# Patient Record
Sex: MALE | Race: WHITE | NOT HISPANIC OR LATINO | URBAN - METROPOLITAN AREA
[De-identification: names, ages, dates, MRNs, and addresses within clinical notes are randomized per-mention and may not be internally consistent; named-entity substitution may affect disease eponyms.]

---

## 2021-10-11 ENCOUNTER — EMERGENCY (EMERGENCY)
Facility: HOSPITAL | Age: 81
LOS: 1 days | Discharge: HOME | End: 2021-10-11
Attending: EMERGENCY MEDICINE | Admitting: EMERGENCY MEDICINE
Payer: COMMERCIAL

## 2021-10-11 VITALS — TEMPERATURE: 99 F

## 2021-10-11 VITALS
WEIGHT: 235.01 LBS | OXYGEN SATURATION: 97 % | RESPIRATION RATE: 19 BRPM | HEART RATE: 89 BPM | SYSTOLIC BLOOD PRESSURE: 191 MMHG | TEMPERATURE: 98 F | DIASTOLIC BLOOD PRESSURE: 78 MMHG

## 2021-10-11 PROCEDURE — 73030 X-RAY EXAM OF SHOULDER: CPT

## 2021-10-11 PROCEDURE — 93971 EXTREMITY STUDY: CPT

## 2021-10-11 PROCEDURE — 71045 X-RAY EXAM CHEST 1 VIEW: CPT

## 2021-10-11 PROCEDURE — 93971 EXTREMITY STUDY: CPT | Mod: 26,RT

## 2021-10-11 PROCEDURE — 73030 X-RAY EXAM OF SHOULDER: CPT | Mod: 26,RT

## 2021-10-11 PROCEDURE — 73060 X-RAY EXAM OF HUMERUS: CPT | Mod: 26,RT

## 2021-10-11 PROCEDURE — 71045 X-RAY EXAM CHEST 1 VIEW: CPT | Mod: 26

## 2021-10-11 PROCEDURE — 99284 EMERGENCY DEPT VISIT MOD MDM: CPT | Mod: 25

## 2021-10-11 PROCEDURE — 73060 X-RAY EXAM OF HUMERUS: CPT

## 2021-10-11 PROCEDURE — 99285 EMERGENCY DEPT VISIT HI MDM: CPT

## 2021-10-11 RX ORDER — ACETAMINOPHEN 500 MG
650 TABLET ORAL ONCE
Refills: 0 | Status: COMPLETED | OUTPATIENT
Start: 2021-10-11 | End: 2021-10-11

## 2021-10-11 RX ADMIN — Medication 650 MILLIGRAM(S): at 15:31

## 2021-10-11 NOTE — ED PROVIDER NOTE - PHYSICAL EXAMINATION
right arm- tenderness to humerus and anterior shoulder, positive ROM, no redness or signs of infection noted, positive radial pulse, less than 2 sec cap refill, pain on ROM

## 2021-10-11 NOTE — ED PROVIDER NOTE - CARE PLAN
Principal Discharge DX:	Arthritis of right shoulder region   1 Principal Discharge DX:	Right arm pain

## 2021-10-11 NOTE — ED PROVIDER NOTE - PATIENT PORTAL LINK FT
no You can access the FollowMyHealth Patient Portal offered by [facility] by registering at the following website: [email or web address]. By joining [facility]’s FollowMyHealth portal, you will also be able to view your health information using other applications (apps) compatible with our system.

## 2021-10-11 NOTE — ED PROVIDER NOTE - CLINICAL SUMMARY MEDICAL DECISION MAKING FREE TEXT BOX
81 yr old male with hx of HTN, HLD, DM presents with atraumatic right arm pain since last night after fixing his belt. Denies any fever, chills, n/v/d, chest pain, sob or any other symptoms.  right arm- tenderness to humerus and anterior shoulder, positive ROM, no redness or signs of infection noted, positive radial pulse, less than 2 sec cap refill, pain on ROM 81 yr old male with hx of HTN, HLD, DM presents with atraumatic right arm pain since last night after fixing his belt. Denies any fever, chills, n/v/d, chest pain, sob or any other symptoms.  right arm- tenderness to humerus and anterior shoulder, positive ROM, no redness or signs of infection noted, positive radial pulse, less than 2 sec cap refill, pain on ROM   xray reviewed 81 yr old male with hx of HTN, HLD, DM presents with atraumatic right arm pain since last night after fixing his belt. Denies any fever, chills, n/v/d, chest pain, sob or any other symptoms.  right arm- tenderness to humerus and anterior shoulder, positive ROM, no redness or signs of infection noted, positive radial pulse, less than 2 sec cap refill, pain on ROM   xray, us - reviewed   RICE, stable for dc and fu with ortho   sling given

## 2021-10-11 NOTE — CHART NOTE - NSCHARTNOTEFT_GEN_A_CORE
SW met with pt at bedside, pts son Nader Raines present as well. SW introduced self and role of SW. Pt presented to ED for arm pain. Pt resides in pvt home with daughter Moira ([phone]), has 2 [address] and a full flight upstairs with hr present. Pt is independent with ADLs and ambulation, denies use of any DME or services. Pt denies any recent falls or safety concerns. Fall prevention and safety checklist reviewed and discussed, copy provided to pt. Pt declined assistance with scheduling follow up care stating he will call his PMD himself to setup appointment. Pt denies need for any community/home based resources. SW met with pt at bedside, pts son Nader Raines present as well. SW introduced self and role of SW. Pt presented to ED for arm pain. Pt resides in pvt home with daughter Moira ([phone]), has 2 [address] and a full flight upstairs with hr present. Pt is independent with ADLs and ambulation, denies use of any DME or services. Pt denies any recent falls or safety concerns. Fall prevention and safety checklist reviewed and discussed, copy provided to pt. Pt declined assistance with scheduling follow up care stating he will call his PMD himself to setup appointment. Pt denies need for any community/home based resources.    PMD: Dr. Phoenix ([phone])  Rx: [facility] in [place] ([phone] SW met with pt at bedside, pts son Nader Raines present as well. SW introduced self and role of SW. Pt presented to ED for arm pain. Pt resides in pvt home with daughter Moiar ([phone]), has 2 [address] and a full flight upstairs with hr present. Pt is independent with ADLs and ambulation, denies use of any DME or services. Pt denies any recent falls or safety concerns. Fall prevention and safety checklist reviewed and discussed, copy provided to pt. Pt declined assistance with scheduling follow up care stating he will call his PMD himself to setup appointment. Pt reports having seen an orthopedic in the past and wishes to go home to confirm name and will schedule himself. Pt denies need for any community/home based resources.    PMD: Dr. Phoenix ([phone])  Rx: [facility] in [place] ([phone]

## 2021-10-11 NOTE — ED PROVIDER NOTE - ATTENDING CONTRIBUTION TO CARE
I personally evaluated the patient. I reviewed the Resident’s or Physician Assistant’s note (as assigned above), and agree with the findings and plan except as documented in my note.  81 yr old male with hx of HTN, HLD, DM presents with atraumatic right arm pain since last night after fixing his belt. Denies any fever, chills, n/v/d, chest pain, sob or any other symptoms.  right arm- tenderness to humerus and anterior shoulder, positive ROM, no redness or signs of infection noted, positive radial pulse, less than 2 sec cap refill, pain on ROM   xray, us - reviewed   sono negative  Most likely muscular tear  CLOVIS, stable for dc and fu with ortho   sling given

## 2021-10-11 NOTE — ED PROVIDER NOTE - PRO INTERPRETER NEED 2
"Behavior  Anxiety 0 with 10 being the worst.   Depression 0 with 10 being the worst.   SI no  HI no  AVH no    1:1 with patient completed. Patient is calm and cooperative in his room. Patient makes good eye contact and is interacting appropriately with staff.  Patient states \"I am feeling pretty good, just tired.\" Patient denies any needs at this time.           Intervention  Instructed in medication usage and effects. Patient encouraged to notify staff of any needs, increased/uncontrolled anxiety/depression, or thoughts to harm self or others.       Response  Patient is agreeable and verbalizes understanding.         Plan  Support offered and will continue to monitor. Q15 minute checks for safety.     " English

## 2021-10-11 NOTE — ED ADULT TRIAGE NOTE - CHIEF COMPLAINT QUOTE
Pt BIB EMS from home for c/o right arm pain Pt BIB EMS from home for c/o right arm pain. ISAR Negative.

## 2021-10-11 NOTE — ED ADULT NURSE NOTE - OBJECTIVE STATEMENT
Patient from home, reports 7/10 right arm pain that began yesterday after buckling his pants. Pt states he also feels numbness and tingling in his right hand. Denies injuring his arm or other complaints at this time

## 2021-10-11 NOTE — ED PROVIDER NOTE - CARE PROVIDER_API CALL
Hector Torres)  Orthopaedic Sports Medicine; Orthopaedic Surgery  [address] [address]  Phone: [phone]  Fax: [phone]  Follow Up Time:

## 2021-10-11 NOTE — ED PROVIDER NOTE - OBJECTIVE STATEMENT
81 yr old male with hx of HTN, HLD, DM presents with atraumatic right arm pain since last night after fixing his belt. Denies any fever, chills, n/v/d, chest pain, sob or any other symptoms.

## 2021-10-11 NOTE — ED PROVIDER NOTE - NSFOLLOWUPINSTRUCTIONS_ED_ALL_ED_FT
Rest, ice, elevate Rest, ice, elevate   Take tylenol 650 mg every  4-6 hours as needed for pain   Return to the ED if any worsening or persistent symptoms.       Arm Pain    WHAT YOU NEED TO KNOW:    Your arm pain may be caused by a number of conditions. Examples include arthritis, nerve problems, or an awkward position while you sleep. X-rays did not show a broken bone in your arm or wrist. Arm pain may be a sign of a serious condition that needs immediate care, such as a heart attack.    DISCHARGE INSTRUCTIONS:    Call your local emergency number (911 in the [place]) for any of the following:   •You have any of the following signs of a heart attack: ?Squeezing, pressure, or pain in your chest      ?You may also have any of the following: ?Discomfort or pain in your back, neck, jaw, stomach, or arm      ?Shortness of breath      ?Nausea or vomiting      ?Lightheadedness or a sudden cold sweat            Return to the emergency department if:   •You have severe pain, or pain that spreads from your arm to other areas.      •You have swelling, tingling, or numbness in your hand or fingers, or the skin turns blue.      •You cannot move your arm.      Call your doctor if:   •You have questions or concerns about your condition or care.          Medicines: You may need any of the following:   •Prescription pain medicine may be given. Ask your healthcare provider how to take this medicine safely. Some prescription pain medicines contain acetaminophen. Do not take other medicines that contain acetaminophen without talking to your healthcare provider. Too much acetaminophen may cause liver damage. Prescription pain medicine may cause constipation. Ask your healthcare provider how to prevent or treat constipation.       •NSAIDs, such as ibuprofen, help decrease swelling, pain, and fever. This medicine is available with or without a doctor's order. NSAIDs can cause stomach bleeding or kidney problems in certain people. If you take blood thinner medicine, always ask your healthcare provider if NSAIDs are safe for you. Always read the medicine label and follow directions.      •Take your medicine as directed. Contact your healthcare provider if you think your medicine is not helping or if you have side effects. Tell him or her if you are allergic to any medicine. Keep a list of the medicines, vitamins, and herbs you take. Include the amounts, and when and why you take them. Bring the list or the pill bottles to follow-up visits. Carry your medicine list with you in case of an emergency.      Self-care:   •Rest your arm as directed. A sling may be used to keep your arm from moving while it heals.      •Apply ice as directed. Ice helps decrease pain and swelling. Ice may also help prevent tissue damage. Use an ice pack, or put crushed ice in a plastic bag. Cover it with a towel. Apply it to your arm for 20 minutes every few hours, or as directed. Ask how many times to apply ice each day, and for how many days.      •Elevate your arm above the level of your heart as often as you can. This will help decrease swelling and pain. Prop your arm on pillows or blankets to keep the area elevated comfortably.             •Adjust your position if you work in front of a computer. You may need arm or wrist supports or change the height of your chair.      •Keep a pain record. Write down when your pain happens and how severe it is. Include any other symptoms you have with your pain. A record will help you keep track of pain cycles. Bring the record with you to your follow-up visits. It may also help your healthcare provider find out what is causing your pain.      Follow up with your doctor as directed: You may need physical therapy. You may need to see an orthopedic specialist. Write down your questions so you remember to ask them during your visits.       © Copyright [employer] 2021           back to top                          © Copyright [employer] 2021

## 2021-10-16 NOTE — CHART NOTE - NSCHARTNOTEFT_GEN_A_CORE
SW called patient to discuss and assist with follow up.  Patient presented to ED on 10/10/21 due to arm pain.  Patient was seen by SW in ED - denied any safety concerns or assistance with scheduling.  Patient not available to speak but SW spoke with daughter.  Daughter confirms patient is scheduled to see PMD on 10/18/21 to discuss referral to orthopedic. Daughter denied any other needs at this time.  Encouraged daughter to call SW is further assistance is needed.

## 2024-11-09 ENCOUNTER — INPATIENT (INPATIENT)
Facility: HOSPITAL | Age: 84
LOS: 2 days | Discharge: HOME | DRG: 948 | End: 2024-11-12
Attending: STUDENT IN AN ORGANIZED HEALTH CARE EDUCATION/TRAINING PROGRAM | Admitting: INTERNAL MEDICINE
Payer: COMMERCIAL

## 2024-11-09 VITALS
TEMPERATURE: 98 F | SYSTOLIC BLOOD PRESSURE: 94 MMHG | WEIGHT: 199.96 LBS | OXYGEN SATURATION: 99 % | DIASTOLIC BLOOD PRESSURE: 47 MMHG | RESPIRATION RATE: 18 BRPM | HEART RATE: 61 BPM

## 2024-11-09 DIAGNOSIS — R53.1 WEAKNESS: ICD-10-CM

## 2024-11-09 LAB
ALBUMIN SERPL ELPH-MCNC: 3.9 G/DL — SIGNIFICANT CHANGE UP (ref 3.3–5)
ALP SERPL-CCNC: 70 U/L — SIGNIFICANT CHANGE UP (ref 40–120)
ALT FLD-CCNC: 22 U/L — SIGNIFICANT CHANGE UP (ref 10–45)
ANION GAP SERPL CALC-SCNC: 7 MMOL/L — SIGNIFICANT CHANGE UP (ref 5–17)
APTT BLD: 24.1 SEC — LOW (ref 24.5–35.6)
AST SERPL-CCNC: 16 U/L — SIGNIFICANT CHANGE UP (ref 10–40)
BASOPHILS # BLD AUTO: 0.05 K/UL — SIGNIFICANT CHANGE UP (ref 0–0.2)
BASOPHILS NFR BLD AUTO: 0.4 % — SIGNIFICANT CHANGE UP (ref 0–2)
BILIRUB SERPL-MCNC: 0.7 MG/DL — SIGNIFICANT CHANGE UP (ref 0.2–1.2)
BLD GP AB SCN SERPL QL: SIGNIFICANT CHANGE UP
BUN SERPL-MCNC: 82 MG/DL — HIGH (ref 7–23)
CALCIUM SERPL-MCNC: 9.2 MG/DL — SIGNIFICANT CHANGE UP (ref 8.4–10.5)
CHLORIDE SERPL-SCNC: 104 MMOL/L — SIGNIFICANT CHANGE UP (ref 96–108)
CO2 SERPL-SCNC: 31 MMOL/L — SIGNIFICANT CHANGE UP (ref 22–31)
CREAT SERPL-MCNC: 3.07 MG/DL — HIGH (ref 0.5–1.3)
EGFR: 19 ML/MIN/1.73M2 — LOW
EOSINOPHIL # BLD AUTO: 0.47 K/UL — SIGNIFICANT CHANGE UP (ref 0–0.5)
EOSINOPHIL NFR BLD AUTO: 3.5 % — SIGNIFICANT CHANGE UP (ref 0–6)
FLUAV AG NPH QL: SIGNIFICANT CHANGE UP
FLUBV AG NPH QL: SIGNIFICANT CHANGE UP
GLUCOSE BLDC GLUCOMTR-MCNC: 118 MG/DL — HIGH (ref 70–99)
GLUCOSE BLDC GLUCOMTR-MCNC: 87 MG/DL — SIGNIFICANT CHANGE UP (ref 70–99)
GLUCOSE SERPL-MCNC: 91 MG/DL — SIGNIFICANT CHANGE UP (ref 70–99)
HCT VFR BLD CALC: 32 % — LOW (ref 39–50)
HGB BLD-MCNC: 10.9 G/DL — LOW (ref 13–17)
IMM GRANULOCYTES NFR BLD AUTO: 0.4 % — SIGNIFICANT CHANGE UP (ref 0–0.9)
INR BLD: 1.21 RATIO — HIGH (ref 0.85–1.16)
LACTATE SERPL-SCNC: 1.2 MMOL/L — SIGNIFICANT CHANGE UP (ref 0.7–2)
LYMPHOCYTES # BLD AUTO: 16.4 % — SIGNIFICANT CHANGE UP (ref 13–44)
LYMPHOCYTES # BLD AUTO: 2.19 K/UL — SIGNIFICANT CHANGE UP (ref 1–3.3)
MCHC RBC-ENTMCNC: 30.4 PG — SIGNIFICANT CHANGE UP (ref 27–34)
MCHC RBC-ENTMCNC: 34.1 G/DL — SIGNIFICANT CHANGE UP (ref 32–36)
MCV RBC AUTO: 89.1 FL — SIGNIFICANT CHANGE UP (ref 80–100)
MONOCYTES # BLD AUTO: 1.02 K/UL — HIGH (ref 0–0.9)
MONOCYTES NFR BLD AUTO: 7.6 % — SIGNIFICANT CHANGE UP (ref 2–14)
NEUTROPHILS # BLD AUTO: 9.58 K/UL — HIGH (ref 1.8–7.4)
NEUTROPHILS NFR BLD AUTO: 71.7 % — SIGNIFICANT CHANGE UP (ref 43–77)
NRBC # BLD: 0 /100 WBCS — SIGNIFICANT CHANGE UP (ref 0–0)
PLATELET # BLD AUTO: 176 K/UL — SIGNIFICANT CHANGE UP (ref 150–400)
POTASSIUM SERPL-MCNC: 4.1 MMOL/L — SIGNIFICANT CHANGE UP (ref 3.5–5.3)
POTASSIUM SERPL-SCNC: 4.1 MMOL/L — SIGNIFICANT CHANGE UP (ref 3.5–5.3)
PROT SERPL-MCNC: 7.1 G/DL — SIGNIFICANT CHANGE UP (ref 6–8.3)
PROTHROM AB SERPL-ACNC: 14.2 SEC — HIGH (ref 9.9–13.4)
RBC # BLD: 3.59 M/UL — LOW (ref 4.2–5.8)
RBC # FLD: 13.9 % — SIGNIFICANT CHANGE UP (ref 10.3–14.5)
RSV RNA NPH QL NAA+NON-PROBE: SIGNIFICANT CHANGE UP
SARS-COV-2 RNA SPEC QL NAA+PROBE: SIGNIFICANT CHANGE UP
SODIUM SERPL-SCNC: 142 MMOL/L — SIGNIFICANT CHANGE UP (ref 135–145)
TROPONIN I, HIGH SENSITIVITY RESULT: 10.9 NG/L — SIGNIFICANT CHANGE UP
TROPONIN I, HIGH SENSITIVITY RESULT: 13.9 NG/L — SIGNIFICANT CHANGE UP
TSH SERPL-MCNC: 6.28 UIU/ML — HIGH (ref 0.36–3.74)
WBC # BLD: 13.37 K/UL — HIGH (ref 3.8–10.5)
WBC # FLD AUTO: 13.37 K/UL — HIGH (ref 3.8–10.5)

## 2024-11-09 PROCEDURE — 99223 1ST HOSP IP/OBS HIGH 75: CPT | Mod: GC

## 2024-11-09 PROCEDURE — 70450 CT HEAD/BRAIN W/O DYE: CPT | Mod: 26,MC

## 2024-11-09 PROCEDURE — 71045 X-RAY EXAM CHEST 1 VIEW: CPT | Mod: 26

## 2024-11-09 PROCEDURE — 99285 EMERGENCY DEPT VISIT HI MDM: CPT

## 2024-11-09 PROCEDURE — 93010 ELECTROCARDIOGRAM REPORT: CPT

## 2024-11-09 RX ORDER — CLONIDINE HYDROCHLORIDE 0.2 MG/1
1 TABLET ORAL
Refills: 0 | DISCHARGE

## 2024-11-09 RX ORDER — MELATONIN 5 MG
3 TABLET ORAL AT BEDTIME
Refills: 0 | Status: DISCONTINUED | OUTPATIENT
Start: 2024-11-09 | End: 2024-11-12

## 2024-11-09 RX ORDER — INSULIN GLARGINE,HUM.REC.ANLOG 100/ML
24 VIAL (ML) SUBCUTANEOUS
Refills: 0 | DISCHARGE

## 2024-11-09 RX ORDER — MAGNESIUM, ALUMINUM HYDROXIDE 200-200 MG
30 TABLET,CHEWABLE ORAL EVERY 4 HOURS
Refills: 0 | Status: DISCONTINUED | OUTPATIENT
Start: 2024-11-09 | End: 2024-11-12

## 2024-11-09 RX ORDER — INSULIN LISPRO 100/ML
12 VIAL (ML) SUBCUTANEOUS
Refills: 0 | DISCHARGE

## 2024-11-09 RX ORDER — CHLORTHALIDONE 25 MG
1 TABLET ORAL
Refills: 0 | DISCHARGE

## 2024-11-09 RX ORDER — LINAGLIPTIN 5 MG/1
1 TABLET, FILM COATED ORAL
Refills: 0 | DISCHARGE

## 2024-11-09 RX ORDER — LEVOTHYROXINE SODIUM 88 MCG
1 TABLET ORAL
Refills: 0 | DISCHARGE

## 2024-11-09 RX ORDER — ROSUVASTATIN CALCIUM 10 MG
1 TABLET ORAL
Refills: 0 | DISCHARGE

## 2024-11-09 RX ORDER — ONDANSETRON HYDROCHLORIDE 2 MG/ML
4 INJECTION, SOLUTION INTRAMUSCULAR; INTRAVENOUS EVERY 8 HOURS
Refills: 0 | Status: DISCONTINUED | OUTPATIENT
Start: 2024-11-09 | End: 2024-11-12

## 2024-11-09 RX ORDER — SODIUM CHLORIDE 9 MG/ML
1000 INJECTION, SOLUTION INTRAMUSCULAR; INTRAVENOUS; SUBCUTANEOUS ONCE
Refills: 0 | Status: COMPLETED | OUTPATIENT
Start: 2024-11-09 | End: 2024-11-09

## 2024-11-09 RX ORDER — ACETAMINOPHEN 500 MG
650 TABLET ORAL EVERY 6 HOURS
Refills: 0 | Status: DISCONTINUED | OUTPATIENT
Start: 2024-11-09 | End: 2024-11-12

## 2024-11-09 RX ORDER — PROPRANOLOL HCL 60 MG
1 TABLET ORAL
Refills: 0 | DISCHARGE

## 2024-11-09 RX ORDER — HYDRALAZINE HYDROCHLORIDE 50 MG/1
1 TABLET, FILM COATED ORAL
Refills: 0 | DISCHARGE

## 2024-11-09 RX ADMIN — SODIUM CHLORIDE 2000 MILLILITER(S): 9 INJECTION, SOLUTION INTRAMUSCULAR; INTRAVENOUS; SUBCUTANEOUS at 20:41

## 2024-11-09 RX ADMIN — SODIUM CHLORIDE 1000 MILLILITER(S): 9 INJECTION, SOLUTION INTRAMUSCULAR; INTRAVENOUS; SUBCUTANEOUS at 22:00

## 2024-11-09 NOTE — ED PROVIDER NOTE - CARE PLAN
Principal Discharge DX:	Weakness  Secondary Diagnosis:	Near syncope  Secondary Diagnosis:	Elevated serum creatinine   1

## 2024-11-09 NOTE — ED PROVIDER NOTE - OBJECTIVE STATEMENT
84-year-old male presents to the emergency department status post extreme weakness.  Appeared near syncopal.  It is possible that patient had a syncopal episode however he was supported by his family and scouts as he was at a veterans dinner when this occurred.  Patient states he had his insulin and then had dinner at around 6 PM.  Event occurred around 7:30 PM.  There was no fall or trauma.  Daughter states that she was sitting next to him at the veterans dinner and he kept leaning to the side.  She kept prompting him to sit up straight and then realized that maybe he was tired and needed to go home.  She asked for him to stand up so they can get going and he could not.  Some scouts at the event came to assist and he was unable to get up.  EMS noted that he was hypotensive at the scene with diastolic of 40.  Systolic of the 100s.  Upon arrival, fingerstick favorable.  Patient denies pain.  States he has some weakness.  No other complaints reported.  Patient has diabetes, hypertension, hyperlipidemia, pacemaker, on Eliquis, hypothyroidism on levothyroxine.

## 2024-11-09 NOTE — ED ADULT NURSE NOTE - NSFALLHARMRISKINTERV_ED_ALL_ED

## 2024-11-09 NOTE — ED ADULT NURSE NOTE - OBJECTIVE STATEMENT
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

## 2024-11-09 NOTE — ED PROVIDER NOTE - CLINICAL SUMMARY MEDICAL DECISION MAKING FREE TEXT BOX
84-year-old male presents to the emergency department status post extreme weakness.  Appeared near syncopal.  It is possible that patient had a syncopal episode however he was supported by his family and scouts as he was at a veterans dinner when this occurred.  Patient states he had his insulin and then had dinner at around 6 PM.  Event occurred around 7:30 PM.  There was no fall or trauma.  Daughter states that she was sitting next to him at the veterans dinner and he kept leaning to the side.  She kept prompting him to sit up straight and then realized that maybe he was tired and needed to go home.  She asked for him to stand up so they can get going and he could not.  Some scouts at the event came to assist and he was unable to get up.  EMS noted that he was hypotensive at the scene with diastolic of 40.  Systolic of the 100s.  Upon arrival, fingerstick favorable.  Patient denies pain.  States he has some weakness.  No other complaints reported.  Patient has diabetes, hypertension, hyperlipidemia, pacemaker, on Eliquis, hypothyroidism on levothyroxine.Exam as stated.  Patient neurologically intact.  EKG steady rate at 60.  Blood pressure values reading low.  98/40.  Plan for IV fluids.  Patient agreeable to admission.  CT head negative. Plan for admission.

## 2024-11-09 NOTE — ED PROVIDER NOTE - PHYSICAL EXAMINATION
Vitals: I have reviewed the patients vital signs  General: nontoxic appearing  HEENT: Atraumatic, normocephalic, airway patent, dry oral mucosa  Eyes: EOMI, tracking appropriately  Neck: no tracheal deviation  Chest/Lungs: no trauma, symmetric chest rise, speaking in complete sentences,  no resp distress, clear lungs b/l.  Heart: skin and extremities well perfused, regular rate and rhythm  Neuro: A+Ox3, appears non focal, cn 3-12 intact.   MSK: strength at baseline in all extremities, no muscle wasting or atrophy  Skin: no cyanosis, no jaundice, mild pallor.

## 2024-11-10 DIAGNOSIS — Z95.0 PRESENCE OF CARDIAC PACEMAKER: Chronic | ICD-10-CM

## 2024-11-10 DIAGNOSIS — Z90.79 ACQUIRED ABSENCE OF OTHER GENITAL ORGAN(S): Chronic | ICD-10-CM

## 2024-11-10 DIAGNOSIS — E89.0 POSTPROCEDURAL HYPOTHYROIDISM: Chronic | ICD-10-CM

## 2024-11-10 PROBLEM — E11.9 TYPE 2 DIABETES MELLITUS WITHOUT COMPLICATIONS: Chronic | Status: ACTIVE | Noted: 2021-10-11

## 2024-11-10 PROBLEM — E78.5 HYPERLIPIDEMIA, UNSPECIFIED: Chronic | Status: ACTIVE | Noted: 2021-10-11

## 2024-11-10 PROBLEM — I10 ESSENTIAL (PRIMARY) HYPERTENSION: Chronic | Status: ACTIVE | Noted: 2021-10-11

## 2024-11-10 LAB
A1C WITH ESTIMATED AVERAGE GLUCOSE RESULT: 7.4 % — HIGH (ref 4–5.6)
ALBUMIN SERPL ELPH-MCNC: 3.5 G/DL — SIGNIFICANT CHANGE UP (ref 3.3–5)
ALP SERPL-CCNC: 65 U/L — SIGNIFICANT CHANGE UP (ref 40–120)
ALT FLD-CCNC: 23 U/L — SIGNIFICANT CHANGE UP (ref 10–45)
ANION GAP SERPL CALC-SCNC: 8 MMOL/L — SIGNIFICANT CHANGE UP (ref 5–17)
APPEARANCE UR: CLEAR — SIGNIFICANT CHANGE UP
AST SERPL-CCNC: 20 U/L — SIGNIFICANT CHANGE UP (ref 10–40)
BACTERIA # UR AUTO: NEGATIVE /HPF — SIGNIFICANT CHANGE UP
BILIRUB SERPL-MCNC: 0.5 MG/DL — SIGNIFICANT CHANGE UP (ref 0.2–1.2)
BILIRUB UR-MCNC: NEGATIVE — SIGNIFICANT CHANGE UP
BUN SERPL-MCNC: 85 MG/DL — HIGH (ref 7–23)
CALCIUM SERPL-MCNC: 8.7 MG/DL — SIGNIFICANT CHANGE UP (ref 8.4–10.5)
CHLORIDE SERPL-SCNC: 104 MMOL/L — SIGNIFICANT CHANGE UP (ref 96–108)
CHOLEST SERPL-MCNC: 124 MG/DL — SIGNIFICANT CHANGE UP
CO2 SERPL-SCNC: 29 MMOL/L — SIGNIFICANT CHANGE UP (ref 22–31)
COLOR SPEC: YELLOW — SIGNIFICANT CHANGE UP
CREAT SERPL-MCNC: 3.01 MG/DL — HIGH (ref 0.5–1.3)
DIFF PNL FLD: NEGATIVE — SIGNIFICANT CHANGE UP
EGFR: 20 ML/MIN/1.73M2 — LOW
EPI CELLS # UR: SIGNIFICANT CHANGE UP
ESTIMATED AVERAGE GLUCOSE: 166 MG/DL — HIGH (ref 68–114)
GLUCOSE BLDC GLUCOMTR-MCNC: 110 MG/DL — HIGH (ref 70–99)
GLUCOSE BLDC GLUCOMTR-MCNC: 225 MG/DL — HIGH (ref 70–99)
GLUCOSE BLDC GLUCOMTR-MCNC: 255 MG/DL — HIGH (ref 70–99)
GLUCOSE BLDC GLUCOMTR-MCNC: 309 MG/DL — HIGH (ref 70–99)
GLUCOSE SERPL-MCNC: 219 MG/DL — HIGH (ref 70–99)
GLUCOSE UR QL: NEGATIVE MG/DL — SIGNIFICANT CHANGE UP
HCT VFR BLD CALC: 31.6 % — LOW (ref 39–50)
HDLC SERPL-MCNC: 35 MG/DL — LOW
HGB BLD-MCNC: 10.4 G/DL — LOW (ref 13–17)
KETONES UR-MCNC: NEGATIVE MG/DL — SIGNIFICANT CHANGE UP
LEUKOCYTE ESTERASE UR-ACNC: NEGATIVE — SIGNIFICANT CHANGE UP
LIPID PNL WITH DIRECT LDL SERPL: 51 MG/DL — SIGNIFICANT CHANGE UP
MCHC RBC-ENTMCNC: 29.8 PG — SIGNIFICANT CHANGE UP (ref 27–34)
MCHC RBC-ENTMCNC: 32.9 G/DL — SIGNIFICANT CHANGE UP (ref 32–36)
MCV RBC AUTO: 90.5 FL — SIGNIFICANT CHANGE UP (ref 80–100)
NITRITE UR-MCNC: NEGATIVE — SIGNIFICANT CHANGE UP
NON HDL CHOLESTEROL: 89 MG/DL — SIGNIFICANT CHANGE UP
NRBC # BLD: 0 /100 WBCS — SIGNIFICANT CHANGE UP (ref 0–0)
PH UR: 6 — SIGNIFICANT CHANGE UP (ref 5–8)
PLATELET # BLD AUTO: 158 K/UL — SIGNIFICANT CHANGE UP (ref 150–400)
POTASSIUM SERPL-MCNC: 4.5 MMOL/L — SIGNIFICANT CHANGE UP (ref 3.5–5.3)
POTASSIUM SERPL-SCNC: 4.5 MMOL/L — SIGNIFICANT CHANGE UP (ref 3.5–5.3)
PROT SERPL-MCNC: 6.8 G/DL — SIGNIFICANT CHANGE UP (ref 6–8.3)
PROT UR-MCNC: NEGATIVE MG/DL — SIGNIFICANT CHANGE UP
RBC # BLD: 3.49 M/UL — LOW (ref 4.2–5.8)
RBC # FLD: 14.1 % — SIGNIFICANT CHANGE UP (ref 10.3–14.5)
RBC CASTS # UR COMP ASSIST: 0 /HPF — SIGNIFICANT CHANGE UP (ref 0–4)
SODIUM SERPL-SCNC: 141 MMOL/L — SIGNIFICANT CHANGE UP (ref 135–145)
SP GR SPEC: 1.01 — SIGNIFICANT CHANGE UP (ref 1–1.03)
T3 SERPL-MCNC: 69 NG/DL — LOW (ref 80–200)
T4 AB SER-ACNC: 8.7 UG/DL — SIGNIFICANT CHANGE UP (ref 4.6–12)
T4 FREE SERPL-MCNC: 1.5 NG/DL — SIGNIFICANT CHANGE UP (ref 0.9–1.8)
TRIGL SERPL-MCNC: 237 MG/DL — HIGH
TROPONIN I, HIGH SENSITIVITY RESULT: 13.4 NG/L — SIGNIFICANT CHANGE UP
UROBILINOGEN FLD QL: 0.2 MG/DL — SIGNIFICANT CHANGE UP (ref 0.2–1)
WBC # BLD: 8.75 K/UL — SIGNIFICANT CHANGE UP (ref 3.8–10.5)
WBC # FLD AUTO: 8.75 K/UL — SIGNIFICANT CHANGE UP (ref 3.8–10.5)
WBC UR QL: 0 /HPF — SIGNIFICANT CHANGE UP (ref 0–5)

## 2024-11-10 PROCEDURE — 93306 TTE W/DOPPLER COMPLETE: CPT | Mod: 26

## 2024-11-10 PROCEDURE — 99222 1ST HOSP IP/OBS MODERATE 55: CPT

## 2024-11-10 PROCEDURE — 93880 EXTRACRANIAL BILAT STUDY: CPT | Mod: 26

## 2024-11-10 PROCEDURE — 99233 SBSQ HOSP IP/OBS HIGH 50: CPT

## 2024-11-10 RX ORDER — ERGOCALCIFEROL (VITAMIN D2) 200 MCG/ML
50000 DROPS ORAL
Refills: 0 | Status: DISCONTINUED | OUTPATIENT
Start: 2024-11-10 | End: 2024-11-10

## 2024-11-10 RX ORDER — INSULIN GLARGINE,HUM.REC.ANLOG 100/ML
12 VIAL (ML) SUBCUTANEOUS AT BEDTIME
Refills: 0 | Status: DISCONTINUED | OUTPATIENT
Start: 2024-11-10 | End: 2024-11-11

## 2024-11-10 RX ORDER — ERGOCALCIFEROL (VITAMIN D2) 200 MCG/ML
50000 DROPS ORAL
Refills: 0 | Status: DISCONTINUED | OUTPATIENT
Start: 2024-11-13 | End: 2024-11-12

## 2024-11-10 RX ORDER — ROSUVASTATIN CALCIUM 10 MG
5 TABLET ORAL AT BEDTIME
Refills: 0 | Status: DISCONTINUED | OUTPATIENT
Start: 2024-11-10 | End: 2024-11-12

## 2024-11-10 RX ORDER — CLONIDINE HYDROCHLORIDE 0.2 MG/1
0.2 TABLET ORAL
Refills: 0 | Status: DISCONTINUED | OUTPATIENT
Start: 2024-11-10 | End: 2024-11-10

## 2024-11-10 RX ORDER — ENALAPRIL MALEATE 10 MG
20 TABLET ORAL
Refills: 0 | Status: DISCONTINUED | OUTPATIENT
Start: 2024-11-10 | End: 2024-11-10

## 2024-11-10 RX ORDER — INSULIN LISPRO 100/ML
VIAL (ML) SUBCUTANEOUS AT BEDTIME
Refills: 0 | Status: DISCONTINUED | OUTPATIENT
Start: 2024-11-10 | End: 2024-11-12

## 2024-11-10 RX ORDER — LEVOTHYROXINE SODIUM 88 MCG
150 TABLET ORAL DAILY
Refills: 0 | Status: DISCONTINUED | OUTPATIENT
Start: 2024-11-10 | End: 2024-11-12

## 2024-11-10 RX ORDER — HYDRALAZINE HYDROCHLORIDE 50 MG/1
100 TABLET, FILM COATED ORAL THREE TIMES A DAY
Refills: 0 | Status: DISCONTINUED | OUTPATIENT
Start: 2024-11-10 | End: 2024-11-10

## 2024-11-10 RX ORDER — INSULIN LISPRO 100/ML
VIAL (ML) SUBCUTANEOUS
Refills: 0 | Status: DISCONTINUED | OUTPATIENT
Start: 2024-11-10 | End: 2024-11-12

## 2024-11-10 RX ORDER — PROPRANOLOL HCL 60 MG
80 TABLET ORAL DAILY
Refills: 0 | Status: DISCONTINUED | OUTPATIENT
Start: 2024-11-10 | End: 2024-11-12

## 2024-11-10 RX ORDER — HYDRALAZINE HYDROCHLORIDE 50 MG/1
75 TABLET, FILM COATED ORAL THREE TIMES A DAY
Refills: 0 | Status: DISCONTINUED | OUTPATIENT
Start: 2024-11-10 | End: 2024-11-12

## 2024-11-10 RX ORDER — FUROSEMIDE 40 MG
1 TABLET ORAL
Refills: 0 | DISCHARGE

## 2024-11-10 RX ORDER — CLONIDINE HYDROCHLORIDE 0.2 MG/1
0.2 TABLET ORAL
Refills: 0 | Status: DISCONTINUED | OUTPATIENT
Start: 2024-11-10 | End: 2024-11-12

## 2024-11-10 RX ORDER — ASPIRIN/MAG CARB/ALUMINUM AMIN 325 MG
325 TABLET ORAL DAILY
Refills: 0 | Status: DISCONTINUED | OUTPATIENT
Start: 2024-11-10 | End: 2024-11-10

## 2024-11-10 RX ORDER — CLONIDINE HYDROCHLORIDE 0.2 MG/1
0.1 TABLET ORAL
Refills: 0 | Status: DISCONTINUED | OUTPATIENT
Start: 2024-11-10 | End: 2024-11-10

## 2024-11-10 RX ORDER — ASPIRIN/MAG CARB/ALUMINUM AMIN 325 MG
81 TABLET ORAL DAILY
Refills: 0 | Status: DISCONTINUED | OUTPATIENT
Start: 2024-11-10 | End: 2024-11-10

## 2024-11-10 RX ORDER — GLUCAGON INJECTION, SOLUTION 1 MG/.2ML
1 INJECTION, SOLUTION SUBCUTANEOUS ONCE
Refills: 0 | Status: DISCONTINUED | OUTPATIENT
Start: 2024-11-10 | End: 2024-11-12

## 2024-11-10 RX ORDER — INSULIN LISPRO 100/ML
6 VIAL (ML) SUBCUTANEOUS
Refills: 0 | Status: DISCONTINUED | OUTPATIENT
Start: 2024-11-10 | End: 2024-11-11

## 2024-11-10 RX ORDER — APIXABAN 5 MG/1
1 TABLET, FILM COATED ORAL
Refills: 0 | DISCHARGE

## 2024-11-10 RX ORDER — ERGOCALCIFEROL (VITAMIN D2) 200 MCG/ML
1.25 DROPS ORAL
Refills: 0 | DISCHARGE

## 2024-11-10 RX ORDER — APIXABAN 5 MG/1
2.5 TABLET, FILM COATED ORAL
Refills: 0 | Status: DISCONTINUED | OUTPATIENT
Start: 2024-11-10 | End: 2024-11-12

## 2024-11-10 RX ADMIN — Medication 6 UNIT(S): at 12:14

## 2024-11-10 RX ADMIN — Medication 6: at 17:05

## 2024-11-10 RX ADMIN — HYDRALAZINE HYDROCHLORIDE 75 MILLIGRAM(S): 50 TABLET, FILM COATED ORAL at 13:41

## 2024-11-10 RX ADMIN — Medication 6 UNIT(S): at 08:02

## 2024-11-10 RX ADMIN — Medication 12 UNIT(S): at 21:39

## 2024-11-10 RX ADMIN — APIXABAN 2.5 MILLIGRAM(S): 5 TABLET, FILM COATED ORAL at 17:04

## 2024-11-10 RX ADMIN — Medication 6 UNIT(S): at 17:05

## 2024-11-10 RX ADMIN — APIXABAN 2.5 MILLIGRAM(S): 5 TABLET, FILM COATED ORAL at 06:17

## 2024-11-10 RX ADMIN — CLONIDINE HYDROCHLORIDE 0.2 MILLIGRAM(S): 0.2 TABLET ORAL at 06:17

## 2024-11-10 RX ADMIN — Medication 4: at 07:59

## 2024-11-10 RX ADMIN — HYDRALAZINE HYDROCHLORIDE 75 MILLIGRAM(S): 50 TABLET, FILM COATED ORAL at 21:33

## 2024-11-10 RX ADMIN — Medication 80 MILLIGRAM(S): at 20:50

## 2024-11-10 RX ADMIN — Medication 8: at 12:14

## 2024-11-10 RX ADMIN — CLONIDINE HYDROCHLORIDE 0.2 MILLIGRAM(S): 0.2 TABLET ORAL at 18:00

## 2024-11-10 RX ADMIN — Medication 5 MILLIGRAM(S): at 21:33

## 2024-11-10 RX ADMIN — Medication 150 MICROGRAM(S): at 06:17

## 2024-11-10 NOTE — PROGRESS NOTE ADULT - SUBJECTIVE AND OBJECTIVE BOX
CC: Patient is a 84y old  Male who presents with a chief complaint of     Interval History: Patient seen and examined at bedside. No acute overnight events. No complaints this morning.    ALLERGIES:  No Known Allergies    MEDICATIONS  (STANDING):  apixaban 2.5 milliGRAM(s) Oral two times a day  cloNIDine 0.2 milliGRAM(s) Oral two times a day  dextrose 5%. 1000 milliLiter(s) (50 mL/Hr) IV Continuous <Continuous>  dextrose 5%. 1000 milliLiter(s) (100 mL/Hr) IV Continuous <Continuous>  dextrose 50% Injectable 12.5 Gram(s) IV Push once  dextrose 50% Injectable 25 Gram(s) IV Push once  dextrose 50% Injectable 25 Gram(s) IV Push once  ergocalciferol 88669 Unit(s) Oral every week  glucagon  Injectable 1 milliGRAM(s) IntraMuscular once  insulin glargine Injectable (LANTUS) 12 Unit(s) SubCutaneous at bedtime  insulin lispro (ADMELOG) corrective regimen sliding scale   SubCutaneous at bedtime  insulin lispro (ADMELOG) corrective regimen sliding scale   SubCutaneous three times a day before meals  insulin lispro Injectable (ADMELOG) 6 Unit(s) SubCutaneous three times a day before meals  levothyroxine 150 MICROGram(s) Oral daily  propranolol 80 milliGRAM(s) Oral daily  rosuvastatin 5 milliGRAM(s) Oral at bedtime    MEDICATIONS  (PRN):  acetaminophen     Tablet .. 650 milliGRAM(s) Oral every 6 hours PRN Temp greater or equal to 38C (100.4F), Mild Pain (1 - 3)  aluminum hydroxide/magnesium hydroxide/simethicone Suspension 30 milliLiter(s) Oral every 4 hours PRN Dyspepsia  dextrose Oral Gel 15 Gram(s) Oral once PRN Blood Glucose LESS THAN 70 milliGRAM(s)/deciliter  melatonin 3 milliGRAM(s) Oral at bedtime PRN Insomnia  ondansetron Injectable 4 milliGRAM(s) IV Push every 8 hours PRN Nausea and/or Vomiting    Vital Signs Last 24 Hrs  T(F): 98.2 (10 Nov 2024 06:15), Max: 98.2 (09 Nov 2024 20:03)  HR: 61 (10 Nov 2024 06:15) (59 - 65)  BP: 169/66 (10 Nov 2024 06:15) (94/47 - 177/74)  RR: 18 (10 Nov 2024 06:15) (17 - 18)  SpO2: 93% (10 Nov 2024 06:15) (93% - 100%)  I&O's Summary    09 Nov 2024 07:01  -  10 Nov 2024 07:00  --------------------------------------------------------  IN: 0 mL / OUT: 500 mL / NET: -500 mL          PHYSICAL EXAM:  GENERAL: pt laying in bed in NAD  CHEST/LUNG: Clear to percussion bilaterally; No rales, rhonchi, wheezing, or rubs; normal respiratory effort   HEART: Regular rate and rhythm; No murmurs noted  ABDOMEN: Soft, Nontender, Nondistended; Bowel sounds present   MUSCULOSKELETAL/EXTREMITIES:   FROM of extremities no edema noted to BLE  NERVOUS SYSTEM:   Sensation intact; follows commands  PSYCH: Appropriate affect, Alert & Oriented x 3     LABS:                        10.4   8.75  )-----------( 158      ( 10 Nov 2024 06:55 )             31.6       11-10    141  |  104  |  85  ----------------------------<  219  4.5   |  29  |  3.01    Ca    8.7      10 Nov 2024 06:55    TPro  6.8  /  Alb  3.5  /  TBili  0.5  /  DBili  x   /  AST  20  /  ALT  23  /  AlkPhos  65  11-10       PT/INR - ( 09 Nov 2024 20:23 )   PT: 14.2 sec;   INR: 1.21 ratio         PTT - ( 09 Nov 2024 20:23 )  PTT:24.1 sec   Lactate, Blood: 1.2 mmol/L (11-09 @ 20:23)    CARDIAC MARKERS ( 10 Nov 2024 06:55 )  x     / 13.4 ng/L / x     / x     / x      CARDIAC MARKERS ( 09 Nov 2024 23:03 )  x     / 13.9 ng/L / x     / x     / x      CARDIAC MARKERS ( 09 Nov 2024 20:23 )  x     / 10.9 ng/L / x     / x     / x            TSH 6.283   TSH with FT4 reflex --  Total T3 --              POCT Blood Glucose.: 225 mg/dL (10 Nov 2024 07:56)  POCT Blood Glucose.: 118 mg/dL (09 Nov 2024 23:58)  POCT Blood Glucose.: 87 mg/dL (09 Nov 2024 22:58)  POCT Blood Glucose.: 93 mg/dL (09 Nov 2024 20:09)      Urinalysis Basic - ( 10 Nov 2024 06:55 )    Color: x / Appearance: x / SG: x / pH: x  Gluc: 219 mg/dL / Ketone: x  / Bili: x / Urobili: x   Blood: x / Protein: x / Nitrite: x   Leuk Esterase: x / RBC: x / WBC x   Sq Epi: x / Non Sq Epi: x / Bacteria: x            Care Discussed with Consultants/Other Providers: Yes   CC: Patient is a 84y old  Male who presents with a chief complaint of     Interval History: Patient seen and examined at bedside. No acute overnight events. No complaints this morning.    ALLERGIES:  No Known Allergies    MEDICATIONS  (STANDING):  apixaban 2.5 milliGRAM(s) Oral two times a day  cloNIDine 0.2 milliGRAM(s) Oral two times a day  dextrose 5%. 1000 milliLiter(s) (50 mL/Hr) IV Continuous <Continuous>  dextrose 5%. 1000 milliLiter(s) (100 mL/Hr) IV Continuous <Continuous>  dextrose 50% Injectable 12.5 Gram(s) IV Push once  dextrose 50% Injectable 25 Gram(s) IV Push once  dextrose 50% Injectable 25 Gram(s) IV Push once  ergocalciferol 13200 Unit(s) Oral every week  glucagon  Injectable 1 milliGRAM(s) IntraMuscular once  insulin glargine Injectable (LANTUS) 12 Unit(s) SubCutaneous at bedtime  insulin lispro (ADMELOG) corrective regimen sliding scale   SubCutaneous at bedtime  insulin lispro (ADMELOG) corrective regimen sliding scale   SubCutaneous three times a day before meals  insulin lispro Injectable (ADMELOG) 6 Unit(s) SubCutaneous three times a day before meals  levothyroxine 150 MICROGram(s) Oral daily  propranolol 80 milliGRAM(s) Oral daily  rosuvastatin 5 milliGRAM(s) Oral at bedtime    MEDICATIONS  (PRN):  acetaminophen     Tablet .. 650 milliGRAM(s) Oral every 6 hours PRN Temp greater or equal to 38C (100.4F), Mild Pain (1 - 3)  aluminum hydroxide/magnesium hydroxide/simethicone Suspension 30 milliLiter(s) Oral every 4 hours PRN Dyspepsia  dextrose Oral Gel 15 Gram(s) Oral once PRN Blood Glucose LESS THAN 70 milliGRAM(s)/deciliter  melatonin 3 milliGRAM(s) Oral at bedtime PRN Insomnia  ondansetron Injectable 4 milliGRAM(s) IV Push every 8 hours PRN Nausea and/or Vomiting    Vital Signs Last 24 Hrs  T(F): 98.2 (10 Nov 2024 06:15), Max: 98.2 (09 Nov 2024 20:03)  HR: 61 (10 Nov 2024 06:15) (59 - 65)  BP: 169/66 (10 Nov 2024 06:15) (94/47 - 177/74)  RR: 18 (10 Nov 2024 06:15) (17 - 18)  SpO2: 93% (10 Nov 2024 06:15) (93% - 100%)  I&O's Summary    09 Nov 2024 07:01  -  10 Nov 2024 07:00  --------------------------------------------------------  IN: 0 mL / OUT: 500 mL / NET: -500 mL          PHYSICAL EXAM:  GENERAL: pt laying in bed in NAD  CHEST/LUNG: Clear to percussion bilaterally; No rales, rhonchi, wheezing, or rubs; normal respiratory effort   HEART: Regular rate and rhythm; No murmurs noted  ABDOMEN: Soft, Nontender, Nondistended; Bowel sounds present   MUSCULOSKELETAL/EXTREMITIES:   FROM of extremities no edema noted to BLE  NERVOUS SYSTEM:   Sensation intact; follows commands  PSYCH: Appropriate affect, Alert & Oriented x 3     LABS:                        10.4   8.75  )-----------( 158      ( 10 Nov 2024 06:55 )             31.6       11-10    141  |  104  |  85  ----------------------------<  219  4.5   |  29  |  3.01    Ca    8.7      10 Nov 2024 06:55    TPro  6.8  /  Alb  3.5  /  TBili  0.5  /  DBili  x   /  AST  20  /  ALT  23  /  AlkPhos  65  11-10       PT/INR - ( 09 Nov 2024 20:23 )   PT: 14.2 sec;   INR: 1.21 ratio         PTT - ( 09 Nov 2024 20:23 )  PTT:24.1 sec   Lactate, Blood: 1.2 mmol/L (11-09 @ 20:23)    CARDIAC MARKERS ( 10 Nov 2024 06:55 )  x     / 13.4 ng/L / x     / x     / x      CARDIAC MARKERS ( 09 Nov 2024 23:03 )  x     / 13.9 ng/L / x     / x     / x      CARDIAC MARKERS ( 09 Nov 2024 20:23 )  x     / 10.9 ng/L / x     / x     / x            TSH 6.283   TSH with FT4 reflex --  Total T3 --              POCT Blood Glucose.: 225 mg/dL (10 Nov 2024 07:56)  POCT Blood Glucose.: 118 mg/dL (09 Nov 2024 23:58)  POCT Blood Glucose.: 87 mg/dL (09 Nov 2024 22:58)  POCT Blood Glucose.: 93 mg/dL (09 Nov 2024 20:09)      Urinalysis Basic - ( 10 Nov 2024 06:55 )    Color: x / Appearance: x / SG: x / pH: x  Gluc: 219 mg/dL / Ketone: x  / Bili: x / Urobili: x   Blood: x / Protein: x / Nitrite: x   Leuk Esterase: x / RBC: x / WBC x   Sq Epi: x / Non Sq Epi: x / Bacteria: x            Care Discussed with Consultants/Other Providers: Yes- cardio

## 2024-11-10 NOTE — H&P ADULT - HISTORY OF PRESENT ILLNESS
84M PMHx significant for HTN, HLD, DM2, prostate cancer s/p prostatectomy, CKD, TIA, with a pacemaker who was BIBA because of extreme weakness and syncope. The patient was at a veterans dinner when his daughter noticed that he was leaning over the left repeatedly. The patient states he did not realize he was leaning over in his chair. When the daughter had mentioned to him that they should go home, the patient was unable to get up because of extreme weakness. The patient was very groggy as per the patient daughter. The daughter called the ambulance, but by the time they got to him, the patient was passed out, as per daughter. The patient woke up in the ambulance. Patient states his BG levels are in the 110-120s in the morning and go up to the 200s at night. States he took his premeal insulin before the dinner. States he ate the food during dinner.     In the ED, the patient's vitals were significant for a BP of 94/47. Labs were significant for WBC: 13.3, H&H: 10.9/32, BUN: 82, Cr: 3.07, TSH: 6.283, eGFR: 19. CTH: negative. CXR wet read: negative. EKG: Atrial paced rhythm with prolonged AV conduction.

## 2024-11-10 NOTE — CONSULT NOTE ADULT - SUBJECTIVE AND OBJECTIVE BOX
Chief Complaint: syncope    HPI: THIS IS AN 84-YEAR-OLD MAN ADMITTED FOLLOWING A SYNCOPAL EPISODE LAST NIGHT.  tHE PATIENT WAS IN HIS USUAL STATE OF HEALTH AND WAS AT A [employer] DINNER.  He apparently was listing to the left he did not notice this but his daughter did.  He then had a syncopal episode.  Of note is the fact that the patient has a pacemaker since approximately May this was placed when apparently pauses were found on a monitor looking for atrial fibrillation following  and MRI that revealed multiple old strokes.  The patient has no known history of coronary artery disease.  He states he has never had a stress test.  The patient does have longstanding hypertension hyperlipidemia and diabetes.  Of note is the fact that the patient was a smoker for approximately 20 years but stopped 50 years ago.  He has not had an alcoholic beverage since .  He denies chest discomfort shortness of breath palpitations or dizziness.  Of note is the fact the patient has had worsening renal insufficiency over the past year.  Also of note is the fact the patient was somewhat hypotensive on admission to the emergency room.      PMH:       HTN (hypertension)    HLD (hyperlipidemia)    DM (diabetes mellitus)    TIA (transient ischemic attack)    Prostate cancer      PSH:   No significant past surgical history    S/P prostatectomy    Pacemaker    H/O thyroidectomy      Family History:  FAMILY HISTORY:  FHx: heart disease (Father, Mother)        Social History:  Smoking:in the past  Alcohol:not since   Drugs:no    Allergies:  No Known Allergies      Medications:  acetaminophen     Tablet .. 650 milliGRAM(s) Oral every 6 hours PRN  aluminum hydroxide/magnesium hydroxide/simethicone Suspension 30 milliLiter(s) Oral every 4 hours PRN  apixaban 2.5 milliGRAM(s) Oral two times a day  cloNIDine 0.2 milliGRAM(s) Oral two times a day  dextrose 5%. 1000 milliLiter(s) IV Continuous <Continuous>  dextrose 5%. 1000 milliLiter(s) IV Continuous <Continuous>  dextrose 50% Injectable 25 Gram(s) IV Push once  dextrose 50% Injectable 25 Gram(s) IV Push once  dextrose 50% Injectable 12.5 Gram(s) IV Push once  dextrose Oral Gel 15 Gram(s) Oral once PRN  ergocalciferol 79882 Unit(s) Oral every week  glucagon  Injectable 1 milliGRAM(s) IntraMuscular once  hydrALAZINE 75 milliGRAM(s) Oral three times a day  insulin glargine Injectable (LANTUS) 12 Unit(s) SubCutaneous at bedtime  insulin lispro (ADMELOG) corrective regimen sliding scale   SubCutaneous three times a day before meals  insulin lispro (ADMELOG) corrective regimen sliding scale   SubCutaneous at bedtime  insulin lispro Injectable (ADMELOG) 6 Unit(s) SubCutaneous three times a day before meals  levothyroxine 150 MICROGram(s) Oral daily  melatonin 3 milliGRAM(s) Oral at bedtime PRN  ondansetron Injectable 4 milliGRAM(s) IV Push every 8 hours PRN  propranolol 80 milliGRAM(s) Oral daily  rosuvastatin 5 milliGRAM(s) Oral at bedtime      in    Physical Exam:  T(C): 36.8 (11-10-24 @ 06:15), Max: 36.8 (24 @ 20:03)  HR: 61 (11-10-24 @ 06:15) (59 - 65)  BP: 169/66 (11-10-24 @ 06:15) (94/47 - 177/74)  RR: 18 (11-10-24 @ 06:15) (17 - 18)  SpO2: 93% (11-10-24 @ 06:15) (93% - 100%)  Wt(kg): --      NECK: Supple, No JVD, no bruits  CHEST/LUNG: Clear to percussion bilaterally; No rales, rhonchi, wheezing, or rubs  HEART: Regular rate and rhythm; No murmurs, rubs, or gallops PMI non displaced.      Cardiovascular Diagnostic Testing:  ECG: a paced    Labs:                        10.4   8.75  )-----------( 158      ( 10 Nov 2024 06:55 )             31.6     11-10    141  |  104  |  85[H]  ----------------------------<  219[H]  4.5   |  29  |  3.01[H]    Ca    8.7      10 Nov 2024 06:55    TPro  6.8  /  Alb  3.5  /  TBili  0.5  /  DBili  x   /  AST  20  /  ALT  23  /  AlkPhos  65  11-10    PT/INR - ( 2024 20:23 )   PT: 14.2 sec;   INR: 1.21 ratio         PTT - ( 2024 20:23 )  PTT:24.1 sec    troponin negative x 3        Total Cholesterol: 124  LDL: --  HDL: 35  T      Thyroid Stimulating Hormone, Serum: 6.283 uIU/mL ( @ 20:23)      Imaging:cxr- pacing wires clear lung fields

## 2024-11-10 NOTE — PATIENT PROFILE ADULT - NSPROHMDIABETMGMTSTRAT_GEN_A_NUR
blood glucose testing Patient has Hyperic blood glucose monitoring device in place to left upper arm- but does not have reading monitor device available to him in hospital/blood glucose testing

## 2024-11-10 NOTE — PROGRESS NOTE ADULT - ASSESSMENT
84M PMHx significant for HTN, HLD, DM2, prostate cancer s/p prostatectomy, CKD, TIA, with a pacemaker who was BIBA because of extreme weakness and syncope.     #Syncope likely secondary to hypoglycemia vs orthostatic hypotension vs cardiac in nature   -Pt BIBA after syncopal episode at a restaurant  -Pt with PPM  -BP: 94/47, glucose: 91 on admission  -C/w  telemetry monitoring  -F/u Echocardiogram   -Serial EKGs  -Troponin neg x3  -F/u Carotid doppler  -Orthostatics  -Provider to RN to have teletech to interrogate PPM in AM  -Cardio consulted    #DM2  -Glucose 91 after eating a full meal  -Halved patient's home dose medications- Lantus 12 units, Lispro 6 units with meals  -ISS  -Consistent carbohydrate renal diet     #CKD stage 4  -eGFR: 19  -Cr: 3.07 (baseline ~3)  -BUN:82    #HTN  -Patient with hypotension on arrival  -Hold Furosemide, Chlorthalidone Hydralazine, Enalapril due to hypotension on arrival  -c/w the rest of home medications    #HLD  -c/w home medications    #DVT prophylaxis  -c/w Bubba     Spoke with patient's daughter, Farhana, at bedside. All questions and concerns were answered. Patient and daughter are in agreement with the plan.  Farhana's phone number: [phone]   84M PMHx significant for HTN, HLD, DM2, prostate cancer s/p prostatectomy, CKD, TIA, with a pacemaker who was BIBA because of extreme weakness and syncope.     #Syncope possibly secondary to hypoglycemia vs orthostatic hypotension vs cardiac in nature   -Pt BIBA after syncopal episode at a restaurant  -Pt with PPM  -BP: 94/47, glucose: 91 on admission  -C/w  telemetry monitoring  -F/u Echocardiogram   -Serial EKGs  -Troponin neg x3  -F/u Carotid doppler  -Orthostatics  -Provider to RN to have teletech to interrogate PPM in AM. PPM at [place], cardio at [facility]. Pt saw cardio 3 days ago and daughter reports meds were all okay/checked  -Cardio consulted    #DM2  -Glucose 91 after eating a full meal  -Halved patient's home dose medications- Lantus 12 units, Lispro 6 units with meals  -ISS  -Consistent carbohydrate renal diet     #CKD stage 4  -Cr: 3.07 (baseline ~3)  -monitor    #HTN  -Patient with hypotension on arrival  -Hold Furosemide, Chlorthalidone,  Enalapril due to hypotension on arrival  -Restart hydralazine at 75mg TID    #HLD  -c/w home medications    #DVT prophylaxis  -c/w Bubba     Spoke with patient's daughter, Farhana 11/10. All questions and concerns were answered. Farhana's phone number: [phone]   84M PMHx significant for HTN, HLD, DM2, prostate cancer s/p prostatectomy, CKD, TIA, with a pacemaker who was BIBA because of extreme weakness and syncope.     #Syncope possibly secondary to hypoglycemia vs orthostatic hypotension vs cardiac in nature   -Pt BIBA after syncopal episode at a restaurant  -Pt with PPM  -BP: 94/47, glucose: 91 on admission  -C/w  telemetry monitoring  -F/u Echocardiogram   -Serial EKGs  -Troponin neg x3  -F/u Carotid doppler  -Orthostatics  -Provider to RN to have teletech to interrogate PPM in AM. PPM at [place], cardio at [facility]. Pt saw cardio 3 days ago and daughter reports meds were all okay/checked  -Cardio consulted  -neg UA and CXR and leukocytosis; infection ruled out    #DM2  -Glucose 91 after eating a full meal  -Halved patient's home dose medications- Lantus 12 units, Lispro 6 units with meals  -ISS  -Consistent carbohydrate renal diet     #CKD stage 4  -Cr: 3.07 (baseline ~3)  -monitor    #HTN  -Patient with hypotension on arrival  -Hold Furosemide, Chlorthalidone,  Enalapril due to hypotension on arrival  -Restart hydralazine at 75mg TID    #HLD  -c/w home medications    #DVT prophylaxis  -c/w Bubba     Spoke with patient's daughter, Farhana 11/10. All questions and concerns were answered. Farhana's phone number: [phone]   84M PMHx significant for HTN, HLD, DM2, prostate cancer s/p prostatectomy, CKD, TIA, with a pacemaker who was BIBA because of extreme weakness and syncope.     #Syncope possibly secondary to hypoglycemia vs orthostatic hypotension vs cardiac in nature   -Pt BIBA after syncopal episode at a restaurant  -Pt with PPM  -BP: 94/47, glucose: 91 on admission  -C/w  telemetry monitoring  -F/u Echocardiogram   -Serial EKGs  -Troponin neg x3  -F/u Carotid doppler  -Orthostatics  -Provider to RN to have teletech to interrogate PPM in AM. PPM at [place], cardio at [facility]. Pt saw cardio 3 days ago and daughter reports meds were all okay/checked  -Cardio consulted, f/u recs  -neg UA and CXR and leukocytosis; infection ruled out    #DM2  -Glucose 91 after eating a full meal  -Halved patient's home dose medications- Lantus 12 units, Lispro 6 units with meals  -ISS  -Consistent carbohydrate renal diet     #CKD stage 4  -Cr: 3.07 (baseline ~3)  -monitor    #HTN  -Patient with hypotension on arrival  -Hold Furosemide, Chlorthalidone,  Enalapril due to hypotension on arrival  -Restart hydralazine at 75mg TID    #HLD  -c/w home medications    #DVT prophylaxis  -c/w Bubba     Spoke with patient's daughter, Farhana 11/10. All questions and concerns were answered. Farhana's phone number: [phone]

## 2024-11-10 NOTE — H&P ADULT - ATTENDING COMMENTS
I have personally seen and examined patient on the above date.  I discussed the case with Dr Thompson and I agree with findings and plan as detailed per note above, which I have amended where appropriate.    Superseding the above.   84M HTN, HLD, s/p PPM, T2DM on insulin, CKD (baseline cr: 2.5-3) pw syncope witnessed by daughter. Pt was at a [employer] party, finished meal and was leaning against daughter without realizing it. Daughter noticed he appeared fatigue and was attempting to leave but pt unable to get and syncopized. no injury - was sitting throughout episode. No antecedent dizziness, HA, SOB, CP, palps, nausea. No recent fevers, chills, cough NVD, dysuria.     PSHx: s/p thyroidectomy, PPM, prostatectomy sec prostate cancer  Sochx: remote tob/ETOH use  FMHx; CAD, DM  Meds:tradjenta 5mg, enalapril 20mg bid, crestor 5, humalog 12-15 u tid, lantus 24 u qhs, levo 150, propranolol ER 80, hydralazine 100mg tid, clonidine 0.2mg bid, chlorthalidone 25 , vit D qweek Lasix 40mg qd, eliquis 2.5mg bid. ASA 325mg qd.     #Syncope likely sec to hypotension  BP improved after IVFs  tele monitoring  trend trops, serial EKGs, ECHO, carotid dopplers, cardiology consult  PPM interrogation  hold hydralazine for now  reduce dose of clonidine. once BP normalizes/hypertensive can increase clonidine back to baseline dose and add hydralazine back on  r/o occult infection, check UA, COVID/RSV/flu  #?JEROME on CKD  bladder scan q6  hold diuretics lasix/chlorathalidone/ACE  #T2DM with borderline low BG  reduce dose of premeal/night insulin by half given JEROME on CKD    [facility] MARGOTH rev  D/.W Dr Robert DESAI.

## 2024-11-10 NOTE — PHARMACOTHERAPY INTERVENTION NOTE - COMMENTS
Home health called regarding wound care referral. Ref already made Propranolol 80 mg immediate-release ordered, but I noticed patient takes the extended-release at home.  I notified Dr. Khan who agreed the order should be changed to propranolol 80 mg extended-release once a day.

## 2024-11-10 NOTE — H&P ADULT - NSICDXPASTMEDICALHX_GEN_ALL_CORE_FT
PAST MEDICAL HISTORY:  DM (diabetes mellitus)     HLD (hyperlipidemia)     HTN (hypertension)     Prostate cancer     TIA (transient ischemic attack)

## 2024-11-10 NOTE — H&P ADULT - NSHPPHYSICALEXAM_GEN_ALL_CORE
VITALS:   T(C): 36.5 (11-10-24 @ 00:09), Max: 36.8 (11-09-24 @ 20:03)  HR: 59 (11-10-24 @ 00:09) (59 - 65)  BP: 177/74 (11-10-24 @ 00:09) (94/47 - 177/74)  RR: 17 (11-10-24 @ 00:09) (17 - 18)  SpO2: 95% (11-10-24 @ 00:09) (95% - 100%)    GENERAL: NAD, lying in bed comfortably  HEAD:  Atraumatic, normocephalic  EYES: EOMI, PERRLA, conjunctiva and sclera clear  ENT: Dry mucous membranes  HEART: +Distant heart sounds. Regular rate and rhythm, no murmurs, rubs, or gallops  LUNGS: Unlabored respirations.  Clear to auscultation bilaterally, no crackles, wheezing, or rhonchi  ABDOMEN: Soft, nontender, nondistended, +BS  EXTREMITIES: 2+ peripheral pulses bilaterally. No clubbing, cyanosis, or edema  NERVOUS SYSTEM:  A&Ox3, no focal deficits   SKIN: No rashes or lesions

## 2024-11-10 NOTE — H&P ADULT - ASSESSMENT
84M PMHx significant for HTN, HLD, DM2, prostate cancer s/p prostatectomy, CKD, TIA, with a pacemaker who was BIBA because of extreme weakness and syncope.     #Syncope    84M PMHx significant for HTN, HLD, DM2, prostate cancer s/p prostatectomy, CKD, TIA, with a pacemaker who was BIBA because of extreme weakness and syncope.     #Syncope   -Pt BIBA after syncopal episode at a restaurant  -Pt with PPM  -BP: 94/47, glucose: 91 on admission  -Admit to medicine  -Placed on telemetry  -Echocardiogram in AM  -Serial EKG  -Serial Troponin  -Carotid doppler  -Orthostatics  -Provider to RN to have teletech to interrogate PPM  -Cardio consult    #DM2  -Glucose 91 after eating a full meal  -Halved patient's home dose medications  -Hold Lantus tonight as patient's BG levels running low  -ISS  Consistent carbohydrate renal diet     #CKD stage 4  -eGFR: 19  -Cr: 3.07 (baseline ~3)  -BUN:82    #HTN  -Patient with hypotension on arrival  -Hold Furosemide, Chlorthalidone Hydralazine, Enalapril  -c/w the rest of home medications    #HLD  -c/w home medications    #DVT prophylaxis  -c/w Bubba     Spoke with patient's daughter, Farhana, at bedside. All questions and concerns were answered. Patient and daughter are in agreement with the plan.  Farhana's phone number: [phone] 84M PMHx significant for HTN, HLD, DM2, prostate cancer s/p prostatectomy, CKD, TIA, with a pacemaker who was BIBA because of extreme weakness and syncope.     #Syncope likely secondary to hypoglycemia vs orthostatic hypotension vs cardiac in nature   -Pt BIBA after syncopal episode at a restaurant  -Pt with PPM  -BP: 94/47, glucose: 91 on admission  -Admit to medicine  -Placed on telemetry  -Echocardiogram in AM  -Serial EKG  -Serial Troponin  -Carotid doppler  -Orthostatics  -Provider to RN to have teletech to interrogate PPM in AM  -Cardio consult    #DM2  -Glucose 91 after eating a full meal  -Halved patient's home dose medications  -Hold Lantus tonight as patient's BG levels running low  -ISS  #Consistent carbohydrate renal diet     #CKD stage 4  -eGFR: 19  -Cr: 3.07 (baseline ~3)  -BUN:82    #HTN  -Patient with hypotension on arrival  -Hold Furosemide, Chlorthalidone Hydralazine, Enalapril due to hypotension on arrival  -c/w the rest of home medications    #HLD  -c/w home medications    #DVT prophylaxis  -c/w Bubba     Spoke with patient's daughter, Farhana, at bedside. All questions and concerns were answered. Patient and daughter are in agreement with the plan.  Farhana's phone number: [phone]    Case discussed with Dr. Khan

## 2024-11-10 NOTE — PATIENT PROFILE ADULT - FALL HARM RISK - HARM RISK INTERVENTIONS

## 2024-11-10 NOTE — CONSULT NOTE ADULT - ASSESSMENT
In summary, the patient is an 84-year-old man with a syncopal episode of uncertain etiology.  There is no evidence of acute coronary syndrome.    For now would suggest echocardiography.    Would get pacemaker interrogation as well.

## 2024-11-10 NOTE — PATIENT PROFILE ADULT - FUNCTIONAL ASSESSMENT - DAILY ACTIVITY 1.
Follow Up Office Visit      Patient Name: Dillan Medina  : 1983   MRN: [id number]     3 = A little assistance

## 2024-11-10 NOTE — H&P ADULT - NSICDXFAMILYHX_GEN_ALL_CORE_FT
FAMILY HISTORY:  Father  Still living? Unknown  FHx: heart disease, Age at diagnosis: Age Unknown    Mother  Still living? Unknown  FHx: heart disease, Age at diagnosis: Age Unknown

## 2024-11-11 LAB
ANION GAP SERPL CALC-SCNC: 7 MMOL/L — SIGNIFICANT CHANGE UP (ref 5–17)
BUN SERPL-MCNC: 81 MG/DL — HIGH (ref 7–23)
CALCIUM SERPL-MCNC: 8.9 MG/DL — SIGNIFICANT CHANGE UP (ref 8.4–10.5)
CHLORIDE SERPL-SCNC: 103 MMOL/L — SIGNIFICANT CHANGE UP (ref 96–108)
CO2 SERPL-SCNC: 30 MMOL/L — SIGNIFICANT CHANGE UP (ref 22–31)
CREAT SERPL-MCNC: 2.58 MG/DL — HIGH (ref 0.5–1.3)
EGFR: 24 ML/MIN/1.73M2 — LOW
GLUCOSE BLDC GLUCOMTR-MCNC: 125 MG/DL — HIGH (ref 70–99)
GLUCOSE BLDC GLUCOMTR-MCNC: 232 MG/DL — HIGH (ref 70–99)
GLUCOSE BLDC GLUCOMTR-MCNC: 232 MG/DL — HIGH (ref 70–99)
GLUCOSE BLDC GLUCOMTR-MCNC: 238 MG/DL — HIGH (ref 70–99)
GLUCOSE BLDC GLUCOMTR-MCNC: 242 MG/DL — HIGH (ref 70–99)
GLUCOSE SERPL-MCNC: 218 MG/DL — HIGH (ref 70–99)
POTASSIUM SERPL-MCNC: 4.2 MMOL/L — SIGNIFICANT CHANGE UP (ref 3.5–5.3)
POTASSIUM SERPL-SCNC: 4.2 MMOL/L — SIGNIFICANT CHANGE UP (ref 3.5–5.3)
SODIUM SERPL-SCNC: 140 MMOL/L — SIGNIFICANT CHANGE UP (ref 135–145)

## 2024-11-11 PROCEDURE — 99232 SBSQ HOSP IP/OBS MODERATE 35: CPT

## 2024-11-11 PROCEDURE — 99233 SBSQ HOSP IP/OBS HIGH 50: CPT

## 2024-11-11 RX ORDER — INSULIN GLARGINE,HUM.REC.ANLOG 100/ML
20 VIAL (ML) SUBCUTANEOUS AT BEDTIME
Refills: 0 | Status: DISCONTINUED | OUTPATIENT
Start: 2024-11-11 | End: 2024-11-12

## 2024-11-11 RX ORDER — INSULIN LISPRO 100/ML
10 VIAL (ML) SUBCUTANEOUS
Refills: 0 | Status: DISCONTINUED | OUTPATIENT
Start: 2024-11-11 | End: 2024-11-12

## 2024-11-11 RX ORDER — REGADENOSON 0.08 MG/ML
0.4 INJECTION, SOLUTION INTRAVENOUS ONCE
Refills: 0 | Status: DISCONTINUED | OUTPATIENT
Start: 2024-11-11 | End: 2024-11-12

## 2024-11-11 RX ADMIN — Medication 10 UNIT(S): at 18:14

## 2024-11-11 RX ADMIN — Medication 4: at 12:17

## 2024-11-11 RX ADMIN — HYDRALAZINE HYDROCHLORIDE 75 MILLIGRAM(S): 50 TABLET, FILM COATED ORAL at 05:56

## 2024-11-11 RX ADMIN — HYDRALAZINE HYDROCHLORIDE 75 MILLIGRAM(S): 50 TABLET, FILM COATED ORAL at 21:55

## 2024-11-11 RX ADMIN — Medication 20 UNIT(S): at 21:54

## 2024-11-11 RX ADMIN — CLONIDINE HYDROCHLORIDE 0.2 MILLIGRAM(S): 0.2 TABLET ORAL at 17:29

## 2024-11-11 RX ADMIN — Medication 6 UNIT(S): at 08:55

## 2024-11-11 RX ADMIN — APIXABAN 2.5 MILLIGRAM(S): 5 TABLET, FILM COATED ORAL at 17:29

## 2024-11-11 RX ADMIN — Medication 4: at 08:55

## 2024-11-11 RX ADMIN — APIXABAN 2.5 MILLIGRAM(S): 5 TABLET, FILM COATED ORAL at 05:56

## 2024-11-11 RX ADMIN — Medication 150 MICROGRAM(S): at 05:56

## 2024-11-11 RX ADMIN — Medication 80 MILLIGRAM(S): at 05:57

## 2024-11-11 RX ADMIN — CLONIDINE HYDROCHLORIDE 0.2 MILLIGRAM(S): 0.2 TABLET ORAL at 05:56

## 2024-11-11 RX ADMIN — Medication 5 MILLIGRAM(S): at 21:54

## 2024-11-11 RX ADMIN — HYDRALAZINE HYDROCHLORIDE 75 MILLIGRAM(S): 50 TABLET, FILM COATED ORAL at 12:17

## 2024-11-11 RX ADMIN — Medication 6 UNIT(S): at 12:17

## 2024-11-11 NOTE — DISCHARGE NOTE PROVIDER - DISCHARGE DIET
Pt reports raw bleeding sites to the penis as well as a few bumps. Pt reports he recently used a different condom than usual which may have caused it. Consistent Carbohydrate Diabetic Diets DASH Diet/Consistent Carbohydrate Diabetic Diets

## 2024-11-11 NOTE — DISCHARGE NOTE PROVIDER - PROVIDER TOKENS
FREE:[LAST:[Dr. Diego],PHONE:[(   )    -],FAX:[(   )    -]],FREE:[LAST:[Dr. Pro],PHONE:[(   )    -],FAX:[(   )    -]]

## 2024-11-11 NOTE — DISCHARGE NOTE PROVIDER - ATTENDING DISCHARGE PHYSICAL EXAMINATION:
GENERAL: pt laying in bed in NAD  HEENT: NC/AT, MMM  PULMONARY: CTAB, no wheezing  CARDIOVASCULAR:  RRR, no murmur  GASTROINTESTINAL: soft nontender    MUSCULOSKELETAL:  +weakness, no edema to BLE  Psych: A&Ox3, appropriate affect

## 2024-11-11 NOTE — PROGRESS NOTE ADULT - ASSESSMENT
Assessment:  Nader Liu is an 84 year old man with past medical history of Hypertension, Hyperlipidemia, Diabetes mellitus, Prostate cancer (s/p prostatectomy) and Chronic kidney disease who presents with episode of syncope while eating, found to be hypotensive.     ECG consistent with atrial paced rhythm and nonspecific ST abnormalities. Troponins negative x 3, patient denies any angina or dyspnea. CT head unremarkable. CXR clear. Carotid US with no significant hemodynamic stenosis of either carotid artery, however there is limited visualization of the left carotid bifurcation due to the presence of calcified plaque - consider correlation with CTA.    Recommendations:  [] Syncope: Unclear etiology. May need to consider vascular evaluation given carotid US findings. PPM interrogation reviewed with normal battery life and normal PPM functioning, no events. Continue to monitor on telemetry. Check orthostatic vital signs.  Patient follows with cardiologist, Dr. Pro in [facility].   [] CKD: Cr improving with IV fluids, likely there was a component of dehydration, recommend to obtain baseline Cr level    We will continue to follow along.    Cee Lopez MD  Cardiology

## 2024-11-11 NOTE — DISCHARGE NOTE PROVIDER - CARE PROVIDER_API CALL
Dr. Diego,   Phone: (   )    -  Fax: (   )    -  Follow Up Time:     Dr. Pro,   Phone: (   )    -  Fax: (   )    -  Follow Up Time:

## 2024-11-11 NOTE — PROGRESS NOTE ADULT - SUBJECTIVE AND OBJECTIVE BOX
Patient is a 84y old  Male who presents with a chief complaint of     Patient seen and examined at bedside. No overnight events reported. Patient states he feels well. Denies chest pain, sob, nausea, vomiting.     ALLERGIES:  No Known Allergies    MEDICATIONS  (STANDING):  apixaban 2.5 milliGRAM(s) Oral two times a day  cloNIDine 0.2 milliGRAM(s) Oral two times a day  dextrose 5%. 1000 milliLiter(s) (50 mL/Hr) IV Continuous <Continuous>  dextrose 5%. 1000 milliLiter(s) (100 mL/Hr) IV Continuous <Continuous>  dextrose 50% Injectable 12.5 Gram(s) IV Push once  dextrose 50% Injectable 25 Gram(s) IV Push once  dextrose 50% Injectable 25 Gram(s) IV Push once  ergocalciferol 60114 Unit(s) Oral every week  glucagon  Injectable 1 milliGRAM(s) IntraMuscular once  hydrALAZINE 75 milliGRAM(s) Oral three times a day  insulin glargine Injectable (LANTUS) 12 Unit(s) SubCutaneous at bedtime  insulin lispro (ADMELOG) corrective regimen sliding scale   SubCutaneous at bedtime  insulin lispro (ADMELOG) corrective regimen sliding scale   SubCutaneous three times a day before meals  insulin lispro Injectable (ADMELOG) 6 Unit(s) SubCutaneous three times a day before meals  levothyroxine 150 MICROGram(s) Oral daily  propranolol LA 80 milliGRAM(s) Oral daily  rosuvastatin 5 milliGRAM(s) Oral at bedtime    MEDICATIONS  (PRN):  acetaminophen     Tablet .. 650 milliGRAM(s) Oral every 6 hours PRN Temp greater or equal to 38C (100.4F), Mild Pain (1 - 3)  aluminum hydroxide/magnesium hydroxide/simethicone Suspension 30 milliLiter(s) Oral every 4 hours PRN Dyspepsia  dextrose Oral Gel 15 Gram(s) Oral once PRN Blood Glucose LESS THAN 70 milliGRAM(s)/deciliter  melatonin 3 milliGRAM(s) Oral at bedtime PRN Insomnia  ondansetron Injectable 4 milliGRAM(s) IV Push every 8 hours PRN Nausea and/or Vomiting    Vital Signs Last 24 Hrs  T(F): 98.1 (11 Nov 2024 05:34), Max: 98.6 (10 Nov 2024 14:23)  HR: 84 (11 Nov 2024 05:34) (56 - 84)  BP: 158/74 (11 Nov 2024 05:34) (118/52 - 158/74)  RR: 17 (11 Nov 2024 05:34) (16 - 18)  SpO2: 92% (10 Nov 2024 20:52) (92% - 95%)  I&O's Summary    10 Nov 2024 07:01  -  11 Nov 2024 07:00  --------------------------------------------------------  IN: 0 mL / OUT: 1822 mL / NET: -1822 mL    11 Nov 2024 07:01  -  11 Nov 2024 11:54  --------------------------------------------------------  IN: 400 mL / OUT: 400 mL / NET: 0 mL      PHYSICAL EXAM:  General: NAD, Awake, alert  ENT: No gross hearing impairment, Moist mucous membranes, no thrush  Neck: Supple, No JVD  Lungs: Clear to auscultation bilaterally, good air entry, non-labored breathing  Cardio: RRR, S1/S2, No murmur  Abdomen: Soft, Nontender, Nondistended; Bowel sounds present  Extremities: No calf tenderness, No cyanosis, No pitting edema  Psych: Appropriate mood and affect    LABS:                        10.4   8.75  )-----------( 158      ( 10 Nov 2024 06:55 )             31.6     11-11    140  |  103  |  81  ----------------------------<  218  4.2   |  30  |  2.58    Ca    8.9      11 Nov 2024 06:51    TPro  6.8  /  Alb  3.5  /  TBili  0.5  /  DBili  x   /  AST  20  /  ALT  23  /  AlkPhos  65  11-10          PT/INR - ( 09 Nov 2024 20:23 )   PT: 14.2 sec;   INR: 1.21 ratio         PTT - ( 09 Nov 2024 20:23 )  PTT:24.1 sec  Lactate, Blood: 1.2 mmol/L (11-09 @ 20:23)      CARDIAC MARKERS ( 10 Nov 2024 06:55 )  x     / 13.4 ng/L / x     / x     / x      CARDIAC MARKERS ( 09 Nov 2024 23:03 )  x     / 13.9 ng/L / x     / x     / x      CARDIAC MARKERS ( 09 Nov 2024 20:23 )  x     / 10.9 ng/L / x     / x     / x          11-10 Chol 124 mg/dL LDL -- HDL 35 mg/dL Trig 237 mg/dL  TSH --   TSH with FT4 reflex --  Total T3 69              POCT Blood Glucose.: 232 mg/dL (11 Nov 2024 08:04)  POCT Blood Glucose.: 110 mg/dL (10 Nov 2024 21:29)  POCT Blood Glucose.: 255 mg/dL (10 Nov 2024 16:36)  POCT Blood Glucose.: 309 mg/dL (10 Nov 2024 12:14)      Urinalysis Basic - ( 11 Nov 2024 06:51 )    Color: x / Appearance: x / SG: x / pH: x  Gluc: 218 mg/dL / Ketone: x  / Bili: x / Urobili: x   Blood: x / Protein: x / Nitrite: x   Leuk Esterase: x / RBC: x / WBC x   Sq Epi: x / Non Sq Epi: x / Bacteria: x          RADIOLOGY & ADDITIONAL TESTS:    Care Discussed with Consultants/Other Providers:    Patient is a 84y old  Male who presents with a chief complaint of     Patient seen and examined at bedside. No overnight events reported. Patient states he feels well. Denies chest pain, sob, nausea, vomiting.     ALLERGIES:  No Known Allergies    MEDICATIONS  (STANDING):  apixaban 2.5 milliGRAM(s) Oral two times a day  cloNIDine 0.2 milliGRAM(s) Oral two times a day  dextrose 5%. 1000 milliLiter(s) (50 mL/Hr) IV Continuous <Continuous>  dextrose 5%. 1000 milliLiter(s) (100 mL/Hr) IV Continuous <Continuous>  dextrose 50% Injectable 12.5 Gram(s) IV Push once  dextrose 50% Injectable 25 Gram(s) IV Push once  dextrose 50% Injectable 25 Gram(s) IV Push once  ergocalciferol 67266 Unit(s) Oral every week  glucagon  Injectable 1 milliGRAM(s) IntraMuscular once  hydrALAZINE 75 milliGRAM(s) Oral three times a day  insulin glargine Injectable (LANTUS) 12 Unit(s) SubCutaneous at bedtime  insulin lispro (ADMELOG) corrective regimen sliding scale   SubCutaneous at bedtime  insulin lispro (ADMELOG) corrective regimen sliding scale   SubCutaneous three times a day before meals  insulin lispro Injectable (ADMELOG) 6 Unit(s) SubCutaneous three times a day before meals  levothyroxine 150 MICROGram(s) Oral daily  propranolol LA 80 milliGRAM(s) Oral daily  rosuvastatin 5 milliGRAM(s) Oral at bedtime    MEDICATIONS  (PRN):  acetaminophen     Tablet .. 650 milliGRAM(s) Oral every 6 hours PRN Temp greater or equal to 38C (100.4F), Mild Pain (1 - 3)  aluminum hydroxide/magnesium hydroxide/simethicone Suspension 30 milliLiter(s) Oral every 4 hours PRN Dyspepsia  dextrose Oral Gel 15 Gram(s) Oral once PRN Blood Glucose LESS THAN 70 milliGRAM(s)/deciliter  melatonin 3 milliGRAM(s) Oral at bedtime PRN Insomnia  ondansetron Injectable 4 milliGRAM(s) IV Push every 8 hours PRN Nausea and/or Vomiting    Vital Signs Last 24 Hrs  T(F): 98.1 (11 Nov 2024 05:34), Max: 98.6 (10 Nov 2024 14:23)  HR: 84 (11 Nov 2024 05:34) (56 - 84)  BP: 158/74 (11 Nov 2024 05:34) (118/52 - 158/74)  RR: 17 (11 Nov 2024 05:34) (16 - 18)  SpO2: 92% (10 Nov 2024 20:52) (92% - 95%)  I&O's Summary    10 Nov 2024 07:01  -  11 Nov 2024 07:00  --------------------------------------------------------  IN: 0 mL / OUT: 1822 mL / NET: -1822 mL    11 Nov 2024 07:01  -  11 Nov 2024 11:54  --------------------------------------------------------  IN: 400 mL / OUT: 400 mL / NET: 0 mL      PHYSICAL EXAM:  General: NAD, Awake, alert  ENT: No gross hearing impairment, Moist mucous membranes, no thrush  Neck: Supple, No JVD  Lungs: Clear to auscultation bilaterally, good air entry, non-labored breathing  Cardio: RRR, S1/S2, No murmur  Abdomen: Soft, Nontender, Nondistended; Bowel sounds present  Extremities: No calf tenderness, No cyanosis, No pitting edema  Psych: Appropriate mood and affect    LABS:                        10.4   8.75  )-----------( 158      ( 10 Nov 2024 06:55 )             31.6     11-11    140  |  103  |  81  ----------------------------<  218  4.2   |  30  |  2.58    Ca    8.9      11 Nov 2024 06:51    TPro  6.8  /  Alb  3.5  /  TBili  0.5  /  DBili  x   /  AST  20  /  ALT  23  /  AlkPhos  65  11-10          PT/INR - ( 09 Nov 2024 20:23 )   PT: 14.2 sec;   INR: 1.21 ratio         PTT - ( 09 Nov 2024 20:23 )  PTT:24.1 sec  Lactate, Blood: 1.2 mmol/L (11-09 @ 20:23)      CARDIAC MARKERS ( 10 Nov 2024 06:55 )  x     / 13.4 ng/L / x     / x     / x      CARDIAC MARKERS ( 09 Nov 2024 23:03 )  x     / 13.9 ng/L / x     / x     / x      CARDIAC MARKERS ( 09 Nov 2024 20:23 )  x     / 10.9 ng/L / x     / x     / x          11-10 Chol 124 mg/dL LDL -- HDL 35 mg/dL Trig 237 mg/dL  TSH --   TSH with FT4 reflex --  Total T3 69              POCT Blood Glucose.: 232 mg/dL (11 Nov 2024 08:04)  POCT Blood Glucose.: 110 mg/dL (10 Nov 2024 21:29)  POCT Blood Glucose.: 255 mg/dL (10 Nov 2024 16:36)  POCT Blood Glucose.: 309 mg/dL (10 Nov 2024 12:14)      Urinalysis Basic - ( 11 Nov 2024 06:51 )    Color: x / Appearance: x / SG: x / pH: x  Gluc: 218 mg/dL / Ketone: x  / Bili: x / Urobili: x   Blood: x / Protein: x / Nitrite: x   Leuk Esterase: x / RBC: x / WBC x   Sq Epi: x / Non Sq Epi: x / Bacteria: x          RADIOLOGY & ADDITIONAL TESTS:    Care Discussed with Consultants/Other Providers:   yes with cardio

## 2024-11-11 NOTE — DISCHARGE NOTE PROVIDER - HOSPITAL COURSE
HPI:  84M PMHx significant for HTN, HLD, DM2, prostate cancer s/p prostatectomy, CKD, TIA, with a pacemaker who was BIBA because of extreme weakness and syncope. The patient was at a veterans dinner when his daughter noticed that he was leaning over the left repeatedly. The patient states he did not realize he was leaning over in his chair. When the daughter had mentioned to him that they should go home, the patient was unable to get up because of extreme weakness. The patient was very groggy as per the patient daughter. The daughter called the ambulance, but by the time they got to him, the patient was passed out, as per daughter. The patient woke up in the ambulance. Patient states his BG levels are in the 110-120s in the morning and go up to the 200s at night. States he took his premeal insulin before the dinner. States he ate the food during dinner.     In the ED, the patient's vitals were significant for a BP of 94/47. Labs were significant for WBC: 13.3, H&H: 10.9/32, BUN: 82, Cr: 3.07, TSH: 6.283, eGFR: 19. CTH: negative. CXR wet read: negative. EKG: Atrial paced rhythm with prolonged AV conduction. (10 Nov 2024 00:22)      Hospital Course Summary: Upon admission, cardiology was consulted. TTE and carotid US performed with results shown below. Discussed with vascular, no intervention for carotids. Patient's PPM interrogated with normal battery life and normal PPM functioning, no events. Patient had pharm nuclear stress test....      < from: US Duplex Carotid Arteries Complete, Bilateral (11.10.24 @ 12:08) >    IMPRESSION: No significant hemodynamic stenosis of either carotid artery.    Limited visualization of the left carotid bifurcation due to the presence   of calcified plaque. Consider correlation with CTA.    < end of copied text >    < from: TTE Echo Complete w/o Contrast w/ Doppler (11.10.24 @ 09:32) >    Summary:   1. Left ventricular ejection fraction, by visual estimation, is 60 to   65%.   2. Normal global left ventricular systolic function.   3. Mildly enlarged left atrium.   4. Mild thickening and calcification of the anterior and posterior   mitral valve leaflets.   5. Trace mitral valve regurgitation.   6. Mild tricuspid regurgitation.   7. Sclerotic aortic valve with normal opening.    < end of copied text >      GOC:   •	Code Status :Full Code  •	Summary of Goals of Care Conversation/ what matters most: Explained risks of CPR/Intubation, patient agreeable to chest compressions and intubation if needed to save their life in an emergency.    Discharging Provider:  “First, Last Name”  Contact Info: “Your work cell number”    Outpatient Provider: Sign out given?  SNF Provider: Sign-out given?    You came to the hospital due to syncope (passing out)  You were evaluated by cardiology and had a stress test performed  You were treated with   You were prescribed the following new medications:    You will need to follow up with your primary care physician for further management.    Discharging Provider:  First, Last Name, MD/DO/NP/PA  Contact Info: Work Cell - Please call with any questions or concerns.         HPI:  84M PMHx significant for HTN, HLD, DM2, prostate cancer s/p prostatectomy, CKD, TIA, with a pacemaker who was BIBA because of extreme weakness and syncope. The patient was at a veterans dinner when his daughter noticed that he was leaning over the left repeatedly. The patient states he did not realize he was leaning over in his chair. When the daughter had mentioned to him that they should go home, the patient was unable to get up because of extreme weakness. The patient was very groggy as per the patient daughter. The daughter called the ambulance, but by the time they got to him, the patient was passed out, as per daughter. The patient woke up in the ambulance. Patient states his BG levels are in the 110-120s in the morning and go up to the 200s at night. States he took his premeal insulin before the dinner. States he ate the food during dinner.     In the ED, the patient's vitals were significant for a BP of 94/47. Labs were significant for WBC: 13.3, H&H: 10.9/32, BUN: 82, Cr: 3.07, TSH: 6.283, eGFR: 19. CTH: negative. CXR wet read: negative. EKG: Atrial paced rhythm with prolonged AV conduction. (10 Nov 2024 00:22)      Hospital Course Summary: Upon admission, cardiology was consulted. TTE and carotid US performed with results shown below. Discussed with vascular, no intervention for carotids. Patient's PPM interrogated with normal battery life and normal PPM functioning, no events. Patient had pharm nuclear stress test with normal results. Patient should follow up with cardiology outpatient as well as PCP. Patient was evaluated by physical therapy and recommended home PT, however patient declines.       < from: US Duplex Carotid Arteries Complete, Bilateral (11.10.24 @ 12:08) >    IMPRESSION: No significant hemodynamic stenosis of either carotid artery.    Limited visualization of the left carotid bifurcation due to the presence   of calcified plaque. Consider correlation with CTA.    < end of copied text >    < from: TTE Echo Complete w/o Contrast w/ Doppler (11.10.24 @ 09:32) >    Summary:   1. Left ventricular ejection fraction, by visual estimation, is 60 to   65%.   2. Normal global left ventricular systolic function.   3. Mildly enlarged left atrium.   4. Mild thickening and calcification of the anterior and posterior   mitral valve leaflets.   5. Trace mitral valve regurgitation.   6. Mild tricuspid regurgitation.   7. Sclerotic aortic valve with normal opening.    < end of copied text >      GOC:   •	Code Status :Full Code  •	Summary of Goals of Care Conversation/ what matters most: Explained risks of CPR/Intubation, patient agreeable to chest compressions and intubation if needed to save their life in an emergency.    Discharging Provider:  SYDNEY Ingram  Contact Info: [phone]    Outpatient Provider: Sign out given?  SNF Provider: Sign-out given?    You came to the hospital due to syncope (passing out)  You were evaluated by cardiology and had a stress test performed  Your stress test is normal and you should follow up with cardiology outpatient  Hold off on taking Enalopril until follow up with your primary care provider    You will need to follow up with your primary care physician for further management.    Discharging Provider:  First, Last Name, MD//NP/PA  Contact Info: Work Cell - Please call with any questions or concerns.         HPI:  84M PMHx significant for HTN, HLD, DM2, prostate cancer s/p prostatectomy, CKD, TIA, with a pacemaker who was BIBA because of extreme weakness and syncope. The patient was at a veterans dinner when his daughter noticed that he was leaning over the left repeatedly. The patient states he did not realize he was leaning over in his chair. When the daughter had mentioned to him that they should go home, the patient was unable to get up because of extreme weakness. The patient was very groggy as per the patient daughter. The daughter called the ambulance, but by the time they got to him, the patient was passed out, as per daughter. The patient woke up in the ambulance. Patient states his BG levels are in the 110-120s in the morning and go up to the 200s at night. States he took his premeal insulin before the dinner. States he ate the food during dinner.     In the ED, the patient's vitals were significant for a BP of 94/47. Labs were significant for WBC: 13.3, H&H: 10.9/32, BUN: 82, Cr: 3.07, TSH: 6.283, eGFR: 19. CTH: negative. CXR wet read: negative. EKG: Atrial paced rhythm with prolonged AV conduction. (10 Nov 2024 00:22)      Hospital Course Summary: Upon admission, cardiology was consulted. TTE and carotid US performed with results shown below. Discussed with vascular, no intervention for carotids. Patient's PPM interrogated with normal battery life and normal PPM functioning, no events. Patient had pharm nuclear stress test with normal results per discussion with cardiology. Patient should follow up with cardiology outpatient as well as PCP. Patient was evaluated by physical therapy and recommended home PT, however patient declines. Carotid doppler reviewed, no significant stenosis, but presence of calcified plaque, reached out to vascular for consult Dr. Branham -no vascular intervention. UA and CXR negative. Held Furosemide, and Enalapril due to hypotension on arrival. Restarted chlorthalidone and hydralazine.        < from: US Duplex Carotid Arteries Complete, Bilateral (11.10.24 @ 12:08) >    IMPRESSION: No significant hemodynamic stenosis of either carotid artery.    Limited visualization of the left carotid bifurcation due to the presence   of calcified plaque. Consider correlation with CTA.    < end of copied text >    < from: TTE Echo Complete w/o Contrast w/ Doppler (11.10.24 @ 09:32) >    Summary:   1. Left ventricular ejection fraction, by visual estimation, is 60 to   65%.   2. Normal global left ventricular systolic function.   3. Mildly enlarged left atrium.   4. Mild thickening and calcification of the anterior and posterior   mitral valve leaflets.   5. Trace mitral valve regurgitation.   6. Mild tricuspid regurgitation.   7. Sclerotic aortic valve with normal opening.    < end of copied text >      GOC:   •	Code Status :Full Code  •	Summary of Goals of Care Conversation/ what matters most: Explained risks of CPR/Intubation, patient agreeable to chest compressions and intubation if needed to save their life in an emergency.    Discharging Provider:  SYDNEY Ingram  Contact Info: [phone]    Outpatient Provider: Dr. Diego [phone] notified     You came to the hospital due to syncope (passing out)  You were evaluated by cardiology and had a stress test performed  Your stress test is normal and you should follow up with cardiology outpatient  Hold off on taking Enalopril until follow up with your primary care provider    You will need to follow up with your primary care physician for further management.    Discharging Provider:  First, Last Name, MD//NP/PA  Contact Info: Work Cell - Please call with any questions or concerns.         HPI:  84M PMHx significant for HTN, HLD, DM2, prostate cancer s/p prostatectomy, CKD, TIA, with a pacemaker who was BIBA because of extreme weakness and syncope. The patient was at a veterans dinner when his daughter noticed that he was leaning over the left repeatedly. The patient states he did not realize he was leaning over in his chair. When the daughter had mentioned to him that they should go home, the patient was unable to get up because of extreme weakness. The patient was very groggy as per the patient daughter. The daughter called the ambulance, but by the time they got to him, the patient was passed out, as per daughter. The patient woke up in the ambulance. Patient states his BG levels are in the 110-120s in the morning and go up to the 200s at night. States he took his premeal insulin before the dinner. States he ate the food during dinner.     In the ED, the patient's vitals were significant for a BP of 94/47. Labs were significant for WBC: 13.3, H&H: 10.9/32, BUN: 82, Cr: 3.07, TSH: 6.283, eGFR: 19. CTH: negative. CXR wet read: negative. EKG: Atrial paced rhythm with prolonged AV conduction. (10 Nov 2024 00:22)      Hospital Course Summary: Pt was admitted for Syncope possibly secondary to hypoglycemia vs orthostatic hypotension vs cardiac in nature . Insulin was decreased from home dosing and gradually brought back up to home dosing. Upon admission, cardiology was consulted. TTE and carotid US performed with results shown below. Discussed with vascular, no intervention for carotids. Patient's PPM interrogated with normal battery life and normal PPM functioning, no events. Patient had pharm nuclear stress test with normal results per discussion with cardiology. Patient should follow up with cardiology outpatient as well as PCP. Patient was evaluated by physical therapy and recommended home PT, however patient declines. Carotid doppler reviewed, no significant stenosis, but presence of calcified plaque, reached out to vascular for consult Dr. Branham -no vascular intervention. UA and CXR negative. Held Furosemide, Chlordalithone, and Enalapril due to hypotension on arrival. Restarted Clonidine and hydralazine TID. Pt remained hemodynamically stable for d/c home. Daughter at bedside aware and stated pt has cardio apt in 2 days.      < from: US Duplex Carotid Arteries Complete, Bilateral (11.10.24 @ 12:08) >    IMPRESSION: No significant hemodynamic stenosis of either carotid artery.    Limited visualization of the left carotid bifurcation due to the presence   of calcified plaque. Consider correlation with CTA.    < end of copied text >    < from: TTE Echo Complete w/o Contrast w/ Doppler (11.10.24 @ 09:32) >    Summary:   1. Left ventricular ejection fraction, by visual estimation, is 60 to   65%.   2. Normal global left ventricular systolic function.   3. Mildly enlarged left atrium.   4. Mild thickening and calcification of the anterior and posterior   mitral valve leaflets.   5. Trace mitral valve regurgitation.   6. Mild tricuspid regurgitation.   7. Sclerotic aortic valve with normal opening.    < end of copied text >      GOC:   •	Code Status :Full Code  •	Summary of Goals of Care Conversation/ what matters most: Explained risks of CPR/Intubation, patient agreeable to chest compressions and intubation if needed to save their life in an emergency.    Discharging Provider:  SYDNEY Ingram  Contact Info: [phone]    Outpatient Provider: Dr. Diego      You came to the hospital due to syncope (passing out)  You were evaluated by cardiology and had a stress test performed  Your stress test is normal and you should follow up with cardiology outpatient  Hold off on taking Enalopril until follow up with your primary care provider/cardiologist    You will need to follow up with your primary care physician for further management.    Discharging Provider:  Dr. Madrigal  Please call with any questions or concerns.

## 2024-11-11 NOTE — PROGRESS NOTE ADULT - ASSESSMENT
84M PMHx significant for HTN, HLD, DM2, prostate cancer s/p prostatectomy, CKD, TIA, with a pacemaker who was BIBA because of extreme weakness and syncope.     #Syncope possibly secondary to hypoglycemia vs orthostatic hypotension vs cardiac in nature   -Pt BIBA after syncopal episode at a restaurant  -Pt with PPM  -BP: 94/47, glucose: 91 on admission  -C/w  telemetry monitoring  - Echocardiogram reviewed  -Serial EKGs  -Troponin neg x3  -Carotid doppler reviewed, no significant stenosis, but presence of calcified plaque, reached out to vascular for consult Dr. Branham -no vascular intervention   -Check Orthostatics  -PPM interrogated, normal functioning.  PPM at [place], cardio at [facility]. Pt saw cardio 3 days ago and daughter reports meds were all okay/checked  -Cardio consulted, pharm nuclear stress test tmrw am, npo after midnight   -neg UA and CXR and leukocytosis; infection ruled out    #DM2  -Glucose 91 after eating a full meal  -Halved patient's home dose medications- Lantus 12 units, Lispro 6 units with meals  -Patient's glucose elevated in 200s  -ISS  -Consistent carbohydrate renal diet     #CKD stage 4  -Cr: 2.58 (baseline ~3)  -monitor    #HTN  -Patient with hypotension on arrival  -Held Furosemide, Chlorthalidone,  Enalapril due to hypotension on arrival  -Restart hydralazine at 75mg TID, restart catapres 0.2mg BID    #HLD  -c/w home medications    #DVT prophylaxis  -c/w Bubba     Spoke with patient's daughter, Farhana. All questions and concerns were answered. Farhana's phone number: [phone]   84M PMHx significant for HTN, HLD, DM2, prostate cancer s/p prostatectomy, CKD, TIA, with a pacemaker who was BIBA because of extreme weakness and syncope.     #Syncope possibly secondary to hypoglycemia vs orthostatic hypotension vs cardiac in nature   -Pt BIBA after syncopal episode at a restaurant  -Pt with PPM  -BP: 94/47, glucose: 91 on admission  -C/w  telemetry monitoring  - Echocardiogram reviewed  -Serial EKGs  -Troponin neg x3  -Carotid doppler reviewed, no significant stenosis, but presence of calcified plaque, reached out to vascular for consult Dr. Branham -no vascular intervention   -Check Orthostatics  -PPM interrogated, normal functioning.  PPM at [place], cardio at [facility]. Pt saw cardio 3 days ago and daughter reports meds were all okay/checked  -Cardio consulted, pharm nuclear stress test tmrw am, npo after midnight   -neg UA and CXR and leukocytosis; infection ruled out    #DM2  -Glucose 91 after eating a full meal  -Halved patient's home dose medications- Lantus 12 units, Lispro 6 units with meals  -Patient's glucose elevated in 200s, will increase lantus to 20 units, and increase premeal to 10 units  -ISS  -Consistent carbohydrate renal diet     #CKD stage 4  -Cr: 2.58 (baseline ~3)  -monitor    #HTN  -Patient with hypotension on arrival  -Held Furosemide, Chlorthalidone,  Enalapril due to hypotension on arrival  -Restart hydralazine at 75mg TID, restart catapres 0.2mg BID, restart enalapril if bp elevated consistently     #HLD  -c/w home medications    #DVT prophylaxis  -c/w Bubba     Spoke with patient's daughter, Farhana. All questions and concerns were answered. Farhana's phone number: [phone]   84M PMHx significant for HTN, HLD, DM2, prostate cancer s/p prostatectomy, CKD, TIA, with a pacemaker who was BIBA because of extreme weakness and syncope.     #Syncope possibly secondary to hypoglycemia vs orthostatic hypotension vs cardiac in nature   -Pt BIBA after syncopal episode at a restaurant  -BP: 94/47, glucose: 91 on admission  -C/w  telemetry monitoring  - Echocardiogram reviewed  -Serial EKGs  -Troponin neg x3  -Carotid doppler reviewed, no significant stenosis, but presence of calcified plaque, reached out to vascular for consult Dr. Branham -no vascular intervention   -Check Orthostatics  -PPM interrogated, normal functioning.  PPM at [place], cardio at [facility]. Pt saw cardio 3 days ago and daughter reports meds were all okay/checked  -Cardio consulted, pharm nuclear stress test tmrw am, npo after midnight   -neg UA and CXR and leukocytosis; infection ruled out    #DM2  -Glucose 91 after eating a full meal  -Halved patient's home dose medications- Lantus 12 units, Lispro 6 units with meals  -Patient's glucose elevated in 200s, will increase lantus to 20 units, and increase premeal to 10 units  -ISS  -Consistent carbohydrate renal diet     #CKD stage 4  -Cr: 2.58 (baseline ~3)  -monitor    #HTN  -Patient with hypotension on arrival  -Held Furosemide, Chlorthalidone,  Enalapril due to hypotension on arrival  -Restarted hydralazine at 75mg TID, restarted catapres 0.2mg BID, restart enalapril if bp elevated consistently     #HLD  -c/w home medications    #DVT prophylaxis  -c/w Bubba     Spoke with patient's daughter, Farhana. All questions and concerns were answered. Farhana's phone number: [phone]

## 2024-11-11 NOTE — DISCHARGE NOTE PROVIDER - NSDCCPCAREPLAN_GEN_ALL_CORE_FT
PRINCIPAL DISCHARGE DIAGNOSIS  Diagnosis: Syncope  Assessment and Plan of Treatment: You came to the hospital due to syncope (passing out)  You were evaluated by cardiology and had a stress test performed  Your stress test is normal and you should follow up with cardiology outpatient  Hold off on taking Enalopril until follow up with your primary care provider      SECONDARY DISCHARGE DIAGNOSES  Diagnosis: Near syncope  Assessment and Plan of Treatment:     Diagnosis: Elevated serum creatinine  Assessment and Plan of Treatment:      PRINCIPAL DISCHARGE DIAGNOSIS  Diagnosis: Syncope  Assessment and Plan of Treatment: You came to the hospital due to syncope (passing out)  You were evaluated by cardiology and had a stress test performed  Your stress test is normal and you should follow up with cardiology outpatient  Hold off on taking Enalopril until follow up with your primary care provider     PRINCIPAL DISCHARGE DIAGNOSIS  Diagnosis: Syncope  Assessment and Plan of Treatment: You came to the hospital due to syncope (passing out)  You were evaluated by cardiology and had a stress test performed  Your stress test is normal and you should follow up with cardiology outpatient as scheduled  Hold off on taking Enalopril until follow up with your primary care provider/cardiologist and hold ASA until then as well.

## 2024-11-11 NOTE — PROGRESS NOTE ADULT - SUBJECTIVE AND OBJECTIVE BOX
[facility]  [id number]      Chief Complaint: Syncope/History of PPM    Interval History: The patient recalls eating dinner at a restaurant and then fainting, denies prodromal symptoms of chest pain, shortness of breath or palpitations.     Tele: atrial paced 60s BPM      Current meds:   acetaminophen     Tablet .. 650 milliGRAM(s) Oral every 6 hours PRN  aluminum hydroxide/magnesium hydroxide/simethicone Suspension 30 milliLiter(s) Oral every 4 hours PRN  apixaban 2.5 milliGRAM(s) Oral two times a day  cloNIDine 0.2 milliGRAM(s) Oral two times a day  dextrose 5%. 1000 milliLiter(s) IV Continuous <Continuous>  dextrose 5%. 1000 milliLiter(s) IV Continuous <Continuous>  dextrose 50% Injectable 25 Gram(s) IV Push once  dextrose 50% Injectable 25 Gram(s) IV Push once  dextrose 50% Injectable 12.5 Gram(s) IV Push once  dextrose Oral Gel 15 Gram(s) Oral once PRN  ergocalciferol 48648 Unit(s) Oral every week  glucagon  Injectable 1 milliGRAM(s) IntraMuscular once  hydrALAZINE 75 milliGRAM(s) Oral three times a day  insulin glargine Injectable (LANTUS) 12 Unit(s) SubCutaneous at bedtime  insulin lispro (ADMELOG) corrective regimen sliding scale   SubCutaneous three times a day before meals  insulin lispro (ADMELOG) corrective regimen sliding scale   SubCutaneous at bedtime  insulin lispro Injectable (ADMELOG) 6 Unit(s) SubCutaneous three times a day before meals  levothyroxine 150 MICROGram(s) Oral daily  melatonin 3 milliGRAM(s) Oral at bedtime PRN  ondansetron Injectable 4 milliGRAM(s) IV Push every 8 hours PRN  propranolol LA 80 milliGRAM(s) Oral daily  rosuvastatin 5 milliGRAM(s) Oral at bedtime      Objective:     Vital Signs:   T(C): 36.7 (11-11-24 @ 05:34), Max: 37 (11-10-24 @ 14:23)  HR: 84 (11-11-24 @ 05:34) (56 - 84)  BP: 158/74 (11-11-24 @ 05:34) (118/52 - 158/74)  RR: 17 (11-11-24 @ 05:34) (16 - 18)  SpO2: 92% (11-10-24 @ 20:52) (92% - 95%)  Wt(kg): --    Physical Exam:   General: no acute distress  Neck: supple   CVS: JVP ~ 7 cm H20, RRR, s1, s2, no murmurs  Pulm: unlabored respirations, clear to ausculation   Ext: no lower extremity edema b/l   Neuro: awake, alert and oriented  Psych: Normal affect      Labs:   10 Nov 2024 06:55    141    |  104    |  85     ----------------------------<  219    4.5     |  29     |  3.01     Ca    8.7        10 Nov 2024 06:55    TPro  6.8    /  Alb  3.5    /  TBili  0.5    /  DBili  x      /  AST  20     /  ALT  23     /  AlkPhos  65     10 Nov 2024 06:55                          10.4   8.75  )-----------( 158      ( 10 Nov 2024 06:55 )             31.6     PT/INR - ( 09 Nov 2024 20:23 )   PT: 14.2 sec;   INR: 1.21 ratio         PTT - ( 09 Nov 2024 20:23 )  PTT:24.1 sec        ECG (11/9/24): atrial paced rhythm, nonspecific ST abnormalities     TTE (11/10/24):  LVEF 60-65%

## 2024-11-11 NOTE — DISCHARGE NOTE PROVIDER - NSDCMRMEDTOKEN_GEN_ALL_CORE_FT
aspirin 325 mg oral tablet: 1 tab(s) orally once a day with dinner  chlorthalidone 25 mg oral tablet: 1 tab(s) orally once a day after breakfast  cloNIDine 0.2 mg oral tablet: 1 tab(s) orally 2 times a day  Eliquis 2.5 mg oral tablet: 1 tab(s) orally 2 times a day  enalapril: 20 milligram(s) 2 times a day  ergocalciferol: 1.25 milligram(s) once a week  furosemide 40 mg oral tablet: 1 tab(s) orally once a day (in the afternoon)  HumaLOG 100 units/mL injectable solution: 12 unit(s) injectable 3 times a day (before meals)  hydrALAZINE 100 mg oral tablet: 1 tab(s) orally 3 times a day  Lantus 100 units/mL subcutaneous solution: 24 unit(s) subcutaneous once a day (at bedtime)  levothyroxine 150 mcg (0.15 mg) oral tablet: 1 tab(s) orally once a day (in the morning)  propranolol 80 mg oral capsule, extended release: 1 cap(s) orally once a day with dinner  rosuvastatin 5 mg oral tablet: 1 tab(s) orally once a day with dinner  Tradjenta 5 mg oral tablet: 1 tab(s) orally once a day (in the morning)   chlorthalidone 25 mg oral tablet: 1 tab(s) orally once a day after breakfast  cloNIDine 0.2 mg oral tablet: 1 tab(s) orally 2 times a day  Eliquis 2.5 mg oral tablet: 1 tab(s) orally 2 times a day  ergocalciferol: 1.25 milligram(s) once a week  furosemide 40 mg oral tablet: 1 tab(s) orally once a day (in the afternoon)  HumaLOG 100 units/mL injectable solution: 12 unit(s) injectable 3 times a day (before meals)  hydrALAZINE 100 mg oral tablet: 1 tab(s) orally 3 times a day  Lantus 100 units/mL subcutaneous solution: 24 unit(s) subcutaneous once a day (at bedtime)  levothyroxine 150 mcg (0.15 mg) oral tablet: 1 tab(s) orally once a day (in the morning)  propranolol 80 mg oral capsule, extended release: 1 cap(s) orally once a day with dinner  rosuvastatin 5 mg oral tablet: 1 tab(s) orally once a day with dinner  Tradjenta 5 mg oral tablet: 1 tab(s) orally once a day (in the morning)

## 2024-11-12 VITALS — HEART RATE: 86 BPM | SYSTOLIC BLOOD PRESSURE: 152 MMHG | DIASTOLIC BLOOD PRESSURE: 64 MMHG

## 2024-11-12 LAB
ANION GAP SERPL CALC-SCNC: 7 MMOL/L — SIGNIFICANT CHANGE UP (ref 5–17)
BUN SERPL-MCNC: 74 MG/DL — HIGH (ref 7–23)
CALCIUM SERPL-MCNC: 8.3 MG/DL — LOW (ref 8.4–10.5)
CHLORIDE SERPL-SCNC: 104 MMOL/L — SIGNIFICANT CHANGE UP (ref 96–108)
CO2 SERPL-SCNC: 28 MMOL/L — SIGNIFICANT CHANGE UP (ref 22–31)
CREAT SERPL-MCNC: 2.45 MG/DL — HIGH (ref 0.5–1.3)
EGFR: 25 ML/MIN/1.73M2 — LOW
GLUCOSE BLDC GLUCOMTR-MCNC: 236 MG/DL — HIGH (ref 70–99)
GLUCOSE BLDC GLUCOMTR-MCNC: 290 MG/DL — HIGH (ref 70–99)
GLUCOSE SERPL-MCNC: 258 MG/DL — HIGH (ref 70–99)
MAGNESIUM SERPL-MCNC: 2.2 MG/DL — SIGNIFICANT CHANGE UP (ref 1.6–2.6)
POTASSIUM SERPL-MCNC: 3.8 MMOL/L — SIGNIFICANT CHANGE UP (ref 3.5–5.3)
POTASSIUM SERPL-SCNC: 3.8 MMOL/L — SIGNIFICANT CHANGE UP (ref 3.5–5.3)
SODIUM SERPL-SCNC: 139 MMOL/L — SIGNIFICANT CHANGE UP (ref 135–145)

## 2024-11-12 PROCEDURE — 81001 URINALYSIS AUTO W/SCOPE: CPT

## 2024-11-12 PROCEDURE — 93018 CV STRESS TEST I&R ONLY: CPT

## 2024-11-12 PROCEDURE — 86901 BLOOD TYPING SEROLOGIC RH(D): CPT

## 2024-11-12 PROCEDURE — 70450 CT HEAD/BRAIN W/O DYE: CPT | Mod: MC

## 2024-11-12 PROCEDURE — 36415 COLL VENOUS BLD VENIPUNCTURE: CPT

## 2024-11-12 PROCEDURE — 83735 ASSAY OF MAGNESIUM: CPT

## 2024-11-12 PROCEDURE — A9500: CPT

## 2024-11-12 PROCEDURE — 80053 COMPREHEN METABOLIC PANEL: CPT

## 2024-11-12 PROCEDURE — 84443 ASSAY THYROID STIM HORMONE: CPT

## 2024-11-12 PROCEDURE — 84480 ASSAY TRIIODOTHYRONINE (T3): CPT

## 2024-11-12 PROCEDURE — 86900 BLOOD TYPING SEROLOGIC ABO: CPT

## 2024-11-12 PROCEDURE — 83036 HEMOGLOBIN GLYCOSYLATED A1C: CPT

## 2024-11-12 PROCEDURE — 84484 ASSAY OF TROPONIN QUANT: CPT

## 2024-11-12 PROCEDURE — 93017 CV STRESS TEST TRACING ONLY: CPT

## 2024-11-12 PROCEDURE — 87637 SARSCOV2&INF A&B&RSV AMP PRB: CPT

## 2024-11-12 PROCEDURE — 97162 PT EVAL MOD COMPLEX 30 MIN: CPT

## 2024-11-12 PROCEDURE — 85025 COMPLETE CBC W/AUTO DIFF WBC: CPT

## 2024-11-12 PROCEDURE — 78452 HT MUSCLE IMAGE SPECT MULT: CPT | Mod: MC

## 2024-11-12 PROCEDURE — 84439 ASSAY OF FREE THYROXINE: CPT

## 2024-11-12 PROCEDURE — 99285 EMERGENCY DEPT VISIT HI MDM: CPT | Mod: 25

## 2024-11-12 PROCEDURE — 99239 HOSP IP/OBS DSCHRG MGMT >30: CPT

## 2024-11-12 PROCEDURE — 86850 RBC ANTIBODY SCREEN: CPT

## 2024-11-12 PROCEDURE — 85730 THROMBOPLASTIN TIME PARTIAL: CPT

## 2024-11-12 PROCEDURE — 96360 HYDRATION IV INFUSION INIT: CPT

## 2024-11-12 PROCEDURE — 85610 PROTHROMBIN TIME: CPT

## 2024-11-12 PROCEDURE — 99232 SBSQ HOSP IP/OBS MODERATE 35: CPT

## 2024-11-12 PROCEDURE — 84436 ASSAY OF TOTAL THYROXINE: CPT

## 2024-11-12 PROCEDURE — 71045 X-RAY EXAM CHEST 1 VIEW: CPT

## 2024-11-12 PROCEDURE — 93880 EXTRACRANIAL BILAT STUDY: CPT

## 2024-11-12 PROCEDURE — 93005 ELECTROCARDIOGRAM TRACING: CPT

## 2024-11-12 PROCEDURE — 82962 GLUCOSE BLOOD TEST: CPT

## 2024-11-12 PROCEDURE — 80061 LIPID PANEL: CPT

## 2024-11-12 PROCEDURE — 78452 HT MUSCLE IMAGE SPECT MULT: CPT | Mod: 26

## 2024-11-12 PROCEDURE — 83605 ASSAY OF LACTIC ACID: CPT

## 2024-11-12 PROCEDURE — 93016 CV STRESS TEST SUPVJ ONLY: CPT

## 2024-11-12 PROCEDURE — 93306 TTE W/DOPPLER COMPLETE: CPT

## 2024-11-12 PROCEDURE — 80048 BASIC METABOLIC PNL TOTAL CA: CPT

## 2024-11-12 PROCEDURE — 85027 COMPLETE CBC AUTOMATED: CPT

## 2024-11-12 RX ORDER — INSULIN GLARGINE,HUM.REC.ANLOG 100/ML
24 VIAL (ML) SUBCUTANEOUS AT BEDTIME
Refills: 0 | Status: DISCONTINUED | OUTPATIENT
Start: 2024-11-12 | End: 2024-11-12

## 2024-11-12 RX ORDER — ASPIRIN/MAG CARB/ALUMINUM AMIN 325 MG
1 TABLET ORAL
Refills: 0 | DISCHARGE

## 2024-11-12 RX ORDER — HYDRALAZINE HYDROCHLORIDE 50 MG/1
100 TABLET, FILM COATED ORAL THREE TIMES A DAY
Refills: 0 | Status: DISCONTINUED | OUTPATIENT
Start: 2024-11-12 | End: 2024-11-12

## 2024-11-12 RX ORDER — ENALAPRIL MALEATE 10 MG
20 TABLET ORAL
Refills: 0 | DISCHARGE

## 2024-11-12 RX ADMIN — HYDRALAZINE HYDROCHLORIDE 100 MILLIGRAM(S): 50 TABLET, FILM COATED ORAL at 13:45

## 2024-11-12 RX ADMIN — Medication 10 UNIT(S): at 11:47

## 2024-11-12 RX ADMIN — APIXABAN 2.5 MILLIGRAM(S): 5 TABLET, FILM COATED ORAL at 06:49

## 2024-11-12 RX ADMIN — Medication 150 MICROGRAM(S): at 06:49

## 2024-11-12 RX ADMIN — Medication 6: at 11:46

## 2024-11-12 NOTE — PROGRESS NOTE ADULT - ASSESSMENT
Assessment:  Nader Liu is an 84 year old man with past medical history of Hypertension, Hyperlipidemia, Diabetes mellitus, Prostate cancer (s/p prostatectomy) and Chronic kidney disease who presents with episode of syncope while eating, found to be hypotensive and hypoglycemic.     ECG consistent with atrial paced rhythm and nonspecific ST abnormalities. Troponins negative x 3, patient denies any angina or dyspnea. CT head unremarkable. CXR clear. Carotid US with no significant hemodynamic stenosis of either carotid artery, however there is limited visualization of the left carotid bifurcation due to the presence of calcified plaque - consider correlation with CTA.    Recommendations:  [] Syncope: Unclear etiology, may be due to hypoglycemia and dehydration - noted when patient presented in ER. Carotid US with no significant hemodynamic stenosis, also per primary team no Vascular intervention for left calcified plaque. PPM interrogation reviewed with normal battery life and normal PPM functioning, no events. Continue to monitor on telemetry - no events. Check orthostatic vital signs. Plan for pharmacologic nuclear stress test today to ensure no coronary ischemia. Patient follows with cardiologist, Dr. Pro in [facility].  [] CKD: Cr improving with IV fluids, likely there was a component of dehydration, patient reports his baseline Cr ranges from 2.5 to 3.5 and it is currently 2.4 which is stable.     We will continue to follow along.    Cee Lopez MD  Cardiology         Assessment:  Nader Liu is an 84 year old man with past medical history of Hypertension, Hyperlipidemia, Diabetes mellitus, Prostate cancer (s/p prostatectomy) and Chronic kidney disease who presents with episode of syncope while eating, found to be hypotensive and hypoglycemic.     ECG consistent with atrial paced rhythm and nonspecific ST abnormalities. Troponins negative x 3, patient denies any angina or dyspnea. CT head unremarkable. CXR clear. Carotid US with no significant hemodynamic stenosis of either carotid artery, however there is limited visualization of the left carotid bifurcation due to the presence of calcified plaque - consider correlation with CTA.    Recommendations:  [] Syncope: Unclear etiology, may be due to hypoglycemia and dehydration - noted when patient presented in ER. Carotid US with no significant hemodynamic stenosis, also per primary team no Vascular intervention for left calcified plaque. PPM interrogation reviewed with normal battery life and normal PPM functioning, no events. Continue to monitor on telemetry - no events. Check orthostatic vital signs. Plan for pharmacologic nuclear stress test today to ensure no coronary ischemia. Patient follows with cardiologist, Dr. Pro in [facility].  [] CKD: Cr improving with IV fluids, likely there was a component of dehydration, patient reports his baseline Cr ranges from 2.5 to 3.5 and it is currently 2.4 which is stable.     Addendum:  Nuclear stress test report:    IMPRESSION:  SPECT Myocardial Perfusion Imaging post rest and post vasodilator revealed a moderate-sized, moderate intensity fixed perfusion defect of the apical inferior, mid inferior and basal inferior wall with normal wall motion suggestive of diaphragmatic attenuation artifact.  Normal left ventricular wall motion with ejection fraction of 69 %   (normal: 50% or greater).  No regional wall motion abnormalities.    Nuclear stress test appears to have generally normal myocardial perfusion and diaphragmatic attenuation artifact. The patient is CV stable for discharge home today with close follow up with his cardiologist.    Cee Lopez MD  Cardiology

## 2024-11-12 NOTE — PROGRESS NOTE ADULT - SUBJECTIVE AND OBJECTIVE BOX
Patient is a 84y old  Male who presents with a chief complaint of     Patient seen and examined at bedside. No overnight events reported. Patient states he feels good. Denies chest pain, sob, nausea, vomiting.     ALLERGIES:  No Known Allergies    MEDICATIONS  (STANDING):  apixaban 2.5 milliGRAM(s) Oral two times a day  cloNIDine 0.2 milliGRAM(s) Oral two times a day  dextrose 5%. 1000 milliLiter(s) (50 mL/Hr) IV Continuous <Continuous>  dextrose 5%. 1000 milliLiter(s) (100 mL/Hr) IV Continuous <Continuous>  dextrose 50% Injectable 25 Gram(s) IV Push once  dextrose 50% Injectable 25 Gram(s) IV Push once  dextrose 50% Injectable 12.5 Gram(s) IV Push once  ergocalciferol 72204 Unit(s) Oral every week  glucagon  Injectable 1 milliGRAM(s) IntraMuscular once  hydrALAZINE 75 milliGRAM(s) Oral three times a day  insulin glargine Injectable (LANTUS) 20 Unit(s) SubCutaneous at bedtime  insulin lispro (ADMELOG) corrective regimen sliding scale   SubCutaneous at bedtime  insulin lispro (ADMELOG) corrective regimen sliding scale   SubCutaneous three times a day before meals  insulin lispro Injectable (ADMELOG) 10 Unit(s) SubCutaneous three times a day before meals  levothyroxine 150 MICROGram(s) Oral daily  propranolol LA 80 milliGRAM(s) Oral daily  regadenoson Injectable 0.4 milliGRAM(s) IV Push once  rosuvastatin 5 milliGRAM(s) Oral at bedtime    MEDICATIONS  (PRN):  acetaminophen     Tablet .. 650 milliGRAM(s) Oral every 6 hours PRN Temp greater or equal to 38C (100.4F), Mild Pain (1 - 3)  aluminum hydroxide/magnesium hydroxide/simethicone Suspension 30 milliLiter(s) Oral every 4 hours PRN Dyspepsia  dextrose Oral Gel 15 Gram(s) Oral once PRN Blood Glucose LESS THAN 70 milliGRAM(s)/deciliter  melatonin 3 milliGRAM(s) Oral at bedtime PRN Insomnia  ondansetron Injectable 4 milliGRAM(s) IV Push every 8 hours PRN Nausea and/or Vomiting    Vital Signs Last 24 Hrs  T(F): 97.7 (12 Nov 2024 04:58), Max: 98.2 (11 Nov 2024 13:09)  HR: 60 (12 Nov 2024 04:58) (59 - 96)  BP: 156/64 (12 Nov 2024 04:58) (114/57 - 156/64)  RR: 18 (12 Nov 2024 04:58) (16 - 18)  SpO2: 94% (12 Nov 2024 04:58) (92% - 94%)  I&O's Summary    11 Nov 2024 07:01  -  12 Nov 2024 07:00  --------------------------------------------------------  IN: 400 mL / OUT: 1510 mL / NET: -1110 mL      PHYSICAL EXAM:  General: NAD, A/O x 3  ENT: No gross hearing impairment, Moist mucous membranes, no thrush  Neck: Supple, No JVD  Lungs: Clear to auscultation bilaterally, good air entry, non-labored breathing  Cardio: RRR, S1/S2, No murmur  Abdomen: Soft, Nontender, Nondistended; Bowel sounds present  Extremities: No calf tenderness, No cyanosis, No pitting edema  Psych: Appropriate mood and affect    LABS:                        10.4   8.75  )-----------( 158      ( 10 Nov 2024 06:55 )             31.6     11-12    139  |  104  |  74  ----------------------------<  258  3.8   |  28  |  2.45    Ca    8.3      12 Nov 2024 06:00  Mg     2.2     11-12    TPro  6.8  /  Alb  3.5  /  TBili  0.5  /  DBili  x   /  AST  20  /  ALT  23  /  AlkPhos  65  11-10          PT/INR - ( 09 Nov 2024 20:23 )   PT: 14.2 sec;   INR: 1.21 ratio         PTT - ( 09 Nov 2024 20:23 )  PTT:24.1 sec  Lactate, Blood: 1.2 mmol/L (11-09 @ 20:23)      CARDIAC MARKERS ( 10 Nov 2024 06:55 )  x     / 13.4 ng/L / x     / x     / x      CARDIAC MARKERS ( 09 Nov 2024 23:03 )  x     / 13.9 ng/L / x     / x     / x      CARDIAC MARKERS ( 09 Nov 2024 20:23 )  x     / 10.9 ng/L / x     / x     / x          11-10 Chol 124 mg/dL LDL -- HDL 35 mg/dL Trig 237 mg/dL  TSH --   TSH with FT4 reflex --  Total T3 69              POCT Blood Glucose.: 236 mg/dL (12 Nov 2024 07:47)  POCT Blood Glucose.: 242 mg/dL (11 Nov 2024 20:58)  POCT Blood Glucose.: 125 mg/dL (11 Nov 2024 16:54)  POCT Blood Glucose.: 232 mg/dL (11 Nov 2024 12:15)  POCT Blood Glucose.: 238 mg/dL (11 Nov 2024 11:38)      Urinalysis Basic - ( 12 Nov 2024 06:00 )    Color: x / Appearance: x / SG: x / pH: x  Gluc: 258 mg/dL / Ketone: x  / Bili: x / Urobili: x   Blood: x / Protein: x / Nitrite: x   Leuk Esterase: x / RBC: x / WBC x   Sq Epi: x / Non Sq Epi: x / Bacteria: x          RADIOLOGY & ADDITIONAL TESTS:    Care Discussed with Consultants/Other Providers:    Patient is a 84y old  Male who presents with a chief complaint of     Patient seen and examined at bedside. No overnight events reported. Patient states he feels good. Denies chest pain, sob, nausea, vomiting.     ALLERGIES:  No Known Allergies    MEDICATIONS  (STANDING):  apixaban 2.5 milliGRAM(s) Oral two times a day  cloNIDine 0.2 milliGRAM(s) Oral two times a day  dextrose 5%. 1000 milliLiter(s) (50 mL/Hr) IV Continuous <Continuous>  dextrose 5%. 1000 milliLiter(s) (100 mL/Hr) IV Continuous <Continuous>  dextrose 50% Injectable 25 Gram(s) IV Push once  dextrose 50% Injectable 25 Gram(s) IV Push once  dextrose 50% Injectable 12.5 Gram(s) IV Push once  ergocalciferol 07066 Unit(s) Oral every week  glucagon  Injectable 1 milliGRAM(s) IntraMuscular once  hydrALAZINE 75 milliGRAM(s) Oral three times a day  insulin glargine Injectable (LANTUS) 20 Unit(s) SubCutaneous at bedtime  insulin lispro (ADMELOG) corrective regimen sliding scale   SubCutaneous at bedtime  insulin lispro (ADMELOG) corrective regimen sliding scale   SubCutaneous three times a day before meals  insulin lispro Injectable (ADMELOG) 10 Unit(s) SubCutaneous three times a day before meals  levothyroxine 150 MICROGram(s) Oral daily  propranolol LA 80 milliGRAM(s) Oral daily  regadenoson Injectable 0.4 milliGRAM(s) IV Push once  rosuvastatin 5 milliGRAM(s) Oral at bedtime    MEDICATIONS  (PRN):  acetaminophen     Tablet .. 650 milliGRAM(s) Oral every 6 hours PRN Temp greater or equal to 38C (100.4F), Mild Pain (1 - 3)  aluminum hydroxide/magnesium hydroxide/simethicone Suspension 30 milliLiter(s) Oral every 4 hours PRN Dyspepsia  dextrose Oral Gel 15 Gram(s) Oral once PRN Blood Glucose LESS THAN 70 milliGRAM(s)/deciliter  melatonin 3 milliGRAM(s) Oral at bedtime PRN Insomnia  ondansetron Injectable 4 milliGRAM(s) IV Push every 8 hours PRN Nausea and/or Vomiting    Vital Signs Last 24 Hrs  T(F): 97.7 (12 Nov 2024 04:58), Max: 98.2 (11 Nov 2024 13:09)  HR: 60 (12 Nov 2024 04:58) (59 - 96)  BP: 156/64 (12 Nov 2024 04:58) (114/57 - 156/64)  RR: 18 (12 Nov 2024 04:58) (16 - 18)  SpO2: 94% (12 Nov 2024 04:58) (92% - 94%)  I&O's Summary    11 Nov 2024 07:01  -  12 Nov 2024 07:00  --------------------------------------------------------  IN: 400 mL / OUT: 1510 mL / NET: -1110 mL      PHYSICAL EXAM:  General: NAD, A/O x 3  ENT: No gross hearing impairment, Moist mucous membranes, no thrush  Neck: Supple, No JVD  Lungs: Clear to auscultation bilaterally, good air entry, non-labored breathing  Cardio: RRR, S1/S2, No murmur  Abdomen: Soft, Nontender, Nondistended; Bowel sounds present  Extremities: No calf tenderness, No cyanosis, No pitting edema  Psych: Appropriate mood and affect    LABS:                        10.4   8.75  )-----------( 158      ( 10 Nov 2024 06:55 )             31.6     11-12    139  |  104  |  74  ----------------------------<  258  3.8   |  28  |  2.45    Ca    8.3      12 Nov 2024 06:00  Mg     2.2     11-12    TPro  6.8  /  Alb  3.5  /  TBili  0.5  /  DBili  x   /  AST  20  /  ALT  23  /  AlkPhos  65  11-10          PT/INR - ( 09 Nov 2024 20:23 )   PT: 14.2 sec;   INR: 1.21 ratio         PTT - ( 09 Nov 2024 20:23 )  PTT:24.1 sec  Lactate, Blood: 1.2 mmol/L (11-09 @ 20:23)      CARDIAC MARKERS ( 10 Nov 2024 06:55 )  x     / 13.4 ng/L / x     / x     / x      CARDIAC MARKERS ( 09 Nov 2024 23:03 )  x     / 13.9 ng/L / x     / x     / x      CARDIAC MARKERS ( 09 Nov 2024 20:23 )  x     / 10.9 ng/L / x     / x     / x          11-10 Chol 124 mg/dL LDL -- HDL 35 mg/dL Trig 237 mg/dL  TSH --   TSH with FT4 reflex --  Total T3 69              POCT Blood Glucose.: 236 mg/dL (12 Nov 2024 07:47)  POCT Blood Glucose.: 242 mg/dL (11 Nov 2024 20:58)  POCT Blood Glucose.: 125 mg/dL (11 Nov 2024 16:54)  POCT Blood Glucose.: 232 mg/dL (11 Nov 2024 12:15)  POCT Blood Glucose.: 238 mg/dL (11 Nov 2024 11:38)      Urinalysis Basic - ( 12 Nov 2024 06:00 )    Color: x / Appearance: x / SG: x / pH: x  Gluc: 258 mg/dL / Ketone: x  / Bili: x / Urobili: x   Blood: x / Protein: x / Nitrite: x   Leuk Esterase: x / RBC: x / WBC x   Sq Epi: x / Non Sq Epi: x / Bacteria: x          RADIOLOGY & ADDITIONAL TESTS:    Care Discussed with Consultants/Other Providers:   yes with cardio

## 2024-11-12 NOTE — PROGRESS NOTE ADULT - ASSESSMENT
84M PMHx significant for HTN, HLD, DM2, prostate cancer s/p prostatectomy, CKD, TIA, with a pacemaker who was BIBA because of extreme weakness and syncope.     #Syncope possibly secondary to hypoglycemia vs orthostatic hypotension vs cardiac in nature   -Pt BIBA after syncopal episode at a restaurant  -BP: 94/47, glucose: 91 on admission  -C/w  telemetry monitoring  - Echocardiogram reviewed  -Serial EKGs  -Troponin neg x3  -Carotid doppler reviewed, no significant stenosis, but presence of calcified plaque, reached out to vascular for consult Dr. Branham -no vascular intervention   -Check Orthostatics  -PPM interrogated, normal functioning.  PPM at [place], cardio at [facility]. Pt saw cardio 3 days ago and daughter reports meds were all okay/checked  -Cardio consulted, pharm nuclear stress test today, npo for test  -neg UA and CXR and leukocytosis; infection ruled out    #DM2  -Glucose 91 after eating a full meal  -Halved patient's home dose medications- Lantus 12 units, Lispro 6 units with meals  -Patient's glucose elevated in 200s, increased lantus to 20 units, and increased premeal to 10 units  -ISS  -Consistent carbohydrate renal diet     #CKD stage 4  -Cr: 2.45 (baseline ~3)  -monitor    #HTN  -Patient with hypotension on arrival  -Held Furosemide, Chlorthalidone,  Enalapril due to hypotension on arrival  -Restarted hydralazine at 75mg TID, restarted catapres 0.2mg BID, restart enalapril if bp elevated consistently     #HLD  -c/w home medications    #DVT prophylaxis  -c/w Bubba     Spoke with patient's daughter, Farhana. All questions and concerns were answered. Farhana's phone number: [phone]   84M PMHx significant for HTN, HLD, DM2, prostate cancer s/p prostatectomy, CKD, TIA, with a pacemaker who was BIBA because of extreme weakness and syncope.     #Syncope possibly secondary to hypoglycemia vs orthostatic hypotension vs cardiac in nature   -Pt BIBA after syncopal episode at a restaurant  -BP: 94/47, glucose: 91 on admission  -C/w  telemetry monitoring  - Echocardiogram reviewed  -Serial EKGs  -Troponin neg x3  -Carotid doppler reviewed, no significant stenosis, but presence of calcified plaque, reached out to vascular for consult Dr. Branham -no vascular intervention   -Check Orthostatics  -PPM interrogated, normal functioning.  PPM at [place], cardio at [facility]. Pt saw cardio 3 days ago and daughter reports meds were all okay/checked  -Cardio consulted, pharm nuclear stress test today, npo for test  -neg UA and CXR and leukocytosis; infection ruled out    #DM2  -Glucose 91 after eating a full meal  -Halved patient's home dose medications- Lantus 12 units, Lispro 6 units with meals  -Patient's glucose elevated in 200s, increased lantus to 20 units, and increased premeal to 10 units yesterday. will increase dosing back up to home doses today  -ISS  -Consistent carbohydrate renal diet     #CKD stage 4  -Cr: 2.45 (baseline ~3)  -monitor    #HTN  -Patient with hypotension on arrival  -Held Furosemide, Chlorthalidone,  Enalapril due to hypotension on arrival  -Restarted hydralazine at 75mg TID, restarted catapres 0.2mg BID, restart enalapril if bp elevated consistently. Will increase Hydralazine back to home dose 100mg TID today    #HLD  -c/w home medications    #DVT prophylaxis  -c/w Bubba     Spoke with patient's daughter, Farhana. All questions and concerns were answered. Farhana's phone number: [phone]

## 2024-11-12 NOTE — DISCHARGE NOTE NURSING/CASE MANAGEMENT/SOCIAL WORK - PATIENT PORTAL LINK FT
You can access the FollowMyHealth Patient Portal offered by [facility] by registering at the following website: [email or web address]. By joining [employer]’s FollowMyHealth portal, you will also be able to view your health information using other applications (apps) compatible with our system.

## 2024-11-12 NOTE — PROGRESS NOTE ADULT - SUBJECTIVE AND OBJECTIVE BOX
[facility]  [id number]      Chief Complaint: Syncope/History of PPM/Hypoglycemia     Interval History: The patient reports feeling well. Denies chest pain, shortness of breath, palpitations or dizziness.    Tele: atrial paced 60s BPM      Current meds:   acetaminophen     Tablet .. 650 milliGRAM(s) Oral every 6 hours PRN  aluminum hydroxide/magnesium hydroxide/simethicone Suspension 30 milliLiter(s) Oral every 4 hours PRN  apixaban 2.5 milliGRAM(s) Oral two times a day  cloNIDine 0.2 milliGRAM(s) Oral two times a day  dextrose 5%. 1000 milliLiter(s) IV Continuous <Continuous>  dextrose 5%. 1000 milliLiter(s) IV Continuous <Continuous>  dextrose 50% Injectable 25 Gram(s) IV Push once  dextrose 50% Injectable 25 Gram(s) IV Push once  dextrose 50% Injectable 12.5 Gram(s) IV Push once  dextrose Oral Gel 15 Gram(s) Oral once PRN  ergocalciferol 12648 Unit(s) Oral every week  glucagon  Injectable 1 milliGRAM(s) IntraMuscular once  hydrALAZINE 75 milliGRAM(s) Oral three times a day  insulin glargine Injectable (LANTUS) 20 Unit(s) SubCutaneous at bedtime  insulin lispro (ADMELOG) corrective regimen sliding scale   SubCutaneous at bedtime  insulin lispro (ADMELOG) corrective regimen sliding scale   SubCutaneous three times a day before meals  insulin lispro Injectable (ADMELOG) 10 Unit(s) SubCutaneous three times a day before meals  levothyroxine 150 MICROGram(s) Oral daily  melatonin 3 milliGRAM(s) Oral at bedtime PRN  ondansetron Injectable 4 milliGRAM(s) IV Push every 8 hours PRN  propranolol LA 80 milliGRAM(s) Oral daily  regadenoson Injectable 0.4 milliGRAM(s) IV Push once  rosuvastatin 5 milliGRAM(s) Oral at bedtime      Objective:     Vital Signs:   T(C): 36.5 (11-12-24 @ 04:58), Max: 36.8 (11-11-24 @ 13:09)  HR: 60 (11-12-24 @ 04:58) (59 - 96)  BP: 156/64 (11-12-24 @ 04:58) (114/57 - 156/64)  RR: 18 (11-12-24 @ 04:58) (16 - 18)  SpO2: 94% (11-12-24 @ 04:58) (92% - 94%)  Wt(kg): --      Physical Exam:   General: no acute distress  Neck: supple   CVS: JVP ~ 7 cm H20, RRR, s1, s2, no murmurs  Pulm: unlabored respirations, clear to ausculation   Ext: no lower extremity edema b/l   Neuro: awake, alert and oriented  Psych: Normal affect      Labs:   12 Nov 2024 06:00    139    |  104    |  74     ----------------------------<  258    3.8     |  28     |  2.45     Ca    8.3        12 Nov 2024 06:00  Mg     2.2       12 Nov 2024 06:00          ECG (11/9/24): atrial paced rhythm, nonspecific ST abnormalities     TTE (11/10/24):  LVEF 60-65%

## 2024-11-12 NOTE — PROGRESS NOTE ADULT - NS ATTEND AMEND GEN_ALL_CORE FT
reviewed am labs - Cr downtrending. spoke with cardio, plan for stress test in AM, NPO at midnight.  spoke with vascular coverage for US carotid findings - no acute intervention.  Increase Lantus to 20 and Lispro to 10 TID with meals
reviewed AM labs; stable. pt getting stress test this AM. will f/u with cardio about result. will increase lantus and lispro back to home dosing as well as pt's hyralazine.

## 2024-11-12 NOTE — DISCHARGE NOTE NURSING/CASE MANAGEMENT/SOCIAL WORK - FINANCIAL ASSISTANCE
[facility] provides services at a reduced cost to those who are determined to be eligible through [facility]’s financial assistance program. Information regarding [facility]’s financial assistance program can be found by going to [email or web address] or by calling [phone].

## 2024-11-12 NOTE — PHYSICAL THERAPY INITIAL EVALUATION ADULT - ADDITIONAL COMMENTS
Pt lives with daughter in private home, [address] with 2 rails in rear of house, 1 step inside.  Pt stated ambulates with SAC in home and RW for long distances.

## 2024-11-12 NOTE — PHYSICAL THERAPY INITIAL EVALUATION ADULT - PERTINENT HX OF CURRENT PROBLEM, REHAB EVAL
84M PMHx significant for HTN, HLD, DM2, prostate cancer s/p prostatectomy, CKD, TIA, with a pacemaker who was BIBA because of extreme weakness and syncope. The patient was at a veterans dinner when his daughter noticed that he was leaning over the left repeatedly. The patient states he did not realize he was leaning over in his chair. When the daughter had mentioned to him that they should go home, the patient was unable to get up because of extreme weakness. The patient was very groggy as per the patient daughter. The daughter called the ambulance, but by the time they got to him, the patient was passed out, as per daughter. The patient woke up in the ambulance. Patient states his BG levels are in the 110-120s in the morning and go up to the 200s at night. States he took his premeal insulin before the dinner. States he ate the food during dinner.

## 2025-05-06 ENCOUNTER — INPATIENT (INPATIENT)
Facility: HOSPITAL | Age: 85
LOS: 4 days | Discharge: ANOTHER HOSPITAL | DRG: 543 | End: 2025-05-11
Attending: FAMILY MEDICINE | Admitting: FAMILY MEDICINE
Payer: COMMERCIAL

## 2025-05-06 VITALS
OXYGEN SATURATION: 99 % | DIASTOLIC BLOOD PRESSURE: 73 MMHG | WEIGHT: 207.9 LBS | TEMPERATURE: 98 F | SYSTOLIC BLOOD PRESSURE: 188 MMHG | RESPIRATION RATE: 16 BRPM | HEART RATE: 69 BPM | HEIGHT: 71 IN

## 2025-05-06 DIAGNOSIS — R26.2 DIFFICULTY IN WALKING, NOT ELSEWHERE CLASSIFIED: ICD-10-CM

## 2025-05-06 DIAGNOSIS — E89.0 POSTPROCEDURAL HYPOTHYROIDISM: Chronic | ICD-10-CM

## 2025-05-06 DIAGNOSIS — Z95.0 PRESENCE OF CARDIAC PACEMAKER: Chronic | ICD-10-CM

## 2025-05-06 DIAGNOSIS — Z90.79 ACQUIRED ABSENCE OF OTHER GENITAL ORGAN(S): Chronic | ICD-10-CM

## 2025-05-06 LAB
ALBUMIN SERPL ELPH-MCNC: 3.3 G/DL — SIGNIFICANT CHANGE UP (ref 3.3–5)
ALP SERPL-CCNC: 68 U/L — SIGNIFICANT CHANGE UP (ref 40–120)
ALT FLD-CCNC: 21 U/L — SIGNIFICANT CHANGE UP (ref 10–45)
ANION GAP SERPL CALC-SCNC: 9 MMOL/L — SIGNIFICANT CHANGE UP (ref 5–17)
APPEARANCE UR: CLEAR — SIGNIFICANT CHANGE UP
AST SERPL-CCNC: 18 U/L — SIGNIFICANT CHANGE UP (ref 10–40)
BACTERIA # UR AUTO: ABNORMAL /HPF
BASOPHILS # BLD AUTO: 0.02 K/UL — SIGNIFICANT CHANGE UP (ref 0–0.2)
BASOPHILS NFR BLD AUTO: 0.1 % — SIGNIFICANT CHANGE UP (ref 0–2)
BILIRUB SERPL-MCNC: 1.4 MG/DL — HIGH (ref 0.2–1.2)
BILIRUB UR-MCNC: NEGATIVE — SIGNIFICANT CHANGE UP
BUN SERPL-MCNC: 53 MG/DL — HIGH (ref 7–23)
CALCIUM SERPL-MCNC: 9.1 MG/DL — SIGNIFICANT CHANGE UP (ref 8.4–10.5)
CHLORIDE SERPL-SCNC: 95 MMOL/L — LOW (ref 96–108)
CO2 SERPL-SCNC: 26 MMOL/L — SIGNIFICANT CHANGE UP (ref 22–31)
COLOR SPEC: YELLOW — SIGNIFICANT CHANGE UP
CREAT SERPL-MCNC: 2.4 MG/DL — HIGH (ref 0.5–1.3)
DIFF PNL FLD: NEGATIVE — SIGNIFICANT CHANGE UP
EGFR: 25 ML/MIN/1.73M2 — LOW
EGFR: 25 ML/MIN/1.73M2 — LOW
EOSINOPHIL # BLD AUTO: 0.02 K/UL — SIGNIFICANT CHANGE UP (ref 0–0.5)
EOSINOPHIL NFR BLD AUTO: 0.1 % — SIGNIFICANT CHANGE UP (ref 0–6)
EPI CELLS # UR: SIGNIFICANT CHANGE UP
GLUCOSE BLDC GLUCOMTR-MCNC: 253 MG/DL — HIGH (ref 70–99)
GLUCOSE SERPL-MCNC: 261 MG/DL — HIGH (ref 70–99)
GLUCOSE UR QL: NEGATIVE MG/DL — SIGNIFICANT CHANGE UP
HCT VFR BLD CALC: 32.9 % — LOW (ref 39–50)
HGB BLD-MCNC: 11.1 G/DL — LOW (ref 13–17)
IMM GRANULOCYTES NFR BLD AUTO: 0.8 % — SIGNIFICANT CHANGE UP (ref 0–0.9)
KETONES UR-MCNC: ABNORMAL MG/DL
LEUKOCYTE ESTERASE UR-ACNC: ABNORMAL
LYMPHOCYTES # BLD AUTO: 0.43 K/UL — LOW (ref 1–3.3)
LYMPHOCYTES # BLD AUTO: 2.8 % — LOW (ref 13–44)
MCHC RBC-ENTMCNC: 28.2 PG — SIGNIFICANT CHANGE UP (ref 27–34)
MCHC RBC-ENTMCNC: 33.7 G/DL — SIGNIFICANT CHANGE UP (ref 32–36)
MCV RBC AUTO: 83.5 FL — SIGNIFICANT CHANGE UP (ref 80–100)
MONOCYTES # BLD AUTO: 0.77 K/UL — SIGNIFICANT CHANGE UP (ref 0–0.9)
MONOCYTES NFR BLD AUTO: 5 % — SIGNIFICANT CHANGE UP (ref 2–14)
NEUTROPHILS # BLD AUTO: 14.13 K/UL — HIGH (ref 1.8–7.4)
NEUTROPHILS NFR BLD AUTO: 91.2 % — HIGH (ref 43–77)
NITRITE UR-MCNC: NEGATIVE — SIGNIFICANT CHANGE UP
NRBC BLD AUTO-RTO: 0 /100 WBCS — SIGNIFICANT CHANGE UP (ref 0–0)
PH UR: 5.5 — SIGNIFICANT CHANGE UP (ref 5–8)
PLATELET # BLD AUTO: 189 K/UL — SIGNIFICANT CHANGE UP (ref 150–400)
POTASSIUM SERPL-MCNC: 4.7 MMOL/L — SIGNIFICANT CHANGE UP (ref 3.5–5.3)
POTASSIUM SERPL-SCNC: 4.7 MMOL/L — SIGNIFICANT CHANGE UP (ref 3.5–5.3)
PROT SERPL-MCNC: 7.5 G/DL — SIGNIFICANT CHANGE UP (ref 6–8.3)
PROT UR-MCNC: 100 MG/DL
RBC # BLD: 3.94 M/UL — LOW (ref 4.2–5.8)
RBC # FLD: 15.3 % — HIGH (ref 10.3–14.5)
RBC CASTS # UR COMP ASSIST: 10 /HPF — HIGH (ref 0–4)
SODIUM SERPL-SCNC: 130 MMOL/L — LOW (ref 135–145)
SP GR SPEC: 1.02 — SIGNIFICANT CHANGE UP (ref 1–1.03)
UROBILINOGEN FLD QL: 1 MG/DL — SIGNIFICANT CHANGE UP (ref 0.2–1)
WBC # BLD: 15.49 K/UL — HIGH (ref 3.8–10.5)
WBC # FLD AUTO: 15.49 K/UL — HIGH (ref 3.8–10.5)
WBC UR QL: 5 /HPF — SIGNIFICANT CHANGE UP (ref 0–5)

## 2025-05-06 PROCEDURE — 72131 CT LUMBAR SPINE W/O DYE: CPT | Mod: 26

## 2025-05-06 PROCEDURE — 99285 EMERGENCY DEPT VISIT HI MDM: CPT

## 2025-05-06 PROCEDURE — 99223 1ST HOSP IP/OBS HIGH 75: CPT | Mod: GC

## 2025-05-06 PROCEDURE — 93010 ELECTROCARDIOGRAM REPORT: CPT

## 2025-05-06 RX ORDER — LIDOCAINE HYDROCHLORIDE 20 MG/ML
1 JELLY TOPICAL ONCE
Refills: 0 | Status: COMPLETED | OUTPATIENT
Start: 2025-05-06 | End: 2025-05-06

## 2025-05-06 RX ORDER — KETOROLAC TROMETHAMINE 30 MG/ML
15 INJECTION, SOLUTION INTRAMUSCULAR; INTRAVENOUS ONCE
Refills: 0 | Status: DISCONTINUED | OUTPATIENT
Start: 2025-05-06 | End: 2025-05-06

## 2025-05-06 RX ORDER — APIXABAN 2.5 MG/1
2.5 TABLET, FILM COATED ORAL
Refills: 0 | Status: DISCONTINUED | OUTPATIENT
Start: 2025-05-06 | End: 2025-05-11

## 2025-05-06 RX ORDER — GLUCAGON 3 MG/1
1 POWDER NASAL ONCE
Refills: 0 | Status: DISCONTINUED | OUTPATIENT
Start: 2025-05-06 | End: 2025-05-11

## 2025-05-06 RX ORDER — INSULIN LISPRO 100 U/ML
12 INJECTION, SOLUTION INTRAVENOUS; SUBCUTANEOUS
Refills: 0 | Status: DISCONTINUED | OUTPATIENT
Start: 2025-05-06 | End: 2025-05-06

## 2025-05-06 RX ORDER — DEXTROSE 50 % IN WATER 50 %
25 SYRINGE (ML) INTRAVENOUS ONCE
Refills: 0 | Status: DISCONTINUED | OUTPATIENT
Start: 2025-05-06 | End: 2025-05-11

## 2025-05-06 RX ORDER — CYCLOBENZAPRINE HYDROCHLORIDE 15 MG/1
5 CAPSULE, EXTENDED RELEASE ORAL ONCE
Refills: 0 | Status: COMPLETED | OUTPATIENT
Start: 2025-05-06 | End: 2025-05-06

## 2025-05-06 RX ORDER — DEXTROSE 50 % IN WATER 50 %
15 SYRINGE (ML) INTRAVENOUS ONCE
Refills: 0 | Status: DISCONTINUED | OUTPATIENT
Start: 2025-05-06 | End: 2025-05-11

## 2025-05-06 RX ORDER — MAGNESIUM, ALUMINUM HYDROXIDE 200-200 MG
30 TABLET,CHEWABLE ORAL EVERY 4 HOURS
Refills: 0 | Status: DISCONTINUED | OUTPATIENT
Start: 2025-05-06 | End: 2025-05-11

## 2025-05-06 RX ORDER — ONDANSETRON HCL/PF 4 MG/2 ML
4 VIAL (ML) INJECTION EVERY 8 HOURS
Refills: 0 | Status: DISCONTINUED | OUTPATIENT
Start: 2025-05-06 | End: 2025-05-11

## 2025-05-06 RX ORDER — MELATONIN 5 MG
3 TABLET ORAL AT BEDTIME
Refills: 0 | Status: DISCONTINUED | OUTPATIENT
Start: 2025-05-06 | End: 2025-05-11

## 2025-05-06 RX ORDER — SODIUM CHLORIDE 9 G/1000ML
1000 INJECTION, SOLUTION INTRAVENOUS
Refills: 0 | Status: DISCONTINUED | OUTPATIENT
Start: 2025-05-06 | End: 2025-05-11

## 2025-05-06 RX ORDER — DEXTROSE 50 % IN WATER 50 %
12.5 SYRINGE (ML) INTRAVENOUS ONCE
Refills: 0 | Status: DISCONTINUED | OUTPATIENT
Start: 2025-05-06 | End: 2025-05-11

## 2025-05-06 RX ORDER — ASPIRIN 325 MG
81 TABLET ORAL DAILY
Refills: 0 | Status: DISCONTINUED | OUTPATIENT
Start: 2025-05-06 | End: 2025-05-11

## 2025-05-06 RX ORDER — INSULIN GLARGINE-YFGN 100 [IU]/ML
20 INJECTION, SOLUTION SUBCUTANEOUS AT BEDTIME
Refills: 0 | Status: DISCONTINUED | OUTPATIENT
Start: 2025-05-06 | End: 2025-05-11

## 2025-05-06 RX ORDER — INSULIN LISPRO 100 U/ML
INJECTION, SOLUTION INTRAVENOUS; SUBCUTANEOUS AT BEDTIME
Refills: 0 | Status: DISCONTINUED | OUTPATIENT
Start: 2025-05-06 | End: 2025-05-11

## 2025-05-06 RX ORDER — INSULIN LISPRO 100 U/ML
8 INJECTION, SOLUTION INTRAVENOUS; SUBCUTANEOUS
Refills: 0 | Status: DISCONTINUED | OUTPATIENT
Start: 2025-05-05 | End: 2025-05-11

## 2025-05-06 RX ORDER — SENNA 187 MG
2 TABLET ORAL AT BEDTIME
Refills: 0 | Status: DISCONTINUED | OUTPATIENT
Start: 2025-05-06 | End: 2025-05-11

## 2025-05-06 RX ORDER — POLYETHYLENE GLYCOL 3350 17 G/17G
17 POWDER, FOR SOLUTION ORAL DAILY
Refills: 0 | Status: DISCONTINUED | OUTPATIENT
Start: 2025-05-06 | End: 2025-05-11

## 2025-05-06 RX ORDER — LEVOTHYROXINE SODIUM 300 MCG
150 TABLET ORAL DAILY
Refills: 0 | Status: DISCONTINUED | OUTPATIENT
Start: 2025-05-07 | End: 2025-05-11

## 2025-05-06 RX ORDER — INSULIN LISPRO 100 U/ML
INJECTION, SOLUTION INTRAVENOUS; SUBCUTANEOUS
Refills: 0 | Status: DISCONTINUED | OUTPATIENT
Start: 2025-05-06 | End: 2025-05-11

## 2025-05-06 RX ORDER — ROSUVASTATIN CALCIUM 20 MG/1
5 TABLET, FILM COATED ORAL AT BEDTIME
Refills: 0 | Status: DISCONTINUED | OUTPATIENT
Start: 2025-05-06 | End: 2025-05-11

## 2025-05-06 RX ORDER — ACETAMINOPHEN 500 MG/5ML
650 LIQUID (ML) ORAL EVERY 6 HOURS
Refills: 0 | Status: DISCONTINUED | OUTPATIENT
Start: 2025-05-06 | End: 2025-05-07

## 2025-05-06 RX ADMIN — LIDOCAINE HYDROCHLORIDE 1 PATCH: 20 JELLY TOPICAL at 16:42

## 2025-05-06 RX ADMIN — Medication 1000 MILLILITER(S): at 16:47

## 2025-05-06 RX ADMIN — Medication 650 MILLIGRAM(S): at 23:50

## 2025-05-06 RX ADMIN — KETOROLAC TROMETHAMINE 15 MILLIGRAM(S): 30 INJECTION, SOLUTION INTRAMUSCULAR; INTRAVENOUS at 16:42

## 2025-05-06 RX ADMIN — ROSUVASTATIN CALCIUM 5 MILLIGRAM(S): 20 TABLET, FILM COATED ORAL at 21:21

## 2025-05-06 RX ADMIN — KETOROLAC TROMETHAMINE 15 MILLIGRAM(S): 30 INJECTION, SOLUTION INTRAMUSCULAR; INTRAVENOUS at 17:12

## 2025-05-06 RX ADMIN — Medication 80 MILLILITER(S): at 23:23

## 2025-05-06 RX ADMIN — Medication 100 MILLIGRAM(S): at 21:21

## 2025-05-06 RX ADMIN — Medication 0.2 MILLIGRAM(S): at 21:21

## 2025-05-06 RX ADMIN — LIDOCAINE HYDROCHLORIDE 1 PATCH: 20 JELLY TOPICAL at 20:44

## 2025-05-06 RX ADMIN — Medication 80 MILLILITER(S): at 21:59

## 2025-05-06 RX ADMIN — INSULIN GLARGINE-YFGN 20 UNIT(S): 100 INJECTION, SOLUTION SUBCUTANEOUS at 21:22

## 2025-05-06 RX ADMIN — Medication 650 MILLIGRAM(S): at 23:18

## 2025-05-06 RX ADMIN — CYCLOBENZAPRINE HYDROCHLORIDE 5 MILLIGRAM(S): 15 CAPSULE, EXTENDED RELEASE ORAL at 16:41

## 2025-05-06 RX ADMIN — INSULIN LISPRO 1: 100 INJECTION, SOLUTION INTRAVENOUS; SUBCUTANEOUS at 21:21

## 2025-05-07 PROBLEM — G45.9 TRANSIENT CEREBRAL ISCHEMIC ATTACK, UNSPECIFIED: Chronic | Status: ACTIVE | Noted: 2024-11-10

## 2025-05-07 PROBLEM — C61 MALIGNANT NEOPLASM OF PROSTATE: Chronic | Status: ACTIVE | Noted: 2024-11-10

## 2025-05-07 LAB
A1C WITH ESTIMATED AVERAGE GLUCOSE RESULT: 6.9 % — HIGH (ref 4–5.6)
ALBUMIN SERPL ELPH-MCNC: 2.7 G/DL — LOW (ref 3.3–5)
ALP SERPL-CCNC: 57 U/L — SIGNIFICANT CHANGE UP (ref 40–120)
ALT FLD-CCNC: 12 U/L — SIGNIFICANT CHANGE UP (ref 10–45)
ANION GAP SERPL CALC-SCNC: 10 MMOL/L — SIGNIFICANT CHANGE UP (ref 5–17)
AST SERPL-CCNC: 15 U/L — SIGNIFICANT CHANGE UP (ref 10–40)
BASOPHILS # BLD AUTO: 0.03 K/UL — SIGNIFICANT CHANGE UP (ref 0–0.2)
BASOPHILS NFR BLD AUTO: 0.2 % — SIGNIFICANT CHANGE UP (ref 0–2)
BILIRUB SERPL-MCNC: 1.4 MG/DL — HIGH (ref 0.2–1.2)
BUN SERPL-MCNC: 58 MG/DL — HIGH (ref 7–23)
CALCIUM SERPL-MCNC: 8.5 MG/DL — SIGNIFICANT CHANGE UP (ref 8.4–10.5)
CHLORIDE SERPL-SCNC: 100 MMOL/L — SIGNIFICANT CHANGE UP (ref 96–108)
CO2 SERPL-SCNC: 25 MMOL/L — SIGNIFICANT CHANGE UP (ref 22–31)
CREAT SERPL-MCNC: 2.41 MG/DL — HIGH (ref 0.5–1.3)
EGFR: 25 ML/MIN/1.73M2 — LOW
EGFR: 25 ML/MIN/1.73M2 — LOW
EOSINOPHIL # BLD AUTO: 0.02 K/UL — SIGNIFICANT CHANGE UP (ref 0–0.5)
EOSINOPHIL NFR BLD AUTO: 0.1 % — SIGNIFICANT CHANGE UP (ref 0–6)
ESTIMATED AVERAGE GLUCOSE: 151 MG/DL — HIGH (ref 68–114)
GLUCOSE BLDC GLUCOMTR-MCNC: 108 MG/DL — HIGH (ref 70–99)
GLUCOSE BLDC GLUCOMTR-MCNC: 112 MG/DL — HIGH (ref 70–99)
GLUCOSE BLDC GLUCOMTR-MCNC: 157 MG/DL — HIGH (ref 70–99)
GLUCOSE BLDC GLUCOMTR-MCNC: 194 MG/DL — HIGH (ref 70–99)
GLUCOSE SERPL-MCNC: 202 MG/DL — HIGH (ref 70–99)
HCT VFR BLD CALC: 28.7 % — LOW (ref 39–50)
HGB BLD-MCNC: 9.5 G/DL — LOW (ref 13–17)
IMM GRANULOCYTES NFR BLD AUTO: 0.6 % — SIGNIFICANT CHANGE UP (ref 0–0.9)
LYMPHOCYTES # BLD AUTO: 0.81 K/UL — LOW (ref 1–3.3)
LYMPHOCYTES # BLD AUTO: 4.9 % — LOW (ref 13–44)
MCHC RBC-ENTMCNC: 27.6 PG — SIGNIFICANT CHANGE UP (ref 27–34)
MCHC RBC-ENTMCNC: 33.1 G/DL — SIGNIFICANT CHANGE UP (ref 32–36)
MCV RBC AUTO: 83.4 FL — SIGNIFICANT CHANGE UP (ref 80–100)
MONOCYTES # BLD AUTO: 1.11 K/UL — HIGH (ref 0–0.9)
MONOCYTES NFR BLD AUTO: 6.7 % — SIGNIFICANT CHANGE UP (ref 2–14)
NEUTROPHILS # BLD AUTO: 14.61 K/UL — HIGH (ref 1.8–7.4)
NEUTROPHILS NFR BLD AUTO: 87.5 % — HIGH (ref 43–77)
NRBC BLD AUTO-RTO: 0 /100 WBCS — SIGNIFICANT CHANGE UP (ref 0–0)
PLATELET # BLD AUTO: 161 K/UL — SIGNIFICANT CHANGE UP (ref 150–400)
POTASSIUM SERPL-MCNC: 4.5 MMOL/L — SIGNIFICANT CHANGE UP (ref 3.5–5.3)
POTASSIUM SERPL-SCNC: 4.5 MMOL/L — SIGNIFICANT CHANGE UP (ref 3.5–5.3)
PROT SERPL-MCNC: 6.3 G/DL — SIGNIFICANT CHANGE UP (ref 6–8.3)
RBC # BLD: 3.44 M/UL — LOW (ref 4.2–5.8)
RBC # FLD: 15.4 % — HIGH (ref 10.3–14.5)
SODIUM SERPL-SCNC: 135 MMOL/L — SIGNIFICANT CHANGE UP (ref 135–145)
TSH SERPL-MCNC: 2.27 UIU/ML — SIGNIFICANT CHANGE UP (ref 0.36–3.74)
WBC # BLD: 16.68 K/UL — HIGH (ref 3.8–10.5)
WBC # FLD AUTO: 16.68 K/UL — HIGH (ref 3.8–10.5)

## 2025-05-07 PROCEDURE — 71045 X-RAY EXAM CHEST 1 VIEW: CPT | Mod: 26

## 2025-05-07 PROCEDURE — 99233 SBSQ HOSP IP/OBS HIGH 50: CPT

## 2025-05-07 PROCEDURE — 99222 1ST HOSP IP/OBS MODERATE 55: CPT | Mod: GC

## 2025-05-07 RX ORDER — BISACODYL 5 MG
5 TABLET, DELAYED RELEASE (ENTERIC COATED) ORAL DAILY
Refills: 0 | Status: DISCONTINUED | OUTPATIENT
Start: 2025-05-07 | End: 2025-05-11

## 2025-05-07 RX ORDER — PROPRANOLOL HCL 60 MG
120 TABLET ORAL EVERY 24 HOURS
Refills: 0 | Status: DISCONTINUED | OUTPATIENT
Start: 2025-05-07 | End: 2025-05-11

## 2025-05-07 RX ORDER — KETOROLAC TROMETHAMINE 30 MG/ML
7.5 INJECTION, SOLUTION INTRAMUSCULAR; INTRAVENOUS EVERY 6 HOURS
Refills: 0 | Status: DISCONTINUED | OUTPATIENT
Start: 2025-05-07 | End: 2025-05-08

## 2025-05-07 RX ORDER — IBUPROFEN 200 MG
600 TABLET ORAL EVERY 8 HOURS
Refills: 0 | Status: DISCONTINUED | OUTPATIENT
Start: 2025-05-07 | End: 2025-05-07

## 2025-05-07 RX ORDER — LIDOCAINE HYDROCHLORIDE 20 MG/ML
1 JELLY TOPICAL EVERY 24 HOURS
Refills: 0 | Status: DISCONTINUED | OUTPATIENT
Start: 2025-05-07 | End: 2025-05-08

## 2025-05-07 RX ORDER — NALOXONE HYDROCHLORIDE 0.4 MG/ML
0.4 INJECTION, SOLUTION INTRAMUSCULAR; INTRAVENOUS; SUBCUTANEOUS ONCE
Refills: 0 | Status: DISCONTINUED | OUTPATIENT
Start: 2025-05-07 | End: 2025-05-11

## 2025-05-07 RX ORDER — KETOROLAC TROMETHAMINE 30 MG/ML
15 INJECTION, SOLUTION INTRAMUSCULAR; INTRAVENOUS ONCE
Refills: 0 | Status: DISCONTINUED | OUTPATIENT
Start: 2025-05-07 | End: 2025-05-08

## 2025-05-07 RX ORDER — ACETAMINOPHEN 500 MG/5ML
1000 LIQUID (ML) ORAL EVERY 6 HOURS
Refills: 0 | Status: DISCONTINUED | OUTPATIENT
Start: 2025-05-07 | End: 2025-05-09

## 2025-05-07 RX ORDER — TRAMADOL HYDROCHLORIDE 50 MG/1
25 TABLET, FILM COATED ORAL EVERY 12 HOURS
Refills: 0 | Status: DISCONTINUED | OUTPATIENT
Start: 2025-05-07 | End: 2025-05-07

## 2025-05-07 RX ORDER — HYDROMORPHONE/SOD CHLOR,ISO/PF 2 MG/10 ML
0.2 SYRINGE (ML) INJECTION EVERY 4 HOURS
Refills: 0 | Status: DISCONTINUED | OUTPATIENT
Start: 2025-05-07 | End: 2025-05-08

## 2025-05-07 RX ADMIN — Medication 81 MILLIGRAM(S): at 11:41

## 2025-05-07 RX ADMIN — Medication 2 MILLIGRAM(S): at 14:50

## 2025-05-07 RX ADMIN — INSULIN LISPRO 1: 100 INJECTION, SOLUTION INTRAVENOUS; SUBCUTANEOUS at 08:53

## 2025-05-07 RX ADMIN — Medication 2 TABLET(S): at 21:16

## 2025-05-07 RX ADMIN — Medication 0.2 MILLIGRAM(S): at 21:16

## 2025-05-07 RX ADMIN — Medication 2 MILLIGRAM(S): at 15:50

## 2025-05-07 RX ADMIN — Medication 4 MILLIGRAM(S): at 09:48

## 2025-05-07 RX ADMIN — INSULIN LISPRO 1: 100 INJECTION, SOLUTION INTRAVENOUS; SUBCUTANEOUS at 12:43

## 2025-05-07 RX ADMIN — INSULIN LISPRO 8 UNIT(S): 100 INJECTION, SOLUTION INTRAVENOUS; SUBCUTANEOUS at 12:44

## 2025-05-07 RX ADMIN — Medication 100 MILLIGRAM(S): at 06:09

## 2025-05-07 RX ADMIN — Medication 400 MILLIGRAM(S): at 18:21

## 2025-05-07 RX ADMIN — Medication 4 MILLIGRAM(S): at 10:30

## 2025-05-07 RX ADMIN — LIDOCAINE HYDROCHLORIDE 1 PATCH: 20 JELLY TOPICAL at 05:03

## 2025-05-07 RX ADMIN — Medication 0.2 MILLIGRAM(S): at 15:49

## 2025-05-07 RX ADMIN — Medication 1000 MILLIGRAM(S): at 19:00

## 2025-05-07 RX ADMIN — INSULIN LISPRO 8 UNIT(S): 100 INJECTION, SOLUTION INTRAVENOUS; SUBCUTANEOUS at 08:53

## 2025-05-07 RX ADMIN — Medication 120 MILLIGRAM(S): at 17:22

## 2025-05-07 RX ADMIN — APIXABAN 2.5 MILLIGRAM(S): 2.5 TABLET, FILM COATED ORAL at 06:09

## 2025-05-07 RX ADMIN — Medication 2 MILLIGRAM(S): at 17:38

## 2025-05-07 RX ADMIN — Medication 150 MICROGRAM(S): at 06:09

## 2025-05-07 RX ADMIN — POLYETHYLENE GLYCOL 3350 17 GRAM(S): 17 POWDER, FOR SOLUTION ORAL at 11:41

## 2025-05-07 RX ADMIN — Medication 2 MILLIGRAM(S): at 16:38

## 2025-05-07 RX ADMIN — INSULIN LISPRO 8 UNIT(S): 100 INJECTION, SOLUTION INTRAVENOUS; SUBCUTANEOUS at 17:22

## 2025-05-07 RX ADMIN — APIXABAN 2.5 MILLIGRAM(S): 2.5 TABLET, FILM COATED ORAL at 17:22

## 2025-05-07 RX ADMIN — Medication 100 MILLIGRAM(S): at 15:49

## 2025-05-07 RX ADMIN — Medication 0.2 MILLIGRAM(S): at 06:02

## 2025-05-07 RX ADMIN — ROSUVASTATIN CALCIUM 5 MILLIGRAM(S): 20 TABLET, FILM COATED ORAL at 21:17

## 2025-05-07 RX ADMIN — INSULIN GLARGINE-YFGN 20 UNIT(S): 100 INJECTION, SOLUTION SUBCUTANEOUS at 21:15

## 2025-05-08 LAB
ALBUMIN SERPL ELPH-MCNC: 2.5 G/DL — LOW (ref 3.3–5)
ALP SERPL-CCNC: 59 U/L — SIGNIFICANT CHANGE UP (ref 40–120)
ALT FLD-CCNC: 16 U/L — SIGNIFICANT CHANGE UP (ref 10–45)
ANION GAP SERPL CALC-SCNC: 11 MMOL/L — SIGNIFICANT CHANGE UP (ref 5–17)
AST SERPL-CCNC: 17 U/L — SIGNIFICANT CHANGE UP (ref 10–40)
BASOPHILS # BLD AUTO: 0.04 K/UL — SIGNIFICANT CHANGE UP (ref 0–0.2)
BASOPHILS NFR BLD AUTO: 0.3 % — SIGNIFICANT CHANGE UP (ref 0–2)
BILIRUB SERPL-MCNC: 0.9 MG/DL — SIGNIFICANT CHANGE UP (ref 0.2–1.2)
BUN SERPL-MCNC: 66 MG/DL — HIGH (ref 7–23)
CALCIUM SERPL-MCNC: 8.7 MG/DL — SIGNIFICANT CHANGE UP (ref 8.4–10.5)
CHLORIDE SERPL-SCNC: 102 MMOL/L — SIGNIFICANT CHANGE UP (ref 96–108)
CO2 SERPL-SCNC: 24 MMOL/L — SIGNIFICANT CHANGE UP (ref 22–31)
CREAT SERPL-MCNC: 2.2 MG/DL — HIGH (ref 0.5–1.3)
EGFR: 28 ML/MIN/1.73M2 — LOW
EGFR: 28 ML/MIN/1.73M2 — LOW
EOSINOPHIL # BLD AUTO: 0.2 K/UL — SIGNIFICANT CHANGE UP (ref 0–0.5)
EOSINOPHIL NFR BLD AUTO: 1.3 % — SIGNIFICANT CHANGE UP (ref 0–6)
GLUCOSE BLDC GLUCOMTR-MCNC: 115 MG/DL — HIGH (ref 70–99)
GLUCOSE BLDC GLUCOMTR-MCNC: 156 MG/DL — HIGH (ref 70–99)
GLUCOSE BLDC GLUCOMTR-MCNC: 156 MG/DL — HIGH (ref 70–99)
GLUCOSE BLDC GLUCOMTR-MCNC: 201 MG/DL — HIGH (ref 70–99)
GLUCOSE SERPL-MCNC: 108 MG/DL — HIGH (ref 70–99)
HCT VFR BLD CALC: 30 % — LOW (ref 39–50)
HGB BLD-MCNC: 9.9 G/DL — LOW (ref 13–17)
IMM GRANULOCYTES NFR BLD AUTO: 0.8 % — SIGNIFICANT CHANGE UP (ref 0–0.9)
LYMPHOCYTES # BLD AUTO: 0.84 K/UL — LOW (ref 1–3.3)
LYMPHOCYTES # BLD AUTO: 5.5 % — LOW (ref 13–44)
MCHC RBC-ENTMCNC: 27.7 PG — SIGNIFICANT CHANGE UP (ref 27–34)
MCHC RBC-ENTMCNC: 33 G/DL — SIGNIFICANT CHANGE UP (ref 32–36)
MCV RBC AUTO: 83.8 FL — SIGNIFICANT CHANGE UP (ref 80–100)
MONOCYTES # BLD AUTO: 1.52 K/UL — HIGH (ref 0–0.9)
MONOCYTES NFR BLD AUTO: 9.9 % — SIGNIFICANT CHANGE UP (ref 2–14)
NEUTROPHILS # BLD AUTO: 12.66 K/UL — HIGH (ref 1.8–7.4)
NEUTROPHILS NFR BLD AUTO: 82.2 % — HIGH (ref 43–77)
NRBC BLD AUTO-RTO: 0 /100 WBCS — SIGNIFICANT CHANGE UP (ref 0–0)
PLATELET # BLD AUTO: 162 K/UL — SIGNIFICANT CHANGE UP (ref 150–400)
POTASSIUM SERPL-MCNC: 4.5 MMOL/L — SIGNIFICANT CHANGE UP (ref 3.5–5.3)
POTASSIUM SERPL-SCNC: 4.5 MMOL/L — SIGNIFICANT CHANGE UP (ref 3.5–5.3)
PROT SERPL-MCNC: 6.4 G/DL — SIGNIFICANT CHANGE UP (ref 6–8.3)
RBC # BLD: 3.58 M/UL — LOW (ref 4.2–5.8)
RBC # FLD: 15.5 % — HIGH (ref 10.3–14.5)
SODIUM SERPL-SCNC: 137 MMOL/L — SIGNIFICANT CHANGE UP (ref 135–145)
WBC # BLD: 15.38 K/UL — HIGH (ref 3.8–10.5)
WBC # FLD AUTO: 15.38 K/UL — HIGH (ref 3.8–10.5)

## 2025-05-08 PROCEDURE — 99233 SBSQ HOSP IP/OBS HIGH 50: CPT

## 2025-05-08 RX ORDER — HYDROMORPHONE/SOD CHLOR,ISO/PF 2 MG/10 ML
0.2 SYRINGE (ML) INJECTION EVERY 4 HOURS
Refills: 0 | Status: DISCONTINUED | OUTPATIENT
Start: 2025-05-08 | End: 2025-05-09

## 2025-05-08 RX ORDER — HYDROMORPHONE/SOD CHLOR,ISO/PF 2 MG/10 ML
0.5 SYRINGE (ML) INJECTION EVERY 4 HOURS
Refills: 0 | Status: DISCONTINUED | OUTPATIENT
Start: 2025-05-08 | End: 2025-05-09

## 2025-05-08 RX ADMIN — Medication 120 MILLIGRAM(S): at 17:32

## 2025-05-08 RX ADMIN — Medication 0.2 MILLIGRAM(S): at 21:30

## 2025-05-08 RX ADMIN — POLYETHYLENE GLYCOL 3350 17 GRAM(S): 17 POWDER, FOR SOLUTION ORAL at 12:56

## 2025-05-08 RX ADMIN — Medication 0.2 MILLIGRAM(S): at 02:00

## 2025-05-08 RX ADMIN — Medication 0.5 MILLIGRAM(S): at 18:45

## 2025-05-08 RX ADMIN — KETOROLAC TROMETHAMINE 15 MILLIGRAM(S): 30 INJECTION, SOLUTION INTRAMUSCULAR; INTRAVENOUS at 04:15

## 2025-05-08 RX ADMIN — INSULIN LISPRO 1: 100 INJECTION, SOLUTION INTRAVENOUS; SUBCUTANEOUS at 17:29

## 2025-05-08 RX ADMIN — INSULIN GLARGINE-YFGN 20 UNIT(S): 100 INJECTION, SOLUTION SUBCUTANEOUS at 21:29

## 2025-05-08 RX ADMIN — APIXABAN 2.5 MILLIGRAM(S): 2.5 TABLET, FILM COATED ORAL at 05:43

## 2025-05-08 RX ADMIN — ROSUVASTATIN CALCIUM 5 MILLIGRAM(S): 20 TABLET, FILM COATED ORAL at 21:30

## 2025-05-08 RX ADMIN — INSULIN LISPRO 8 UNIT(S): 100 INJECTION, SOLUTION INTRAVENOUS; SUBCUTANEOUS at 12:47

## 2025-05-08 RX ADMIN — APIXABAN 2.5 MILLIGRAM(S): 2.5 TABLET, FILM COATED ORAL at 17:31

## 2025-05-08 RX ADMIN — INSULIN LISPRO 2: 100 INJECTION, SOLUTION INTRAVENOUS; SUBCUTANEOUS at 12:47

## 2025-05-08 RX ADMIN — INSULIN LISPRO 8 UNIT(S): 100 INJECTION, SOLUTION INTRAVENOUS; SUBCUTANEOUS at 08:59

## 2025-05-08 RX ADMIN — INSULIN LISPRO 8 UNIT(S): 100 INJECTION, SOLUTION INTRAVENOUS; SUBCUTANEOUS at 17:29

## 2025-05-08 RX ADMIN — KETOROLAC TROMETHAMINE 15 MILLIGRAM(S): 30 INJECTION, SOLUTION INTRAMUSCULAR; INTRAVENOUS at 03:39

## 2025-05-08 RX ADMIN — Medication 81 MILLIGRAM(S): at 12:56

## 2025-05-08 RX ADMIN — Medication 0.2 MILLIGRAM(S): at 13:38

## 2025-05-08 RX ADMIN — Medication 0.2 MILLIGRAM(S): at 01:00

## 2025-05-08 RX ADMIN — Medication 100 MILLIGRAM(S): at 05:43

## 2025-05-08 RX ADMIN — Medication 2 TABLET(S): at 21:30

## 2025-05-08 RX ADMIN — Medication 0.5 MILLIGRAM(S): at 12:53

## 2025-05-08 RX ADMIN — Medication 0.5 MILLIGRAM(S): at 13:45

## 2025-05-08 RX ADMIN — Medication 0.2 MILLIGRAM(S): at 05:43

## 2025-05-08 RX ADMIN — Medication 150 MICROGRAM(S): at 05:44

## 2025-05-08 RX ADMIN — Medication 0.5 MILLIGRAM(S): at 22:37

## 2025-05-08 RX ADMIN — Medication 0.5 MILLIGRAM(S): at 23:00

## 2025-05-08 RX ADMIN — Medication 100 MILLIGRAM(S): at 13:34

## 2025-05-08 RX ADMIN — Medication 0.5 MILLIGRAM(S): at 17:52

## 2025-05-09 ENCOUNTER — TRANSCRIPTION ENCOUNTER (OUTPATIENT)
Age: 85
End: 2025-05-09

## 2025-05-09 LAB
ALBUMIN SERPL ELPH-MCNC: 2.4 G/DL — LOW (ref 3.3–5)
ALP SERPL-CCNC: 64 U/L — SIGNIFICANT CHANGE UP (ref 40–120)
ALT FLD-CCNC: 14 U/L — SIGNIFICANT CHANGE UP (ref 10–45)
ANION GAP SERPL CALC-SCNC: 10 MMOL/L — SIGNIFICANT CHANGE UP (ref 5–17)
AST SERPL-CCNC: 12 U/L — SIGNIFICANT CHANGE UP (ref 10–40)
BILIRUB SERPL-MCNC: 0.7 MG/DL — SIGNIFICANT CHANGE UP (ref 0.2–1.2)
BUN SERPL-MCNC: 77 MG/DL — HIGH (ref 7–23)
CALCIUM SERPL-MCNC: 8.5 MG/DL — SIGNIFICANT CHANGE UP (ref 8.4–10.5)
CHLORIDE SERPL-SCNC: 102 MMOL/L — SIGNIFICANT CHANGE UP (ref 96–108)
CO2 SERPL-SCNC: 25 MMOL/L — SIGNIFICANT CHANGE UP (ref 22–31)
CREAT SERPL-MCNC: 2.48 MG/DL — HIGH (ref 0.5–1.3)
EGFR: 24 ML/MIN/1.73M2 — LOW
EGFR: 24 ML/MIN/1.73M2 — LOW
GLUCOSE BLDC GLUCOMTR-MCNC: 142 MG/DL — HIGH (ref 70–99)
GLUCOSE BLDC GLUCOMTR-MCNC: 161 MG/DL — HIGH (ref 70–99)
GLUCOSE BLDC GLUCOMTR-MCNC: 210 MG/DL — HIGH (ref 70–99)
GLUCOSE BLDC GLUCOMTR-MCNC: 222 MG/DL — HIGH (ref 70–99)
GLUCOSE SERPL-MCNC: 237 MG/DL — HIGH (ref 70–99)
HCT VFR BLD CALC: 29.9 % — LOW (ref 39–50)
HGB BLD-MCNC: 9.8 G/DL — LOW (ref 13–17)
MCHC RBC-ENTMCNC: 27.5 PG — SIGNIFICANT CHANGE UP (ref 27–34)
MCHC RBC-ENTMCNC: 32.8 G/DL — SIGNIFICANT CHANGE UP (ref 32–36)
MCV RBC AUTO: 84 FL — SIGNIFICANT CHANGE UP (ref 80–100)
NRBC BLD AUTO-RTO: 0 /100 WBCS — SIGNIFICANT CHANGE UP (ref 0–0)
PLATELET # BLD AUTO: 184 K/UL — SIGNIFICANT CHANGE UP (ref 150–400)
POTASSIUM SERPL-MCNC: 5 MMOL/L — SIGNIFICANT CHANGE UP (ref 3.5–5.3)
POTASSIUM SERPL-SCNC: 5 MMOL/L — SIGNIFICANT CHANGE UP (ref 3.5–5.3)
PROT SERPL-MCNC: 6.5 G/DL — SIGNIFICANT CHANGE UP (ref 6–8.3)
RBC # BLD: 3.56 M/UL — LOW (ref 4.2–5.8)
RBC # FLD: 15.4 % — HIGH (ref 10.3–14.5)
SODIUM SERPL-SCNC: 137 MMOL/L — SIGNIFICANT CHANGE UP (ref 135–145)
WBC # BLD: 13.37 K/UL — HIGH (ref 3.8–10.5)
WBC # FLD AUTO: 13.37 K/UL — HIGH (ref 3.8–10.5)

## 2025-05-09 PROCEDURE — 76770 US EXAM ABDO BACK WALL COMP: CPT | Mod: 26

## 2025-05-09 PROCEDURE — 99233 SBSQ HOSP IP/OBS HIGH 50: CPT

## 2025-05-09 RX ORDER — CALCITONIN,SALMON,SYNTHETIC 200/SPRAY
1 AEROSOL, SPRAY WITH PUMP (ML) NASAL DAILY
Refills: 0 | Status: DISCONTINUED | OUTPATIENT
Start: 2025-05-09 | End: 2025-05-11

## 2025-05-09 RX ORDER — HYDROMORPHONE/SOD CHLOR,ISO/PF 2 MG/10 ML
1 SYRINGE (ML) INJECTION EVERY 4 HOURS
Refills: 0 | Status: DISCONTINUED | OUTPATIENT
Start: 2025-05-09 | End: 2025-05-11

## 2025-05-09 RX ORDER — ACETAMINOPHEN 500 MG/5ML
650 LIQUID (ML) ORAL EVERY 6 HOURS
Refills: 0 | Status: DISCONTINUED | OUTPATIENT
Start: 2025-05-09 | End: 2025-05-11

## 2025-05-09 RX ORDER — HYDROMORPHONE/SOD CHLOR,ISO/PF 2 MG/10 ML
1.5 SYRINGE (ML) INJECTION EVERY 4 HOURS
Refills: 0 | Status: DISCONTINUED | OUTPATIENT
Start: 2025-05-09 | End: 2025-05-11

## 2025-05-09 RX ORDER — TAMSULOSIN HYDROCHLORIDE 0.4 MG/1
0.4 CAPSULE ORAL AT BEDTIME
Refills: 0 | Status: DISCONTINUED | OUTPATIENT
Start: 2025-05-09 | End: 2025-05-11

## 2025-05-09 RX ORDER — ACETAMINOPHEN 500 MG/5ML
1000 LIQUID (ML) ORAL ONCE
Refills: 0 | Status: COMPLETED | OUTPATIENT
Start: 2025-05-09 | End: 2025-05-09

## 2025-05-09 RX ADMIN — APIXABAN 2.5 MILLIGRAM(S): 2.5 TABLET, FILM COATED ORAL at 05:43

## 2025-05-09 RX ADMIN — Medication 0.5 MILLIGRAM(S): at 09:30

## 2025-05-09 RX ADMIN — INSULIN LISPRO 2: 100 INJECTION, SOLUTION INTRAVENOUS; SUBCUTANEOUS at 12:49

## 2025-05-09 RX ADMIN — Medication 150 MICROGRAM(S): at 05:44

## 2025-05-09 RX ADMIN — INSULIN GLARGINE-YFGN 20 UNIT(S): 100 INJECTION, SOLUTION SUBCUTANEOUS at 22:04

## 2025-05-09 RX ADMIN — Medication 1000 MILLIGRAM(S): at 11:45

## 2025-05-09 RX ADMIN — Medication 0.2 MILLIGRAM(S): at 22:04

## 2025-05-09 RX ADMIN — INSULIN LISPRO 8 UNIT(S): 100 INJECTION, SOLUTION INTRAVENOUS; SUBCUTANEOUS at 12:50

## 2025-05-09 RX ADMIN — Medication 100 MILLIGRAM(S): at 22:05

## 2025-05-09 RX ADMIN — Medication 1.5 MILLIGRAM(S): at 16:48

## 2025-05-09 RX ADMIN — Medication 100 MILLIGRAM(S): at 05:44

## 2025-05-09 RX ADMIN — Medication 0.5 MILLIGRAM(S): at 03:26

## 2025-05-09 RX ADMIN — Medication 400 MILLIGRAM(S): at 10:54

## 2025-05-09 RX ADMIN — Medication 0.2 MILLIGRAM(S): at 07:21

## 2025-05-09 RX ADMIN — Medication 2 TABLET(S): at 22:20

## 2025-05-09 RX ADMIN — Medication 500 MILLILITER(S): at 18:55

## 2025-05-09 RX ADMIN — Medication 0.2 MILLIGRAM(S): at 13:55

## 2025-05-09 RX ADMIN — Medication 0.2 MILLIGRAM(S): at 05:44

## 2025-05-09 RX ADMIN — Medication 1.5 MILLIGRAM(S): at 22:04

## 2025-05-09 RX ADMIN — Medication 1.5 MILLIGRAM(S): at 17:30

## 2025-05-09 RX ADMIN — Medication 1.5 MILLIGRAM(S): at 11:30

## 2025-05-09 RX ADMIN — Medication 1 SPRAY(S): at 13:49

## 2025-05-09 RX ADMIN — INSULIN LISPRO 8 UNIT(S): 100 INJECTION, SOLUTION INTRAVENOUS; SUBCUTANEOUS at 08:43

## 2025-05-09 RX ADMIN — Medication 1.5 MILLIGRAM(S): at 23:22

## 2025-05-09 RX ADMIN — APIXABAN 2.5 MILLIGRAM(S): 2.5 TABLET, FILM COATED ORAL at 17:16

## 2025-05-09 RX ADMIN — Medication 100 MILLIGRAM(S): at 13:55

## 2025-05-09 RX ADMIN — ROSUVASTATIN CALCIUM 5 MILLIGRAM(S): 20 TABLET, FILM COATED ORAL at 22:05

## 2025-05-09 RX ADMIN — POLYETHYLENE GLYCOL 3350 17 GRAM(S): 17 POWDER, FOR SOLUTION ORAL at 12:51

## 2025-05-09 RX ADMIN — Medication 0.2 MILLIGRAM(S): at 06:29

## 2025-05-09 RX ADMIN — INSULIN LISPRO 2: 100 INJECTION, SOLUTION INTRAVENOUS; SUBCUTANEOUS at 08:42

## 2025-05-09 RX ADMIN — Medication 1.5 MILLIGRAM(S): at 10:48

## 2025-05-09 RX ADMIN — Medication 120 MILLIGRAM(S): at 17:16

## 2025-05-09 RX ADMIN — Medication 81 MILLIGRAM(S): at 12:51

## 2025-05-09 RX ADMIN — TAMSULOSIN HYDROCHLORIDE 0.4 MILLIGRAM(S): 0.4 CAPSULE ORAL at 22:20

## 2025-05-09 RX ADMIN — Medication 0.5 MILLIGRAM(S): at 08:47

## 2025-05-09 RX ADMIN — Medication 0.5 MILLIGRAM(S): at 04:20

## 2025-05-10 VITALS
SYSTOLIC BLOOD PRESSURE: 120 MMHG | DIASTOLIC BLOOD PRESSURE: 62 MMHG | RESPIRATION RATE: 18 BRPM | TEMPERATURE: 98 F | OXYGEN SATURATION: 95 % | HEART RATE: 78 BPM

## 2025-05-10 LAB
ALBUMIN SERPL ELPH-MCNC: 2.6 G/DL — LOW (ref 3.3–5)
ALP SERPL-CCNC: 77 U/L — SIGNIFICANT CHANGE UP (ref 40–120)
ALT FLD-CCNC: 14 U/L — SIGNIFICANT CHANGE UP (ref 10–45)
ANION GAP SERPL CALC-SCNC: 16 MMOL/L — SIGNIFICANT CHANGE UP (ref 5–17)
APPEARANCE UR: CLEAR — SIGNIFICANT CHANGE UP
AST SERPL-CCNC: 15 U/L — SIGNIFICANT CHANGE UP (ref 10–40)
BACTERIA # UR AUTO: ABNORMAL /HPF
BILIRUB SERPL-MCNC: 1 MG/DL — SIGNIFICANT CHANGE UP (ref 0.2–1.2)
BILIRUB UR-MCNC: NEGATIVE — SIGNIFICANT CHANGE UP
BUN SERPL-MCNC: 85 MG/DL — HIGH (ref 7–23)
CALCIUM SERPL-MCNC: 8.8 MG/DL — SIGNIFICANT CHANGE UP (ref 8.4–10.5)
CHLORIDE SERPL-SCNC: 102 MMOL/L — SIGNIFICANT CHANGE UP (ref 96–108)
CO2 SERPL-SCNC: 21 MMOL/L — LOW (ref 22–31)
COLOR SPEC: YELLOW — SIGNIFICANT CHANGE UP
COMMENT - URINE: SIGNIFICANT CHANGE UP
CREAT SERPL-MCNC: 2.27 MG/DL — HIGH (ref 0.5–1.3)
DIFF PNL FLD: NEGATIVE — SIGNIFICANT CHANGE UP
EGFR: 27 ML/MIN/1.73M2 — LOW
EGFR: 27 ML/MIN/1.73M2 — LOW
EPI CELLS # UR: PRESENT
GLUCOSE BLDC GLUCOMTR-MCNC: 160 MG/DL — HIGH (ref 70–99)
GLUCOSE BLDC GLUCOMTR-MCNC: 184 MG/DL — HIGH (ref 70–99)
GLUCOSE BLDC GLUCOMTR-MCNC: 216 MG/DL — HIGH (ref 70–99)
GLUCOSE BLDC GLUCOMTR-MCNC: 234 MG/DL — HIGH (ref 70–99)
GLUCOSE SERPL-MCNC: 219 MG/DL — HIGH (ref 70–99)
GLUCOSE UR QL: NEGATIVE MG/DL — SIGNIFICANT CHANGE UP
HCT VFR BLD CALC: 33.9 % — LOW (ref 39–50)
HGB BLD-MCNC: 10.8 G/DL — LOW (ref 13–17)
KETONES UR-MCNC: ABNORMAL MG/DL
LEUKOCYTE ESTERASE UR-ACNC: ABNORMAL
MCHC RBC-ENTMCNC: 27.1 PG — SIGNIFICANT CHANGE UP (ref 27–34)
MCHC RBC-ENTMCNC: 31.9 G/DL — LOW (ref 32–36)
MCV RBC AUTO: 85.2 FL — SIGNIFICANT CHANGE UP (ref 80–100)
NITRITE UR-MCNC: NEGATIVE — SIGNIFICANT CHANGE UP
NRBC BLD AUTO-RTO: 0 /100 WBCS — SIGNIFICANT CHANGE UP (ref 0–0)
PH UR: 5 — SIGNIFICANT CHANGE UP (ref 5–8)
PLATELET # BLD AUTO: 217 K/UL — SIGNIFICANT CHANGE UP (ref 150–400)
POTASSIUM SERPL-MCNC: 4.9 MMOL/L — SIGNIFICANT CHANGE UP (ref 3.5–5.3)
POTASSIUM SERPL-SCNC: 4.9 MMOL/L — SIGNIFICANT CHANGE UP (ref 3.5–5.3)
PROT SERPL-MCNC: 7.1 G/DL — SIGNIFICANT CHANGE UP (ref 6–8.3)
PROT UR-MCNC: 100 MG/DL
RBC # BLD: 3.98 M/UL — LOW (ref 4.2–5.8)
RBC # FLD: 15.7 % — HIGH (ref 10.3–14.5)
RBC CASTS # UR COMP ASSIST: 3 /HPF — SIGNIFICANT CHANGE UP (ref 0–4)
SODIUM SERPL-SCNC: 139 MMOL/L — SIGNIFICANT CHANGE UP (ref 135–145)
SP GR SPEC: 1.02 — SIGNIFICANT CHANGE UP (ref 1–1.03)
UROBILINOGEN FLD QL: 1 MG/DL — SIGNIFICANT CHANGE UP (ref 0.2–1)
WBC # BLD: 16.22 K/UL — HIGH (ref 3.8–10.5)
WBC # FLD AUTO: 16.22 K/UL — HIGH (ref 3.8–10.5)
WBC UR QL: 31 /HPF — HIGH (ref 0–5)

## 2025-05-10 PROCEDURE — 74176 CT ABD & PELVIS W/O CONTRAST: CPT | Mod: 26

## 2025-05-10 PROCEDURE — 99233 SBSQ HOSP IP/OBS HIGH 50: CPT

## 2025-05-10 RX ORDER — TELMISARTAN 20 MG/1
1 TABLET ORAL
Refills: 0 | DISCHARGE

## 2025-05-10 RX ORDER — CEFTRIAXONE 500 MG/1
1000 INJECTION, POWDER, FOR SOLUTION INTRAMUSCULAR; INTRAVENOUS EVERY 24 HOURS
Refills: 0 | Status: DISCONTINUED | OUTPATIENT
Start: 2025-05-10 | End: 2025-05-11

## 2025-05-10 RX ORDER — ASPIRIN 325 MG
1 TABLET ORAL
Refills: 0 | DISCHARGE

## 2025-05-10 RX ORDER — BISACODYL 5 MG
10 TABLET, DELAYED RELEASE (ENTERIC COATED) ORAL ONCE
Refills: 0 | Status: COMPLETED | OUTPATIENT
Start: 2025-05-10 | End: 2025-05-10

## 2025-05-10 RX ORDER — METOCLOPRAMIDE HCL 10 MG
10 TABLET ORAL ONCE
Refills: 0 | Status: COMPLETED | OUTPATIENT
Start: 2025-05-10 | End: 2025-05-10

## 2025-05-10 RX ORDER — PROPRANOLOL HCL 60 MG
1 TABLET ORAL
Refills: 0 | DISCHARGE

## 2025-05-10 RX ORDER — NALOXEGOL OXALATE 12.5 MG/1
12.5 TABLET, FILM COATED ORAL DAILY
Refills: 0 | Status: DISCONTINUED | OUTPATIENT
Start: 2025-05-10 | End: 2025-05-11

## 2025-05-10 RX ORDER — METOCLOPRAMIDE HCL 10 MG
10 TABLET ORAL EVERY 6 HOURS
Refills: 0 | Status: DISCONTINUED | OUTPATIENT
Start: 2025-05-10 | End: 2025-05-11

## 2025-05-10 RX ADMIN — Medication 10 MILLIGRAM(S): at 12:59

## 2025-05-10 RX ADMIN — CEFTRIAXONE 100 MILLIGRAM(S): 500 INJECTION, POWDER, FOR SOLUTION INTRAMUSCULAR; INTRAVENOUS at 11:55

## 2025-05-10 RX ADMIN — Medication 0.2 MILLIGRAM(S): at 21:38

## 2025-05-10 RX ADMIN — Medication 1 MILLIGRAM(S): at 17:40

## 2025-05-10 RX ADMIN — Medication 120 MILLIGRAM(S): at 17:33

## 2025-05-10 RX ADMIN — Medication 0.2 MILLIGRAM(S): at 06:05

## 2025-05-10 RX ADMIN — POLYETHYLENE GLYCOL 3350 17 GRAM(S): 17 POWDER, FOR SOLUTION ORAL at 11:55

## 2025-05-10 RX ADMIN — INSULIN GLARGINE-YFGN 20 UNIT(S): 100 INJECTION, SOLUTION SUBCUTANEOUS at 21:37

## 2025-05-10 RX ADMIN — Medication 1 MILLIGRAM(S): at 21:38

## 2025-05-10 RX ADMIN — APIXABAN 2.5 MILLIGRAM(S): 2.5 TABLET, FILM COATED ORAL at 17:33

## 2025-05-10 RX ADMIN — Medication 100 MILLIGRAM(S): at 06:05

## 2025-05-10 RX ADMIN — Medication 75 MILLILITER(S): at 10:43

## 2025-05-10 RX ADMIN — Medication 1.5 MILLIGRAM(S): at 07:29

## 2025-05-10 RX ADMIN — NALOXEGOL OXALATE 12.5 MILLIGRAM(S): 12.5 TABLET, FILM COATED ORAL at 11:56

## 2025-05-10 RX ADMIN — Medication 4 MILLIGRAM(S): at 21:37

## 2025-05-10 RX ADMIN — Medication 100 MILLIGRAM(S): at 13:40

## 2025-05-10 RX ADMIN — Medication 4 MILLIGRAM(S): at 07:58

## 2025-05-10 RX ADMIN — ROSUVASTATIN CALCIUM 5 MILLIGRAM(S): 20 TABLET, FILM COATED ORAL at 21:37

## 2025-05-10 RX ADMIN — Medication 150 MICROGRAM(S): at 06:05

## 2025-05-10 RX ADMIN — INSULIN LISPRO 1: 100 INJECTION, SOLUTION INTRAVENOUS; SUBCUTANEOUS at 17:32

## 2025-05-10 RX ADMIN — APIXABAN 2.5 MILLIGRAM(S): 2.5 TABLET, FILM COATED ORAL at 06:05

## 2025-05-10 RX ADMIN — INSULIN LISPRO 8 UNIT(S): 100 INJECTION, SOLUTION INTRAVENOUS; SUBCUTANEOUS at 08:07

## 2025-05-10 RX ADMIN — Medication 81 MILLIGRAM(S): at 11:55

## 2025-05-10 RX ADMIN — Medication 2 TABLET(S): at 21:38

## 2025-05-10 RX ADMIN — Medication 1 MILLIGRAM(S): at 11:52

## 2025-05-10 RX ADMIN — INSULIN LISPRO 8 UNIT(S): 100 INJECTION, SOLUTION INTRAVENOUS; SUBCUTANEOUS at 17:32

## 2025-05-10 RX ADMIN — Medication 1.5 MILLIGRAM(S): at 06:22

## 2025-05-10 RX ADMIN — Medication 1.5 MILLIGRAM(S): at 23:22

## 2025-05-10 RX ADMIN — INSULIN LISPRO 8 UNIT(S): 100 INJECTION, SOLUTION INTRAVENOUS; SUBCUTANEOUS at 12:38

## 2025-05-10 RX ADMIN — INSULIN LISPRO 2: 100 INJECTION, SOLUTION INTRAVENOUS; SUBCUTANEOUS at 08:06

## 2025-05-10 RX ADMIN — Medication 1 MILLIGRAM(S): at 12:15

## 2025-05-10 RX ADMIN — Medication 100 MILLIGRAM(S): at 21:37

## 2025-05-10 RX ADMIN — Medication 0.2 MILLIGRAM(S): at 13:40

## 2025-05-10 RX ADMIN — INSULIN LISPRO 2: 100 INJECTION, SOLUTION INTRAVENOUS; SUBCUTANEOUS at 12:38

## 2025-05-10 RX ADMIN — Medication 1 SPRAY(S): at 12:36

## 2025-05-10 RX ADMIN — TAMSULOSIN HYDROCHLORIDE 0.4 MILLIGRAM(S): 0.4 CAPSULE ORAL at 21:37

## 2025-05-11 ENCOUNTER — INPATIENT (INPATIENT)
Facility: HOSPITAL | Age: 85
LOS: 51 days | Discharge: HOME | End: 2025-07-02
Attending: STUDENT IN AN ORGANIZED HEALTH CARE EDUCATION/TRAINING PROGRAM | Admitting: STUDENT IN AN ORGANIZED HEALTH CARE EDUCATION/TRAINING PROGRAM
Payer: MEDICARE

## 2025-05-11 VITALS — HEART RATE: 64 BPM | RESPIRATION RATE: 18 BRPM | OXYGEN SATURATION: 100 %

## 2025-05-11 DIAGNOSIS — Z29.9 ENCOUNTER FOR PROPHYLACTIC MEASURES, UNSPECIFIED: ICD-10-CM

## 2025-05-11 DIAGNOSIS — J96.01 ACUTE RESPIRATORY FAILURE WITH HYPOXIA: ICD-10-CM

## 2025-05-11 DIAGNOSIS — R33.9 RETENTION OF URINE, UNSPECIFIED: ICD-10-CM

## 2025-05-11 DIAGNOSIS — S32.010A WEDGE COMPRESSION FRACTURE OF FIRST LUMBAR VERTEBRA, INITIAL ENCOUNTER FOR CLOSED FRACTURE: ICD-10-CM

## 2025-05-11 DIAGNOSIS — Z95.0 PRESENCE OF CARDIAC PACEMAKER: Chronic | ICD-10-CM

## 2025-05-11 DIAGNOSIS — E11.9 TYPE 2 DIABETES MELLITUS WITHOUT COMPLICATIONS: ICD-10-CM

## 2025-05-11 DIAGNOSIS — M54.50 LOW BACK PAIN, UNSPECIFIED: ICD-10-CM

## 2025-05-11 DIAGNOSIS — E78.5 HYPERLIPIDEMIA, UNSPECIFIED: ICD-10-CM

## 2025-05-11 DIAGNOSIS — I10 ESSENTIAL (PRIMARY) HYPERTENSION: ICD-10-CM

## 2025-05-11 DIAGNOSIS — E89.0 POSTPROCEDURAL HYPOTHYROIDISM: Chronic | ICD-10-CM

## 2025-05-11 DIAGNOSIS — Z90.79 ACQUIRED ABSENCE OF OTHER GENITAL ORGAN(S): Chronic | ICD-10-CM

## 2025-05-11 DIAGNOSIS — Z86.73 PERSONAL HISTORY OF TRANSIENT ISCHEMIC ATTACK (TIA), AND CEREBRAL INFARCTION WITHOUT RESIDUAL DEFICITS: ICD-10-CM

## 2025-05-11 DIAGNOSIS — E03.9 HYPOTHYROIDISM, UNSPECIFIED: ICD-10-CM

## 2025-05-11 LAB
ADD ON TEST-SPECIMEN IN LAB: SIGNIFICANT CHANGE UP
ANION GAP SERPL CALC-SCNC: 17 MMOL/L — HIGH (ref 7–14)
BASOPHILS # BLD AUTO: 0.03 K/UL — SIGNIFICANT CHANGE UP (ref 0–0.2)
BASOPHILS NFR BLD AUTO: 0.2 % — SIGNIFICANT CHANGE UP (ref 0–2)
BLOOD GAS ARTERIAL COMPREHENSIVE RESULT: SIGNIFICANT CHANGE UP
BUN SERPL-MCNC: 92 MG/DL — HIGH (ref 7–23)
CALCIUM SERPL-MCNC: 9.2 MG/DL — SIGNIFICANT CHANGE UP (ref 8.4–10.5)
CHLORIDE SERPL-SCNC: 101 MMOL/L — SIGNIFICANT CHANGE UP (ref 98–107)
CO2 SERPL-SCNC: 22 MMOL/L — SIGNIFICANT CHANGE UP (ref 22–31)
CREAT SERPL-MCNC: 2.12 MG/DL — HIGH (ref 0.5–1.3)
EGFR: 30 ML/MIN/1.73M2 — LOW
EGFR: 30 ML/MIN/1.73M2 — LOW
EOSINOPHIL # BLD AUTO: 0.02 K/UL — SIGNIFICANT CHANGE UP (ref 0–0.5)
EOSINOPHIL NFR BLD AUTO: 0.1 % — SIGNIFICANT CHANGE UP (ref 0–6)
GLUCOSE BLDC GLUCOMTR-MCNC: 174 MG/DL — HIGH (ref 70–99)
GLUCOSE BLDC GLUCOMTR-MCNC: 194 MG/DL — HIGH (ref 70–99)
GLUCOSE BLDC GLUCOMTR-MCNC: 262 MG/DL — HIGH (ref 70–99)
GLUCOSE BLDC GLUCOMTR-MCNC: 286 MG/DL — HIGH (ref 70–99)
GLUCOSE SERPL-MCNC: 245 MG/DL — HIGH (ref 70–99)
HCT VFR BLD CALC: 32.8 % — LOW (ref 39–50)
HGB BLD-MCNC: 10.9 G/DL — LOW (ref 13–17)
IANC: 16.06 K/UL — HIGH (ref 1.8–7.4)
IMM GRANULOCYTES NFR BLD AUTO: 1 % — HIGH (ref 0–0.9)
LACTATE SERPL-SCNC: 1 MMOL/L — SIGNIFICANT CHANGE UP (ref 0.5–2)
LYMPHOCYTES # BLD AUTO: 0.87 K/UL — LOW (ref 1–3.3)
LYMPHOCYTES # BLD AUTO: 4.6 % — LOW (ref 13–44)
MAGNESIUM SERPL-MCNC: 2.8 MG/DL — HIGH (ref 1.6–2.6)
MCHC RBC-ENTMCNC: 27.9 PG — SIGNIFICANT CHANGE UP (ref 27–34)
MCHC RBC-ENTMCNC: 33.2 G/DL — SIGNIFICANT CHANGE UP (ref 32–36)
MCV RBC AUTO: 83.9 FL — SIGNIFICANT CHANGE UP (ref 80–100)
MONOCYTES # BLD AUTO: 1.73 K/UL — HIGH (ref 0–0.9)
MONOCYTES NFR BLD AUTO: 9.2 % — SIGNIFICANT CHANGE UP (ref 2–14)
NEUTROPHILS # BLD AUTO: 16.06 K/UL — HIGH (ref 1.8–7.4)
NEUTROPHILS NFR BLD AUTO: 84.9 % — HIGH (ref 43–77)
NRBC # BLD AUTO: 0 K/UL — SIGNIFICANT CHANGE UP (ref 0–0)
NRBC # FLD: 0 K/UL — SIGNIFICANT CHANGE UP (ref 0–0)
NRBC BLD AUTO-RTO: 0 /100 WBCS — SIGNIFICANT CHANGE UP (ref 0–0)
NT-PROBNP SERPL-SCNC: 2064 PG/ML — HIGH
PHOSPHATE SERPL-MCNC: 3.9 MG/DL — SIGNIFICANT CHANGE UP (ref 2.5–4.5)
PLATELET # BLD AUTO: 247 K/UL — SIGNIFICANT CHANGE UP (ref 150–400)
POTASSIUM SERPL-MCNC: 4.6 MMOL/L — SIGNIFICANT CHANGE UP (ref 3.5–5.3)
POTASSIUM SERPL-SCNC: 4.6 MMOL/L — SIGNIFICANT CHANGE UP (ref 3.5–5.3)
RBC # BLD: 3.91 M/UL — LOW (ref 4.2–5.8)
RBC # FLD: 15.6 % — HIGH (ref 10.3–14.5)
SODIUM SERPL-SCNC: 140 MMOL/L — SIGNIFICANT CHANGE UP (ref 135–145)
TROPONIN T, HIGH SENSITIVITY RESULT: 26 NG/L — SIGNIFICANT CHANGE UP
WBC # BLD: 18.45 K/UL — HIGH (ref 3.8–10.5)
WBC # FLD AUTO: 18.45 K/UL — HIGH (ref 3.8–10.5)

## 2025-05-11 PROCEDURE — 93010 ELECTROCARDIOGRAM REPORT: CPT

## 2025-05-11 PROCEDURE — 82962 GLUCOSE BLOOD TEST: CPT

## 2025-05-11 PROCEDURE — 78582 LUNG VENTILAT&PERFUS IMAGING: CPT | Mod: 26,GC

## 2025-05-11 PROCEDURE — 80053 COMPREHEN METABOLIC PANEL: CPT

## 2025-05-11 PROCEDURE — 83036 HEMOGLOBIN GLYCOSYLATED A1C: CPT

## 2025-05-11 PROCEDURE — 71045 X-RAY EXAM CHEST 1 VIEW: CPT | Mod: 26

## 2025-05-11 PROCEDURE — 93005 ELECTROCARDIOGRAM TRACING: CPT

## 2025-05-11 PROCEDURE — 71045 X-RAY EXAM CHEST 1 VIEW: CPT

## 2025-05-11 PROCEDURE — 85025 COMPLETE CBC W/AUTO DIFF WBC: CPT

## 2025-05-11 PROCEDURE — 99223 1ST HOSP IP/OBS HIGH 75: CPT

## 2025-05-11 PROCEDURE — 76770 US EXAM ABDO BACK WALL COMP: CPT

## 2025-05-11 PROCEDURE — 36415 COLL VENOUS BLD VENIPUNCTURE: CPT

## 2025-05-11 PROCEDURE — 72131 CT LUMBAR SPINE W/O DYE: CPT | Mod: MC

## 2025-05-11 PROCEDURE — 74176 CT ABD & PELVIS W/O CONTRAST: CPT

## 2025-05-11 PROCEDURE — 99285 EMERGENCY DEPT VISIT HI MDM: CPT | Mod: 25

## 2025-05-11 PROCEDURE — 97535 SELF CARE MNGMENT TRAINING: CPT

## 2025-05-11 PROCEDURE — 97166 OT EVAL MOD COMPLEX 45 MIN: CPT

## 2025-05-11 PROCEDURE — 81001 URINALYSIS AUTO W/SCOPE: CPT

## 2025-05-11 PROCEDURE — 84443 ASSAY THYROID STIM HORMONE: CPT

## 2025-05-11 PROCEDURE — 85027 COMPLETE CBC AUTOMATED: CPT

## 2025-05-11 PROCEDURE — 97162 PT EVAL MOD COMPLEX 30 MIN: CPT

## 2025-05-11 RX ORDER — NIFEDIPINE 30 MG
30 TABLET, EXTENDED RELEASE 24 HR ORAL DAILY
Refills: 0 | Status: DISCONTINUED | OUTPATIENT
Start: 2025-05-11 | End: 2025-06-06

## 2025-05-11 RX ORDER — HYDROMORPHONE/SOD CHLOR,ISO/PF 2 MG/10 ML
0.2 SYRINGE (ML) INJECTION ONCE
Refills: 0 | Status: DISCONTINUED | OUTPATIENT
Start: 2025-05-11 | End: 2025-05-11

## 2025-05-11 RX ORDER — SODIUM CHLORIDE 9 G/1000ML
1000 INJECTION, SOLUTION INTRAVENOUS
Refills: 0 | Status: DISCONTINUED | OUTPATIENT
Start: 2025-05-11 | End: 2025-06-06

## 2025-05-11 RX ORDER — INSULIN LISPRO 100 U/ML
INJECTION, SOLUTION INTRAVENOUS; SUBCUTANEOUS
Refills: 0 | Status: DISCONTINUED | OUTPATIENT
Start: 2025-05-11 | End: 2025-05-16

## 2025-05-11 RX ORDER — DEXTROSE 50 % IN WATER 50 %
12.5 SYRINGE (ML) INTRAVENOUS ONCE
Refills: 0 | Status: DISCONTINUED | OUTPATIENT
Start: 2025-05-11 | End: 2025-06-06

## 2025-05-11 RX ORDER — GLUCAGON 3 MG/1
1 POWDER NASAL ONCE
Refills: 0 | Status: DISCONTINUED | OUTPATIENT
Start: 2025-05-11 | End: 2025-06-06

## 2025-05-11 RX ORDER — LEVOTHYROXINE SODIUM 300 MCG
150 TABLET ORAL DAILY
Refills: 0 | Status: DISCONTINUED | OUTPATIENT
Start: 2025-05-11 | End: 2025-06-06

## 2025-05-11 RX ORDER — NIFEDIPINE 30 MG
30 TABLET, EXTENDED RELEASE 24 HR ORAL ONCE
Refills: 0 | Status: DISCONTINUED | OUTPATIENT
Start: 2025-05-11 | End: 2025-05-11

## 2025-05-11 RX ORDER — PROPRANOLOL HCL 60 MG
120 TABLET ORAL DAILY
Refills: 0 | Status: DISCONTINUED | OUTPATIENT
Start: 2025-05-11 | End: 2025-06-06

## 2025-05-11 RX ORDER — INSULIN GLARGINE-YFGN 100 [IU]/ML
20 INJECTION, SOLUTION SUBCUTANEOUS AT BEDTIME
Refills: 0 | Status: DISCONTINUED | OUTPATIENT
Start: 2025-05-11 | End: 2025-05-11

## 2025-05-11 RX ORDER — TAMSULOSIN HYDROCHLORIDE 0.4 MG/1
0.4 CAPSULE ORAL AT BEDTIME
Refills: 0 | Status: DISCONTINUED | OUTPATIENT
Start: 2025-05-11 | End: 2025-06-06

## 2025-05-11 RX ORDER — DEXTROSE 50 % IN WATER 50 %
15 SYRINGE (ML) INTRAVENOUS ONCE
Refills: 0 | Status: DISCONTINUED | OUTPATIENT
Start: 2025-05-11 | End: 2025-06-06

## 2025-05-11 RX ORDER — ROSUVASTATIN CALCIUM 5 MG/1
10 TABLET, FILM COATED ORAL AT BEDTIME
Refills: 0 | Status: DISCONTINUED | OUTPATIENT
Start: 2025-05-11 | End: 2025-06-06

## 2025-05-11 RX ORDER — HYDROMORPHONE/SOD CHLOR,ISO/PF 2 MG/10 ML
0.5 SYRINGE (ML) INJECTION EVERY 4 HOURS
Refills: 0 | Status: DISCONTINUED | OUTPATIENT
Start: 2025-05-11 | End: 2025-05-18

## 2025-05-11 RX ORDER — INSULIN LISPRO 100 U/ML
8 INJECTION, SOLUTION INTRAVENOUS; SUBCUTANEOUS
Refills: 0 | Status: DISCONTINUED | OUTPATIENT
Start: 2025-05-11 | End: 2025-05-14

## 2025-05-11 RX ORDER — ASPIRIN 325 MG
81 TABLET ORAL DAILY
Refills: 0 | Status: DISCONTINUED | OUTPATIENT
Start: 2025-05-11 | End: 2025-06-17

## 2025-05-11 RX ORDER — INSULIN LISPRO 100 U/ML
INJECTION, SOLUTION INTRAVENOUS; SUBCUTANEOUS AT BEDTIME
Refills: 0 | Status: DISCONTINUED | OUTPATIENT
Start: 2025-05-11 | End: 2025-05-16

## 2025-05-11 RX ORDER — DEXTROSE 50 % IN WATER 50 %
25 SYRINGE (ML) INTRAVENOUS ONCE
Refills: 0 | Status: DISCONTINUED | OUTPATIENT
Start: 2025-05-11 | End: 2025-06-07

## 2025-05-11 RX ORDER — INSULIN LISPRO 100 U/ML
0 INJECTION, SOLUTION INTRAVENOUS; SUBCUTANEOUS
Refills: 0 | DISCHARGE

## 2025-05-11 RX ORDER — APIXABAN 2.5 MG/1
2.5 TABLET, FILM COATED ORAL
Refills: 0 | Status: DISCONTINUED | OUTPATIENT
Start: 2025-05-11 | End: 2025-05-14

## 2025-05-11 RX ORDER — DEXTROSE 50 % IN WATER 50 %
25 SYRINGE (ML) INTRAVENOUS ONCE
Refills: 0 | Status: DISCONTINUED | OUTPATIENT
Start: 2025-05-11 | End: 2025-06-06

## 2025-05-11 RX ORDER — INSULIN GLARGINE-YFGN 100 [IU]/ML
24 INJECTION, SOLUTION SUBCUTANEOUS AT BEDTIME
Refills: 0 | Status: DISCONTINUED | OUTPATIENT
Start: 2025-05-11 | End: 2025-05-15

## 2025-05-11 RX ADMIN — Medication 0.5 MILLIGRAM(S): at 10:40

## 2025-05-11 RX ADMIN — Medication 100 MILLIGRAM(S): at 21:21

## 2025-05-11 RX ADMIN — Medication 0.5 MILLIGRAM(S): at 16:00

## 2025-05-11 RX ADMIN — TAMSULOSIN HYDROCHLORIDE 0.4 MILLIGRAM(S): 0.4 CAPSULE ORAL at 21:21

## 2025-05-11 RX ADMIN — APIXABAN 2.5 MILLIGRAM(S): 2.5 TABLET, FILM COATED ORAL at 05:57

## 2025-05-11 RX ADMIN — Medication 120 MILLIGRAM(S): at 05:58

## 2025-05-11 RX ADMIN — Medication 0.5 MILLIGRAM(S): at 20:15

## 2025-05-11 RX ADMIN — INSULIN LISPRO 8 UNIT(S): 100 INJECTION, SOLUTION INTRAVENOUS; SUBCUTANEOUS at 09:45

## 2025-05-11 RX ADMIN — Medication 0.2 MILLIGRAM(S): at 14:56

## 2025-05-11 RX ADMIN — INSULIN LISPRO 8 UNIT(S): 100 INJECTION, SOLUTION INTRAVENOUS; SUBCUTANEOUS at 18:18

## 2025-05-11 RX ADMIN — APIXABAN 2.5 MILLIGRAM(S): 2.5 TABLET, FILM COATED ORAL at 18:06

## 2025-05-11 RX ADMIN — Medication 0.5 MILLIGRAM(S): at 21:00

## 2025-05-11 RX ADMIN — Medication 0.5 MILLIGRAM(S): at 09:45

## 2025-05-11 RX ADMIN — Medication 150 MICROGRAM(S): at 04:30

## 2025-05-11 RX ADMIN — Medication 81 MILLIGRAM(S): at 12:09

## 2025-05-11 RX ADMIN — Medication 100 MILLIGRAM(S): at 05:57

## 2025-05-11 RX ADMIN — Medication 0.2 MILLIGRAM(S): at 05:57

## 2025-05-11 RX ADMIN — Medication 0.2 MILLIGRAM(S): at 21:21

## 2025-05-11 RX ADMIN — Medication 0.2 MILLIGRAM(S): at 12:08

## 2025-05-11 RX ADMIN — Medication 100 MILLIGRAM(S): at 12:07

## 2025-05-11 RX ADMIN — Medication 30 MILLIGRAM(S): at 14:11

## 2025-05-11 RX ADMIN — INSULIN GLARGINE-YFGN 24 UNIT(S): 100 INJECTION, SOLUTION SUBCUTANEOUS at 21:21

## 2025-05-11 RX ADMIN — INSULIN LISPRO 2: 100 INJECTION, SOLUTION INTRAVENOUS; SUBCUTANEOUS at 18:18

## 2025-05-11 RX ADMIN — INSULIN LISPRO 8 UNIT(S): 100 INJECTION, SOLUTION INTRAVENOUS; SUBCUTANEOUS at 13:42

## 2025-05-11 RX ADMIN — Medication 0.5 MILLIGRAM(S): at 14:35

## 2025-05-11 RX ADMIN — Medication 0.2 MILLIGRAM(S): at 16:00

## 2025-05-11 RX ADMIN — ROSUVASTATIN CALCIUM 10 MILLIGRAM(S): 5 TABLET, FILM COATED ORAL at 21:21

## 2025-05-11 NOTE — H&P ADULT - NSHPLABSRESULTS_GEN_ALL_CORE
10.8   16.22 )-----------( 217      ( 10 May 2025 07:55 )             33.9     05-10    139  |  102  |  85[H]  ----------------------------<  219[H]  4.9   |  21[L]  |  2.27[H]    Ca    8.8      10 May 2025 07:55    TPro  7.1  /  Alb  2.6[L]  /  TBili  1.0  /  DBili  x   /  AST  15  /  ALT  14  /  AlkPhos  77  05-10        LIVER FUNCTIONS - ( 10 May 2025 07:55 )  Alb: 2.6 g/dL / Pro: 7.1 g/dL / ALK PHOS: 77 U/L / ALT: 14 U/L / AST: 15 U/L / GGT: x               Urinalysis Basic - ( 10 May 2025 07:55 )    Color: x / Appearance: x / SG: x / pH: x  Gluc: 219 mg/dL / Ketone: x  / Bili: x / Urobili: x   Blood: x / Protein: x / Nitrite: x   Leuk Esterase: x / RBC: x / WBC x   Sq Epi: x / Non Sq Epi: x / Bacteria: x          Blood, Urine: Negative (05-10 @ 06:50)    Troponin Trend:               EKG:     RADIOLOGY STUDIES:

## 2025-05-11 NOTE — H&P ADULT - NSHPPHYSICALEXAM_GEN_ALL_CORE
VITALS:   T(C): 36.6 (05-11-25 @ 02:30), Max: 36.9 (05-10-25 @ 19:01)  HR: 65 (05-11-25 @ 02:30) (60 - 88)  BP: 159/64 (05-11-25 @ 02:30) (115/67 - 193/65)  RR: 18 (05-11-25 @ 02:30) (17 - 18)  SpO2: 100% (05-11-25 @ 02:30) (91% - 100%)    GENERAL: NAD, lying in bed comfortably  HEAD:  Atraumatic, normocephalic  EYES: EOMI, PERRLA, conjunctiva and sclera clear  ENT: Moist mucous membranes, HFNC in place  NECK: Supple, no JVD  HEART: Regular rate and rhythm, no murmurs, rubs, or gallops  LUNGS: Unlabored respirations.  Clear to auscultation bilaterally, no crackles, wheezing, or rhonchi  ABDOMEN: Soft, nontender, nondistended, +BS  EXTREMITIES: 2+ peripheral pulses bilaterally. No clubbing, cyanosis, or edema. 2+/5 strength b/l LEs, weaker proximally. Sensation intact  NERVOUS SYSTEM:  A&Ox3, no focal deficits   SKIN: No rashes or lesions

## 2025-05-11 NOTE — PROGRESS NOTE ADULT - PROBLEM SELECTOR PLAN 5
Home regimen: lantus 24u, admelog 12u tid with meals   - lantus 20u qhs, humalog 8u tid with meals, low ISS

## 2025-05-11 NOTE — H&P ADULT - PROBLEM SELECTOR PLAN 8
Diet: CC  DVT: eliquis  Dispo: pending ortho and PT eval    DNR/DNI - Continue Home Levothyroxine 150mcg

## 2025-05-11 NOTE — H&P ADULT - PROBLEM SELECTOR PLAN 3
#HTN   - Continue Home Hydralazine 100mg tid  - Continue Home Clonidine .2mg tid  - Hold Telmisartan 40mg qd in setting of JEROME   - Hold Chlorthalidone 25mg qd in setting of JEROME acute urinary retention in the setting of back pain and immobility  - wong in place, TOV tomorrow

## 2025-05-11 NOTE — PROGRESS NOTE ADULT - ASSESSMENT
Pt is an 85M with HTN, HLD, DM2, prostate cancer s/p prostatectomy, CKD, TIA with a pacemaker who presents transferred from [place] for further evaluation of low back pain secondary to lumbar compression deformities.

## 2025-05-11 NOTE — PROGRESS NOTE ADULT - PROBLEM SELECTOR PLAN 4
#HTN   - Continue Home Hydralazine 100mg tid  - Continue Home Clonidine .2mg tid  - Hold Telmisartan 40mg qd in setting of JEROME   - Hold Chlorthalidone 25mg qd in setting of JEROME

## 2025-05-11 NOTE — H&P ADULT - PROBLEM SELECTOR PLAN 4
Home regimen: lantus 24u, admelog 12u tid with meals   - lantus 20u qhs, humalog 8u tid with meals, low ISS #HTN   - Continue Home Hydralazine 100mg tid  - Continue Home Clonidine .2mg tid  - Hold Telmisartan 40mg qd in setting of JEROME   - Hold Chlorthalidone 25mg qd in setting of JEROME

## 2025-05-11 NOTE — H&P ADULT - ATTENDING COMMENTS
84 y/o M with HTN, HLD, DM type 2, CKD, TIA and prostate ca s/p prostatectomy transferred from [place] where he presented for back pain.  Tranferred to [facility] for further w/u with MRI.  Pt has continued pain, especially with movement/transferring.  Feels mild SOB.  Pt was started on NRB for hypoxia prior to arrival to [facility].  Was transitioned to hi flow.  On exam, pt in NAD, breathing comfortably.  Heart RRR, lungs CTA B, abd s/nt/nd, BS normal,  No edema  labs reviewed  CT reviewed showing L5 compression fx  reviewed ortho/spine consult  will check MRI  unclear reason for hypoxia, but must r/o PE given cancer history  Will check VQ scan   Pyuria on UA but asymptomatic.  Will observe off abx for now

## 2025-05-11 NOTE — PROGRESS NOTE ADULT - SUBJECTIVE AND OBJECTIVE BOX
PROGRESS NOTE:     Patient is a 85y old  Male who presents with a chief complaint of back pain (11 May 2025 03:10)      INTERVAL EVENTS: No acute overnight events.     SUBJECTIVE: Patient seen and examined at bedside. This morning, the patient is comfortable and doing well. No acute complaints.    MEDICATIONS  (STANDING):  apixaban 2.5 milliGRAM(s) Oral two times a day  aspirin  chewable 81 milliGRAM(s) Oral daily  cloNIDine 0.2 milliGRAM(s) Oral three times a day  dextrose 5%. 1000 milliLiter(s) (50 mL/Hr) IV Continuous <Continuous>  dextrose 5%. 1000 milliLiter(s) (100 mL/Hr) IV Continuous <Continuous>  dextrose 50% Injectable 25 Gram(s) IV Push once  dextrose 50% Injectable 12.5 Gram(s) IV Push once  dextrose 50% Injectable 25 Gram(s) IV Push once  dextrose Oral Gel 15 Gram(s) Oral once  glucagon  Injectable 1 milliGRAM(s) IntraMuscular once  hydrALAZINE 100 milliGRAM(s) Oral three times a day  insulin glargine Injectable (LANTUS) 20 Unit(s) SubCutaneous at bedtime  insulin lispro Injectable (ADMELOG) 8 Unit(s) SubCutaneous three times a day before meals  levothyroxine 150 MICROGram(s) Oral daily  propranolol  milliGRAM(s) Oral daily  rosuvastatin 10 milliGRAM(s) Oral at bedtime    MEDICATIONS  (PRN):      CAPILLARY BLOOD GLUCOSE      POCT Blood Glucose.: 184 mg/dL (10 May 2025 20:54)  POCT Blood Glucose.: 160 mg/dL (10 May 2025 16:58)  POCT Blood Glucose.: 234 mg/dL (10 May 2025 12:14)  POCT Blood Glucose.: 216 mg/dL (10 May 2025 08:01)    I&O's Summary      PHYSICAL EXAM:  Vital Signs Last 24 Hrs  T(C): 36.6 (11 May 2025 02:30), Max: 36.9 (10 May 2025 19:01)  T(F): 97.8 (11 May 2025 02:30), Max: 98.5 (10 May 2025 19:01)  HR: 65 (11 May 2025 02:30) (60 - 88)  BP: 159/64 (11 May 2025 02:30) (115/67 - 193/65)  BP(mean): --  RR: 18 (11 May 2025 02:30) (17 - 18)  SpO2: 100% (11 May 2025 02:30) (92% - 100%)    Parameters below as of 11 May 2025 02:30  Patient On (Oxygen Delivery Method): nasal cannula, high flow  O2 Flow (L/min): 40  O2 Concentration (%): 40    GENERAL: NAD, lying in bed comfortably  HEAD: Atraumatic, normocephalic  EYES: EOMI, PERRLA, conjunctiva and sclera clear  ENT: Moist mucous membranes  NECK: Supple, no JVD  HEART: S1, S2, Regular rate and rhythm, no murmurs, rubs, or gallops  LUNGS: Unlabored respirations, clear to auscultation bilaterally, no crackles, wheezing, or rhonchi  ABDOMEN: Soft, nontender, nondistended, +BS  EXTREMITIES: 2+ peripheral pulses bilaterally. No clubbing, cyanosis, or edema  NERVOUS SYSTEM:  A&Ox3, no focal deficits   SKIN: No rashes or lesions    LABS:                        10.9   18.45 )-----------( 247      ( 11 May 2025 06:30 )             32.8     05-10    139  |  102  |  85[H]  ----------------------------<  219[H]  4.9   |  21[L]  |  2.27[H]    Ca    8.8      10 May 2025 07:55    TPro  7.1  /  Alb  2.6[L]  /  TBili  1.0  /  DBili  x   /  AST  15  /  ALT  14  /  AlkPhos  77  05-10          Urinalysis Basic - ( 10 May 2025 07:55 )    Color: x / Appearance: x / SG: x / pH: x  Gluc: 219 mg/dL / Ketone: x  / Bili: x / Urobili: x   Blood: x / Protein: x / Nitrite: x   Leuk Esterase: x / RBC: x / WBC x   Sq Epi: x / Non Sq Epi: x / Bacteria: x          RADIOLOGY & ADDITIONAL TESTS:  Results Reviewed:   Imaging Personally Reviewed:  Electrocardiogram Personally Reviewed:  Tele:

## 2025-05-11 NOTE — PROGRESS NOTE ADULT - PROBLEM SELECTOR PLAN 3
acute urinary retention in the setting of back pain and immobility  - wong in place, TOV tomorrow estimated energy & protein needs based on IBW of 52.1 kg with consideration for BMI>30; pt fluid overloaded; defer fluid to team.

## 2025-05-11 NOTE — CONSULT NOTE ADULT - ASSESSMENT
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  [facility] [id number]  [facility]        [id number]  [facility]  [phone]/ [phone]

## 2025-05-11 NOTE — H&P ADULT - NSHPREVIEWOFSYSTEMS_GEN_ALL_CORE
REVIEW OF SYSTEMS:  CONSTITUTIONAL: No weakness, fevers or chills  EYES/ENT: No visual changes;  No vertigo or throat pain   NECK: No pain or stiffness  RESPIRATORY: No cough, wheezing, hemoptysis; mild shortness of breath  CARDIOVASCULAR: No chest pain or palpitations  GASTROINTESTINAL: No abdominal or epigastric pain. No nausea, vomiting, or hematemesis; No diarrhea or constipation. No melena or hematochezia.  GENITOURINARY: No dysuria, frequency or hematuria  NEUROLOGICAL: No numbness or weakness  SKIN: No itching, rashes

## 2025-05-11 NOTE — H&P ADULT - PROBLEM SELECTOR PLAN 2
acute urinary retention in the setting of back pain and immobility  - wong in place, TOV tomorrow Pt hypoxic on presentation for unclear reason; started on high flow nasal cannula 40/40  - CXR unimpressive  - lungs clear on exam  - poss bronchiectasis vs pna Pt hypoxic on presentation for unclear reason; started on high flow nasal cannula 40/40  - CXR unimpressive  - lungs clear on exam  - poss bronchiectasis vs pna  - VQ scan to rule out PE, avoiding iv contrast iso JEROME

## 2025-05-11 NOTE — PROGRESS NOTE ADULT - PROBLEM SELECTOR PLAN 2
Pt hypoxic on presentation for unclear reason; started on high flow nasal cannula 40/40  - CXR unimpressive  - lungs clear on exam  - poss bronchiectasis vs pna  - VQ scan to rule out PE, avoiding iv contrast iso JEROME

## 2025-05-11 NOTE — PATIENT PROFILE ADULT - FALL HARM RISK - HARM RISK INTERVENTIONS
Assistance with ambulation/Assistance OOB with selected safe patient handling equipment/Communicate Risk of Fall with Harm to all staff/Discuss with provider need for PT consult/Monitor gait and stability/Provide patient with walking aids - walker, cane, crutches/Reinforce activity limits and safety measures with patient and family/Tailored Fall Risk Interventions/Use of alarms - bed, chair and/or voice tab/Visual Cue: Yellow wristband and red socks/Bed in lowest position, wheels locked, appropriate side rails in place/Call bell, personal items and telephone in reach/Instruct patient to call for assistance before getting out of bed or chair/Non-slip footwear when patient is out of bed/[place] to call system/Physically safe environment - no spills, clutter or unnecessary equipment/Purposeful Proactive Rounding/Room/bathroom lighting operational, light cord in reach

## 2025-05-11 NOTE — PATIENT PROFILE ADULT - FUNCTIONAL ASSESSMENT - BASIC MOBILITY 6.
1-calculated by average/Not able to assess (calculate score using [facility] averaging method)

## 2025-05-11 NOTE — PROGRESS NOTE ADULT - ATTENDING COMMENTS
84 y/o M with HTN, HLD, DM type 2, CKD, TIA and prostate ca s/p prostatectomy transferred from [place] where he presented for back pain.      #Back Pain  - f/u MR    #HTN  - add on nifedipine  - c/w Hydral  - c/w clonidine    #Urinary retention  - add on tamsulosin    #Diabetes  - increase lantus to 24  - add on mISS  - obtain a1c

## 2025-05-11 NOTE — PROGRESS NOTE ADULT - PROBLEM SELECTOR PLAN 1
CT Lumbar Spine: L3-L4 disc bulge, L4-L5 left paracentral-foraminal disc protrusion, multiple mild lumbar vertebral body compression deformities  . L3-L4 disc bulge, resulting in severe central canal, bilateral lateral recess and moderate bilateral neural foramen stenosis with facet arthrosis and ligamentum flavum hypertrophy.    Pain: iv tylenol prn, dilaudid .5mg mod pain, dilaudid 1mg severe pain  - Ortho Consulted; recs appreciated  - TLSO brace  - MRI wo con (CrCl 14 so avoiding gadolinium) to r/o cauda equina and malignant disease, gely with history of prostate ca

## 2025-05-11 NOTE — H&P ADULT - HISTORY OF PRESENT ILLNESS
Pt is an 85M with HTN, HLD, DM2, prostate cancer s/p prostatectomy, CKD, TIA with a pacemaker who presents transferred from [place] for further evaluation of low back pain. The pain began when he was going to the bathroom and worsened when he was in bed and rolled over. The pain is worse when he is walking or moving. He has never had back pain like this before. Patient normally ambulates with a walker or with a cane. Of note, patient endorses a fall in February where he fell into a pile of snow. Patient denies any injury after that event and did not have any imaging at that time. He usually has one BM every 3-4 days and this has not changed. Wakes up 2-3x/night to urinate. No episodes of bladder or bowel incontinence. He says he has diabetic neuropathy and has seen pain management. Says he has tried gabapentin but it made his legs very weak and he almost fell.    At [place], CT showed multiple lumbar compression deformities. MRI was unable to be performed 2/2 ppm compatibility. He was transferred to [facility] for MRI and orthopedic eval.

## 2025-05-11 NOTE — PATIENT PROFILE ADULT - NSPROSPHOSPCHAPLAINYN_GEN_A_NUR
Message   Recorded as Task   Date: 03/10/2017 06:58 AM, Created By: Bonifacio Molina   Task Name: Follow Up   Assigned To: Michelle Sanchez   Regarding Patient: Gorge Rey, Status: In Progress   CommentFaghulam Scott - 10 Mar 2017 6:58 AM     TASK CREATED  On the patient and ordered the daughter-in-law tell her that her labs are stable her thyroid function is normal she has some pyuria in the urine but she did not have any symptoms when I saw her have a nice rest of the winter and spring and we will see her   Shawna Avalos - 10 Mar 2017 8:23 AM     TASK IN PROGRESS   Shawna Avalos - 10 Mar 2017 11:32 AM     TASK EDITED   Stager to Cathy Veloz, daughter, and gave results    she understood
no

## 2025-05-11 NOTE — H&P ADULT - ASSESSMENT
Pt is an 85M with HTN, HLD, DM2, prostate cancer s/p prostatectomy, CKD, TIA with a pacemaker who presents transferred from [place] for further evaluation of low back pain secondary to lumbar compression deformities.

## 2025-05-11 NOTE — H&P ADULT - PROBLEM SELECTOR PLAN 5
- Continue Home Propanolol  - Continue Home Eliquis   - ASA 81mg qd Home regimen: lantus 24u, admelog 12u tid with meals   - lantus 20u qhs, humalog 8u tid with meals, low ISS

## 2025-05-11 NOTE — CONSULT NOTE ADULT - SUBJECTIVE AND OBJECTIVE BOX
Patient is a 85y Male PMH HTN, HLD, DM, Portate ca s/p prostatectomy, CKD, TIA s/p PPM who presents c/o low back pain for one week. Patient notes he has had multiple falls in the past most recently in February. Prior to presentation to OSH was in bathroom when he turned and heard a crack. Since then has had intractable back pain. At baseline ambulates w a cane/walker. Denies HS/LOC. Denies pain/injury elsewhere. Denies saddle anesthesia. Denies fevers/chills. No other complaints at this time.    HEALTH ISSUES - PROBLEM Dx:          MEDICATIONS  (STANDING):      Allergies    gabapentin (Other)    Intolerances        PAST MEDICAL & SURGICAL HISTORY:  No pertinent past medical history    HTN (hypertension)    HLD (hyperlipidemia)    DM (diabetes mellitus)    TIA (transient ischemic attack)    Prostate cancer    No significant past surgical history    S/P prostatectomy    Pacemaker    H/O thyroidectomy                              10.8   16.22 )-----------( 217      ( 10 May 2025 07:55 )             33.9       10 May 2025 07:55    139    |  102    |  85     ----------------------------<  219    4.9     |  21     |  2.27     Ca    8.8        10 May 2025 07:55    TPro  7.1    /  Alb  2.6    /  TBili  1.0    /  DBili  x      /  AST  15     /  ALT  14     /  AlkPhos  77     10 May 2025 07:55          Urinalysis Basic - ( 10 May 2025 07:55 )    Color: x / Appearance: x / SG: x / pH: x  Gluc: 219 mg/dL / Ketone: x  / Bili: x / Urobili: x   Blood: x / Protein: x / Nitrite: x   Leuk Esterase: x / RBC: x / WBC x   Sq Epi: x / Non Sq Epi: x / Bacteria: x        Vital Signs Last 24 Hrs  T(C): 36.6 (05-10-25 @ 23:00), Max: 36.9 (05-10-25 @ 19:01)  T(F): 97.9 (05-10-25 @ 23:00), Max: 98.5 (05-10-25 @ 19:01)  HR: 78 (05-10-25 @ 23:00) (60 - 88)  BP: 120/62 (05-10-25 @ 23:00) (115/67 - 193/65)  BP(mean): --  RR: 18 (05-10-25 @ 23:00) (17 - 18)  SpO2: 95% (05-10-25 @ 23:00) (91% - 95%)    Physical Exam:  Gen: NAD  Spine:  Skin intact  No gross deformity  No midline TTP C/T/L/S spine  No bony step offs  No paraspinal muscle ttp/hypertonicity   Negative Straight leg raise  Negative clonus  Negative babinski  Negative carbajal  + rectal tone  No saddle anesthesia    Motor:                   C5                C6              C7               C8           T1   R            5/5                5/5            5/5             5/5          5/5  L             5/5               5/5             5/5             5/5          5/5                L2             L3             L4               L5            S1  R         2+/5           2+/5          4/5             4/5           4/5  L          2+/5          2+/5           4/5             4/5           4/5    Sensory:            C5         C6         C7      C8       T1        (0=absent, 1=impaired, 2=normal, NT=not testable)  R         2            2           2        2         2  L          2            2           2        2         2               L2          L3         L4      L5       S1         (0=absent, 1=impaired, 2=normal, NT=not testable)  R         2            2            2        2        2  L          2            2           2        2         2    Imaging:

## 2025-05-11 NOTE — H&P ADULT - PROBLEM SELECTOR PLAN 1
CT Lumbar Spine: L3-L4 disc bulge, L4-L5 left paracentral-foraminal disc protrusion, multiple mild lumbar vertebral body compression deformities  . L3-L4 disc bulge, resulting in severe central canal, bilateral lateral recess and moderate bilateral neural foramen stenosis with facet arthrosis and ligamentum flavum hypertrophy.    Pain: iv tylenol prn, dilaudid .5mg mod pain, dilaudid 1mg severe pain  - Ortho Consulted; recs appreciated  - TLSO brace  - MRI w/wo to r/o cauda equina and malignant disease, gely with history of prostate ca CT Lumbar Spine: L3-L4 disc bulge, L4-L5 left paracentral-foraminal disc protrusion, multiple mild lumbar vertebral body compression deformities  . L3-L4 disc bulge, resulting in severe central canal, bilateral lateral recess and moderate bilateral neural foramen stenosis with facet arthrosis and ligamentum flavum hypertrophy.    Pain: iv tylenol prn, dilaudid .5mg mod pain, dilaudid 1mg severe pain  - Ortho Consulted; recs appreciated  - TLSO brace  - MRI wo con (CrCl 14 so avoiding gadolinium) to r/o cauda equina and malignant disease, gely with history of prostate ca

## 2025-05-11 NOTE — PATIENT PROFILE ADULT - FUNCTIONAL ASSESSMENT - BASIC MOBILITY 3.
Follow Up:  encephalitis with positive CSF toxo    Interval History: brain MRI again did not show any lesions    ROS:      All other systems negative    Constitutional: no fever, no chills  Eyes: no vision changes, no eye pain  ENT:  no sore throat, no rhinorrhea  Cardiovascular:  no chest pain, no palpitation  Respiratory:  no SOB, no cough  GI:  no abd pain, no vomiting, no diarrhea  urinary: no dysuria, no hematuria, no flank pain  musculoskeletal:  no joint pain, no joint swelling  skin:  no rash  neurology:  occasional frontal headache        Allergies  No Known Allergies        ANTIMICROBIALS:  trimethoprim / sulfamethoxazole IVPB 385 every 12 hours      OTHER MEDS:  acetaminophen    Suspension .. 650 milliGRAM(s) Oral every 6 hours PRN  acetaminophen   Tablet .. 325 milliGRAM(s) Oral daily  aMILoride 10 milliGRAM(s) Oral <User Schedule>  enoxaparin Injectable 40 milliGRAM(s) SubCutaneous daily  fluticasone propionate 50 MICROgram(s)/spray Nasal Spray 1 Spray(s) Both Nostrils two times a day  influenza   Vaccine 0.5 milliLiter(s) IntraMuscular once  labetalol 100 milliGRAM(s) Oral every 8 hours  lacosamide 200 milliGRAM(s) Oral two times a day  levETIRAcetam  Solution 1500 milliGRAM(s) Oral two times a day  magnesium oxide 400 milliGRAM(s) Oral two times a day with meals  methylPREDNISolone 60 milliGRAM(s) Oral daily  pantoprazole  Injectable 40 milliGRAM(s) IV Push two times a day  potassium chloride   Solution 40 milliEquivalent(s) Enteral Tube daily  senna Syrup 10 milliLiter(s) Oral at bedtime  sertraline 50 milliGRAM(s) Oral daily  sodium chloride 3 Gram(s) Oral three times a day      Vital Signs Last 24 Hrs  T(C): 36.5 (02 Dec 2019 08:46), Max: 36.7 (01 Dec 2019 20:22)  T(F): 97.7 (02 Dec 2019 08:46), Max: 98 (01 Dec 2019 20:22)  HR: 53 (02 Dec 2019 08:46) (53 - 67)  BP: 123/72 (02 Dec 2019 08:46) (114/57 - 129/70)  BP(mean): --  RR: 18 (02 Dec 2019 08:46) (18 - 20)  SpO2: 99% (02 Dec 2019 08:46) (95% - 99%)    Physical Exam:  General:    NAD,  non toxic  Head: EEG electrodes  Eye: normal sclera and conjunctiva  ENT:    no oropharyngeal lesions,   no LAD,   neck supple  Cardio:     regular S1, S2,  no murmur  Respiratory:    clear b/l,    no wheezing  abd:     soft,   BS +,   no tenderness, PEG with no erythema  :   no CVAT,  no suprapubic tenderness,   no  bradley  Musculoskeletal:   no joint swelling,   no edema  vascular: no phlebitis, normal pulses  Skin:    no rash  Neurologic:   awake and sitting on a chair, A&O x 3, follows commands                          13.4   8.64  )-----------( 322      ( 02 Dec 2019 08:01 )             38.9       12-02    129<L>  |  92<L>  |  25<H>  ----------------------------<  145<H>  3.5   |  22  |  0.63    Ca    8.9      02 Dec 2019 06:00  Phos  1.9     12-02  Mg     1.4     12-02    TPro  8.2  /  Alb  3.0<L>  /  TBili  0.3  /  DBili  x   /  AST  31  /  ALT  51<H>  /  AlkPhos  108  12-02          MICROBIOLOGY:  v  .CSF CSF  11-20-19   No growth  --    No polymorphonuclear cells seen  No organisms seen  by cytocentrifuge      .Urine Clean Catch (Midstream)  11-17-19   >=3 organisms. Probable collection contamination.  --  --        CMV PCR Detection: NotDetec IU/mL (11-27-19 @ 13:39)    CMV PCR Detection: NotDetec IU/mL (11-27 @ 13:39)  EBV PCR: NotDetec IU/mL (11-27 @ 13:39)        RADIOLOGY:  Images below reviewed personally  < from: MR Head w/wo IV Cont (11.29.19 @ 16:41) >  IMPRESSION:    Limited by motion    No gross evidence for abnormal intracranial enhancement.    No acute intracranial hemorrhage, mass effect, vasogenic edema, or   evidence of acute territorial infarct.    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

## 2025-05-12 DIAGNOSIS — R78.81 BACTEREMIA: ICD-10-CM

## 2025-05-12 LAB
ANION GAP SERPL CALC-SCNC: 14 MMOL/L — SIGNIFICANT CHANGE UP (ref 7–14)
APPEARANCE UR: ABNORMAL
BACTERIA # UR AUTO: NEGATIVE /HPF — SIGNIFICANT CHANGE UP
BILIRUB UR-MCNC: NEGATIVE — SIGNIFICANT CHANGE UP
BUN SERPL-MCNC: 91 MG/DL — HIGH (ref 7–23)
CALCIUM SERPL-MCNC: 9 MG/DL — SIGNIFICANT CHANGE UP (ref 8.4–10.5)
CAST: 23 /LPF — HIGH (ref 0–4)
CHLORIDE SERPL-SCNC: 105 MMOL/L — SIGNIFICANT CHANGE UP (ref 98–107)
CO2 SERPL-SCNC: 24 MMOL/L — SIGNIFICANT CHANGE UP (ref 22–31)
COLOR SPEC: YELLOW — SIGNIFICANT CHANGE UP
CREAT SERPL-MCNC: 1.98 MG/DL — HIGH (ref 0.5–1.3)
DIFF PNL FLD: NEGATIVE — SIGNIFICANT CHANGE UP
E FAECALIS DNA BLD POS QL NAA+NON-PROBE: SIGNIFICANT CHANGE UP
EGFR: 32 ML/MIN/1.73M2 — LOW
EGFR: 32 ML/MIN/1.73M2 — LOW
GLUCOSE BLDC GLUCOMTR-MCNC: 177 MG/DL — HIGH (ref 70–99)
GLUCOSE SERPL-MCNC: 155 MG/DL — HIGH (ref 70–99)
GLUCOSE UR QL: NEGATIVE MG/DL — SIGNIFICANT CHANGE UP
GRAM STN FLD: ABNORMAL
HCT VFR BLD CALC: 30.8 % — LOW (ref 39–50)
HGB BLD-MCNC: 10.1 G/DL — LOW (ref 13–17)
KETONES UR QL: NEGATIVE MG/DL — SIGNIFICANT CHANGE UP
LEUKOCYTE ESTERASE UR-ACNC: ABNORMAL
MAGNESIUM SERPL-MCNC: 2.8 MG/DL — HIGH (ref 1.6–2.6)
MCHC RBC-ENTMCNC: 27.7 PG — SIGNIFICANT CHANGE UP (ref 27–34)
MCHC RBC-ENTMCNC: 32.8 G/DL — SIGNIFICANT CHANGE UP (ref 32–36)
MCV RBC AUTO: 84.6 FL — SIGNIFICANT CHANGE UP (ref 80–100)
METHOD TYPE: SIGNIFICANT CHANGE UP
NITRITE UR-MCNC: NEGATIVE — SIGNIFICANT CHANGE UP
NRBC # BLD AUTO: 0 K/UL — SIGNIFICANT CHANGE UP (ref 0–0)
NRBC # FLD: 0 K/UL — SIGNIFICANT CHANGE UP (ref 0–0)
NRBC BLD AUTO-RTO: 0 /100 WBCS — SIGNIFICANT CHANGE UP (ref 0–0)
PH UR: 5.5 — SIGNIFICANT CHANGE UP (ref 5–8)
PHOSPHATE SERPL-MCNC: 3.3 MG/DL — SIGNIFICANT CHANGE UP (ref 2.5–4.5)
PLATELET # BLD AUTO: 247 K/UL — SIGNIFICANT CHANGE UP (ref 150–400)
POTASSIUM SERPL-MCNC: 4.4 MMOL/L — SIGNIFICANT CHANGE UP (ref 3.5–5.3)
POTASSIUM SERPL-SCNC: 4.4 MMOL/L — SIGNIFICANT CHANGE UP (ref 3.5–5.3)
PROT UR-MCNC: 30 MG/DL
RBC # BLD: 3.64 M/UL — LOW (ref 4.2–5.8)
RBC # FLD: 15.6 % — HIGH (ref 10.3–14.5)
RBC CASTS # UR COMP ASSIST: 0 /HPF — SIGNIFICANT CHANGE UP (ref 0–4)
REVIEW: SIGNIFICANT CHANGE UP
SODIUM SERPL-SCNC: 143 MMOL/L — SIGNIFICANT CHANGE UP (ref 135–145)
SP GR SPEC: 1.02 — SIGNIFICANT CHANGE UP (ref 1–1.03)
SPECIMEN SOURCE: SIGNIFICANT CHANGE UP
SQUAMOUS # UR AUTO: 2 /HPF — SIGNIFICANT CHANGE UP (ref 0–5)
URATE CRY FLD QL MICRO: PRESENT
UROBILINOGEN FLD QL: 0.2 MG/DL — SIGNIFICANT CHANGE UP (ref 0.2–1)
WBC # BLD: 15.78 K/UL — HIGH (ref 3.8–10.5)
WBC # FLD AUTO: 15.78 K/UL — HIGH (ref 3.8–10.5)
WBC UR QL: 3 /HPF — SIGNIFICANT CHANGE UP (ref 0–5)

## 2025-05-12 PROCEDURE — 99233 SBSQ HOSP IP/OBS HIGH 50: CPT | Mod: GC

## 2025-05-12 PROCEDURE — 93280 PM DEVICE PROGR EVAL DUAL: CPT | Mod: 26

## 2025-05-12 RX ORDER — AMPICILLIN SODIUM 1 G/1
2 INJECTION, POWDER, FOR SOLUTION INTRAMUSCULAR; INTRAVENOUS EVERY 6 HOURS
Refills: 0 | Status: DISCONTINUED | OUTPATIENT
Start: 2025-05-12 | End: 2025-05-12

## 2025-05-12 RX ORDER — AMPICILLIN SODIUM 1 G/1
2 INJECTION, POWDER, FOR SOLUTION INTRAMUSCULAR; INTRAVENOUS EVERY 8 HOURS
Refills: 0 | Status: DISCONTINUED | OUTPATIENT
Start: 2025-05-12 | End: 2025-05-13

## 2025-05-12 RX ADMIN — Medication 81 MILLIGRAM(S): at 13:22

## 2025-05-12 RX ADMIN — Medication 150 MICROGRAM(S): at 04:40

## 2025-05-12 RX ADMIN — Medication 0.5 MILLIGRAM(S): at 10:50

## 2025-05-12 RX ADMIN — APIXABAN 2.5 MILLIGRAM(S): 2.5 TABLET, FILM COATED ORAL at 05:56

## 2025-05-12 RX ADMIN — Medication 120 MILLIGRAM(S): at 05:55

## 2025-05-12 RX ADMIN — AMPICILLIN SODIUM 200 GRAM(S): 1 INJECTION, POWDER, FOR SOLUTION INTRAMUSCULAR; INTRAVENOUS at 13:20

## 2025-05-12 RX ADMIN — Medication 0.5 MILLIGRAM(S): at 05:14

## 2025-05-12 RX ADMIN — INSULIN LISPRO 8 UNIT(S): 100 INJECTION, SOLUTION INTRAVENOUS; SUBCUTANEOUS at 18:03

## 2025-05-12 RX ADMIN — Medication 0.2 MILLIGRAM(S): at 05:56

## 2025-05-12 RX ADMIN — Medication 0.2 MILLIGRAM(S): at 13:23

## 2025-05-12 RX ADMIN — INSULIN GLARGINE-YFGN 24 UNIT(S): 100 INJECTION, SOLUTION SUBCUTANEOUS at 21:50

## 2025-05-12 RX ADMIN — Medication 0.5 MILLIGRAM(S): at 06:30

## 2025-05-12 RX ADMIN — INSULIN LISPRO 2: 100 INJECTION, SOLUTION INTRAVENOUS; SUBCUTANEOUS at 09:13

## 2025-05-12 RX ADMIN — Medication 100 MILLIGRAM(S): at 21:48

## 2025-05-12 RX ADMIN — Medication 100 MILLIGRAM(S): at 13:23

## 2025-05-12 RX ADMIN — TAMSULOSIN HYDROCHLORIDE 0.4 MILLIGRAM(S): 0.4 CAPSULE ORAL at 21:47

## 2025-05-12 RX ADMIN — APIXABAN 2.5 MILLIGRAM(S): 2.5 TABLET, FILM COATED ORAL at 18:06

## 2025-05-12 RX ADMIN — AMPICILLIN SODIUM 200 GRAM(S): 1 INJECTION, POWDER, FOR SOLUTION INTRAMUSCULAR; INTRAVENOUS at 23:24

## 2025-05-12 RX ADMIN — Medication 100 MILLIGRAM(S): at 05:56

## 2025-05-12 RX ADMIN — Medication 0.2 MILLIGRAM(S): at 21:49

## 2025-05-12 RX ADMIN — Medication 0.5 MILLIGRAM(S): at 14:53

## 2025-05-12 RX ADMIN — Medication 0.5 MILLIGRAM(S): at 19:01

## 2025-05-12 RX ADMIN — ROSUVASTATIN CALCIUM 10 MILLIGRAM(S): 5 TABLET, FILM COATED ORAL at 21:48

## 2025-05-12 RX ADMIN — Medication 30 MILLIGRAM(S): at 05:56

## 2025-05-12 RX ADMIN — INSULIN LISPRO 8 UNIT(S): 100 INJECTION, SOLUTION INTRAVENOUS; SUBCUTANEOUS at 09:13

## 2025-05-12 NOTE — PHARMACOTHERAPY INTERVENTION NOTE - COMMENTS
Recommended starting ampicillin 2g IV q6h for Enterococcus faecalis bacteremia. eGFR 32.       Lizz Neri, PharmD   Clinical Pharmacy Specialist, Infectious Diseases  Tele-Antimicrobial Stewardship Program (Tele-ASP)  Tele-ASP Phone: [phone]

## 2025-05-12 NOTE — PROGRESS NOTE ADULT - ATTENDING COMMENTS
6 y/o M with HTN, HLD, DM type 2, CKD, TIA and prostate ca s/p prostatectomy transferred from [place] where he presented for back pain.    LE sensation intact,  LE strength 2/5 b/l    #Back Pain  - awaiting MR, required EP/Radiology clearance in setting of PPM  -otho following  -continue pain control    #Urinary retention  - continue wong; tamsulosin  rest as above

## 2025-05-12 NOTE — PROGRESS NOTE ADULT - ASSESSMENT
Pt is an 85M with HTN, HLD, DM2, prostate cancer s/p prostatectomy, CKD, TIA with a pacemaker who presents transferred from [place] for further evaluation of low back pain secondary to lumbar compression deformities. Pt is an 85M with HTN, HLD, DM2, prostate cancer s/p prostatectomy, CKD, TIA with a pacemaker who presents transferred from [place] for further evaluation of low back pain secondary to lumbar compression deformities. EP on board to clear PPM for MRI compatibility. Found to have e facaelis bacteremia in one BCx bottle, on treatment awaiting repeat BCx.

## 2025-05-12 NOTE — PROGRESS NOTE ADULT - PROBLEM SELECTOR PLAN 4
#HTN   - Continue Home Hydralazine 100mg tid  - Continue Home Clonidine .2mg tid  - Hold Telmisartan 40mg qd in setting of JEROME   - Hold Chlorthalidone 25mg qd in setting of JEROME acute urinary retention in the setting of back pain and immobility    - wong in place  - Will TOV after ortho assessment given Pt stating worsening LE weakness that is progressive

## 2025-05-12 NOTE — PROGRESS NOTE ADULT - PROBLEM SELECTOR PLAN 3
acute urinary retention in the setting of back pain and immobility  - wong in place, TOV tomorrow Pt hypoxic on presentation for unclear reason; started on high flow nasal cannula 40/40    - CXR unimpressive  - lungs clear on exam  - poss bronchiectasis vs pna  - VQ scan to rule out PE -> very low probability of PE

## 2025-05-12 NOTE — PROGRESS NOTE ADULT - PROBLEM SELECTOR PLAN 2
Pt hypoxic on presentation for unclear reason; started on high flow nasal cannula 40/40  - CXR unimpressive  - lungs clear on exam  - poss bronchiectasis vs pna  - VQ scan to rule out PE, avoiding iv contrast iso JEROME - BCx positive for E faecalis   - Only 1 bottle was sent and is positive  - Sent repeat BCx  - Started ampicillin 2g q8 for reduced renal clearance

## 2025-05-12 NOTE — PROGRESS NOTE ADULT - PROBLEM SELECTOR PLAN 7
cont home rosuvastatin 5mg qhs (atorva inpatient) - Continue Home Propanolol  - Continue Home Eliquis   - ASA 81mg qd

## 2025-05-12 NOTE — PROGRESS NOTE ADULT - PROBLEM SELECTOR PLAN 5
Home regimen: lantus 24u, admelog 12u tid with meals   - lantus 20u qhs, humalog 8u tid with meals, low ISS #HTN     - Continue Home Hydralazine 100mg tid  - Continue Home Clonidine .2mg tid  - Hold Telmisartan 40mg qd in setting of JEROME   - Hold Chlorthalidone 25mg qd in setting of EJROME

## 2025-05-12 NOTE — PROGRESS NOTE ADULT - SUBJECTIVE AND OBJECTIVE BOX
***Plan not finalized until attending attestation***    Raman Carmichael MD (PGY-1)  Internal Medicine  Contact via [employer] TEAMS    ******************************************    PROGRESS NOTE:     Patient is a 85y old  Male who presents with a chief complaint of back pain (11 May 2025 07:15)      INTERVAL EVENTS: No acute overnight events.     SUBJECTIVE: Patient seen and examined at bedside. This morning, the patient is comfortable and doing well. No acute complaints.    MEDICATIONS  (STANDING):  apixaban 2.5 milliGRAM(s) Oral two times a day  aspirin  chewable 81 milliGRAM(s) Oral daily  cloNIDine 0.2 milliGRAM(s) Oral three times a day  dextrose 5%. 1000 milliLiter(s) (50 mL/Hr) IV Continuous <Continuous>  dextrose 5%. 1000 milliLiter(s) (100 mL/Hr) IV Continuous <Continuous>  dextrose 50% Injectable 25 Gram(s) IV Push once  dextrose 50% Injectable 12.5 Gram(s) IV Push once  dextrose 50% Injectable 25 Gram(s) IV Push once  dextrose Oral Gel 15 Gram(s) Oral once  glucagon  Injectable 1 milliGRAM(s) IntraMuscular once  hydrALAZINE 100 milliGRAM(s) Oral three times a day  insulin glargine Injectable (LANTUS) 24 Unit(s) SubCutaneous at bedtime  insulin lispro (ADMELOG) corrective regimen sliding scale   SubCutaneous three times a day before meals  insulin lispro (ADMELOG) corrective regimen sliding scale   SubCutaneous at bedtime  insulin lispro Injectable (ADMELOG) 8 Unit(s) SubCutaneous three times a day before meals  levothyroxine 150 MICROGram(s) Oral daily  NIFEdipine XL 30 milliGRAM(s) Oral daily  propranolol  milliGRAM(s) Oral daily  rosuvastatin 10 milliGRAM(s) Oral at bedtime  tamsulosin 0.4 milliGRAM(s) Oral at bedtime    MEDICATIONS  (PRN):  HYDROmorphone  Injectable 0.5 milliGRAM(s) IV Push every 4 hours PRN Severe Pain (7 - 10)      CAPILLARY BLOOD GLUCOSE      POCT Blood Glucose.: 174 mg/dL (11 May 2025 21:15)  POCT Blood Glucose.: 194 mg/dL (11 May 2025 17:57)  POCT Blood Glucose.: 286 mg/dL (11 May 2025 12:50)  POCT Blood Glucose.: 262 mg/dL (11 May 2025 08:47)    I&O's Summary    11 May 2025 07:01  -  12 May 2025 07:00  --------------------------------------------------------  IN: 0 mL / OUT: 300 mL / NET: -300 mL        PHYSICAL EXAM:  Vital Signs Last 24 Hrs  T(C): 36.7 (11 May 2025 20:40), Max: 36.7 (11 May 2025 09:40)  T(F): 98 (11 May 2025 20:40), Max: 98.1 (11 May 2025 09:40)  HR: 60 (11 May 2025 20:40) (60 - 67)  BP: 123/58 (11 May 2025 20:40) (123/58 - 189/64)  BP(mean): --  RR: 18 (11 May 2025 20:40) (18 - 19)  SpO2: 97% (11 May 2025 20:40) (96% - 99%)    Parameters below as of 11 May 2025 20:40  Patient On (Oxygen Delivery Method): nasal cannula  O2 Flow (L/min): 5      GENERAL: NAD, lying in bed comfortably  HEAD: Atraumatic, normocephalic  EYES: EOMI, PERRLA, conjunctiva and sclera clear  ENT: Moist mucous membranes  NECK: Supple, no JVD  HEART: S1, S2, Regular rate and rhythm, no murmurs, rubs, or gallops  LUNGS: Unlabored respirations, clear to auscultation bilaterally, no crackles, wheezing, or rhonchi  ABDOMEN: Soft, nontender, nondistended, +BS  EXTREMITIES: 2+ peripheral pulses bilaterally. No clubbing, cyanosis, or edema  NERVOUS SYSTEM:  A&Ox3, no focal deficits   SKIN: No rashes or lesions    LABS:                        10.1   15.78 )-----------( 247      ( 12 May 2025 06:00 )             30.8     05-11    140  |  101  |  92[H]  ----------------------------<  245[H]  4.6   |  22  |  2.12[H]    Ca    9.2      11 May 2025 06:30  Phos  3.9     05-11  Mg     2.80     05-11    TPro  7.1  /  Alb  2.6[L]  /  TBili  1.0  /  DBili  x   /  AST  15  /  ALT  14  /  AlkPhos  77  05-10          Urinalysis Basic - ( 11 May 2025 06:30 )    Color: x / Appearance: x / SG: x / pH: x  Gluc: 245 mg/dL / Ketone: x  / Bili: x / Urobili: x   Blood: x / Protein: x / Nitrite: x   Leuk Esterase: x / RBC: x / WBC x   Sq Epi: x / Non Sq Epi: x / Bacteria: x        Culture - Blood (collected 11 May 2025 14:27)  Source: Blood Blood-Peripheral  Gram Stain (12 May 2025 06:28):    Growth in aerobic bottle: Gram Positive Cocci in Pairs and Chains    Growth in anaerobic bottle: Gram Positive Cocci in Pairs and Chains  Preliminary Report (12 May 2025 06:29):    Growth in aerobic bottle: Gram Positive Cocci in Pairs and Chains    Growth in anaerobic bottle: Gram Positive Cocci in Pairs and Chains    Direct identification is available within approximately 3-5    hours either by Blood Panel Multiplexed PCR or Direct    MALDI-TOF. Details: [email or web address]  Organism: Blood Culture PCR (12 May 2025 06:13)  Organism: Blood Culture PCR (12 May 2025 06:13)        RADIOLOGY & ADDITIONAL TESTS:  Results Reviewed:   Imaging Personally Reviewed:  Electrocardiogram Personally Reviewed:  Tele: ***Plan not finalized until attending attestation***    Raman Carmichael MD (PGY-1)  Internal Medicine  Contact via [employer] TEAMS    ******************************************    PROGRESS NOTE:     Patient is a 85y old  Male who presents with a chief complaint of back pain (11 May 2025 07:15)    INTERVAL EVENTS: No acute overnight events.     SUBJECTIVE: Patient seen and examined at bedside.   Pt states that his feels his legs getting progressively weaker for the past couple of days  Other than this, no complaints    MEDICATIONS  (STANDING):  apixaban 2.5 milliGRAM(s) Oral two times a day  aspirin  chewable 81 milliGRAM(s) Oral daily  cloNIDine 0.2 milliGRAM(s) Oral three times a day  dextrose 5%. 1000 milliLiter(s) (50 mL/Hr) IV Continuous <Continuous>  dextrose 5%. 1000 milliLiter(s) (100 mL/Hr) IV Continuous <Continuous>  dextrose 50% Injectable 25 Gram(s) IV Push once  dextrose 50% Injectable 12.5 Gram(s) IV Push once  dextrose 50% Injectable 25 Gram(s) IV Push once  dextrose Oral Gel 15 Gram(s) Oral once  glucagon  Injectable 1 milliGRAM(s) IntraMuscular once  hydrALAZINE 100 milliGRAM(s) Oral three times a day  insulin glargine Injectable (LANTUS) 24 Unit(s) SubCutaneous at bedtime  insulin lispro (ADMELOG) corrective regimen sliding scale   SubCutaneous three times a day before meals  insulin lispro (ADMELOG) corrective regimen sliding scale   SubCutaneous at bedtime  insulin lispro Injectable (ADMELOG) 8 Unit(s) SubCutaneous three times a day before meals  levothyroxine 150 MICROGram(s) Oral daily  NIFEdipine XL 30 milliGRAM(s) Oral daily  propranolol  milliGRAM(s) Oral daily  rosuvastatin 10 milliGRAM(s) Oral at bedtime  tamsulosin 0.4 milliGRAM(s) Oral at bedtime    MEDICATIONS  (PRN):  HYDROmorphone  Injectable 0.5 milliGRAM(s) IV Push every 4 hours PRN Severe Pain (7 - 10)      CAPILLARY BLOOD GLUCOSE      POCT Blood Glucose.: 174 mg/dL (11 May 2025 21:15)  POCT Blood Glucose.: 194 mg/dL (11 May 2025 17:57)  POCT Blood Glucose.: 286 mg/dL (11 May 2025 12:50)  POCT Blood Glucose.: 262 mg/dL (11 May 2025 08:47)    I&O's Summary    11 May 2025 07:01  -  12 May 2025 07:00  --------------------------------------------------------  IN: 0 mL / OUT: 300 mL / NET: -300 mL        PHYSICAL EXAM:  Vital Signs Last 24 Hrs  T(C): 36.7 (11 May 2025 20:40), Max: 36.7 (11 May 2025 09:40)  T(F): 98 (11 May 2025 20:40), Max: 98.1 (11 May 2025 09:40)  HR: 60 (11 May 2025 20:40) (60 - 67)  BP: 123/58 (11 May 2025 20:40) (123/58 - 189/64)  BP(mean): --  RR: 18 (11 May 2025 20:40) (18 - 19)  SpO2: 97% (11 May 2025 20:40) (96% - 99%)    Parameters below as of 11 May 2025 20:40  Patient On (Oxygen Delivery Method): nasal cannula  O2 Flow (L/min): 5      GENERAL: NAD, lying in bed comfortably  HEAD: Atraumatic, normocephalic  EYES: EOMI, PERRLA, conjunctiva and sclera clear  ENT: Moist mucous membranes  NECK: Supple, no JVD  HEART: S1, S2, Regular rate and rhythm, no murmurs, rubs, or gallops  LUNGS: Unlabored respirations, clear to auscultation bilaterally, no crackles, wheezing, or rhonchi  ABDOMEN: Soft, nontender, nondistended, +BS  EXTREMITIES: 2+ peripheral pulses bilaterally. No clubbing, cyanosis, or edema  NERVOUS SYSTEM:  +Moves all extremities, LE b/l can be pressed down with minor resistance  SKIN: No rashes or lesions  +Frye    LABS:                        10.1   15.78 )-----------( 247      ( 12 May 2025 06:00 )             30.8     05-11    140  |  101  |  92[H]  ----------------------------<  245[H]  4.6   |  22  |  2.12[H]    Ca    9.2      11 May 2025 06:30  Phos  3.9     05-11  Mg     2.80     05-11    TPro  7.1  /  Alb  2.6[L]  /  TBili  1.0  /  DBili  x   /  AST  15  /  ALT  14  /  AlkPhos  77  05-10          Urinalysis Basic - ( 11 May 2025 06:30 )    Color: x / Appearance: x / SG: x / pH: x  Gluc: 245 mg/dL / Ketone: x  / Bili: x / Urobili: x   Blood: x / Protein: x / Nitrite: x   Leuk Esterase: x / RBC: x / WBC x   Sq Epi: x / Non Sq Epi: x / Bacteria: x        Culture - Blood (collected 11 May 2025 14:27)  Source: Blood Blood-Peripheral  Gram Stain (12 May 2025 06:28):    Growth in aerobic bottle: Gram Positive Cocci in Pairs and Chains    Growth in anaerobic bottle: Gram Positive Cocci in Pairs and Chains  Preliminary Report (12 May 2025 06:29):    Growth in aerobic bottle: Gram Positive Cocci in Pairs and Chains    Growth in anaerobic bottle: Gram Positive Cocci in Pairs and Chains    Direct identification is available within approximately 3-5    hours either by Blood Panel Multiplexed PCR or Direct    MALDI-TOF. Details: [email or web address]  Organism: Blood Culture PCR (12 May 2025 06:13)  Organism: Blood Culture PCR (12 May 2025 06:13)

## 2025-05-12 NOTE — PROGRESS NOTE ADULT - PROBLEM SELECTOR PLAN 1
CT Lumbar Spine: L3-L4 disc bulge, L4-L5 left paracentral-foraminal disc protrusion, multiple mild lumbar vertebral body compression deformities  . L3-L4 disc bulge, resulting in severe central canal, bilateral lateral recess and moderate bilateral neural foramen stenosis with facet arthrosis and ligamentum flavum hypertrophy.    Pain: iv tylenol prn, dilaudid .5mg mod pain, dilaudid 1mg severe pain  - Ortho Consulted; recs appreciated  - TLSO brace  - MRI wo con (CrCl 14 so avoiding gadolinium) to r/o cauda equina and malignant disease, gely with history of prostate ca CT Lumbar Spine: L3-L4 disc bulge, L4-L5 left paracentral-foraminal disc protrusion, multiple mild lumbar vertebral body compression deformities  . L3-L4 disc bulge, resulting in severe central canal, bilateral lateral recess and moderate bilateral neural foramen stenosis with facet arthrosis and ligamentum flavum hypertrophy.    - Pain: iv tylenol prn, dilaudid .5mg mod pain, dilaudid 1mg severe pain  - Ortho Consulted; recs appreciated  - TLSO brace  - MRI wo con (CrCl 14 so avoiding gadolinium) to r/o cauda equina and malignant disease, gely with history of prostate ca  - EP consulted for PPM MRI clearance -> cardiac clearance form filled by EP and sent to radiology CT Lumbar Spine: L3-L4 disc bulge, L4-L5 left paracentral-foraminal disc protrusion, multiple mild lumbar vertebral body compression deformities  . L3-L4 disc bulge, resulting in severe central canal, bilateral lateral recess and moderate bilateral neural foramen stenosis with facet arthrosis and ligamentum flavum hypertrophy.    - Pain: iv tylenol prn, dilaudid .5mg mod pain, dilaudid 1mg severe pain  - Ortho Consulted; recs appreciated  - TLSO brace  - MRI wo con (CrCl 14 so avoiding gadolinium) to r/o cauda equina and malignant disease, gely with history of prostate ca  - EP consulted for PPM MRI clearance -> cardiac clearance form filled by EP and sent to radiology  - Ortho reached out to about Pt's worsening progressive BL LE symptoms

## 2025-05-13 LAB
-  AMPICILLIN: SIGNIFICANT CHANGE UP
-  GENTAMICIN SYNERGY: SIGNIFICANT CHANGE UP
-  STREPTOMYCIN SYNERGY: SIGNIFICANT CHANGE UP
-  VANCOMYCIN: SIGNIFICANT CHANGE UP
ANION GAP SERPL CALC-SCNC: 10 MMOL/L — SIGNIFICANT CHANGE UP (ref 7–14)
ANISOCYTOSIS BLD QL: SLIGHT — SIGNIFICANT CHANGE UP
BASOPHILS # BLD AUTO: 0 K/UL — SIGNIFICANT CHANGE UP (ref 0–0.2)
BASOPHILS NFR BLD AUTO: 0 % — SIGNIFICANT CHANGE UP (ref 0–2)
BUN SERPL-MCNC: 83 MG/DL — HIGH (ref 7–23)
CALCIUM SERPL-MCNC: 9.1 MG/DL — SIGNIFICANT CHANGE UP (ref 8.4–10.5)
CHLORIDE SERPL-SCNC: 104 MMOL/L — SIGNIFICANT CHANGE UP (ref 98–107)
CO2 SERPL-SCNC: 25 MMOL/L — SIGNIFICANT CHANGE UP (ref 22–31)
CREAT SERPL-MCNC: 1.83 MG/DL — HIGH (ref 0.5–1.3)
CULTURE RESULTS: ABNORMAL
EGFR: 35 ML/MIN/1.73M2 — LOW
EGFR: 35 ML/MIN/1.73M2 — LOW
EOSINOPHIL # BLD AUTO: 0.12 K/UL — SIGNIFICANT CHANGE UP (ref 0–0.5)
EOSINOPHIL NFR BLD AUTO: 0.9 % — SIGNIFICANT CHANGE UP (ref 0–6)
GIANT PLATELETS BLD QL SMEAR: PRESENT — SIGNIFICANT CHANGE UP
GLUCOSE SERPL-MCNC: 118 MG/DL — HIGH (ref 70–99)
HCT VFR BLD CALC: 30.3 % — LOW (ref 39–50)
HGB BLD-MCNC: 10 G/DL — LOW (ref 13–17)
IANC: 9.89 K/UL — HIGH (ref 1.8–7.4)
LYMPHOCYTES # BLD AUTO: 0.61 K/UL — LOW (ref 1–3.3)
LYMPHOCYTES # BLD AUTO: 4.4 % — LOW (ref 13–44)
MAGNESIUM SERPL-MCNC: 2.7 MG/DL — HIGH (ref 1.6–2.6)
MANUAL SMEAR VERIFICATION: SIGNIFICANT CHANGE UP
MCHC RBC-ENTMCNC: 27.6 PG — SIGNIFICANT CHANGE UP (ref 27–34)
MCHC RBC-ENTMCNC: 33 G/DL — SIGNIFICANT CHANGE UP (ref 32–36)
MCV RBC AUTO: 83.7 FL — SIGNIFICANT CHANGE UP (ref 80–100)
METHOD TYPE: SIGNIFICANT CHANGE UP
MONOCYTES # BLD AUTO: 1.47 K/UL — HIGH (ref 0–0.9)
MONOCYTES NFR BLD AUTO: 10.6 % — SIGNIFICANT CHANGE UP (ref 2–14)
NEUTROPHILS # BLD AUTO: 11.63 K/UL — HIGH (ref 1.8–7.4)
NEUTROPHILS NFR BLD AUTO: 84.1 % — HIGH (ref 43–77)
ORGANISM # SPEC MICROSCOPIC CNT: ABNORMAL
OVALOCYTES BLD QL SMEAR: SLIGHT — SIGNIFICANT CHANGE UP
PHOSPHATE SERPL-MCNC: 3.5 MG/DL — SIGNIFICANT CHANGE UP (ref 2.5–4.5)
PLAT MORPH BLD: ABNORMAL
PLATELET # BLD AUTO: 241 K/UL — SIGNIFICANT CHANGE UP (ref 150–400)
PLATELET COUNT - ESTIMATE: NORMAL — SIGNIFICANT CHANGE UP
POIKILOCYTOSIS BLD QL AUTO: SLIGHT — SIGNIFICANT CHANGE UP
POLYCHROMASIA BLD QL SMEAR: SLIGHT — SIGNIFICANT CHANGE UP
POTASSIUM SERPL-MCNC: 4 MMOL/L — SIGNIFICANT CHANGE UP (ref 3.5–5.3)
POTASSIUM SERPL-SCNC: 4 MMOL/L — SIGNIFICANT CHANGE UP (ref 3.5–5.3)
RBC # BLD: 3.62 M/UL — LOW (ref 4.2–5.8)
RBC # FLD: 15.4 % — HIGH (ref 10.3–14.5)
RBC BLD AUTO: ABNORMAL
SMUDGE CELLS # BLD: PRESENT — SIGNIFICANT CHANGE UP
SODIUM SERPL-SCNC: 139 MMOL/L — SIGNIFICANT CHANGE UP (ref 135–145)
SPECIMEN SOURCE: SIGNIFICANT CHANGE UP
WBC # BLD: 13.83 K/UL — HIGH (ref 3.8–10.5)
WBC # FLD AUTO: 13.83 K/UL — HIGH (ref 3.8–10.5)

## 2025-05-13 PROCEDURE — 72146 MRI CHEST SPINE W/O DYE: CPT | Mod: 26

## 2025-05-13 PROCEDURE — 99233 SBSQ HOSP IP/OBS HIGH 50: CPT | Mod: GC

## 2025-05-13 PROCEDURE — 72141 MRI NECK SPINE W/O DYE: CPT | Mod: 26

## 2025-05-13 PROCEDURE — 72148 MRI LUMBAR SPINE W/O DYE: CPT | Mod: 26

## 2025-05-13 PROCEDURE — G0545: CPT

## 2025-05-13 PROCEDURE — 99222 1ST HOSP IP/OBS MODERATE 55: CPT | Mod: GC

## 2025-05-13 RX ORDER — HYDROMORPHONE/SOD CHLOR,ISO/PF 2 MG/10 ML
1 SYRINGE (ML) INJECTION ONCE
Refills: 0 | Status: DISCONTINUED | OUTPATIENT
Start: 2025-05-13 | End: 2025-05-13

## 2025-05-13 RX ORDER — ACETAMINOPHEN 500 MG/5ML
1000 LIQUID (ML) ORAL ONCE
Refills: 0 | Status: COMPLETED | OUTPATIENT
Start: 2025-05-13 | End: 2025-05-13

## 2025-05-13 RX ORDER — CEFTRIAXONE 500 MG/1
2000 INJECTION, POWDER, FOR SOLUTION INTRAMUSCULAR; INTRAVENOUS EVERY 24 HOURS
Refills: 0 | Status: DISCONTINUED | OUTPATIENT
Start: 2025-05-13 | End: 2025-05-13

## 2025-05-13 RX ORDER — HYDROMORPHONE/SOD CHLOR,ISO/PF 2 MG/10 ML
0.5 SYRINGE (ML) INJECTION ONCE
Refills: 0 | Status: DISCONTINUED | OUTPATIENT
Start: 2025-05-13 | End: 2025-05-13

## 2025-05-13 RX ORDER — CEFTRIAXONE 500 MG/1
2000 INJECTION, POWDER, FOR SOLUTION INTRAMUSCULAR; INTRAVENOUS EVERY 12 HOURS
Refills: 0 | Status: DISCONTINUED | OUTPATIENT
Start: 2025-05-13 | End: 2025-05-20

## 2025-05-13 RX ORDER — AMPICILLIN SODIUM 1 G/1
2 INJECTION, POWDER, FOR SOLUTION INTRAMUSCULAR; INTRAVENOUS EVERY 6 HOURS
Refills: 0 | Status: DISCONTINUED | OUTPATIENT
Start: 2025-05-13 | End: 2025-05-19

## 2025-05-13 RX ADMIN — Medication 0.5 MILLIGRAM(S): at 14:32

## 2025-05-13 RX ADMIN — Medication 1000 MILLIGRAM(S): at 17:50

## 2025-05-13 RX ADMIN — Medication 0.5 MILLIGRAM(S): at 09:31

## 2025-05-13 RX ADMIN — Medication 0.2 MILLIGRAM(S): at 14:38

## 2025-05-13 RX ADMIN — Medication 0.2 MILLIGRAM(S): at 22:39

## 2025-05-13 RX ADMIN — Medication 0.2 MILLIGRAM(S): at 05:25

## 2025-05-13 RX ADMIN — INSULIN LISPRO 8 UNIT(S): 100 INJECTION, SOLUTION INTRAVENOUS; SUBCUTANEOUS at 09:30

## 2025-05-13 RX ADMIN — Medication 100 MILLIGRAM(S): at 22:39

## 2025-05-13 RX ADMIN — Medication 0.5 MILLIGRAM(S): at 17:50

## 2025-05-13 RX ADMIN — INSULIN GLARGINE-YFGN 24 UNIT(S): 100 INJECTION, SOLUTION SUBCUTANEOUS at 21:22

## 2025-05-13 RX ADMIN — Medication 30 MILLIGRAM(S): at 05:26

## 2025-05-13 RX ADMIN — Medication 100 MILLIGRAM(S): at 14:38

## 2025-05-13 RX ADMIN — ROSUVASTATIN CALCIUM 10 MILLIGRAM(S): 5 TABLET, FILM COATED ORAL at 21:57

## 2025-05-13 RX ADMIN — INSULIN LISPRO 8 UNIT(S): 100 INJECTION, SOLUTION INTRAVENOUS; SUBCUTANEOUS at 13:25

## 2025-05-13 RX ADMIN — APIXABAN 2.5 MILLIGRAM(S): 2.5 TABLET, FILM COATED ORAL at 16:51

## 2025-05-13 RX ADMIN — Medication 1 MILLIGRAM(S): at 14:38

## 2025-05-13 RX ADMIN — Medication 400 MILLIGRAM(S): at 16:51

## 2025-05-13 RX ADMIN — APIXABAN 2.5 MILLIGRAM(S): 2.5 TABLET, FILM COATED ORAL at 05:26

## 2025-05-13 RX ADMIN — Medication 81 MILLIGRAM(S): at 13:33

## 2025-05-13 RX ADMIN — Medication 0.5 MILLIGRAM(S): at 10:31

## 2025-05-13 RX ADMIN — Medication 0.5 MILLIGRAM(S): at 16:50

## 2025-05-13 RX ADMIN — AMPICILLIN SODIUM 200 GRAM(S): 1 INJECTION, POWDER, FOR SOLUTION INTRAMUSCULAR; INTRAVENOUS at 05:31

## 2025-05-13 RX ADMIN — TAMSULOSIN HYDROCHLORIDE 0.4 MILLIGRAM(S): 0.4 CAPSULE ORAL at 21:57

## 2025-05-13 RX ADMIN — AMPICILLIN SODIUM 200 GRAM(S): 1 INJECTION, POWDER, FOR SOLUTION INTRAMUSCULAR; INTRAVENOUS at 23:40

## 2025-05-13 RX ADMIN — Medication 0.5 MILLIGRAM(S): at 13:32

## 2025-05-13 RX ADMIN — Medication 1 APPLICATION(S): at 13:34

## 2025-05-13 RX ADMIN — INSULIN LISPRO 2: 100 INJECTION, SOLUTION INTRAVENOUS; SUBCUTANEOUS at 09:30

## 2025-05-13 RX ADMIN — Medication 0.5 MILLIGRAM(S): at 04:28

## 2025-05-13 RX ADMIN — Medication 100 MILLIGRAM(S): at 05:26

## 2025-05-13 RX ADMIN — Medication 150 MICROGRAM(S): at 05:26

## 2025-05-13 RX ADMIN — Medication 120 MILLIGRAM(S): at 05:27

## 2025-05-13 RX ADMIN — AMPICILLIN SODIUM 200 GRAM(S): 1 INJECTION, POWDER, FOR SOLUTION INTRAMUSCULAR; INTRAVENOUS at 13:33

## 2025-05-13 NOTE — CONSULT NOTE ADULT - SUBJECTIVE AND OBJECTIVE BOX
Patient is an 85-year-old male with past medical history significant for HTN, HLD, DM2, hypothyroidism,  prostate cancer s/p prostatectomy, CKD, TIA, pacemaker in place transferred from [facility] for further management of bacteremia. Patient intiially presented to [facility] on 5/6 for acute onset of sharp lower back pain  who presents with low back pain that began at 1:30 a.m. Patient notes that he was going to the bathroom when he starting having a sharp pain in lower back bilaterally. Patient states the pain is worse when he is walking or moving. Patient endorses a 10/10 pain. Patient unable to eat or sleep due to the pain. Patient states he has never had back pain like this before. Patient normally ambulates with a walker or with a cane. Of note, patient endorses a fall in February where he fell into a pile of snow. Patient denies any injury after that event and did not have any imaging at that time. Patient denies trouble with bladder or bowel. Patient endorses that he usually is constipated and only has a bowel movement every three days (last BM yesterday). Patient denies pain shooting down from his back to his legs. Patient does endorse diabetic neuropathy, pins and needles sensation in bilateral lower extremity. Patient endorses an allergy to gabapentin where his feet went completely numb. Patient denies fevers, but endorses an episode of chills on Sunday. Patient denies abdominal pain.     On presentation:   VSS  Labs: CBC with leukocytosis to 15.49. CMP with creatinine 2.40, TB 1.4. U/A with 10 WBCs.   CT lumbar spine: 1. Evidence of L3-L4 disc bulge, resulting in severe central canal,   bilateral lateral recess and moderate bilateral neural foramen stenosis with facet arthrosis and ligamentum flavum hypertrophy. 2. Evidence of L4-L5 left paracentral-foraminal disc protrusion superimposed upon a disc bulge, resulting in severe central canal,   bilateral lateral recess, severe left and moderate right neural foramen stenosis with facet arthrosis and ligamentum flavum hypertrophy, likely impinging upon the left L4 nerve roots. 3. Multiple mild lumbar vertebral body compression deformities, with a  suggestion of paravertebral stranding at the level of the L5 superior  endplate. No retropulsion. A recent compression fracture cannot be excluded. If there are no medical contraindications, correlation with   noncontrast MRI of the lumbar spine is recommended.4. Additional findings, including those degenerative, described in detail above.    Hospital course at [facility] also complicated by acute urinary retention for which wong was ultimately placed (first straight cath performed on 5/9) and imagign was ordered: US kidney bladder was ordered showing Very distended urinary bladder. Please correlate for urinary retention. Mild left hydronephrosis. Hypoechoic lesion in the right lower pole, measuring 1.4 cm without demonstrable internal vascularity, possibly complex cyst. Follow up CT abdomen pelvis was ordered: Nonspecific gastric distention. Colonic distention with stool and air. Probable pancreatic cystic lesions and indeterminant right adrenal nodule which can be further evaluated on MRI. Partially visualized heterogeneous sclerotic lesion in the left proximal femur. Mild L5 superior endplate compression deformity. Please see recent lumbar   spine CT report for further details.    Orhto consulted- recommended TLSO brace and outpatient follow up however after urinary retention episode,r ecommended MRI T/L/S spine further evaluation fo cord injury.  However unable to obtain MRI for further evaluation of spine given PPM so patient transferred to [facility] for othopedic spinel evaluation.     On presentation here, patient noted to be hypoxic requiring hi flow NC.     Lans notable for leukocytosis to 18.45 and repeat U/;A with 31 WBCs. 1 set of blood culture sent on 5/11 and 2 sets on 5/12 with growth of e. fecalis.     Abx:   Abx: ampicillin 2 g IVPB q8h ( 5/12-)       REVIEW OF SYSTEMS  pending full examination    prior hospital charts reviewed [V]  primary team notes reviewed [V]  other consultant notes reviewed [V]    PAST MEDICAL & SURGICAL HISTORY:  HTN (hypertension)      HLD (hyperlipidemia)      DM (diabetes mellitus)      TIA (transient ischemic attack)      Prostate cancer      S/P prostatectomy      Pacemaker      H/O thyroidectomy          SOCIAL HISTORY:  Denied smoking/vaping/alcohol/recreational drug use    FAMILY HISTORY:  FHx: heart disease (Father, Mother)        Allergies  gabapentin (Other)        ANTIMICROBIALS:  ampicillin  IVPB 2 every 8 hours      ANTIMICROBIALS (past 90 days):  MEDICATIONS  (STANDING):    ampicillin  IVPB   200 mL/Hr IV Intermittent (05-13-25 @ 05:31)   200 mL/Hr IV Intermittent (05-12-25 @ 23:24)   200 mL/Hr IV Intermittent (05-12-25 @ 13:20)        OTHER MEDS:   MEDICATIONS  (STANDING):  apixaban 2.5 two times a day  aspirin  chewable 81 daily  cloNIDine 0.2 three times a day  dextrose 50% Injectable 25 once  dextrose 50% Injectable 12.5 once  dextrose 50% Injectable 25 once  dextrose Oral Gel 15 once  glucagon  Injectable 1 once  hydrALAZINE 100 three times a day  HYDROmorphone  Injectable 0.5 every 4 hours PRN  insulin glargine Injectable (LANTUS) 24 at bedtime  insulin lispro (ADMELOG) corrective regimen sliding scale  three times a day before meals  insulin lispro (ADMELOG) corrective regimen sliding scale  at bedtime  insulin lispro Injectable (ADMELOG) 8 three times a day before meals  levothyroxine 150 daily  NIFEdipine XL 30 daily  propranolol  daily  rosuvastatin 10 at bedtime  tamsulosin 0.4 at bedtime      VITALS:  Vital Signs Last 24 Hrs  T(F): 97.4 (05-13-25 @ 05:30), Max: 98.9 (05-06-25 @ 22:29)    Vital Signs Last 24 Hrs  HR: 60 (05-13-25 @ 09:36) (60 - 94)  BP: 140/46 (05-13-25 @ 09:36) (123/53 - 154/54)  RR: 18 (05-13-25 @ 09:36)  SpO2: 94% (05-13-25 @ 09:36) (94% - 97%)  Wt(kg): --    EXAM:  pending full examination      Labs:                        10.0   13.83 )-----------( 241      ( 13 May 2025 03:30 )             30.3     05-13    139  |  104  |  83[H]  ----------------------------<  118[H]  4.0   |  25  |  1.83[H]    Ca    9.1      13 May 2025 03:30  Phos  3.5     05-13  Mg     2.70     05-13        WBC Trend:  WBC Count: 13.83 (05-13-25 @ 03:30)  WBC Count: 15.78 (05-12-25 @ 06:00)  WBC Count: 18.45 (05-11-25 @ 06:30)  WBC Count: 16.22 (05-10-25 @ 07:55)      Auto Neutrophil #: 9.58 K/uL (11-09-24 @ 20:23)      Creatine Trend:  Creatinine: 1.83 (05-13)  Creatinine: 1.98 (05-12)  Creatinine: 2.12 (05-11)  Creatinine: 2.27 (05-10)      Liver Biochemical Testing Trend:  Alanine Aminotransferase (ALT/SGPT): 14 (05-10)  Alanine Aminotransferase (ALT/SGPT): 14 (05-09)  Alanine Aminotransferase (ALT/SGPT): 16 (05-08)  Alanine Aminotransferase (ALT/SGPT): 12 (05-07)  Alanine Aminotransferase (ALT/SGPT): 21 (05-06)  Aspartate Aminotransferase (AST/SGOT): 15 (05-10-25 @ 07:55)  Aspartate Aminotransferase (AST/SGOT): 12 (05-09-25 @ 07:15)  Aspartate Aminotransferase (AST/SGOT): 17 (05-08-25 @ 06:42)  Aspartate Aminotransferase (AST/SGOT): 15 (05-07-25 @ 06:03)  Aspartate Aminotransferase (AST/SGOT): 18 (05-06-25 @ 14:45)  Bilirubin Total: 1.0 (05-10)  Bilirubin Total: 0.7 (05-09)  Bilirubin Total: 0.9 (05-08)  Bilirubin Total: 1.4 (05-07)  Bilirubin Total: 1.4 (05-06)      Trend LDH          MICROBIOLOGY:        Culture - Blood (collected 12 May 2025 07:12)  Source: Blood Blood-Peripheral  Preliminary Report:    Growth in aerobic bottle: Gram Positive Cocci in Pairs and Chains    Growth in anaerobic bottle: Gram Positive Cocci in Pairs and Chains    Culture - Blood (collected 12 May 2025 07:07)  Source: Blood Blood-Peripheral  Preliminary Report:    Growth in aerobic bottle: Gram Positive Cocci in Pairs and Chains    Growth in anaerobic bottle: Gram Positive Cocci in Pairs and Chains    Culture - Blood (collected 11 May 2025 14:27)  Source: Blood Blood-Peripheral  Preliminary Report:    Growth in aerobic bottle: Gram Positive Cocci in Pairs and Chains    Growth in anaerobic bottle: Gram Positive Cocci in Pairs and Chains    Direct identification is available within approximately 3-5    hours either by Blood Panel Multiplexed PCR or Direct    MALDI-TOF. Details: [email or web address]  Organism: Blood Culture PCR  Organism: Blood Culture PCR    Sensitivities:      Method Type: PCR      -  Enterococcus faecalis: Detec                                            Troponin T, High Sensitivity Result: 26 (05-11)    Lactate, Blood: 1.0 (05-11 @ 14:27)  Blood Gas Arterial, Lactate: 1.0 (05-11 @ 14:27)    A1C with Estimated Average Glucose Result: 6.9 % (05-07-25 @ 06:03)      RADIOLOGY:  imaging below personally reviewed   Patient is an 85-year-old male with past medical history significant for HTN, HLD, DM2, hypothyroidism, prostate cancer s/p prostatectomy, CKD, TIA, pacemaker placed in 5/2024 for abnormal arrhythymia per patient transferred from [facility] for further workup of possiole spinal cord injury.  Patient intiially presented to [facility] on 5/6 for acute onset of sharp lower back pain that occured while turning in bed to go to the bathroom. Patient endorses an episode of chills 2 days prior that self resolved but denies prior fevers, night sweats, chest pain, trouble breathing, abdominal pain, nausea, vomiting, urinary sx, diarrhea, new skin wounds or rashes. No other prosthetic devices or hardware other than pacemaker. No recent surgical procedures or injections. No dental pain or recent dental manipulations.     On presentation:   VSS  Labs: CBC with leukocytosis to 15.49 (had normal labs in 12/2024). CMP with creatinine 2.40, TB 1.4. U/A with 10 WBCs.   CT lumbar spine: 1. Evidence of L3-L4 disc bulge, resulting in severe central canal, bilateral lateral recess and moderate bilateral neural foramen stenosis with facet arthrosis and ligamentum flavum hypertrophy. 2. Evidence of L4-L5 left paracentral-foraminal disc protrusion superimposed upon a disc bulge, resulting in severe central canal, bilateral lateral recess, severe left and moderate right neural foramen stenosis with facet arthrosis and ligamentum flavum hypertrophy, likely impinging upon the left L4 nerve roots. 3. Multiple mild lumbar vertebral body compression deformities, with a  suggestion of paravertebral stranding at the level of the L5 superior  endplate. No retropulsion. A recent compression fracture cannot be excluded. If there are no medical contraindications, correlation with noncontrast MRI of the lumbar spine is recommended.4. Additional findings, including those degenerative, described in detail above.    Hospital course at [facility] also complicated by acute urinary retention for which wong was ultimately placed (first straight cath performed on 5/9) and imagign was ordered: US kidney bladder was ordered showing Very distended urinary bladder. Please correlate for urinary retention. Mild left hydronephrosis. Hypoechoic lesion in the right lower pole, measuring 1.4 cm without demonstrable internal vascularity, possibly complex cyst. Follow up CT abdomen pelvis was ordered: Nonspecific gastric distention. Colonic distention with stool and air. Probable pancreatic cystic lesions and indeterminant right adrenal nodule which can be further evaluated on MRI. Partially visualized heterogeneous sclerotic lesion in the left proximal femur. Mild L5 superior endplate compression deformity. Please see recent lumbar   spine CT report for further details.    Orhto consulted- recommended TLSO brace and outpatient follow up however after urinary retention episode,r ecommended MRI T/L/S spine further evaluation fo cord injury.  However unable to obtain MRI for further evaluation of spine given PPM so patient transferred to [facility] for orthopedic spinel evaluation.     On presentation here, patient noted to be hypoxic requiring hi flow NC now on 2 L NC. Patient states he was never feeling short of breath.     5/11 CXR: Clear lungs.  5/11 VQ scan: very low probability of PE.     Labs notable for leukocytosis to 18.45 and repeat U/A with 31 WBCs. 1 set of blood culture sent on 5/11 and 2 sets on 5/12 with growth of e. fecalis. Blood cultures drawn for leukocytosis.     Abx:   ampicillin 2 g IVPB q8h ( 5/12-)       REVIEW OF SYSTEMS  Constitutional: No fevers, No chills  Respiratory: No cough, no SOB  Cardiovascular:  No chest pain, No palpitations   Gastrointestinal: No pain, No nausea, No vomiting, No diarrhea, No constipation	  Genitourinary: No dysuria, No frequency, No hesitancy, No flank pain  MSK: No Joint pain, + back pain, No edema  Neurological: No HA, no weakness, no seizures, no AMS     prior hospital charts reviewed [V]  primary team notes reviewed [V]  other consultant notes reviewed [V]    PAST MEDICAL & SURGICAL HISTORY:  HTN (hypertension)      HLD (hyperlipidemia)      DM (diabetes mellitus)      TIA (transient ischemic attack)      Prostate cancer      S/P prostatectomy      Pacemaker      H/O thyroidectomy          SOCIAL HISTORY:  +former smoker   -denies etoh use or drug use  -born in the [place]  -no pets at home     FAMILY HISTORY:  FHx: heart disease (Father, Mother) and diabetes         Allergies  gabapentin (Other)        ANTIMICROBIALS:  ampicillin  IVPB 2 every 8 hours      ANTIMICROBIALS (past 90 days):  MEDICATIONS  (STANDING):    ampicillin  IVPB   200 mL/Hr IV Intermittent (05-13-25 @ 05:31)   200 mL/Hr IV Intermittent (05-12-25 @ 23:24)   200 mL/Hr IV Intermittent (05-12-25 @ 13:20)        OTHER MEDS:   MEDICATIONS  (STANDING):  apixaban 2.5 two times a day  aspirin  chewable 81 daily  cloNIDine 0.2 three times a day  dextrose 50% Injectable 25 once  dextrose 50% Injectable 12.5 once  dextrose 50% Injectable 25 once  dextrose Oral Gel 15 once  glucagon  Injectable 1 once  hydrALAZINE 100 three times a day  HYDROmorphone  Injectable 0.5 every 4 hours PRN  insulin glargine Injectable (LANTUS) 24 at bedtime  insulin lispro (ADMELOG) corrective regimen sliding scale  three times a day before meals  insulin lispro (ADMELOG) corrective regimen sliding scale  at bedtime  insulin lispro Injectable (ADMELOG) 8 three times a day before meals  levothyroxine 150 daily  NIFEdipine XL 30 daily  propranolol  daily  rosuvastatin 10 at bedtime  tamsulosin 0.4 at bedtime      VITALS:  Vital Signs Last 24 Hrs  T(F): 97.4 (05-13-25 @ 05:30), Max: 98.9 (05-06-25 @ 22:29)    Vital Signs Last 24 Hrs  HR: 60 (05-13-25 @ 09:36) (60 - 94)  BP: 140/46 (05-13-25 @ 09:36) (123/53 - 154/54)  RR: 18 (05-13-25 @ 09:36)  SpO2: 94% (05-13-25 @ 09:36) (94% - 97%)  Wt(kg): --    EXAM:  General: Patient appears comfortable, no acute distress  HEENT: NCAT. poor dentition noted.   CV: +PPM in place with site c/d/i.   Lungs: No respiratory distress, CTA b/l, no wheezing, rales or rhonchi  Abd:  BS4+, Soft, NTND, no guarding  : No suprapubic tenderness  Neuro: AAOx3. No focal deficits noted. +lower  lumbar midline spinal tenderness. Unable to pinpoint as patient is not able to fully turn.   Ext: No cyanosis, no edema  Msk: freely moving upper and lower extremities  Skin: No rash, no phlebitis, No erythema     Labs:                        10.0   13.83 )-----------( 241      ( 13 May 2025 03:30 )             30.3     05-13    139  |  104  |  83[H]  ----------------------------<  118[H]  4.0   |  25  |  1.83[H]    Ca    9.1      13 May 2025 03:30  Phos  3.5     05-13  Mg     2.70     05-13        WBC Trend:  WBC Count: 13.83 (05-13-25 @ 03:30)  WBC Count: 15.78 (05-12-25 @ 06:00)  WBC Count: 18.45 (05-11-25 @ 06:30)  WBC Count: 16.22 (05-10-25 @ 07:55)      Auto Neutrophil #: 9.58 K/uL (11-09-24 @ 20:23)      Creatine Trend:  Creatinine: 1.83 (05-13)  Creatinine: 1.98 (05-12)  Creatinine: 2.12 (05-11)  Creatinine: 2.27 (05-10)      Liver Biochemical Testing Trend:  Alanine Aminotransferase (ALT/SGPT): 14 (05-10)  Alanine Aminotransferase (ALT/SGPT): 14 (05-09)  Alanine Aminotransferase (ALT/SGPT): 16 (05-08)  Alanine Aminotransferase (ALT/SGPT): 12 (05-07)  Alanine Aminotransferase (ALT/SGPT): 21 (05-06)  Aspartate Aminotransferase (AST/SGOT): 15 (05-10-25 @ 07:55)  Aspartate Aminotransferase (AST/SGOT): 12 (05-09-25 @ 07:15)  Aspartate Aminotransferase (AST/SGOT): 17 (05-08-25 @ 06:42)  Aspartate Aminotransferase (AST/SGOT): 15 (05-07-25 @ 06:03)  Aspartate Aminotransferase (AST/SGOT): 18 (05-06-25 @ 14:45)  Bilirubin Total: 1.0 (05-10)  Bilirubin Total: 0.7 (05-09)  Bilirubin Total: 0.9 (05-08)  Bilirubin Total: 1.4 (05-07)  Bilirubin Total: 1.4 (05-06)      Trend LDH          MICROBIOLOGY:        Culture - Blood (collected 12 May 2025 07:12)  Source: Blood Blood-Peripheral  Preliminary Report:    Growth in aerobic bottle: Gram Positive Cocci in Pairs and Chains    Growth in anaerobic bottle: Gram Positive Cocci in Pairs and Chains    Culture - Blood (collected 12 May 2025 07:07)  Source: Blood Blood-Peripheral  Preliminary Report:    Growth in aerobic bottle: Gram Positive Cocci in Pairs and Chains    Growth in anaerobic bottle: Gram Positive Cocci in Pairs and Chains    Culture - Blood (collected 11 May 2025 14:27)  Source: Blood Blood-Peripheral  Preliminary Report:    Growth in aerobic bottle: Gram Positive Cocci in Pairs and Chains    Growth in anaerobic bottle: Gram Positive Cocci in Pairs and Chains    Direct identification is available within approximately 3-5    hours either by Blood Panel Multiplexed PCR or Direct    MALDI-TOF. Details: [email or web address]  Organism: Blood Culture PCR  Organism: Blood Culture PCR    Sensitivities:      Method Type: PCR      -  Enterococcus faecalis: Detec                                            Troponin T, High Sensitivity Result: 26 (05-11)    Lactate, Blood: 1.0 (05-11 @ 14:27)  Blood Gas Arterial, Lactate: 1.0 (05-11 @ 14:27)    A1C with Estimated Average Glucose Result: 6.9 % (05-07-25 @ 06:03)      RADIOLOGY:    ACC: [id number] EXAM:  CT ABDOMEN AND PELVIS   ORDERED BY:  NAZANIN DOUGLAS     PROCEDURE DATE:  05/10/2025          INTERPRETATION:  CLINICAL INFORMATION: Low back pain. Urinary retention.   Concern for cauda equina syndrome.    COMPARISON: CT lumbar spine 5/6/2025.    CONTRAST/COMPLICATIONS:  IV Contrast: NONE  Oral Contrast: NONE  .    PROCEDURE:  CT of the Abdomen and Pelvis was performed.  Sagittal and coronal reformats were performed.    FINDINGS:  LOWER CHEST: Trace left pleuraleffusion. Partially visualized cardiac   device leads.    LIVER: Within normal limits.  BILE DUCTS: Normal caliber.  GALLBLADDER: Within normal limits.  SPLEEN: Within normal limits.  PANCREAS: Atrophic. 1.5 cm hypodense in the pancreatic body and possibly   in the pancreatic tail cyst, likely cystic lesions. Evaluation is   somewhat degraded by streak artifact and noncontrast technique.  ADRENALS: Indeterminate 1.5 cm right adrenal nodule.  KIDNEYS/URETERS: No renal stones or hydronephrosis. Right renal cyst.    BLADDER: Within normal limits.  REPRODUCTIVE ORGANS: Prostatectomy.    BOWEL: Nonspecific gastric distention. No small bowel obstruction.   Rectum, transverse, and ascending colon distended with air and stool.   Appendix is normal.  PERITONEUM/RETROPERITONEUM: Within normal limits.  VESSELS: Diffuse atherosclerotic calcification.  LYMPH NODES: No lymphadenopathy.  ABDOMINAL WALL: Small right inguinal hernia containing nonobstructed   small bowel.  BONES: Partially visualized heterogeneous sclerotic lesion in the left   femur. Age indeterminant L5 superior endplate compression deformity.    IMPRESSION:  Nonspecific gastric distention. Colonic distention with stool and air.    Probable pancreatic cystic lesions and indeterminant right adrenal nodule   which can be further evaluated on MRI.    Partially visualized heterogeneous sclerotic lesion in the left proximal   femur.    Mild L5 superior endplate compression deformity. Please see recent lumbar   spine CT report for further details.    --- End of Report ---            MELO DUARTE MD; Attending Radiologist  This document has been electronically signed. May 10 2025  5:13PM    ACC: [id number] EXAM:  NM PULM VENTILATION PERFUS IMG   ORDERED BY: YAQUELIN HERRING     PROCEDURE DATE:  05/11/2025          INTERPRETATION:  CLINICAL INFORMATION: 85-year-old male back pain.    Evaluate for pulmonary embolus.    RADIOPHARMACEUTICAL: 1 mCi [id number];  6.6 mCi Tc-99m-MAA, I.V.    TECHNIQUE:  Ventilation and perfusion images of the lungs were obtained   following administration of Tc-99m-DTPA and Tc-99m-MAA. Images were   obtained in the anterior, posterior, both lateral, and all 4 oblique   views. The study was interpreted in conjunction with chest radiograph of   5/11/2025.    COMPARISON: None    OTHER STUDIES USED FOR CORRELATION: None    FINDINGS: Chest radiograph shows no distinct consolidation or opacity    Mild heterogeneity in distribution of radiopharmaceutical in both lung   fields on perfusion and ventilation images. No mismatched perfusion   defect that is compatible with pulmonary embolus.    IMPRESSION: Very low probability of pulmonary embolus.    --- End of Report ---            SUSANNAH CALDERON MD; Attending Radiologist  This document has been electronically signed. May 11 2025  4:40PM     Patient is an 85-year-old male with past medical history significant for HTN, HLD, DM2, hypothyroidism, prostate cancer s/p prostatectomy, CKD, TIA, pacemaker placed in 5/2024 for abnormal arrhythymia per patient transferred from [facility] for further workup of possiole spinal cord injury.  Patient intiially presented to [facility] on 5/6 for acute onset of sharp lower back pain that occured while turning in bed to go to the bathroom. Patient endorses an episode of chills 2 days prior that self resolved but denies prior fevers, night sweats, chest pain, trouble breathing, abdominal pain, nausea, vomiting, urinary sx, diarrhea, new skin wounds or rashes. No other prosthetic devices or hardware other than pacemaker. No recent surgical procedures or injections. No dental pain or recent dental manipulations.     On presentation:   VSS  Labs: CBC with leukocytosis to 15.49 (had normal labs in 12/2024). CMP with creatinine 2.40, TB 1.4. U/A with 10 WBCs.   CT lumbar spine: 1. Evidence of L3-L4 disc bulge, resulting in severe central canal, bilateral lateral recess and moderate bilateral neural foramen stenosis with facet arthrosis and ligamentum flavum hypertrophy. 2. Evidence of L4-L5 left paracentral-foraminal disc protrusion superimposed upon a disc bulge, resulting in severe central canal, bilateral lateral recess, severe left and moderate right neural foramen stenosis with facet arthrosis and ligamentum flavum hypertrophy, likely impinging upon the left L4 nerve roots. 3. Multiple mild lumbar vertebral body compression deformities, with a  suggestion of paravertebral stranding at the level of the L5 superior  endplate. No retropulsion. A recent compression fracture cannot be excluded. If there are no medical contraindications, correlation with noncontrast MRI of the lumbar spine is recommended.4. Additional findings, including those degenerative, described in detail above.    Hospital course at [facility] also complicated by acute urinary retention for which wong was ultimately placed (first straight cath performed on 5/9) and imagign was ordered: US kidney bladder was ordered showing Very distended urinary bladder. Please correlate for urinary retention. Mild left hydronephrosis. Hypoechoic lesion in the right lower pole, measuring 1.4 cm without demonstrable internal vascularity, possibly complex cyst. Follow up CT abdomen pelvis on 5/120 was ordered: Nonspecific gastric distention. Colonic distention with stool and air. Probable pancreatic cystic lesions and indeterminant right adrenal nodule which can be further evaluated on MRI. Partially visualized heterogeneous sclerotic lesion in the left proximal femur. Mild L5 superior endplate compression deformity. Please see recent lumbar   spine CT report for further details.    Orhto consulted- recommended TLSO brace and outpatient follow up however after urinary retention episode,r ecommended MRI T/L/S spine further evaluation fo cord injury.  However unable to obtain MRI for further evaluation of spine given PPM so patient transferred to [facility] for orthopedic spinel evaluation.     On presentation here, patient noted to be hypoxic requiring hi flow NC now on 2 L NC. Patient states he was never feeling short of breath.     5/11 CXR: Clear lungs.  5/11 VQ scan: very low probability of PE.     Labs notable for leukocytosis to 18.45 and repeat U/A with 31 WBCs. 1 set of blood culture sent on 5/11 and 2 sets on 5/12 with growth of e. fecalis. Blood cultures drawn for leukocytosis.     Abx:   ampicillin 2 g IVPB q8h ( 5/12-)       REVIEW OF SYSTEMS  Constitutional: No fevers, No chills  Respiratory: No cough, no SOB  Cardiovascular:  No chest pain, No palpitations   Gastrointestinal: No pain, No nausea, No vomiting, No diarrhea, No constipation	  Genitourinary: No dysuria, No frequency, No hesitancy, No flank pain  MSK: No Joint pain, + back pain, No edema  Neurological: No HA, no weakness, no seizures, no AMS     prior hospital charts reviewed [V]  primary team notes reviewed [V]  other consultant notes reviewed [V]    PAST MEDICAL & SURGICAL HISTORY:  HTN (hypertension)      HLD (hyperlipidemia)      DM (diabetes mellitus)      TIA (transient ischemic attack)      Prostate cancer      S/P prostatectomy      Pacemaker      H/O thyroidectomy          SOCIAL HISTORY:  +former smoker   -denies etoh use or drug use  -born in the [place]  -no pets at home     FAMILY HISTORY:  FHx: heart disease (Father, Mother) and diabetes         Allergies  gabapentin (Other)        ANTIMICROBIALS:  ampicillin  IVPB 2 every 8 hours      ANTIMICROBIALS (past 90 days):  MEDICATIONS  (STANDING):    ampicillin  IVPB   200 mL/Hr IV Intermittent (05-13-25 @ 05:31)   200 mL/Hr IV Intermittent (05-12-25 @ 23:24)   200 mL/Hr IV Intermittent (05-12-25 @ 13:20)        OTHER MEDS:   MEDICATIONS  (STANDING):  apixaban 2.5 two times a day  aspirin  chewable 81 daily  cloNIDine 0.2 three times a day  dextrose 50% Injectable 25 once  dextrose 50% Injectable 12.5 once  dextrose 50% Injectable 25 once  dextrose Oral Gel 15 once  glucagon  Injectable 1 once  hydrALAZINE 100 three times a day  HYDROmorphone  Injectable 0.5 every 4 hours PRN  insulin glargine Injectable (LANTUS) 24 at bedtime  insulin lispro (ADMELOG) corrective regimen sliding scale  three times a day before meals  insulin lispro (ADMELOG) corrective regimen sliding scale  at bedtime  insulin lispro Injectable (ADMELOG) 8 three times a day before meals  levothyroxine 150 daily  NIFEdipine XL 30 daily  propranolol  daily  rosuvastatin 10 at bedtime  tamsulosin 0.4 at bedtime      VITALS:  Vital Signs Last 24 Hrs  T(F): 97.4 (05-13-25 @ 05:30), Max: 98.9 (05-06-25 @ 22:29)    Vital Signs Last 24 Hrs  HR: 60 (05-13-25 @ 09:36) (60 - 94)  BP: 140/46 (05-13-25 @ 09:36) (123/53 - 154/54)  RR: 18 (05-13-25 @ 09:36)  SpO2: 94% (05-13-25 @ 09:36) (94% - 97%)  Wt(kg): --    EXAM:  General: Patient appears comfortable, no acute distress  HEENT: NCAT. poor dentition noted.   CV: +PPM in place with site c/d/i.   Lungs: No respiratory distress, CTA b/l, no wheezing, rales or rhonchi  Abd:  BS4+, Soft, NTND, no guarding  : No suprapubic tenderness  Neuro: AAOx3. No focal deficits noted. +lower  lumbar midline spinal tenderness. Unable to pinpoint as patient is not able to fully turn.   Ext: No cyanosis, no edema  Msk: freely moving upper and lower extremities  Skin: No rash, no phlebitis, No erythema     Labs:                        10.0   13.83 )-----------( 241      ( 13 May 2025 03:30 )             30.3     05-13    139  |  104  |  83[H]  ----------------------------<  118[H]  4.0   |  25  |  1.83[H]    Ca    9.1      13 May 2025 03:30  Phos  3.5     05-13  Mg     2.70     05-13        WBC Trend:  WBC Count: 13.83 (05-13-25 @ 03:30)  WBC Count: 15.78 (05-12-25 @ 06:00)  WBC Count: 18.45 (05-11-25 @ 06:30)  WBC Count: 16.22 (05-10-25 @ 07:55)      Auto Neutrophil #: 9.58 K/uL (11-09-24 @ 20:23)      Creatine Trend:  Creatinine: 1.83 (05-13)  Creatinine: 1.98 (05-12)  Creatinine: 2.12 (05-11)  Creatinine: 2.27 (05-10)      Liver Biochemical Testing Trend:  Alanine Aminotransferase (ALT/SGPT): 14 (05-10)  Alanine Aminotransferase (ALT/SGPT): 14 (05-09)  Alanine Aminotransferase (ALT/SGPT): 16 (05-08)  Alanine Aminotransferase (ALT/SGPT): 12 (05-07)  Alanine Aminotransferase (ALT/SGPT): 21 (05-06)  Aspartate Aminotransferase (AST/SGOT): 15 (05-10-25 @ 07:55)  Aspartate Aminotransferase (AST/SGOT): 12 (05-09-25 @ 07:15)  Aspartate Aminotransferase (AST/SGOT): 17 (05-08-25 @ 06:42)  Aspartate Aminotransferase (AST/SGOT): 15 (05-07-25 @ 06:03)  Aspartate Aminotransferase (AST/SGOT): 18 (05-06-25 @ 14:45)  Bilirubin Total: 1.0 (05-10)  Bilirubin Total: 0.7 (05-09)  Bilirubin Total: 0.9 (05-08)  Bilirubin Total: 1.4 (05-07)  Bilirubin Total: 1.4 (05-06)      Trend LDH          MICROBIOLOGY:        Culture - Blood (collected 12 May 2025 07:12)  Source: Blood Blood-Peripheral  Preliminary Report:    Growth in aerobic bottle: Gram Positive Cocci in Pairs and Chains    Growth in anaerobic bottle: Gram Positive Cocci in Pairs and Chains    Culture - Blood (collected 12 May 2025 07:07)  Source: Blood Blood-Peripheral  Preliminary Report:    Growth in aerobic bottle: Gram Positive Cocci in Pairs and Chains    Growth in anaerobic bottle: Gram Positive Cocci in Pairs and Chains    Culture - Blood (collected 11 May 2025 14:27)  Source: Blood Blood-Peripheral  Preliminary Report:    Growth in aerobic bottle: Gram Positive Cocci in Pairs and Chains    Growth in anaerobic bottle: Gram Positive Cocci in Pairs and Chains    Direct identification is available within approximately 3-5    hours either by Blood Panel Multiplexed PCR or Direct    MALDI-TOF. Details: [email or web address]  Organism: Blood Culture PCR  Organism: Blood Culture PCR    Sensitivities:      Method Type: PCR      -  Enterococcus faecalis: Detec                                            Troponin T, High Sensitivity Result: 26 (05-11)    Lactate, Blood: 1.0 (05-11 @ 14:27)  Blood Gas Arterial, Lactate: 1.0 (05-11 @ 14:27)    A1C with Estimated Average Glucose Result: 6.9 % (05-07-25 @ 06:03)      RADIOLOGY:    ACC: [id number] EXAM:  CT ABDOMEN AND PELVIS   ORDERED BY:  NAZANIN DOUGLAS     PROCEDURE DATE:  05/10/2025          INTERPRETATION:  CLINICAL INFORMATION: Low back pain. Urinary retention.   Concern for cauda equina syndrome.    COMPARISON: CT lumbar spine 5/6/2025.    CONTRAST/COMPLICATIONS:  IV Contrast: NONE  Oral Contrast: NONE  .    PROCEDURE:  CT of the Abdomen and Pelvis was performed.  Sagittal and coronal reformats were performed.    FINDINGS:  LOWER CHEST: Trace left pleuraleffusion. Partially visualized cardiac   device leads.    LIVER: Within normal limits.  BILE DUCTS: Normal caliber.  GALLBLADDER: Within normal limits.  SPLEEN: Within normal limits.  PANCREAS: Atrophic. 1.5 cm hypodense in the pancreatic body and possibly   in the pancreatic tail cyst, likely cystic lesions. Evaluation is   somewhat degraded by streak artifact and noncontrast technique.  ADRENALS: Indeterminate 1.5 cm right adrenal nodule.  KIDNEYS/URETERS: No renal stones or hydronephrosis. Right renal cyst.    BLADDER: Within normal limits.  REPRODUCTIVE ORGANS: Prostatectomy.    BOWEL: Nonspecific gastric distention. No small bowel obstruction.   Rectum, transverse, and ascending colon distended with air and stool.   Appendix is normal.  PERITONEUM/RETROPERITONEUM: Within normal limits.  VESSELS: Diffuse atherosclerotic calcification.  LYMPH NODES: No lymphadenopathy.  ABDOMINAL WALL: Small right inguinal hernia containing nonobstructed   small bowel.  BONES: Partially visualized heterogeneous sclerotic lesion in the left   femur. Age indeterminant L5 superior endplate compression deformity.    IMPRESSION:  Nonspecific gastric distention. Colonic distention with stool and air.    Probable pancreatic cystic lesions and indeterminant right adrenal nodule   which can be further evaluated on MRI.    Partially visualized heterogeneous sclerotic lesion in the left proximal   femur.    Mild L5 superior endplate compression deformity. Please see recent lumbar   spine CT report for further details.    --- End of Report ---            MELO DUARTE MD; Attending Radiologist  This document has been electronically signed. May 10 2025  5:13PM    ACC: [id number] EXAM:  NM PULM VENTILATION PERFUS IMG   ORDERED BY: YAQUELIN HERRING     PROCEDURE DATE:  05/11/2025          INTERPRETATION:  CLINICAL INFORMATION: 85-year-old male back pain.    Evaluate for pulmonary embolus.    RADIOPHARMACEUTICAL: 1 mCi [id number];  6.6 mCi Tc-99m-MAA, I.V.    TECHNIQUE:  Ventilation and perfusion images of the lungs were obtained   following administration of Tc-99m-DTPA and Tc-99m-MAA. Images were   obtained in the anterior, posterior, both lateral, and all 4 oblique   views. The study was interpreted in conjunction with chest radiograph of   5/11/2025.    COMPARISON: None    OTHER STUDIES USED FOR CORRELATION: None    FINDINGS: Chest radiograph shows no distinct consolidation or opacity    Mild heterogeneity in distribution of radiopharmaceutical in both lung   fields on perfusion and ventilation images. No mismatched perfusion   defect that is compatible with pulmonary embolus.    IMPRESSION: Very low probability of pulmonary embolus.    --- End of Report ---            SUSANNAH CALDERON MD; Attending Radiologist  This document has been electronically signed. May 11 2025  4:40PM

## 2025-05-13 NOTE — CONSULT NOTE ADULT - ASSESSMENT
Patient is an 85-year-old male with past medical history significant for HTN, HLD, DM2, hypothyroidism, prostate cancer s/p prostatectomy, CKD, TIA, pacemaker placed in 5/2024 for abnormal arrhythymia per patient transferred from [facility] for further workup of possible spinal cord injury. Patient initially presented to [facility] on 5/6 for acute onset of sharp lower back pain.  Patient endorses an episode of chills 2 days prior that self resolved but denies prior fevers, night sweats, chest pain, trouble breathing, abdominal pain, nausea, vomiting, urinary sx, diarrhea, new skin wounds or rashes. No other prosthetic devices or hardware other than pacemaker. No recent surgical procedures or injections. No dental pain or recent dental manipulations.     Patient hemodynamically stable on presentation to [facility]. Labs with leukocytosis to 15.49.     Infectious workup/imaging:   -U/A with 10 WBCs.    -5/11 blood cultures set x 1 and 4/125 blood culture sets x 2 with e. fecalis by PCR  -2 sets on 5/12 with growth of e. fecalis. Blood cultures drawn for leukocytosis.   -CT lumbar spine (5/6) : 1. Evidence of L3-L4 disc bulge, resulting in severe central canal, bilateral lateral recess and moderate bilateral neural foramen stenosis with facet arthrosis and ligamentum flavum hypertrophy. 2. Evidence of L4-L5 left paracentral-foraminal disc protrusion superimposed upon a disc bulge, resulting in severe central canal, bilateral lateral recess, severe left and moderate right neural foramen stenosis with facet arthrosis and ligamentum flavum hypertrophy, likely impinging upon the left L4 nerve roots. 3. Multiple mild lumbar vertebral body compression deformities, with a  suggestion of paravertebral stranding at the level of the L5 superior  endplate. No retropulsion. A recent compression fracture cannot be excluded. If there are no medical contraindications, correlation with noncontrast MRI of the lumbar spine is recommended.4. Additional findings, including those degenerative, described in detail above.  -US kidney bladder ( 5/9): Very distended urinary bladder. Please correlate for urinary retention. Mild left hydronephrosis. Hypoechoic lesion in the right lower pole, measuring 1.4 cm without demonstrable internal vascularity, possibly complex cyst.  -CT abdomen pelvis (5/10): Nonspecific gastric distention. Colonic distention with stool and air. Probable pancreatic cystic lesions and indeterminant right adrenal nodule which can be further evaluated on MRI. Partially visualized heterogeneous sclerotic lesion in the left proximal femur. Mild L5 superior endplate compression deformity. Please see recent lumbar   spine CT report for further details.    Patient transferred from [facility] for further ortho spine evaluation in setting of pacemaker. On arrival to [facility], blood cultures drawn in setting of leukocytosis with growth of e. fecalis.     Orhto consulted- recommended TLSO brace and outpatient follow up however after urinary retention episode,r ecommended MRI T/L/S spine further evaluation fo cord injury.  However unable to obtain MRI for further evaluation of spine given PPM so patient transferred to [facility] for orthopedic spinel evaluation.     On presentation here, patient noted to be hypoxic requiring hi flow NC now on 2 L NC. Patient states he was never feeling short of breath.     5/11 CXR: Clear lungs.  5/11 VQ scan: very low probability of PE.     Labs notable for leukocytosis to 18.45 and repeat U/A with 31 WBCs.  Abx:   ampicillin 2 g IVPB q8h ( 5/12-)    Patient is an 85-year-old male with past medical history significant for HTN, HLD, DM2, hypothyroidism, prostate cancer s/p prostatectomy, CKD, TIA, pacemaker placed in 5/2024 for abnormal arrhythymia per patient transferred from [facility] for further workup of possible spinal cord injury. Patient initially presented to [facility] on 5/6 for acute onset of sharp lower back pain.  Patient endorses an episode of chills 2 days prior that self resolved but denies prior fevers, night sweats, chest pain, trouble breathing, abdominal pain, nausea, vomiting, urinary sx, diarrhea, new skin wounds or rashes. No other prosthetic devices or hardware other than pacemaker. No recent surgical procedures or injections. No dental pain or recent dental manipulations.     Patient hemodynamically stable on presentation to [facility]. Labs with leukocytosis to 15.49.     Infectious workup/imaging:   -U/A with 10 WBCs.    -5/11 blood cultures set x 1 and 4/125 blood culture sets x 2 with e. fecalis by PCR  -2 sets on 5/12 with growth of e. fecalis. Blood cultures drawn for leukocytosis.   -CT lumbar spine (5/6) : 1. Evidence of L3-L4 disc bulge, resulting in severe central canal, bilateral lateral recess and moderate bilateral neural foramen stenosis with facet arthrosis and ligamentum flavum hypertrophy. 2. Evidence of L4-L5 left paracentral-foraminal disc protrusion superimposed upon a disc bulge, resulting in severe central canal, bilateral lateral recess, severe left and moderate right neural foramen stenosis with facet arthrosis and ligamentum flavum hypertrophy, likely impinging upon the left L4 nerve roots. 3. Multiple mild lumbar vertebral body compression deformities, with a  suggestion of paravertebral stranding at the level of the L5 superior  endplate. No retropulsion. A recent compression fracture cannot be excluded. If there are no medical contraindications, correlation with noncontrast MRI of the lumbar spine is recommended.4. Additional findings, including those degenerative, described in detail above.  -US kidney bladder ( 5/9): Very distended urinary bladder. Please correlate for urinary retention. Mild left hydronephrosis. Hypoechoic lesion in the right lower pole, measuring 1.4 cm without demonstrable internal vascularity, possibly complex cyst.  -CT abdomen pelvis (5/10): Nonspecific gastric distention. Colonic distention with stool and air. Probable pancreatic cystic lesions and indeterminant right adrenal nodule which can be further evaluated on MRI. Partially visualized heterogeneous sclerotic lesion in the left proximal femur. Mild L5 superior endplate compression deformity. Please see recent lumbar   spine CT report for further details.  -CXR (5/11): Clear lungs.  -VQ scan (5/11): very low probability of PE.   -5/12 MRI CTL spine: 1. Incomplete thoracic and lumbar examinations. 2. C4-C5 disc bulge, effacing the subarachnoid space and flattening the ventral cord, resulting in moderate central canal, severe right and moderate left neural foramen stenosis with uncovertebral spurring, facet arthrosis and ligamentum flavum hypertrophy. 3. C5-C6 disc bulge with left paracentral annular tear, effacing the subarachnoid space and flattening the ventral cord, resulting in moderate central canal, severe right and moderate left neural foramen stenosis with uncovertebral spurring, facet arthrosis and ligamentum flavum   hypertrophy.4. Evidence of a T8-T9 left paracentral disc protrusion, flattening the left ventral cord, incompletely characterized in the absence of axial imaging. 5. No gross abnormal cord signal, cord edema or atrophy, within the   limitations of this exam.    Patient transferred from [facility] for further ortho spine evaluation in setting of pacemaker. On arrival to [facility], blood cultures drawn in setting of leukocytosis with growth of e. fecalis (seen above). Patient afebrile throughout entire hospital course. Noted to have acute urinary retention while at [facility] for which he had multiple straight caths and eventually wong placement however continued to have leukocytosis during hospital course before straight cath placed. Patient has ppm in place which was placed in 5/2024. No concern for PPM infection clinically.     Abx:   ampicillin 2 g IVPB q8h ( 5/12-)     #leukocytosis  #e. fecalis bacteremia   -unclear source of bacteremia at this time based on imaging and clinical presentation however will cover empirically for endocarditis in setting of pacemaker in place.   -increase ampicillin to 2 g IVPB q6h based on currently creatine clearance and also add Ceftriaxone 2 g IVPB q12h.   -obtain TTE   -obtain EP evaluation in setting of pacemaker and e. fecalis bacteremia for input on device removal and MARIA LUISA   -obtain 2 sets of repeat blood cultures tomorrow  -f/u all culture data  -monitor WBC and fever curve     Case seen and discussed with Dr. Falk who agrees with assessment and plan. Note not final until attending addendum.

## 2025-05-13 NOTE — PROVIDER CONTACT NOTE (OTHER) - SITUATION
pt unable to tolerate MRI; tolerated about 1/2 (30 minutes) before hit call bell in machine and insisted on coming out.  complaining of noise; hard surface; pain.

## 2025-05-13 NOTE — PROGRESS NOTE ADULT - ATTENDING COMMENTS
6 y/o M with HTN, HLD, DM type 2, CKD, TIA and prostate ca s/p prostatectomy transferred from [place] where he presented for back pain.    Afebrile  WBC 13  5/11: Bcx Enterococcus  5/12: BCx; GPC chains    LE sensation intact,  LE strength 2/5 b/l    #Enterococcus Bacertemia:  Continue ampicillin; c/s ID, check echo, repeat bcx, concern for involvement of spine given pain, f/u MRI    #Back Pain  - awaiting MRI  -otho following  -continue pain control    #Urinary retention  - continue wong; tamsulosin  rest as above .

## 2025-05-13 NOTE — PROGRESS NOTE ADULT - PROBLEM SELECTOR PLAN 4
acute urinary retention in the setting of back pain and immobility    - wong in place  - Will TOV after MRI and possible ortho intervention

## 2025-05-13 NOTE — PROGRESS NOTE ADULT - PROBLEM SELECTOR PLAN 2
- BCx positive for E faecalis   - Only 1 bottle was sent and is positive -> repeat Bcx positive  - Started ampicillin 2g q8 for reduced renal clearance  - ID consulted  - TTE ordered

## 2025-05-13 NOTE — PROGRESS NOTE ADULT - SUBJECTIVE AND OBJECTIVE BOX
***Plan not finalized until attending attestation***    Raman Carmichael MD (PGY-1)  Internal Medicine  Contact via [employer] TEAMS    ******************************************    PROGRESS NOTE:     Patient is a 85y old  Male who presents with a chief complaint of back pain (13 May 2025 12:19)      INTERVAL EVENTS:   Rapid for agitation and AMS  Pt was agitated that didn't know when going for MRI    SUBJECTIVE: Patient seen and examined at bedside. This morning, the patient is comfortable and doing well. No acute complaints.    MEDICATIONS  (STANDING):  ampicillin  IVPB 2 Gram(s) IV Intermittent every 8 hours  apixaban 2.5 milliGRAM(s) Oral two times a day  aspirin  chewable 81 milliGRAM(s) Oral daily  chlorhexidine 2% Cloths 1 Application(s) Topical daily  cloNIDine 0.2 milliGRAM(s) Oral three times a day  dextrose 5%. 1000 milliLiter(s) (50 mL/Hr) IV Continuous <Continuous>  dextrose 5%. 1000 milliLiter(s) (100 mL/Hr) IV Continuous <Continuous>  dextrose 50% Injectable 25 Gram(s) IV Push once  dextrose 50% Injectable 12.5 Gram(s) IV Push once  dextrose 50% Injectable 25 Gram(s) IV Push once  dextrose Oral Gel 15 Gram(s) Oral once  glucagon  Injectable 1 milliGRAM(s) IntraMuscular once  hydrALAZINE 100 milliGRAM(s) Oral three times a day  HYDROmorphone  Injectable 0.5 milliGRAM(s) IV Push once  insulin glargine Injectable (LANTUS) 24 Unit(s) SubCutaneous at bedtime  insulin lispro (ADMELOG) corrective regimen sliding scale   SubCutaneous three times a day before meals  insulin lispro (ADMELOG) corrective regimen sliding scale   SubCutaneous at bedtime  insulin lispro Injectable (ADMELOG) 8 Unit(s) SubCutaneous three times a day before meals  levothyroxine 150 MICROGram(s) Oral daily  NIFEdipine XL 30 milliGRAM(s) Oral daily  propranolol  milliGRAM(s) Oral daily  rosuvastatin 10 milliGRAM(s) Oral at bedtime  tamsulosin 0.4 milliGRAM(s) Oral at bedtime    MEDICATIONS  (PRN):  HYDROmorphone  Injectable 0.5 milliGRAM(s) IV Push every 4 hours PRN Severe Pain (7 - 10)      CAPILLARY BLOOD GLUCOSE      POCT Blood Glucose.: 110 mg/dL (13 May 2025 12:33)  POCT Blood Glucose.: 152 mg/dL (13 May 2025 09:26)  POCT Blood Glucose.: 115 mg/dL (13 May 2025 03:08)  POCT Blood Glucose.: 115 mg/dL (12 May 2025 21:22)  POCT Blood Glucose.: 147 mg/dL (12 May 2025 17:55)    I&O's Summary    12 May 2025 07:01  -  13 May 2025 07:00  --------------------------------------------------------  IN: 0 mL / OUT: 1200 mL / NET: -1200 mL        PHYSICAL EXAM:  Vital Signs Last 24 Hrs  T(C): 36.3 (13 May 2025 05:30), Max: 36.7 (12 May 2025 18:41)  T(F): 97.4 (13 May 2025 05:30), Max: 98.1 (12 May 2025 18:41)  HR: 60 (13 May 2025 09:36) (60 - 94)  BP: 140/46 (13 May 2025 09:36) (123/53 - 154/54)  BP(mean): --  RR: 18 (13 May 2025 09:36) (18 - 18)  SpO2: 94% (13 May 2025 09:36) (94% - 97%)    Parameters below as of 13 May 2025 09:36  Patient On (Oxygen Delivery Method): nasal cannula  O2 Flow (L/min): 2      GENERAL: NAD, lying in bed comfortably  HEAD: Atraumatic, normocephalic  EYES: EOMI, PERRLA, conjunctiva and sclera clear  ENT: Moist mucous membranes  NECK: Supple, no JVD  HEART: S1, S2, Regular rate and rhythm, no murmurs, rubs, or gallops  LUNGS: Unlabored respirations, clear to auscultation bilaterally, no crackles, wheezing, or rhonchi  ABDOMEN: Soft, nontender, nondistended, +BS  EXTREMITIES: 2+ peripheral pulses bilaterally. No clubbing, cyanosis, or edema  NERVOUS SYSTEM:  A&Ox3, LE R weaker than left  SKIN: No rashes or lesions  +Frye    LABS:                        10.0   13.83 )-----------( 241      ( 13 May 2025 03:30 )             30.3     05-13    139  |  104  |  83[H]  ----------------------------<  118[H]  4.0   |  25  |  1.83[H]    Ca    9.1      13 May 2025 03:30  Phos  3.5     05-13  Mg     2.70     05-13            Urinalysis Basic - ( 13 May 2025 03:30 )    Color: x / Appearance: x / SG: x / pH: x  Gluc: 118 mg/dL / Ketone: x  / Bili: x / Urobili: x   Blood: x / Protein: x / Nitrite: x   Leuk Esterase: x / RBC: x / WBC x   Sq Epi: x / Non Sq Epi: x / Bacteria: x        Culture - Blood (collected 12 May 2025 07:12)  Source: Blood Blood-Peripheral  Gram Stain (12 May 2025 22:19):    Growth in aerobic bottle: Gram Positive Cocci in Pairs and Chains    Growth in anaerobic bottle: Gram Positive Cocci in Pairs and Chains  Preliminary Report (12 May 2025 22:19):    Growth in aerobic bottle: Gram Positive Cocci in Pairs and Chains    Growth in anaerobic bottle: Gram Positive Cocci in Pairs and Chains    Culture - Blood (collected 12 May 2025 07:07)  Source: Blood Blood-Peripheral  Gram Stain (12 May 2025 22:29):    Growth in aerobic bottle: Gram Positive Cocci in Pairs and Chains    Growth in anaerobic bottle: Gram Positive Cocci in Pairs and Chains  Preliminary Report (12 May 2025 22:29):    Growth in aerobic bottle: Gram Positive Cocci in Pairs and Chains    Growth in anaerobic bottle: Gram Positive Cocci in Pairs and Chains    Culture - Blood (collected 11 May 2025 14:27)  Source: Blood Blood-Peripheral  Gram Stain (12 May 2025 06:28):    Growth in aerobic bottle: Gram Positive Cocci in Pairs and Chains    Growth in anaerobic bottle: Gram Positive Cocci in Pairs and Chains  Preliminary Report (12 May 2025 06:29):    Growth in aerobic bottle: Gram Positive Cocci in Pairs and Chains    Growth in anaerobic bottle: Gram Positive Cocci in Pairs and Chains    Direct identification is available within approximately 3-5    hours either by Blood Panel Multiplexed PCR or Direct    MALDI-TOF. Details: [email or web address]  Organism: Blood Culture PCR (12 May 2025 06:13)  Organism: Blood Culture PCR (12 May 2025 06:13)

## 2025-05-13 NOTE — PROGRESS NOTE ADULT - ASSESSMENT
Pt is an 85M with HTN, HLD, DM2, prostate cancer s/p prostatectomy, CKD, TIA with a pacemaker who presents transferred from [place] for further evaluation of low back pain secondary to lumbar compression deformities. EP on board to clear PPM for MRI compatibility. Found to have e facaelis bacteremia.

## 2025-05-13 NOTE — PROVIDER CONTACT NOTE (HYPOGLYCEMIA EVENT) - NS PROVIDER CONTACT BACKGROUND-HYPO
Age: 85y    Gender: Male    POCT Blood Glucose:63 (05-13-25 @ 18:05)  65 (05-13-25 @ 18:03)    90 mg/dL (05-13-25 @ 19:27)  72 mg/dL (05-13-25 @ 18:49)  66 mg/dL (05-13-25 @ 18:48)  65 mg/dL (05-13-25 @ 18:03)  110 mg/dL (05-13-25 @ 12:33)  152 mg/dL (05-13-25 @ 09:26)  115 mg/dL (05-13-25 @ 03:08)  115 mg/dL (05-12-25 @ 21:22)      eMAR:  insulin glargine Injectable (LANTUS)   24 Unit(s) SubCutaneous (05-12-25 @ 21:50)    insulin lispro (ADMELOG) corrective regimen sliding scale   2 Unit(s) SubCutaneous (05-13-25 @ 09:30)    insulin lispro Injectable (ADMELOG)   8 Unit(s) SubCutaneous (05-13-25 @ 13:25)   8 Unit(s) SubCutaneous (05-13-25 @ 09:30)    levothyroxine   150 MICROGram(s) Oral (05-13-25 @ 05:26)    rosuvastatin   10 milliGRAM(s) Oral (05-12-25 @ 21:48)

## 2025-05-13 NOTE — PROGRESS NOTE ADULT - PROBLEM SELECTOR PLAN 1
CT Lumbar Spine: L3-L4 disc bulge, L4-L5 left paracentral-foraminal disc protrusion, multiple mild lumbar vertebral body compression deformities  . L3-L4 disc bulge, resulting in severe central canal, bilateral lateral recess and moderate bilateral neural foramen stenosis with facet arthrosis and ligamentum flavum hypertrophy.    - Pain: iv tylenol prn, dilaudid .5mg mod pain, dilaudid 1mg severe pain  - Ortho Consulted; recs appreciated  - TLSO brace  - MRI wo con (CrCl 14 so avoiding gadolinium) to r/o cauda equina and malignant disease, gely with history of prostate ca  - EP consulted for PPM MRI clearance -> cleared by EP and radiology  - Ortho reached out to about Pt's worsening progressive BL LE symptoms

## 2025-05-13 NOTE — PROGRESS NOTE ADULT - PROBLEM SELECTOR PLAN 3
Pt hypoxic on presentation for unclear reason; started on high flow nasal cannula 40/40    - CXR unimpressive  - lungs clear on exam  - poss bronchiectasis vs pna  - VQ scan to rule out PE -> very low probability of PE

## 2025-05-13 NOTE — RAPID RESPONSE TEAM SUMMARY - NSSITUATIONBACKGROUNDRRT_GEN_ALL_CORE
Pt is an 85M with HTN, HLD, DM2, prostate cancer s/p prostatectomy, CKD, TIA with a pacemaker who presents transferred from [place] for further evaluation of low back pain secondary to lumbar compression deformities. EP on board to clear PPM for MRI compatibility. Found to have e facaelis bacteremia in one BCx bottle, on treatment awaiting repeat BCx.  Rapid called for AMS. Per NF and primary nurse, patient had awoken earlier in the night, pulled out all IVs, and refused all treatment/wanted to AMA unless he spoke with his daughter immediately. Rapid called because patient refusing to answer orientation questions, concerning that he may be newly altered. On initial exam, patient refusing to cooperate with any exam or to answer any questions. After speaking with his daughter, patient cooperative, answering questions appropriately, AOx4. Exam notable for slight discrepancy in pupil size between eyes, but no other neurologic deficits. Unclear chronicity of eye findings, as no previous exam to compare with. Otherwise VSS. At this time low concern for acute neurologic event, most likely delirium. Rapid ended.

## 2025-05-14 ENCOUNTER — RESULT REVIEW (OUTPATIENT)
Age: 85
End: 2025-05-14

## 2025-05-14 LAB
ALBUMIN SERPL ELPH-MCNC: 2.8 G/DL — LOW (ref 3.3–5)
ALP SERPL-CCNC: 57 U/L — SIGNIFICANT CHANGE UP (ref 40–120)
ALT FLD-CCNC: 8 U/L — SIGNIFICANT CHANGE UP (ref 4–41)
ANION GAP SERPL CALC-SCNC: 9 MMOL/L — SIGNIFICANT CHANGE UP (ref 7–14)
AST SERPL-CCNC: 14 U/L — SIGNIFICANT CHANGE UP (ref 4–40)
BILIRUB SERPL-MCNC: 0.3 MG/DL — SIGNIFICANT CHANGE UP (ref 0.2–1.2)
BLD GP AB SCN SERPL QL: NEGATIVE — SIGNIFICANT CHANGE UP
BUN SERPL-MCNC: 77 MG/DL — HIGH (ref 7–23)
CALCIUM SERPL-MCNC: 8.7 MG/DL — SIGNIFICANT CHANGE UP (ref 8.4–10.5)
CHLORIDE SERPL-SCNC: 102 MMOL/L — SIGNIFICANT CHANGE UP (ref 98–107)
CO2 SERPL-SCNC: 26 MMOL/L — SIGNIFICANT CHANGE UP (ref 22–31)
CREAT SERPL-MCNC: 1.82 MG/DL — HIGH (ref 0.5–1.3)
EGFR: 36 ML/MIN/1.73M2 — LOW
EGFR: 36 ML/MIN/1.73M2 — LOW
GLUCOSE SERPL-MCNC: 244 MG/DL — HIGH (ref 70–99)
HCT VFR BLD CALC: 29.5 % — LOW (ref 39–50)
HGB BLD-MCNC: 9.5 G/DL — LOW (ref 13–17)
MAGNESIUM SERPL-MCNC: 2.7 MG/DL — HIGH (ref 1.6–2.6)
MCHC RBC-ENTMCNC: 27.5 PG — SIGNIFICANT CHANGE UP (ref 27–34)
MCHC RBC-ENTMCNC: 32.2 G/DL — SIGNIFICANT CHANGE UP (ref 32–36)
MCV RBC AUTO: 85.5 FL — SIGNIFICANT CHANGE UP (ref 80–100)
NRBC # BLD AUTO: 0 K/UL — SIGNIFICANT CHANGE UP (ref 0–0)
NRBC # FLD: 0 K/UL — SIGNIFICANT CHANGE UP (ref 0–0)
NRBC BLD AUTO-RTO: 0 /100 WBCS — SIGNIFICANT CHANGE UP (ref 0–0)
PHOSPHATE SERPL-MCNC: 3.7 MG/DL — SIGNIFICANT CHANGE UP (ref 2.5–4.5)
PLATELET # BLD AUTO: 217 K/UL — SIGNIFICANT CHANGE UP (ref 150–400)
POTASSIUM SERPL-MCNC: 4 MMOL/L — SIGNIFICANT CHANGE UP (ref 3.5–5.3)
POTASSIUM SERPL-SCNC: 4 MMOL/L — SIGNIFICANT CHANGE UP (ref 3.5–5.3)
PROT SERPL-MCNC: 6.1 G/DL — SIGNIFICANT CHANGE UP (ref 6–8.3)
RBC # BLD: 3.45 M/UL — LOW (ref 4.2–5.8)
RBC # FLD: 15.3 % — HIGH (ref 10.3–14.5)
RH IG SCN BLD-IMP: POSITIVE — SIGNIFICANT CHANGE UP
SODIUM SERPL-SCNC: 137 MMOL/L — SIGNIFICANT CHANGE UP (ref 135–145)
WBC # BLD: 12.45 K/UL — HIGH (ref 3.8–10.5)
WBC # FLD AUTO: 12.45 K/UL — HIGH (ref 3.8–10.5)

## 2025-05-14 PROCEDURE — 99233 SBSQ HOSP IP/OBS HIGH 50: CPT

## 2025-05-14 PROCEDURE — G0545: CPT

## 2025-05-14 PROCEDURE — 93306 TTE W/DOPPLER COMPLETE: CPT | Mod: 26

## 2025-05-14 PROCEDURE — 93010 ELECTROCARDIOGRAM REPORT: CPT

## 2025-05-14 PROCEDURE — 99233 SBSQ HOSP IP/OBS HIGH 50: CPT | Mod: GC

## 2025-05-14 RX ORDER — BISACODYL 5 MG
5 TABLET, DELAYED RELEASE (ENTERIC COATED) ORAL AT BEDTIME
Refills: 0 | Status: DISCONTINUED | OUTPATIENT
Start: 2025-05-14 | End: 2025-06-05

## 2025-05-14 RX ORDER — SENNA 187 MG
2 TABLET ORAL AT BEDTIME
Refills: 0 | Status: DISCONTINUED | OUTPATIENT
Start: 2025-05-14 | End: 2025-06-06

## 2025-05-14 RX ORDER — LIDOCAINE HYDROCHLORIDE 20 MG/ML
1 JELLY TOPICAL DAILY
Refills: 0 | Status: DISCONTINUED | OUTPATIENT
Start: 2025-05-14 | End: 2025-07-02

## 2025-05-14 RX ORDER — INSULIN LISPRO 100 U/ML
5 INJECTION, SOLUTION INTRAVENOUS; SUBCUTANEOUS
Refills: 0 | Status: DISCONTINUED | OUTPATIENT
Start: 2025-05-14 | End: 2025-05-15

## 2025-05-14 RX ADMIN — AMPICILLIN SODIUM 200 GRAM(S): 1 INJECTION, POWDER, FOR SOLUTION INTRAMUSCULAR; INTRAVENOUS at 06:38

## 2025-05-14 RX ADMIN — LIDOCAINE HYDROCHLORIDE 1 PATCH: 20 JELLY TOPICAL at 13:09

## 2025-05-14 RX ADMIN — CEFTRIAXONE 100 MILLIGRAM(S): 500 INJECTION, POWDER, FOR SOLUTION INTRAMUSCULAR; INTRAVENOUS at 17:03

## 2025-05-14 RX ADMIN — Medication 0.5 MILLIGRAM(S): at 06:43

## 2025-05-14 RX ADMIN — Medication 0.5 MILLIGRAM(S): at 23:00

## 2025-05-14 RX ADMIN — Medication 0.5 MILLIGRAM(S): at 13:08

## 2025-05-14 RX ADMIN — Medication 0.5 MILLIGRAM(S): at 14:08

## 2025-05-14 RX ADMIN — Medication 150 MICROGRAM(S): at 04:47

## 2025-05-14 RX ADMIN — INSULIN LISPRO 4: 100 INJECTION, SOLUTION INTRAVENOUS; SUBCUTANEOUS at 09:13

## 2025-05-14 RX ADMIN — INSULIN LISPRO 4: 100 INJECTION, SOLUTION INTRAVENOUS; SUBCUTANEOUS at 13:07

## 2025-05-14 RX ADMIN — Medication 5 MILLIGRAM(S): at 22:40

## 2025-05-14 RX ADMIN — Medication 0.2 MILLIGRAM(S): at 13:06

## 2025-05-14 RX ADMIN — INSULIN LISPRO 2: 100 INJECTION, SOLUTION INTRAVENOUS; SUBCUTANEOUS at 18:11

## 2025-05-14 RX ADMIN — AMPICILLIN SODIUM 200 GRAM(S): 1 INJECTION, POWDER, FOR SOLUTION INTRAMUSCULAR; INTRAVENOUS at 13:08

## 2025-05-14 RX ADMIN — Medication 1 APPLICATION(S): at 17:03

## 2025-05-14 RX ADMIN — Medication 81 MILLIGRAM(S): at 13:06

## 2025-05-14 RX ADMIN — APIXABAN 2.5 MILLIGRAM(S): 2.5 TABLET, FILM COATED ORAL at 06:37

## 2025-05-14 RX ADMIN — INSULIN LISPRO 5 UNIT(S): 100 INJECTION, SOLUTION INTRAVENOUS; SUBCUTANEOUS at 09:14

## 2025-05-14 RX ADMIN — Medication 100 MILLIGRAM(S): at 06:41

## 2025-05-14 RX ADMIN — INSULIN LISPRO 5 UNIT(S): 100 INJECTION, SOLUTION INTRAVENOUS; SUBCUTANEOUS at 13:08

## 2025-05-14 RX ADMIN — Medication 100 MILLIGRAM(S): at 22:44

## 2025-05-14 RX ADMIN — INSULIN GLARGINE-YFGN 24 UNIT(S): 100 INJECTION, SOLUTION SUBCUTANEOUS at 22:41

## 2025-05-14 RX ADMIN — CEFTRIAXONE 100 MILLIGRAM(S): 500 INJECTION, POWDER, FOR SOLUTION INTRAMUSCULAR; INTRAVENOUS at 05:42

## 2025-05-14 RX ADMIN — Medication 0.5 MILLIGRAM(S): at 22:15

## 2025-05-14 RX ADMIN — ROSUVASTATIN CALCIUM 10 MILLIGRAM(S): 5 TABLET, FILM COATED ORAL at 22:45

## 2025-05-14 RX ADMIN — LIDOCAINE HYDROCHLORIDE 1 PATCH: 20 JELLY TOPICAL at 22:25

## 2025-05-14 RX ADMIN — TAMSULOSIN HYDROCHLORIDE 0.4 MILLIGRAM(S): 0.4 CAPSULE ORAL at 22:43

## 2025-05-14 RX ADMIN — Medication 0.2 MILLIGRAM(S): at 06:41

## 2025-05-14 RX ADMIN — Medication 120 MILLIGRAM(S): at 06:41

## 2025-05-14 RX ADMIN — AMPICILLIN SODIUM 200 GRAM(S): 1 INJECTION, POWDER, FOR SOLUTION INTRAMUSCULAR; INTRAVENOUS at 17:40

## 2025-05-14 RX ADMIN — Medication 0.2 MILLIGRAM(S): at 22:40

## 2025-05-14 RX ADMIN — Medication 1 APPLICATION(S): at 13:06

## 2025-05-14 RX ADMIN — Medication 100 MILLIGRAM(S): at 13:06

## 2025-05-14 NOTE — CONSULT NOTE ADULT - SUBJECTIVE AND OBJECTIVE BOX
Source: patient and Chart    HPI:  Patient is a 85y old Male with PMH of HTN, HLD, DM2, hypothyroidism, prostate cancer s/p prostatectomy, CKD, TIA, dual chamber pacemaker( 5/2024 at [facility]) presented as a transferred from [facility] for further workup of possiole spinal cord injury.  Patient intiially presented to [facility] for acute onset of sharp lower back pain and course c/b acute urinary retention. CT L Spine showed L-spine disc bulge and MRI L spine to follow. Hospital course is further c/b significant leukocytosis to 18.45, afebrile and postive blood culture with positive E. Fecalis on 5/11 and 5/12. CXR with clear lungs and VQ scan with very low probability of PE. ID consulted and pt. is on IV ABX Ampicillin and Rocephin. EP is consulted to eval for CIED infection. Device implanted in 4/2024 with interrogation reveals AP 90.1%,  1.4%. TTE in 11/2024 with EF 60-65%.            PAST MEDICAL & SURGICAL HISTORY:  HTN (hypertension)      HLD (hyperlipidemia)      DM (diabetes mellitus)      TIA (transient ischemic attack)      Prostate cancer      S/P prostatectomy      Pacemaker      H/O thyroidectomy            MEDICATIONS  (STANDING):  ampicillin  IVPB 2 Gram(s) IV Intermittent every 6 hours  apixaban 2.5 milliGRAM(s) Oral two times a day  aspirin  chewable 81 milliGRAM(s) Oral daily  cefTRIAXone   IVPB 2000 milliGRAM(s) IV Intermittent every 12 hours  chlorhexidine 2% Cloths 1 Application(s) Topical daily  cloNIDine 0.2 milliGRAM(s) Oral three times a day  dextrose 5%. 1000 milliLiter(s) (50 mL/Hr) IV Continuous <Continuous>  dextrose 5%. 1000 milliLiter(s) (100 mL/Hr) IV Continuous <Continuous>  dextrose 50% Injectable 25 Gram(s) IV Push once  dextrose 50% Injectable 12.5 Gram(s) IV Push once  dextrose 50% Injectable 25 Gram(s) IV Push once  dextrose Oral Gel 15 Gram(s) Oral once  glucagon  Injectable 1 milliGRAM(s) IntraMuscular once  hydrALAZINE 100 milliGRAM(s) Oral three times a day  insulin glargine Injectable (LANTUS) 24 Unit(s) SubCutaneous at bedtime  insulin lispro (ADMELOG) corrective regimen sliding scale   SubCutaneous three times a day before meals  insulin lispro (ADMELOG) corrective regimen sliding scale   SubCutaneous at bedtime  insulin lispro Injectable (ADMELOG) 5 Unit(s) SubCutaneous three times a day before meals  levothyroxine 150 MICROGram(s) Oral daily  lidocaine   4% Patch 1 Patch Transdermal daily  NIFEdipine XL 30 milliGRAM(s) Oral daily  propranolol  milliGRAM(s) Oral daily  rosuvastatin 10 milliGRAM(s) Oral at bedtime  tamsulosin 0.4 milliGRAM(s) Oral at bedtime    MEDICATIONS  (PRN):  HYDROmorphone  Injectable 0.5 milliGRAM(s) IV Push every 4 hours PRN Severe Pain (7 - 10)      FAMILY HISTORY:  FHx: heart disease (Father, Mother)        SOCIAL HISTORY:    LIVING SITUATION:  CIGARETTES: Denied  ALCOHOL: denied   ILLICIT DRUG USES: denied    REVIEW OF SYSTEMS:  CONSTITUTIONAL: No fever, weight loss, chills, shakes, or fatigue, +back pain  EYES: No eye pain, visual disturbances, or discharge  ENMT:  No difficulty hearing, tinnitus, vertigo; No sinus or throat pain  NECK: No pain or stiffness  RESPIRATORY: No cough, wheezing, hemoptysis, or shortness of breath  CARDIOVASCULAR: No chest pain, dyspnea, palpitations, dizziness, syncope, paroxysmal nocturnal dyspnea, orthopnea, or arm or leg swelling  GASTROINTESTINAL: No abdominal  or epigastric pain, nausea, vomiting, hematemesis, diarrhea, constipation, melena or bright red blood.  GENITOURINARY: No dysuria, nocturia, hematuria, or urinary incontinence  NEUROLOGICAL: No headaches, memory loss, slurred speech, limb weakness, loss of strength, numbness, or tremors  MUSCULOSKELETAL: No joint pain or swelling, muscle, back, or extremity pain  PSYCHIATRIC: No depression, anxiety, or difficulty sleeping        Vital Signs Last 24 Hrs  T(C): 36.6 (14 May 2025 06:40), Max: 36.8 (13 May 2025 21:31)  T(F): 97.8 (14 May 2025 06:40), Max: 98.3 (13 May 2025 21:31)  HR: 62 (14 May 2025 06:40) (59 - 62)  BP: 139/47 (14 May 2025 06:40) (111/43 - 145/50)  BP(mean): --  RR: 18 (14 May 2025 06:40) (17 - 18)  SpO2: 97% (14 May 2025 06:40) (92% - 100%)    Parameters below as of 14 May 2025 06:40  Patient On (Oxygen Delivery Method): nasal cannula  O2 Flow (L/min): 2      PHYSICAL EXAM:  GENERAL: appears weak, speaking in full sentence, in NAD  HEAD:  Atraumatic, Normocephalic  EYES: EOMI, PERRLA, conjunctiva and sclera clear  ENMT: No tonsillar erythema, exudates, or enlargement; Moist mucous membranes, Good dentition, No lesions  NECK: Supple and normal thyroid.  No JVD or carotid bruit.  Carotid pulse is 2+ bilaterally.  HEART: S1S2 RRR; No murmurs, rubs, or gallops appreciated .  PULMONARY: CTABL, normal respiratory effort.  No rales, wheezing, or rhonchi appreciated bilaterally  ABDOMEN: Bowel sounds present, soft, NDNT  EXTREMITIES:  Warm, well -perfused, no pedal edema, distal pulses present  NEUROLOGICAL:AOx3       INTERPRETATION OF TELEMETRY: patient is not on Tele monitor        I&O's Detail    13 May 2025 07:01  -  14 May 2025 07:00  --------------------------------------------------------  IN:  Total IN: 0 mL    OUT:    Indwelling Catheter - Urethral (mL): 1325 mL  Total OUT: 1325 mL    Total NET: -1325 mL          LABS:                        9.5    12.45 )-----------( 217      ( 14 May 2025 07:46 )             29.5     05-14    137  |  102  |  77[H]  ----------------------------<  244[H]  4.0   |  26  |  1.82[H]    Ca    8.7      14 May 2025 07:46  Phos  3.7     05-14  Mg     2.70     05-14    TPro  6.1  /  Alb  2.8[L]  /  TBili  0.3  /  DBili  x   /  AST  14  /  ALT  8   /  AlkPhos  57  05-14          Urinalysis Basic - ( 14 May 2025 07:46 )    Color: x / Appearance: x / SG: x / pH: x  Gluc: 244 mg/dL / Ketone: x  / Bili: x / Urobili: x   Blood: x / Protein: x / Nitrite: x   Leuk Esterase: x / RBC: x / WBC x   Sq Epi: x / Non Sq Epi: x / Bacteria: x      BNP  I&O's Detail    13 May 2025 07:01  -  14 May 2025 07:00  --------------------------------------------------------  IN:  Total IN: 0 mL    OUT:    Indwelling Catheter - Urethral (mL): 1325 mL  Total OUT: 1325 mL    Total NET: -1325 mL        Daily     Daily     RADIOLOGY & ADDITIONAL STUDIES:      Summary:   1. Left ventricular ejection fraction, by visual estimation, is 60 to   65%.   2. Normal global left ventricular systolic function.   3. Mildly enlarged left atrium.   4. Mild thickening and calcification of the anterior and posterior   mitral valve leaflets.   5. Trace mitral valve regurgitation.   6. Mild tricuspid regurgitation.   7. Sclerotic aortic valve with normal opening.

## 2025-05-14 NOTE — PROGRESS NOTE ADULT - ASSESSMENT
Pt is an 85M with HTN, HLD, DM2, prostate cancer s/p prostatectomy, CKD, TIA with a pacemaker who presents transferred from [place] for further evaluation of low back pain secondary to lumbar compression deformities. EP on board to clear PPM for MRI compatibility. Found to have e facaelis bacteremia.

## 2025-05-14 NOTE — PROGRESS NOTE ADULT - PROBLEM SELECTOR PLAN 2
- BCx positive for E faecalis   - Only 1 bottle was sent and is positive -> repeat Bcx positive  - Started ampicillin 2g q8 for reduced renal clearance  - ID consulted  - TTE ordered - BCx positive for E faecalis   - Only 1 bottle was sent and is positive -> repeat Bcx positive  - Started ampicillin 2g q6 and ceftriaxone per ID  - TTE ordered  - EP consulted regarding possible removing PPM  - Repeat Cx ordered

## 2025-05-14 NOTE — PROGRESS NOTE ADULT - SUBJECTIVE AND OBJECTIVE BOX
Infectious Diseases Follow Up:    Patient is a 85y old  Male who presents with a chief complaint of back pain (14 May 2025 11:13)      Interval History/ROS:  No acute events noted, back pain w/ movement     Allergies  gabapentin (Other)        ANTIMICROBIALS:  ampicillin  IVPB 2 every 6 hours  cefTRIAXone   IVPB 2000 every 12 hours      Current Abx:     Previous Abx     OTHER MEDS:  MEDICATIONS  (STANDING):  apixaban 2.5 two times a day  aspirin  chewable 81 daily  cloNIDine 0.2 three times a day  dextrose 50% Injectable 25 once  dextrose 50% Injectable 12.5 once  dextrose 50% Injectable 25 once  dextrose Oral Gel 15 once  glucagon  Injectable 1 once  hydrALAZINE 100 three times a day  HYDROmorphone  Injectable 0.5 every 4 hours PRN  insulin glargine Injectable (LANTUS) 24 at bedtime  insulin lispro (ADMELOG) corrective regimen sliding scale  three times a day before meals  insulin lispro (ADMELOG) corrective regimen sliding scale  at bedtime  insulin lispro Injectable (ADMELOG) 5 three times a day before meals  levothyroxine 150 daily  NIFEdipine XL 30 daily  propranolol  daily  rosuvastatin 10 at bedtime  tamsulosin 0.4 at bedtime      Vital Signs Last 24 Hrs  T(C): 36.6 (14 May 2025 06:40), Max: 36.8 (13 May 2025 21:31)  T(F): 97.8 (14 May 2025 06:40), Max: 98.3 (13 May 2025 21:31)  HR: 62 (14 May 2025 06:40) (59 - 62)  BP: 139/47 (14 May 2025 06:40) (111/43 - 145/50)  BP(mean): --  RR: 18 (14 May 2025 06:40) (17 - 18)  SpO2: 97% (14 May 2025 06:40) (92% - 100%)    Parameters below as of 14 May 2025 06:40  Patient On (Oxygen Delivery Method): nasal cannula  O2 Flow (L/min): 2      PHYSICAL EXAM:  GENERAL: NAD, well-developed  HEAD:  Atraumatic, Normocephalic  EYES: EOMI, conjunctiva and sclera clear  CHEST/LUNG: On RA, not in respiratory distress, clear to auscultation bilaterally; L chest w/ PPM site well appearing   HEART: Regular rate and rhythm;   ABDOMEN: Soft, Nontender, Nondistended;   PSYCH: AAOx3                          9.5    12.45 )-----------( 217      ( 14 May 2025 07:46 )             29.5       05-14    137  |  102  |  77[H]  ----------------------------<  244[H]  4.0   |  26  |  1.82[H]    Ca    8.7      14 May 2025 07:46  Phos  3.7     05-14  Mg     2.70     05-14    TPro  6.1  /  Alb  2.8[L]  /  TBili  0.3  /  DBili  x   /  AST  14  /  ALT  8   /  AlkPhos  57  05-14      Urinalysis Basic - ( 14 May 2025 07:46 )    Color: x / Appearance: x / SG: x / pH: x  Gluc: 244 mg/dL / Ketone: x  / Bili: x / Urobili: x   Blood: x / Protein: x / Nitrite: x   Leuk Esterase: x / RBC: x / WBC x   Sq Epi: x / Non Sq Epi: x / Bacteria: x        MICROBIOLOGY:  v  Blood Blood-Peripheral  05-12-25   Growth in aerobic and anaerobic bottles: Enterococcus faecalis  See previous culture [id number]  --    Growth in aerobic bottle: Gram Positive Cocci in Pairs and Chains  Growth in anaerobic bottle: Gram Positive Cocci in Pairs and Chains      Blood Blood-Peripheral  05-12-25   Growth in aerobic and anaerobic bottles: Enterococcus faecalis  See previous culture [id number]  --    Growth in aerobic bottle: Gram Positive Cocci in Pairs and Chains  Growth in anaerobic bottle: Gram Positive Cocci in Pairs and Chains      Blood Blood-Peripheral  05-11-25   Growth in aerobic and anaerobic bottles: Enterococcus faecalis  Direct identification is available within approximately 3-5  hours either by Blood Panel Multiplexed PCR or Direct  MALDI-TOF. Details: [email or web address]  --  Blood Culture PCR  Enterococcus faecalis                RADIOLOGY:

## 2025-05-14 NOTE — PROGRESS NOTE ADULT - PROBLEM SELECTOR PLAN 1
CT Lumbar Spine: L3-L4 disc bulge, L4-L5 left paracentral-foraminal disc protrusion, multiple mild lumbar vertebral body compression deformities  . L3-L4 disc bulge, resulting in severe central canal, bilateral lateral recess and moderate bilateral neural foramen stenosis with facet arthrosis and ligamentum flavum hypertrophy.    - Pain: iv tylenol prn, dilaudid .5mg mod pain, dilaudid 1mg severe pain  - Ortho Consulted; recs appreciated  - TLSO brace  - MRI wo con (CrCl 14 so avoiding gadolinium) to r/o cauda equina and malignant disease, gely with history of prostate ca  - EP consulted for PPM MRI clearance -> cleared by EP and radiology  - Ortho reached out to about Pt's worsening progressive BL LE symptoms CT Lumbar Spine: L3-L4 disc bulge, L4-L5 left paracentral-foraminal disc protrusion, multiple mild lumbar vertebral body compression deformities  . L3-L4 disc bulge, resulting in severe central canal, bilateral lateral recess and moderate bilateral neural foramen stenosis with facet arthrosis and ligamentum flavum hypertrophy.    - Pain: iv tylenol prn, dilaudid .5mg mod pain, dilaudid 1mg severe pain  - Ortho Consulted; recs appreciated  - TLSO brace  - MRI wo con (CrCl 14 so avoiding gadolinium) to r/o cauda equina and malignant disease, gely with history of prostate ca -> MRI was not tolerated given loudness of machine and pain. Pt does not want to go into MRI machine again    - Ortho reached out to about Pt's worsening progressive BL LE symptoms

## 2025-05-14 NOTE — PROGRESS NOTE ADULT - ATTENDING COMMENTS
86 y/o M with HTN, HLD, DM type 2, CKD, TIA and prostate ca s/p prostatectomy transferred from [place] where he presented for back pain.    Remains Afebrile  WBC 13  5/11: Bcx Enterococcus  5/12: BCx; Enterococcus  WBC 12, Cr 1.82    LE sensation intact,  LE strength 2/5 b/l    #Enterococcus Bacertemia:  -Continue ampicillin, 2gm q6, ID recommends adding CTX 2gm q12; check echo, repeat bcx, concern for involvement of spine given pain however patient unable to tolerate MRI; will discuss options with ID; will likely need PPM removed, EP following    #Back Pain  - unable to tolerate MRI  -otho following  -continue pain control, add lidocaine patch  #hx TIA  continue DOAC, statin  #DM  -am fingerstick low, decreased prandial insulin, continue to monitor  #Urinary retention  - continue wong; tamsulosin  rest as above .

## 2025-05-14 NOTE — CONSULT NOTE ADULT - ASSESSMENT
Patient is a 85y old Male with PMH of HTN, HLD, DM2, hypothyroidism, prostate cancer s/p prostatectomy, CKD, TIA, dual chamber pacemaker( 5/2024 at [facility]) presented as a transferred from [facility] for further workup of possiole spinal cord injury.  Patient intiially presented to [facility] for acute onset of sharp lower back pain and course c/b acute urinary retention. CT L Spine showed L-spine disc bulge and MRI L spine to follow. Hospital course is further c/b significant leukocytosis to 18.45, afebrile and postive blood culture with positive E. Fecalis on 5/11 and 5/12. CXR with clear lungs and VQ scan with very low probability of PE. ID consulted and pt. is on IV ABX Ampicillin and Rocephin. EP is consulted to eval for CIED infection. Device implanted in 4/2024 with interrogation reveals AP 90.1%,  1.4%. TTE in 11/2024 with EF 60-65%.      ## e. fecalis bacteremia   ## leukocytosis  ## Back pain  ## Dual chamber PPM ( 5/2024)         RECOMMENDATIONS:  - In light of e. fecalis bacteremia with a PPM, device (include wires) explant is warranted to ensure complete resolution of bacteremia. Device shows AP 90.1%,  1.4% ( AAI <=> DDD ) with SND of SB at 50s. Possible leadless pacemaker will be warranted after device extraction  - Device implanted on 5/21/2024 by DR. JOSAFAT Fgiueredo at [facility]     [id number]   [id number]       [id number]  [id number]    [id number]  [id number]    - Please obtain EKG  - Please obtain TTE to eval IE   - Monitor electrolytes and replete K to 4 and Mg to 2  - Appreciate ID rec. and continue IV ABX   - Continue care per primary team    Patient to be staffed with attending. Please await attending addendum   Patient is a 85y old Male with PMH of HTN, HLD, DM2, hypothyroidism, prostate cancer s/p prostatectomy, CKD, TIA, dual chamber pacemaker( 5/2024 at [facility]) presented as a transferred from [facility] for further workup of possible spinal cord injury.  Patient initially presented to [facility] for acute onset of sharp lower back pain and course c/b acute urinary retention. CT L Spine showed L-spine disc bulge and MRI L spine to follow. Hospital course is further c/b significant leukocytosis to 18.45, afebrile and positive blood culture with positive E. Fecalis on 5/11 and 5/12. CXR with clear lungs and VQ scan with very low probability of PE. ID consulted and pt. is on IV ABX Ampicillin and Rocephin. EP is consulted to eval for CIED infection. Device implanted in 4/2024 with interrogation reveals AP 90.1%,  1.4%. TTE in 11/2024 with EF 60-65%.      ## e. fecalis bacteremia   ## leukocytosis  ## Back pain  ## Dual chamber PPM ( 5/2024)         RECOMMENDATIONS:  - In light of e. fecalis bacteremia with a PPM, device (include wires) explant is warranted to ensure complete resolution of bacteremia. Device shows AP 90.1%,  1.4% ( AAI <=> DDD ) with SND of SB at 50s. leadless pacemaker will be warranted simultaneously with device extraction for his SND  - The process of the procedures along with the risks and benefits for each procedure were explained in detail which included but not limited to bleeding requiring transfusion, infection, stroke, plural effusion, esophageal injury, pericardial effusion, cardiac tamponade requiring chest tube, intubation, and death. Patient and family ( daughter/ HCP) expressed understanding and all questions were answered. Consent obtained.  - NPO after MN   - AM labs and T&S X2  - Please hold Eliquis tonight and kavitha for anticipated procedure  - Device implanted on 5/21/2024 by DR. JOSAFAT Figueredo at [facility]     [id number]   [id number]       [id number]  [id number]    [id number]  [id number]  - Please obtain EKG  - Please obtain TTE to eval IE   - Monitor electrolytes and replete K to 4 and Mg to 2  - Appreciate ID rec. and continue IV ABX   - Continue care per primary team    Patient to be staffed with attending. Please await attending addendum

## 2025-05-14 NOTE — CONSULT NOTE ADULT - NS ATTEND AMEND GEN_ALL_CORE FT
Patient with persistent E. Fecalis bacteremia. Has PPM. Needs extraction. Family is agreeable. Will plan for an Avier placement.

## 2025-05-14 NOTE — PROGRESS NOTE ADULT - SUBJECTIVE AND OBJECTIVE BOX
***Plan not finalized until attending attestation***    Raman Carmichael MD (PGY-1)  Internal Medicine  Contact via [employer] TEAMS    ******************************************    PROGRESS NOTE:     Patient is a 85y old  Male who presents with a chief complaint of back pain (13 May 2025 12:54)      INTERVAL EVENTS: No acute overnight events.     SUBJECTIVE: Patient seen and examined at bedside. This morning, the patient is comfortable and doing well. No acute complaints.    MEDICATIONS  (STANDING):  ampicillin  IVPB 2 Gram(s) IV Intermittent every 6 hours  apixaban 2.5 milliGRAM(s) Oral two times a day  aspirin  chewable 81 milliGRAM(s) Oral daily  cefTRIAXone   IVPB 2000 milliGRAM(s) IV Intermittent every 12 hours  chlorhexidine 2% Cloths 1 Application(s) Topical daily  cloNIDine 0.2 milliGRAM(s) Oral three times a day  dextrose 5%. 1000 milliLiter(s) (50 mL/Hr) IV Continuous <Continuous>  dextrose 5%. 1000 milliLiter(s) (100 mL/Hr) IV Continuous <Continuous>  dextrose 50% Injectable 25 Gram(s) IV Push once  dextrose 50% Injectable 12.5 Gram(s) IV Push once  dextrose 50% Injectable 25 Gram(s) IV Push once  dextrose Oral Gel 15 Gram(s) Oral once  glucagon  Injectable 1 milliGRAM(s) IntraMuscular once  hydrALAZINE 100 milliGRAM(s) Oral three times a day  insulin glargine Injectable (LANTUS) 24 Unit(s) SubCutaneous at bedtime  insulin lispro (ADMELOG) corrective regimen sliding scale   SubCutaneous three times a day before meals  insulin lispro (ADMELOG) corrective regimen sliding scale   SubCutaneous at bedtime  insulin lispro Injectable (ADMELOG) 8 Unit(s) SubCutaneous three times a day before meals  levothyroxine 150 MICROGram(s) Oral daily  NIFEdipine XL 30 milliGRAM(s) Oral daily  propranolol  milliGRAM(s) Oral daily  rosuvastatin 10 milliGRAM(s) Oral at bedtime  tamsulosin 0.4 milliGRAM(s) Oral at bedtime    MEDICATIONS  (PRN):  HYDROmorphone  Injectable 0.5 milliGRAM(s) IV Push every 4 hours PRN Severe Pain (7 - 10)      CAPILLARY BLOOD GLUCOSE  63 (13 May 2025 18:05)  65 (13 May 2025 18:03)      POCT Blood Glucose.: 137 mg/dL (13 May 2025 20:33)  POCT Blood Glucose.: 114 mg/dL (13 May 2025 20:04)  POCT Blood Glucose.: 90 mg/dL (13 May 2025 19:27)  POCT Blood Glucose.: 72 mg/dL (13 May 2025 18:49)  POCT Blood Glucose.: 66 mg/dL (13 May 2025 18:48)  POCT Blood Glucose.: 65 mg/dL (13 May 2025 18:03)  POCT Blood Glucose.: 110 mg/dL (13 May 2025 12:33)  POCT Blood Glucose.: 152 mg/dL (13 May 2025 09:26)    I&O's Summary    13 May 2025 07:01  -  14 May 2025 07:00  --------------------------------------------------------  IN: 0 mL / OUT: 1325 mL / NET: -1325 mL        PHYSICAL EXAM:  Vital Signs Last 24 Hrs  T(C): 36.6 (14 May 2025 06:40), Max: 36.8 (13 May 2025 21:31)  T(F): 97.8 (14 May 2025 06:40), Max: 98.3 (13 May 2025 21:31)  HR: 62 (14 May 2025 06:40) (59 - 62)  BP: 139/47 (14 May 2025 06:40) (111/43 - 145/50)  BP(mean): --  RR: 18 (14 May 2025 06:40) (17 - 18)  SpO2: 97% (14 May 2025 06:40) (92% - 100%)    Parameters below as of 14 May 2025 06:40  Patient On (Oxygen Delivery Method): nasal cannula  O2 Flow (L/min): 2      GENERAL: NAD, lying in bed comfortably  HEAD: Atraumatic, normocephalic  EYES: EOMI, PERRLA, conjunctiva and sclera clear  ENT: Moist mucous membranes  NECK: Supple, no JVD  HEART: S1, S2, Regular rate and rhythm, no murmurs, rubs, or gallops  LUNGS: Unlabored respirations, clear to auscultation bilaterally, no crackles, wheezing, or rhonchi  ABDOMEN: Soft, nontender, nondistended, +BS  EXTREMITIES: 2+ peripheral pulses bilaterally. No clubbing, cyanosis, or edema  NERVOUS SYSTEM:  A&Ox3, no focal deficits   SKIN: No rashes or lesions    LABS:                        9.5    12.45 )-----------( 217      ( 14 May 2025 07:46 )             29.5     05-13    139  |  104  |  83[H]  ----------------------------<  118[H]  4.0   |  25  |  1.83[H]    Ca    9.1      13 May 2025 03:30  Phos  3.5     05-13  Mg     2.70     05-13            Urinalysis Basic - ( 13 May 2025 03:30 )    Color: x / Appearance: x / SG: x / pH: x  Gluc: 118 mg/dL / Ketone: x  / Bili: x / Urobili: x   Blood: x / Protein: x / Nitrite: x   Leuk Esterase: x / RBC: x / WBC x   Sq Epi: x / Non Sq Epi: x / Bacteria: x        Culture - Blood (collected 12 May 2025 07:12)  Source: Blood Blood-Peripheral  Gram Stain (12 May 2025 22:19):    Growth in aerobic bottle: Gram Positive Cocci in Pairs and Chains    Growth in anaerobic bottle: Gram Positive Cocci in Pairs and Chains  Final Report (13 May 2025 16:37):    Growth in aerobic and anaerobic bottles: Enterococcus faecalis    See previous culture [id number]    Culture - Blood (collected 12 May 2025 07:07)  Source: Blood Blood-Peripheral  Gram Stain (12 May 2025 22:29):    Growth in aerobic bottle: Gram Positive Cocci in Pairs and Chains    Growth in anaerobic bottle: Gram Positive Cocci in Pairs and Chains  Final Report (13 May 2025 14:14):    Growth in aerobic and anaerobic bottles: Enterococcus faecalis    See previous culture [id number]    Culture - Blood (collected 11 May 2025 14:27)  Source: Blood Blood-Peripheral  Gram Stain (12 May 2025 06:28):    Growth in aerobic bottle: Gram Positive Cocci in Pairs and Chains    Growth in anaerobic bottle: Gram Positive Cocci in Pairs and Chains  Final Report (13 May 2025 16:32):    Growth in aerobic and anaerobic bottles: Enterococcus faecalis    Direct identification is available within approximately 3-5    hours either by Blood Panel Multiplexed PCR or Direct    MALDI-TOF. Details: [email or web address]  Organism: Blood Culture PCR  Enterococcus faecalis (13 May 2025 16:32)  Organism: Enterococcus faecalis (13 May 2025 16:32)  Organism: Blood Culture PCR (13 May 2025 16:32)        RADIOLOGY & ADDITIONAL TESTS:  Results Reviewed:   Imaging Personally Reviewed:  Electrocardiogram Personally Reviewed:  Tele: ***Plan not finalized until attending attestation***    Raman Carmichael MD (PGY-1)  Internal Medicine  Contact via [employer] TEAMS    ******************************************    PROGRESS NOTE:     Patient is a 85y old  Male who presents with a chief complaint of back pain (13 May 2025 12:54)      INTERVAL EVENTS: No acute overnight events.     SUBJECTIVE: Patient seen and examined at bedside. This morning, the patient is comfortable and doing well. No acute complaints.    MEDICATIONS  (STANDING):  ampicillin  IVPB 2 Gram(s) IV Intermittent every 6 hours  apixaban 2.5 milliGRAM(s) Oral two times a day  aspirin  chewable 81 milliGRAM(s) Oral daily  cefTRIAXone   IVPB 2000 milliGRAM(s) IV Intermittent every 12 hours  chlorhexidine 2% Cloths 1 Application(s) Topical daily  cloNIDine 0.2 milliGRAM(s) Oral three times a day  dextrose 5%. 1000 milliLiter(s) (50 mL/Hr) IV Continuous <Continuous>  dextrose 5%. 1000 milliLiter(s) (100 mL/Hr) IV Continuous <Continuous>  dextrose 50% Injectable 25 Gram(s) IV Push once  dextrose 50% Injectable 12.5 Gram(s) IV Push once  dextrose 50% Injectable 25 Gram(s) IV Push once  dextrose Oral Gel 15 Gram(s) Oral once  glucagon  Injectable 1 milliGRAM(s) IntraMuscular once  hydrALAZINE 100 milliGRAM(s) Oral three times a day  insulin glargine Injectable (LANTUS) 24 Unit(s) SubCutaneous at bedtime  insulin lispro (ADMELOG) corrective regimen sliding scale   SubCutaneous three times a day before meals  insulin lispro (ADMELOG) corrective regimen sliding scale   SubCutaneous at bedtime  insulin lispro Injectable (ADMELOG) 8 Unit(s) SubCutaneous three times a day before meals  levothyroxine 150 MICROGram(s) Oral daily  NIFEdipine XL 30 milliGRAM(s) Oral daily  propranolol  milliGRAM(s) Oral daily  rosuvastatin 10 milliGRAM(s) Oral at bedtime  tamsulosin 0.4 milliGRAM(s) Oral at bedtime    MEDICATIONS  (PRN):  HYDROmorphone  Injectable 0.5 milliGRAM(s) IV Push every 4 hours PRN Severe Pain (7 - 10)      CAPILLARY BLOOD GLUCOSE  63 (13 May 2025 18:05)  65 (13 May 2025 18:03)      POCT Blood Glucose.: 137 mg/dL (13 May 2025 20:33)  POCT Blood Glucose.: 114 mg/dL (13 May 2025 20:04)  POCT Blood Glucose.: 90 mg/dL (13 May 2025 19:27)  POCT Blood Glucose.: 72 mg/dL (13 May 2025 18:49)  POCT Blood Glucose.: 66 mg/dL (13 May 2025 18:48)  POCT Blood Glucose.: 65 mg/dL (13 May 2025 18:03)  POCT Blood Glucose.: 110 mg/dL (13 May 2025 12:33)  POCT Blood Glucose.: 152 mg/dL (13 May 2025 09:26)    I&O's Summary    13 May 2025 07:01  -  14 May 2025 07:00  --------------------------------------------------------  IN: 0 mL / OUT: 1325 mL / NET: -1325 mL        PHYSICAL EXAM:  Vital Signs Last 24 Hrs  T(C): 36.6 (14 May 2025 06:40), Max: 36.8 (13 May 2025 21:31)  T(F): 97.8 (14 May 2025 06:40), Max: 98.3 (13 May 2025 21:31)  HR: 62 (14 May 2025 06:40) (59 - 62)  BP: 139/47 (14 May 2025 06:40) (111/43 - 145/50)  BP(mean): --  RR: 18 (14 May 2025 06:40) (17 - 18)  SpO2: 97% (14 May 2025 06:40) (92% - 100%)    Parameters below as of 14 May 2025 06:40  Patient On (Oxygen Delivery Method): nasal cannula  O2 Flow (L/min): 2      GENERAL: NAD, lying in bed comfortably  HEAD: Atraumatic, normocephalic  EYES: EOMI, PERRLA, conjunctiva and sclera clear  ENT: Moist mucous membranes  NECK: Supple, no JVD  HEART: S1, S2, Regular rate and rhythm, no murmurs, rubs, or gallops  LUNGS: Unlabored respirations, clear to auscultation bilaterally, no crackles, wheezing, or rhonchi  ABDOMEN: Soft, nontender, nondistended, +BS  EXTREMITIES: 2+ peripheral pulses bilaterally. No clubbing, cyanosis, or edema  NERVOUS SYSTEM:  A&Ox3, no focal deficits, LE 2/5   SKIN: No rashes or lesions    LABS:                        9.5    12.45 )-----------( 217      ( 14 May 2025 07:46 )             29.5     05-13    139  |  104  |  83[H]  ----------------------------<  118[H]  4.0   |  25  |  1.83[H]    Ca    9.1      13 May 2025 03:30  Phos  3.5     05-13  Mg     2.70     05-13            Urinalysis Basic - ( 13 May 2025 03:30 )    Color: x / Appearance: x / SG: x / pH: x  Gluc: 118 mg/dL / Ketone: x  / Bili: x / Urobili: x   Blood: x / Protein: x / Nitrite: x   Leuk Esterase: x / RBC: x / WBC x   Sq Epi: x / Non Sq Epi: x / Bacteria: x        Culture - Blood (collected 12 May 2025 07:12)  Source: Blood Blood-Peripheral  Gram Stain (12 May 2025 22:19):    Growth in aerobic bottle: Gram Positive Cocci in Pairs and Chains    Growth in anaerobic bottle: Gram Positive Cocci in Pairs and Chains  Final Report (13 May 2025 16:37):    Growth in aerobic and anaerobic bottles: Enterococcus faecalis    See previous culture [id number]    Culture - Blood (collected 12 May 2025 07:07)  Source: Blood Blood-Peripheral  Gram Stain (12 May 2025 22:29):    Growth in aerobic bottle: Gram Positive Cocci in Pairs and Chains    Growth in anaerobic bottle: Gram Positive Cocci in Pairs and Chains  Final Report (13 May 2025 14:14):    Growth in aerobic and anaerobic bottles: Enterococcus faecalis    See previous culture [id number]    Culture - Blood (collected 11 May 2025 14:27)  Source: Blood Blood-Peripheral  Gram Stain (12 May 2025 06:28):    Growth in aerobic bottle: Gram Positive Cocci in Pairs and Chains    Growth in anaerobic bottle: Gram Positive Cocci in Pairs and Chains  Final Report (13 May 2025 16:32):    Growth in aerobic and anaerobic bottles: Enterococcus faecalis    Direct identification is available within approximately 3-5    hours either by Blood Panel Multiplexed PCR or Direct    MALDI-TOF. Details: [email or web address]  Organism: Blood Culture PCR  Enterococcus faecalis (13 May 2025 16:32)  Organism: Enterococcus faecalis (13 May 2025 16:32)  Organism: Blood Culture PCR (13 May 2025 16:32)        RADIOLOGY & ADDITIONAL TESTS:  Results Reviewed:   Imaging Personally Reviewed:  Electrocardiogram Personally Reviewed:  Tele: ***Plan not finalized until attending attestation***    Raman Carmichael MD (PGY-1)  Internal Medicine  Contact via [employer] TEAMS    ******************************************    PROGRESS NOTE:     Patient is a 85y old  Male who presents with a chief complaint of back pain (13 May 2025 12:54)    INTERVAL EVENTS: No acute overnight events.     SUBJECTIVE: Patient seen and examined at bedside.   Pt staing that he does not want to go into MRI again    MEDICATIONS  (STANDING):  ampicillin  IVPB 2 Gram(s) IV Intermittent every 6 hours  apixaban 2.5 milliGRAM(s) Oral two times a day  aspirin  chewable 81 milliGRAM(s) Oral daily  cefTRIAXone   IVPB 2000 milliGRAM(s) IV Intermittent every 12 hours  chlorhexidine 2% Cloths 1 Application(s) Topical daily  cloNIDine 0.2 milliGRAM(s) Oral three times a day  dextrose 5%. 1000 milliLiter(s) (50 mL/Hr) IV Continuous <Continuous>  dextrose 5%. 1000 milliLiter(s) (100 mL/Hr) IV Continuous <Continuous>  dextrose 50% Injectable 25 Gram(s) IV Push once  dextrose 50% Injectable 12.5 Gram(s) IV Push once  dextrose 50% Injectable 25 Gram(s) IV Push once  dextrose Oral Gel 15 Gram(s) Oral once  glucagon  Injectable 1 milliGRAM(s) IntraMuscular once  hydrALAZINE 100 milliGRAM(s) Oral three times a day  insulin glargine Injectable (LANTUS) 24 Unit(s) SubCutaneous at bedtime  insulin lispro (ADMELOG) corrective regimen sliding scale   SubCutaneous three times a day before meals  insulin lispro (ADMELOG) corrective regimen sliding scale   SubCutaneous at bedtime  insulin lispro Injectable (ADMELOG) 8 Unit(s) SubCutaneous three times a day before meals  levothyroxine 150 MICROGram(s) Oral daily  NIFEdipine XL 30 milliGRAM(s) Oral daily  propranolol  milliGRAM(s) Oral daily  rosuvastatin 10 milliGRAM(s) Oral at bedtime  tamsulosin 0.4 milliGRAM(s) Oral at bedtime    MEDICATIONS  (PRN):  HYDROmorphone  Injectable 0.5 milliGRAM(s) IV Push every 4 hours PRN Severe Pain (7 - 10)      CAPILLARY BLOOD GLUCOSE  63 (13 May 2025 18:05)  65 (13 May 2025 18:03)      POCT Blood Glucose.: 137 mg/dL (13 May 2025 20:33)  POCT Blood Glucose.: 114 mg/dL (13 May 2025 20:04)  POCT Blood Glucose.: 90 mg/dL (13 May 2025 19:27)  POCT Blood Glucose.: 72 mg/dL (13 May 2025 18:49)  POCT Blood Glucose.: 66 mg/dL (13 May 2025 18:48)  POCT Blood Glucose.: 65 mg/dL (13 May 2025 18:03)  POCT Blood Glucose.: 110 mg/dL (13 May 2025 12:33)  POCT Blood Glucose.: 152 mg/dL (13 May 2025 09:26)    I&O's Summary    13 May 2025 07:01  -  14 May 2025 07:00  --------------------------------------------------------  IN: 0 mL / OUT: 1325 mL / NET: -1325 mL        PHYSICAL EXAM:  Vital Signs Last 24 Hrs  T(C): 36.6 (14 May 2025 06:40), Max: 36.8 (13 May 2025 21:31)  T(F): 97.8 (14 May 2025 06:40), Max: 98.3 (13 May 2025 21:31)  HR: 62 (14 May 2025 06:40) (59 - 62)  BP: 139/47 (14 May 2025 06:40) (111/43 - 145/50)  BP(mean): --  RR: 18 (14 May 2025 06:40) (17 - 18)  SpO2: 97% (14 May 2025 06:40) (92% - 100%)    Parameters below as of 14 May 2025 06:40  Patient On (Oxygen Delivery Method): nasal cannula  O2 Flow (L/min): 2      GENERAL: NAD, lying in bed comfortably  HEAD: Atraumatic, normocephalic  EYES: EOMI, PERRLA, conjunctiva and sclera clear  ENT: Moist mucous membranes  NECK: Supple, no JVD  HEART: S1, S2, Regular rate and rhythm, no murmurs, rubs, or gallops  LUNGS: Unlabored respirations, clear to auscultation bilaterally, no crackles, wheezing, or rhonchi  ABDOMEN: Soft, nontender, nondistended, +BS  EXTREMITIES: 2+ peripheral pulses bilaterally. No clubbing, cyanosis, or edema  NERVOUS SYSTEM:  A&Ox3, LE R weaker than left  SKIN: No rashes or lesions  +Frye    LABS:                        9.5    12.45 )-----------( 217      ( 14 May 2025 07:46 )             29.5     05-13    139  |  104  |  83[H]  ----------------------------<  118[H]  4.0   |  25  |  1.83[H]    Ca    9.1      13 May 2025 03:30  Phos  3.5     05-13  Mg     2.70     05-13            Urinalysis Basic - ( 13 May 2025 03:30 )    Color: x / Appearance: x / SG: x / pH: x  Gluc: 118 mg/dL / Ketone: x  / Bili: x / Urobili: x   Blood: x / Protein: x / Nitrite: x   Leuk Esterase: x / RBC: x / WBC x   Sq Epi: x / Non Sq Epi: x / Bacteria: x        Culture - Blood (collected 12 May 2025 07:12)  Source: Blood Blood-Peripheral  Gram Stain (12 May 2025 22:19):    Growth in aerobic bottle: Gram Positive Cocci in Pairs and Chains    Growth in anaerobic bottle: Gram Positive Cocci in Pairs and Chains  Final Report (13 May 2025 16:37):    Growth in aerobic and anaerobic bottles: Enterococcus faecalis    See previous culture [id number]    Culture - Blood (collected 12 May 2025 07:07)  Source: Blood Blood-Peripheral  Gram Stain (12 May 2025 22:29):    Growth in aerobic bottle: Gram Positive Cocci in Pairs and Chains    Growth in anaerobic bottle: Gram Positive Cocci in Pairs and Chains  Final Report (13 May 2025 14:14):    Growth in aerobic and anaerobic bottles: Enterococcus faecalis    See previous culture [id number]    Culture - Blood (collected 11 May 2025 14:27)  Source: Blood Blood-Peripheral  Gram Stain (12 May 2025 06:28):    Growth in aerobic bottle: Gram Positive Cocci in Pairs and Chains    Growth in anaerobic bottle: Gram Positive Cocci in Pairs and Chains  Final Report (13 May 2025 16:32):    Growth in aerobic and anaerobic bottles: Enterococcus faecalis    Direct identification is available within approximately 3-5    hours either by Blood Panel Multiplexed PCR or Direct    MALDI-TOF. Details: [email or web address]  Organism: Blood Culture PCR  Enterococcus faecalis (13 May 2025 16:32)  Organism: Enterococcus faecalis (13 May 2025 16:32)  Organism: Blood Culture PCR (13 May 2025 16:32)

## 2025-05-14 NOTE — PROGRESS NOTE ADULT - ASSESSMENT
This is a 86 y/o M w/ PMhx HTN, HLD, DM2, hypothyroidism, prostate cancer s/p prostatectomy, CKD, TIA, pacemaker placed in 5/2024 for abnormal arrythmia initially admitted to [facility] on 5/6 for back pain, now transferred to [facility] for orthopedic spine. Labs w/ leukocytosis to 18, BCx w/ E faecalis in multiple sets.     #E faecalis bacteremia in the setting of PPM, c/f PPM infection, and possible IE   #LBP, L3-4 disc bulge  #Respiratory failure, improved     Overall, 86 y/o M w/ PMhx HTN, HLD, DM2, hypothyroidism, prostate cancer s/p prostatectomy, CKD, TIA, pacemaker placed in 5/2024 for abnormal arrythmia transferred to [facility] from [facility] for MRI spine and ortho evaluation give LBP and lumbar compression w/ urinary retention. Pt noted to have chills and leukocytosis to 18, now with high grade E faecalis bacteremia. Unclear source however UCx not done, U/A with 10 WBCs, CT A/P w/o IV contrast on 5/10 w/o acute infectious source, MRI L spine here limited exam, findings of stenosis, no clear OM/discitis, however no contrast.   Given PPM and high grade bacteremia, will need TTE and eventual MARIA LUISA with PPM removal. Would empirically treat for IE until MARIA LUISA done.      Recommendations:   1. Ampicillin 2 g q6, Ceftriaxone 2 g q12  2. TTE, will need MARIA LUISA eventually    3. Appreciate EP recommendations for PPM removal   4. Repeat BCx x 2 in the AM   5. If bacteremia not clearing, consider interval CT A/P with IV contrast     Thank you for consulting us and involving us in the management of this patient's case. In addition to reviewing history, imaging, documents, labs, microbiology, and infection control strategies and potential issues.     ID will continue to follow    Darrin Falk M.D.  Attending Physician  Division of Infectious Diseases  Department of Medicine    Please contact through MS Teams message.  Office: [phone] (after 5 PM or weekend). This is a 84 y/o M w/ PMhx HTN, HLD, DM2, hypothyroidism, prostate cancer s/p prostatectomy, CKD, TIA, pacemaker placed in 5/2024 for abnormal arrythmia initially admitted to [facility] on 5/6 for back pain, now transferred to [facility] for orthopedic spine. Labs w/ leukocytosis to 18, BCx w/ E faecalis in multiple sets.     #E faecalis bacteremia in the setting of PPM, c/f PPM infection, and possible IE   #LBP, L3-4 disc bulge  #Respiratory failure, improved     Overall, 84 y/o M w/ PMhx HTN, HLD, DM2, hypothyroidism, prostate cancer s/p prostatectomy, CKD, TIA, pacemaker placed in 5/2024 for abnormal arrythmia transferred to [facility] from [facility] for MRI spine and ortho evaluation give LBP and lumbar compression w/ urinary retention. Pt noted to have chills and leukocytosis to 18, now with high grade E faecalis bacteremia. Unclear source however UCx not done, U/A with 10 WBCs, CT A/P w/o IV contrast on 5/10 w/o acute infectious source, MRI L spine here limited exam, findings of stenosis, no clear OM/discitis, however no contrast.   Given PPM and high grade bacteremia, will need TTE and eventual MARIA LUISA with PPM removal. Would empirically treat for IE until MARIA LUISA done.      Recommendations:   1. Ampicillin 2 g q6, Ceftriaxone 2 g q12  2. TTE, will need MARIA LUISA eventually, would ask if EP can do intraop  3. Appreciate EP recommendations for PPM removal   4. Repeat BCx x 2 in the AM   5. If bacteremia not clearing, consider interval CT A/P with IV contrast  6. MRI incomplete d/t patient not able to tolerate, would consider repeating MRI L spine later if possible.     Thank you for consulting us and involving us in the management of this patient's case. In addition to reviewing history, imaging, documents, labs, microbiology, and infection control strategies and potential issues.     ID will continue to follow    Darrin Falk M.D.  Attending Physician  Division of Infectious Diseases  Department of Medicine    Please contact through MS Teams message.  Office: [phone] (after 5 PM or weekend).

## 2025-05-14 NOTE — PROGRESS NOTE ADULT - PROBLEM SELECTOR PLAN 5
#HTN     - Continue Home Hydralazine 100mg tid  - Continue Home Clonidine .2mg tid  - Hold Telmisartan 40mg qd in setting of JEROME   - Hold Chlorthalidone 25mg qd in setting of JEROME No abnormalities

## 2025-05-15 LAB
ALBUMIN SERPL ELPH-MCNC: 2.8 G/DL — LOW (ref 3.3–5)
ALP SERPL-CCNC: 56 U/L — SIGNIFICANT CHANGE UP (ref 40–120)
ALT FLD-CCNC: 10 U/L — SIGNIFICANT CHANGE UP (ref 4–41)
ANION GAP SERPL CALC-SCNC: 12 MMOL/L — SIGNIFICANT CHANGE UP (ref 7–14)
APTT BLD: 26.3 SEC — SIGNIFICANT CHANGE UP (ref 26.1–36.8)
AST SERPL-CCNC: 15 U/L — SIGNIFICANT CHANGE UP (ref 4–40)
BILIRUB SERPL-MCNC: 0.2 MG/DL — SIGNIFICANT CHANGE UP (ref 0.2–1.2)
BLD GP AB SCN SERPL QL: NEGATIVE — SIGNIFICANT CHANGE UP
BUN SERPL-MCNC: 69 MG/DL — HIGH (ref 7–23)
CALCIUM SERPL-MCNC: 8.7 MG/DL — SIGNIFICANT CHANGE UP (ref 8.4–10.5)
CHLORIDE SERPL-SCNC: 102 MMOL/L — SIGNIFICANT CHANGE UP (ref 98–107)
CO2 SERPL-SCNC: 23 MMOL/L — SIGNIFICANT CHANGE UP (ref 22–31)
CREAT SERPL-MCNC: 1.68 MG/DL — HIGH (ref 0.5–1.3)
EGFR: 39 ML/MIN/1.73M2 — LOW
EGFR: 39 ML/MIN/1.73M2 — LOW
GLUCOSE SERPL-MCNC: 255 MG/DL — HIGH (ref 70–99)
GRAM STN FLD: ABNORMAL
HCT VFR BLD CALC: 29.9 % — LOW (ref 39–50)
HGB BLD-MCNC: 9.7 G/DL — LOW (ref 13–17)
INR BLD: 1.14 RATIO — SIGNIFICANT CHANGE UP (ref 0.85–1.16)
MAGNESIUM SERPL-MCNC: 2.5 MG/DL — SIGNIFICANT CHANGE UP (ref 1.6–2.6)
MCHC RBC-ENTMCNC: 27.4 PG — SIGNIFICANT CHANGE UP (ref 27–34)
MCHC RBC-ENTMCNC: 32.4 G/DL — SIGNIFICANT CHANGE UP (ref 32–36)
MCV RBC AUTO: 84.5 FL — SIGNIFICANT CHANGE UP (ref 80–100)
MRSA PCR RESULT.: SIGNIFICANT CHANGE UP
NRBC # BLD AUTO: 0 K/UL — SIGNIFICANT CHANGE UP (ref 0–0)
NRBC # FLD: 0 K/UL — SIGNIFICANT CHANGE UP (ref 0–0)
NRBC BLD AUTO-RTO: 0 /100 WBCS — SIGNIFICANT CHANGE UP (ref 0–0)
PHOSPHATE SERPL-MCNC: 3.5 MG/DL — SIGNIFICANT CHANGE UP (ref 2.5–4.5)
PLATELET # BLD AUTO: 253 K/UL — SIGNIFICANT CHANGE UP (ref 150–400)
POTASSIUM SERPL-MCNC: 4.3 MMOL/L — SIGNIFICANT CHANGE UP (ref 3.5–5.3)
POTASSIUM SERPL-SCNC: 4.3 MMOL/L — SIGNIFICANT CHANGE UP (ref 3.5–5.3)
PROT SERPL-MCNC: 6.3 G/DL — SIGNIFICANT CHANGE UP (ref 6–8.3)
PROTHROM AB SERPL-ACNC: 13.6 SEC — HIGH (ref 9.9–13.4)
RBC # BLD: 3.54 M/UL — LOW (ref 4.2–5.8)
RBC # FLD: 15.2 % — HIGH (ref 10.3–14.5)
RH IG SCN BLD-IMP: POSITIVE — SIGNIFICANT CHANGE UP
S AUREUS DNA NOSE QL NAA+PROBE: SIGNIFICANT CHANGE UP
SODIUM SERPL-SCNC: 137 MMOL/L — SIGNIFICANT CHANGE UP (ref 135–145)
WBC # BLD: 13.85 K/UL — HIGH (ref 3.8–10.5)
WBC # FLD AUTO: 13.85 K/UL — HIGH (ref 3.8–10.5)

## 2025-05-15 PROCEDURE — 99232 SBSQ HOSP IP/OBS MODERATE 35: CPT

## 2025-05-15 PROCEDURE — 99233 SBSQ HOSP IP/OBS HIGH 50: CPT | Mod: GC

## 2025-05-15 PROCEDURE — 93010 ELECTROCARDIOGRAM REPORT: CPT

## 2025-05-15 PROCEDURE — G0545: CPT

## 2025-05-15 PROCEDURE — 99233 SBSQ HOSP IP/OBS HIGH 50: CPT

## 2025-05-15 RX ORDER — POLYETHYLENE GLYCOL 3350 17 G/17G
17 POWDER, FOR SOLUTION ORAL DAILY
Refills: 0 | Status: DISCONTINUED | OUTPATIENT
Start: 2025-05-15 | End: 2025-05-21

## 2025-05-15 RX ORDER — INSULIN LISPRO 100 U/ML
6 INJECTION, SOLUTION INTRAVENOUS; SUBCUTANEOUS
Refills: 0 | Status: DISCONTINUED | OUTPATIENT
Start: 2025-05-15 | End: 2025-06-06

## 2025-05-15 RX ORDER — BISACODYL 5 MG
10 TABLET, DELAYED RELEASE (ENTERIC COATED) ORAL ONCE
Refills: 0 | Status: COMPLETED | OUTPATIENT
Start: 2025-05-15 | End: 2025-05-15

## 2025-05-15 RX ORDER — APIXABAN 2.5 MG/1
2.5 TABLET, FILM COATED ORAL EVERY 12 HOURS
Refills: 0 | Status: COMPLETED | OUTPATIENT
Start: 2025-05-15 | End: 2025-05-15

## 2025-05-15 RX ORDER — INSULIN GLARGINE-YFGN 100 [IU]/ML
28 INJECTION, SOLUTION SUBCUTANEOUS AT BEDTIME
Refills: 0 | Status: DISCONTINUED | OUTPATIENT
Start: 2025-05-15 | End: 2025-05-26

## 2025-05-15 RX ADMIN — INSULIN GLARGINE-YFGN 28 UNIT(S): 100 INJECTION, SOLUTION SUBCUTANEOUS at 21:58

## 2025-05-15 RX ADMIN — Medication 0.2 MILLIGRAM(S): at 22:03

## 2025-05-15 RX ADMIN — Medication 0.2 MILLIGRAM(S): at 05:21

## 2025-05-15 RX ADMIN — Medication 100 MILLIGRAM(S): at 05:21

## 2025-05-15 RX ADMIN — INSULIN LISPRO 4: 100 INJECTION, SOLUTION INTRAVENOUS; SUBCUTANEOUS at 09:38

## 2025-05-15 RX ADMIN — AMPICILLIN SODIUM 200 GRAM(S): 1 INJECTION, POWDER, FOR SOLUTION INTRAMUSCULAR; INTRAVENOUS at 00:09

## 2025-05-15 RX ADMIN — LIDOCAINE HYDROCHLORIDE 1 PATCH: 20 JELLY TOPICAL at 20:00

## 2025-05-15 RX ADMIN — Medication 0.5 MILLIGRAM(S): at 09:36

## 2025-05-15 RX ADMIN — TAMSULOSIN HYDROCHLORIDE 0.4 MILLIGRAM(S): 0.4 CAPSULE ORAL at 22:01

## 2025-05-15 RX ADMIN — Medication 0.5 MILLIGRAM(S): at 14:56

## 2025-05-15 RX ADMIN — CEFTRIAXONE 100 MILLIGRAM(S): 500 INJECTION, POWDER, FOR SOLUTION INTRAMUSCULAR; INTRAVENOUS at 05:53

## 2025-05-15 RX ADMIN — Medication 120 MILLIGRAM(S): at 05:20

## 2025-05-15 RX ADMIN — Medication 1 APPLICATION(S): at 13:13

## 2025-05-15 RX ADMIN — Medication 150 MICROGRAM(S): at 04:07

## 2025-05-15 RX ADMIN — Medication 30 MILLIGRAM(S): at 05:21

## 2025-05-15 RX ADMIN — Medication 0.5 MILLIGRAM(S): at 19:30

## 2025-05-15 RX ADMIN — AMPICILLIN SODIUM 200 GRAM(S): 1 INJECTION, POWDER, FOR SOLUTION INTRAMUSCULAR; INTRAVENOUS at 05:19

## 2025-05-15 RX ADMIN — Medication 1 APPLICATION(S): at 13:30

## 2025-05-15 RX ADMIN — ROSUVASTATIN CALCIUM 10 MILLIGRAM(S): 5 TABLET, FILM COATED ORAL at 22:00

## 2025-05-15 RX ADMIN — Medication 0.5 MILLIGRAM(S): at 10:00

## 2025-05-15 RX ADMIN — INSULIN LISPRO 6 UNIT(S): 100 INJECTION, SOLUTION INTRAVENOUS; SUBCUTANEOUS at 18:54

## 2025-05-15 RX ADMIN — INSULIN LISPRO 4: 100 INJECTION, SOLUTION INTRAVENOUS; SUBCUTANEOUS at 13:09

## 2025-05-15 RX ADMIN — Medication 0.2 MILLIGRAM(S): at 13:09

## 2025-05-15 RX ADMIN — Medication 100 MILLIGRAM(S): at 22:03

## 2025-05-15 RX ADMIN — INSULIN LISPRO 6 UNIT(S): 100 INJECTION, SOLUTION INTRAVENOUS; SUBCUTANEOUS at 13:10

## 2025-05-15 RX ADMIN — Medication 0.5 MILLIGRAM(S): at 18:55

## 2025-05-15 RX ADMIN — CEFTRIAXONE 100 MILLIGRAM(S): 500 INJECTION, POWDER, FOR SOLUTION INTRAMUSCULAR; INTRAVENOUS at 17:20

## 2025-05-15 RX ADMIN — Medication 0.5 MILLIGRAM(S): at 03:01

## 2025-05-15 RX ADMIN — Medication 10 MILLIGRAM(S): at 17:19

## 2025-05-15 RX ADMIN — LIDOCAINE HYDROCHLORIDE 1 PATCH: 20 JELLY TOPICAL at 13:07

## 2025-05-15 RX ADMIN — INSULIN LISPRO 5 UNIT(S): 100 INJECTION, SOLUTION INTRAVENOUS; SUBCUTANEOUS at 09:40

## 2025-05-15 RX ADMIN — Medication 2 TABLET(S): at 00:09

## 2025-05-15 RX ADMIN — Medication 81 MILLIGRAM(S): at 13:12

## 2025-05-15 RX ADMIN — LIDOCAINE HYDROCHLORIDE 1 PATCH: 20 JELLY TOPICAL at 01:00

## 2025-05-15 RX ADMIN — APIXABAN 2.5 MILLIGRAM(S): 2.5 TABLET, FILM COATED ORAL at 13:02

## 2025-05-15 RX ADMIN — Medication 100 MILLIGRAM(S): at 13:12

## 2025-05-15 RX ADMIN — Medication 0.5 MILLIGRAM(S): at 14:26

## 2025-05-15 RX ADMIN — AMPICILLIN SODIUM 200 GRAM(S): 1 INJECTION, POWDER, FOR SOLUTION INTRAMUSCULAR; INTRAVENOUS at 19:47

## 2025-05-15 RX ADMIN — Medication 0.5 MILLIGRAM(S): at 03:36

## 2025-05-15 RX ADMIN — AMPICILLIN SODIUM 200 GRAM(S): 1 INJECTION, POWDER, FOR SOLUTION INTRAMUSCULAR; INTRAVENOUS at 14:26

## 2025-05-15 NOTE — PROGRESS NOTE ADULT - SUBJECTIVE AND OBJECTIVE BOX
***Plan not finalized until attending attestation***    Raman Carmichael MD (PGY-1)  Internal Medicine  Contact via [employer] TEAMS    ******************************************    PROGRESS NOTE:     Patient is a 85y old  Male who presents with a chief complaint of back pain (14 May 2025 12:03)    INTERVAL EVENTS: No acute overnight events.     SUBJECTIVE: Patient seen and examined at bedside. This morning, the patient is comfortable and doing well. No acute complaints.  Has back pain when moving  Still does not want MRI    MEDICATIONS  (STANDING):  ampicillin  IVPB 2 Gram(s) IV Intermittent every 6 hours  aspirin  chewable 81 milliGRAM(s) Oral daily  bisacodyl 5 milliGRAM(s) Oral at bedtime  cefTRIAXone   IVPB 2000 milliGRAM(s) IV Intermittent every 12 hours  chlorhexidine 2% Cloths 1 Application(s) Topical daily  chlorhexidine 2% Cloths 1 Application(s) Topical daily  cloNIDine 0.2 milliGRAM(s) Oral three times a day  dextrose 5%. 1000 milliLiter(s) (50 mL/Hr) IV Continuous <Continuous>  dextrose 5%. 1000 milliLiter(s) (100 mL/Hr) IV Continuous <Continuous>  dextrose 50% Injectable 25 Gram(s) IV Push once  dextrose 50% Injectable 12.5 Gram(s) IV Push once  dextrose 50% Injectable 25 Gram(s) IV Push once  dextrose Oral Gel 15 Gram(s) Oral once  glucagon  Injectable 1 milliGRAM(s) IntraMuscular once  hydrALAZINE 100 milliGRAM(s) Oral three times a day  insulin glargine Injectable (LANTUS) 24 Unit(s) SubCutaneous at bedtime  insulin lispro (ADMELOG) corrective regimen sliding scale   SubCutaneous three times a day before meals  insulin lispro (ADMELOG) corrective regimen sliding scale   SubCutaneous at bedtime  insulin lispro Injectable (ADMELOG) 5 Unit(s) SubCutaneous three times a day before meals  levothyroxine 150 MICROGram(s) Oral daily  lidocaine   4% Patch 1 Patch Transdermal daily  NIFEdipine XL 30 milliGRAM(s) Oral daily  propranolol  milliGRAM(s) Oral daily  rosuvastatin 10 milliGRAM(s) Oral at bedtime  senna 2 Tablet(s) Oral at bedtime  tamsulosin 0.4 milliGRAM(s) Oral at bedtime    MEDICATIONS  (PRN):  HYDROmorphone  Injectable 0.5 milliGRAM(s) IV Push every 4 hours PRN Severe Pain (7 - 10)      CAPILLARY BLOOD GLUCOSE      POCT Blood Glucose.: 232 mg/dL (14 May 2025 21:50)  POCT Blood Glucose.: 179 mg/dL (14 May 2025 17:41)  POCT Blood Glucose.: 216 mg/dL (14 May 2025 12:59)  POCT Blood Glucose.: 240 mg/dL (14 May 2025 08:42)    I&O's Summary      PHYSICAL EXAM:  Vital Signs Last 24 Hrs  T(C): 36.4 (15 May 2025 05:01), Max: 36.9 (14 May 2025 21:06)  T(F): 97.5 (15 May 2025 05:01), Max: 98.5 (14 May 2025 21:06)  HR: 61 (15 May 2025 05:01) (59 - 63)  BP: 125/48 (15 May 2025 05:01) (125/40 - 151/55)  BP(mean): 68 (14 May 2025 11:55) (68 - 68)  RR: 18 (15 May 2025 05:01) (18 - 19)  SpO2: 98% (15 May 2025 05:01) (95% - 99%)    Parameters below as of 15 May 2025 05:01  Patient On (Oxygen Delivery Method): nasal cannula  O2 Flow (L/min): 2      GENERAL: NAD, lying in bed comfortably  HEAD: Atraumatic, normocephalic  EYES: EOMI, PERRLA, conjunctiva and sclera clear  ENT: Moist mucous membranes  NECK: Supple, no JVD  HEART: S1, S2, Regular rate and rhythm, no murmurs, rubs, or gallops  LUNGS: Unlabored respirations, clear to auscultation bilaterally, no crackles, wheezing, or rhonchi  ABDOMEN: Soft, nontender, nondistended, +BS  EXTREMITIES: 2+ peripheral pulses bilaterally. No clubbing, cyanosis, or edema  NERVOUS SYSTEM:  A&Ox3, LE R weaker than left  SKIN: No rashes or lesions  +Frye    LABS:                        9.5    12.45 )-----------( 217      ( 14 May 2025 07:46 )             29.5     05-14    137  |  102  |  77[H]  ----------------------------<  244[H]  4.0   |  26  |  1.82[H]    Ca    8.7      14 May 2025 07:46  Phos  3.7     05-14  Mg     2.70     05-14    TPro  6.1  /  Alb  2.8[L]  /  TBili  0.3  /  DBili  x   /  AST  14  /  ALT  8   /  AlkPhos  57  05-14          Urinalysis Basic - ( 14 May 2025 07:46 )    Color: x / Appearance: x / SG: x / pH: x  Gluc: 244 mg/dL / Ketone: x  / Bili: x / Urobili: x   Blood: x / Protein: x / Nitrite: x   Leuk Esterase: x / RBC: x / WBC x   Sq Epi: x / Non Sq Epi: x / Bacteria: x

## 2025-05-15 NOTE — PROGRESS NOTE ADULT - PROBLEM SELECTOR PLAN 4
acute urinary retention in the setting of back pain and immobility    - wong in place  - Will TOV after MRI and possible ortho intervention acute urinary retention in the setting of back pain and immobility    - wong in place

## 2025-05-15 NOTE — PROGRESS NOTE ADULT - PROBLEM SELECTOR PLAN 2
- BCx positive for E faecalis   - Only 1 bottle was sent and is positive -> repeat Bcx positive  - Started ampicillin 2g q6 and ceftriaxone per ID  - TTE ordered  - EP consulted regarding possible removing PPM -> plan for removal 5/15  - Repeat Cx ordered - BCx positive for E faecalis   - Only 1 bottle was sent and is positive -> repeat Bcx positive  - Started ampicillin 2g q6 and ceftriaxone per ID  - TTE ordered -> EF 64% mild LV dystolic dysfunction  - EP consulted regarding possible removing PPM -> plan for removal 5/16  - NPO midnight, morning labs ordered  - Repeat Cx in lab  - MARIA LUISA ordered per ID

## 2025-05-15 NOTE — PROGRESS NOTE ADULT - ASSESSMENT
Pt is an 85M with HTN, HLD, DM2, prostate cancer s/p prostatectomy, CKD, TIA with a pacemaker who presents transferred from [place] for further evaluation of low back pain secondary to lumbar compression deformities. EP on board to clear PPM for MRI compatibility. Found to have e facaelis bacteremia. On abx, unable to tolerate MRI. PPM removal on 5/15 Pt is an 85M with HTN, HLD, DM2, prostate cancer s/p prostatectomy, CKD, TIA with a pacemaker who presents transferred from [place] for further evaluation of low back pain secondary to lumbar compression deformities. EP on board to clear PPM for MRI compatibility. Found to have e facaelis bacteremia. On abx, unable to tolerate MRI. PPM removal on 5/16

## 2025-05-15 NOTE — PROGRESS NOTE ADULT - SUBJECTIVE AND OBJECTIVE BOX
Interval history:   no acute overnight event        PAST MEDICAL & SURGICAL HISTORY:  No pertinent past medical history    HTN (hypertension)    HLD (hyperlipidemia)    DM (diabetes mellitus)    TIA (transient ischemic attack)    Prostate cancer    No significant past surgical history    S/P prostatectomy    Pacemaker    H/O thyroidectomy        MEDICATIONS  (STANDING):  ampicillin  IVPB 2 Gram(s) IV Intermittent every 6 hours  aspirin  chewable 81 milliGRAM(s) Oral daily  bisacodyl 5 milliGRAM(s) Oral at bedtime  cefTRIAXone   IVPB 2000 milliGRAM(s) IV Intermittent every 12 hours  chlorhexidine 2% Cloths 1 Application(s) Topical daily  chlorhexidine 2% Cloths 1 Application(s) Topical daily  cloNIDine 0.2 milliGRAM(s) Oral three times a day  dextrose 5%. 1000 milliLiter(s) (50 mL/Hr) IV Continuous <Continuous>  dextrose 5%. 1000 milliLiter(s) (100 mL/Hr) IV Continuous <Continuous>  dextrose 50% Injectable 25 Gram(s) IV Push once  dextrose 50% Injectable 12.5 Gram(s) IV Push once  dextrose 50% Injectable 25 Gram(s) IV Push once  dextrose Oral Gel 15 Gram(s) Oral once  glucagon  Injectable 1 milliGRAM(s) IntraMuscular once  hydrALAZINE 100 milliGRAM(s) Oral three times a day  insulin glargine Injectable (LANTUS) 24 Unit(s) SubCutaneous at bedtime  insulin lispro (ADMELOG) corrective regimen sliding scale   SubCutaneous three times a day before meals  insulin lispro (ADMELOG) corrective regimen sliding scale   SubCutaneous at bedtime  insulin lispro Injectable (ADMELOG) 5 Unit(s) SubCutaneous three times a day before meals  levothyroxine 150 MICROGram(s) Oral daily  lidocaine   4% Patch 1 Patch Transdermal daily  NIFEdipine XL 30 milliGRAM(s) Oral daily  propranolol  milliGRAM(s) Oral daily  rosuvastatin 10 milliGRAM(s) Oral at bedtime  senna 2 Tablet(s) Oral at bedtime  tamsulosin 0.4 milliGRAM(s) Oral at bedtime    MEDICATIONS  (PRN):  HYDROmorphone  Injectable 0.5 milliGRAM(s) IV Push every 4 hours PRN Severe Pain (7 - 10)            Vital Signs Last 24 Hrs  T(C): 36.4 (15 May 2025 05:01), Max: 36.9 (14 May 2025 21:06)  T(F): 97.5 (15 May 2025 05:01), Max: 98.5 (14 May 2025 21:06)  HR: 61 (15 May 2025 05:01) (59 - 63)  BP: 125/48 (15 May 2025 05:01) (125/40 - 151/55)  BP(mean): 68 (14 May 2025 11:55) (68 - 68)  RR: 18 (15 May 2025 05:01) (18 - 19)  SpO2: 98% (15 May 2025 05:01) (95% - 99%)    Parameters below as of 15 May 2025 05:01  Patient On (Oxygen Delivery Method): nasal cannula  O2 Flow (L/min): 2              INTERPRETATION OF TELEMETRY: NOT on Tele        LABS:                        9.7    13.85 )-----------( 253      ( 15 May 2025 06:48 )             29.9     05-15    137  |  102  |  69[H]  ----------------------------<  255[H]  4.3   |  23  |  1.68[H]    Ca    8.7      15 May 2025 06:48  Phos  3.5     05-15  Mg     2.50     05-15    TPro  6.3  /  Alb  2.8[L]  /  TBili  0.2  /  DBili  x   /  AST  15  /  ALT  10  /  AlkPhos  56  05-15        PT/INR - ( 15 May 2025 06:48 )   PT: 13.6 sec;   INR: 1.14 ratio         PTT - ( 15 May 2025 06:48 )  PTT:26.3 sec  Urinalysis Basic - ( 15 May 2025 06:48 )    Color: x / Appearance: x / SG: x / pH: x  Gluc: 255 mg/dL / Ketone: x  / Bili: x / Urobili: x   Blood: x / Protein: x / Nitrite: x   Leuk Esterase: x / RBC: x / WBC x   Sq Epi: x / Non Sq Epi: x / Bacteria: x      I&O's Summary    BNP  RADIOLOGY & ADDITIONAL STUDIES:    CONCLUSIONS:      1. Technically difficult image quality.   2. Left ventricular cavity is normal in size. Left ventricular systolic function is normal with an ejection fraction of 64 % by Melgar's method of disks. There are no regional wall motion abnormalities seen.   3. There is mild (grade 1) left ventricular diastolic dysfunction.   4. The right atrium is dilated.   5. Normal right ventricular cavity size and normal right ventricular systolic function.   6. No significant valvular disease.   7. Trileaflet aortic valve with normal systolic excursion. There is calcification of the aortic valve leaflets.   8. No pericardial effusion seen.      PHYSICAL EXAM:    GENERAL: In no apparent distress, well nourished, and hydrated.  HEART: Regular rate and rhythm; No murmurs, rubs, or gallops.  PULMONARY: Clear to auscultation and percussion.    ABDOMEN: Soft, Nontender, Nondistended; Bowel sounds present  EXTREMITIES: Peripheral Pulses present  NEUROLOGICAL: Grossly nonfocal

## 2025-05-15 NOTE — PROGRESS NOTE ADULT - SUBJECTIVE AND OBJECTIVE BOX
Infectious Diseases Follow Up:    Patient is a 85y old  Male who presents with a chief complaint of back pain (15 May 2025 09:02)      Interval History/ROS:   No acute events, back pain okay at rest    Allergies  gabapentin (Other)        ANTIMICROBIALS:  ampicillin  IVPB 2 every 6 hours  cefTRIAXone   IVPB 2000 every 12 hours      Current Abx:     Previous Abx     OTHER MEDS:  MEDICATIONS  (STANDING):  apixaban 2.5 every 12 hours  aspirin  chewable 81 daily  bisacodyl 5 at bedtime  cloNIDine 0.2 three times a day  dextrose 50% Injectable 25 once  dextrose 50% Injectable 12.5 once  dextrose 50% Injectable 25 once  dextrose Oral Gel 15 once  glucagon  Injectable 1 once  hydrALAZINE 100 three times a day  HYDROmorphone  Injectable 0.5 every 4 hours PRN  insulin glargine Injectable (LANTUS) 28 at bedtime  insulin lispro (ADMELOG) corrective regimen sliding scale  three times a day before meals  insulin lispro (ADMELOG) corrective regimen sliding scale  at bedtime  insulin lispro Injectable (ADMELOG) 6 three times a day before meals  levothyroxine 150 daily  NIFEdipine XL 30 daily  propranolol  daily  rosuvastatin 10 at bedtime  senna 2 at bedtime  tamsulosin 0.4 at bedtime      Vital Signs Last 24 Hrs  T(C): 36.4 (15 May 2025 09:30), Max: 36.9 (14 May 2025 21:06)  T(F): 97.5 (15 May 2025 09:30), Max: 98.5 (14 May 2025 21:06)  HR: 62 (15 May 2025 09:30) (59 - 63)  BP: 157/50 (15 May 2025 09:30) (125/40 - 157/50)  BP(mean): --  RR: 19 (15 May 2025 09:30) (18 - 19)  SpO2: 93% (15 May 2025 09:30) (93% - 99%)    Parameters below as of 15 May 2025 09:30  Patient On (Oxygen Delivery Method): nasal cannula  O2 Flow (L/min): 2      PHYSICAL EXAM:  GENERAL: NAD, well-developed  HEAD:  Atraumatic, Normocephalic  EYES: EOMI, conjunctiva and sclera clear  CHEST/LUNG: On RA, not in respiratory distress, clear to auscultation bilaterally; L chest w/ PPM site well appearing   HEART: Regular rate and rhythm;   ABDOMEN: Soft, Nontender, Nondistended;   PSYCH: AAOx3                          9.7    13.85 )-----------( 253      ( 15 May 2025 06:48 )             29.9       05-15    137  |  102  |  69[H]  ----------------------------<  255[H]  4.3   |  23  |  1.68[H]    Ca    8.7      15 May 2025 06:48  Phos  3.5     05-15  Mg     2.50     05-15    TPro  6.3  /  Alb  2.8[L]  /  TBili  0.2  /  DBili  x   /  AST  15  /  ALT  10  /  AlkPhos  56  05-15      Urinalysis Basic - ( 15 May 2025 06:48 )    Color: x / Appearance: x / SG: x / pH: x  Gluc: 255 mg/dL / Ketone: x  / Bili: x / Urobili: x   Blood: x / Protein: x / Nitrite: x   Leuk Esterase: x / RBC: x / WBC x   Sq Epi: x / Non Sq Epi: x / Bacteria: x        MICROBIOLOGY:  v  Blood Blood-Peripheral  05-12-25   Growth in aerobic and anaerobic bottles: Enterococcus faecalis  See previous culture [id number]  --    Growth in aerobic bottle: Gram Positive Cocci in Pairs and Chains  Growth in anaerobic bottle: Gram Positive Cocci in Pairs and Chains      Blood Blood-Peripheral  05-12-25   Growth in aerobic and anaerobic bottles: Enterococcus faecalis  See previous culture [id number]  --    Growth in aerobic bottle: Gram Positive Cocci in Pairs and Chains  Growth in anaerobic bottle: Gram Positive Cocci in Pairs and Chains      Blood Blood-Peripheral  05-11-25   Growth in aerobic and anaerobic bottles: Enterococcus faecalis  Direct identification is available within approximately 3-5  hours either by Blood Panel Multiplexed PCR or Direct  MALDI-TOF. Details: [email or web address]  --  Blood Culture PCR  Enterococcus faecalis                RADIOLOGY:

## 2025-05-15 NOTE — PROGRESS NOTE ADULT - PROBLEM SELECTOR PLAN 1
CT Lumbar Spine: L3-L4 disc bulge, L4-L5 left paracentral-foraminal disc protrusion, multiple mild lumbar vertebral body compression deformities  . L3-L4 disc bulge, resulting in severe central canal, bilateral lateral recess and moderate bilateral neural foramen stenosis with facet arthrosis and ligamentum flavum hypertrophy.    - Pain: iv tylenol prn, dilaudid .5mg mod pain, dilaudid 1mg severe pain  - Ortho Consulted; recs appreciated  - TLSO brace  - MRI wo con (CrCl 14 so avoiding gadolinium) to r/o cauda equina and malignant disease, gely with history of prostate ca -> MRI was not tolerated given loudness of machine and pain. Pt does not want to go into MRI machine again    - Ortho reached out to about Pt's worsening progressive BL LE symptoms CT Lumbar Spine: L3-L4 disc bulge, L4-L5 left paracentral-foraminal disc protrusion, multiple mild lumbar vertebral body compression deformities  . L3-L4 disc bulge, resulting in severe central canal, bilateral lateral recess and moderate bilateral neural foramen stenosis with facet arthrosis and ligamentum flavum hypertrophy.    - Pain: iv tylenol prn, dilaudid .5mg mod pain, dilaudid 1mg severe pain  - Ortho Consulted; recs appreciated  - TLSO brace  - MRI wo con (CrCl 14 so avoiding gadolinium) to r/o cauda equina and malignant disease, gely with history of prostate ca -> MRI was not tolerated given loudness of machine and pain. Pt does not want to go into MRI machine again

## 2025-05-15 NOTE — PROGRESS NOTE ADULT - ATTENDING COMMENTS
85 year old transferred from [place] for further evaluation of low back pain secondary to lumbar compression deformities found to have e facaelis bacteremia.    Remains Afebrile    5/11: Bcx Enterococcus  5/12: BCx; Enterococcus    #Enterococcus Bacertemia:  -Continue ampicillin and CTX, awaiting repeat bcx, concern for involvement of spine given pain however patient unable to tolerate MRI; for PPM removed, EP following, needs MARIA LUISA    #Back Pain  - unable to tolerate MRI  - otho following  -continue pain control, add lidocaine patch  #hx TIA  continue DOAC, statin  #DM  - titrate insulin  #Urinary retention  - continue wong; tamsulosin  rest as above .

## 2025-05-15 NOTE — PROGRESS NOTE ADULT - ASSESSMENT
Patient is a 85y old Male with PMH of HTN, HLD, DM2, hypothyroidism, prostate cancer s/p prostatectomy, CKD, TIA, dual chamber pacemaker( 5/2024 at [facility]) presented as a transferred from [facility] for further workup of possible spinal cord injury.  Patient initially presented to [facility] for acute onset of sharp lower back pain and course c/b acute urinary retention. CT L Spine showed L-spine disc bulge and MRI L spine to follow. Hospital course is further c/b significant leukocytosis to 18.45, afebrile and positive blood culture with positive E. Fecalis on 5/11 and 5/12. CXR with clear lungs and VQ scan with very low probability of PE. ID consulted and pt. is on IV ABX Ampicillin and Rocephin. EP is consulted to eval for CIED infection. Device implanted in 4/2024 with interrogation reveals AP 90.1%,  1.4%. TTE in 11/2024 with EF 60-65%.      ## e. fecalis bacteremia   ## leukocytosis  ## Back pain  ## Dual chamber PPM ( 5/2024)         RECOMMENDATIONS:  - In light of e. fecalis bacteremia with a PPM, device (include wires) explant is warranted to ensure complete resolution of bacteremia. Device shows AP 90.1%,  1.4% ( AAI <=> DDD ) with SND of SB at 50s. leadless pacemaker will be warranted simultaneously with device extraction for his SND  - The process of the procedures along with the risks and benefits for each procedure were explained and consent obtained.  - NPO after MN   - AM labs and keep T&S X2 active  - Please hold Eliquis tonight and kavitha for anticipated procedure  - Device implanted on 5/21/2024 by DR. JOSAFAT Figueredo at [facility]     [id number]   [id number]       [id number]  [id number]    [id number]  [id number]  - Please obtain EKG  - TTE with EF 64%  - Monitor electrolytes and replete K to 4 and Mg to 2  - Appreciate ID rec. and continue IV ABX, pending repeat blood culture  - Continue care per primary team

## 2025-05-15 NOTE — PROGRESS NOTE ADULT - PROBLEM SELECTOR PLAN 6
Home regimen: lantus 24u, admelog 12u tid with meals   - lantus 20u qhs, humalog 8u tid with meals, low ISS - lantus 28u qhs, humalog 6u tid with meals, low ISS  - Tending to be hyperglycemic on FSG

## 2025-05-15 NOTE — PROGRESS NOTE ADULT - ASSESSMENT
This is a 84 y/o M w/ PMhx HTN, HLD, DM2, hypothyroidism, prostate cancer s/p prostatectomy, CKD, TIA, pacemaker placed in 5/2024 for abnormal arrythmia initially admitted to [facility] on 5/6 for back pain, now transferred to [facility] for orthopedic spine. Labs w/ leukocytosis to 18, BCx w/ E faecalis in multiple sets.     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 Pt noted to have chills and leukocytosis to 18, now with high grade E faecalis bacteremia. Unclear source however UCx not done, U/A with 10 WBCs, CT A/P w/o IV contrast on 5/10 w/o acute infectious source, MRI L spine here limited exam, findings of stenosis, no clear OM/discitis, however no contrast.   Given PPM and high grade bacteremia, will need TTE and eventual MARIA LUISA with PPM removal. Would empirically treat for IE until MARIA LUISA done.      Recommendations:   1. Ampicillin 2 g q6, Ceftriaxone 2 g q12  2. TTE difficult views, would obtain MARIA LUISA  3. Appreciate EP recommendations for PPM removal   4. Repeat BCx x 2 sent 5/14  5. If bacteremia not clearing, consider interval CT A/P with IV contrast  6. MRI incomplete d/t patient not able to tolerate, would consider repeating MRI L spine later if possible.     Thank you for consulting us and involving us in the management of this patient's case. In addition to reviewing history, imaging, documents, labs, microbiology, and infection control strategies and potential issues.     ID will continue to follow    Darrin Falk M.D.  Attending Physician  Division of Infectious Diseases  Department of Medicine    Please contact through MS Teams message.  Office: [phone] (after 5 PM or weekend).

## 2025-05-16 LAB
ALBUMIN SERPL ELPH-MCNC: 2.7 G/DL — LOW (ref 3.3–5)
ALP SERPL-CCNC: 54 U/L — SIGNIFICANT CHANGE UP (ref 40–120)
ALT FLD-CCNC: 8 U/L — SIGNIFICANT CHANGE UP (ref 4–41)
ANION GAP SERPL CALC-SCNC: 10 MMOL/L — SIGNIFICANT CHANGE UP (ref 7–14)
APTT BLD: 23.8 SEC — LOW (ref 26.1–36.8)
AST SERPL-CCNC: 13 U/L — SIGNIFICANT CHANGE UP (ref 4–40)
BILIRUB SERPL-MCNC: 0.3 MG/DL — SIGNIFICANT CHANGE UP (ref 0.2–1.2)
BLD GP AB SCN SERPL QL: NEGATIVE — SIGNIFICANT CHANGE UP
BUN SERPL-MCNC: 58 MG/DL — HIGH (ref 7–23)
CALCIUM SERPL-MCNC: 8.8 MG/DL — SIGNIFICANT CHANGE UP (ref 8.4–10.5)
CHLORIDE SERPL-SCNC: 105 MMOL/L — SIGNIFICANT CHANGE UP (ref 98–107)
CO2 SERPL-SCNC: 25 MMOL/L — SIGNIFICANT CHANGE UP (ref 22–31)
CREAT SERPL-MCNC: 1.58 MG/DL — HIGH (ref 0.5–1.3)
EGFR: 42 ML/MIN/1.73M2 — LOW
EGFR: 42 ML/MIN/1.73M2 — LOW
GLUCOSE SERPL-MCNC: 201 MG/DL — HIGH (ref 70–99)
HCT VFR BLD CALC: 27.1 % — LOW (ref 39–50)
HGB BLD-MCNC: 9 G/DL — LOW (ref 13–17)
INR BLD: 1.14 RATIO — SIGNIFICANT CHANGE UP (ref 0.85–1.16)
MAGNESIUM SERPL-MCNC: 2.5 MG/DL — SIGNIFICANT CHANGE UP (ref 1.6–2.6)
MCHC RBC-ENTMCNC: 27.6 PG — SIGNIFICANT CHANGE UP (ref 27–34)
MCHC RBC-ENTMCNC: 33.2 G/DL — SIGNIFICANT CHANGE UP (ref 32–36)
MCV RBC AUTO: 83.1 FL — SIGNIFICANT CHANGE UP (ref 80–100)
NRBC # BLD AUTO: 0 K/UL — SIGNIFICANT CHANGE UP (ref 0–0)
NRBC # FLD: 0 K/UL — SIGNIFICANT CHANGE UP (ref 0–0)
NRBC BLD AUTO-RTO: 0 /100 WBCS — SIGNIFICANT CHANGE UP (ref 0–0)
PHOSPHATE SERPL-MCNC: 3.2 MG/DL — SIGNIFICANT CHANGE UP (ref 2.5–4.5)
PLATELET # BLD AUTO: 237 K/UL — SIGNIFICANT CHANGE UP (ref 150–400)
POTASSIUM SERPL-MCNC: 3.9 MMOL/L — SIGNIFICANT CHANGE UP (ref 3.5–5.3)
POTASSIUM SERPL-SCNC: 3.9 MMOL/L — SIGNIFICANT CHANGE UP (ref 3.5–5.3)
PROT SERPL-MCNC: 6 G/DL — SIGNIFICANT CHANGE UP (ref 6–8.3)
PROTHROM AB SERPL-ACNC: 13.2 SEC — SIGNIFICANT CHANGE UP (ref 9.9–13.4)
RBC # BLD: 3.26 M/UL — LOW (ref 4.2–5.8)
RBC # FLD: 14.9 % — HIGH (ref 10.3–14.5)
RH IG SCN BLD-IMP: POSITIVE — SIGNIFICANT CHANGE UP
SODIUM SERPL-SCNC: 140 MMOL/L — SIGNIFICANT CHANGE UP (ref 135–145)
WBC # BLD: 13.28 K/UL — HIGH (ref 3.8–10.5)
WBC # FLD AUTO: 13.28 K/UL — HIGH (ref 3.8–10.5)

## 2025-05-16 PROCEDURE — 99233 SBSQ HOSP IP/OBS HIGH 50: CPT | Mod: GC

## 2025-05-16 PROCEDURE — 71045 X-RAY EXAM CHEST 1 VIEW: CPT | Mod: 26

## 2025-05-16 PROCEDURE — 99232 SBSQ HOSP IP/OBS MODERATE 35: CPT

## 2025-05-16 PROCEDURE — 93010 ELECTROCARDIOGRAM REPORT: CPT

## 2025-05-16 RX ORDER — ONDANSETRON HCL/PF 4 MG/2 ML
4 VIAL (ML) INJECTION ONCE
Refills: 0 | Status: DISCONTINUED | OUTPATIENT
Start: 2025-05-16 | End: 2025-05-17

## 2025-05-16 RX ORDER — HYDROMORPHONE/SOD CHLOR,ISO/PF 2 MG/10 ML
0.5 SYRINGE (ML) INJECTION
Refills: 0 | Status: DISCONTINUED | OUTPATIENT
Start: 2025-05-16 | End: 2025-05-17

## 2025-05-16 RX ORDER — INSULIN LISPRO 100 U/ML
INJECTION, SOLUTION INTRAVENOUS; SUBCUTANEOUS EVERY 6 HOURS
Refills: 0 | Status: DISCONTINUED | OUTPATIENT
Start: 2025-05-16 | End: 2025-05-21

## 2025-05-16 RX ADMIN — LIDOCAINE HYDROCHLORIDE 1 PATCH: 20 JELLY TOPICAL at 23:24

## 2025-05-16 RX ADMIN — Medication 0.5 MILLIGRAM(S): at 21:30

## 2025-05-16 RX ADMIN — Medication 0.2 MILLIGRAM(S): at 23:23

## 2025-05-16 RX ADMIN — Medication 100 MILLIGRAM(S): at 05:57

## 2025-05-16 RX ADMIN — AMPICILLIN SODIUM 200 GRAM(S): 1 INJECTION, POWDER, FOR SOLUTION INTRAMUSCULAR; INTRAVENOUS at 21:11

## 2025-05-16 RX ADMIN — LIDOCAINE HYDROCHLORIDE 1 PATCH: 20 JELLY TOPICAL at 01:28

## 2025-05-16 RX ADMIN — Medication 0.2 MILLIGRAM(S): at 05:57

## 2025-05-16 RX ADMIN — INSULIN GLARGINE-YFGN 28 UNIT(S): 100 INJECTION, SOLUTION SUBCUTANEOUS at 23:56

## 2025-05-16 RX ADMIN — Medication 120 MILLIGRAM(S): at 06:00

## 2025-05-16 RX ADMIN — Medication 0.5 MILLIGRAM(S): at 21:15

## 2025-05-16 RX ADMIN — ROSUVASTATIN CALCIUM 10 MILLIGRAM(S): 5 TABLET, FILM COATED ORAL at 23:23

## 2025-05-16 RX ADMIN — Medication 100 MILLIGRAM(S): at 23:24

## 2025-05-16 RX ADMIN — Medication 1 APPLICATION(S): at 13:03

## 2025-05-16 RX ADMIN — INSULIN LISPRO 4: 100 INJECTION, SOLUTION INTRAVENOUS; SUBCUTANEOUS at 07:53

## 2025-05-16 RX ADMIN — Medication 150 MICROGRAM(S): at 05:58

## 2025-05-16 RX ADMIN — Medication 30 MILLIGRAM(S): at 05:57

## 2025-05-16 RX ADMIN — AMPICILLIN SODIUM 200 GRAM(S): 1 INJECTION, POWDER, FOR SOLUTION INTRAMUSCULAR; INTRAVENOUS at 05:52

## 2025-05-16 RX ADMIN — AMPICILLIN SODIUM 200 GRAM(S): 1 INJECTION, POWDER, FOR SOLUTION INTRAMUSCULAR; INTRAVENOUS at 00:10

## 2025-05-16 RX ADMIN — CEFTRIAXONE 100 MILLIGRAM(S): 500 INJECTION, POWDER, FOR SOLUTION INTRAMUSCULAR; INTRAVENOUS at 21:53

## 2025-05-16 RX ADMIN — TAMSULOSIN HYDROCHLORIDE 0.4 MILLIGRAM(S): 0.4 CAPSULE ORAL at 23:23

## 2025-05-16 RX ADMIN — CEFTRIAXONE 100 MILLIGRAM(S): 500 INJECTION, POWDER, FOR SOLUTION INTRAMUSCULAR; INTRAVENOUS at 06:05

## 2025-05-16 NOTE — PROGRESS NOTE ADULT - PROBLEM SELECTOR PLAN 1
CT Lumbar Spine: L3-L4 disc bulge, L4-L5 left paracentral-foraminal disc protrusion, multiple mild lumbar vertebral body compression deformities  . L3-L4 disc bulge, resulting in severe central canal, bilateral lateral recess and moderate bilateral neural foramen stenosis with facet arthrosis and ligamentum flavum hypertrophy.    - Pain: iv tylenol prn, dilaudid .5mg mod pain, dilaudid 1mg severe pain  - Ortho Consulted; recs appreciated  - TLSO brace  - MRI wo con (CrCl 14 so avoiding gadolinium) to r/o cauda equina and malignant disease, gely with history of prostate ca -> MRI was not tolerated given loudness of machine and pain. Pt does not want to go into MRI machine again

## 2025-05-16 NOTE — DISCHARGE NOTE PROVIDER - NSDCCPTREATMENT_GEN_ALL_CORE_FT
PRINCIPAL PROCEDURE  Procedure: CT lumbar spine  Findings and Treatment: IMPRESSION:  1. Evidence of L3-L4 disc bulge, resulting in severe central canal,   bilateral lateral recess and moderate bilateral neural foramen stenosis   with facet arthrosis and ligamentum flavum hypertrophy.  2. Evidence of L4-L5 left paracentral-foraminal disc protrusion   superimposed upon a disc bulge, resulting in severe central canal,   bilateral lateral recess, severe left and moderate right neural foramen   stenosis with facet arthrosis and ligamentum flavum hypertrophy, likely   impinging upon the left L4 nerve roots.  3. Multiple mild lumbar vertebral body compression deformities, with a   suggestion of paravertebral stranding at the level of the L5 superior   endplate. No retropulsion. A recent compression fracture cannot be   excluded. If there are no medical contraindications, correlation with   noncontrast MRI of the lumbar spine is recommended.  4. Additional findings, including those degenerative, described in detail   above.        SECONDARY PROCEDURE  Procedure: CT abdomen pelvis  Findings and Treatment: IMPRESSION:  Nonspecific gastric distention. Colonic distention with stool and air.  Probable pancreatic cystic lesions and indeterminant right adrenal nodule   which can be further evaluated on MRI.  Partially visualized heterogeneous sclerotic lesion in the left proximal   femur.  Mild L5 superior endplate compression deformity. Please see recent lumbar   spine CT report for further details.       PRINCIPAL PROCEDURE  Procedure: CT lumbar spine  Findings and Treatment: IMPRESSION:  1. Evidence of L3-L4 disc bulge, resulting in severe central canal,   bilateral lateral recess and moderate bilateral neural foramen stenosis   with facet arthrosis and ligamentum flavum hypertrophy.  2. Evidence of L4-L5 left paracentral-foraminal disc protrusion   superimposed upon a disc bulge, resulting in severe central canal,   bilateral lateral recess, severe left and moderate right neural foramen   stenosis with facet arthrosis and ligamentum flavum hypertrophy, likely   impinging upon the left L4 nerve roots.  3. Multiple mild lumbar vertebral body compression deformities, with a   suggestion of paravertebral stranding at the level of the L5 superior   endplate. No retropulsion. A recent compression fracture cannot be   excluded. If there are no medical contraindications, correlation with   noncontrast MRI of the lumbar spine is recommended.  4. Additional findings, including those degenerative, described in detail   above.        SECONDARY PROCEDURE  Procedure: CT abdomen pelvis  Findings and Treatment: IMPRESSION:  Nonspecific gastric distention. Colonic distention with stool and air.  Probable pancreatic cystic lesions and indeterminant right adrenal nodule   which can be further evaluated on MRI.  Partially visualized heterogeneous sclerotic lesion in the left proximal   femur.  Mild L5 superior endplate compression deformity. Please see recent lumbar   spine CT report for further details.      Procedure: Transesophageal echocardiography  Findings and Treatment: CONCLUSIONS:      1. Left ventricular systolic function is normal. There are no regional wall motion abnormalities seen.   2. Normal right ventricular cavity size and normal right ventricular systolic function.   3. Structurally normal mitral valve with normal leaflet excursion. No vegetations seen in association with the mitral valve. There is calcification of the mitral valve annulus. There is mild mitral regurgitation.   4. The aortic valve appears trileaflet with normal systolic excursion. There is calcification of the aortic valve leaflets. No vegetations seen in association with the aortic valve. There is trace aortic regurgitation.   5. No atheroma in the visualized portions of the proximal ascending aorta. Mild non-mobile atheroma in the visualized portions of the transverse aortic arch. Mild non-mobile atheroma in the visualized portions of the descending aorta.   6. The left atrium is normal in size. There is no evidence of left atrial or left atrial appendage thrombus. The left atrial appendage emptying velocity is normal.   7. Agitated saline injection was negative for intracardiac shunt.   8. No echocardiographic evidence of vegetations.       PRINCIPAL PROCEDURE  Procedure: Laparoscopic cholecystectomy  Findings and Treatment: WOUND CARE:  Please keep incisions clean and dry. Please do not Scrub or rub incisions. Do not use lotion or powder on incisions.   BATHING: You may shower and/or sponge bathe. You may use warm soapy water in the shower and rinse, pat dry.  ACTIVITY: As tolerated  DIET: NPO (failed esophragram, feeding tube not within GOC  Patient is discharging to hospice, does not need to follow up with your surgeon, Dr. Irwin, but can follow up or call as needed.      SECONDARY PROCEDURE  Procedure: CT lumbar spine  Findings and Treatment: IMPRESSION:  1. Evidence of L3-L4 disc bulge, resulting in severe central canal,   bilateral lateral recess and moderate bilateral neural foramen stenosis   with facet arthrosis and ligamentum flavum hypertrophy.  2. Evidence of L4-L5 left paracentral-foraminal disc protrusion   superimposed upon a disc bulge, resulting in severe central canal,   bilateral lateral recess, severe left and moderate right neural foramen   stenosis with facet arthrosis and ligamentum flavum hypertrophy, likely   impinging upon the left L4 nerve roots.  3. Multiple mild lumbar vertebral body compression deformities, with a   suggestion of paravertebral stranding at the level of the L5 superior   endplate. No retropulsion. A recent compression fracture cannot be   excluded. If there are no medical contraindications, correlation with   noncontrast MRI of the lumbar spine is recommended.  4. Additional findings, including those degenerative, described in detail   above.

## 2025-05-16 NOTE — DISCHARGE NOTE PROVIDER - NSDCFUSCHEDAPPT_GEN_ALL_CORE_FT
[facility] Phys[facility] [phone]  Scheduled Appointment: 05/28/2025

## 2025-05-16 NOTE — DISCHARGE NOTE PROVIDER - NSDCCPCAREPLAN_GEN_ALL_CORE_FT
PRINCIPAL DISCHARGE DIAGNOSIS  Diagnosis: Lumbar radiculopathy  Assessment and Plan of Treatment: Lumbar radiculopathy, sometimes called a pinched nerve, occurs in your lower back.  It happens when a nerve root in your lumbar spine is compressed or irritated. This can cause pain, numbness, tingling, or weakness that travels down your leg, sometimes into your foot.  Several things can cause this, including a herniated disc. Given your fall and findings of disc buldging, there is concern for compression of the spinal cord. Orthpedics was consulted and recommended MRI which showed***      SECONDARY DISCHARGE DIAGNOSES  Diagnosis: Enterococcal bacteremia  Assessment and Plan of Treatment: Enterococcal faecalis bacteremia is a bloodstream infection caused by Enterococcus faecalis bacteria, which normally live in your intestines.  It can cause fever, chills, and other symptoms.  Sometimes it originates from an infection elsewhere in the body, like the urinary tract.  Treatment usually involves antibiotics. You were given ceftriaxone and ampicillin and a scan of your heart ruled out endocariditis, and ceftriaxone was stopped. Your pacemaker was replaced given you being bacteremic. You cleared your blood cultures meaning you no longer have bacteria in your blood. Please continue taking your antibiotics ***.      Diagnosis: Urinary retention  Assessment and Plan of Treatment: Urinary retention means you're unable to completely empty your bladder. This can happen suddenly (acute) or gradually (chronic).  You might feel the urge to urinate but can't, or you might only pass small amounts.  Causes can range from blockage to nerve problems.  Your MRI showed ***       Diagnosis: Cardiac pacemaker  Assessment and Plan of Treatment: You have a new pacemaker in from when you were bacteremic and your pacemaker was removed. Please follow-up with the elctrophysiologist.     PRINCIPAL DISCHARGE DIAGNOSIS  Diagnosis: Lumbar radiculopathy  Assessment and Plan of Treatment: Lumbar radiculopathy, sometimes called a pinched nerve, occurs in your lower back.  It happens when a nerve root in your lumbar spine is compressed or irritated. This can cause pain, numbness, tingling, or weakness that travels down your leg, sometimes into your foot.  Several things can cause this, including a herniated disc. Given your fall and findings of disc buldging, there is concern for compression of the spinal cord. Orthpedics was consulted and recommended MRI which showed***      SECONDARY DISCHARGE DIAGNOSES  Diagnosis: Enterococcal bacteremia  Assessment and Plan of Treatment: Enterococcal faecalis bacteremia is a bloodstream infection caused by Enterococcus faecalis bacteria, which normally live in your intestines.  It can cause fever, chills, and other symptoms.  Sometimes it originates from an infection elsewhere in the body, like the urinary tract.  Treatment usually involves antibiotics. You were given ceftriaxone and ampicillin and a scan of your heart ruled out endocariditis, and ceftriaxone was stopped. Your pacemaker was replaced given you being bacteremic. You cleared your blood cultures meaning you no longer have bacteria in your blood. Please continue taking your antibiotics ***.      Diagnosis: Urinary retention  Assessment and Plan of Treatment: Urinary retention means you're unable to completely empty your bladder. This can happen suddenly (acute) or gradually (chronic).  You might feel the urge to urinate but can't, or you might only pass small amounts.  Causes can range from blockage to nerve problems.  Your MRI showed ***       Diagnosis: Cardiac pacemaker  Assessment and Plan of Treatment: You have a new pacemaker in from when you were bacteremic and your pacemaker was removed. Please follow-up with the elctrophysiologist.    Diagnosis: Phlebitis of superficial vein of upper extremity  Assessment and Plan of Treatment: Superficial phlebitis in an upper extremity is inflammation of a vein close to the skin's surface, usually caused by an IV catheter. It presents as redness, tenderness, and a palpable cord along the vein. It's generally benign and resolves with warm compresses and NSAIDs. Rarely, it can progress to a more serious condition like deep vein thrombosis (DVT) if the clot extends to deeper veins. Because you are on anticoagulation, this is already the treatment for vein clots.     PRINCIPAL DISCHARGE DIAGNOSIS  Diagnosis: Lumbar radiculopathy  Assessment and Plan of Treatment: Lumbar radiculopathy, sometimes called a pinched nerve, occurs in your lower back.  It happens when a nerve root in your lumbar spine is compressed or irritated. This can cause pain, numbness, tingling, or weakness that travels down your leg, sometimes into your foot.  Several things can cause this, including a herniated disc. Given your fall and findings of disc buldging, there is concern for compression of the spinal cord. Orthpedics was consulted and recommended MRI which showed***      SECONDARY DISCHARGE DIAGNOSES  Diagnosis: Enterococcal bacteremia  Assessment and Plan of Treatment: Enterococcal faecalis bacteremia is a bloodstream infection caused by Enterococcus faecalis bacteria, which normally live in your intestines.  It can cause fever, chills, and other symptoms.  Sometimes it originates from an infection elsewhere in the body, like the urinary tract.  Treatment usually involves antibiotics. You were given ceftriaxone and ampicillin and a scan of your heart ruled out endocariditis, and ceftriaxone was stopped. Your pacemaker was replaced given you being bacteremic. You cleared your blood cultures meaning you no longer have bacteria in your blood. Please continue taking your antibiotics ***.      Diagnosis: Urinary retention  Assessment and Plan of Treatment: Urinary retention means you're unable to completely empty your bladder. This can happen suddenly (acute) or gradually (chronic).  You might feel the urge to urinate but can't, or you might only pass small amounts.  Causes can range from blockage to nerve problems.  Your MRI showed ***       Diagnosis: Cardiac pacemaker  Assessment and Plan of Treatment: You have a new pacemaker in from when you were bacteremic and your pacemaker was removed. Please follow-up with the elctrophysiologist.    Diagnosis: Phlebitis of superficial vein of upper extremity  Assessment and Plan of Treatment: Superficial phlebitis in an upper extremity is inflammation of a vein close to the skin's surface, usually caused by an IV catheter. It presents as redness, tenderness, and a palpable cord along the vein. It's generally benign and resolves with warm compresses and NSAIDs. Rarely, it can progress to a more serious condition like deep vein thrombosis (DVT) if the clot extends to deeper veins. Because you are on anticoagulation, this is already the treatment for vein clots.    Diagnosis: Acute acalculous cholecystitis  Assessment and Plan of Treatment:

## 2025-05-16 NOTE — DISCHARGE NOTE PROVIDER - NSFOLLOWUPCLINICS_GEN_ALL_ED_FT
[facility]  Internal Medicine  [address]  [place] [address]  Phone: [phone]  Fax:   Follow Up Time: 1 week     [facility]  Internal Medicine  [address] [place] [address]  Phone: [phone]  Fax:   Follow Up Time: 1 week    [facility] - Infectious Disease  Infectious Disease  [address], [place] [address]  Phone: [phone]  Fax:   Follow Up Time: 2 weeks

## 2025-05-16 NOTE — PROGRESS NOTE ADULT - PROBLEM SELECTOR PLAN 2
- BCx positive for E faecalis   - Only 1 bottle was sent and is positive -> repeat Bcx positive  - Started ampicillin 2g q6 and ceftriaxone per ID  - TTE ordered -> EF 64% mild LV dystolic dysfunction  - EP consulted regarding possible removing PPM -> plan for removal 5/16  - Repeat Cx show one set positive  - MARIA LUISA ordered per ID

## 2025-05-16 NOTE — DISCHARGE NOTE PROVIDER - NSDCFUADDINST_GEN_ALL_CORE_FT
You are being discharged with an wong catheter. You are being discharged to a palliative facility who will mainatain your wong catheter

## 2025-05-16 NOTE — DISCHARGE NOTE PROVIDER - NSDCFUADDAPPT_GEN_ALL_CORE_FT
APPTS ARE READY TO BE MADE: [X] YES    Best Family or Patient Contact (if needed):    Additional Information about above appointments (if needed):    1: PCP  2: Electrophysiology  3:     Other comments or requests:    APPTS ARE READY TO BE MADE: [X] YES    Best Family or Patient Contact (if needed):    Additional Information about above appointments (if needed):    1: PCP  2: Electrophysiology  3: Infectious disease    Other comments or requests:    APPTS ARE READY TO BE MADE: [X] YES    Best Family or Patient Contact (if needed):    Additional Information about above appointments (if needed):    1: PCP  2: Electrophysiology  3: Infectious disease    Other comments or requests:     Patient is being discharged to SNF. Caregiver will arrange follow up.   Please follow up with you  1: PCP  2: Electrophysiology  3: Infectious disease  4: Orthopedic surgery     Patient discharging to hospice. Pt does not need to follow up with outpatient providers. If any questions/concerns about current diagnoses can follow up with:  1: PCP  2: Electrophysiology  3: Infectious disease  4: Orthopedic surgery

## 2025-05-16 NOTE — DISCHARGE NOTE PROVIDER - CARE PROVIDER_API CALL
Adilson Irwin  Surgery (General Surgery)  [address] [address] [place] [id number]  Phone: [phone]  Fax: [phone]  Follow Up Time: Routine

## 2025-05-16 NOTE — CHART NOTE - NSCHARTNOTEFT_GEN_A_CORE
Pt s/p left sided PPM explant and leadless PPM implant via right femoral vein. As per Dr. Herrera, hold Eliquis (EP will inform team when to resume). No further antibiotics as patient is on standing Ampicillin. CXR ordered on hold - to be resumed in PACU. Bedrest x 6 hours. Right femoral suture to be removed by EP service in AM. Sign out given to primary team.

## 2025-05-16 NOTE — PROGRESS NOTE ADULT - SUBJECTIVE AND OBJECTIVE BOX
***Plan not finalized until attending attestation***    Raman Carmichael MD (PGY-1)  Internal Medicine  Contact via [employer] TEAMS    ******************************************    PROGRESS NOTE:     Patient is a 85y old  Male who presents with a chief complaint of back pain (15 May 2025 12:27)    INTERVAL EVENTS: No acute overnight events.     SUBJECTIVE: Patient seen and examined at bedside.   No complaints today    MEDICATIONS  (STANDING):  ampicillin  IVPB 2 Gram(s) IV Intermittent every 6 hours  aspirin  chewable 81 milliGRAM(s) Oral daily  bisacodyl 5 milliGRAM(s) Oral at bedtime  cefTRIAXone   IVPB 2000 milliGRAM(s) IV Intermittent every 12 hours  chlorhexidine 2% Cloths 1 Application(s) Topical daily  chlorhexidine 2% Cloths 1 Application(s) Topical daily  cloNIDine 0.2 milliGRAM(s) Oral three times a day  dextrose 5%. 1000 milliLiter(s) (50 mL/Hr) IV Continuous <Continuous>  dextrose 5%. 1000 milliLiter(s) (100 mL/Hr) IV Continuous <Continuous>  dextrose 50% Injectable 25 Gram(s) IV Push once  dextrose 50% Injectable 12.5 Gram(s) IV Push once  dextrose 50% Injectable 25 Gram(s) IV Push once  dextrose Oral Gel 15 Gram(s) Oral once  glucagon  Injectable 1 milliGRAM(s) IntraMuscular once  hydrALAZINE 100 milliGRAM(s) Oral three times a day  insulin glargine Injectable (LANTUS) 28 Unit(s) SubCutaneous at bedtime  insulin lispro (ADMELOG) corrective regimen sliding scale   SubCutaneous every 6 hours  insulin lispro Injectable (ADMELOG) 6 Unit(s) SubCutaneous three times a day before meals  levothyroxine 150 MICROGram(s) Oral daily  lidocaine   4% Patch 1 Patch Transdermal daily  NIFEdipine XL 30 milliGRAM(s) Oral daily  polyethylene glycol 3350 17 Gram(s) Oral daily  propranolol  milliGRAM(s) Oral daily  rosuvastatin 10 milliGRAM(s) Oral at bedtime  senna 2 Tablet(s) Oral at bedtime  tamsulosin 0.4 milliGRAM(s) Oral at bedtime    MEDICATIONS  (PRN):  HYDROmorphone  Injectable 0.5 milliGRAM(s) IV Push every 4 hours PRN Severe Pain (7 - 10)      CAPILLARY BLOOD GLUCOSE      POCT Blood Glucose.: 195 mg/dL (16 May 2025 03:46)  POCT Blood Glucose.: 224 mg/dL (15 May 2025 21:53)  POCT Blood Glucose.: 149 mg/dL (15 May 2025 18:01)  POCT Blood Glucose.: 217 mg/dL (15 May 2025 12:10)  POCT Blood Glucose.: 247 mg/dL (15 May 2025 08:57)    I&O's Summary      PHYSICAL EXAM:  Vital Signs Last 24 Hrs  T(C): 36.6 (15 May 2025 20:35), Max: 36.6 (15 May 2025 20:35)  T(F): 97.8 (15 May 2025 20:35), Max: 97.8 (15 May 2025 20:35)  HR: 60 (15 May 2025 20:35) (60 - 64)  BP: 140/67 (15 May 2025 20:35) (140/67 - 177/80)  BP(mean): --  RR: 17 (15 May 2025 20:35) (17 - 19)  SpO2: 98% (15 May 2025 20:35) (93% - 98%)    Parameters below as of 15 May 2025 20:35  Patient On (Oxygen Delivery Method): nasal cannula  O2 Flow (L/min): 2      GENERAL: NAD, lying in bed comfortably  HEAD: Atraumatic, normocephalic  EYES: EOMI, PERRLA, conjunctiva and sclera clear  ENT: Moist mucous membranes  NECK: Supple, no JVD  HEART: S1, S2, Regular rate and rhythm, no murmurs, rubs, or gallops  LUNGS: Unlabored respirations, clear to auscultation bilaterally, no crackles, wheezing, or rhonchi  ABDOMEN: Soft, nontender, nondistended, +BS  EXTREMITIES: 2+ peripheral pulses bilaterally. No clubbing, cyanosis, or edema  NERVOUS SYSTEM:  A&Ox3, LE R weaker than left  SKIN: No rashes or lesions  +Frye    LABS:                        9.0    13.28 )-----------( 237      ( 16 May 2025 03:48 )             27.1     05-16    140  |  105  |  58[H]  ----------------------------<  201[H]  3.9   |  25  |  1.58[H]    Ca    8.8      16 May 2025 05:10  Phos  3.2     05-16  Mg     2.50     05-16    TPro  6.0  /  Alb  2.7[L]  /  TBili  0.3  /  DBili  x   /  AST  13  /  ALT  8   /  AlkPhos  54  05-16    PT/INR - ( 16 May 2025 03:48 )   PT: 13.2 sec;   INR: 1.14 ratio         PTT - ( 16 May 2025 03:48 )  PTT:23.8 sec      Urinalysis Basic - ( 16 May 2025 05:10 )    Color: x / Appearance: x / SG: x / pH: x  Gluc: 201 mg/dL / Ketone: x  / Bili: x / Urobili: x   Blood: x / Protein: x / Nitrite: x   Leuk Esterase: x / RBC: x / WBC x   Sq Epi: x / Non Sq Epi: x / Bacteria: x        Culture - Blood (collected 14 May 2025 10:40)  Source: Blood Blood  Preliminary Report (15 May 2025 17:03):    No growth at 24 hours    Culture - Blood (collected 14 May 2025 10:26)  Source: Blood Blood  Gram Stain (15 May 2025 22:39):    Growth in aerobic bottle: Gram Positive Cocci in Pairs and Chains  Preliminary Report (15 May 2025 22:40):    Growth in aerobic bottle: Gram Positive Cocci in Pairs and Chains

## 2025-05-16 NOTE — DISCHARGE NOTE PROVIDER - NSDCMRMEDTOKEN_GEN_ALL_CORE_FT
apixaban 2.5 mg oral tablet: 1 tab(s) orally 2 times a day  aspirin 81 mg oral tablet: 1 tab(s) orally once a day  chlorthalidone 25 mg oral tablet: 1 tab(s) orally once a day  cloNIDine 0.2 mg oral tablet: 1 tab(s) orally 3 times a day  ergocalciferol 1.25 mg (50,000 intl units) oral tablet: 1 tab(s) orally once a week  HumaLOG 100 units/mL injectable solution: 12 unit(s) injectable 3 times a day (with meals)  hydrALAZINE 100 mg oral tablet: 1 tab(s) orally 3 times a day  Lantus 100 units/mL subcutaneous solution: 24 unit(s) subcutaneous once a day (at bedtime)  Levothroid 150 mcg (0.15 mg) oral tablet: 1 tab(s) orally once a day  propranolol 120 mg oral capsule, extended release: 1 cap(s) orally once a day  rosuvastatin 10 mg oral tablet: 1 tab(s) orally once a day (at bedtime)  telmisartan 40 mg oral tablet: 1 tab(s) orally once a day  Tradjenta 5 mg oral tablet: 1 tab(s) orally once a day   acetaminophen 325 mg oral tablet, disintegratin tab(s) orally every 6 hours as needed for  mild pain  albuterol 90 mcg/inh inhalation aerosol: 2 puff(s) inhaled every 6 hours  dornase mayda 2.5 mg/2.5 mL inhalation solution: 2.5 milliliter(s) inhaled once a day  hyoscyamine 0.125 mg sublingual tablet: 1 tab(s) sublingually every 6 hours as needed for  excessive/terminal secretions  ipratropium-albuterol 0.5 mg-2.5 mg/3 mL inhalation solution: 3 milliliter(s) inhaled every 6 hours  LORazepam 2 mg/mL oral concentrate: 0.5 milliliter(s) orally every 3 hours as needed for  agitation  morphine 20 mg/mL oral concentrate: 0.25 milliliter(s) orally every 6 hours as needed for  pain  sodium chloride 3% inhalation solution: 4 milliliter(s) inhaled every 6 hours

## 2025-05-16 NOTE — PROGRESS NOTE ADULT - SUBJECTIVE AND OBJECTIVE BOX
interval history:  no acute overnight event  NPO for device extraction and PPM implant      PAST MEDICAL & SURGICAL HISTORY:  No pertinent past medical history    HTN (hypertension)    HLD (hyperlipidemia)    DM (diabetes mellitus)    TIA (transient ischemic attack)    Prostate cancer    No significant past surgical history    S/P prostatectomy    Pacemaker    H/O thyroidectomy        MEDICATIONS  (STANDING):  ampicillin  IVPB 2 Gram(s) IV Intermittent every 6 hours  aspirin  chewable 81 milliGRAM(s) Oral daily  bisacodyl 5 milliGRAM(s) Oral at bedtime  cefTRIAXone   IVPB 2000 milliGRAM(s) IV Intermittent every 12 hours  chlorhexidine 2% Cloths 1 Application(s) Topical daily  chlorhexidine 2% Cloths 1 Application(s) Topical daily  cloNIDine 0.2 milliGRAM(s) Oral three times a day  dextrose 5%. 1000 milliLiter(s) (50 mL/Hr) IV Continuous <Continuous>  dextrose 5%. 1000 milliLiter(s) (100 mL/Hr) IV Continuous <Continuous>  dextrose 50% Injectable 25 Gram(s) IV Push once  dextrose 50% Injectable 12.5 Gram(s) IV Push once  dextrose 50% Injectable 25 Gram(s) IV Push once  dextrose Oral Gel 15 Gram(s) Oral once  glucagon  Injectable 1 milliGRAM(s) IntraMuscular once  hydrALAZINE 100 milliGRAM(s) Oral three times a day  insulin glargine Injectable (LANTUS) 28 Unit(s) SubCutaneous at bedtime  insulin lispro (ADMELOG) corrective regimen sliding scale   SubCutaneous every 6 hours  insulin lispro Injectable (ADMELOG) 6 Unit(s) SubCutaneous three times a day before meals  levothyroxine 150 MICROGram(s) Oral daily  lidocaine   4% Patch 1 Patch Transdermal daily  NIFEdipine XL 30 milliGRAM(s) Oral daily  polyethylene glycol 3350 17 Gram(s) Oral daily  propranolol  milliGRAM(s) Oral daily  rosuvastatin 10 milliGRAM(s) Oral at bedtime  senna 2 Tablet(s) Oral at bedtime  tamsulosin 0.4 milliGRAM(s) Oral at bedtime    MEDICATIONS  (PRN):  HYDROmorphone  Injectable 0.5 milliGRAM(s) IV Push every 4 hours PRN Severe Pain (7 - 10)            Vital Signs Last 24 Hrs  T(C): 36.9 (16 May 2025 08:13), Max: 36.9 (16 May 2025 08:13)  T(F): 98.4 (16 May 2025 08:13), Max: 98.4 (16 May 2025 08:13)  HR: 60 (16 May 2025 08:13) (59 - 64)  BP: 150/50 (16 May 2025 08:13) (125/54 - 177/80)  BP(mean): 115 (16 May 2025 05:55) (115 - 115)  RR: 18 (16 May 2025 08:13) (17 - 19)  SpO2: 95% (16 May 2025 08:13) (93% - 98%)    Parameters below as of 16 May 2025 07:41  Patient On (Oxygen Delivery Method): nasal cannula  O2 Flow (L/min): 4              INTERPRETATION OF TELEMETRY: NOT ON TELE        LABS:                        9.0    13.28 )-----------( 237      ( 16 May 2025 03:48 )             27.1     05-16    140  |  105  |  58[H]  ----------------------------<  201[H]  3.9   |  25  |  1.58[H]    Ca    8.8      16 May 2025 05:10  Phos  3.2     05-16  Mg     2.50     05-16    TPro  6.0  /  Alb  2.7[L]  /  TBili  0.3  /  DBili  x   /  AST  13  /  ALT  8   /  AlkPhos  54  05-16        PT/INR - ( 16 May 2025 03:48 )   PT: 13.2 sec;   INR: 1.14 ratio         PTT - ( 16 May 2025 03:48 )  PTT:23.8 sec  Urinalysis Basic - ( 16 May 2025 05:10 )    Color: x / Appearance: x / SG: x / pH: x  Gluc: 201 mg/dL / Ketone: x  / Bili: x / Urobili: x   Blood: x / Protein: x / Nitrite: x   Leuk Esterase: x / RBC: x / WBC x   Sq Epi: x / Non Sq Epi: x / Bacteria: x      I&O's Summary    BNP  RADIOLOGY & ADDITIONAL STUDIES:      PHYSICAL EXAM:      GENERAL: In no apparent distress, well nourished, and hydrated.  HEART: Regular rate and rhythm; No murmurs, rubs, or gallops.  PULMONARY: Clear to auscultation and percussion.    ABDOMEN: Soft, Nontender, Nondistended; Bowel sounds present  EXTREMITIES: Peripheral Pulses present  NEUROLOGICAL: Grossly nonfocal

## 2025-05-16 NOTE — DISCHARGE NOTE PROVIDER - HOSPITAL COURSE
Hospital Course:   Nader Liu is an 85M with HTN, HLD, DM2, prostate cancer s/p prostatectomy, CKD, TIA with a pacemaker who presents transferred from [place] for further evaluation of low back pain. At [place], CT showed multiple lumbar compression deformities. MRI was unable to be performed 2/2 ppm compatibility. He was transferred to [facility] for MRI and orthopedic eval. At [facility], Pt was found to be bacteremic with E faecalis and ID consulted and started ampicillin and CTX. EP consulted for bacteremia with PPM as well as PPM clearance for MRI. Pt PPM was MRI conditional and was eventually cleared but could not tolerate MRI due to noise and pain. Pt refused going back into the MRI. EP removed the PPM and placed a leadless PPM.     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 At [place], CT showed multiple lumbar compression deformities. MRI was unable to be performed 2/2 ppm compatibility. He was transferred to [facility] for MRI and orthopedic eval. At [facility], Pt was found to be bacteremic with E faecalis and ID consulted and started ampicillin and CTX. EP consulted for bacteremia with PPM as well as PPM clearance for MRI. Pt PPM was MRI conditional and was eventually cleared but could not tolerate MRI due to noise and pain. Pt refused going back into the MRI. EP removed the PPM and placed a leadless PPM. Pt underwent MARIA LUISA which showed no signs of endocarditis and CTX was discontinued but ampicillin continued. Pt was unable to tolerate TOV and wong was replaced. Pt agreed to MRI under general anesthesia and EP was called to clear PPM for MRI.    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 At [place], CT showed multiple lumbar compression deformities. MRI was unable to be performed 2/2 ppm compatibility. He was transferred to [facility] for MRI and orthopedic eval. At [facility], Pt was found to be bacteremic with E faecalis and ID consulted and started ampicillin and CTX. EP consulted for bacteremia with PPM as well as PPM clearance for MRI. Pt PPM was MRI conditional and was eventually cleared but could not tolerate MRI due to noise and pain. Pt refused going back into the MRI. EP removed the PPM and placed a leadless PPM. Pt underwent MARIA LUISA which showed no signs of endocarditis and CTX was discontinued but ampicillin continued. Pt was unable to tolerate TOV and wong was replaced. Given limited mobility 2/2 pain and requiring wong, Pt agreed to MRI under general anesthesia and EP was called to clear PPM for MRI. PPM cleared and Pt was coordinated with [facility] for MRI under sedation. 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 At [place], CT showed multiple lumbar compression deformities. MRI was unable to be performed 2/2 ppm compatibility. He was transferred to [facility] for MRI and orthopedic eval. At [facility], Pt was found to be bacteremic with E faecalis and ID consulted and started ampicillin and CTX. EP consulted for bacteremia with PPM as well as PPM clearance for MRI. Pt PPM was MRI conditional and was eventually cleared but could not tolerate MRI due to noise and pain. Pt refused going back into the MRI. EP removed the PPM and placed a leadless PPM. Pt underwent MARIA LUISA which showed no signs of endocarditis and CTX was discontinued but ampicillin continued. Pt was unable to tolerate TOV and wong was replaced. Given limited mobility 2/2 pain and requiring wong, Pt agreed to MRI under general anesthesia and EP was called to clear PPM for MRI. PPM cleared and Pt was coordinated with [facility] for MRI under sedation. 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 At [place], CT showed multiple lumbar compression deformities. MRI was unable to be performed 2/2 ppm compatibility. He was transferred to [facility] for MRI and orthopedic eval. At [facility], Pt was found to be bacteremic with E faecalis and ID consulted and started ampicillin and CTX. EP consulted for bacteremia with PPM as well as PPM clearance for MRI. Pt PPM was MRI conditional and was eventually cleared but could not tolerate MRI due to noise and pain. Pt refused going back into the MRI. EP removed the PPM and placed a leadless PPM. Pt underwent MARIA LUISA which showed no signs of endocarditis and CTX was discontinued but ampicillin continued. Pt was unable to tolerate TOV and wong was replaced. Given limited mobility 2/2 pain and requiring wong, Pt agreed to MRI under general anesthesia and EP was called to clear PPM for MRI. PPM cleared and Pt was coordinated with [facility] for MRI under sedation. MRI of cervical, thoracic and lumbar completed.    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 At [place], CT showed multiple lumbar compression deformities. MRI was unable to be performed 2/2 ppm compatibility. He was transferred to [facility] for MRI and orthopedic eval. At [facility], Pt was found to be bacteremic with E faecalis and ID consulted and started ampicillin and CTX. EP consulted for bacteremia with PPM as well as PPM clearance for MRI. Pt PPM was MRI conditional and was eventually cleared but could not tolerate MRI due to noise and pain. Pt refused going back into the MRI. EP removed the PPM and placed a leadless PPM. Pt underwent MARIA LUISA which showed no signs of endocarditis and CTX was discontinued but ampicillin continued. Pt was unable to tolerate TOV and wong was replaced. Given limited mobility 2/2 pain and requiring wong, Pt agreed to MRI under general anesthesia and EP was called to clear PPM for MRI. PPM cleared and Pt was coordinated with [facility] for MRI under sedation. MRI of cervical, thoracic and lumbar completed.    5/17- RRT called for HTN  5/21- Increase ampicillin, DC ceftriaxone  5/27- Wound care consulted  5/29- MRI completed- Severe spinal canal stenosis at L3-L4 and L4-L5 with impingement of the cauda equina at these levels  6/1- Ortho discussed spinal surgery with family  6/2- Cardiology consulted  6/4- Abd pain, Surgery consulted. 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 At [place], CT showed multiple lumbar compression deformities. MRI was unable to be performed 2/2 ppm compatibility. He was transferred to [facility] for MRI and orthopedic eval. At [facility], Pt was found to be bacteremic with E faecalis and ID consulted and started ampicillin and CTX. EP consulted for bacteremia with PPM as well as PPM clearance for MRI. Pt PPM was MRI conditional and was eventually cleared but could not tolerate MRI due to noise and pain. Pt refused going back into the MRI. EP removed the PPM and placed a leadless PPM. Pt underwent MARIA LUISA which showed no signs of endocarditis and CTX was discontinued but ampicillin continued. Pt was unable to tolerate TOV and wong was replaced. Given limited mobility 2/2 pain and requiring wong, Pt agreed to MRI under general anesthesia and EP was called to clear PPM for MRI. PPM cleared and Pt was coordinated with [facility] for MRI under sedation. MRI of cervical, thoracic and lumbar completed.    5/17- RRT called for HTN  5/21- Increase ampicillin, DC ceftriaxone  5/27- Wound care consulted  5/29- MRI completed- Severe spinal canal stenosis at L3-L4 and L4-L5 with impingement of the cauda equina at these levels  6/1- Ortho discussed spinal surgery with family  6/2- Cardiology consulted  6/4- Abd pain, Surgery consulted. ID rec Zosyn  6/5- Robotic cholecystectomy completed    6/16 - Patient developed an ileus and had an ng tube placed. 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 At [place], CT showed multiple lumbar compression deformities. MRI was unable to be performed 2/2 ppm compatibility. He was transferred to [facility] for MRI and orthopedic eval. At [facility], Pt was found to be bacteremic with E faecalis and ID consulted and started ampicillin and CTX. EP consulted for bacteremia with PPM as well as PPM clearance for MRI. Pt PPM was MRI conditional and was eventually cleared but could not tolerate MRI due to noise and pain. Pt refused going back into the MRI. EP removed the PPM and placed a leadless PPM. Pt underwent MARIA LUISA which showed no signs of endocarditis and CTX was discontinued but ampicillin continued. Pt was unable to tolerate TOV and wong was replaced. Given limited mobility 2/2 pain and requiring wong, Pt agreed to MRI under general anesthesia and EP was called to clear PPM for MRI. PPM cleared and Pt was coordinated with [facility] for MRI under sedation. MRI of cervical, thoracic and lumbar completed.    5/17- RRT called for HTN  5/21- Increase ampicillin, DC ceftriaxone  5/27- Wound care consulted  5/29- MRI completed- Severe spinal canal stenosis at L3-L4 and L4-L5 with impingement of the cauda equina at these levels  6/1- Ortho discussed spinal surgery with family  6/2- Cardiology consulted  6/4- Abd pain, Surgery consulted. ID rec Zosyn  6/5- Robotic cholecystectomy completed  6/8 - Patient seen by PT and recommended Sub-acute Rehab  6/11 - Patient was transferred to the floor  6/15 - Endocrinology was consulted for management of diabetes mellitus.  6/16 - Patient developed an ileus and had an ng tube placed. CT scan performed revealed collection in gb fossa  6/17 - Patient went to IR for drainage of gb fossa  6/23 - Patient failed Speech and swallow   6/26 - Pulm consulted for hypoxia Patient with persistent B-lines bilaterally and small (miniscule) left sided effusion Suggested continuing aggressive diuresis. Patient had a repeat cine and failed.  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 At [place], CT showed multiple lumbar compression deformities. MRI was unable to be performed 2/2 ppm compatibility. He was transferred to [facility] for MRI and orthopedic eval. At [facility], Pt was found to be bacteremic with E faecalis and ID consulted and started ampicillin and CTX. EP consulted for bacteremia with PPM as well as PPM clearance for MRI. Pt PPM was MRI conditional and was eventually cleared but could not tolerate MRI due to noise and pain. Pt refused going back into the MRI. EP removed the PPM and placed a leadless PPM. Pt underwent MARIA LUISA which showed no signs of endocarditis and CTX was discontinued but ampicillin continued. Pt was unable to tolerate TOV and wong was replaced. Given limited mobility 2/2 pain and requiring wong, Pt agreed to MRI under general anesthesia and EP was called to clear PPM for MRI. PPM cleared and Pt was coordinated with [facility] for MRI under sedation. MRI of cervical, thoracic and lumbar completed.    5/17- RRT called for HTN  5/21- Increase ampicillin, DC ceftriaxone  5/27- Wound care consulted  5/29- MRI completed- Severe spinal canal stenosis at L3-L4 and L4-L5 with impingement of the cauda equina at these levels  6/1- Ortho discussed spinal surgery with family  6/2- Cardiology consulted  6/4- Abd pain, Surgery consulted. ID rec Zosyn  6/5- Robotic cholecystectomy completed  6/8 - Patient seen by PT and recommended Sub-acute Rehab  6/11 - Patient was transferred to the floor  6/15 - Endocrinology was consulted for management of diabetes mellitus.  6/16 - Patient developed an ileus and had an ng tube placed. CT scan performed revealed collection in gb fossa  6/17 - Patient went to IR for drainage of gb fossa  6/23 - Patient failed Speech and swallow   6/26 - Pulm consulted for hypoxia Patient with persistent B-lines bilaterally and small (miniscule) left sided effusion Suggested continuing aggressive diuresis. Patient had a repeat cine and failed.  6/27 - IR consulted for removal of IR drain and drain was pulled  6/28 - Patient started on comfort clears and ice chips, patient referral sent out for rehab    Per continued palliative GOC conversations, family made decision to proceed with comfort care measures and discharge to a hospice facility. Referral was sent and patient is accepted to [facility] for palliative/hospice care.

## 2025-05-16 NOTE — DISCHARGE NOTE PROVIDER - DETAILS OF MALNUTRITION DIAGNOSIS/DIAGNOSES
This patient has been assessed with a concern for Malnutrition and was treated during this hospitalization for the following Nutrition diagnosis/diagnoses:     -  06/11/2025: Severe protein-calorie malnutrition

## 2025-05-16 NOTE — PROGRESS NOTE ADULT - ASSESSMENT
Pt is an 85M with HTN, HLD, DM2, prostate cancer s/p prostatectomy, CKD, TIA with a pacemaker who presents transferred from [place] for further evaluation of low back pain secondary to lumbar compression deformities. EP on board to clear PPM for MRI compatibility. Found to have e facaelis bacteremia. On abx, unable to tolerate MRI. PPM removal planned for 5/16

## 2025-05-16 NOTE — PROGRESS NOTE ADULT - ATTENDING COMMENTS
85M w/ hypertension, type 2 diabetes (A1c 6.9%), CKD (b/l Cr 1.5-2), prostate cancer s/p prostatectomy, h/o TIA, sinus node dysfunction s/p PPM presented to [facility] with lower back pain, found to have imaging with lumbar compression deformities and high grade E faecalis bacteremia, transferred to [facility] for further management.    5/14 BCx 1/4 bottles GPC    #Enterococcus Bacertemia:  -Continue ampicillin and CTX, 5/14 BCx not clear, another set obtained, concern for involvement of spine given pain however patient unable to tolerate MRI; for PPM removal/replacement with leadless PPM today, needs MARIA LUISA    #Back Pain  - unable to tolerate MRI  - otho following - will re-engage today given persistent LE weakness and unable to obtain repeat MRI (?any utility to alternative imaging)  -continue pain control, add lidocaine patch  -TLSO brace when OOB, continue PT  #hx TIA  continue DOAC, statin  #DM  - titrate insulin  #Urinary retention    Time-based billing (NON-critical care).   50 minutes spent on total encounter, which excludes teaching and separately reported services. The necessity of the time spent during the encounter on this date of service was due to:   Documentation in [place], reviewing chart and coordinating care with patient/resident and interdisciplinary staff (such as , social workers, etc) as well as reviewing vitals, laboratory data, radiology, medication list, consultants' recommendations and prior records. Interventions were performed as documented above.    - continue wong; tamsulosin - attempt TOV tonight

## 2025-05-16 NOTE — PROGRESS NOTE ADULT - ASSESSMENT
Patient is a 85y old Male with PMH of HTN, HLD, DM2, hypothyroidism, prostate cancer s/p prostatectomy, CKD, TIA, dual chamber pacemaker( 5/2024 at [facility]) presented as a transferred from [facility] for further workup of possible spinal cord injury.  Patient initially presented to [facility] for acute onset of sharp lower back pain and course c/b acute urinary retention. CT L Spine showed L-spine disc bulge and MRI L spine to follow. Hospital course is further c/b significant leukocytosis to 18.45, afebrile and positive blood culture with positive E. Fecalis on 5/11 and 5/12. CXR with clear lungs and VQ scan with very low probability of PE. ID consulted and pt. is on IV ABX Ampicillin and Rocephin. EP is consulted to eval for CIED infection. Device implanted in 4/2024 with interrogation reveals AP 90.1%,  1.4%. TTE in 11/2024 with EF 60-65%.      ## e. fecalis bacteremia   ## leukocytosis  ## Back pain  ## Dual chamber PPM ( 5/2024)         RECOMMENDATIONS:  - In light of e. fecalis bacteremia with a PPM, device (include wires) explant is warranted to ensure complete resolution of bacteremia. Device shows AP 90.1%,  1.4% ( AAI <=> DDD ) with SND of SB at 50s. leadless Aveir pacemaker will be warranted simultaneously with device extraction for his SND  - The process of the procedures along with the risks and benefits for each procedure were explained and consent obtained.  - NPO   - AM labs and keep T&S X2 active  - Eliquis is hold for anticipated procedure  - Please obtain EKG  - TTE with EF 64%  - Monitor electrolytes and replete K to 4 and Mg to 2  - Appreciate ID rec. and continue IV ABX, pending repeat blood culture  - Continue care per primary team

## 2025-05-17 ENCOUNTER — RESULT REVIEW (OUTPATIENT)
Age: 85
End: 2025-05-17

## 2025-05-17 DIAGNOSIS — R60.0 LOCALIZED EDEMA: ICD-10-CM

## 2025-05-17 LAB
-  AMPICILLIN: SIGNIFICANT CHANGE UP
-  GENTAMICIN SYNERGY: SIGNIFICANT CHANGE UP
-  STREPTOMYCIN SYNERGY: SIGNIFICANT CHANGE UP
-  VANCOMYCIN: SIGNIFICANT CHANGE UP
ALBUMIN SERPL ELPH-MCNC: 2.9 G/DL — LOW (ref 3.3–5)
ALP SERPL-CCNC: 61 U/L — SIGNIFICANT CHANGE UP (ref 40–120)
ALT FLD-CCNC: 10 U/L — SIGNIFICANT CHANGE UP (ref 4–41)
ANION GAP SERPL CALC-SCNC: 14 MMOL/L — SIGNIFICANT CHANGE UP (ref 7–14)
APTT BLD: 23.9 SEC — LOW (ref 26.1–36.8)
AST SERPL-CCNC: 25 U/L — SIGNIFICANT CHANGE UP (ref 4–40)
BILIRUB SERPL-MCNC: 0.3 MG/DL — SIGNIFICANT CHANGE UP (ref 0.2–1.2)
BLOOD GAS VENOUS COMPREHENSIVE RESULT: SIGNIFICANT CHANGE UP
BUN SERPL-MCNC: 44 MG/DL — HIGH (ref 7–23)
CALCIUM SERPL-MCNC: 8.9 MG/DL — SIGNIFICANT CHANGE UP (ref 8.4–10.5)
CHLORIDE SERPL-SCNC: 106 MMOL/L — SIGNIFICANT CHANGE UP (ref 98–107)
CO2 SERPL-SCNC: 25 MMOL/L — SIGNIFICANT CHANGE UP (ref 22–31)
CREAT SERPL-MCNC: 1.38 MG/DL — HIGH (ref 0.5–1.3)
CULTURE RESULTS: ABNORMAL
CULTURE RESULTS: ABNORMAL
EGFR: 49 ML/MIN/1.73M2 — LOW
EGFR: 49 ML/MIN/1.73M2 — LOW
GLUCOSE SERPL-MCNC: 176 MG/DL — HIGH (ref 70–99)
GRAM STN FLD: ABNORMAL
HCT VFR BLD CALC: 32.9 % — LOW (ref 39–50)
HGB BLD-MCNC: 10.5 G/DL — LOW (ref 13–17)
INR BLD: 0.97 RATIO — SIGNIFICANT CHANGE UP (ref 0.85–1.16)
LIDOCAIN IGE QN: 12 U/L — SIGNIFICANT CHANGE UP (ref 7–60)
MAGNESIUM SERPL-MCNC: 2.3 MG/DL — SIGNIFICANT CHANGE UP (ref 1.6–2.6)
MCHC RBC-ENTMCNC: 27.5 PG — SIGNIFICANT CHANGE UP (ref 27–34)
MCHC RBC-ENTMCNC: 31.9 G/DL — LOW (ref 32–36)
MCV RBC AUTO: 86.1 FL — SIGNIFICANT CHANGE UP (ref 80–100)
METHOD TYPE: SIGNIFICANT CHANGE UP
NRBC # BLD AUTO: 0 K/UL — SIGNIFICANT CHANGE UP (ref 0–0)
NRBC # FLD: 0 K/UL — SIGNIFICANT CHANGE UP (ref 0–0)
NRBC BLD AUTO-RTO: 0 /100 WBCS — SIGNIFICANT CHANGE UP (ref 0–0)
ORGANISM # SPEC MICROSCOPIC CNT: ABNORMAL
PHOSPHATE SERPL-MCNC: 2.8 MG/DL — SIGNIFICANT CHANGE UP (ref 2.5–4.5)
PLATELET # BLD AUTO: 321 K/UL — SIGNIFICANT CHANGE UP (ref 150–400)
POTASSIUM SERPL-MCNC: 3.8 MMOL/L — SIGNIFICANT CHANGE UP (ref 3.5–5.3)
POTASSIUM SERPL-SCNC: 3.8 MMOL/L — SIGNIFICANT CHANGE UP (ref 3.5–5.3)
PROT SERPL-MCNC: 6.6 G/DL — SIGNIFICANT CHANGE UP (ref 6–8.3)
PROTHROM AB SERPL-ACNC: 11.6 SEC — SIGNIFICANT CHANGE UP (ref 9.9–13.4)
RBC # BLD: 3.82 M/UL — LOW (ref 4.2–5.8)
RBC # FLD: 15.4 % — HIGH (ref 10.3–14.5)
SODIUM SERPL-SCNC: 145 MMOL/L — SIGNIFICANT CHANGE UP (ref 135–145)
SPECIMEN SOURCE: SIGNIFICANT CHANGE UP
WBC # BLD: 21.23 K/UL — HIGH (ref 3.8–10.5)
WBC # FLD AUTO: 21.23 K/UL — HIGH (ref 3.8–10.5)

## 2025-05-17 PROCEDURE — 93971 EXTREMITY STUDY: CPT | Mod: 26,LT

## 2025-05-17 PROCEDURE — 99233 SBSQ HOSP IP/OBS HIGH 50: CPT | Mod: GC

## 2025-05-17 PROCEDURE — 71045 X-RAY EXAM CHEST 1 VIEW: CPT | Mod: 26

## 2025-05-17 PROCEDURE — 74018 RADEX ABDOMEN 1 VIEW: CPT | Mod: 26

## 2025-05-17 RX ORDER — LABETALOL HYDROCHLORIDE 200 MG/1
10 TABLET, FILM COATED ORAL ONCE
Refills: 0 | Status: COMPLETED | OUTPATIENT
Start: 2025-05-17 | End: 2025-05-17

## 2025-05-17 RX ORDER — ONDANSETRON HCL/PF 4 MG/2 ML
4 VIAL (ML) INJECTION ONCE
Refills: 0 | Status: COMPLETED | OUTPATIENT
Start: 2025-05-17 | End: 2025-05-17

## 2025-05-17 RX ORDER — HALOPERIDOL 10 MG/1
0.5 TABLET ORAL ONCE
Refills: 0 | Status: COMPLETED | OUTPATIENT
Start: 2025-05-17 | End: 2025-05-17

## 2025-05-17 RX ADMIN — INSULIN LISPRO 2: 100 INJECTION, SOLUTION INTRAVENOUS; SUBCUTANEOUS at 00:26

## 2025-05-17 RX ADMIN — Medication 0.5 MILLIGRAM(S): at 09:10

## 2025-05-17 RX ADMIN — Medication 0.2 MILLIGRAM(S): at 04:54

## 2025-05-17 RX ADMIN — Medication 150 MICROGRAM(S): at 04:53

## 2025-05-17 RX ADMIN — Medication 5 MILLIGRAM(S): at 22:18

## 2025-05-17 RX ADMIN — INSULIN LISPRO 2: 100 INJECTION, SOLUTION INTRAVENOUS; SUBCUTANEOUS at 18:25

## 2025-05-17 RX ADMIN — Medication 30 MILLIGRAM(S): at 04:53

## 2025-05-17 RX ADMIN — LABETALOL HYDROCHLORIDE 10 MILLIGRAM(S): 200 TABLET, FILM COATED ORAL at 11:19

## 2025-05-17 RX ADMIN — CEFTRIAXONE 100 MILLIGRAM(S): 500 INJECTION, POWDER, FOR SOLUTION INTRAMUSCULAR; INTRAVENOUS at 18:27

## 2025-05-17 RX ADMIN — Medication 100 MILLIGRAM(S): at 22:17

## 2025-05-17 RX ADMIN — Medication 100 MILLIGRAM(S): at 15:49

## 2025-05-17 RX ADMIN — AMPICILLIN SODIUM 200 GRAM(S): 1 INJECTION, POWDER, FOR SOLUTION INTRAMUSCULAR; INTRAVENOUS at 03:11

## 2025-05-17 RX ADMIN — Medication 100 MILLIGRAM(S): at 04:53

## 2025-05-17 RX ADMIN — Medication 4 MILLIGRAM(S): at 05:07

## 2025-05-17 RX ADMIN — LIDOCAINE HYDROCHLORIDE 1 PATCH: 20 JELLY TOPICAL at 18:44

## 2025-05-17 RX ADMIN — CEFTRIAXONE 100 MILLIGRAM(S): 500 INJECTION, POWDER, FOR SOLUTION INTRAMUSCULAR; INTRAVENOUS at 04:59

## 2025-05-17 RX ADMIN — Medication 4 MILLIGRAM(S): at 07:31

## 2025-05-17 RX ADMIN — Medication 1 APPLICATION(S): at 11:23

## 2025-05-17 RX ADMIN — LIDOCAINE HYDROCHLORIDE 1 PATCH: 20 JELLY TOPICAL at 15:15

## 2025-05-17 RX ADMIN — Medication 0.5 MILLIGRAM(S): at 13:50

## 2025-05-17 RX ADMIN — AMPICILLIN SODIUM 200 GRAM(S): 1 INJECTION, POWDER, FOR SOLUTION INTRAMUSCULAR; INTRAVENOUS at 15:16

## 2025-05-17 RX ADMIN — Medication 120 MILLIGRAM(S): at 04:54

## 2025-05-17 RX ADMIN — Medication 0.5 MILLIGRAM(S): at 14:50

## 2025-05-17 RX ADMIN — AMPICILLIN SODIUM 200 GRAM(S): 1 INJECTION, POWDER, FOR SOLUTION INTRAMUSCULAR; INTRAVENOUS at 22:13

## 2025-05-17 RX ADMIN — Medication 0.2 MILLIGRAM(S): at 15:15

## 2025-05-17 RX ADMIN — Medication 0.5 MILLIGRAM(S): at 08:14

## 2025-05-17 RX ADMIN — Medication 81 MILLIGRAM(S): at 18:24

## 2025-05-17 RX ADMIN — INSULIN LISPRO 2: 100 INJECTION, SOLUTION INTRAVENOUS; SUBCUTANEOUS at 09:15

## 2025-05-17 RX ADMIN — ROSUVASTATIN CALCIUM 10 MILLIGRAM(S): 5 TABLET, FILM COATED ORAL at 22:15

## 2025-05-17 RX ADMIN — INSULIN GLARGINE-YFGN 28 UNIT(S): 100 INJECTION, SOLUTION SUBCUTANEOUS at 22:18

## 2025-05-17 RX ADMIN — TAMSULOSIN HYDROCHLORIDE 0.4 MILLIGRAM(S): 0.4 CAPSULE ORAL at 22:16

## 2025-05-17 RX ADMIN — AMPICILLIN SODIUM 200 GRAM(S): 1 INJECTION, POWDER, FOR SOLUTION INTRAMUSCULAR; INTRAVENOUS at 09:07

## 2025-05-17 RX ADMIN — Medication 0.5 MILLIGRAM(S): at 00:00

## 2025-05-17 RX ADMIN — Medication 5 MILLIGRAM(S): at 08:19

## 2025-05-17 RX ADMIN — Medication 0.2 MILLIGRAM(S): at 22:14

## 2025-05-17 NOTE — PROGRESS NOTE ADULT - PROBLEM SELECTOR PLAN 3
Pt hypoxic on presentation for unclear reason; started on high flow nasal cannula 40/40    - CXR unimpressive  - lungs clear on exam  - poss bronchiectasis vs pna  - VQ scan to rule out PE -> very low probability of PE - BCx positive for E faecalis . Only 1 bottle was sent and is positive -> repeat Bcx 5/15 negative   - s/p removal of PPM on 5/16 and exchange to leadless pacemaker with EP   - c/w ampicillin 2g q6 and ceftriaxone per ID  - TTE ordered -> EF 64% mild LV systolic dysfunction  - MARIA LUISA ordered per ID

## 2025-05-17 NOTE — PROGRESS NOTE ADULT - PROBLEM SELECTOR PLAN 7
- Continue Home Propanolol  - Continue Home Eliquis   - ASA 81mg qd - lantus 28u qhs, humalog 6u tid with meals, low ISS  - Tending to be hyperglycemic on FSG

## 2025-05-17 NOTE — PROGRESS NOTE ADULT - ATTENDING COMMENTS
85M w/ hypertension, type 2 diabetes (A1c 6.9%), CKD (b/l Cr 1.5-2), prostate cancer s/p prostatectomy, h/o TIA, sinus node dysfunction s/p PPM presented to [facility] with lower back pain, found to have imaging with lumbar compression deformities and high grade E faecalis bacteremia, transferred to [facility] for further management.  5/14 BCx 1/4 bottles GPC  Pt s/p PPM removal on 5/16 , pt noted to have LUE edema , pt also hypertensive and nauseas     #Enterococcus Bacertemia:  -Continue ampicillin and CTX, 5/14 BCx not clear, another set obtained, concern for involvement of spine given pain however patient unable to tolerate MRI; s/p PPM removal/replacement with leadless PPM 5/16 , needs MARIA LUISA  LUE edema - will check  LUE duplex  -f/u CXR to eval PPM site  #Back Pain  - unable to tolerate MRI  - otho following - Pt does not want to go into MRI machine again  -continue pain control, add lidocaine patch  -TLSO brace when OOB, continue PT  # HTN , uncontrolled , per daughter , his BP at home used to be high 140s to 160s, do not need to lower it drastically , pt s/p IV Labetalol x1 dose, will CTM    #hx TIA  continue  statin, DOAC was on hold for procedure , EP rec to resume , As pt with , hypertensive urgency and nausea , will check CT head , if oK will use heparin gtt for now instead of DOAC   #DM  - titrate insulin  #Urinary retention; Wong +, continue Wong care per protocol   - continue wong; tamsulosin   Rest of the management as above  Plan of care d/w family at bedside   Pt DNR/DNI

## 2025-05-17 NOTE — PROGRESS NOTE ADULT - SUBJECTIVE AND OBJECTIVE BOX
Patient is a 85y old  Male who presents with a chief complaint of back pain (16 May 2025 16:52)      SUBJECTIVE / OVERNIGHT EVENTS: Patient seen and examined at bedside. No acute events overnight. Pt denies fevers, chills, chest pain, abdominal pain, n/v/d.    MEDICATIONS  (STANDING):  ampicillin  IVPB 2 Gram(s) IV Intermittent every 6 hours  aspirin  chewable 81 milliGRAM(s) Oral daily  bisacodyl 5 milliGRAM(s) Oral at bedtime  cefTRIAXone   IVPB 2000 milliGRAM(s) IV Intermittent every 12 hours  chlorhexidine 2% Cloths 1 Application(s) Topical daily  chlorhexidine 2% Cloths 1 Application(s) Topical daily  cloNIDine 0.2 milliGRAM(s) Oral three times a day  dextrose 5%. 1000 milliLiter(s) (50 mL/Hr) IV Continuous <Continuous>  dextrose 5%. 1000 milliLiter(s) (100 mL/Hr) IV Continuous <Continuous>  dextrose 50% Injectable 25 Gram(s) IV Push once  dextrose 50% Injectable 12.5 Gram(s) IV Push once  dextrose 50% Injectable 25 Gram(s) IV Push once  dextrose Oral Gel 15 Gram(s) Oral once  glucagon  Injectable 1 milliGRAM(s) IntraMuscular once  hydrALAZINE 100 milliGRAM(s) Oral three times a day  insulin glargine Injectable (LANTUS) 28 Unit(s) SubCutaneous at bedtime  insulin lispro (ADMELOG) corrective regimen sliding scale   SubCutaneous every 6 hours  insulin lispro Injectable (ADMELOG) 6 Unit(s) SubCutaneous three times a day before meals  levothyroxine 150 MICROGram(s) Oral daily  lidocaine   4% Patch 1 Patch Transdermal daily  NIFEdipine XL 30 milliGRAM(s) Oral daily  polyethylene glycol 3350 17 Gram(s) Oral daily  propranolol  milliGRAM(s) Oral daily  rosuvastatin 10 milliGRAM(s) Oral at bedtime  senna 2 Tablet(s) Oral at bedtime  tamsulosin 0.4 milliGRAM(s) Oral at bedtime    MEDICATIONS  (PRN):  HYDROmorphone  Injectable 0.5 milliGRAM(s) IV Push every 4 hours PRN Severe Pain (7 - 10)      Vital Signs Last 24 Hrs  T(C): 36.3 (17 May 2025 01:36), Max: 36.9 (16 May 2025 08:13)  T(F): 97.3 (17 May 2025 01:36), Max: 98.4 (16 May 2025 08:13)  HR: 68 (17 May 2025 03:15) (53 - 68)  BP: 175/79 (17 May 2025 03:15) (150/50 - 198/76)  BP(mean): 92 (17 May 2025 00:30) (80 - 100)  RR: 18 (17 May 2025 03:15) (12 - 18)  SpO2: 97% (17 May 2025 03:15) (95% - 100%)    Parameters below as of 17 May 2025 03:15  Patient On (Oxygen Delivery Method): nasal cannula  O2 Flow (L/min): 3    CAPILLARY BLOOD GLUCOSE      POCT Blood Glucose.: 170 mg/dL (17 May 2025 07:34)  POCT Blood Glucose.: 174 mg/dL (16 May 2025 23:53)  POCT Blood Glucose.: 161 mg/dL (16 May 2025 20:07)  POCT Blood Glucose.: 166 mg/dL (16 May 2025 11:57)    I&O's Summary    16 May 2025 07:01  -  17 May 2025 07:00  --------------------------------------------------------  IN: 0 mL / OUT: 1000 mL / NET: -1000 mL        PHYSICAL EXAM:  GENERAL: NAD, lying in bed comfortably  HEAD: Atraumatic, normocephalic  EYES: EOMI, PERRLA, conjunctiva and sclera clear  ENT: Moist mucous membranes  NECK: Supple, no JVD  HEART: S1, S2, Regular rate and rhythm, no murmurs, rubs, or gallops  LUNGS: Unlabored respirations, clear to auscultation bilaterally, no crackles, wheezing, or rhonchi  ABDOMEN: Soft, nontender, nondistended, +BS  EXTREMITIES: 2+ peripheral pulses bilaterally. No clubbing, cyanosis, or edema  NERVOUS SYSTEM:  A&Ox3, LE R weaker than left  SKIN: No rashes or lesions  +Frye    LABS:                        9.0    13.28 )-----------( 237      ( 16 May 2025 03:48 )             27.1     05-16    140  |  105  |  58[H]  ----------------------------<  201[H]  3.9   |  25  |  1.58[H]    Ca    8.8      16 May 2025 05:10  Phos  3.2     05-16  Mg     2.50     05-16    TPro  6.0  /  Alb  2.7[L]  /  TBili  0.3  /  DBili  x   /  AST  13  /  ALT  8   /  AlkPhos  54  05-16    PT/INR - ( 16 May 2025 03:48 )   PT: 13.2 sec;   INR: 1.14 ratio         PTT - ( 16 May 2025 03:48 )  PTT:23.8 sec      Urinalysis Basic - ( 16 May 2025 05:10 )    Color: x / Appearance: x / SG: x / pH: x  Gluc: 201 mg/dL / Ketone: x  / Bili: x / Urobili: x   Blood: x / Protein: x / Nitrite: x   Leuk Esterase: x / RBC: x / WBC x   Sq Epi: x / Non Sq Epi: x / Bacteria: x        RADIOLOGY & ADDITIONAL TESTS:    Imaging Personally Reviewed:    Consultant(s) Notes Reviewed:      Care Discussed with Consultants/Other Providers:    Mala Reynolds MD, Internal Medicine Resident     Patient is a 85y old  Male who presents with a chief complaint of back pain (16 May 2025 16:52)      SUBJECTIVE / OVERNIGHT EVENTS: Patient seen and examined at bedside. No acute events overnight. LUE swelling, pt with episodes of nausea and hypertensive as unable to tolerate PO    MEDICATIONS  (STANDING):  ampicillin  IVPB 2 Gram(s) IV Intermittent every 6 hours  aspirin  chewable 81 milliGRAM(s) Oral daily  bisacodyl 5 milliGRAM(s) Oral at bedtime  cefTRIAXone   IVPB 2000 milliGRAM(s) IV Intermittent every 12 hours  chlorhexidine 2% Cloths 1 Application(s) Topical daily  chlorhexidine 2% Cloths 1 Application(s) Topical daily  cloNIDine 0.2 milliGRAM(s) Oral three times a day  dextrose 5%. 1000 milliLiter(s) (50 mL/Hr) IV Continuous <Continuous>  dextrose 5%. 1000 milliLiter(s) (100 mL/Hr) IV Continuous <Continuous>  dextrose 50% Injectable 25 Gram(s) IV Push once  dextrose 50% Injectable 12.5 Gram(s) IV Push once  dextrose 50% Injectable 25 Gram(s) IV Push once  dextrose Oral Gel 15 Gram(s) Oral once  glucagon  Injectable 1 milliGRAM(s) IntraMuscular once  hydrALAZINE 100 milliGRAM(s) Oral three times a day  insulin glargine Injectable (LANTUS) 28 Unit(s) SubCutaneous at bedtime  insulin lispro (ADMELOG) corrective regimen sliding scale   SubCutaneous every 6 hours  insulin lispro Injectable (ADMELOG) 6 Unit(s) SubCutaneous three times a day before meals  levothyroxine 150 MICROGram(s) Oral daily  lidocaine   4% Patch 1 Patch Transdermal daily  NIFEdipine XL 30 milliGRAM(s) Oral daily  polyethylene glycol 3350 17 Gram(s) Oral daily  propranolol  milliGRAM(s) Oral daily  rosuvastatin 10 milliGRAM(s) Oral at bedtime  senna 2 Tablet(s) Oral at bedtime  tamsulosin 0.4 milliGRAM(s) Oral at bedtime    MEDICATIONS  (PRN):  HYDROmorphone  Injectable 0.5 milliGRAM(s) IV Push every 4 hours PRN Severe Pain (7 - 10)      Vital Signs Last 24 Hrs  T(C): 36.3 (17 May 2025 01:36), Max: 36.9 (16 May 2025 08:13)  T(F): 97.3 (17 May 2025 01:36), Max: 98.4 (16 May 2025 08:13)  HR: 68 (17 May 2025 03:15) (53 - 68)  BP: 175/79 (17 May 2025 03:15) (150/50 - 198/76)  BP(mean): 92 (17 May 2025 00:30) (80 - 100)  RR: 18 (17 May 2025 03:15) (12 - 18)  SpO2: 97% (17 May 2025 03:15) (95% - 100%)    Parameters below as of 17 May 2025 03:15  Patient On (Oxygen Delivery Method): nasal cannula  O2 Flow (L/min): 3    CAPILLARY BLOOD GLUCOSE      POCT Blood Glucose.: 170 mg/dL (17 May 2025 07:34)  POCT Blood Glucose.: 174 mg/dL (16 May 2025 23:53)  POCT Blood Glucose.: 161 mg/dL (16 May 2025 20:07)  POCT Blood Glucose.: 166 mg/dL (16 May 2025 11:57)    I&O's Summary    16 May 2025 07:01  -  17 May 2025 07:00  --------------------------------------------------------  IN: 0 mL / OUT: 1000 mL / NET: -1000 mL        PHYSICAL EXAM:  GENERAL: NAD, lying in bed comfortably  HEAD: Atraumatic, normocephalic  EYES: EOMI, PERRLA, conjunctiva and sclera clear  ENT: Moist mucous membranes  NECK: Supple, no JVD  HEART: S1, S2, Regular rate and rhythm, no murmurs, rubs, or gallops  LUNGS: Unlabored respirations, clear to auscultation bilaterally, no crackles, wheezing, or rhonchi  ABDOMEN: Soft, nontender, nondistended, +BS  EXTREMITIES: 2+ peripheral pulses bilaterally. No clubbing, cyanosis, or edema  NERVOUS SYSTEM:  A&Ox3, LE R weaker than left  SKIN: No rashes or lesions  +Frye    LABS:                        9.0    13.28 )-----------( 237      ( 16 May 2025 03:48 )             27.1     05-16    140  |  105  |  58[H]  ----------------------------<  201[H]  3.9   |  25  |  1.58[H]    Ca    8.8      16 May 2025 05:10  Phos  3.2     05-16  Mg     2.50     05-16    TPro  6.0  /  Alb  2.7[L]  /  TBili  0.3  /  DBili  x   /  AST  13  /  ALT  8   /  AlkPhos  54  05-16    PT/INR - ( 16 May 2025 03:48 )   PT: 13.2 sec;   INR: 1.14 ratio         PTT - ( 16 May 2025 03:48 )  PTT:23.8 sec      Urinalysis Basic - ( 16 May 2025 05:10 )    Color: x / Appearance: x / SG: x / pH: x  Gluc: 201 mg/dL / Ketone: x  / Bili: x / Urobili: x   Blood: x / Protein: x / Nitrite: x   Leuk Esterase: x / RBC: x / WBC x   Sq Epi: x / Non Sq Epi: x / Bacteria: x        RADIOLOGY & ADDITIONAL TESTS:    Imaging Personally Reviewed:    Consultant(s) Notes Reviewed:      Care Discussed with Consultants/Other Providers:    Mala Reynolds MD, Internal Medicine Resident

## 2025-05-17 NOTE — PROGRESS NOTE ADULT - PROBLEM SELECTOR PLAN 5
#HTN     - Continue Home Hydralazine 100mg tid  - Continue Home Clonidine .2mg tid  - Hold Telmisartan 40mg qd in setting of JEROME   - Hold Chlorthalidone 25mg qd in setting of JEROME acute urinary retention in the setting of back pain and immobility    - wong in place

## 2025-05-17 NOTE — RAPID RESPONSE TEAM SUMMARY - NSSITUATIONBACKGROUNDRRT_GEN_ALL_CORE
85M with HTN, HLD, DM2, prostate cancer s/p prostatectomy, CKD, TIA with a pacemaker who presents transferred from [place] for further evaluation of low back pain secondary to lumbar compression deformities. EP on board to clear PPM for MRI compatibility. Found to have e facaelis bacteremia. On abx, unable to tolerate MRI s/p PPM removal on 5/16 and exchange for leadless PPM.    RRT called for hypertension and nausea. Upon arrival of RRT team, pt is awake, alert, at baseline mental status, denies chest pain, blurry vision, SOB, does appear uncomfortable with vomit bag, says he has been nauseous all morning despite Zofran, does have acute on chronic back pain. BP systolic 202/77, HR normal. At this time elevated BP does not represent emergency as no signs of SCAPE, no hx intracranial hemorrhage or dissection that required strict BP goals, current symptoms unlikely to be related to BP. Primary team at bedside, defer to them for management of symptoms and BP to avoid dangerous drops in BP that could cause ischemia without signs of emergency. Primary team ordered 10mg IV labetalol, haldol 0.5mg for anti-emetic, RN to give PRN dilaudid as he is due for pain PRN. RRT ended, primary team to stay and coordinate rest of care.

## 2025-05-17 NOTE — PROGRESS NOTE ADULT - ASSESSMENT
Pt is an 85M with HTN, HLD, DM2, prostate cancer s/p prostatectomy, CKD, TIA with a pacemaker who presents transferred from [place] for further evaluation of low back pain secondary to lumbar compression deformities. EP on board to clear PPM for MRI compatibility. Found to have e facaelis bacteremia. On abx, unable to tolerate MRI. PPM removal planned for 5/16 Pt is an 85M with HTN, HLD, DM2, prostate cancer s/p prostatectomy, CKD, TIA with a pacemaker who presents transferred from [place] for further evaluation of low back pain secondary to lumbar compression deformities. EP on board to clear PPM for MRI compatibility. Found to have e facaelis bacteremia. On abx, unable to tolerate MRI s/p PPM removal on 5/16 and exchange for leadless PPM

## 2025-05-17 NOTE — PROGRESS NOTE ADULT - PROBLEM SELECTOR PLAN 4
acute urinary retention in the setting of back pain and immobility    - wong in place Pt hypoxic on presentation for unclear reason; started on high flow nasal cannula 40/40    - CXR unimpressive  - lungs clear on exam  - poss bronchiectasis vs pna  - VQ scan to rule out PE performed on 5/11: very low probability of PE

## 2025-05-17 NOTE — PROGRESS NOTE ADULT - PROBLEM SELECTOR PLAN 6
- lantus 28u qhs, humalog 6u tid with meals, low ISS  - Tending to be hyperglycemic on FSG #HTN     - Continue Home Hydralazine 100mg tid  - Continue Home Clonidine .2mg tid  - Hold Telmisartan 40mg qd in setting of JEROME   - Hold Chlorthalidone 25mg qd in setting of JEROME  - On 5/17, pt intermittently nauseous and unable to tolerate PO, requiring IV 10 labetalol PRN

## 2025-05-17 NOTE — PROGRESS NOTE ADULT - PROBLEM SELECTOR PLAN 2
- BCx positive for E faecalis   - Only 1 bottle was sent and is positive -> repeat Bcx positive  - Started ampicillin 2g q6 and ceftriaxone per ID  - TTE ordered -> EF 64% mild LV dystolic dysfunction  - EP consulted regarding possible removing PPM -> plan for removal 5/16  - Repeat Cx show one set positive  - MARIA LUISA ordered per ID CT Lumbar Spine: L3-L4 disc bulge, L4-L5 left paracentral-foraminal disc protrusion, multiple mild lumbar vertebral body compression deformities  . L3-L4 disc bulge, resulting in severe central canal, bilateral lateral recess and moderate bilateral neural foramen stenosis with facet arthrosis and ligamentum flavum hypertrophy.    - Pain: iv tylenol prn, dilaudid .5mg mod pain, dilaudid 1mg severe pain  - Ortho Consulted; recs appreciated  - TLSO brace  - MRI wo con (CrCl 14 so avoiding gadolinium) to r/o cauda equina and malignant disease, gely with history of prostate ca -> MRI was not tolerated given loudness of machine and pain. Pt does not want to go into MRI machine again

## 2025-05-17 NOTE — CHART NOTE - NSCHARTNOTEFT_GEN_A_CORE
Patient evaluated in the PACU at approximately 11:45pm. Groin and PPM extraction site appeared clean without any signs of bleeding. BP elevated to 190s. However, patient feeling comfortable without any symptoms. Midnight HTN meds given while in PACU. Upon transfer to floors, BP still elevated but downtrended to 175/79 by 3am. Patient still asymptomatic at this time. Ordered 6am HTN meds to be given early and were administered around 4:5am. However, at this time, patient because endorsing nausea with multiple episodes of bilious vomit. BP elevated to systolic 188, but afebrile and other VSS. Unclear if patient absorbed his meds, so Zofran ordered first to avoid double-dosing antihypertensives and to evaluate if retching was influencing his high blood pressures. Repeat BP at 7:00am in systolic 200, so tentative plan for additional zofran dose and IV push of antihypertensive. Events signed out to day team. Patient evaluated in the PACU at approximately 11:45pm. Groin and PPM extraction site appeared clean without any signs of bleeding. BP elevated to 190s. However, patient feeling comfortable without any symptoms. Midnight HTN meds given while in PACU. Upon transfer to floors, BP still elevated but downtrended to 175/79 by 3am. Patient still asymptomatic at this time. Ordered 6am HTN meds to be given early and were administered around 4:50am. However, at this time, patient because endorsing nausea with multiple episodes of bilious vomit. BP elevated to systolic 188, but afebrile and other VSS. Unclear if patient absorbed his meds, so Zofran ordered first to avoid double-dosing antihypertensives and to evaluate if retching was influencing his high blood pressures. Repeat BP at 7:00am in systolic 200, so tentative plan for additional zofran dose and IV push of antihypertensive. Daughter at bedside during these events, understanding and in agreement with plan. Events signed out to day team.

## 2025-05-17 NOTE — PROVIDER CONTACT NOTE (OTHER) - SITUATION
Left arm swollen - total arm swelling noted and larger than right arm. per family this is new since last night.

## 2025-05-17 NOTE — PROGRESS NOTE ADULT - PROBLEM SELECTOR PLAN 1
CT Lumbar Spine: L3-L4 disc bulge, L4-L5 left paracentral-foraminal disc protrusion, multiple mild lumbar vertebral body compression deformities  . L3-L4 disc bulge, resulting in severe central canal, bilateral lateral recess and moderate bilateral neural foramen stenosis with facet arthrosis and ligamentum flavum hypertrophy.    - Pain: iv tylenol prn, dilaudid .5mg mod pain, dilaudid 1mg severe pain  - Ortho Consulted; recs appreciated  - TLSO brace  - MRI wo con (CrCl 14 so avoiding gadolinium) to r/o cauda equina and malignant disease, gely with history of prostate ca -> MRI was not tolerated given loudness of machine and pain. Pt does not want to go into MRI machine again LUE swelling, nursing examined IV in LUE and it is functioning appropriately low c/f infiltration. Of note, pt is s/p PPM extraction but no fluctuance or edema at PPM site. Neurovascularly intact.    Plan:  -f/u LUE duplex  -f/u CXR to eval PPM site  -EP team updated

## 2025-05-17 NOTE — CHART NOTE - NSCHARTNOTEFT_GEN_A_CORE
R groin access site groin suture removed, site well-healing, dry, intact without evidence of bleeding, hematoma, tenderness, or discharge. Of note with LUE diffuse swelling, L chest device site non-tender, soft, without evidence of bleeding, hematoma, tenderness, or discharge. Agree with LUE duplex to evaluate for DVT. R groin access site groin suture removed, site well-healing, dry, intact without evidence of bleeding, hematoma, tenderness, or discharge. Of note with LUE diffuse swelling, L chest site non-tender, soft, without evidence of bleeding, hematoma, tenderness, or discharge. Agree with LUE duplex to evaluate for DVT.

## 2025-05-17 NOTE — PROVIDER CONTACT NOTE (OTHER) - SITUATION
/85, patient still very nauseous and unable to take PO medication. s/p PPM extraction yesterday and since procedure hypertension has been persistent

## 2025-05-18 DIAGNOSIS — N17.9 ACUTE KIDNEY FAILURE, UNSPECIFIED: ICD-10-CM

## 2025-05-18 LAB
ALBUMIN SERPL ELPH-MCNC: 2.8 G/DL — LOW (ref 3.3–5)
ALP SERPL-CCNC: 53 U/L — SIGNIFICANT CHANGE UP (ref 40–120)
ALT FLD-CCNC: 8 U/L — SIGNIFICANT CHANGE UP (ref 4–41)
ANION GAP SERPL CALC-SCNC: 12 MMOL/L — SIGNIFICANT CHANGE UP (ref 7–14)
AST SERPL-CCNC: 18 U/L — SIGNIFICANT CHANGE UP (ref 4–40)
BILIRUB SERPL-MCNC: 0.3 MG/DL — SIGNIFICANT CHANGE UP (ref 0.2–1.2)
BUN SERPL-MCNC: 52 MG/DL — HIGH (ref 7–23)
CALCIUM SERPL-MCNC: 8.6 MG/DL — SIGNIFICANT CHANGE UP (ref 8.4–10.5)
CHLORIDE SERPL-SCNC: 106 MMOL/L — SIGNIFICANT CHANGE UP (ref 98–107)
CO2 SERPL-SCNC: 27 MMOL/L — SIGNIFICANT CHANGE UP (ref 22–31)
CREAT SERPL-MCNC: 2.24 MG/DL — HIGH (ref 0.5–1.3)
CULTURE RESULTS: ABNORMAL
E FAECALIS DNA BLD POS QL NAA+NON-PROBE: SIGNIFICANT CHANGE UP
EGFR: 28 ML/MIN/1.73M2 — LOW
EGFR: 28 ML/MIN/1.73M2 — LOW
GLUCOSE SERPL-MCNC: 146 MG/DL — HIGH (ref 70–99)
GRAM STN FLD: ABNORMAL
HCT VFR BLD CALC: 28.8 % — LOW (ref 39–50)
HGB BLD-MCNC: 9.3 G/DL — LOW (ref 13–17)
MAGNESIUM SERPL-MCNC: 2.3 MG/DL — SIGNIFICANT CHANGE UP (ref 1.6–2.6)
MCHC RBC-ENTMCNC: 27.8 PG — SIGNIFICANT CHANGE UP (ref 27–34)
MCHC RBC-ENTMCNC: 32.3 G/DL — SIGNIFICANT CHANGE UP (ref 32–36)
MCV RBC AUTO: 86 FL — SIGNIFICANT CHANGE UP (ref 80–100)
METHOD TYPE: SIGNIFICANT CHANGE UP
NRBC # BLD AUTO: 0 K/UL — SIGNIFICANT CHANGE UP (ref 0–0)
NRBC # FLD: 0 K/UL — SIGNIFICANT CHANGE UP (ref 0–0)
NRBC BLD AUTO-RTO: 0 /100 WBCS — SIGNIFICANT CHANGE UP (ref 0–0)
PHOSPHATE SERPL-MCNC: 3.6 MG/DL — SIGNIFICANT CHANGE UP (ref 2.5–4.5)
PLATELET # BLD AUTO: 241 K/UL — SIGNIFICANT CHANGE UP (ref 150–400)
POTASSIUM SERPL-MCNC: 3.9 MMOL/L — SIGNIFICANT CHANGE UP (ref 3.5–5.3)
POTASSIUM SERPL-SCNC: 3.9 MMOL/L — SIGNIFICANT CHANGE UP (ref 3.5–5.3)
PROT SERPL-MCNC: 5.9 G/DL — LOW (ref 6–8.3)
RBC # BLD: 3.35 M/UL — LOW (ref 4.2–5.8)
RBC # FLD: 15.7 % — HIGH (ref 10.3–14.5)
SODIUM SERPL-SCNC: 145 MMOL/L — SIGNIFICANT CHANGE UP (ref 135–145)
WBC # BLD: 15.1 K/UL — HIGH (ref 3.8–10.5)
WBC # FLD AUTO: 15.1 K/UL — HIGH (ref 3.8–10.5)

## 2025-05-18 PROCEDURE — 99232 SBSQ HOSP IP/OBS MODERATE 35: CPT | Mod: GC

## 2025-05-18 RX ORDER — HEPARIN SODIUM 1000 [USP'U]/ML
7500 INJECTION INTRAVENOUS; SUBCUTANEOUS ONCE
Refills: 0 | Status: COMPLETED | OUTPATIENT
Start: 2025-05-18 | End: 2025-05-18

## 2025-05-18 RX ORDER — HEPARIN SODIUM 1000 [USP'U]/ML
INJECTION INTRAVENOUS; SUBCUTANEOUS
Qty: 25000 | Refills: 0 | Status: DISCONTINUED | OUTPATIENT
Start: 2025-05-18 | End: 2025-05-18

## 2025-05-18 RX ORDER — HEPARIN SODIUM 1000 [USP'U]/ML
3500 INJECTION INTRAVENOUS; SUBCUTANEOUS EVERY 6 HOURS
Refills: 0 | Status: DISCONTINUED | OUTPATIENT
Start: 2025-05-18 | End: 2025-05-23

## 2025-05-18 RX ORDER — HEPARIN SODIUM 1000 [USP'U]/ML
7500 INJECTION INTRAVENOUS; SUBCUTANEOUS EVERY 6 HOURS
Refills: 0 | Status: DISCONTINUED | OUTPATIENT
Start: 2025-05-18 | End: 2025-05-23

## 2025-05-18 RX ADMIN — Medication 0.5 MILLIGRAM(S): at 08:20

## 2025-05-18 RX ADMIN — ROSUVASTATIN CALCIUM 10 MILLIGRAM(S): 5 TABLET, FILM COATED ORAL at 22:33

## 2025-05-18 RX ADMIN — AMPICILLIN SODIUM 200 GRAM(S): 1 INJECTION, POWDER, FOR SOLUTION INTRAMUSCULAR; INTRAVENOUS at 16:56

## 2025-05-18 RX ADMIN — Medication 5 MILLIGRAM(S): at 22:32

## 2025-05-18 RX ADMIN — Medication 150 MICROGRAM(S): at 05:42

## 2025-05-18 RX ADMIN — CEFTRIAXONE 100 MILLIGRAM(S): 500 INJECTION, POWDER, FOR SOLUTION INTRAMUSCULAR; INTRAVENOUS at 17:56

## 2025-05-18 RX ADMIN — Medication 0.2 MILLIGRAM(S): at 08:23

## 2025-05-18 RX ADMIN — Medication 0.5 MILLIGRAM(S): at 09:15

## 2025-05-18 RX ADMIN — Medication 81 MILLIGRAM(S): at 11:31

## 2025-05-18 RX ADMIN — AMPICILLIN SODIUM 200 GRAM(S): 1 INJECTION, POWDER, FOR SOLUTION INTRAMUSCULAR; INTRAVENOUS at 23:57

## 2025-05-18 RX ADMIN — INSULIN LISPRO 6 UNIT(S): 100 INJECTION, SOLUTION INTRAVENOUS; SUBCUTANEOUS at 09:02

## 2025-05-18 RX ADMIN — Medication 1 APPLICATION(S): at 11:31

## 2025-05-18 RX ADMIN — INSULIN LISPRO 2: 100 INJECTION, SOLUTION INTRAVENOUS; SUBCUTANEOUS at 00:54

## 2025-05-18 RX ADMIN — INSULIN LISPRO 6 UNIT(S): 100 INJECTION, SOLUTION INTRAVENOUS; SUBCUTANEOUS at 18:24

## 2025-05-18 RX ADMIN — Medication 0.1 MILLIGRAM(S): at 14:29

## 2025-05-18 RX ADMIN — INSULIN GLARGINE-YFGN 28 UNIT(S): 100 INJECTION, SOLUTION SUBCUTANEOUS at 22:25

## 2025-05-18 RX ADMIN — CEFTRIAXONE 100 MILLIGRAM(S): 500 INJECTION, POWDER, FOR SOLUTION INTRAMUSCULAR; INTRAVENOUS at 06:12

## 2025-05-18 RX ADMIN — AMPICILLIN SODIUM 200 GRAM(S): 1 INJECTION, POWDER, FOR SOLUTION INTRAMUSCULAR; INTRAVENOUS at 05:41

## 2025-05-18 RX ADMIN — LIDOCAINE HYDROCHLORIDE 1 PATCH: 20 JELLY TOPICAL at 11:27

## 2025-05-18 RX ADMIN — HEPARIN SODIUM 7500 UNIT(S): 1000 INJECTION INTRAVENOUS; SUBCUTANEOUS at 16:27

## 2025-05-18 RX ADMIN — TAMSULOSIN HYDROCHLORIDE 0.4 MILLIGRAM(S): 0.4 CAPSULE ORAL at 22:32

## 2025-05-18 RX ADMIN — HEPARIN SODIUM 1700 UNIT(S)/HR: 1000 INJECTION INTRAVENOUS; SUBCUTANEOUS at 16:26

## 2025-05-18 RX ADMIN — POLYETHYLENE GLYCOL 3350 17 GRAM(S): 17 POWDER, FOR SOLUTION ORAL at 11:31

## 2025-05-18 RX ADMIN — LIDOCAINE HYDROCHLORIDE 1 PATCH: 20 JELLY TOPICAL at 19:57

## 2025-05-18 RX ADMIN — INSULIN LISPRO 2: 100 INJECTION, SOLUTION INTRAVENOUS; SUBCUTANEOUS at 06:03

## 2025-05-18 RX ADMIN — HEPARIN SODIUM 1700 UNIT(S)/HR: 1000 INJECTION INTRAVENOUS; SUBCUTANEOUS at 19:55

## 2025-05-18 RX ADMIN — AMPICILLIN SODIUM 200 GRAM(S): 1 INJECTION, POWDER, FOR SOLUTION INTRAMUSCULAR; INTRAVENOUS at 11:25

## 2025-05-18 RX ADMIN — LIDOCAINE HYDROCHLORIDE 1 PATCH: 20 JELLY TOPICAL at 22:57

## 2025-05-18 NOTE — DIETITIAN INITIAL EVALUATION ADULT - NS FNS DIET ORDER
Diet, NPO after Midnight:      NPO Start Date: 18-May-2025,   NPO Start Time: 23:59 (05-18-25 @ 11:38)

## 2025-05-18 NOTE — PROGRESS NOTE ADULT - PROBLEM SELECTOR PLAN 2
CT Lumbar Spine: L3-L4 disc bulge, L4-L5 left paracentral-foraminal disc protrusion, multiple mild lumbar vertebral body compression deformities  . L3-L4 disc bulge, resulting in severe central canal, bilateral lateral recess and moderate bilateral neural foramen stenosis with facet arthrosis and ligamentum flavum hypertrophy.    - Pain: iv tylenol prn, dilaudid .5mg mod pain, dilaudid 1mg severe pain  - Ortho Consulted; recs appreciated  - TLSO brace  - MRI wo con (CrCl 14 so avoiding gadolinium) to r/o cauda equina and malignant disease, gely with history of prostate ca -> MRI was not tolerated given loudness of machine and pain. Pt does not want to go into MRI machine again - Most likely i/s/o contrast nephropathy after EP procedure vs. lower BP than baseline given BP meds  - Reduced clonidine from 0.2mg TID to 0.1mg

## 2025-05-18 NOTE — PROGRESS NOTE ADULT - PROBLEM SELECTOR PLAN 11
Diet: CC  DVT: heparin gtt  Dispo: pending ortho and PT eval    DNR/DNI - Continue Home Levothyroxine 150mcg

## 2025-05-18 NOTE — PROGRESS NOTE ADULT - PROBLEM SELECTOR PLAN 6
#HTN     - Continue Home Hydralazine 100mg tid  - Continue Home Clonidine .2mg tid  - Hold Telmisartan 40mg qd in setting of JEROME   - Hold Chlorthalidone 25mg qd in setting of JEROME  - On 5/17, pt intermittently nauseous and unable to tolerate PO, requiring IV 10 labetalol PRN -> CT head ordered acute urinary retention in the setting of back pain and immobility    - wong in place  - Plan for TOV night of 5/18

## 2025-05-18 NOTE — PROGRESS NOTE ADULT - PROBLEM SELECTOR PLAN 7
- lantus 28u qhs, humalog 6u tid with meals, low ISS  - Tending to be hyperglycemic on FSG #HTN     - Continue Home Hydralazine 100mg tid  - Continue Home Clonidine .2mg tid -> decrease to 0.1mg for hypotension  - Hold Telmisartan 40mg qd in setting of JEROME   - Hold Chlorthalidone 25mg qd in setting of JEROME  - On 5/17, pt intermittently nauseous and unable to tolerate PO, requiring IV 10 labetalol PRN -> CT head ordered to r/o bleed

## 2025-05-18 NOTE — DIETITIAN INITIAL EVALUATION ADULT - PROBLEM SELECTOR PLAN 2
Pt hypoxic on presentation for unclear reason; started on high flow nasal cannula 40/40  - CXR unimpressive  - lungs clear on exam  - poss bronchiectasis vs pna  - VQ scan to rule out PE, avoiding iv contrast iso JEROME unable to respond due to neuro status

## 2025-05-18 NOTE — PROGRESS NOTE ADULT - PROBLEM SELECTOR PLAN 4
Pt hypoxic on presentation for unclear reason; started on high flow nasal cannula 40/40    - CXR unimpressive  - lungs clear on exam  - poss bronchiectasis vs pna  - VQ scan to rule out PE performed on 5/11: very low probability of PE - BCx positive for E faecalis . 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 NPO midnight and morning labs

## 2025-05-18 NOTE — DIETITIAN INITIAL EVALUATION ADULT - OTHER INFO
84 y/o male with hx DM, HLD, HTN, prostate ca and TIA admitted with back pain. Pt reports generally good appetite/PO intake PTA, consumes a low sugar diet at baseline. Pt confirms NKFA, denies difficulties chewing/swallowing. Pt lives at home, independent with ADLs PTA. Notable medications PTA per chart inclusive of Humalog, Lantus and Tradjenta for DM management, rosuvastatin, telmisartan, propranolol and hydralazine. Pt reported a stable wt trend PTA with -210 lbs and current admit wt of 205 lbs.    Pt reports poor appetite/PO intake due to ongoing nausea this admission. Offered oral supplementation or to provide diet gingerale to assist with nausea. Pt declined stating that RNs have been helping and sometimes bring him crackers/juice in small amounts. Pt requires assistance with tray set up but is able to feed self independently. Last BM 5/15 per flowsheets. Ordered for Miralax 17 gm qD and senna 2 tablets qHS. Labs notable for  - 188 mg/dl with Lantus and Admelog insulins orderd for coverage. Hb A1C 6.9% - good control given pt's advanced age. Recommend encouraging oral intake as tolerated. RDN services to remain available as needed.

## 2025-05-18 NOTE — PROGRESS NOTE ADULT - ATTENDING COMMENTS
85M w/ hypertension, type 2 diabetes (A1c 6.9%), CKD (b/l Cr 1.5-2), prostate cancer s/p prostatectomy, h/o TIA, sinus node dysfunction s/p PPM presented to [facility] with lower back pain, found to have imaging with lumbar compression deformities and high grade E faecalis bacteremia, transferred to [facility] for further management.  5/14 BCx 1/4 bottles GPC  Pt s/p PPM removal on 5/16 , pt noted to have LUE edema , pt more alert and awake today , in a pleasant mood     #Enterococcus Bacertemia:  -Continue ampicillin and CTX, 5/14 BCx not clear, another set obtained, concern for involvement of spine given pain however patient unable to tolerate MRI; s/p PPM removal/replacement with leadless PPM 5/16 , needs MARIA LUISA  LUE edema - will check  LUE duplex  -f/u CXR to eval PPM site  #Back Pain  - unable to tolerate MRI  - otho following - Pt does not want to go into MRI machine again  -continue pain control, add lidocaine patch  -TLSO brace when OOB, continue PT  # HTN , uncontrolled , per daughter , his BP at home used to be high 140s to 160s, do not need to lower it drastically , pt s/p IV Labetalol x1 dose on 5/17, now BP tightly controled, BP has been 115//50,   #JEROME Cr uptrended , may be 2/2 to HERIBERTO vs ATN for tight  BP control will CTM    #hx TIA  continue  statin, DOAC was on hold for procedure , EP rec to resume , As pt with , hypertensive urgency and nausea , will check CT head , if oK will use heparin gtt for now instead of DOAC   #DM  - titrate insulin  #Urinary retention; Frye +, continue Frye care per protocol , Plan for TOV tonight   - continue tamsulosin   Rest of the management as above  Plan of care d/w family at bedside   Pt DNR/DNI

## 2025-05-18 NOTE — PROGRESS NOTE ADULT - PROBLEM SELECTOR PLAN 12
Diet: CC  DVT: Elioquis 2.5 BID -> holding until negative head CT  Dispo: pending ortho and PT eval    DNR/DNI

## 2025-05-18 NOTE — DIETITIAN INITIAL EVALUATION ADULT - PERTINENT LABORATORY DATA
05-18    145  |  106  |  52[H]  ----------------------------<  146[H]  3.9   |  27  |  2.24[H]    Ca    8.6      18 May 2025 06:00  Phos  3.6     05-18  Mg     2.30     05-18    TPro  5.9[L]  /  Alb  2.8[L]  /  TBili  0.3  /  DBili  x   /  AST  18  /  ALT  8   /  AlkPhos  53  05-18  POCT Blood Glucose.: 114 mg/dL (05-18-25 @ 12:24)  A1C with Estimated Average Glucose Result: 6.9 % (05-07-25 @ 06:03)  A1C with Estimated Average Glucose Result: 7.4 % (11-10-24 @ 06:55)

## 2025-05-18 NOTE — DIETITIAN INITIAL EVALUATION ADULT - NUTRITIONGOAL OUTCOME1
pt with improved tolerance towards PO and meeting > 75% of estimated nutrition needs to optimize nutrition/clinical status

## 2025-05-18 NOTE — DIETITIAN INITIAL EVALUATION ADULT - PERTINENT MEDS FT
MEDICATIONS  (STANDING):  ampicillin  IVPB 2 Gram(s) IV Intermittent every 6 hours  aspirin  chewable 81 milliGRAM(s) Oral daily  bisacodyl 5 milliGRAM(s) Oral at bedtime  cefTRIAXone   IVPB 2000 milliGRAM(s) IV Intermittent every 12 hours  chlorhexidine 2% Cloths 1 Application(s) Topical daily  chlorhexidine 2% Cloths 1 Application(s) Topical daily  cloNIDine 0.1 milliGRAM(s) Oral three times a day  dextrose 5%. 1000 milliLiter(s) (50 mL/Hr) IV Continuous <Continuous>  dextrose 5%. 1000 milliLiter(s) (100 mL/Hr) IV Continuous <Continuous>  dextrose 50% Injectable 25 Gram(s) IV Push once  dextrose 50% Injectable 12.5 Gram(s) IV Push once  dextrose 50% Injectable 25 Gram(s) IV Push once  dextrose Oral Gel 15 Gram(s) Oral once  glucagon  Injectable 1 milliGRAM(s) IntraMuscular once  heparin   Injectable 7500 Unit(s) IV Push once  heparin  Infusion.  Unit(s)/Hr (17 mL/Hr) IV Continuous <Continuous>  hydrALAZINE 100 milliGRAM(s) Oral three times a day  insulin glargine Injectable (LANTUS) 28 Unit(s) SubCutaneous at bedtime  insulin lispro (ADMELOG) corrective regimen sliding scale   SubCutaneous every 6 hours  insulin lispro Injectable (ADMELOG) 6 Unit(s) SubCutaneous three times a day before meals  levothyroxine 150 MICROGram(s) Oral daily  lidocaine   4% Patch 1 Patch Transdermal daily  NIFEdipine XL 30 milliGRAM(s) Oral daily  polyethylene glycol 3350 17 Gram(s) Oral daily  propranolol  milliGRAM(s) Oral daily  rosuvastatin 10 milliGRAM(s) Oral at bedtime  senna 2 Tablet(s) Oral at bedtime  tamsulosin 0.4 milliGRAM(s) Oral at bedtime    MEDICATIONS  (PRN):  heparin   Injectable 7500 Unit(s) IV Push every 6 hours PRN For aPTT less than 40  heparin   Injectable 3500 Unit(s) IV Push every 6 hours PRN For aPTT between 40 - 57  HYDROmorphone  Injectable 0.5 milliGRAM(s) IV Push every 4 hours PRN Severe Pain (7 - 10)

## 2025-05-18 NOTE — PROGRESS NOTE ADULT - SUBJECTIVE AND OBJECTIVE BOX
***Plan not finalized until attending attestation***    Raman Carmichael MD (PGY-1)  Internal Medicine  Contact via [employer] TEAMS    ******************************************    PROGRESS NOTE:     Patient is a 85y old  Male who presents with a chief complaint of back pain (17 May 2025 07:53)    INTERVAL EVENTS: No acute overnight events.     SUBJECTIVE: Patient seen and examined at bedside.  Back pain when moving, other than this no complaints    MEDICATIONS  (STANDING):  ampicillin  IVPB 2 Gram(s) IV Intermittent every 6 hours  aspirin  chewable 81 milliGRAM(s) Oral daily  bisacodyl 5 milliGRAM(s) Oral at bedtime  cefTRIAXone   IVPB 2000 milliGRAM(s) IV Intermittent every 12 hours  chlorhexidine 2% Cloths 1 Application(s) Topical daily  chlorhexidine 2% Cloths 1 Application(s) Topical daily  cloNIDine 0.2 milliGRAM(s) Oral three times a day  dextrose 5%. 1000 milliLiter(s) (50 mL/Hr) IV Continuous <Continuous>  dextrose 5%. 1000 milliLiter(s) (100 mL/Hr) IV Continuous <Continuous>  dextrose 50% Injectable 25 Gram(s) IV Push once  dextrose 50% Injectable 12.5 Gram(s) IV Push once  dextrose 50% Injectable 25 Gram(s) IV Push once  dextrose Oral Gel 15 Gram(s) Oral once  glucagon  Injectable 1 milliGRAM(s) IntraMuscular once  hydrALAZINE 100 milliGRAM(s) Oral three times a day  insulin glargine Injectable (LANTUS) 28 Unit(s) SubCutaneous at bedtime  insulin lispro (ADMELOG) corrective regimen sliding scale   SubCutaneous every 6 hours  insulin lispro Injectable (ADMELOG) 6 Unit(s) SubCutaneous three times a day before meals  levothyroxine 150 MICROGram(s) Oral daily  lidocaine   4% Patch 1 Patch Transdermal daily  NIFEdipine XL 30 milliGRAM(s) Oral daily  polyethylene glycol 3350 17 Gram(s) Oral daily  propranolol  milliGRAM(s) Oral daily  rosuvastatin 10 milliGRAM(s) Oral at bedtime  senna 2 Tablet(s) Oral at bedtime  tamsulosin 0.4 milliGRAM(s) Oral at bedtime    MEDICATIONS  (PRN):  HYDROmorphone  Injectable 0.5 milliGRAM(s) IV Push every 4 hours PRN Severe Pain (7 - 10)      CAPILLARY BLOOD GLUCOSE      POCT Blood Glucose.: 154 mg/dL (18 May 2025 05:36)  POCT Blood Glucose.: 163 mg/dL (18 May 2025 00:34)  POCT Blood Glucose.: 188 mg/dL (17 May 2025 21:56)  POCT Blood Glucose.: 182 mg/dL (17 May 2025 17:53)  POCT Blood Glucose.: 177 mg/dL (17 May 2025 12:30)  POCT Blood Glucose.: 176 mg/dL (17 May 2025 09:11)  POCT Blood Glucose.: 170 mg/dL (17 May 2025 07:34)    I&O's Summary    17 May 2025 07:01  -  18 May 2025 07:00  --------------------------------------------------------  IN: 0 mL / OUT: 875 mL / NET: -875 mL        PHYSICAL EXAM:  Vital Signs Last 24 Hrs  T(C): 36.7 (17 May 2025 12:29), Max: 36.7 (17 May 2025 12:29)  T(F): 98.1 (17 May 2025 12:29), Max: 98.1 (17 May 2025 12:29)  HR: 60 (17 May 2025 18:30) (60 - 79)  BP: 134/50 (17 May 2025 18:30) (134/50 - 212/85)  BP(mean): --  RR: 18 (17 May 2025 18:30) (18 - 18)  SpO2: 99% (17 May 2025 18:30) (96% - 99%)    Parameters below as of 17 May 2025 18:30  Patient On (Oxygen Delivery Method): nasal cannula    PHYSICAL EXAM:  GENERAL: NAD, lying in bed comfortably  HEAD: Atraumatic, normocephalic  EYES: EOMI, PERRLA, conjunctiva and sclera clear  ENT: Moist mucous membranes  NECK: Supple, no JVD  HEART: S1, S2, Regular rate and rhythm, no murmurs, rubs, or gallops  LUNGS: Unlabored respirations, clear to auscultation bilaterally, no crackles, wheezing, or rhonchi  ABDOMEN: Soft, nontender, nondistended, +BS  EXTREMITIES: 2+ peripheral pulses bilaterally. No clubbing, cyanosis, or edema  NERVOUS SYSTEM:  A&Ox3, LE R weaker than left  SKIN: No rashes or lesions  +Frye    LABS:                        9.3    15.10 )-----------( 241      ( 18 May 2025 06:00 )             28.8     05-17    145  |  106  |  44[H]  ----------------------------<  176[H]  3.8   |  25  |  1.38[H]    Ca    8.9      17 May 2025 07:10  Phos  2.8     05-17  Mg     2.30     05-17    TPro  6.6  /  Alb  2.9[L]  /  TBili  0.3  /  DBili  x   /  AST  25  /  ALT  10  /  AlkPhos  61  05-17    PT/INR - ( 17 May 2025 12:17 )   PT: 11.6 sec;   INR: 0.97 ratio         PTT - ( 17 May 2025 12:17 )  PTT:23.9 sec      Urinalysis Basic - ( 17 May 2025 07:10 )    Color: x / Appearance: x / SG: x / pH: x  Gluc: 176 mg/dL / Ketone: x  / Bili: x / Urobili: x   Blood: x / Protein: x / Nitrite: x   Leuk Esterase: x / RBC: x / WBC x   Sq Epi: x / Non Sq Epi: x / Bacteria: x        Culture - Blood (collected 15 May 2025 21:55)  Source: Blood Blood  Preliminary Report (18 May 2025 02:02):    No growth at 48 Hours    Culture - Blood (collected 15 May 2025 21:47)  Source: Blood Blood  Preliminary Report (18 May 2025 02:02):    No growth at 48 Hours

## 2025-05-18 NOTE — PROGRESS NOTE ADULT - PROBLEM SELECTOR PLAN 3
- BCx positive for E faecalis . Only 1 bottle was sent and is positive -> repeat Bcx 5/15 negative   - s/p removal of PPM on 5/16 and exchange to leadless pacemaker with EP   - c/w ampicillin 2g q6 and ceftriaxone per ID  - TTE ordered -> EF 64% mild LV systolic dysfunction  - MARIA LUISA ordered per ID, plan for Monday CT Lumbar Spine: L3-L4 disc bulge, L4-L5 left paracentral-foraminal disc protrusion, multiple mild lumbar vertebral body compression deformities  . L3-L4 disc bulge, resulting in severe central canal, bilateral lateral recess and moderate bilateral neural foramen stenosis with facet arthrosis and ligamentum flavum hypertrophy.    - Pain: iv tylenol prn, dilaudid .5mg mod pain, dilaudid 1mg severe pain  - Ortho Consulted; recs appreciated  - TLSO brace  - MRI wo con (CrCl 14 so avoiding gadolinium) to r/o cauda equina and malignant disease, gely with history of prostate ca -> MRI was not tolerated given loudness of machine and pain. Pt does not want to go into MRI machine again

## 2025-05-18 NOTE — PROGRESS NOTE ADULT - ASSESSMENT
Pt is an 85M with HTN, HLD, DM2, prostate cancer s/p prostatectomy, CKD, TIA with a pacemaker who presents transferred from [place] for further evaluation of low back pain secondary to lumbar compression deformities. EP on board to clear PPM for MRI compatibility. Found to have e facaelis bacteremia. On abx, unable to tolerate MRI s/p PPM removal on 5/16 and exchange for leadless PPM.

## 2025-05-18 NOTE — CHART NOTE - NSCHARTNOTEFT_GEN_A_CORE
Given improved mental status compared to yesterday and no new neurologic deficits on physical exam, will discontinue the CT head non con and will begin heparin drip for hx of TIA.

## 2025-05-18 NOTE — DIETITIAN INITIAL EVALUATION ADULT - ADD RECOMMEND
1. Provide encouragement with PO intake, menu selections, and assistance with meals as needed. 2. Continue to monitor nutritional intake, labs, weights, BM, skin, clinical course. 3. Follow pt as per protocol.

## 2025-05-19 DIAGNOSIS — D64.9 ANEMIA, UNSPECIFIED: ICD-10-CM

## 2025-05-19 LAB
ANION GAP SERPL CALC-SCNC: 11 MMOL/L — SIGNIFICANT CHANGE UP (ref 7–14)
ANION GAP SERPL CALC-SCNC: 12 MMOL/L — SIGNIFICANT CHANGE UP (ref 7–14)
APTT BLD: 24.4 SEC — LOW (ref 26.1–36.8)
BLD GP AB SCN SERPL QL: NEGATIVE — SIGNIFICANT CHANGE UP
BUN SERPL-MCNC: 56 MG/DL — HIGH (ref 7–23)
BUN SERPL-MCNC: 61 MG/DL — HIGH (ref 7–23)
CALCIUM SERPL-MCNC: 8 MG/DL — LOW (ref 8.4–10.5)
CALCIUM SERPL-MCNC: 8 MG/DL — LOW (ref 8.4–10.5)
CHLORIDE SERPL-SCNC: 104 MMOL/L — SIGNIFICANT CHANGE UP (ref 98–107)
CHLORIDE SERPL-SCNC: 106 MMOL/L — SIGNIFICANT CHANGE UP (ref 98–107)
CO2 SERPL-SCNC: 25 MMOL/L — SIGNIFICANT CHANGE UP (ref 22–31)
CO2 SERPL-SCNC: 26 MMOL/L — SIGNIFICANT CHANGE UP (ref 22–31)
CREAT SERPL-MCNC: 2.57 MG/DL — HIGH (ref 0.5–1.3)
CREAT SERPL-MCNC: 2.74 MG/DL — HIGH (ref 0.5–1.3)
CULTURE RESULTS: ABNORMAL
EGFR: 22 ML/MIN/1.73M2 — LOW
EGFR: 22 ML/MIN/1.73M2 — LOW
EGFR: 23 ML/MIN/1.73M2 — LOW
EGFR: 23 ML/MIN/1.73M2 — LOW
GLUCOSE BLDC GLUCOMTR-MCNC: 109 MG/DL — HIGH (ref 70–99)
GLUCOSE BLDC GLUCOMTR-MCNC: 132 MG/DL — HIGH (ref 70–99)
GLUCOSE BLDC GLUCOMTR-MCNC: 97 MG/DL — SIGNIFICANT CHANGE UP (ref 70–99)
GLUCOSE SERPL-MCNC: 114 MG/DL — HIGH (ref 70–99)
GLUCOSE SERPL-MCNC: 123 MG/DL — HIGH (ref 70–99)
HCT VFR BLD CALC: 24.3 % — LOW (ref 39–50)
HCT VFR BLD CALC: 27 % — LOW (ref 39–50)
HGB BLD-MCNC: 7.9 G/DL — LOW (ref 13–17)
HGB BLD-MCNC: 8.9 G/DL — LOW (ref 13–17)
INR BLD: 0.97 RATIO — SIGNIFICANT CHANGE UP (ref 0.85–1.16)
MAGNESIUM SERPL-MCNC: 2.4 MG/DL — SIGNIFICANT CHANGE UP (ref 1.6–2.6)
MCHC RBC-ENTMCNC: 27.6 PG — SIGNIFICANT CHANGE UP (ref 27–34)
MCHC RBC-ENTMCNC: 28.1 PG — SIGNIFICANT CHANGE UP (ref 27–34)
MCHC RBC-ENTMCNC: 32.5 G/DL — SIGNIFICANT CHANGE UP (ref 32–36)
MCHC RBC-ENTMCNC: 33 G/DL — SIGNIFICANT CHANGE UP (ref 32–36)
MCV RBC AUTO: 85 FL — SIGNIFICANT CHANGE UP (ref 80–100)
MCV RBC AUTO: 85.2 FL — SIGNIFICANT CHANGE UP (ref 80–100)
NRBC # BLD AUTO: 0 K/UL — SIGNIFICANT CHANGE UP (ref 0–0)
NRBC # BLD AUTO: 0 K/UL — SIGNIFICANT CHANGE UP (ref 0–0)
NRBC # FLD: 0 K/UL — SIGNIFICANT CHANGE UP (ref 0–0)
NRBC # FLD: 0 K/UL — SIGNIFICANT CHANGE UP (ref 0–0)
NRBC BLD AUTO-RTO: 0 /100 WBCS — SIGNIFICANT CHANGE UP (ref 0–0)
NRBC BLD AUTO-RTO: 0 /100 WBCS — SIGNIFICANT CHANGE UP (ref 0–0)
ORGANISM # SPEC MICROSCOPIC CNT: ABNORMAL
ORGANISM # SPEC MICROSCOPIC CNT: ABNORMAL
PHOSPHATE SERPL-MCNC: 3.9 MG/DL — SIGNIFICANT CHANGE UP (ref 2.5–4.5)
PLATELET # BLD AUTO: 198 K/UL — SIGNIFICANT CHANGE UP (ref 150–400)
PLATELET # BLD AUTO: 230 K/UL — SIGNIFICANT CHANGE UP (ref 150–400)
POTASSIUM SERPL-MCNC: 3.6 MMOL/L — SIGNIFICANT CHANGE UP (ref 3.5–5.3)
POTASSIUM SERPL-MCNC: 3.9 MMOL/L — SIGNIFICANT CHANGE UP (ref 3.5–5.3)
POTASSIUM SERPL-SCNC: 3.6 MMOL/L — SIGNIFICANT CHANGE UP (ref 3.5–5.3)
POTASSIUM SERPL-SCNC: 3.9 MMOL/L — SIGNIFICANT CHANGE UP (ref 3.5–5.3)
PROTHROM AB SERPL-ACNC: 11.6 SEC — SIGNIFICANT CHANGE UP (ref 9.9–13.4)
RBC # BLD: 2.86 M/UL — LOW (ref 4.2–5.8)
RBC # BLD: 3.17 M/UL — LOW (ref 4.2–5.8)
RBC # FLD: 15.1 % — HIGH (ref 10.3–14.5)
RBC # FLD: 15.4 % — HIGH (ref 10.3–14.5)
RH IG SCN BLD-IMP: POSITIVE — SIGNIFICANT CHANGE UP
SODIUM SERPL-SCNC: 141 MMOL/L — SIGNIFICANT CHANGE UP (ref 135–145)
SODIUM SERPL-SCNC: 143 MMOL/L — SIGNIFICANT CHANGE UP (ref 135–145)
SPECIMEN SOURCE: SIGNIFICANT CHANGE UP
WBC # BLD: 12.44 K/UL — HIGH (ref 3.8–10.5)
WBC # BLD: 16.1 K/UL — HIGH (ref 3.8–10.5)
WBC # FLD AUTO: 12.44 K/UL — HIGH (ref 3.8–10.5)
WBC # FLD AUTO: 16.1 K/UL — HIGH (ref 3.8–10.5)

## 2025-05-19 PROCEDURE — G0545: CPT

## 2025-05-19 PROCEDURE — 99233 SBSQ HOSP IP/OBS HIGH 50: CPT | Mod: GC

## 2025-05-19 RX ORDER — SODIUM CHLORIDE 9 G/1000ML
500 INJECTION, SOLUTION INTRAVENOUS ONCE
Refills: 0 | Status: COMPLETED | OUTPATIENT
Start: 2025-05-19 | End: 2025-05-19

## 2025-05-19 RX ORDER — HYDROMORPHONE/SOD CHLOR,ISO/PF 2 MG/10 ML
0.5 SYRINGE (ML) INJECTION EVERY 4 HOURS
Refills: 0 | Status: DISCONTINUED | OUTPATIENT
Start: 2025-05-19 | End: 2025-05-26

## 2025-05-19 RX ORDER — AMPICILLIN SODIUM 1 G/1
2 INJECTION, POWDER, FOR SOLUTION INTRAMUSCULAR; INTRAVENOUS EVERY 8 HOURS
Refills: 0 | Status: DISCONTINUED | OUTPATIENT
Start: 2025-05-19 | End: 2025-05-21

## 2025-05-19 RX ADMIN — AMPICILLIN SODIUM 200 GRAM(S): 1 INJECTION, POWDER, FOR SOLUTION INTRAMUSCULAR; INTRAVENOUS at 05:37

## 2025-05-19 RX ADMIN — Medication 0.1 MILLIGRAM(S): at 09:27

## 2025-05-19 RX ADMIN — Medication 0.1 MILLIGRAM(S): at 20:37

## 2025-05-19 RX ADMIN — Medication 2 TABLET(S): at 20:36

## 2025-05-19 RX ADMIN — Medication 40 MILLIEQUIVALENT(S): at 07:03

## 2025-05-19 RX ADMIN — CEFTRIAXONE 100 MILLIGRAM(S): 500 INJECTION, POWDER, FOR SOLUTION INTRAMUSCULAR; INTRAVENOUS at 05:37

## 2025-05-19 RX ADMIN — LIDOCAINE HYDROCHLORIDE 1 PATCH: 20 JELLY TOPICAL at 11:54

## 2025-05-19 RX ADMIN — Medication 30 MILLIGRAM(S): at 07:02

## 2025-05-19 RX ADMIN — Medication 100 MILLIGRAM(S): at 07:01

## 2025-05-19 RX ADMIN — Medication 5 MILLIGRAM(S): at 20:37

## 2025-05-19 RX ADMIN — Medication 100 MILLIGRAM(S): at 20:38

## 2025-05-19 RX ADMIN — LIDOCAINE HYDROCHLORIDE 1 PATCH: 20 JELLY TOPICAL at 23:48

## 2025-05-19 RX ADMIN — AMPICILLIN SODIUM 200 GRAM(S): 1 INJECTION, POWDER, FOR SOLUTION INTRAMUSCULAR; INTRAVENOUS at 23:00

## 2025-05-19 RX ADMIN — Medication 81 MILLIGRAM(S): at 11:55

## 2025-05-19 RX ADMIN — Medication 1 APPLICATION(S): at 11:56

## 2025-05-19 RX ADMIN — AMPICILLIN SODIUM 200 GRAM(S): 1 INJECTION, POWDER, FOR SOLUTION INTRAMUSCULAR; INTRAVENOUS at 15:43

## 2025-05-19 RX ADMIN — INSULIN GLARGINE-YFGN 28 UNIT(S): 100 INJECTION, SOLUTION SUBCUTANEOUS at 22:35

## 2025-05-19 RX ADMIN — SODIUM CHLORIDE 1000 MILLILITER(S): 9 INJECTION, SOLUTION INTRAVENOUS at 11:45

## 2025-05-19 RX ADMIN — Medication 0.5 MILLIGRAM(S): at 11:45

## 2025-05-19 RX ADMIN — Medication 0.1 MILLIGRAM(S): at 15:41

## 2025-05-19 RX ADMIN — Medication 0.1 MILLIGRAM(S): at 01:00

## 2025-05-19 RX ADMIN — ROSUVASTATIN CALCIUM 10 MILLIGRAM(S): 5 TABLET, FILM COATED ORAL at 20:37

## 2025-05-19 RX ADMIN — TAMSULOSIN HYDROCHLORIDE 0.4 MILLIGRAM(S): 0.4 CAPSULE ORAL at 20:37

## 2025-05-19 RX ADMIN — Medication 0.5 MILLIGRAM(S): at 21:15

## 2025-05-19 RX ADMIN — Medication 0.5 MILLIGRAM(S): at 01:30

## 2025-05-19 RX ADMIN — Medication 0.5 MILLIGRAM(S): at 00:58

## 2025-05-19 RX ADMIN — LIDOCAINE HYDROCHLORIDE 1 PATCH: 20 JELLY TOPICAL at 20:30

## 2025-05-19 RX ADMIN — Medication 0.5 MILLIGRAM(S): at 12:30

## 2025-05-19 RX ADMIN — INSULIN LISPRO 6 UNIT(S): 100 INJECTION, SOLUTION INTRAVENOUS; SUBCUTANEOUS at 17:59

## 2025-05-19 RX ADMIN — Medication 150 MICROGRAM(S): at 05:35

## 2025-05-19 RX ADMIN — CEFTRIAXONE 100 MILLIGRAM(S): 500 INJECTION, POWDER, FOR SOLUTION INTRAMUSCULAR; INTRAVENOUS at 17:58

## 2025-05-19 RX ADMIN — Medication 0.5 MILLIGRAM(S): at 20:42

## 2025-05-19 NOTE — PROGRESS NOTE ADULT - ATTENDING COMMENTS
85M hx of HTN, HLD, T2DM, prostate ca s/p prostatectomy, CKD, TIA, PPM presents from [facility] for eval of lower back pain 2/2 compression deformities course c/b E. faecalis bacteremia s/p PPM explant and leadless PPM pacemaker - awaiting TTE.   #Enterococcus bacteremia, unclear source. BCx cleared on 5/17. ID following. Pending MARIA LUISA to rule out IE. Also given bacteremia, MRI lumbar spine for pain/compression deformities is recommended but patient cannot tolerate the exam. C/w ampicillin and ceftriaxone.   #Lower back pain, CT lumbar spine with disc bulge and vertebral compression deformities - ortho recommending MRI but could not tolerate. Pain control with dilaudid and tylenol. TLSO brace.   #Anemia, Hgb 10.5-->9.3 --> 7.9. No signs of bleeding. 1U PRBC   #JEROME, creatinine uptrending in the setting of possible recent contrast vs hypotension. Urine lytes. Fluid bolus challenge. Repeat BMP.   Remainder of plan as stated above. Plan discussed with HS. 85M hx of HTN, HLD, T2DM, prostate ca s/p prostatectomy, CKD, TIA, PPM presents from [facility] for eval of lower back pain 2/2 compression deformities course c/b E. faecalis bacteremia s/p PPM explant and leadless PPM pacemaker - awaiting TTE.   #Enterococcus bacteremia, unclear source. BCx cleared on 5/17. ID following. Pending MARIA LUISA to rule out IE. Also given bacteremia, MRI lumbar spine for pain/compression deformities is recommended but patient cannot tolerate the exam. C/w ampicillin and ceftriaxone.   #Lower back pain, CT lumbar spine with disc bulge and vertebral compression deformities - ortho recommending MRI but could not tolerate. Pain control with dilaudid and tylenol. TLSO brace.   #Anemia, Hgb 10.5-->9.3 --> 7.9. No signs of bleeding. 1U PRBC   #JEROME, creatinine uptrending in the setting of possible recent contrast vs hypotension. Urine lytes. Fluid bolus challenge. Repeat BMP.   Remainder of plan as stated above. Plan discussed with HS.  #Hypoxic respiratory failure, initially requiring HFNC. Now on 2L. Weaned to RA - 94%. CXR clear. Lungs clear on exam. VQ scan with low probability for PE. Unclear etiology. Possibly atelectasis? Trial ICS.   Remainder of plan as stated above. Plan discussed with HS.

## 2025-05-19 NOTE — PROGRESS NOTE ADULT - PROBLEM SELECTOR PLAN 3
CT Lumbar Spine: L3-L4 disc bulge, L4-L5 left paracentral-foraminal disc protrusion, multiple mild lumbar vertebral body compression deformities  . L3-L4 disc bulge, resulting in severe central canal, bilateral lateral recess and moderate bilateral neural foramen stenosis with facet arthrosis and ligamentum flavum hypertrophy.    - Pain: iv tylenol prn, dilaudid .5mg mod pain, dilaudid 1mg severe pain  - Ortho Consulted; recs appreciated  - TLSO brace  - MRI wo con (CrCl 14 so avoiding gadolinium) to r/o cauda equina and malignant disease, gely with history of prostate ca -> MRI was not tolerated given loudness of machine and pain. Pt does not want to go into MRI machine again - Downtrending hgb  - No notable bleeding at cath site or EP surgical site  - Will repeat CBC and if continuing downtrending will consider CT abd to r/o RP bleed given on heparin

## 2025-05-19 NOTE — PROGRESS NOTE ADULT - PROBLEM SELECTOR PLAN 6
acute urinary retention in the setting of back pain and immobility    - wong in place  - Plan for TOV night of 5/18 Pt hypoxic on presentation for unclear reason; started on high flow nasal cannula 40/40    - CXR unimpressive  - lungs clear on exam  - poss bronchiectasis vs pna  - VQ scan to rule out PE performed on 5/11: very low probability of PE

## 2025-05-19 NOTE — PROGRESS NOTE ADULT - ATTENDING COMMENTS
This is a 86 y/o M w/ PMhx HTN, HLD, DM2, hypothyroidism, prostate cancer s/p prostatectomy, CKD, TIA, pacemaker placed in 5/2024 for abnormal arrythmia initially admitted to [facility] on 5/6 for back pain, now transferred to [facility] for orthopedic spine. Labs w/ leukocytosis to 18, BCx w/ E faecalis in multiple sets.     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 S/p PPM removal    #LBP, L3-4 disc bulge  #Respiratory failure, improved     Overall, 86 y/o M w/ PMhx HTN, HLD, DM2, hypothyroidism, prostate cancer s/p prostatectomy, CKD, TIA, pacemaker placed in 5/2024 for abnormal arrythmia transferred to [facility] from [facility] for MRI spine and ortho evaluation give LBP and lumbar compression w/ urinary retention. Pt noted to have chills and leukocytosis to 18, now with high grade E faecalis bacteremia. Unclear source however UCx not done, U/A with 10 WBCs, CT A/P w/o IV contrast on 5/10 w/o acute infectious source, MRI L spine here limited exam, findings of stenosis, no clear OM/discitis, however no contrast.   Given PPM and high grade bacteremia, will need TTE and eventual MARIA LUISA with PPM removal. Would empirically treat for IE until MARIA LUISA done.      Recommendations:   1. Ampicillin 2 g q6, Ceftriaxone 2 g q12  2. TTE difficult views, would obtain MARIA LUISA, planned for tomorrow. If negative, would stop Ceftriaxone.   3. Appreciate EP recommendations for PPM removal   4. BCx form 5/15 NGTD  5. MRI incomplete d/t patient not able to tolerate, per daughter may not be able to tolerate.     Thank you for consulting us and involving us in the management of this patient's case. In addition to reviewing history, imaging, documents, labs, microbiology, and infection control strategies and potential issues.     ID will continue to follow    Darrin Falk M.D.  Attending Physician  Division of Infectious Diseases  Department of Medicine    Please contact through MS Teams message.  Office: [phone] (after 5 PM or weekend)

## 2025-05-19 NOTE — PROGRESS NOTE ADULT - ASSESSMENT
Pt is an 85M with HTN, HLD, DM2, prostate cancer s/p prostatectomy, CKD, TIA with a pacemaker who presents transferred from [place] for further evaluation of low back pain secondary to lumbar compression deformities. EP on board to clear PPM for MRI compatibility. Found to have e facaelis bacteremia. On abx, unable to tolerate MRI s/p PPM removal on 5/16 and exchange for leadless PPM. Pt is an 85M with HTN, HLD, DM2, prostate cancer s/p prostatectomy, CKD, TIA with a pacemaker who presents transferred from [place] for further evaluation of low back pain secondary to lumbar compression deformities. EP on board to clear PPM for MRI compatibility. Found to have e facaelis bacteremia. On abx, unable to tolerate MRI s/p PPM removal on 5/16 and exchange for leadless PPM. Plan for MARIA LUISA.

## 2025-05-19 NOTE — PROGRESS NOTE ADULT - SUBJECTIVE AND OBJECTIVE BOX
***Plan not finalized until attending attestation***    Raman Carmichael MD (PGY-1)  Internal Medicine  Contact via [employer] TEAMS    ******************************************    PROGRESS NOTE:     Patient is a 85y old  Male who presents with a chief complaint of back pain (18 May 2025 15:17)    INTERVAL EVENTS: No acute overnight events.     SUBJECTIVE: Patient seen and examined at bedside. This morning, the patient is comfortable and doing well. No acute complaints.    MEDICATIONS  (STANDING):  ampicillin  IVPB 2 Gram(s) IV Intermittent every 6 hours  aspirin  chewable 81 milliGRAM(s) Oral daily  bisacodyl 5 milliGRAM(s) Oral at bedtime  cefTRIAXone   IVPB 2000 milliGRAM(s) IV Intermittent every 12 hours  chlorhexidine 2% Cloths 1 Application(s) Topical daily  chlorhexidine 2% Cloths 1 Application(s) Topical daily  cloNIDine 0.1 milliGRAM(s) Oral three times a day  dextrose 5%. 1000 milliLiter(s) (50 mL/Hr) IV Continuous <Continuous>  dextrose 5%. 1000 milliLiter(s) (100 mL/Hr) IV Continuous <Continuous>  dextrose 50% Injectable 25 Gram(s) IV Push once  dextrose 50% Injectable 12.5 Gram(s) IV Push once  dextrose 50% Injectable 25 Gram(s) IV Push once  dextrose Oral Gel 15 Gram(s) Oral once  glucagon  Injectable 1 milliGRAM(s) IntraMuscular once  hydrALAZINE 100 milliGRAM(s) Oral three times a day  insulin glargine Injectable (LANTUS) 28 Unit(s) SubCutaneous at bedtime  insulin lispro (ADMELOG) corrective regimen sliding scale   SubCutaneous every 6 hours  insulin lispro Injectable (ADMELOG) 6 Unit(s) SubCutaneous three times a day before meals  levothyroxine 150 MICROGram(s) Oral daily  lidocaine   4% Patch 1 Patch Transdermal daily  NIFEdipine XL 30 milliGRAM(s) Oral daily  polyethylene glycol 3350 17 Gram(s) Oral daily  propranolol  milliGRAM(s) Oral daily  rosuvastatin 10 milliGRAM(s) Oral at bedtime  senna 2 Tablet(s) Oral at bedtime  tamsulosin 0.4 milliGRAM(s) Oral at bedtime    MEDICATIONS  (PRN):  heparin   Injectable 7500 Unit(s) IV Push every 6 hours PRN For aPTT less than 40  heparin   Injectable 3500 Unit(s) IV Push every 6 hours PRN For aPTT between 40 - 57      CAPILLARY BLOOD GLUCOSE      POCT Blood Glucose.: 133 mg/dL (18 May 2025 21:28)  POCT Blood Glucose.: 115 mg/dL (18 May 2025 18:05)  POCT Blood Glucose.: 114 mg/dL (18 May 2025 12:24)  POCT Blood Glucose.: 133 mg/dL (18 May 2025 08:55)    I&O's Summary    18 May 2025 07:01  -  19 May 2025 07:00  --------------------------------------------------------  IN: 0 mL / OUT: 400 mL / NET: -400 mL        PHYSICAL EXAM:  Vital Signs Last 24 Hrs  T(C): 36.6 (18 May 2025 21:10), Max: 36.8 (18 May 2025 12:30)  T(F): 97.9 (18 May 2025 21:10), Max: 98.2 (18 May 2025 12:30)  HR: 55 (19 May 2025 01:08) (55 - 60)  BP: 145/41 (19 May 2025 01:08) (108/41 - 145/41)  BP(mean): --  RR: 17 (19 May 2025 00:57) (17 - 18)  SpO2: 96% (19 May 2025 00:57) (95% - 98%)    Parameters below as of 19 May 2025 00:57  Patient On (Oxygen Delivery Method): nasal cannula  O2 Flow (L/min): 2      PHYSICAL EXAM:  GENERAL: NAD, lying in bed comfortably  HEAD: Atraumatic, normocephalic  EYES: EOMI, PERRLA, conjunctiva and sclera clear  ENT: Moist mucous membranes  NECK: Supple, no JVD  HEART: S1, S2, Regular rate and rhythm, no murmurs, rubs, or gallops  LUNGS: Unlabored respirations, clear to auscultation bilaterally, no crackles, wheezing, or rhonchi  ABDOMEN: Soft, nontender, nondistended, +BS  EXTREMITIES: 2+ peripheral pulses bilaterally. No clubbing, cyanosis, or edema  NERVOUS SYSTEM:  A&Ox3, LE R weaker than left  SKIN: No rashes or lesions  +Frye    LABS:                        7.9    16.10 )-----------( 230      ( 19 May 2025 04:49 )             24.3     05-19    141  |  104  |  61[H]  ----------------------------<  123[H]  3.6   |  26  |  2.74[H]    Ca    8.0[L]      19 May 2025 04:49  Phos  3.9     05-19  Mg     2.40     05-19    TPro  5.9[L]  /  Alb  2.8[L]  /  TBili  0.3  /  DBili  x   /  AST  18  /  ALT  8   /  AlkPhos  53  05-18    PT/INR - ( 19 May 2025 04:49 )   PT: 11.6 sec;   INR: 0.97 ratio         PTT - ( 19 May 2025 04:49 )  PTT:24.4 sec      Urinalysis Basic - ( 19 May 2025 04:49 )    Color: x / Appearance: x / SG: x / pH: x  Gluc: 123 mg/dL / Ketone: x  / Bili: x / Urobili: x   Blood: x / Protein: x / Nitrite: x   Leuk Esterase: x / RBC: x / WBC x   Sq Epi: x / Non Sq Epi: x / Bacteria: x        Culture - Blood (collected 17 May 2025 18:20)  Source: Blood Blood-Peripheral  Preliminary Report (19 May 2025 04:01):    No growth at 24 hours    Culture - Blood (collected 17 May 2025 18:15)  Source: Blood Blood-Peripheral  Preliminary Report (19 May 2025 04:01):    No growth at 24 hours    Culture - Blood (collected 17 May 2025 07:10)  Source: Blood Blood  Preliminary Report (18 May 2025 11:01):    No growth at 24 hours    Culture - Blood (collected 17 May 2025 07:10)  Source: Blood Blood  Preliminary Report (18 May 2025 11:01):    No growth at 24 hours

## 2025-05-19 NOTE — PROGRESS NOTE ADULT - PROBLEM SELECTOR PLAN 4
- BCx positive for E faecalis . 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 NPO midnight and morning labs CT Lumbar Spine: L3-L4 disc bulge, L4-L5 left paracentral-foraminal disc protrusion, multiple mild lumbar vertebral body compression deformities  . L3-L4 disc bulge, resulting in severe central canal, bilateral lateral recess and moderate bilateral neural foramen stenosis with facet arthrosis and ligamentum flavum hypertrophy.    - Pain: iv tylenol prn, dilaudid .5mg mod pain, dilaudid 1mg severe pain  - Ortho Consulted; recs appreciated  - TLSO brace  - MRI wo con (CrCl 14 so avoiding gadolinium) to r/o cauda equina and malignant disease, gely with history of prostate ca -> MRI was not tolerated given loudness of machine and pain. Pt does not want to go into MRI machine again

## 2025-05-19 NOTE — PROGRESS NOTE ADULT - SUBJECTIVE AND OBJECTIVE BOX
Follow Up:      Interval History/ROS:Patient is a 85y old  Male who presents with a chief complaint of back pain (19 May 2025 07:20)      REVIEW OF SYSTEMS  [  ] ROS unobtainable because:    [  ] All other systems negative except as noted below    Constitutional:  [ ] fever [ ] chills  [ ] weight loss  [ ]night sweat  [ ]poor appetite/PO intake [ ]fatigue   Skin:  [ ] rash [ ] phlebitis	  Eyes: [ ] icterus [ ] pain  [ ] discharge	  ENMT: [ ] sore throat  [ ] thrush [ ] ulcers [ ] exudates [ ]anosmia  Respiratory: [ ] dyspnea [ ] hemoptysis [ ] cough [ ] sputum	  Cardiovascular:  [ ] chest pain [ ] palpitations [ ] edema	  Gastrointestinal:  [ ] nausea [ ] vomiting [ ] diarrhea [ ] constipation [ ] pain	  Genitourinary:  [ ] dysuria [ ] frequency [ ] hematuria [ ] discharge [ ] flank pain  [ ] incontinence  Musculoskeletal:  [ ] myalgias [ ] arthralgias [ ] arthritis  [ ] back pain  Neurological:  [ ] headache [ ] weakness [ ] seizures  [ ] confusion/altered mental status    Allergies  gabapentin (Other)        ANTIMICROBIALS:    ampicillin  IVPB 2 every 6 hours  cefTRIAXone   IVPB 2000 every 12 hours      OTHER MEDS: MEDICATIONS  (STANDING):  aspirin  chewable 81 daily  bisacodyl 5 at bedtime  cloNIDine 0.1 three times a day  dextrose 50% Injectable 25 once  dextrose 50% Injectable 12.5 once  dextrose 50% Injectable 25 once  dextrose Oral Gel 15 once  glucagon  Injectable 1 once  heparin   Injectable 7500 every 6 hours PRN  heparin   Injectable 3500 every 6 hours PRN  hydrALAZINE 100 three times a day  insulin glargine Injectable (LANTUS) 28 at bedtime  insulin lispro (ADMELOG) corrective regimen sliding scale  every 6 hours  insulin lispro Injectable (ADMELOG) 6 three times a day before meals  levothyroxine 150 daily  NIFEdipine XL 30 daily  polyethylene glycol 3350 17 daily  propranolol  daily  rosuvastatin 10 at bedtime  senna 2 at bedtime  tamsulosin 0.4 at bedtime      Vital Signs Last 24 Hrs  T(F): 98 (05-19-25 @ 04:45), Max: 98.7 (05-18-25 @ 06:00)    Vital Signs Last 24 Hrs  HR: 58 (05-19-25 @ 09:20) (55 - 60)  BP: 139/48 (05-19-25 @ 09:20) (108/41 - 149/51)  RR: 18 (05-19-25 @ 04:45)  SpO2: 94% (05-19-25 @ 09:20) (94% - 97%)  Wt(kg): --    EXAM:  Physical Exam:  Constitutional:  well preserved, comfortable  Head/Eyes: no icterus, PERRL, EOMI  ENT:  supple; no thrush  LUNGS:  CTA  CVS:  normal S1, S2, no murmur  Abd:  soft, non-tender; non-distended  Ext:  no edema  Vascular:  IV site no erythema tenderness or discharge  MSK:  joints without swelling  Neuro: AAO X 3, non- focal    Labs:                        7.9    16.10 )-----------( 230      ( 19 May 2025 04:49 )             24.3     05-19    141  |  104  |  61[H]  ----------------------------<  123[H]  3.6   |  26  |  2.74[H]    Ca    8.0[L]      19 May 2025 04:49  Phos  3.9     05-19  Mg     2.40     05-19    TPro  5.9[L]  /  Alb  2.8[L]  /  TBili  0.3  /  DBili  x   /  AST  18  /  ALT  8   /  AlkPhos  53  05-18      WBC Trend:  WBC Count: 16.10 (05-19-25 @ 04:49)  WBC Count: 15.10 (05-18-25 @ 06:00)  WBC Count: 21.23 (05-17-25 @ 07:10)  WBC Count: 13.28 (05-16-25 @ 03:48)      Creatine Trend:  Creatinine: 2.74 (05-19)  Creatinine: 2.24 (05-18)  Creatinine: 1.38 (05-17)  Creatinine: 1.58 (05-16)      Liver Biochemical Testing Trend:  Alanine Aminotransferase (ALT/SGPT): 8 (05-18)  Alanine Aminotransferase (ALT/SGPT): 10 (05-17)  Alanine Aminotransferase (ALT/SGPT): 8 (05-16)  Alanine Aminotransferase (ALT/SGPT): 10 (05-15)  Alanine Aminotransferase (ALT/SGPT): 8 (05-14)  Aspartate Aminotransferase (AST/SGOT): 18 (05-18-25 @ 06:00)  Aspartate Aminotransferase (AST/SGOT): 25 (05-17-25 @ 07:10)  Aspartate Aminotransferase (AST/SGOT): 13 (05-16-25 @ 05:10)  Aspartate Aminotransferase (AST/SGOT): 15 (05-15-25 @ 06:48)  Aspartate Aminotransferase (AST/SGOT): 14 (05-14-25 @ 07:46)  Bilirubin Total: 0.3 (05-18)  Bilirubin Total: 0.3 (05-17)  Bilirubin Total: 0.3 (05-16)  Bilirubin Total: 0.2 (05-15)  Bilirubin Total: 0.3 (05-14)      Trend LDH      Urinalysis Basic - ( 19 May 2025 04:49 )    Color: x / Appearance: x / SG: x / pH: x  Gluc: 123 mg/dL / Ketone: x  / Bili: x / Urobili: x   Blood: x / Protein: x / Nitrite: x   Leuk Esterase: x / RBC: x / WBC x   Sq Epi: x / Non Sq Epi: x / Bacteria: x        MICROBIOLOGY:    MRSA PCR Result.: NotDetec (05-14-25 @ 17:32)      Culture - Blood (collected 18 May 2025 06:00)  Source: Blood Blood  Preliminary Report:    No growth at 24 hours    Culture - Blood (collected 18 May 2025 06:00)  Source: Blood Blood  Preliminary Report:    No growth at 24 hours    Culture - Blood (collected 17 May 2025 18:20)  Source: Blood Blood-Peripheral  Preliminary Report:    No growth at 24 hours    Culture - Blood (collected 17 May 2025 18:15)  Source: Blood Blood-Peripheral  Preliminary Report:    No growth at 24 hours    Culture - Blood (collected 17 May 2025 07:10)  Source: Blood Blood  Preliminary Report:    No growth at 48 Hours    Culture - Blood (collected 17 May 2025 07:10)  Source: Blood Blood  Preliminary Report:    No growth at 48 Hours    Culture - Blood (collected 15 May 2025 21:55)  Source: Blood Blood  Preliminary Report:    No growth at 72 Hours    Culture - Blood (collected 15 May 2025 21:47)  Source: Blood Blood  Preliminary Report:    No growth at 72 Hours    Culture - Blood (collected 14 May 2025 10:40)  Source: Blood Blood  Preliminary Report:    Growth in aerobic bottle: Gram Positive Cocci in Pairs and Chains    Direct identification is available within approximately 3-5    hours either by Blood Panel Multiplexed PCR or Direct    MALDI-TOF. Details: [email or web address]  Organism: Blood Culture PCR  Organism: Blood Culture PCR    Sensitivities:      Method Type: PCR      -  Enterococcus faecalis: Detec    Culture - Blood (collected 14 May 2025 10:26)  Source: Blood Blood  Final Report:    Growth in aerobic and anaerobic bottles: Enterococcus faecalis    Growth in anaerobic bottle: blood culture bottle turned positive after    the final report was released.  Organism: Enterococcus faecalis    Sensitivities:      -  Streptomycin synergy: S <=1000      -  Vancomycin: S 0.5      -  Ampicillin: S <=2 Predicts results to ampicillin/sulbactam, amoxacillin-clavulanate and  piperacillin-tazobactam.      Method Type: SHAR      -  Gentamicin synergy: S <=500  Organism: Enterococcus faecalis                                            Blood Gas Venous - Lactate: 1.1 (05-17 @ 12:17)      RADIOLOGY:  imaging below personally reviewed   Follow Up:      Interval History/ROS:Patient is a 85y old  Male who presents with a chief complaint of back pain (19 May 2025 07:20)  Seen and examined at bedside. Patient endorsing lower back pain but no fevers, chills, cough, SOB, abdominal pain, dysuria. Currently undergoing TOV.   MARIA LUISA Cancelled today due to drop in Hgb to 7.9.     REVIEW OF SYSTEMS  [x] All other systems negative except as noted above      Allergies  gabapentin (Other)        ANTIMICROBIALS:    ampicillin  IVPB 2 every 6 hours  cefTRIAXone   IVPB 2000 every 12 hours      OTHER MEDS: MEDICATIONS  (STANDING):  aspirin  chewable 81 daily  bisacodyl 5 at bedtime  cloNIDine 0.1 three times a day  dextrose 50% Injectable 25 once  dextrose 50% Injectable 12.5 once  dextrose 50% Injectable 25 once  dextrose Oral Gel 15 once  glucagon  Injectable 1 once  heparin   Injectable 7500 every 6 hours PRN  heparin   Injectable 3500 every 6 hours PRN  hydrALAZINE 100 three times a day  insulin glargine Injectable (LANTUS) 28 at bedtime  insulin lispro (ADMELOG) corrective regimen sliding scale  every 6 hours  insulin lispro Injectable (ADMELOG) 6 three times a day before meals  levothyroxine 150 daily  NIFEdipine XL 30 daily  polyethylene glycol 3350 17 daily  propranolol  daily  rosuvastatin 10 at bedtime  senna 2 at bedtime  tamsulosin 0.4 at bedtime      Vital Signs Last 24 Hrs  T(F): 98 (05-19-25 @ 04:45), Max: 98.7 (05-18-25 @ 06:00)    Vital Signs Last 24 Hrs  HR: 58 (05-19-25 @ 09:20) (55 - 60)  BP: 139/48 (05-19-25 @ 09:20) (108/41 - 149/51)  RR: 18 (05-19-25 @ 04:45)  SpO2: 94% (05-19-25 @ 09:20) (94% - 97%)  Wt(kg): --    EXAM:  Physical Exam:  Constitutional:  well preserved, comfortable  Head/Eyes: no icterus, PERRL, EOMI  ENT:  supple; no thrush  LUNGS:  CTA  CVS:  normal S1, S2, no murmur, Pacemaker placement site dressing intact w/o bleeding or surrounding erythema   Abd:  soft, non-tender; non-distended  Ext:  ANDREIA UE edema most notable in forearms and hands  Vascular:  IV site no erythema tenderness or discharge  MSK:  joints without swelling  Neuro: AAO X 3, non- focal    Labs:                        7.9    16.10 )-----------( 230      ( 19 May 2025 04:49 )             24.3     05-19    141  |  104  |  61[H]  ----------------------------<  123[H]  3.6   |  26  |  2.74[H]    Ca    8.0[L]      19 May 2025 04:49  Phos  3.9     05-19  Mg     2.40     05-19    TPro  5.9[L]  /  Alb  2.8[L]  /  TBili  0.3  /  DBili  x   /  AST  18  /  ALT  8   /  AlkPhos  53  05-18      WBC Trend:  WBC Count: 16.10 (05-19-25 @ 04:49)  WBC Count: 15.10 (05-18-25 @ 06:00)  WBC Count: 21.23 (05-17-25 @ 07:10)  WBC Count: 13.28 (05-16-25 @ 03:48)      Creatine Trend:  Creatinine: 2.74 (05-19)  Creatinine: 2.24 (05-18)  Creatinine: 1.38 (05-17)  Creatinine: 1.58 (05-16)      Liver Biochemical Testing Trend:  Alanine Aminotransferase (ALT/SGPT): 8 (05-18)  Alanine Aminotransferase (ALT/SGPT): 10 (05-17)  Alanine Aminotransferase (ALT/SGPT): 8 (05-16)  Alanine Aminotransferase (ALT/SGPT): 10 (05-15)  Alanine Aminotransferase (ALT/SGPT): 8 (05-14)  Aspartate Aminotransferase (AST/SGOT): 18 (05-18-25 @ 06:00)  Aspartate Aminotransferase (AST/SGOT): 25 (05-17-25 @ 07:10)  Aspartate Aminotransferase (AST/SGOT): 13 (05-16-25 @ 05:10)  Aspartate Aminotransferase (AST/SGOT): 15 (05-15-25 @ 06:48)  Aspartate Aminotransferase (AST/SGOT): 14 (05-14-25 @ 07:46)  Bilirubin Total: 0.3 (05-18)  Bilirubin Total: 0.3 (05-17)  Bilirubin Total: 0.3 (05-16)  Bilirubin Total: 0.2 (05-15)  Bilirubin Total: 0.3 (05-14)      Trend LDH      Urinalysis Basic - ( 19 May 2025 04:49 )    Color: x / Appearance: x / SG: x / pH: x  Gluc: 123 mg/dL / Ketone: x  / Bili: x / Urobili: x   Blood: x / Protein: x / Nitrite: x   Leuk Esterase: x / RBC: x / WBC x   Sq Epi: x / Non Sq Epi: x / Bacteria: x        MICROBIOLOGY:    MRSA PCR Result.: NotDetec (05-14-25 @ 17:32)      Culture - Blood (collected 18 May 2025 06:00)  Source: Blood Blood  Preliminary Report:    No growth at 24 hours    Culture - Blood (collected 18 May 2025 06:00)  Source: Blood Blood  Preliminary Report:    No growth at 24 hours    Culture - Blood (collected 17 May 2025 18:20)  Source: Blood Blood-Peripheral  Preliminary Report:    No growth at 24 hours    Culture - Blood (collected 17 May 2025 18:15)  Source: Blood Blood-Peripheral  Preliminary Report:    No growth at 24 hours    Culture - Blood (collected 17 May 2025 07:10)  Source: Blood Blood  Preliminary Report:    No growth at 48 Hours    Culture - Blood (collected 17 May 2025 07:10)  Source: Blood Blood  Preliminary Report:    No growth at 48 Hours    Culture - Blood (collected 15 May 2025 21:55)  Source: Blood Blood  Preliminary Report:    No growth at 72 Hours    Culture - Blood (collected 15 May 2025 21:47)  Source: Blood Blood  Preliminary Report:    No growth at 72 Hours    Culture - Blood (collected 14 May 2025 10:40)  Source: Blood Blood  Preliminary Report:    Growth in aerobic bottle: Gram Positive Cocci in Pairs and Chains    Direct identification is available within approximately 3-5    hours either by Blood Panel Multiplexed PCR or Direct    MALDI-TOF. Details: [email or web address]  Organism: Blood Culture PCR  Organism: Blood Culture PCR    Sensitivities:      Method Type: PCR      -  Enterococcus faecalis: Detec    Culture - Blood (collected 14 May 2025 10:26)  Source: Blood Blood  Final Report:    Growth in aerobic and anaerobic bottles: Enterococcus faecalis    Growth in anaerobic bottle: blood culture bottle turned positive after    the final report was released.  Organism: Enterococcus faecalis    Sensitivities:      -  Streptomycin synergy: S <=1000      -  Vancomycin: S 0.5      -  Ampicillin: S <=2 Predicts results to ampicillin/sulbactam, amoxacillin-clavulanate and  piperacillin-tazobactam.      Method Type: SHAR      -  Gentamicin synergy: S <=500  Organism: Enterococcus faecalis                                            Blood Gas Venous - Lactate: 1.1 (05-17 @ 12:17)      RADIOLOGY:  imaging below personally reviewed

## 2025-05-19 NOTE — PROGRESS NOTE ADULT - PROBLEM SELECTOR PLAN 5
Pt hypoxic on presentation for unclear reason; started on high flow nasal cannula 40/40    - CXR unimpressive  - lungs clear on exam  - poss bronchiectasis vs pna  - VQ scan to rule out PE performed on 5/11: very low probability of PE - BCx positive for E faecalis . Only 1 bottle was sent and is positive -> repeat Bcx 5/15 negative   - s/p removal of PPM on 5/16 and exchange to leadless pacemaker with EP   - c/w ampicillin 2g q6 and ceftriaxone per ID  - TTE ordered -> EF 64% mild LV systolic dysfunction  - MARIA LUISA ordered per ID, plan for Monday - BCx positive for E faecalis . Only 1 bottle was sent and is positive -> repeat Bcx 5/15 negative   - s/p removal of PPM on 5/16 and exchange to leadless pacemaker with EP   - c/w ampicillin 2g q6 and ceftriaxone per ID  - TTE ordered -> EF 64% mild LV systolic dysfunction  - MARIA LUISA ordered per ID, plan for Monday -> needs transfusion before proceeding per anesthesia, plan for Tuesday

## 2025-05-19 NOTE — PROGRESS NOTE ADULT - PROBLEM SELECTOR PLAN 8
- lantus 28u qhs, humalog 6u tid with meals, low ISS  - Tending to be hyperglycemic on FSG #HTN     - Continue Home Hydralazine 100mg tid  - Continue Home Clonidine .2mg tid -> decrease to 0.1mg for hypotension  - Hold Telmisartan 40mg qd in setting of JEROME   - Hold Chlorthalidone 25mg qd in setting of JEROME  - On 5/17, pt intermittently nauseous and unable to tolerate PO, requiring IV 10 labetalol PRN -> CT head ordered to r/o bleed -> Pt improved, no CT head needed

## 2025-05-19 NOTE — PROGRESS NOTE ADULT - PROBLEM SELECTOR PLAN 7
#HTN     - Continue Home Hydralazine 100mg tid  - Continue Home Clonidine .2mg tid -> decrease to 0.1mg for hypotension  - Hold Telmisartan 40mg qd in setting of JEROME   - Hold Chlorthalidone 25mg qd in setting of JEROME  - On 5/17, pt intermittently nauseous and unable to tolerate PO, requiring IV 10 labetalol PRN -> CT head ordered to r/o bleed -> Pt improved, no CT head needed acute urinary retention in the setting of back pain and immobility    - TOV 5/19 successful

## 2025-05-19 NOTE — PROGRESS NOTE ADULT - PROBLEM SELECTOR PLAN 2
- Most likely i/s/o contrast nephropathy after EP procedure vs. lower BP than baseline given BP meds  - Reduced clonidine from 0.2mg TID to 0.1mg - Most likely i/s/o contrast nephropathy after EP procedure vs. lower BP than baseline given BP meds  - Reduced clonidine from 0.2mg TID to 0.1mg  - Giving 500cc bolus 5/19 and ordering urine studies and rechecking BMP

## 2025-05-19 NOTE — PROGRESS NOTE ADULT - PROBLEM SELECTOR PLAN 1
LUE swelling, nursing examined IV in LUE and it is functioning appropriately low c/f infiltration. Of note, pt is s/p PPM extraction but no fluctuance or edema at PPM site. Neurovascularly intact.    Plan:  -f/u LUE duplex -> superificial clot -> warm packed and restart AC after neg head CT  -f/u CXR to eval PPM site -> no PTX   -EP team updated

## 2025-05-19 NOTE — PROGRESS NOTE ADULT - PROBLEM SELECTOR PLAN 12
Diet: CC  DVT: Elioquis 2.5 BID -> holding until negative head CT  Dispo: pending ortho and PT eval    DNR/DNI - Continue Home Levothyroxine 150mcg

## 2025-05-19 NOTE — PROGRESS NOTE ADULT - ASSESSMENT
86 y/o M w/ PMhx HTN, HLD, DM2, hypothyroidism, prostate cancer s/p prostatectomy, CKD, TIA, pacemaker placed in 5/2024 for abnormal arrythmia initially admitted to [facility] on 5/6 for back pain, found to have compression deformities, transferred to [facility] for MR and orthopedic eval, with course complicated by Leukocytosis to 18 and BCx w/ persistent e.Faecalis Bacteremia, now S/p PPM replacement on 5/16.    #E faecalis bacteremia  #LBP, L3-4 disc bulge  #Respiratory failure, improving  #JEROME  - Bacteremia iso PPM, w/ concern for PPM infection and possible IE; now s/p PPM replacement on 5/16  - Cx clear since 5/15 but will need MARIA LUISA given high grade bacteremia   - will empirically treat for IE until ruled out w/ MARIA LUISA    Recommendations:   1. c/w Ampicillin 2 g q6, Ceftriaxone 2 g q12  2. Obtain MARIA LUISA to rule out IE   3. If recurrent bacteremia, consider interval CT A/P with IV contrast eval for septic emboli  4. MRI incomplete d/t patient not able to tolerate, would consider repeating MRI L spine later if possible.     ==========================================================  ==========================================================  =====================INCOMPLETE NOTE========================  ==========================================================  ==========================================================   86 y/o M w/ PMhx HTN, HLD, DM2, hypothyroidism, prostate cancer s/p prostatectomy, CKD, TIA, pacemaker placed in 5/2024 for abnormal arrythmia initially admitted to [facility] on 5/6 for back pain, found to have compression deformities, transferred to [facility] for MR and orthopedic eval, with course complicated by Leukocytosis to 18 and BCx w/ persistent e.Faecalis Bacteremia, now S/p PPM replacement on 5/16.    #E faecalis bacteremia  #LBP, L3-4 disc bulge  #Respiratory failure, improving  #JEROME  - Bacteremia iso PPM, w/ concern for PPM infection and possible IE; now s/p PPM replacement on 5/16  - Cx clear since 5/15 but will need MARIA LUISA given high grade bacteremia   - will empirically treat for IE until ruled out w/ MARIA LUISA    Recommendations:   1. c/w Ampicillin 2 g q8 (dosed renally), Ceftriaxone 2 g q12  2. Obtain MARIA LUISA to rule out IE   3. If recurrent bacteremia, consider interval CT A/P with IV contrast eval for septic emboli  4. MRI incomplete d/t patient not able to tolerate, would consider repeating MRI L spine later if possible.     ==========================================================  ==========================================================  =====================INCOMPLETE NOTE========================  ==========================================================  ==========================================================   84 y/o M w/ PMhx HTN, HLD, DM2, hypothyroidism, prostate cancer s/p prostatectomy, CKD, TIA, pacemaker placed in 5/2024 for abnormal arrythmia initially admitted to [facility] on 5/6 for back pain, found to have compression deformities, transferred to [facility] for MR and orthopedic eval, with course complicated by Leukocytosis to 18 and BCx w/ persistent e.Faecalis Bacteremia, now S/p PPM replacement on 5/16.    #E faecalis bacteremia  #LBP, L3-4 disc bulge  #Respiratory failure, resolved  #JEROME  - Bacteremia iso PPM, w/ concern for PPM infection and possible IE; now s/p PPM replacement on 5/16  - Cx clear since 5/15 but will need MARIA LUISA given high grade bacteremia   - will empirically treat for IE until ruled out w/ MARIA LUISA    Recommendations:   1. c/w Ampicillin 2 g q8 (dosed renally) + Ceftriaxone 2 g q12  2. Obtain MARIA LUISA to rule out IE   3. F/u Repeat BCx x2 5/18  4. MRI incomplete d/t patient not able to tolerate, would consider repeating MRI L spine later if possible.     Plan discussed w/ Attending Physician Dr. Darrin Falk.     Dayton Saldana MD (Jason)   Internal Medicine PGY-2  Department of Medicine [facility]

## 2025-05-20 ENCOUNTER — RESULT REVIEW (OUTPATIENT)
Age: 85
End: 2025-05-20

## 2025-05-20 LAB
ANION GAP SERPL CALC-SCNC: 10 MMOL/L — SIGNIFICANT CHANGE UP (ref 7–14)
APPEARANCE UR: CLEAR — SIGNIFICANT CHANGE UP
APTT BLD: 21 SEC — LOW (ref 26.1–36.8)
BACTERIA # UR AUTO: NEGATIVE /HPF — SIGNIFICANT CHANGE UP
BILIRUB UR-MCNC: NEGATIVE — SIGNIFICANT CHANGE UP
BLD GP AB SCN SERPL QL: NEGATIVE — SIGNIFICANT CHANGE UP
BUN SERPL-MCNC: 54 MG/DL — HIGH (ref 7–23)
CALCIUM SERPL-MCNC: 8.1 MG/DL — LOW (ref 8.4–10.5)
CAST: 4 /LPF — SIGNIFICANT CHANGE UP (ref 0–4)
CHLORIDE SERPL-SCNC: 105 MMOL/L — SIGNIFICANT CHANGE UP (ref 98–107)
CO2 SERPL-SCNC: 26 MMOL/L — SIGNIFICANT CHANGE UP (ref 22–31)
COLOR SPEC: YELLOW — SIGNIFICANT CHANGE UP
CREAT ?TM UR-MCNC: 81 MG/DL — SIGNIFICANT CHANGE UP
CREAT SERPL-MCNC: 2.38 MG/DL — HIGH (ref 0.5–1.3)
DIFF PNL FLD: NEGATIVE — SIGNIFICANT CHANGE UP
EGFR: 26 ML/MIN/1.73M2 — LOW
EGFR: 26 ML/MIN/1.73M2 — LOW
GLUCOSE BLDC GLUCOMTR-MCNC: 105 MG/DL — HIGH (ref 70–99)
GLUCOSE BLDC GLUCOMTR-MCNC: 118 MG/DL — HIGH (ref 70–99)
GLUCOSE BLDC GLUCOMTR-MCNC: 118 MG/DL — HIGH (ref 70–99)
GLUCOSE BLDC GLUCOMTR-MCNC: 73 MG/DL — SIGNIFICANT CHANGE UP (ref 70–99)
GLUCOSE BLDC GLUCOMTR-MCNC: 88 MG/DL — SIGNIFICANT CHANGE UP (ref 70–99)
GLUCOSE BLDC GLUCOMTR-MCNC: 91 MG/DL — SIGNIFICANT CHANGE UP (ref 70–99)
GLUCOSE SERPL-MCNC: 94 MG/DL — SIGNIFICANT CHANGE UP (ref 70–99)
GLUCOSE UR QL: NEGATIVE MG/DL — SIGNIFICANT CHANGE UP
HCT VFR BLD CALC: 27 % — LOW (ref 39–50)
HCT VFR BLD CALC: 27.1 % — LOW (ref 39–50)
HGB BLD-MCNC: 8.8 G/DL — LOW (ref 13–17)
HGB BLD-MCNC: 9 G/DL — LOW (ref 13–17)
INR BLD: 1.01 RATIO — SIGNIFICANT CHANGE UP (ref 0.85–1.16)
KETONES UR QL: NEGATIVE MG/DL — SIGNIFICANT CHANGE UP
LEUKOCYTE ESTERASE UR-ACNC: NEGATIVE — SIGNIFICANT CHANGE UP
MAGNESIUM SERPL-MCNC: 2.4 MG/DL — SIGNIFICANT CHANGE UP (ref 1.6–2.6)
MCHC RBC-ENTMCNC: 28 PG — SIGNIFICANT CHANGE UP (ref 27–34)
MCHC RBC-ENTMCNC: 28.8 PG — SIGNIFICANT CHANGE UP (ref 27–34)
MCHC RBC-ENTMCNC: 32.5 G/DL — SIGNIFICANT CHANGE UP (ref 32–36)
MCHC RBC-ENTMCNC: 33.3 G/DL — SIGNIFICANT CHANGE UP (ref 32–36)
MCV RBC AUTO: 86.3 FL — SIGNIFICANT CHANGE UP (ref 80–100)
MCV RBC AUTO: 86.3 FL — SIGNIFICANT CHANGE UP (ref 80–100)
NITRITE UR-MCNC: NEGATIVE — SIGNIFICANT CHANGE UP
NRBC # BLD AUTO: 0 K/UL — SIGNIFICANT CHANGE UP (ref 0–0)
NRBC # BLD AUTO: 0 K/UL — SIGNIFICANT CHANGE UP (ref 0–0)
NRBC # FLD: 0 K/UL — SIGNIFICANT CHANGE UP (ref 0–0)
NRBC # FLD: 0 K/UL — SIGNIFICANT CHANGE UP (ref 0–0)
NRBC BLD AUTO-RTO: 0 /100 WBCS — SIGNIFICANT CHANGE UP (ref 0–0)
NRBC BLD AUTO-RTO: 0 /100 WBCS — SIGNIFICANT CHANGE UP (ref 0–0)
OSMOLALITY UR: 470 MOSM/KG — SIGNIFICANT CHANGE UP (ref 50–1200)
PH UR: 6 — SIGNIFICANT CHANGE UP (ref 5–8)
PHOSPHATE SERPL-MCNC: 3.5 MG/DL — SIGNIFICANT CHANGE UP (ref 2.5–4.5)
PLATELET # BLD AUTO: 203 K/UL — SIGNIFICANT CHANGE UP (ref 150–400)
PLATELET # BLD AUTO: 203 K/UL — SIGNIFICANT CHANGE UP (ref 150–400)
POTASSIUM SERPL-MCNC: 3.8 MMOL/L — SIGNIFICANT CHANGE UP (ref 3.5–5.3)
POTASSIUM SERPL-SCNC: 3.8 MMOL/L — SIGNIFICANT CHANGE UP (ref 3.5–5.3)
POTASSIUM UR-SCNC: 41.4 MMOL/L — SIGNIFICANT CHANGE UP
PROT ?TM UR-MCNC: 63 MG/DL — SIGNIFICANT CHANGE UP
PROT UR-MCNC: 100 MG/DL
PROT/CREAT UR-RTO: 0.8 RATIO — HIGH (ref 0–0.2)
PROTHROM AB SERPL-ACNC: 11.7 SEC — SIGNIFICANT CHANGE UP (ref 9.9–13.4)
RBC # BLD: 3.13 M/UL — LOW (ref 4.2–5.8)
RBC # BLD: 3.14 M/UL — LOW (ref 4.2–5.8)
RBC # FLD: 15.1 % — HIGH (ref 10.3–14.5)
RBC # FLD: 15.2 % — HIGH (ref 10.3–14.5)
RBC CASTS # UR COMP ASSIST: 2 /HPF — SIGNIFICANT CHANGE UP (ref 0–4)
RH IG SCN BLD-IMP: POSITIVE — SIGNIFICANT CHANGE UP
SODIUM SERPL-SCNC: 141 MMOL/L — SIGNIFICANT CHANGE UP (ref 135–145)
SODIUM UR-SCNC: 46 MMOL/L — SIGNIFICANT CHANGE UP
SP GR SPEC: 1.02 — SIGNIFICANT CHANGE UP (ref 1–1.03)
SQUAMOUS # UR AUTO: 1 /HPF — SIGNIFICANT CHANGE UP (ref 0–5)
UROBILINOGEN FLD QL: 0.2 MG/DL — SIGNIFICANT CHANGE UP (ref 0.2–1)
UUN UR-MCNC: 775.2 MG/DL — SIGNIFICANT CHANGE UP
WBC # BLD: 11.34 K/UL — HIGH (ref 3.8–10.5)
WBC # BLD: 11.64 K/UL — HIGH (ref 3.8–10.5)
WBC # FLD AUTO: 11.34 K/UL — HIGH (ref 3.8–10.5)
WBC # FLD AUTO: 11.64 K/UL — HIGH (ref 3.8–10.5)
WBC UR QL: 1 /HPF — SIGNIFICANT CHANGE UP (ref 0–5)

## 2025-05-20 PROCEDURE — 93312 ECHO TRANSESOPHAGEAL: CPT | Mod: 26

## 2025-05-20 PROCEDURE — G0545: CPT

## 2025-05-20 PROCEDURE — 99233 SBSQ HOSP IP/OBS HIGH 50: CPT | Mod: GC

## 2025-05-20 PROCEDURE — 76376 3D RENDER W/INTRP POSTPROCES: CPT | Mod: 26

## 2025-05-20 PROCEDURE — 93320 DOPPLER ECHO COMPLETE: CPT | Mod: 26,GC

## 2025-05-20 PROCEDURE — 93325 DOPPLER ECHO COLOR FLOW MAPG: CPT | Mod: 26,GC

## 2025-05-20 RX ORDER — INSULIN GLARGINE-YFGN 100 [IU]/ML
16 INJECTION, SOLUTION SUBCUTANEOUS ONCE
Refills: 0 | Status: COMPLETED | OUTPATIENT
Start: 2025-05-20 | End: 2025-05-20

## 2025-05-20 RX ORDER — DEXTROSE 50 % IN WATER 50 %
12.5 SYRINGE (ML) INTRAVENOUS ONCE
Refills: 0 | Status: DISCONTINUED | OUTPATIENT
Start: 2025-05-20 | End: 2025-05-20

## 2025-05-20 RX ORDER — HEPARIN SODIUM 1000 [USP'U]/ML
7500 INJECTION INTRAVENOUS; SUBCUTANEOUS ONCE
Refills: 0 | Status: COMPLETED | OUTPATIENT
Start: 2025-05-20 | End: 2025-05-20

## 2025-05-20 RX ORDER — HEPARIN SODIUM 1000 [USP'U]/ML
INJECTION INTRAVENOUS; SUBCUTANEOUS
Qty: 25000 | Refills: 0 | Status: DISCONTINUED | OUTPATIENT
Start: 2025-05-20 | End: 2025-05-23

## 2025-05-20 RX ORDER — DEXTROSE 50 % IN WATER 50 %
12.5 SYRINGE (ML) INTRAVENOUS ONCE
Refills: 0 | Status: COMPLETED | OUTPATIENT
Start: 2025-05-20 | End: 2025-05-20

## 2025-05-20 RX ORDER — SODIUM CHLORIDE 9 G/1000ML
500 INJECTION, SOLUTION INTRAVENOUS ONCE
Refills: 0 | Status: COMPLETED | OUTPATIENT
Start: 2025-05-20 | End: 2025-05-20

## 2025-05-20 RX ORDER — DEXTROSE 50 % IN WATER 50 %
25 SYRINGE (ML) INTRAVENOUS ONCE
Refills: 0 | Status: COMPLETED | OUTPATIENT
Start: 2025-05-20 | End: 2025-05-20

## 2025-05-20 RX ADMIN — Medication 2 TABLET(S): at 21:43

## 2025-05-20 RX ADMIN — CEFTRIAXONE 100 MILLIGRAM(S): 500 INJECTION, POWDER, FOR SOLUTION INTRAMUSCULAR; INTRAVENOUS at 05:28

## 2025-05-20 RX ADMIN — Medication 100 MILLIGRAM(S): at 06:54

## 2025-05-20 RX ADMIN — Medication 100 MILLIGRAM(S): at 16:13

## 2025-05-20 RX ADMIN — Medication 0.5 MILLIGRAM(S): at 21:44

## 2025-05-20 RX ADMIN — INSULIN LISPRO 6 UNIT(S): 100 INJECTION, SOLUTION INTRAVENOUS; SUBCUTANEOUS at 18:14

## 2025-05-20 RX ADMIN — HEPARIN SODIUM 7500 UNIT(S): 1000 INJECTION INTRAVENOUS; SUBCUTANEOUS at 18:12

## 2025-05-20 RX ADMIN — HEPARIN SODIUM 1700 UNIT(S)/HR: 1000 INJECTION INTRAVENOUS; SUBCUTANEOUS at 19:52

## 2025-05-20 RX ADMIN — ROSUVASTATIN CALCIUM 10 MILLIGRAM(S): 5 TABLET, FILM COATED ORAL at 21:43

## 2025-05-20 RX ADMIN — AMPICILLIN SODIUM 200 GRAM(S): 1 INJECTION, POWDER, FOR SOLUTION INTRAMUSCULAR; INTRAVENOUS at 06:57

## 2025-05-20 RX ADMIN — Medication 0.5 MILLIGRAM(S): at 10:51

## 2025-05-20 RX ADMIN — Medication 150 MICROGRAM(S): at 05:24

## 2025-05-20 RX ADMIN — Medication 5 MILLIGRAM(S): at 21:43

## 2025-05-20 RX ADMIN — HEPARIN SODIUM 1700 UNIT(S)/HR: 1000 INJECTION INTRAVENOUS; SUBCUTANEOUS at 18:08

## 2025-05-20 RX ADMIN — Medication 12.5 MILLILITER(S): at 11:33

## 2025-05-20 RX ADMIN — Medication 120 MILLIGRAM(S): at 06:55

## 2025-05-20 RX ADMIN — Medication 20 MILLIEQUIVALENT(S): at 07:03

## 2025-05-20 RX ADMIN — TAMSULOSIN HYDROCHLORIDE 0.4 MILLIGRAM(S): 0.4 CAPSULE ORAL at 21:43

## 2025-05-20 RX ADMIN — AMPICILLIN SODIUM 200 GRAM(S): 1 INJECTION, POWDER, FOR SOLUTION INTRAMUSCULAR; INTRAVENOUS at 23:51

## 2025-05-20 RX ADMIN — Medication 0.5 MILLIGRAM(S): at 22:15

## 2025-05-20 RX ADMIN — Medication 100 MILLIGRAM(S): at 21:43

## 2025-05-20 RX ADMIN — Medication 0.1 MILLIGRAM(S): at 06:54

## 2025-05-20 RX ADMIN — Medication 0.5 MILLIGRAM(S): at 14:39

## 2025-05-20 RX ADMIN — Medication 0.5 MILLIGRAM(S): at 03:38

## 2025-05-20 RX ADMIN — Medication 0.2 MILLIGRAM(S): at 16:13

## 2025-05-20 RX ADMIN — AMPICILLIN SODIUM 200 GRAM(S): 1 INJECTION, POWDER, FOR SOLUTION INTRAMUSCULAR; INTRAVENOUS at 16:11

## 2025-05-20 NOTE — PROGRESS NOTE ADULT - PROBLEM SELECTOR PLAN 6
Pt hypoxic on presentation for unclear reason; started on high flow nasal cannula 40/40    - CXR unimpressive  - lungs clear on exam  - poss bronchiectasis vs pna  - VQ scan to rule out PE performed on 5/11: very low probability of PE

## 2025-05-20 NOTE — PROGRESS NOTE ADULT - PROBLEM SELECTOR PLAN 7
acute urinary retention in the setting of back pain and immobility    - TOV 5/19 successful  - DId have episode of retention of 600cc and straight cathed acute urinary retention in the setting of back pain and immobility    - TOV 5/19  - Had episodes of urinary retention to 600cc and 400cc  - TOV still in process, may need to replace wong

## 2025-05-20 NOTE — PROGRESS NOTE ADULT - PROBLEM SELECTOR PLAN 8
#HTN     - Continue Home Hydralazine 100mg tid  - Continue Home Clonidine .2mg tid -> decrease to 0.1mg for hypotension  - Hold Telmisartan 40mg qd in setting of JEROME   - Hold Chlorthalidone 25mg qd in setting of JEROME  - On 5/17, pt intermittently nauseous and unable to tolerate PO, requiring IV 10 labetalol PRN -> CT head ordered to r/o bleed -> Pt improved, no CT head needed

## 2025-05-20 NOTE — PROGRESS NOTE ADULT - ASSESSMENT
86 y/o M w/ PMhx HTN, HLD, DM2, hypothyroidism, prostate cancer s/p prostatectomy, CKD, TIA, pacemaker placed in 5/2024 for abnormal arrythmia initially admitted to [facility] on 5/6 for back pain, found to have compression deformities, transferred to [facility] for MR and orthopedic eval, with course complicated by Leukocytosis to 18 and BCx w/ persistent e.Faecalis Bacteremia, now S/p PPM replacement on 5/16.    #E faecalis bacteremia  #LBP, L3-4 disc bulge  #Respiratory failure, resolved  #JEROME  - Bacteremia iso PPM, w/ concern for PPM infection and possible IE; now s/p PPM replacement on 5/16  - Cx clear since 5/15 but will need MARIA LUISA given high grade bacteremia   - will empirically treat for IE until ruled out w/ MARIA LUISA    Recommendations:   1. c/w Ampicillin 2 g q8 (dosed renally) + Ceftriaxone 2 g q12  2. Obtain MARIA LUISA to rule out IE   3. F/u Repeat BCx x2 5/18  4. MRI incomplete d/t patient not able to tolerate, would consider repeating MRI L spine later if possible.     ==========================================================  ==========================================================  =====================INCOMPLETE NOTE========================  ==========================================================  ==========================================================   86 y/o M w/ PMhx HTN, HLD, DM2, hypothyroidism, prostate cancer s/p prostatectomy, CKD, TIA, pacemaker placed in 5/2024 for abnormal arrythmia initially admitted to [facility] on 5/6 for back pain, found to have compression deformities, transferred to [facility] for MR and orthopedic eval, with course complicated by Leukocytosis to 18 and BCx w/ persistent e.Faecalis Bacteremia, now S/p PPM replacement on 5/16.    #E faecalis bacteremia  #LBP, L3-4 disc bulge  #Respiratory failure, resolved  #JEROME  - Bacteremia iso PPM, w/ concern for PPM infection and possible IE; now s/p PPM replacement on 5/16  - Cx clear since 5/15 but will need MARIA LUISA given high grade bacteremia   - will empirically treat for IE until ruled out w/ MARIA LUISA    Recommendations:   1. c/w Ampicillin 2 g q8 (dosed renally) + Ceftriaxone 2 g q12  2. Obtain MARIA LUISA to rule out IE   3. F/u Repeat BCx x2 5/18; negative at 48H  4. MRI incomplete d/t patient not able to tolerate, would consider repeating MRI L spine later if possible.     ==========================================================  ==========================================================  =====================INCOMPLETE NOTE========================  ==========================================================  ==========================================================   86 y/o M w/ PMhx HTN, HLD, DM2, hypothyroidism, prostate cancer s/p prostatectomy, CKD, TIA, pacemaker placed in 5/2024 for abnormal arrythmia initially admitted to [facility] on 5/6 for back pain, found to have compression deformities, transferred to [facility] for MR and orthopedic eval, with course complicated by Leukocytosis to 18 and BCx w/ persistent e.Faecalis Bacteremia, now S/p PPM replacement on 5/16.    #E faecalis bacteremia  #LBP, L3-4 disc bulge  #Respiratory failure, resolved  #JEROME  - Bacteremia iso PPM, w/ concern for PPM infection and possible IE; now s/p PPM replacement on 5/16  - Cx clear since 5/15 but will need MARIA LUISA given high grade bacteremia   - will empirically treat for IE until ruled out w/ MARIA LUISA    Recommendations:   1. c/w Ampicillin 2 g q8 (dosed renally) + Ceftriaxone 2 g q12; If MARIA LUISA comes back negative for IE, can DC CTX.  2. Obtain MARIA LUISA to rule out IE   3. F/u Repeat BCx x2 5/18; negative at 48H  4. MRI incomplete d/t patient not able to tolerate, would consider repeating MRI L spine later if possible.     ==========================================================  ==========================================================  =====================INCOMPLETE NOTE========================  ==========================================================  ==========================================================   84 y/o M w/ PMhx HTN, HLD, DM2, hypothyroidism, prostate cancer s/p prostatectomy, CKD, TIA, pacemaker placed in 5/2024 for abnormal arrythmia initially admitted to [facility] on 5/6 for back pain, found to have compression deformities, transferred to [facility] for MR and orthopedic eval, with course complicated by Leukocytosis to 18 and BCx w/ persistent e.Faecalis Bacteremia, now S/p PPM replacement on 5/16.    #E faecalis bacteremia  #LBP, L3-4 disc bulge  #Respiratory failure, resolved  #JEROME  - Bacteremia iso PPM, w/ concern for PPM infection and possible IE; now s/p PPM replacement on 5/16  - Cx clear since 5/15 but will need MARIA LUISA given high grade bacteremia   - will empirically treat for IE until ruled out w/ MARIA LUISA    Recommendations:   1. c/w Ampicillin 2 g q8 (dosed renally) + Ceftriaxone 2 g q12; If MARIA LUISA comes back negative for IE, can DC CTX.  2. Obtain MARIA LUISA to rule out IE   3. F/u Repeat BCx x2 5/18; negative at 48H  4. MRI incomplete d/t patient not able to tolerate, would consider repeating MRI L spine later if possible.     Plan discussed w/ Attending Physician Dr. Darrin Saldana MD (Jason)   Internal Medicine PGY-2  Department of Medicine [facility]

## 2025-05-20 NOTE — PROGRESS NOTE ADULT - ASSESSMENT
Pt is an 85M with HTN, HLD, DM2, prostate cancer s/p prostatectomy, CKD, TIA with a pacemaker who presents transferred from [place] for further evaluation of low back pain secondary to lumbar compression deformities. EP on board to clear PPM for MRI compatibility. Found to have e facaelis bacteremia. On abx, unable to tolerate MRI s/p PPM removal on 5/16 and exchange for leadless PPM. Plan for MARIA LUISA. Pt is an 85M with HTN, HLD, DM2, prostate cancer s/p prostatectomy, CKD, TIA with a pacemaker who presents transferred from [place] for further evaluation of low back pain secondary to lumbar compression deformities. EP on board to clear PPM for MRI compatibility. Found to have e facaelis bacteremia. On abx, unable to tolerate MRI s/p PPM removal on 5/16 and exchange for leadless PPM. Plan for MARIA LUISA on 5/20

## 2025-05-20 NOTE — PROGRESS NOTE ADULT - PROBLEM SELECTOR PLAN 5
- BCx positive for E faecalis . Only 1 bottle was sent and is positive -> repeat Bcx 5/15 negative   - s/p removal of PPM on 5/16 and exchange to leadless pacemaker with EP   - c/w ampicillin 2g q6 and ceftriaxone per ID  - TTE ordered -> EF 64% mild LV systolic dysfunction  - MARIA LUISA ordered per ID, plan for Monday -> needs transfusion before proceeding per anesthesia, plan for 5/20 -> responded well to transfusion - BCx positive for E faecalis . Only 1 bottle was sent and is positive -> repeat Bcx 5/15 negative   - s/p removal of PPM on 5/16 and exchange to leadless pacemaker with EP   - c/w ampicillin 2g q8 (renally dosed) and ceftriaxone per ID  - Will increase amp to q6 after JEROME resolves  - If neg for endocarditis, can DC CTX and keep ampicillin per ID  - TTE ordered -> EF 64% mild LV systolic dysfunction  - MARIA LUISA ordered per ID, plan for Monday -> needs transfusion before proceeding per anesthesia, plan for 5/20 -> responded well to transfusion

## 2025-05-20 NOTE — PROGRESS NOTE ADULT - PROBLEM SELECTOR PLAN 1
LUE swelling, nursing examined IV in LUE and it is functioning appropriately low c/f infiltration. Of note, pt is s/p PPM extraction but no fluctuance or edema at PPM site. Neurovascularly intact.    Plan:  -f/u LUE duplex -> superificial clot -> warm packed and restart AC after neg head CT  -f/u CXR to eval PPM site -> no PTX   -EP team updated LUE swelling, nursing examined IV in LUE and it is functioning appropriately low c/f infiltration. Of note, pt is s/p PPM extraction but no fluctuance or edema at PPM site. Neurovascularly intact.    Plan:  -f/u LUE duplex -> superificial clot -> warm packed and restart AC after MARIA LUISA  -f/u CXR to eval PPM site -> no PTX   -EP team updated

## 2025-05-20 NOTE — PROVIDER CONTACT NOTE (OTHER) - SITUATION
Pt noted with increasing TORY edema, pt has a known Left arm superficial vein thrombosis s/p heparin gtt

## 2025-05-20 NOTE — PROGRESS NOTE ADULT - ATTENDING COMMENTS
This is a 84 y/o M w/ PMhx HTN, HLD, DM2, hypothyroidism, prostate cancer s/p prostatectomy, CKD, TIA, pacemaker placed in 5/2024 for abnormal arrythmia initially admitted to [facility] on 5/6 for back pain, now transferred to [facility] for orthopedic spine. Labs w/ leukocytosis to 18, BCx w/ E faecalis in multiple sets.     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 S/p PPM removal    #LBP, L3-4 disc bulge  #Respiratory failure, improved     Overall, 84 y/o M w/ PMhx HTN, HLD, DM2, hypothyroidism, prostate cancer s/p prostatectomy, CKD, TIA, pacemaker placed in 5/2024 for abnormal arrythmia transferred to [facility] from [facility] for MRI spine and ortho evaluation give LBP and lumbar compression w/ urinary retention. Pt noted to have chills and leukocytosis to 18, now with high grade E faecalis bacteremia. Unclear source however UCx not done, U/A with 10 WBCs, CT A/P w/o IV contrast on 5/10 w/o acute infectious source, MRI L spine here limited exam, findings of stenosis, no clear OM/discitis, however no contrast.   Given PPM and high grade bacteremia, will need TTE and eventual MARIA LUISA with PPM removal. Would empirically treat for IE until MARIA LUISA done.      Recommendations:   1. Ampicillin 2 g q8 (given JEROME, monitor closely if improves CrCl > 30, can increase dose), Ceftriaxone 2 g q12  2. TTE difficult views, plan for MARIA LUISA today If negative, would stop Ceftriaxone.   3. BCx form 5/15 NGTD  4. MRI incomplete d/t patient not able to tolerate, per daughter may not be able to tolerate.     Thank you for consulting us and involving us in the management of this patient's case. In addition to reviewing history, imaging, documents, labs, microbiology, and infection control strategies and potential issues.     ID will continue to follow    Darrin Falk M.D.  Attending Physician  Division of Infectious Diseases  Department of Medicine    Please contact through MS Teams message.  Office: [phone] (after 5 PM or weekend) .

## 2025-05-20 NOTE — PROGRESS NOTE ADULT - PROBLEM SELECTOR PLAN 2
- Most likely i/s/o contrast nephropathy after EP procedure vs. lower BP than baseline given BP meds  - Reduced clonidine from 0.2mg TID to 0.1mg  - Urine studies show indeterminate FENA of 1%  - Plan for kidney U/S to r/o hydro given retention - Most likely i/s/o contrast nephropathy after EP procedure vs. lower BP than baseline given BP meds  - Reduced clonidine from 0.2mg TID to 0.1mg -> increased back to 0.2 given higher BPs  - Urine studies show indeterminate FENA of 1%, improved with 500cc bolus yesterday  - Will give another 500cc today and f/u BMP in AM

## 2025-05-20 NOTE — PROGRESS NOTE ADULT - ATTENDING COMMENTS
85M hx of HTN, HLD, T2DM, prostate ca s/p prostatectomy, CKD, TIA, PPM presents from [facility] for eval of lower back pain 2/2 compression deformities course c/b E. faecalis bacteremia s/p PPM explant and leadless PPM pacemaker - awaiting TTE.   #Enterococcus bacteremia, unclear source. BCx cleared on 5/17. ID following. Pending MARIA LUISA to rule out IE. Also given bacteremia, MRI lumbar spine for pain/compression deformities is recommended but patient cannot tolerate the exam. C/w ampicillin and ceftriaxone.   #Lower back pain, CT lumbar spine with disc bulge and vertebral compression deformities - ortho recommending MRI but could not tolerate. Pain control with dilaudid and tylenol. TLSO brace.   #Anemia, Hgb 10.5-->9.3 --> 7.9. No signs of bleeding. 1U PRBC. Hgb improved to 8-9.   #JEROME, creatinine uptrending in the setting of possible recent contrast vs hypotension. Creatinine improving s/p fluid bolus + 1U PRBC.   #Hypoxic respiratory failure, initially requiring HFNC. Now on 2L. Weaned to RA - 94%. CXR clear. Lungs clear on exam. VQ scan with low probability for PE. Unclear etiology. Possibly atelectasis? Trial ICS.   Remainder of plan as stated above. Plan discussed with HS. 85M hx of HTN, HLD, T2DM, prostate ca s/p prostatectomy, CKD, TIA, PPM presents from [facility] for eval of lower back pain 2/2 compression deformities course c/b E. faecalis bacteremia s/p PPM explant and leadless PPM pacemaker - awaiting TTE.   #Enterococcus bacteremia, unclear source. BCx cleared on 5/17. ID following. Pending MARIA LUISA to rule out IE. Also given bacteremia, MRI lumbar spine for pain/compression deformities is recommended but patient cannot tolerate the exam. C/w ampicillin and ceftriaxone.   #Lower back pain, CT lumbar spine with disc bulge and vertebral compression deformities - ortho recommending MRI but could not tolerate. Pain control with dilaudid and tylenol. TLSO brace.   #Anemia, Hgb 10.5-->9.3 --> 7.9. No signs of bleeding. 1U PRBC. Hgb improved to 8-9.   #JEROME, creatinine uptrending in the setting of possible recent contrast vs hypotension. Creatinine improving s/p fluid bolus + 1U PRBC.   #Hypoxic respiratory failure, initially requiring HFNC. Now on 2L. Weaned to RA - 94%. CXR clear. Lungs clear on exam. VQ scan with low probability for PE. Unclear etiology. Possibly atelectasis? Trial ICS.   #HTN, increase clonidine 0.2mg BID  Remainder of plan as stated above. Plan discussed with HS.

## 2025-05-20 NOTE — PROGRESS NOTE ADULT - PROBLEM SELECTOR PLAN 3
- Downtrending hgb  - No notable bleeding at cath site or EP surgical site  - Will consider CT abd to r/o RP bleed given on heparin if downtrending

## 2025-05-20 NOTE — PROGRESS NOTE ADULT - SUBJECTIVE AND OBJECTIVE BOX
Follow Up:      Interval History/ROS:Patient is a 85y old  Male who presents with a chief complaint of back pain (19 May 2025 11:29)      REVIEW OF SYSTEMS  All other systems negative except as noted above    Allergies  gabapentin (Other)        ANTIMICROBIALS:    ampicillin  IVPB 2 every 8 hours  cefTRIAXone   IVPB 2000 every 12 hours      OTHER MEDS: MEDICATIONS  (STANDING):  aspirin  chewable 81 daily  bisacodyl 5 at bedtime  cloNIDine 0.1 three times a day  dextrose 50% Injectable 12.5 once  dextrose 50% Injectable 25 once  dextrose 50% Injectable 25 once  dextrose Oral Gel 15 once  glucagon  Injectable 1 once  heparin   Injectable 7500 every 6 hours PRN  heparin   Injectable 3500 every 6 hours PRN  hydrALAZINE 100 three times a day  HYDROmorphone  Injectable 0.5 every 4 hours PRN  insulin glargine Injectable (LANTUS) 28 at bedtime  insulin lispro (ADMELOG) corrective regimen sliding scale  every 6 hours  insulin lispro Injectable (ADMELOG) 6 three times a day before meals  levothyroxine 150 daily  NIFEdipine XL 30 daily  polyethylene glycol 3350 17 daily  propranolol  daily  rosuvastatin 10 at bedtime  senna 2 at bedtime  tamsulosin 0.4 at bedtime      Vital Signs Last 24 Hrs  T(F): 98.2 (05-19-25 @ 21:40), Max: 98.7 (05-18-25 @ 06:00)    Vital Signs Last 24 Hrs  HR: 61 (05-20-25 @ 03:35) (55 - 64)  BP: 164/52 (05-20-25 @ 03:35) (128/39 - 181/68)  RR: 16 (05-20-25 @ 03:35)  SpO2: 93% (05-20-25 @ 03:35) (93% - 98%)  Wt(kg): --    EXAM:  Physical Exam:  Constitutional:  well preserved, comfortable  Head/Eyes: no icterus, PERRL, EOMI  ENT:  supple; no thrush  LUNGS:  CTA  CVS:  normal S1, S2, no murmur  Abd:  soft, non-tender; non-distended  Ext:  no edema  Vascular:  IV site no erythema tenderness or discharge  MSK:  joints without swelling  Neuro: AAO X 3, non- focal    Labs:                        8.8    11.64 )-----------( 203      ( 20 May 2025 04:01 )             27.1     05-20    141  |  105  |  54[H]  ----------------------------<  94  3.8   |  26  |  2.38[H]    Ca    8.1[L]      20 May 2025 04:01  Phos  3.5     05-20  Mg     2.40     05-20        WBC Trend:  WBC Count: 11.64 (05-20-25 @ 04:01)  WBC Count: 11.34 (05-20-25 @ 04:01)  WBC Count: 12.44 (05-19-25 @ 22:17)  WBC Count: 16.10 (05-19-25 @ 04:49)      Creatine Trend:  Creatinine: 2.38 (05-20)  Creatinine: 2.57 (05-19)  Creatinine: 2.74 (05-19)  Creatinine: 2.24 (05-18)      Liver Biochemical Testing Trend:  Alanine Aminotransferase (ALT/SGPT): 8 (05-18)  Alanine Aminotransferase (ALT/SGPT): 10 (05-17)  Alanine Aminotransferase (ALT/SGPT): 8 (05-16)  Alanine Aminotransferase (ALT/SGPT): 10 (05-15)  Alanine Aminotransferase (ALT/SGPT): 8 (05-14)  Aspartate Aminotransferase (AST/SGOT): 18 (05-18-25 @ 06:00)  Aspartate Aminotransferase (AST/SGOT): 25 (05-17-25 @ 07:10)  Aspartate Aminotransferase (AST/SGOT): 13 (05-16-25 @ 05:10)  Aspartate Aminotransferase (AST/SGOT): 15 (05-15-25 @ 06:48)  Aspartate Aminotransferase (AST/SGOT): 14 (05-14-25 @ 07:46)  Bilirubin Total: 0.3 (05-18)  Bilirubin Total: 0.3 (05-17)  Bilirubin Total: 0.3 (05-16)  Bilirubin Total: 0.2 (05-15)  Bilirubin Total: 0.3 (05-14)      Trend LDH      Urinalysis Basic - ( 20 May 2025 04:01 )    Color: x / Appearance: x / SG: x / pH: x  Gluc: 94 mg/dL / Ketone: x  / Bili: x / Urobili: x   Blood: x / Protein: x / Nitrite: x   Leuk Esterase: x / RBC: x / WBC x   Sq Epi: x / Non Sq Epi: x / Bacteria: x        MICROBIOLOGY:    MRSA PCR Result.: NotDetec (05-14-25 @ 17:32)      Culture - Blood (collected 18 May 2025 06:00)  Source: Blood Blood  Preliminary Report:    No growth at 24 hours    Culture - Blood (collected 18 May 2025 06:00)  Source: Blood Blood  Preliminary Report:    No growth at 24 hours    Culture - Blood (collected 17 May 2025 18:20)  Source: Blood Blood-Peripheral  Preliminary Report:    No growth at 48 Hours    Culture - Blood (collected 17 May 2025 18:15)  Source: Blood Blood-Peripheral  Preliminary Report:    No growth at 48 Hours    Culture - Blood (collected 17 May 2025 07:10)  Source: Blood Blood  Preliminary Report:    No growth at 48 Hours    Culture - Blood (collected 17 May 2025 07:10)  Source: Blood Blood  Preliminary Report:    No growth at 48 Hours    Culture - Blood (collected 15 May 2025 21:55)  Source: Blood Blood  Preliminary Report:    No growth at 4 days    Culture - Blood (collected 15 May 2025 21:47)  Source: Blood Blood  Preliminary Report:    No growth at 4 days    Culture - Blood (collected 14 May 2025 10:40)  Source: Blood Blood  Final Report:    Growth in aerobic bottle: Enterococcus faecalis    See previous culture [id number]    Direct identification is available within approximately 3-5    hours either by Blood Panel Multiplexed PCR or Direct    MALDI-TOF. Details: [email or web address]  Organism: Blood Culture PCR  Organism: Blood Culture PCR    Sensitivities:      Method Type: PCR      -  Enterococcus faecalis: Detec    Culture - Blood (collected 14 May 2025 10:26)  Source: Blood Blood  Final Report:    Growth in aerobic and anaerobic bottles: Enterococcus faecalis    Growth in anaerobic bottle: blood culture bottle turned positive after    the final report was released.  Organism: Enterococcus faecalis    Sensitivities:      -  Streptomycin synergy: S <=1000      -  Vancomycin: S 0.5      -  Ampicillin: S <=2 Predicts results to ampicillin/sulbactam, amoxacillin-clavulanate and  piperacillin-tazobactam.      Method Type: SHAR      -  Gentamicin synergy: S <=500  Organism: Enterococcus faecalis                                            Blood Gas Venous - Lactate: 1.1 (05-17 @ 12:17)      RADIOLOGY:  imaging below personally reviewed   Follow Up:      Interval History/ROS:Patient is a 85y old  Male who presents with a chief complaint of back pain (19 May 2025 11:29)  Seen and examined at bedside, no acute events overnight. Endorsing Back pain consistent w/ before, lower abdominal pain iso urinary retention (has not been urinating and retaining ~700cc prior to straight caths overnight).   No fevers or chills ON    REVIEW OF SYSTEMS  All other systems negative except as noted above    Allergies  gabapentin (Other)        ANTIMICROBIALS:    ampicillin  IVPB 2 every 8 hours  cefTRIAXone   IVPB 2000 every 12 hours      OTHER MEDS: MEDICATIONS  (STANDING):  aspirin  chewable 81 daily  bisacodyl 5 at bedtime  cloNIDine 0.1 three times a day  dextrose 50% Injectable 12.5 once  dextrose 50% Injectable 25 once  dextrose 50% Injectable 25 once  dextrose Oral Gel 15 once  glucagon  Injectable 1 once  heparin   Injectable 7500 every 6 hours PRN  heparin   Injectable 3500 every 6 hours PRN  hydrALAZINE 100 three times a day  HYDROmorphone  Injectable 0.5 every 4 hours PRN  insulin glargine Injectable (LANTUS) 28 at bedtime  insulin lispro (ADMELOG) corrective regimen sliding scale  every 6 hours  insulin lispro Injectable (ADMELOG) 6 three times a day before meals  levothyroxine 150 daily  NIFEdipine XL 30 daily  polyethylene glycol 3350 17 daily  propranolol  daily  rosuvastatin 10 at bedtime  senna 2 at bedtime  tamsulosin 0.4 at bedtime      Vital Signs Last 24 Hrs  T(F): 98.2 (05-19-25 @ 21:40), Max: 98.7 (05-18-25 @ 06:00)    Vital Signs Last 24 Hrs  HR: 61 (05-20-25 @ 03:35) (55 - 64)  BP: 164/52 (05-20-25 @ 03:35) (128/39 - 181/68)  RR: 16 (05-20-25 @ 03:35)  SpO2: 93% (05-20-25 @ 03:35) (93% - 98%)  Wt(kg): --    EXAM:  Physical Exam:  Constitutional:  well preserved, comfortable  Head/Eyes: no icterus, PERRL, EOMI  ENT:  supple; no thrush  LUNGS:  CTA  CVS:  normal S1, S2, no murmur, Pacemaker placement site dressing intact w/o bleeding or surrounding erythema   Abd:  soft, non-tender; non-distended  Ext:  ANDREIA UE edema most notable in forearms and hands  Vascular:  IV site no erythema tenderness or discharge  MSK:  joints without swelling  Neuro: AAO X 3, non- focal    Labs:                        8.8    11.64 )-----------( 203      ( 20 May 2025 04:01 )             27.1     05-20    141  |  105  |  54[H]  ----------------------------<  94  3.8   |  26  |  2.38[H]    Ca    8.1[L]      20 May 2025 04:01  Phos  3.5     05-20  Mg     2.40     05-20        WBC Trend:  WBC Count: 11.64 (05-20-25 @ 04:01)  WBC Count: 11.34 (05-20-25 @ 04:01)  WBC Count: 12.44 (05-19-25 @ 22:17)  WBC Count: 16.10 (05-19-25 @ 04:49)      Creatine Trend:  Creatinine: 2.38 (05-20)  Creatinine: 2.57 (05-19)  Creatinine: 2.74 (05-19)  Creatinine: 2.24 (05-18)      Liver Biochemical Testing Trend:  Alanine Aminotransferase (ALT/SGPT): 8 (05-18)  Alanine Aminotransferase (ALT/SGPT): 10 (05-17)  Alanine Aminotransferase (ALT/SGPT): 8 (05-16)  Alanine Aminotransferase (ALT/SGPT): 10 (05-15)  Alanine Aminotransferase (ALT/SGPT): 8 (05-14)  Aspartate Aminotransferase (AST/SGOT): 18 (05-18-25 @ 06:00)  Aspartate Aminotransferase (AST/SGOT): 25 (05-17-25 @ 07:10)  Aspartate Aminotransferase (AST/SGOT): 13 (05-16-25 @ 05:10)  Aspartate Aminotransferase (AST/SGOT): 15 (05-15-25 @ 06:48)  Aspartate Aminotransferase (AST/SGOT): 14 (05-14-25 @ 07:46)  Bilirubin Total: 0.3 (05-18)  Bilirubin Total: 0.3 (05-17)  Bilirubin Total: 0.3 (05-16)  Bilirubin Total: 0.2 (05-15)  Bilirubin Total: 0.3 (05-14)      Trend LDH      Urinalysis Basic - ( 20 May 2025 04:01 )    Color: x / Appearance: x / SG: x / pH: x  Gluc: 94 mg/dL / Ketone: x  / Bili: x / Urobili: x   Blood: x / Protein: x / Nitrite: x   Leuk Esterase: x / RBC: x / WBC x   Sq Epi: x / Non Sq Epi: x / Bacteria: x        MICROBIOLOGY:    MRSA PCR Result.: NotDetec (05-14-25 @ 17:32)      Culture - Blood (collected 18 May 2025 06:00)  Source: Blood Blood  Preliminary Report:    No growth at 24 hours    Culture - Blood (collected 18 May 2025 06:00)  Source: Blood Blood  Preliminary Report:    No growth at 24 hours    Culture - Blood (collected 17 May 2025 18:20)  Source: Blood Blood-Peripheral  Preliminary Report:    No growth at 48 Hours    Culture - Blood (collected 17 May 2025 18:15)  Source: Blood Blood-Peripheral  Preliminary Report:    No growth at 48 Hours    Culture - Blood (collected 17 May 2025 07:10)  Source: Blood Blood  Preliminary Report:    No growth at 48 Hours    Culture - Blood (collected 17 May 2025 07:10)  Source: Blood Blood  Preliminary Report:    No growth at 48 Hours    Culture - Blood (collected 15 May 2025 21:55)  Source: Blood Blood  Preliminary Report:    No growth at 4 days    Culture - Blood (collected 15 May 2025 21:47)  Source: Blood Blood  Preliminary Report:    No growth at 4 days    Culture - Blood (collected 14 May 2025 10:40)  Source: Blood Blood  Final Report:    Growth in aerobic bottle: Enterococcus faecalis    See previous culture [id number]    Direct identification is available within approximately 3-5    hours either by Blood Panel Multiplexed PCR or Direct    MALDI-TOF. Details: [email or web address]  Organism: Blood Culture PCR  Organism: Blood Culture PCR    Sensitivities:      Method Type: PCR      -  Enterococcus faecalis: Detec    Culture - Blood (collected 14 May 2025 10:26)  Source: Blood Blood  Final Report:    Growth in aerobic and anaerobic bottles: Enterococcus faecalis    Growth in anaerobic bottle: blood culture bottle turned positive after    the final report was released.  Organism: Enterococcus faecalis    Sensitivities:      -  Streptomycin synergy: S <=1000      -  Vancomycin: S 0.5      -  Ampicillin: S <=2 Predicts results to ampicillin/sulbactam, amoxacillin-clavulanate and  piperacillin-tazobactam.      Method Type: SHAR      -  Gentamicin synergy: S <=500  Organism: Enterococcus faecalis                                            Blood Gas Venous - Lactate: 1.1 (05-17 @ 12:17)      RADIOLOGY:  imaging below personally reviewed

## 2025-05-20 NOTE — PROGRESS NOTE ADULT - SUBJECTIVE AND OBJECTIVE BOX
***Plan not finalized until attending attestation***    Raman Carmichael MD (PGY-1)  Internal Medicine  Contact via [employer] TEAMS    ******************************************    PROGRESS NOTE:     Patient is a 85y old  Male who presents with a chief complaint of back pain (19 May 2025 11:29)    INTERVAL EVENTS: No acute overnight events.     SUBJECTIVE: Patient seen and examined at bedside. This morning, the patient is comfortable and doing well. No acute complaints.    MEDICATIONS  (STANDING):  ampicillin  IVPB 2 Gram(s) IV Intermittent every 8 hours  aspirin  chewable 81 milliGRAM(s) Oral daily  bisacodyl 5 milliGRAM(s) Oral at bedtime  cefTRIAXone   IVPB 2000 milliGRAM(s) IV Intermittent every 12 hours  chlorhexidine 2% Cloths 1 Application(s) Topical daily  chlorhexidine 2% Cloths 1 Application(s) Topical daily  cloNIDine 0.1 milliGRAM(s) Oral three times a day  dextrose 5%. 1000 milliLiter(s) (50 mL/Hr) IV Continuous <Continuous>  dextrose 5%. 1000 milliLiter(s) (100 mL/Hr) IV Continuous <Continuous>  dextrose 50% Injectable 25 Gram(s) IV Push once  dextrose 50% Injectable 12.5 Gram(s) IV Push once  dextrose 50% Injectable 25 Gram(s) IV Push once  dextrose Oral Gel 15 Gram(s) Oral once  glucagon  Injectable 1 milliGRAM(s) IntraMuscular once  hydrALAZINE 100 milliGRAM(s) Oral three times a day  insulin glargine Injectable (LANTUS) 28 Unit(s) SubCutaneous at bedtime  insulin lispro (ADMELOG) corrective regimen sliding scale   SubCutaneous every 6 hours  insulin lispro Injectable (ADMELOG) 6 Unit(s) SubCutaneous three times a day before meals  levothyroxine 150 MICROGram(s) Oral daily  lidocaine   4% Patch 1 Patch Transdermal daily  NIFEdipine XL 30 milliGRAM(s) Oral daily  polyethylene glycol 3350 17 Gram(s) Oral daily  propranolol  milliGRAM(s) Oral daily  rosuvastatin 10 milliGRAM(s) Oral at bedtime  senna 2 Tablet(s) Oral at bedtime  tamsulosin 0.4 milliGRAM(s) Oral at bedtime    MEDICATIONS  (PRN):  heparin   Injectable 7500 Unit(s) IV Push every 6 hours PRN For aPTT less than 40  heparin   Injectable 3500 Unit(s) IV Push every 6 hours PRN For aPTT between 40 - 57  HYDROmorphone  Injectable 0.5 milliGRAM(s) IV Push every 4 hours PRN Severe Pain (7 - 10)      CAPILLARY BLOOD GLUCOSE      POCT Blood Glucose.: 91 mg/dL (20 May 2025 06:56)  POCT Blood Glucose.: 109 mg/dL (19 May 2025 21:58)  POCT Blood Glucose.: 132 mg/dL (19 May 2025 17:54)  POCT Blood Glucose.: 97 mg/dL (19 May 2025 13:11)  POCT Blood Glucose.: 108 mg/dL (19 May 2025 09:12)    I&O's Summary    19 May 2025 07:01  -  20 May 2025 07:00  --------------------------------------------------------  IN: 100 mL / OUT: 1300 mL / NET: -1200 mL        PHYSICAL EXAM:  Vital Signs Last 24 Hrs  T(C): 36.8 (19 May 2025 21:40), Max: 36.8 (19 May 2025 21:40)  T(F): 98.2 (19 May 2025 21:40), Max: 98.2 (19 May 2025 21:40)  HR: 61 (20 May 2025 03:35) (55 - 64)  BP: 164/52 (20 May 2025 03:35) (128/39 - 181/68)  BP(mean): --  RR: 16 (20 May 2025 03:35) (16 - 18)  SpO2: 93% (20 May 2025 03:35) (93% - 98%)    Parameters below as of 20 May 2025 03:35  Patient On (Oxygen Delivery Method): room air        PHYSICAL EXAM:  GENERAL: NAD, lying in bed comfortably  HEAD: Atraumatic, normocephalic  EYES: EOMI, PERRLA, conjunctiva and sclera clear  ENT: Moist mucous membranes  NECK: Supple, no JVD  HEART: S1, S2, Regular rate and rhythm, no murmurs, rubs, or gallops  LUNGS: Unlabored respirations, clear to auscultation bilaterally, no crackles, wheezing, or rhonchi  ABDOMEN: Soft, nontender, nondistended, +BS  EXTREMITIES: 2+ peripheral pulses bilaterally. No clubbing, cyanosis, or edema  NERVOUS SYSTEM:  A&Ox3, LE R weaker than left  SKIN: No rashes or lesions  +Frye    LABS:                        8.8    11.64 )-----------( 203      ( 20 May 2025 04:01 )             27.1     05-20    141  |  105  |  54[H]  ----------------------------<  94  3.8   |  26  |  2.38[H]    Ca    8.1[L]      20 May 2025 04:01  Phos  3.5     05-20  Mg     2.40     05-20      PT/INR - ( 20 May 2025 04:01 )   PT: 11.7 sec;   INR: 1.01 ratio         PTT - ( 20 May 2025 04:01 )  PTT:21.0 sec      Urinalysis Basic - ( 20 May 2025 04:01 )    Color: x / Appearance: x / SG: x / pH: x  Gluc: 94 mg/dL / Ketone: x  / Bili: x / Urobili: x   Blood: x / Protein: x / Nitrite: x   Leuk Esterase: x / RBC: x / WBC x   Sq Epi: x / Non Sq Epi: x / Bacteria: x        Culture - Blood (collected 18 May 2025 06:00)  Source: Blood Blood  Preliminary Report (19 May 2025 11:02):    No growth at 24 hours    Culture - Blood (collected 18 May 2025 06:00)  Source: Blood Blood  Preliminary Report (19 May 2025 11:02):    No growth at 24 hours    Culture - Blood (collected 17 May 2025 18:20)  Source: Blood Blood-Peripheral  Preliminary Report (20 May 2025 04:01):    No growth at 48 Hours    Culture - Blood (collected 17 May 2025 18:15)  Source: Blood Blood-Peripheral  Preliminary Report (20 May 2025 04:01):    No growth at 48 Hours         ***Plan not finalized until attending attestation***    Raman Carmichael MD (PGY-1)  Internal Medicine  Contact via [employer] TEAMS    ******************************************    PROGRESS NOTE:     Patient is a 85y old  Male who presents with a chief complaint of back pain (19 May 2025 11:29)    INTERVAL EVENTS: No acute overnight events.     SUBJECTIVE: Patient seen and examined at bedside. This morning, the patient is comfortable and doing well. No acute complaints.    MEDICATIONS  (STANDING):  ampicillin  IVPB 2 Gram(s) IV Intermittent every 8 hours  aspirin  chewable 81 milliGRAM(s) Oral daily  bisacodyl 5 milliGRAM(s) Oral at bedtime  cefTRIAXone   IVPB 2000 milliGRAM(s) IV Intermittent every 12 hours  chlorhexidine 2% Cloths 1 Application(s) Topical daily  chlorhexidine 2% Cloths 1 Application(s) Topical daily  cloNIDine 0.1 milliGRAM(s) Oral three times a day  dextrose 5%. 1000 milliLiter(s) (50 mL/Hr) IV Continuous <Continuous>  dextrose 5%. 1000 milliLiter(s) (100 mL/Hr) IV Continuous <Continuous>  dextrose 50% Injectable 25 Gram(s) IV Push once  dextrose 50% Injectable 12.5 Gram(s) IV Push once  dextrose 50% Injectable 25 Gram(s) IV Push once  dextrose Oral Gel 15 Gram(s) Oral once  glucagon  Injectable 1 milliGRAM(s) IntraMuscular once  hydrALAZINE 100 milliGRAM(s) Oral three times a day  insulin glargine Injectable (LANTUS) 28 Unit(s) SubCutaneous at bedtime  insulin lispro (ADMELOG) corrective regimen sliding scale   SubCutaneous every 6 hours  insulin lispro Injectable (ADMELOG) 6 Unit(s) SubCutaneous three times a day before meals  levothyroxine 150 MICROGram(s) Oral daily  lidocaine   4% Patch 1 Patch Transdermal daily  NIFEdipine XL 30 milliGRAM(s) Oral daily  polyethylene glycol 3350 17 Gram(s) Oral daily  propranolol  milliGRAM(s) Oral daily  rosuvastatin 10 milliGRAM(s) Oral at bedtime  senna 2 Tablet(s) Oral at bedtime  tamsulosin 0.4 milliGRAM(s) Oral at bedtime    MEDICATIONS  (PRN):  heparin   Injectable 7500 Unit(s) IV Push every 6 hours PRN For aPTT less than 40  heparin   Injectable 3500 Unit(s) IV Push every 6 hours PRN For aPTT between 40 - 57  HYDROmorphone  Injectable 0.5 milliGRAM(s) IV Push every 4 hours PRN Severe Pain (7 - 10)      CAPILLARY BLOOD GLUCOSE      POCT Blood Glucose.: 91 mg/dL (20 May 2025 06:56)  POCT Blood Glucose.: 109 mg/dL (19 May 2025 21:58)  POCT Blood Glucose.: 132 mg/dL (19 May 2025 17:54)  POCT Blood Glucose.: 97 mg/dL (19 May 2025 13:11)  POCT Blood Glucose.: 108 mg/dL (19 May 2025 09:12)    I&O's Summary    19 May 2025 07:01  -  20 May 2025 07:00  --------------------------------------------------------  IN: 100 mL / OUT: 1300 mL / NET: -1200 mL        PHYSICAL EXAM:  Vital Signs Last 24 Hrs  T(C): 36.8 (19 May 2025 21:40), Max: 36.8 (19 May 2025 21:40)  T(F): 98.2 (19 May 2025 21:40), Max: 98.2 (19 May 2025 21:40)  HR: 61 (20 May 2025 03:35) (55 - 64)  BP: 164/52 (20 May 2025 03:35) (128/39 - 181/68)  BP(mean): --  RR: 16 (20 May 2025 03:35) (16 - 18)  SpO2: 93% (20 May 2025 03:35) (93% - 98%)    Parameters below as of 20 May 2025 03:35  Patient On (Oxygen Delivery Method): room air        PHYSICAL EXAM:  GENERAL: NAD, lying in bed comfortably  HEAD: Atraumatic, normocephalic  EYES: EOMI, PERRLA, conjunctiva and sclera clear  ENT: Moist mucous membranes  NECK: Supple, no JVD  HEART: S1, S2, Regular rate and rhythm, no murmurs, rubs, or gallops  LUNGS: Unlabored respirations, clear to auscultation bilaterally, no crackles, wheezing, or rhonchi  ABDOMEN: Soft, nontender, nondistended, +BS  EXTREMITIES: 2+ peripheral pulses bilaterally. No clubbing, cyanosis, or edema  NERVOUS SYSTEM:  A&Ox3, LE R weaker than left  SKIN: No rashes or lesions      LABS:                        8.8    11.64 )-----------( 203      ( 20 May 2025 04:01 )             27.1     05-20    141  |  105  |  54[H]  ----------------------------<  94  3.8   |  26  |  2.38[H]    Ca    8.1[L]      20 May 2025 04:01  Phos  3.5     05-20  Mg     2.40     05-20      PT/INR - ( 20 May 2025 04:01 )   PT: 11.7 sec;   INR: 1.01 ratio         PTT - ( 20 May 2025 04:01 )  PTT:21.0 sec      Urinalysis Basic - ( 20 May 2025 04:01 )    Color: x / Appearance: x / SG: x / pH: x  Gluc: 94 mg/dL / Ketone: x  / Bili: x / Urobili: x   Blood: x / Protein: x / Nitrite: x   Leuk Esterase: x / RBC: x / WBC x   Sq Epi: x / Non Sq Epi: x / Bacteria: x        Culture - Blood (collected 18 May 2025 06:00)  Source: Blood Blood  Preliminary Report (19 May 2025 11:02):    No growth at 24 hours    Culture - Blood (collected 18 May 2025 06:00)  Source: Blood Blood  Preliminary Report (19 May 2025 11:02):    No growth at 24 hours    Culture - Blood (collected 17 May 2025 18:20)  Source: Blood Blood-Peripheral  Preliminary Report (20 May 2025 04:01):    No growth at 48 Hours    Culture - Blood (collected 17 May 2025 18:15)  Source: Blood Blood-Peripheral  Preliminary Report (20 May 2025 04:01):    No growth at 48 Hours

## 2025-05-21 LAB
ANION GAP SERPL CALC-SCNC: 13 MMOL/L — SIGNIFICANT CHANGE UP (ref 7–14)
APTT BLD: 162.4 SEC — CRITICAL HIGH (ref 26.1–36.8)
APTT BLD: 53.3 SEC — HIGH (ref 26.1–36.8)
APTT BLD: 96.2 SEC — HIGH (ref 26.1–36.8)
BUN SERPL-MCNC: 41 MG/DL — HIGH (ref 7–23)
CALCIUM SERPL-MCNC: 8.1 MG/DL — LOW (ref 8.4–10.5)
CHLORIDE SERPL-SCNC: 105 MMOL/L — SIGNIFICANT CHANGE UP (ref 98–107)
CO2 SERPL-SCNC: 22 MMOL/L — SIGNIFICANT CHANGE UP (ref 22–31)
CREAT SERPL-MCNC: 1.88 MG/DL — HIGH (ref 0.5–1.3)
CULTURE RESULTS: SIGNIFICANT CHANGE UP
CULTURE RESULTS: SIGNIFICANT CHANGE UP
EGFR: 34 ML/MIN/1.73M2 — LOW
EGFR: 34 ML/MIN/1.73M2 — LOW
GLUCOSE BLDC GLUCOMTR-MCNC: 128 MG/DL — HIGH (ref 70–99)
GLUCOSE BLDC GLUCOMTR-MCNC: 133 MG/DL — HIGH (ref 70–99)
GLUCOSE BLDC GLUCOMTR-MCNC: 162 MG/DL — HIGH (ref 70–99)
GLUCOSE BLDC GLUCOMTR-MCNC: 164 MG/DL — HIGH (ref 70–99)
GLUCOSE SERPL-MCNC: 150 MG/DL — HIGH (ref 70–99)
HCT VFR BLD CALC: 27.4 % — LOW (ref 39–50)
HCT VFR BLD CALC: 28.7 % — LOW (ref 39–50)
HGB BLD-MCNC: 9 G/DL — LOW (ref 13–17)
HGB BLD-MCNC: 9.3 G/DL — LOW (ref 13–17)
MAGNESIUM SERPL-MCNC: 2.3 MG/DL — SIGNIFICANT CHANGE UP (ref 1.6–2.6)
MCHC RBC-ENTMCNC: 27.9 PG — SIGNIFICANT CHANGE UP (ref 27–34)
MCHC RBC-ENTMCNC: 28.2 PG — SIGNIFICANT CHANGE UP (ref 27–34)
MCHC RBC-ENTMCNC: 32.4 G/DL — SIGNIFICANT CHANGE UP (ref 32–36)
MCHC RBC-ENTMCNC: 32.8 G/DL — SIGNIFICANT CHANGE UP (ref 32–36)
MCV RBC AUTO: 85.9 FL — SIGNIFICANT CHANGE UP (ref 80–100)
MCV RBC AUTO: 86.2 FL — SIGNIFICANT CHANGE UP (ref 80–100)
NRBC # BLD AUTO: 0 K/UL — SIGNIFICANT CHANGE UP (ref 0–0)
NRBC # BLD AUTO: 0 K/UL — SIGNIFICANT CHANGE UP (ref 0–0)
NRBC # FLD: 0 K/UL — SIGNIFICANT CHANGE UP (ref 0–0)
NRBC # FLD: 0 K/UL — SIGNIFICANT CHANGE UP (ref 0–0)
NRBC BLD AUTO-RTO: 0 /100 WBCS — SIGNIFICANT CHANGE UP (ref 0–0)
NRBC BLD AUTO-RTO: 0 /100 WBCS — SIGNIFICANT CHANGE UP (ref 0–0)
PHOSPHATE SERPL-MCNC: 3 MG/DL — SIGNIFICANT CHANGE UP (ref 2.5–4.5)
PLATELET # BLD AUTO: 204 K/UL — SIGNIFICANT CHANGE UP (ref 150–400)
PLATELET # BLD AUTO: 207 K/UL — SIGNIFICANT CHANGE UP (ref 150–400)
POTASSIUM SERPL-MCNC: 4 MMOL/L — SIGNIFICANT CHANGE UP (ref 3.5–5.3)
POTASSIUM SERPL-SCNC: 4 MMOL/L — SIGNIFICANT CHANGE UP (ref 3.5–5.3)
RBC # BLD: 3.19 M/UL — LOW (ref 4.2–5.8)
RBC # BLD: 3.33 M/UL — LOW (ref 4.2–5.8)
RBC # FLD: 15.1 % — HIGH (ref 10.3–14.5)
RBC # FLD: 15.3 % — HIGH (ref 10.3–14.5)
SODIUM SERPL-SCNC: 140 MMOL/L — SIGNIFICANT CHANGE UP (ref 135–145)
SPECIMEN SOURCE: SIGNIFICANT CHANGE UP
SPECIMEN SOURCE: SIGNIFICANT CHANGE UP
WBC # BLD: 12.96 K/UL — HIGH (ref 3.8–10.5)
WBC # BLD: 16.22 K/UL — HIGH (ref 3.8–10.5)
WBC # FLD AUTO: 12.96 K/UL — HIGH (ref 3.8–10.5)
WBC # FLD AUTO: 16.22 K/UL — HIGH (ref 3.8–10.5)

## 2025-05-21 PROCEDURE — 99233 SBSQ HOSP IP/OBS HIGH 50: CPT | Mod: GC

## 2025-05-21 PROCEDURE — 99233 SBSQ HOSP IP/OBS HIGH 50: CPT

## 2025-05-21 PROCEDURE — G0545: CPT

## 2025-05-21 RX ORDER — AMPICILLIN SODIUM 1 G/1
2 INJECTION, POWDER, FOR SOLUTION INTRAMUSCULAR; INTRAVENOUS EVERY 6 HOURS
Refills: 0 | Status: DISCONTINUED | OUTPATIENT
Start: 2025-05-21 | End: 2025-05-26

## 2025-05-21 RX ORDER — POLYETHYLENE GLYCOL 3350 17 G/17G
17 POWDER, FOR SOLUTION ORAL
Refills: 0 | Status: DISCONTINUED | OUTPATIENT
Start: 2025-05-21 | End: 2025-06-05

## 2025-05-21 RX ORDER — INSULIN LISPRO 100 U/ML
INJECTION, SOLUTION INTRAVENOUS; SUBCUTANEOUS
Refills: 0 | Status: DISCONTINUED | OUTPATIENT
Start: 2025-05-21 | End: 2025-05-27

## 2025-05-21 RX ORDER — BISACODYL 5 MG
10 TABLET, DELAYED RELEASE (ENTERIC COATED) ORAL ONCE
Refills: 0 | Status: COMPLETED | OUTPATIENT
Start: 2025-05-21 | End: 2025-05-21

## 2025-05-21 RX ORDER — INSULIN LISPRO 100 U/ML
INJECTION, SOLUTION INTRAVENOUS; SUBCUTANEOUS AT BEDTIME
Refills: 0 | Status: DISCONTINUED | OUTPATIENT
Start: 2025-05-21 | End: 2025-05-27

## 2025-05-21 RX ADMIN — Medication 0.5 MILLIGRAM(S): at 04:00

## 2025-05-21 RX ADMIN — INSULIN GLARGINE-YFGN 16 UNIT(S): 100 INJECTION, SOLUTION SUBCUTANEOUS at 00:17

## 2025-05-21 RX ADMIN — Medication 100 MILLIGRAM(S): at 06:29

## 2025-05-21 RX ADMIN — HEPARIN SODIUM 1400 UNIT(S)/HR: 1000 INJECTION INTRAVENOUS; SUBCUTANEOUS at 01:47

## 2025-05-21 RX ADMIN — HEPARIN SODIUM 1400 UNIT(S)/HR: 1000 INJECTION INTRAVENOUS; SUBCUTANEOUS at 07:48

## 2025-05-21 RX ADMIN — Medication 10 MILLIGRAM(S): at 06:28

## 2025-05-21 RX ADMIN — Medication 30 MILLIGRAM(S): at 06:29

## 2025-05-21 RX ADMIN — Medication 0.2 MILLIGRAM(S): at 18:13

## 2025-05-21 RX ADMIN — Medication 120 MILLIGRAM(S): at 13:48

## 2025-05-21 RX ADMIN — Medication 0.5 MILLIGRAM(S): at 12:02

## 2025-05-21 RX ADMIN — HEPARIN SODIUM 1600 UNIT(S)/HR: 1000 INJECTION INTRAVENOUS; SUBCUTANEOUS at 13:57

## 2025-05-21 RX ADMIN — INSULIN GLARGINE-YFGN 28 UNIT(S): 100 INJECTION, SOLUTION SUBCUTANEOUS at 21:19

## 2025-05-21 RX ADMIN — AMPICILLIN SODIUM 200 GRAM(S): 1 INJECTION, POWDER, FOR SOLUTION INTRAMUSCULAR; INTRAVENOUS at 18:13

## 2025-05-21 RX ADMIN — Medication 1 APPLICATION(S): at 13:48

## 2025-05-21 RX ADMIN — HEPARIN SODIUM 0 UNIT(S)/HR: 1000 INJECTION INTRAVENOUS; SUBCUTANEOUS at 00:36

## 2025-05-21 RX ADMIN — INSULIN LISPRO 6 UNIT(S): 100 INJECTION, SOLUTION INTRAVENOUS; SUBCUTANEOUS at 09:15

## 2025-05-21 RX ADMIN — INSULIN LISPRO 6 UNIT(S): 100 INJECTION, SOLUTION INTRAVENOUS; SUBCUTANEOUS at 13:46

## 2025-05-21 RX ADMIN — Medication 0.5 MILLIGRAM(S): at 07:50

## 2025-05-21 RX ADMIN — AMPICILLIN SODIUM 200 GRAM(S): 1 INJECTION, POWDER, FOR SOLUTION INTRAMUSCULAR; INTRAVENOUS at 23:56

## 2025-05-21 RX ADMIN — Medication 100 MILLIGRAM(S): at 21:24

## 2025-05-21 RX ADMIN — Medication 81 MILLIGRAM(S): at 13:49

## 2025-05-21 RX ADMIN — Medication 0.2 MILLIGRAM(S): at 06:28

## 2025-05-21 RX ADMIN — HEPARIN SODIUM 3500 UNIT(S): 1000 INJECTION INTRAVENOUS; SUBCUTANEOUS at 09:17

## 2025-05-21 RX ADMIN — AMPICILLIN SODIUM 200 GRAM(S): 1 INJECTION, POWDER, FOR SOLUTION INTRAMUSCULAR; INTRAVENOUS at 13:47

## 2025-05-21 RX ADMIN — ROSUVASTATIN CALCIUM 10 MILLIGRAM(S): 5 TABLET, FILM COATED ORAL at 21:24

## 2025-05-21 RX ADMIN — Medication 0.5 MILLIGRAM(S): at 21:46

## 2025-05-21 RX ADMIN — Medication 0.5 MILLIGRAM(S): at 07:14

## 2025-05-21 RX ADMIN — AMPICILLIN SODIUM 200 GRAM(S): 1 INJECTION, POWDER, FOR SOLUTION INTRAMUSCULAR; INTRAVENOUS at 07:17

## 2025-05-21 RX ADMIN — Medication 0.5 MILLIGRAM(S): at 11:02

## 2025-05-21 RX ADMIN — INSULIN LISPRO 6 UNIT(S): 100 INJECTION, SOLUTION INTRAVENOUS; SUBCUTANEOUS at 18:26

## 2025-05-21 RX ADMIN — INSULIN LISPRO 2: 100 INJECTION, SOLUTION INTRAVENOUS; SUBCUTANEOUS at 09:14

## 2025-05-21 RX ADMIN — TAMSULOSIN HYDROCHLORIDE 0.4 MILLIGRAM(S): 0.4 CAPSULE ORAL at 21:24

## 2025-05-21 RX ADMIN — Medication 100 MILLIGRAM(S): at 13:49

## 2025-05-21 RX ADMIN — Medication 0.5 MILLIGRAM(S): at 15:13

## 2025-05-21 RX ADMIN — HEPARIN SODIUM 1600 UNIT(S)/HR: 1000 INJECTION INTRAVENOUS; SUBCUTANEOUS at 18:26

## 2025-05-21 RX ADMIN — Medication 0.5 MILLIGRAM(S): at 21:16

## 2025-05-21 RX ADMIN — Medication 0.5 MILLIGRAM(S): at 16:13

## 2025-05-21 RX ADMIN — Medication 150 MICROGRAM(S): at 03:50

## 2025-05-21 RX ADMIN — HEPARIN SODIUM 1600 UNIT(S)/HR: 1000 INJECTION INTRAVENOUS; SUBCUTANEOUS at 09:15

## 2025-05-21 RX ADMIN — INSULIN LISPRO 2: 100 INJECTION, SOLUTION INTRAVENOUS; SUBCUTANEOUS at 13:45

## 2025-05-21 RX ADMIN — Medication 0.5 MILLIGRAM(S): at 03:30

## 2025-05-21 RX ADMIN — HEPARIN SODIUM 1600 UNIT(S)/HR: 1000 INJECTION INTRAVENOUS; SUBCUTANEOUS at 19:52

## 2025-05-21 NOTE — PROGRESS NOTE ADULT - PROBLEM SELECTOR PLAN 6
Pt hypoxic on presentation for unclear reason; started on high flow nasal cannula 40/40    - CXR unimpressive  - lungs clear on exam  - poss bronchiectasis vs pna  - VQ scan to rule out PE performed on 5/11: very low probability of PE  - Improved - Downtrending hgb  - No notable bleeding at cath site or EP surgical site  - Will consider CT abd to r/o RP bleed given on heparin if downtrending

## 2025-05-21 NOTE — PROGRESS NOTE ADULT - ATTENDING COMMENTS
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   #Enterococcus bacteremia, unclear source. BCx cleared on 5/17. ID following. MARIA LUISA ruled out endocarditis. D/c ceftriaxone and continue with ampicillin. Also given bacteremia, MRI lumbar spine for pain/compression deformities is recommended but patient cannot tolerate the exam. Will try to arrange with anesthesia and will also need rep for pacemaker present as well.   #Lower back pain, CT lumbar spine with disc bulge and vertebral compression deformities - ortho recommending MRI but could not tolerate. Pain control with dilaudid and tylenol. TLSO brace. Planning to obtain MRI with anesthesia given patient unable to ambulate and R>L weakness + urinary retention requiring a wong.   #Urinary retention, failed TOV. Continue with wong.   #Anemia, Hgb 10.5-->9.3 --> 7.9. No signs of bleeding. 1U PRBC. Hgb improved to 8-9.   #JEROME, creatinine uptrending in the setting of possible recent contrast vs hypotension. Creatinine improving s/p fluid bolus + 1U PRBC.   #Hypoxic respiratory failure, initially requiring HFNC. Now on 2L. Weaned to RA - 94%. CXR clear. Lungs clear on exam. VQ scan with low probability for PE. Unclear etiology. Possibly atelectasis? Trial ICS. Improved.   #HTN, increase clonidine 0.2mg BID. C/w hydral 100 TID. Continue to hold chlorthalidone and telmisartan in the setting of JEROME. Started on nifedipine 30mg on 5/11 (not home med). Will continue and uptitrate as needed while inpatient but would transition back to home meds when  JEROME resolves.   Remainder of plan as stated above. Plan discussed with HS.

## 2025-05-21 NOTE — PROGRESS NOTE ADULT - PROBLEM SELECTOR PLAN 4
CT Lumbar Spine: L3-L4 disc bulge, L4-L5 left paracentral-foraminal disc protrusion, multiple mild lumbar vertebral body compression deformities  . L3-L4 disc bulge, resulting in severe central canal, bilateral lateral recess and moderate bilateral neural foramen stenosis with facet arthrosis and ligamentum flavum hypertrophy.    - Pain: iv tylenol prn, dilaudid .5mg mod pain, dilaudid 1mg severe pain  - Ortho Consulted; recs appreciated  - TLSO brace  - MRI wo con (CrCl 14 so avoiding gadolinium) to r/o cauda equina and malignant disease, gely with history of prostate ca -> MRI was not tolerated given loudness of machine and pain. Pt does not want to go into MRI machine again -> will approach the idea of heavy sedation for MRI LUE swelling, nursing examined IV in LUE and it is functioning appropriately low c/f infiltration. Of note, pt is s/p PPM extraction but no fluctuance or edema at PPM site. Neurovascularly intact.    Plan:  -f/u LUE duplex -> superificial clot -> warm packed and restart AC after MARIA LUISA -> AC restarted

## 2025-05-21 NOTE — PROGRESS NOTE ADULT - PROBLEM SELECTOR PLAN 3
- Downtrending hgb  - No notable bleeding at cath site or EP surgical site  - Will consider CT abd to r/o RP bleed given on heparin if downtrending acute urinary retention in the setting of back pain and immobility    - TOV 5/19  - Had episodes of urinary retention to 600cc and 400cc  - Frye replaced

## 2025-05-21 NOTE — PROGRESS NOTE ADULT - ASSESSMENT
Pt is an 85M with HTN, HLD, DM2, prostate cancer s/p prostatectomy, CKD, TIA with a pacemaker who presents transferred from [place] for further evaluation of low back pain secondary to lumbar compression deformities. EP on board to clear PPM for MRI compatibility. Found to have e facaelis bacteremia. On abx, unable to tolerate MRI s/p PPM removal on 5/16 and exchange for leadless PPM. Plan for MARIA LUISA on 5/20 -> negative for endocarditis. Will explore sedated MRI

## 2025-05-21 NOTE — PROGRESS NOTE ADULT - ATTENDING COMMENTS
This is a 86 y/o M w/ PMhx HTN, HLD, DM2, hypothyroidism, prostate cancer s/p prostatectomy, CKD, TIA, pacemaker placed in 5/2024 for abnormal arrythmia initially admitted to [facility] on 5/6 for back pain, now transferred to [facility] for orthopedic spine. Labs w/ leukocytosis to 18, BCx w/ E faecalis in multiple sets.     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 S/p PPM removal    #LBP, L3-4 disc bulge  #Respiratory failure, improved     Overall, 86 y/o M w/ PMhx HTN, HLD, DM2, hypothyroidism, prostate cancer s/p prostatectomy, CKD, TIA, pacemaker placed in 5/2024 for abnormal arrythmia transferred to [facility] from [facility] for MRI spine and ortho evaluation give LBP and lumbar compression w/ urinary retention. Pt noted to have chills and leukocytosis to 18, now with high grade E faecalis bacteremia. Unclear source however UCx not done, U/A with 10 WBCs, CT A/P w/o IV contrast on 5/10 w/o acute infectious source, MRI L spine here limited exam, findings of stenosis, no clear OM/discitis, however no contrast.   S/p PPM removal, MARIA LUISA w/o vegetations.     Recommendations:   1. Increase Ampicillin 2g q6, Stopped Ceftriaxone 2 g q12.  2. MARIA LUISA negative   3. BCx from 5/15 NGTD  4. Pending MRI w/ sedation per primary team     Thank you for consulting us and involving us in the management of this patient's case. In addition to reviewing history, imaging, documents, labs, microbiology, and infection control strategies and potential issues.     ID will continue to follow    Darrin Falk M.D.  Attending Physician  Division of Infectious Diseases  Department of Medicine    Please contact through MS Teams message.  Office: [phone] (after 5 PM or weekend) .

## 2025-05-21 NOTE — PROGRESS NOTE ADULT - PROBLEM SELECTOR PLAN 7
acute urinary retention in the setting of back pain and immobility    - TOV 5/19  - Had episodes of urinary retention to 600cc and 400cc  - Frye replaced Pt hypoxic on presentation for unclear reason; started on high flow nasal cannula 40/40    - CXR unimpressive  - lungs clear on exam  - poss bronchiectasis vs pna  - VQ scan to rule out PE performed on 5/11: very low probability of PE  - Improved

## 2025-05-21 NOTE — CHART NOTE - NSCHARTNOTEFT_GEN_A_CORE
Patient is s/p leadless PM placement on 5/16/25. Device is MRI conditional as per Isaac. MRI cardiology order form completed and placed in the chart

## 2025-05-21 NOTE — PROGRESS NOTE ADULT - PROBLEM SELECTOR PLAN 8
#HTN     - Continue Home Hydralazine 100mg tid  - Continue Home Clonidine .2mg tid -> decrease to 0.1mg for hypotension  - Hold Telmisartan 40mg qd in setting of JEROME   - Hold Chlorthalidone 25mg qd in setting of JEROME  - On 5/17, pt intermittently nauseous and unable to tolerate PO, requiring IV 10 labetalol PRN -> CT head ordered to r/o bleed -> Pt improved, no CT head needed #HTN     - Has labile blood pressures that are high in the afternoon and low in the morning  - Continue Home Hydralazine 100mg tid  - Continue Home Clonidine .2mg tid -> decrease to 0.1mg for hypotension  - Was placed on nifedipine 30mg QD in the hospital while holding nephrotoxic meds  - Hold Telmisartan 40mg qd in setting of JEROME   - Hold Chlorthalidone 25mg qd in setting of JEROME  - On 5/17, pt intermittently nauseous and unable to tolerate PO, requiring IV 10 labetalol PRN -> CT head ordered to r/o bleed -> Pt improved, no CT head needed

## 2025-05-21 NOTE — PROGRESS NOTE ADULT - SUBJECTIVE AND OBJECTIVE BOX
Follow Up:      Interval History/ROS:Patient is a 85y old  Male who presents with a chief complaint of back pain (19 May 2025 11:29)  Seen and examined at bedside, no acute events overnight. Endorsing Back pain consistent w/ before, lower abdominal pain iso urinary retention (has not been urinating and retaining ~700cc prior to straight caths overnight).   No fevers or chills ON    REVIEW OF SYSTEMS  All other systems negative except as noted above    Allergies  gabapentin (Other)        ANTIMICROBIALS:    ampicillin  IVPB 2 every 8 hours  cefTRIAXone   IVPB 2000 every 12 hours      OTHER MEDS: MEDICATIONS  (STANDING):  aspirin  chewable 81 daily  bisacodyl 5 at bedtime  cloNIDine 0.1 three times a day  dextrose 50% Injectable 12.5 once  dextrose 50% Injectable 25 once  dextrose 50% Injectable 25 once  dextrose Oral Gel 15 once  glucagon  Injectable 1 once  heparin   Injectable 7500 every 6 hours PRN  heparin   Injectable 3500 every 6 hours PRN  hydrALAZINE 100 three times a day  HYDROmorphone  Injectable 0.5 every 4 hours PRN  insulin glargine Injectable (LANTUS) 28 at bedtime  insulin lispro (ADMELOG) corrective regimen sliding scale  every 6 hours  insulin lispro Injectable (ADMELOG) 6 three times a day before meals  levothyroxine 150 daily  NIFEdipine XL 30 daily  polyethylene glycol 3350 17 daily  propranolol  daily  rosuvastatin 10 at bedtime  senna 2 at bedtime  tamsulosin 0.4 at bedtime      Vital Signs Last 24 Hrs  T(F): 98.2 (05-19-25 @ 21:40), Max: 98.7 (05-18-25 @ 06:00)    Vital Signs Last 24 Hrs  HR: 61 (05-20-25 @ 03:35) (55 - 64)  BP: 164/52 (05-20-25 @ 03:35) (128/39 - 181/68)  RR: 16 (05-20-25 @ 03:35)  SpO2: 93% (05-20-25 @ 03:35) (93% - 98%)  Wt(kg): --    EXAM:  Physical Exam:  Constitutional:  well preserved, comfortable  Head/Eyes: no icterus, PERRL, EOMI  ENT:  supple; no thrush  LUNGS:  CTA  CVS:  normal S1, S2, no murmur, Pacemaker placement site dressing intact w/o bleeding or surrounding erythema   Abd:  soft, non-tender; non-distended  Ext:  ANDREIA UE edema most notable in forearms and hands  Vascular:  IV site no erythema tenderness or discharge  MSK:  joints without swelling  Neuro: AAO X 3, non- focal    Labs:                        8.8    11.64 )-----------( 203      ( 20 May 2025 04:01 )             27.1     05-20    141  |  105  |  54[H]  ----------------------------<  94  3.8   |  26  |  2.38[H]    Ca    8.1[L]      20 May 2025 04:01  Phos  3.5     05-20  Mg     2.40     05-20        WBC Trend:  WBC Count: 11.64 (05-20-25 @ 04:01)  WBC Count: 11.34 (05-20-25 @ 04:01)  WBC Count: 12.44 (05-19-25 @ 22:17)  WBC Count: 16.10 (05-19-25 @ 04:49)      Creatine Trend:  Creatinine: 2.38 (05-20)  Creatinine: 2.57 (05-19)  Creatinine: 2.74 (05-19)  Creatinine: 2.24 (05-18)      Liver Biochemical Testing Trend:  Alanine Aminotransferase (ALT/SGPT): 8 (05-18)  Alanine Aminotransferase (ALT/SGPT): 10 (05-17)  Alanine Aminotransferase (ALT/SGPT): 8 (05-16)  Alanine Aminotransferase (ALT/SGPT): 10 (05-15)  Alanine Aminotransferase (ALT/SGPT): 8 (05-14)  Aspartate Aminotransferase (AST/SGOT): 18 (05-18-25 @ 06:00)  Aspartate Aminotransferase (AST/SGOT): 25 (05-17-25 @ 07:10)  Aspartate Aminotransferase (AST/SGOT): 13 (05-16-25 @ 05:10)  Aspartate Aminotransferase (AST/SGOT): 15 (05-15-25 @ 06:48)  Aspartate Aminotransferase (AST/SGOT): 14 (05-14-25 @ 07:46)  Bilirubin Total: 0.3 (05-18)  Bilirubin Total: 0.3 (05-17)  Bilirubin Total: 0.3 (05-16)  Bilirubin Total: 0.2 (05-15)  Bilirubin Total: 0.3 (05-14)      Trend LDH      Urinalysis Basic - ( 20 May 2025 04:01 )    Color: x / Appearance: x / SG: x / pH: x  Gluc: 94 mg/dL / Ketone: x  / Bili: x / Urobili: x   Blood: x / Protein: x / Nitrite: x   Leuk Esterase: x / RBC: x / WBC x   Sq Epi: x / Non Sq Epi: x / Bacteria: x        MICROBIOLOGY:    MRSA PCR Result.: NotDetec (05-14-25 @ 17:32)      Culture - Blood (collected 18 May 2025 06:00)  Source: Blood Blood  Preliminary Report:    No growth at 24 hours    Culture - Blood (collected 18 May 2025 06:00)  Source: Blood Blood  Preliminary Report:    No growth at 24 hours    Culture - Blood (collected 17 May 2025 18:20)  Source: Blood Blood-Peripheral  Preliminary Report:    No growth at 48 Hours    Culture - Blood (collected 17 May 2025 18:15)  Source: Blood Blood-Peripheral  Preliminary Report:    No growth at 48 Hours    Culture - Blood (collected 17 May 2025 07:10)  Source: Blood Blood  Preliminary Report:    No growth at 48 Hours    Culture - Blood (collected 17 May 2025 07:10)  Source: Blood Blood  Preliminary Report:    No growth at 48 Hours    Culture - Blood (collected 15 May 2025 21:55)  Source: Blood Blood  Preliminary Report:    No growth at 4 days    Culture - Blood (collected 15 May 2025 21:47)  Source: Blood Blood  Preliminary Report:    No growth at 4 days    Culture - Blood (collected 14 May 2025 10:40)  Source: Blood Blood  Final Report:    Growth in aerobic bottle: Enterococcus faecalis    See previous culture [id number]    Direct identification is available within approximately 3-5    hours either by Blood Panel Multiplexed PCR or Direct    MALDI-TOF. Details: [email or web address]  Organism: Blood Culture PCR  Organism: Blood Culture PCR    Sensitivities:      Method Type: PCR      -  Enterococcus faecalis: Detec    Culture - Blood (collected 14 May 2025 10:26)  Source: Blood Blood  Final Report:    Growth in aerobic and anaerobic bottles: Enterococcus faecalis    Growth in anaerobic bottle: blood culture bottle turned positive after    the final report was released.  Organism: Enterococcus faecalis    Sensitivities:      -  Streptomycin synergy: S <=1000      -  Vancomycin: S 0.5      -  Ampicillin: S <=2 Predicts results to ampicillin/sulbactam, amoxacillin-clavulanate and  piperacillin-tazobactam.      Method Type: SHAR      -  Gentamicin synergy: S <=500  Organism: Enterococcus faecalis                                            Blood Gas Venous - Lactate: 1.1 (05-17 @ 12:17)      RADIOLOGY:  imaging below personally reviewed

## 2025-05-21 NOTE — PROGRESS NOTE ADULT - PROBLEM SELECTOR PLAN 5
- BCx positive for E faecalis . Only 1 bottle was sent and is positive -> repeat Bcx 5/15 negative   - s/p removal of PPM on 5/16 and exchange to leadless pacemaker with EP   - c/w ampicillin 2g q8 (renally dosed) and ceftriaxone per ID  - CTX dc'ed as MARIA LUISA neg for endocarditis  - Will increase amp to q6 after JEROME resolves  - TTE ordered -> EF 64% mild LV systolic dysfunction  - MARIA LUISA ordered per ID, plan for Monday -> needs transfusion before proceeding per anesthesia, plan for 5/20 -> responded well to transfusion -> MARIA LUISA neg for endocarditis - Most likely i/s/o contrast nephropathy after EP procedure vs. lower BP than baseline given BP meds  - Reduced clonidine from 0.2mg TID to 0.1mg -> increased back to 0.2 given higher BPs  - Urine studies show indeterminate FENA of 1%, improved with 500cc bolus  - SCr improving

## 2025-05-21 NOTE — PROGRESS NOTE ADULT - PROBLEM SELECTOR PLAN 2
- Most likely i/s/o contrast nephropathy after EP procedure vs. lower BP than baseline given BP meds  - Reduced clonidine from 0.2mg TID to 0.1mg -> increased back to 0.2 given higher BPs  - Urine studies show indeterminate FENA of 1%, improved with 500cc bolus - BCx positive for E faecalis . Only 1 bottle was sent and is positive -> repeat Bcx 5/15 negative   - s/p removal of PPM on 5/16 and exchange to leadless pacemaker with EP   - c/w ampicillin 2g q8 (renally dosed) and ceftriaxone per ID  - CTX dc'ed as MARIA LUISA neg for endocarditis  - Will increase amp to q6 after JEROME resolves  - TTE ordered -> EF 64% mild LV systolic dysfunction  - MARIA LUISA ordered per ID, plan for Monday -> needs transfusion before proceeding per anesthesia, plan for 5/20 -> responded well to transfusion -> MARIA LUISA neg for endocarditis

## 2025-05-21 NOTE — PROGRESS NOTE ADULT - SUBJECTIVE AND OBJECTIVE BOX
***Plan not finalized until attending attestation***    Raman Carmichael MD (PGY-1)  Internal Medicine  Contact via [employer] TEAMS    ******************************************    PROGRESS NOTE:     Patient is a 85y old  Male who presents with a chief complaint of back pain (20 May 2025 07:31)      INTERVAL EVENTS: No acute overnight events.     SUBJECTIVE: Patient seen and examined at bedside.   Complaints of constipation    MEDICATIONS  (STANDING):  ampicillin  IVPB 2 Gram(s) IV Intermittent every 8 hours  aspirin  chewable 81 milliGRAM(s) Oral daily  bisacodyl 5 milliGRAM(s) Oral at bedtime  chlorhexidine 2% Cloths 1 Application(s) Topical daily  chlorhexidine 2% Cloths 1 Application(s) Topical daily  cloNIDine 0.2 milliGRAM(s) Oral two times a day  dextrose 5%. 1000 milliLiter(s) (50 mL/Hr) IV Continuous <Continuous>  dextrose 5%. 1000 milliLiter(s) (100 mL/Hr) IV Continuous <Continuous>  dextrose 50% Injectable 25 Gram(s) IV Push once  dextrose 50% Injectable 12.5 Gram(s) IV Push once  dextrose 50% Injectable 25 Gram(s) IV Push once  dextrose Oral Gel 15 Gram(s) Oral once  glucagon  Injectable 1 milliGRAM(s) IntraMuscular once  heparin  Infusion.  Unit(s)/Hr (17 mL/Hr) IV Continuous <Continuous>  hydrALAZINE 100 milliGRAM(s) Oral three times a day  insulin glargine Injectable (LANTUS) 28 Unit(s) SubCutaneous at bedtime  insulin lispro (ADMELOG) corrective regimen sliding scale   SubCutaneous every 6 hours  insulin lispro Injectable (ADMELOG) 6 Unit(s) SubCutaneous three times a day before meals  levothyroxine 150 MICROGram(s) Oral daily  lidocaine   4% Patch 1 Patch Transdermal daily  NIFEdipine XL 30 milliGRAM(s) Oral daily  polyethylene glycol 3350 17 Gram(s) Oral two times a day  propranolol  milliGRAM(s) Oral daily  rosuvastatin 10 milliGRAM(s) Oral at bedtime  senna 2 Tablet(s) Oral at bedtime  tamsulosin 0.4 milliGRAM(s) Oral at bedtime    MEDICATIONS  (PRN):  heparin   Injectable 7500 Unit(s) IV Push every 6 hours PRN For aPTT less than 40  heparin   Injectable 3500 Unit(s) IV Push every 6 hours PRN For aPTT between 40 - 57  HYDROmorphone  Injectable 0.5 milliGRAM(s) IV Push every 4 hours PRN Severe Pain (7 - 10)      CAPILLARY BLOOD GLUCOSE      POCT Blood Glucose.: 105 mg/dL (20 May 2025 23:53)  POCT Blood Glucose.: 88 mg/dL (20 May 2025 21:38)  POCT Blood Glucose.: 118 mg/dL (20 May 2025 17:54)  POCT Blood Glucose.: 118 mg/dL (20 May 2025 11:51)  POCT Blood Glucose.: 73 mg/dL (20 May 2025 11:20)    I&O's Summary    20 May 2025 07:01  -  21 May 2025 07:00  --------------------------------------------------------  IN: 0 mL / OUT: 1000 mL / NET: -1000 mL        PHYSICAL EXAM:  Vital Signs Last 24 Hrs  T(C): 36.3 (20 May 2025 20:57), Max: 36.9 (20 May 2025 11:38)  T(F): 97.4 (20 May 2025 20:57), Max: 98.5 (20 May 2025 11:38)  HR: 62 (21 May 2025 03:28) (58 - 67)  BP: 163/42 (21 May 2025 03:28) (131/45 - 187/73)  BP(mean): --  RR: 17 (21 May 2025 03:28) (12 - 20)  SpO2: 100% (21 May 2025 03:28) (90% - 100%)    Parameters below as of 21 May 2025 03:28  Patient On (Oxygen Delivery Method): nasal cannula  O2 Flow (L/min): 2      PHYSICAL EXAM:  GENERAL: NAD, lying in bed comfortably  HEAD: Atraumatic, normocephalic  EYES: EOMI, PERRLA, conjunctiva and sclera clear  ENT: Moist mucous membranes  NECK: Supple, no JVD  HEART: S1, S2, Regular rate and rhythm, no murmurs, rubs, or gallops  LUNGS: Unlabored respirations, clear to auscultation bilaterally, no crackles, wheezing, or rhonchi  ABDOMEN: Soft, nontender, nondistended, +BS  EXTREMITIES: 2+ peripheral pulses bilaterally. No clubbing, cyanosis, or edema  NERVOUS SYSTEM:  A&Ox3, LE R weaker than left  SKIN: No rashes or lesions  +Frye    LABS:                        9.3    16.22 )-----------( 207      ( 20 May 2025 23:54 )             28.7     05-20    141  |  105  |  54[H]  ----------------------------<  94  3.8   |  26  |  2.38[H]    Ca    8.1[L]      20 May 2025 04:01  Phos  3.5     05-20  Mg     2.40     05-20      PT/INR - ( 20 May 2025 04:01 )   PT: 11.7 sec;   INR: 1.01 ratio         PTT - ( 20 May 2025 23:54 )  PTT:162.4 sec      Urinalysis Basic - ( 20 May 2025 04:01 )    Color: x / Appearance: x / SG: x / pH: x  Gluc: 94 mg/dL / Ketone: x  / Bili: x / Urobili: x   Blood: x / Protein: x / Nitrite: x   Leuk Esterase: x / RBC: x / WBC x   Sq Epi: x / Non Sq Epi: x / Bacteria: x          RADIOLOGY & ADDITIONAL TESTS:  < from: MARIA LUISA W or WO Ultrasound Enhancing Agent (05.20.25 @ 12:15) >  CONCLUSIONS:      1. Left ventricular systolic function is normal. There are no regional wall motion abnormalities seen.   2. Normal right ventricular cavity size and normal right ventricular systolic function.   3. Structurally normal mitral valve with normal leaflet excursion. No vegetations seen in association with the mitral valve. There is calcification of the mitral valve annulus. There is mild mitral regurgitation.   4. The aortic valve appears trileaflet with normal systolic excursion. There is calcification of the aortic valve leaflets. No vegetations seen in association with the aortic valve. There is trace aortic regurgitation.   5. No atheroma in the visualized portions of the proximal ascending aorta. Mild non-mobile atheroma in the visualized portions of the transverse aortic arch. Mild non-mobile atheroma in the visualized portions of the descending aorta.   6. The left atrium is normal in size. There is no evidence of left atrial or left atrial appendage thrombus. The left atrial appendage emptying velocity is normal.   7. Agitated saline injection was negative for intracardiac shunt.   8. No echocardiographic evidence of vegetations.    < end of copied text >

## 2025-05-21 NOTE — PROGRESS NOTE ADULT - PROBLEM SELECTOR PLAN 1
LUE swelling, nursing examined IV in LUE and it is functioning appropriately low c/f infiltration. Of note, pt is s/p PPM extraction but no fluctuance or edema at PPM site. Neurovascularly intact.    Plan:  -f/u LUE duplex -> superificial clot -> warm packed and restart AC after MARIA LUISA -> AC restarted CT Lumbar Spine: L3-L4 disc bulge, L4-L5 left paracentral-foraminal disc protrusion, multiple mild lumbar vertebral body compression deformities  . L3-L4 disc bulge, resulting in severe central canal, bilateral lateral recess and moderate bilateral neural foramen stenosis with facet arthrosis and ligamentum flavum hypertrophy.    - Pain: iv tylenol prn, dilaudid .5mg mod pain, dilaudid 1mg severe pain  - Ortho Consulted; recs appreciated  - TLSO brace  - MRI wo con (CrCl 14 so avoiding gadolinium) to r/o cauda equina and malignant disease, gely with history of prostate ca -> MRI was not tolerated given loudness of machine and pain. Pt does not want to go into MRI machine again -> Plan for MRI with general anesthesia

## 2025-05-21 NOTE — PROGRESS NOTE ADULT - ASSESSMENT
86 y/o M w/ PMhx HTN, HLD, DM2, hypothyroidism, prostate cancer s/p prostatectomy, CKD, TIA, pacemaker placed in 5/2024 for abnormal arrythmia initially admitted to [facility] on 5/6 for back pain, found to have compression deformities, transferred to [facility] for MR and orthopedic eval, with course complicated by Leukocytosis to 18 and BCx w/ persistent e.Faecalis Bacteremia, now S/p PPM replacement on 5/16.    #E faecalis bacteremia  #LBP, L3-4 disc bulge  #Respiratory failure, resolved  #JEROME  - Bacteremia iso PPM, w/ concern for PPM infection and possible IE; now s/p PPM replacement on 5/16  - Cx clear since 5/15  - Endocarditis ruled out w/ MARIA LUISA    Recommendations:   1. c/w Ampicillin 2 g q8 (dosed renally)  2. F/u Repeat BCx x2 5/18; negative at 48H  3. MRI incomplete d/t patient not able to tolerate, would consider repeating MRI L spine later if possible.     ==========================================================  ==========================================================  =====================INCOMPLETE NOTE========================  ==========================================================  ==========================================================   84 y/o M w/ PMhx HTN, HLD, DM2, hypothyroidism, prostate cancer s/p prostatectomy, CKD, TIA, pacemaker placed in 5/2024 for abnormal arrythmia initially admitted to [facility] on 5/6 for back pain, found to have compression deformities, transferred to [facility] for MR and orthopedic eval, with course complicated by Leukocytosis to 18 and BCx w/ persistent e.Faecalis Bacteremia, now S/p PPM replacement on 5/16.    #E faecalis bacteremia  #LBP, L3-4 disc bulge  #Respiratory failure, resolved  #JEROME  - Bacteremia iso PPM, w/ concern for PPM infection and possible IE; now s/p PPM replacement on 5/16  - Cx clear since 5/15  - Endocarditis ruled out w/ MARIA LUISA    Recommendations:   1. Increase Ampicillin back to 2 g q6 given improvement in CrCl  2. MRI incomplete d/t patient not able to tolerate, would consider repeating MRI L spine later if possible.     Discussed w/ Attending Physician Dr. Darrin Saldana MD (Jason)   Internal Medicine PGY-2  Department of Medicine [facility]

## 2025-05-22 LAB
ANION GAP SERPL CALC-SCNC: 10 MMOL/L — SIGNIFICANT CHANGE UP (ref 7–14)
APTT BLD: 63.6 SEC — HIGH (ref 26.1–36.8)
APTT BLD: 80.9 SEC — HIGH (ref 26.1–36.8)
BASOPHILS # BLD AUTO: 0.05 K/UL — SIGNIFICANT CHANGE UP (ref 0–0.2)
BASOPHILS NFR BLD AUTO: 0.5 % — SIGNIFICANT CHANGE UP (ref 0–2)
BUN SERPL-MCNC: 39 MG/DL — HIGH (ref 7–23)
CALCIUM SERPL-MCNC: 7.9 MG/DL — LOW (ref 8.4–10.5)
CHLORIDE SERPL-SCNC: 104 MMOL/L — SIGNIFICANT CHANGE UP (ref 98–107)
CO2 SERPL-SCNC: 21 MMOL/L — LOW (ref 22–31)
CREAT SERPL-MCNC: 1.86 MG/DL — HIGH (ref 0.5–1.3)
CULTURE RESULTS: SIGNIFICANT CHANGE UP
CULTURE RESULTS: SIGNIFICANT CHANGE UP
EGFR: 35 ML/MIN/1.73M2 — LOW
EGFR: 35 ML/MIN/1.73M2 — LOW
EOSINOPHIL # BLD AUTO: 0.36 K/UL — SIGNIFICANT CHANGE UP (ref 0–0.5)
EOSINOPHIL NFR BLD AUTO: 3.3 % — SIGNIFICANT CHANGE UP (ref 0–6)
GLUCOSE BLDC GLUCOMTR-MCNC: 104 MG/DL — HIGH (ref 70–99)
GLUCOSE BLDC GLUCOMTR-MCNC: 111 MG/DL — HIGH (ref 70–99)
GLUCOSE BLDC GLUCOMTR-MCNC: 112 MG/DL — HIGH (ref 70–99)
GLUCOSE BLDC GLUCOMTR-MCNC: 128 MG/DL — HIGH (ref 70–99)
GLUCOSE SERPL-MCNC: 107 MG/DL — HIGH (ref 70–99)
HCT VFR BLD CALC: 26.1 % — LOW (ref 39–50)
HGB BLD-MCNC: 8.3 G/DL — LOW (ref 13–17)
IANC: 7.96 K/UL — HIGH (ref 1.8–7.4)
IMM GRANULOCYTES NFR BLD AUTO: 1.4 % — HIGH (ref 0–0.9)
INR BLD: 1.06 RATIO — SIGNIFICANT CHANGE UP (ref 0.85–1.16)
LYMPHOCYTES # BLD AUTO: 1.44 K/UL — SIGNIFICANT CHANGE UP (ref 1–3.3)
LYMPHOCYTES # BLD AUTO: 13.3 % — SIGNIFICANT CHANGE UP (ref 13–44)
MAGNESIUM SERPL-MCNC: 2.3 MG/DL — SIGNIFICANT CHANGE UP (ref 1.6–2.6)
MCHC RBC-ENTMCNC: 28.4 PG — SIGNIFICANT CHANGE UP (ref 27–34)
MCHC RBC-ENTMCNC: 31.8 G/DL — LOW (ref 32–36)
MCV RBC AUTO: 89.4 FL — SIGNIFICANT CHANGE UP (ref 80–100)
MONOCYTES # BLD AUTO: 0.87 K/UL — SIGNIFICANT CHANGE UP (ref 0–0.9)
MONOCYTES NFR BLD AUTO: 8 % — SIGNIFICANT CHANGE UP (ref 2–14)
NEUTROPHILS # BLD AUTO: 7.96 K/UL — HIGH (ref 1.8–7.4)
NEUTROPHILS NFR BLD AUTO: 73.5 % — SIGNIFICANT CHANGE UP (ref 43–77)
NRBC # BLD AUTO: 0 K/UL — SIGNIFICANT CHANGE UP (ref 0–0)
NRBC # FLD: 0 K/UL — SIGNIFICANT CHANGE UP (ref 0–0)
NRBC BLD AUTO-RTO: 0 /100 WBCS — SIGNIFICANT CHANGE UP (ref 0–0)
PHOSPHATE SERPL-MCNC: 3 MG/DL — SIGNIFICANT CHANGE UP (ref 2.5–4.5)
PLATELET # BLD AUTO: 181 K/UL — SIGNIFICANT CHANGE UP (ref 150–400)
POTASSIUM SERPL-MCNC: 4.1 MMOL/L — SIGNIFICANT CHANGE UP (ref 3.5–5.3)
POTASSIUM SERPL-SCNC: 4.1 MMOL/L — SIGNIFICANT CHANGE UP (ref 3.5–5.3)
PROTHROM AB SERPL-ACNC: 12.3 SEC — SIGNIFICANT CHANGE UP (ref 9.9–13.4)
RBC # BLD: 2.92 M/UL — LOW (ref 4.2–5.8)
RBC # FLD: 15.8 % — HIGH (ref 10.3–14.5)
SODIUM SERPL-SCNC: 135 MMOL/L — SIGNIFICANT CHANGE UP (ref 135–145)
SPECIMEN SOURCE: SIGNIFICANT CHANGE UP
SPECIMEN SOURCE: SIGNIFICANT CHANGE UP
WBC # BLD: 10.83 K/UL — HIGH (ref 3.8–10.5)
WBC # FLD AUTO: 10.83 K/UL — HIGH (ref 3.8–10.5)

## 2025-05-22 PROCEDURE — 99233 SBSQ HOSP IP/OBS HIGH 50: CPT | Mod: GC

## 2025-05-22 PROCEDURE — G0545: CPT

## 2025-05-22 PROCEDURE — 99232 SBSQ HOSP IP/OBS MODERATE 35: CPT

## 2025-05-22 RX ORDER — DEXAMETHASONE 0.5 MG/1
10 TABLET ORAL EVERY 6 HOURS
Refills: 0 | Status: DISCONTINUED | OUTPATIENT
Start: 2025-05-22 | End: 2025-05-22

## 2025-05-22 RX ADMIN — Medication 1 APPLICATION(S): at 12:34

## 2025-05-22 RX ADMIN — Medication 150 MICROGRAM(S): at 05:12

## 2025-05-22 RX ADMIN — INSULIN GLARGINE-YFGN 28 UNIT(S): 100 INJECTION, SOLUTION SUBCUTANEOUS at 21:40

## 2025-05-22 RX ADMIN — Medication 0.5 MILLIGRAM(S): at 11:30

## 2025-05-22 RX ADMIN — INSULIN LISPRO 6 UNIT(S): 100 INJECTION, SOLUTION INTRAVENOUS; SUBCUTANEOUS at 13:22

## 2025-05-22 RX ADMIN — AMPICILLIN SODIUM 200 GRAM(S): 1 INJECTION, POWDER, FOR SOLUTION INTRAMUSCULAR; INTRAVENOUS at 12:59

## 2025-05-22 RX ADMIN — HEPARIN SODIUM 1600 UNIT(S)/HR: 1000 INJECTION INTRAVENOUS; SUBCUTANEOUS at 08:03

## 2025-05-22 RX ADMIN — Medication 0.5 MILLIGRAM(S): at 21:40

## 2025-05-22 RX ADMIN — Medication 0.5 MILLIGRAM(S): at 10:44

## 2025-05-22 RX ADMIN — Medication 100 MILLIGRAM(S): at 21:48

## 2025-05-22 RX ADMIN — AMPICILLIN SODIUM 200 GRAM(S): 1 INJECTION, POWDER, FOR SOLUTION INTRAMUSCULAR; INTRAVENOUS at 18:19

## 2025-05-22 RX ADMIN — INSULIN LISPRO 6 UNIT(S): 100 INJECTION, SOLUTION INTRAVENOUS; SUBCUTANEOUS at 18:20

## 2025-05-22 RX ADMIN — HEPARIN SODIUM 1600 UNIT(S)/HR: 1000 INJECTION INTRAVENOUS; SUBCUTANEOUS at 19:37

## 2025-05-22 RX ADMIN — HEPARIN SODIUM 1600 UNIT(S)/HR: 1000 INJECTION INTRAVENOUS; SUBCUTANEOUS at 07:30

## 2025-05-22 RX ADMIN — Medication 0.5 MILLIGRAM(S): at 01:20

## 2025-05-22 RX ADMIN — Medication 81 MILLIGRAM(S): at 12:33

## 2025-05-22 RX ADMIN — Medication 100 MILLIGRAM(S): at 12:34

## 2025-05-22 RX ADMIN — Medication 0.5 MILLIGRAM(S): at 16:40

## 2025-05-22 RX ADMIN — INSULIN LISPRO 6 UNIT(S): 100 INJECTION, SOLUTION INTRAVENOUS; SUBCUTANEOUS at 09:12

## 2025-05-22 RX ADMIN — TAMSULOSIN HYDROCHLORIDE 0.4 MILLIGRAM(S): 0.4 CAPSULE ORAL at 21:49

## 2025-05-22 RX ADMIN — Medication 0.2 MILLIGRAM(S): at 05:12

## 2025-05-22 RX ADMIN — Medication 0.2 MILLIGRAM(S): at 18:22

## 2025-05-22 RX ADMIN — Medication 0.5 MILLIGRAM(S): at 15:58

## 2025-05-22 RX ADMIN — Medication 0.5 MILLIGRAM(S): at 01:50

## 2025-05-22 RX ADMIN — HEPARIN SODIUM 1600 UNIT(S)/HR: 1000 INJECTION INTRAVENOUS; SUBCUTANEOUS at 01:26

## 2025-05-22 RX ADMIN — ROSUVASTATIN CALCIUM 10 MILLIGRAM(S): 5 TABLET, FILM COATED ORAL at 21:49

## 2025-05-22 RX ADMIN — AMPICILLIN SODIUM 200 GRAM(S): 1 INJECTION, POWDER, FOR SOLUTION INTRAMUSCULAR; INTRAVENOUS at 05:12

## 2025-05-22 NOTE — PROGRESS NOTE ADULT - ASSESSMENT
Pt is an 85M with HTN, HLD, DM2, prostate cancer s/p prostatectomy, CKD, TIA with a pacemaker who presents transferred from [place] for further evaluation of low back pain secondary to lumbar compression deformities. EP on board to clear PPM for MRI compatibility. Found to have e facaelis bacteremia. On abx, unable to tolerate MRI s/p PPM removal on 5/16 and exchange for leadless PPM. Plan for MARIA LUISA on 5/20 -> negative for endocarditis. Will explore sedated MRI

## 2025-05-22 NOTE — PROGRESS NOTE ADULT - PROBLEM SELECTOR PLAN 4
LUE swelling, nursing examined IV in LUE and it is functioning appropriately low c/f infiltration. Of note, pt is s/p PPM extraction but no fluctuance or edema at PPM site. Neurovascularly intact.    Plan:  -f/u LUE duplex -> superificial clot -> warm packed and restart AC after MARIA LUISA -> AC restarted

## 2025-05-22 NOTE — PROGRESS NOTE ADULT - PROBLEM SELECTOR PLAN 7
Pt hypoxic on presentation for unclear reason; started on high flow nasal cannula 40/40    - CXR unimpressive  - lungs clear on exam  - poss bronchiectasis vs pna  - VQ scan to rule out PE performed on 5/11: very low probability of PE  - Improved

## 2025-05-22 NOTE — PROGRESS NOTE ADULT - PROBLEM SELECTOR PLAN 5
- Most likely i/s/o contrast nephropathy after EP procedure vs. lower BP than baseline given BP meds  - Reduced clonidine from 0.2mg TID to 0.1mg -> increased back to 0.2 given higher BPs  - Urine studies show indeterminate FENA of 1%, improved with 500cc bolus  - SCr improving

## 2025-05-22 NOTE — PROGRESS NOTE ADULT - PROBLEM SELECTOR PROBLEM 4
Spoke with patient:  She has been speaking with the head pharmacist today and is asking that the office talk with [facility].  Patient needs Rosuvastatin reordered and Bydureon replaced.    Spoke with [place] @ [facility]:  Requesting 90 day RX order for Rosuvastatin and for Dr. Norton to replace Bydureon with:  Shawn Aguilar.    Writer will forward statin refill and update provider to review Bydureon.    Spoke with patient:  She is informed above.  She has taken Trulicity before and would like to try that.   Edema of left upper extremity

## 2025-05-22 NOTE — PROGRESS NOTE ADULT - ASSESSMENT
86 y/o M w/ PMhx HTN, HLD, DM2, hypothyroidism, prostate cancer s/p prostatectomy, CKD, TIA, pacemaker placed in 5/2024 for abnormal arrythmia [facility], found to have compression deformities, transferred to [facility] for MR and orthopedic eval, with course complicated by Leukocytosis to 18 and BCx w/ persistent e.Faecalis Bacteremia, now S/p PPM replacement on 5/16.    #E faecalis bacteremia  #LBP, L3-4 disc bulge  #Respiratory failure, resolved  #JEROME  - Bacteremia iso PPM, w/ concern for PPM infection and possible IE; now s/p PPM replacement on 5/16  - Cx clear since 5/15  - Endocarditis ruled out w/ MARIA LUISA  - Pending MR spine to rule out spinal abscess, Discitis, OM    Recommendations:   1. Continue w/ Ampicillin 2 g q6 given improvement in CrCl  2. Pending MR spine w/ sedation     ==========================================================  ==========================================================  =====================INCOMPLETE NOTE========================  ==========================================================  ==========================================================   84 y/o M w/ PMhx HTN, HLD, DM2, hypothyroidism, prostate cancer s/p prostatectomy, CKD, TIA, pacemaker placed in 5/2024 for abnormal arrythmia initially admitted to [facility] on 5/6 for back pain, found to have compression deformities, transferred to [facility] for MR and orthopedic eval, with course complicated by Leukocytosis to 18 and BCx w/ persistent e.Faecalis Bacteremia, now S/p PPM replacement on 5/16.    #E faecalis bacteremia  #LBP, L3-4 disc bulge  #Respiratory failure, resolved  #JEROME, resolving   - Bacteremia iso PPM, w/ concern for PPM infection and possible IE; now s/p PPM replacement on 5/16  - Cx clear since 5/15  - Endocarditis ruled out w/ MARIA LUISA  - Pending MR spine to rule out spinal abscess, Discitis, OM    #Bradycardia  #S/p Leadless Micra placement   #Hematoma R groin  -Management per EP and Primary team    Recommendations:   1. Continue w/ Ampicillin 2 g q6 given improvement in CrCl  2. Pending MR spine w/ sedation     Discussed w/ Attending Physician Dr. Dileep Saldana MD (Jason)   Internal Medicine PGY-2  Department of Medicine [facility]

## 2025-05-22 NOTE — PROGRESS NOTE ADULT - ATTENDING COMMENTS
85M hx of HTN, HLD, T2DM, prostate ca s/p prostatectomy, CKD, TIA, PPM presents from [facility] for eval of lower back pain 2/2 compression deformities course c/b E. faecalis bacteremia s/p PPM explant and leadless PPM pacemaker - awaiting TTE.   #Enterococcus bacteremia, unclear source. BCx cleared on 5/17. ID following. MARIA LUISA ruled out endocarditis. D/c ceftriaxone and continue with ampicillin. Also given bacteremia, MRI lumbar spine for pain/compression deformities is recommended but patient cannot tolerate the exam. Will try to arrange with anesthesia and will also need rep for pacemaker present as well.   #Lower back pain, CT lumbar spine with disc bulge and vertebral compression deformities - ortho recommending MRI but could not tolerate. Pain control with dilaudid and tylenol. TLSO brace. Planning to obtain MRI with anesthesia given patient unable to ambulate and R>L weakness + urinary retention requiring a wong. Pt. now having bowel incontinence. Discussed with ortho team 5/22. Will re-assess. Plan to start decadron 10q6 d/t concern for cord compression.   #Urinary retention, failed TOV. Continue with wong.   #Anemia, Hgb 10.5-->9.3 --> 7.9. No signs of bleeding. 1U PRBC. Hgb improved to 8-9.   #JEROME, creatinine uptrending in the setting of possible recent contrast vs hypotension. Creatinine improving s/p fluid bolus + 1U PRBC.   #Hypoxic respiratory failure, initially requiring HFNC. Now on 2L. Weaned to RA - 94%. CXR clear. Lungs clear on exam. VQ scan with low probability for PE. Unclear etiology. Possibly atelectasis? Trial ICS. Improved.   #HTN, increase clonidine 0.2mg BID. C/w hydral 100 TID. Continue to hold chlorthalidone and telmisartan in the setting of JEROME. Started on nifedipine 30mg on 5/11 (not home med). Will continue and uptitrate as needed while inpatient but would transition back to home meds when JEROME resolves.   Remainder of plan as stated above. Plan discussed with HS.

## 2025-05-22 NOTE — PROGRESS NOTE ADULT - SUBJECTIVE AND OBJECTIVE BOX
Follow Up:      Interval History/ROS:Patient is a 85y old  Male who presents with a chief complaint of back pain (22 May 2025 08:21)  Seen and examined at bedside. No complaints this AM. Had BM last night. No fevers, chills, abdominal pain. States he thinks lower extremity weakness is improving     REVIEW OF SYSTEMS  All other systems negative except as noted above    Allergies  gabapentin (Other)        ANTIMICROBIALS:    ampicillin  IVPB 2 every 6 hours      OTHER MEDS: MEDICATIONS  (STANDING):  aspirin  chewable 81 daily  bisacodyl 5 at bedtime  cloNIDine 0.2 two times a day  dextrose 50% Injectable 25 once  dextrose 50% Injectable 12.5 once  dextrose 50% Injectable 25 once  dextrose Oral Gel 15 once  glucagon  Injectable 1 once  heparin   Injectable 7500 every 6 hours PRN  heparin   Injectable 3500 every 6 hours PRN  heparin  Infusion.  <Continuous>  hydrALAZINE 100 three times a day  HYDROmorphone  Injectable 0.5 every 4 hours PRN  insulin glargine Injectable (LANTUS) 28 at bedtime  insulin lispro (ADMELOG) corrective regimen sliding scale  three times a day before meals  insulin lispro (ADMELOG) corrective regimen sliding scale  at bedtime  insulin lispro Injectable (ADMELOG) 6 three times a day before meals  levothyroxine 150 daily  NIFEdipine XL 30 daily  polyethylene glycol 3350 17 two times a day  propranolol  daily  rosuvastatin 10 at bedtime  senna 2 at bedtime  tamsulosin 0.4 at bedtime      Vital Signs Last 24 Hrs  T(F): 98 (05-22-25 @ 05:00), Max: 98.7 (05-18-25 @ 06:00)    Vital Signs Last 24 Hrs  HR: 53 (05-22-25 @ 05:00) (53 - 65)  BP: 124/41 (05-22-25 @ 05:00) (109/54 - 139/36)  RR: 18 (05-22-25 @ 05:00)  SpO2: 100% (05-22-25 @ 05:00) (96% - 100%)  Wt(kg): --    EXAM:  Physical Exam:  Constitutional:  well preserved, comfortable  Head/Eyes: no icterus, PERRL, EOMI  ENT:  supple; no thrush  LUNGS:  CTA  CVS:  normal S1, S2, no murmur, Pacemaker placement site dressing intact w/o bleeding or surrounding erythema   Abd:  soft, non-tender; non-distended  Ext:  ANDREIA UE edema most notable in forearms and hands, improving  Vascular:  IV site no erythema tenderness or discharge  MSK:  joints without swelling  Neuro: AAO X 3, non- focal    Labs:                        8.3    10.83 )-----------( 181      ( 22 May 2025 06:55 )             26.1     05-22    135  |  104  |  39[H]  ----------------------------<  107[H]  4.1   |  21[L]  |  1.86[H]    Ca    7.9[L]      22 May 2025 06:55  Phos  3.0     05-22  Mg     2.30     05-22        WBC Trend:  WBC Count: 10.83 (05-22-25 @ 06:55)  WBC Count: 12.96 (05-21-25 @ 08:23)  WBC Count: 16.22 (05-20-25 @ 23:54)  WBC Count: 11.64 (05-20-25 @ 04:01)      Creatine Trend:  Creatinine: 1.86 (05-22)  Creatinine: 1.88 (05-21)  Creatinine: 2.38 (05-20)  Creatinine: 2.57 (05-19)      Liver Biochemical Testing Trend:  Alanine Aminotransferase (ALT/SGPT): 8 (05-18)  Alanine Aminotransferase (ALT/SGPT): 10 (05-17)  Alanine Aminotransferase (ALT/SGPT): 8 (05-16)  Alanine Aminotransferase (ALT/SGPT): 10 (05-15)  Alanine Aminotransferase (ALT/SGPT): 8 (05-14)  Aspartate Aminotransferase (AST/SGOT): 18 (05-18-25 @ 06:00)  Aspartate Aminotransferase (AST/SGOT): 25 (05-17-25 @ 07:10)  Aspartate Aminotransferase (AST/SGOT): 13 (05-16-25 @ 05:10)  Aspartate Aminotransferase (AST/SGOT): 15 (05-15-25 @ 06:48)  Aspartate Aminotransferase (AST/SGOT): 14 (05-14-25 @ 07:46)  Bilirubin Total: 0.3 (05-18)  Bilirubin Total: 0.3 (05-17)  Bilirubin Total: 0.3 (05-16)  Bilirubin Total: 0.2 (05-15)  Bilirubin Total: 0.3 (05-14)      Trend LDH      Urinalysis Basic - ( 22 May 2025 06:55 )    Color: x / Appearance: x / SG: x / pH: x  Gluc: 107 mg/dL / Ketone: x  / Bili: x / Urobili: x   Blood: x / Protein: x / Nitrite: x   Leuk Esterase: x / RBC: x / WBC x   Sq Epi: x / Non Sq Epi: x / Bacteria: x        MICROBIOLOGY:    MRSA PCR Result.: NotDetec (05-14-25 @ 17:32)      Culture - Blood (collected 18 May 2025 06:00)  Source: Blood Blood  Preliminary Report:    No growth at 4 days    Culture - Blood (collected 18 May 2025 06:00)  Source: Blood Blood  Preliminary Report:    No growth at 4 days    Culture - Blood (collected 17 May 2025 18:20)  Source: Blood Blood-Peripheral  Preliminary Report:    No growth at 4 days    Culture - Blood (collected 17 May 2025 18:15)  Source: Blood Blood-Peripheral  Preliminary Report:    No growth at 4 days    Culture - Blood (collected 17 May 2025 07:10)  Source: Blood Blood  Final Report:    No growth at 5 days    Culture - Blood (collected 17 May 2025 07:10)  Source: Blood Blood  Final Report:    No growth at 5 days    Culture - Blood (collected 15 May 2025 21:55)  Source: Blood Blood  Final Report:    No growth at 5 days    Culture - Blood (collected 15 May 2025 21:47)  Source: Blood Blood  Final Report:    No growth at 5 days    Culture - Blood (collected 14 May 2025 10:40)  Source: Blood Blood  Final Report:    Growth in aerobic bottle: Enterococcus faecalis    See previous culture [id number]    Direct identification is available within approximately 3-5    hours either by Blood Panel Multiplexed PCR or Direct    MALDI-TOF. Details: [email or web address]  Organism: Blood Culture PCR  Organism: Blood Culture PCR    Sensitivities:      Method Type: PCR      -  Enterococcus faecalis: Detec    Culture - Blood (collected 14 May 2025 10:26)  Source: Blood Blood  Final Report:    Growth in aerobic and anaerobic bottles: Enterococcus faecalis    Growth in anaerobic bottle: blood culture bottle turned positive after    the final report was released.  Organism: Enterococcus faecalis    Sensitivities:      Method Type: SHAR      -  Ampicillin: S <=2 Predicts results to ampicillin/sulbactam, amoxacillin-clavulanate and  piperacillin-tazobactam.      -  Vancomycin: S 0.5      -  Gentamicin synergy: S <=500      -  Streptomycin synergy: S <=1000  Organism: Enterococcus faecalis                                                RADIOLOGY:  imaging below personally reviewed

## 2025-05-22 NOTE — PROGRESS NOTE ADULT - PROBLEM SELECTOR PLAN 3
acute urinary retention in the setting of back pain and immobility    - TOV 5/19  - Had episodes of urinary retention to 600cc and 400cc  - Frye replaced

## 2025-05-22 NOTE — PROGRESS NOTE ADULT - SUBJECTIVE AND OBJECTIVE BOX
***Plan not finalized until attending attestation***    Raman Carmichael MD (PGY-1)  Internal Medicine  Contact via [employer] TEAMS    ******************************************    PROGRESS NOTE:     Patient is a 85y old  Male who presents with a chief complaint of back pain (22 May 2025 07:43)      INTERVAL EVENTS: No acute overnight events.     SUBJECTIVE: Patient seen and examined at bedside. This morning, the patient is comfortable and doing well. No acute complaints.    MEDICATIONS  (STANDING):  ampicillin  IVPB 2 Gram(s) IV Intermittent every 6 hours  aspirin  chewable 81 milliGRAM(s) Oral daily  bisacodyl 5 milliGRAM(s) Oral at bedtime  chlorhexidine 2% Cloths 1 Application(s) Topical daily  chlorhexidine 2% Cloths 1 Application(s) Topical daily  cloNIDine 0.2 milliGRAM(s) Oral two times a day  dextrose 5%. 1000 milliLiter(s) (50 mL/Hr) IV Continuous <Continuous>  dextrose 5%. 1000 milliLiter(s) (100 mL/Hr) IV Continuous <Continuous>  dextrose 50% Injectable 25 Gram(s) IV Push once  dextrose 50% Injectable 12.5 Gram(s) IV Push once  dextrose 50% Injectable 25 Gram(s) IV Push once  dextrose Oral Gel 15 Gram(s) Oral once  glucagon  Injectable 1 milliGRAM(s) IntraMuscular once  heparin  Infusion.  Unit(s)/Hr (17 mL/Hr) IV Continuous <Continuous>  hydrALAZINE 100 milliGRAM(s) Oral three times a day  insulin glargine Injectable (LANTUS) 28 Unit(s) SubCutaneous at bedtime  insulin lispro (ADMELOG) corrective regimen sliding scale   SubCutaneous three times a day before meals  insulin lispro (ADMELOG) corrective regimen sliding scale   SubCutaneous at bedtime  insulin lispro Injectable (ADMELOG) 6 Unit(s) SubCutaneous three times a day before meals  levothyroxine 150 MICROGram(s) Oral daily  lidocaine   4% Patch 1 Patch Transdermal daily  NIFEdipine XL 30 milliGRAM(s) Oral daily  polyethylene glycol 3350 17 Gram(s) Oral two times a day  propranolol  milliGRAM(s) Oral daily  rosuvastatin 10 milliGRAM(s) Oral at bedtime  senna 2 Tablet(s) Oral at bedtime  tamsulosin 0.4 milliGRAM(s) Oral at bedtime    MEDICATIONS  (PRN):  heparin   Injectable 7500 Unit(s) IV Push every 6 hours PRN For aPTT less than 40  heparin   Injectable 3500 Unit(s) IV Push every 6 hours PRN For aPTT between 40 - 57  HYDROmorphone  Injectable 0.5 milliGRAM(s) IV Push every 4 hours PRN Severe Pain (7 - 10)      CAPILLARY BLOOD GLUCOSE      POCT Blood Glucose.: 128 mg/dL (21 May 2025 21:15)  POCT Blood Glucose.: 133 mg/dL (21 May 2025 18:15)  POCT Blood Glucose.: 162 mg/dL (21 May 2025 12:49)  POCT Blood Glucose.: 164 mg/dL (21 May 2025 08:50)    I&O's Summary    21 May 2025 07:01  -  22 May 2025 07:00  --------------------------------------------------------  IN: 0 mL / OUT: 1025 mL / NET: -1025 mL        PHYSICAL EXAM:  Vital Signs Last 24 Hrs  T(C): 36.7 (22 May 2025 05:00), Max: 36.7 (22 May 2025 05:00)  T(F): 98 (22 May 2025 05:00), Max: 98 (22 May 2025 05:00)  HR: 53 (22 May 2025 05:00) (53 - 67)  BP: 124/41 (22 May 2025 05:00) (109/54 - 139/36)  BP(mean): --  RR: 18 (22 May 2025 05:00) (16 - 18)  SpO2: 100% (22 May 2025 05:00) (96% - 100%)    Parameters below as of 22 May 2025 05:00  Patient On (Oxygen Delivery Method): nasal cannula  O2 Flow (L/min): 2      GENERAL: NAD, lying in bed comfortably  HEAD: Atraumatic, normocephalic  EYES: EOMI, PERRLA, conjunctiva and sclera clear  ENT: Moist mucous membranes  NECK: Supple, no JVD  HEART: S1, S2, Regular rate and rhythm, no murmurs, rubs, or gallops  LUNGS: Unlabored respirations, clear to auscultation bilaterally, no crackles, wheezing, or rhonchi  ABDOMEN: Soft, nontender, nondistended, +BS  EXTREMITIES: 2+ peripheral pulses bilaterally. No clubbing, cyanosis, or edema  NERVOUS SYSTEM:  A&Ox3, no focal deficits   SKIN: No rashes or lesions    LABS:                        8.3    10.83 )-----------( 181      ( 22 May 2025 06:55 )             26.1     05-22    135  |  104  |  39[H]  ----------------------------<  107[H]  4.1   |  21[L]  |  1.86[H]    Ca    7.9[L]      22 May 2025 06:55  Phos  3.0     05-22  Mg     2.30     05-22      PT/INR - ( 22 May 2025 06:55 )   PT: 12.3 sec;   INR: 1.06 ratio         PTT - ( 22 May 2025 06:55 )  PTT:63.6 sec      Urinalysis Basic - ( 22 May 2025 06:55 )    Color: x / Appearance: x / SG: x / pH: x  Gluc: 107 mg/dL / Ketone: x  / Bili: x / Urobili: x   Blood: x / Protein: x / Nitrite: x   Leuk Esterase: x / RBC: x / WBC x   Sq Epi: x / Non Sq Epi: x / Bacteria: x          RADIOLOGY & ADDITIONAL TESTS:  Results Reviewed:   Imaging Personally Reviewed:  Electrocardiogram Personally Reviewed:  Tele: ***Plan not finalized until attending attestation***    Raman Carmichael MD (PGY-1)  Internal Medicine  Contact via [employer] TEAMS    ******************************************    PROGRESS NOTE:     Patient is a 85y old  Male who presents with a chief complaint of back pain (22 May 2025 07:43)      INTERVAL EVENTS:   R sided hematoma on R leg    SUBJECTIVE: Patient seen and examined at bedside. This morning, the patient is comfortable and doing well. No acute complaints.    MEDICATIONS  (STANDING):  ampicillin  IVPB 2 Gram(s) IV Intermittent every 6 hours  aspirin  chewable 81 milliGRAM(s) Oral daily  bisacodyl 5 milliGRAM(s) Oral at bedtime  chlorhexidine 2% Cloths 1 Application(s) Topical daily  chlorhexidine 2% Cloths 1 Application(s) Topical daily  cloNIDine 0.2 milliGRAM(s) Oral two times a day  dextrose 5%. 1000 milliLiter(s) (50 mL/Hr) IV Continuous <Continuous>  dextrose 5%. 1000 milliLiter(s) (100 mL/Hr) IV Continuous <Continuous>  dextrose 50% Injectable 25 Gram(s) IV Push once  dextrose 50% Injectable 12.5 Gram(s) IV Push once  dextrose 50% Injectable 25 Gram(s) IV Push once  dextrose Oral Gel 15 Gram(s) Oral once  glucagon  Injectable 1 milliGRAM(s) IntraMuscular once  heparin  Infusion.  Unit(s)/Hr (17 mL/Hr) IV Continuous <Continuous>  hydrALAZINE 100 milliGRAM(s) Oral three times a day  insulin glargine Injectable (LANTUS) 28 Unit(s) SubCutaneous at bedtime  insulin lispro (ADMELOG) corrective regimen sliding scale   SubCutaneous three times a day before meals  insulin lispro (ADMELOG) corrective regimen sliding scale   SubCutaneous at bedtime  insulin lispro Injectable (ADMELOG) 6 Unit(s) SubCutaneous three times a day before meals  levothyroxine 150 MICROGram(s) Oral daily  lidocaine   4% Patch 1 Patch Transdermal daily  NIFEdipine XL 30 milliGRAM(s) Oral daily  polyethylene glycol 3350 17 Gram(s) Oral two times a day  propranolol  milliGRAM(s) Oral daily  rosuvastatin 10 milliGRAM(s) Oral at bedtime  senna 2 Tablet(s) Oral at bedtime  tamsulosin 0.4 milliGRAM(s) Oral at bedtime    MEDICATIONS  (PRN):  heparin   Injectable 7500 Unit(s) IV Push every 6 hours PRN For aPTT less than 40  heparin   Injectable 3500 Unit(s) IV Push every 6 hours PRN For aPTT between 40 - 57  HYDROmorphone  Injectable 0.5 milliGRAM(s) IV Push every 4 hours PRN Severe Pain (7 - 10)      CAPILLARY BLOOD GLUCOSE      POCT Blood Glucose.: 128 mg/dL (21 May 2025 21:15)  POCT Blood Glucose.: 133 mg/dL (21 May 2025 18:15)  POCT Blood Glucose.: 162 mg/dL (21 May 2025 12:49)  POCT Blood Glucose.: 164 mg/dL (21 May 2025 08:50)    I&O's Summary    21 May 2025 07:01  -  22 May 2025 07:00  --------------------------------------------------------  IN: 0 mL / OUT: 1025 mL / NET: -1025 mL        PHYSICAL EXAM:  Vital Signs Last 24 Hrs  T(C): 36.7 (22 May 2025 05:00), Max: 36.7 (22 May 2025 05:00)  T(F): 98 (22 May 2025 05:00), Max: 98 (22 May 2025 05:00)  HR: 53 (22 May 2025 05:00) (53 - 67)  BP: 124/41 (22 May 2025 05:00) (109/54 - 139/36)  BP(mean): --  RR: 18 (22 May 2025 05:00) (16 - 18)  SpO2: 100% (22 May 2025 05:00) (96% - 100%)    Parameters below as of 22 May 2025 05:00  Patient On (Oxygen Delivery Method): nasal cannula  O2 Flow (L/min): 2      PHYSICAL EXAM:  GENERAL: NAD, lying in bed comfortably  HEAD: Atraumatic, normocephalic  EYES: EOMI, PERRLA, conjunctiva and sclera clear  ENT: Moist mucous membranes  NECK: Supple, no JVD  HEART: S1, S2, Regular rate and rhythm, no murmurs, rubs, or gallops  LUNGS: Unlabored respirations, clear to auscultation bilaterally, no crackles, wheezing, or rhonchi  ABDOMEN: Soft, nontender, nondistended, +BS  EXTREMITIES: +small hematoma in R groin 2+ peripheral pulses bilaterally. No clubbing, cyanosis, or edema  NERVOUS SYSTEM:  A&Ox3, LE R weaker than left  SKIN: No rashes or lesions  +Frye    LABS:                        8.3    10.83 )-----------( 181      ( 22 May 2025 06:55 )             26.1     05-22    135  |  104  |  39[H]  ----------------------------<  107[H]  4.1   |  21[L]  |  1.86[H]    Ca    7.9[L]      22 May 2025 06:55  Phos  3.0     05-22  Mg     2.30     05-22      PT/INR - ( 22 May 2025 06:55 )   PT: 12.3 sec;   INR: 1.06 ratio         PTT - ( 22 May 2025 06:55 )  PTT:63.6 sec      Urinalysis Basic - ( 22 May 2025 06:55 )    Color: x / Appearance: x / SG: x / pH: x  Gluc: 107 mg/dL / Ketone: x  / Bili: x / Urobili: x   Blood: x / Protein: x / Nitrite: x   Leuk Esterase: x / RBC: x / WBC x   Sq Epi: x / Non Sq Epi: x / Bacteria: x

## 2025-05-22 NOTE — PROGRESS NOTE ADULT - ATTENDING COMMENTS
This is a 84 y/o M w/ PMhx HTN, HLD, DM2, hypothyroidism, prostate cancer s/p prostatectomy, CKD, TIA, pacemaker placed in 5/2024 for abnormal arrythmia initially admitted to [facility] on 5/6 for back pain, now transferred to [facility] for orthopedic spine. Labs w/ leukocytosis to 18, BCx w/ E faecalis in multiple sets.     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 S/p PPM removal    #LBP, L3-4 disc bulge pending repeat MRI  #Respiratory failure, improved   #JEROME, improving     Overall, 84 y/o M w/ PMhx HTN, HLD, DM2, hypothyroidism, prostate cancer s/p prostatectomy, CKD, TIA, pacemaker placed in 5/2024 for abnormal arrythmia transferred to [facility] from [facility] for MRI spine and ortho evaluation give LBP and lumbar compression w/ urinary retention. Pt noted to have chills and leukocytosis to 18, now with high grade E faecalis bacteremia. Unclear source however UCx not done, U/A with 10 WBCs, CT A/P w/o IV contrast on 5/10 w/o acute infectious source, MRI L spine here limited exam, findings of stenosis, no clear OM/discitis, however no contrast.   S/p PPM removal, MARIA LUISA w/o vegetations.     Recommendations:   1. Ampicillin 2g q6. Course pending MRI (4-6 weeks likely after neg BCx)   2. MARIA LUISA negative off Ceftriaxone  3. BCx from 5/15 NGTD  4. Pending MRI w/ sedation per primary team     Thank you for consulting us and involving us in the management of this patient's case. In addition to reviewing history, imaging, documents, labs, microbiology, and infection control strategies and potential issues.     ID will continue to follow    Darrin Falk M.D.  Attending Physician  Division of Infectious Diseases  Department of Medicine    Please contact through MS Teams message.  Office: [phone] (after 5 PM or weekend) .

## 2025-05-22 NOTE — PROGRESS NOTE ADULT - PROBLEM SELECTOR PLAN 2
- BCx positive for E faecalis . Only 1 bottle was sent and is positive -> repeat Bcx 5/15 negative   - s/p removal of PPM on 5/16 and exchange to leadless pacemaker with EP   - c/w ampicillin 2g q8 (renally dosed) and ceftriaxone per ID  - CTX dc'ed as MARIA LUISA neg for endocarditis  - Will increase amp to q6 after JEROME resolves  - TTE ordered -> EF 64% mild LV systolic dysfunction  - MARIA LUISA ordered per ID, plan for Monday -> needs transfusion before proceeding per anesthesia, plan for 5/20 -> responded well to transfusion -> MARIA LUISA neg for endocarditis

## 2025-05-22 NOTE — PROGRESS NOTE ADULT - PROBLEM SELECTOR PLAN 10
- Continue Home Propanolol  - Continue Home Eliquis   - ASA 81mg qd - Continue Home Propanolol  - C/w heparin  - ASA 81mg qd

## 2025-05-22 NOTE — PROGRESS NOTE ADULT - PROBLEM SELECTOR PLAN 8
#HTN     - Has labile blood pressures that are high in the afternoon and low in the morning  - Continue Home Hydralazine 100mg tid  - Continue Home Clonidine .2mg tid -> decrease to 0.1mg for hypotension  - Was placed on nifedipine 30mg QD in the hospital while holding nephrotoxic meds  - Hold Telmisartan 40mg qd in setting of JEROME   - Hold Chlorthalidone 25mg qd in setting of JEROME  - On 5/17, pt intermittently nauseous and unable to tolerate PO, requiring IV 10 labetalol PRN -> CT head ordered to r/o bleed -> Pt improved, no CT head needed

## 2025-05-22 NOTE — PROGRESS NOTE ADULT - PROBLEM SELECTOR PLAN 6
- Downtrending hgb  - No notable bleeding at cath site or EP surgical site  - Will consider CT abd to r/o RP bleed given on heparin if downtrending - Downtrending hgb  - Notable small hematoma at R groin site on 5/22, CTM  - Will consider CT abd to r/o RP bleed given on heparin if downtrending

## 2025-05-22 NOTE — PROGRESS NOTE ADULT - PROBLEM SELECTOR PLAN 1
CT Lumbar Spine: L3-L4 disc bulge, L4-L5 left paracentral-foraminal disc protrusion, multiple mild lumbar vertebral body compression deformities  . L3-L4 disc bulge, resulting in severe central canal, bilateral lateral recess and moderate bilateral neural foramen stenosis with facet arthrosis and ligamentum flavum hypertrophy.    - Pain: iv tylenol prn, dilaudid .5mg mod pain, dilaudid 1mg severe pain  - Ortho Consulted; recs appreciated  - TLSO brace  - MRI wo con (CrCl 14 so avoiding gadolinium) to r/o cauda equina and malignant disease, gely with history of prostate ca -> MRI was not tolerated given loudness of machine and pain. Pt does not want to go into MRI machine again -> Plan for MRI with general anesthesia CT Lumbar Spine: L3-L4 disc bulge, L4-L5 left paracentral-foraminal disc protrusion, multiple mild lumbar vertebral body compression deformities  . L3-L4 disc bulge, resulting in severe central canal, bilateral lateral recess and moderate bilateral neural foramen stenosis with facet arthrosis and ligamentum flavum hypertrophy.    - Pain: iv tylenol prn, dilaudid .5mg mod pain, dilaudid 1mg severe pain  - Ortho Consulted; recs appreciated  - TLSO brace  - MRI wo con (CrCl 14 so avoiding gadolinium) to r/o cauda equina and malignant disease, gely with history of prostate ca -> MRI was not tolerated given loudness of machine and pain. Pt does not want to go into MRI machine again -> Plan for MRI with general anesthesia  - Will discuss with EP if new PPM is capable for MRI CT Lumbar Spine: L3-L4 disc bulge, L4-L5 left paracentral-foraminal disc protrusion, multiple mild lumbar vertebral body compression deformities  . L3-L4 disc bulge, resulting in severe central canal, bilateral lateral recess and moderate bilateral neural foramen stenosis with facet arthrosis and ligamentum flavum hypertrophy.    - Pain: iv tylenol prn, dilaudid .5mg mod pain, dilaudid 1mg severe pain  - Ortho Consulted; recs appreciated  - TLSO brace  - MRI wo con (CrCl 14 so avoiding gadolinium) to r/o cauda equina and malignant disease, gely with history of prostate ca -> MRI was not tolerated given loudness of machine and pain. Pt does not want to go into MRI machine again -> Plan for MRI with general anesthesia  - Will discuss with EP if new PPM is capable for MRI -> PPM cleared, cardiology form sent to [facility]

## 2025-05-22 NOTE — PROGRESS NOTE ADULT - SUBJECTIVE AND OBJECTIVE BOX
Orthopedic Surgery      Pt seen and examined. Orthopedics previously evaluated patient on 5/11 and recommended MRI. The patient had a pacemaker that was not compatible with MRI that has now been exchanged for a MRI compatible device. Patient reporting increasing weakness. Patient reports a history of bowel incontinence but denies any recent episodes and now since admission. Patient has had a wong for most of admission after failed trial of void. Patient denies any perigenital or perirectal numbness. Denies any numbness or tingling in lower extremities     ICU Vital Signs Last 24 Hrs  T(C): 36.6 (22 May 2025 11:36), Max: 36.7 (22 May 2025 05:00)  T(F): 97.8 (22 May 2025 11:36), Max: 98 (22 May 2025 05:00)  HR: 53 (22 May 2025 11:36) (53 - 65)  BP: 132/37 (22 May 2025 11:36) (111/36 - 139/36)  BP(mean): 69 (22 May 2025 11:36) (69 - 69)  ABP: --  ABP(mean): --  RR: 18 (22 May 2025 11:36) (18 - 18)  SpO2: 99% (22 May 2025 11:36) (99% - 100%)          Physical exam:   Gen: No acute distress  Resp: Breathing comfortably on room air    Motor exam:          Upper extremity         C5 (Shoulder Abd)    C6 (Elbow flex)   C7 (Elbow ext)   C8 (Finger flex) T1 (finger abd)         R         5/5                 5/5                       5/5                      5/5                         5/5          L          5/5                 5/5                      5/5                      5/5                         5/5                   Lower extremity           L2 (Hip flex)  L3 (knee ext)  L4 (Dorsi flex)  L5 (EHL)  S1 (Plantar flex)                                               R        5/5            5/5             2/5                    2/5          4/5      L        5/5            5/5             5/5                   5/5           5/5      Sensory exam:                         C5      C6      C7      C8       T1          RIGHT          2         2        2         2         2          (0=absent, 1=impaired, 2=normal, NT=not testable)  LEFT             2         2        2         2         2          (0=absent, 1=impaired, 2=normal, NT=not testable)                          L2      L3     L4     L5       S1          RIGHT          2         2        2         2         2          (0=absent, 1=impaired, 2=normal, NT=not testable)  LEFT             2         2        2         2         2          (0=absent, 1=impaired, 2=normal, NT=not testable)     LABS:                        8.3    10.83 )-----------( 181      ( 22 May 2025 06:55 )             26.1     05-22    135  |  104  |  39[H]  ----------------------------<  107[H]  4.1   |  21[L]  |  1.86[H]    Ca    7.9[L]      22 May 2025 06:55  Phos  3.0     05-22  Mg     2.30     05-22      PT/INR - ( 22 May 2025 06:55 )   PT: 12.3 sec;   INR: 1.06 ratio         PTT - ( 22 May 2025 06:55 )  PTT:63.6 sec  Urinalysis Basic - ( 22 May 2025 06:55 )    Color: x / Appearance: x / SG: x / pH: x  Gluc: 107 mg/dL / Ketone: x  / Bili: x / Urobili: x   Blood: x / Protein: x / Nitrite: x   Leuk Esterase: x / RBC: x / WBC x   Sq Epi: x / Non Sq Epi: x / Bacteria: x              Assessment/Plan:       Ethan Barrientos PGY-2    For any questions please contact the on call orthopedic surgery team, please do not reach out via teams.  [facility] [place] [phone]  [facility] pager: [phone]  [facility] pager: [phone] Orthopedic Surgery      Pt seen and examined. Orthopedics previously evaluated patient on 5/11 and recommended MRI. The patient had a pacemaker that was not compatible with MRI that has now been exchanged for a MRI compatible device. Patient reporting increasing weakness. Patient reports a history of bowel incontinence but denies any recent episodes and now since admission. Patient has had a wong for most of admission after failed trial of void. Patient denies any perigenital or perirectal numbness. Denies any numbness or tingling in lower extremities     ICU Vital Signs Last 24 Hrs  T(C): 36.6 (22 May 2025 11:36), Max: 36.7 (22 May 2025 05:00)  T(F): 97.8 (22 May 2025 11:36), Max: 98 (22 May 2025 05:00)  HR: 53 (22 May 2025 11:36) (53 - 65)  BP: 132/37 (22 May 2025 11:36) (111/36 - 139/36)  BP(mean): 69 (22 May 2025 11:36) (69 - 69)  ABP: --  ABP(mean): --  RR: 18 (22 May 2025 11:36) (18 - 18)  SpO2: 99% (22 May 2025 11:36) (99% - 100%)          Physical exam:   Gen: No acute distress  Resp: Breathing comfortably on room air    Motor exam:          Upper extremity         C5 (Shoulder Abd)    C6 (Elbow flex)   C7 (Elbow ext)   C8 (Finger flex) T1 (finger abd)         R         5/5                 5/5                       5/5                      5/5                         5/5          L          5/5                 5/5                      5/5                      5/5                         5/5                   Lower extremity           L2 (Hip flex)  L3 (knee ext)  L4 (Dorsi flex)  L5 (EHL)  S1 (Plantar flex)                                               R        2/5            5/5             2/5                    2/5          4/5      L        3/5            5/5             5/5                   5/5           5/5      Sensory exam:                         C5      C6      C7      C8       T1          RIGHT          2         2        2         2         2          (0=absent, 1=impaired, 2=normal, NT=not testable)  LEFT             2         2        2         2         2          (0=absent, 1=impaired, 2=normal, NT=not testable)                          L2      L3     L4     L5       S1          RIGHT          2         2        2         2         2          (0=absent, 1=impaired, 2=normal, NT=not testable)  LEFT             2         2        2         2         2          (0=absent, 1=impaired, 2=normal, NT=not testable)     LABS:                        8.3    10.83 )-----------( 181      ( 22 May 2025 06:55 )             26.1     05-22    135  |  104  |  39[H]  ----------------------------<  107[H]  4.1   |  21[L]  |  1.86[H]    Ca    7.9[L]      22 May 2025 06:55  Phos  3.0     05-22  Mg     2.30     05-22      PT/INR - ( 22 May 2025 06:55 )   PT: 12.3 sec;   INR: 1.06 ratio         PTT - ( 22 May 2025 06:55 )  PTT:63.6 sec  Urinalysis Basic - ( 22 May 2025 06:55 )    Color: x / Appearance: x / SG: x / pH: x  Gluc: 107 mg/dL / Ketone: x  / Bili: x / Urobili: x   Blood: x / Protein: x / Nitrite: x   Leuk Esterase: x / RBC: x / WBC x   Sq Epi: x / Non Sq Epi: x / Bacteria: x              Assessment/Plan:   85 year old male with 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   MRI CTL sp with and without contrast and MRI brain with/without contrast to rule out infectious versus malignant etiology   WBAT  Further recommendations pending imaging    Ethan Barrientos PGY-2    For any questions please contact the on call orthopedic surgery team, please do not reach out via teams.  [facility] [place]: [phone]  [facility] pager: [phone]  [facility] pager: [phone]

## 2025-05-23 PROBLEM — Z00.00 ENCOUNTER FOR PREVENTIVE HEALTH EXAMINATION: Status: ACTIVE | Noted: 2025-05-23

## 2025-05-23 LAB
ANION GAP SERPL CALC-SCNC: 11 MMOL/L — SIGNIFICANT CHANGE UP (ref 7–14)
APTT BLD: 110.4 SEC — HIGH (ref 26.1–36.8)
APTT BLD: 65.2 SEC — HIGH (ref 26.1–36.8)
BASOPHILS # BLD AUTO: 0.05 K/UL — SIGNIFICANT CHANGE UP (ref 0–0.2)
BASOPHILS NFR BLD AUTO: 0.5 % — SIGNIFICANT CHANGE UP (ref 0–2)
BUN SERPL-MCNC: 34 MG/DL — HIGH (ref 7–23)
CALCIUM SERPL-MCNC: 7.8 MG/DL — LOW (ref 8.4–10.5)
CHLORIDE SERPL-SCNC: 103 MMOL/L — SIGNIFICANT CHANGE UP (ref 98–107)
CO2 SERPL-SCNC: 24 MMOL/L — SIGNIFICANT CHANGE UP (ref 22–31)
CREAT SERPL-MCNC: 1.83 MG/DL — HIGH (ref 0.5–1.3)
CULTURE RESULTS: SIGNIFICANT CHANGE UP
EGFR: 35 ML/MIN/1.73M2 — LOW
EGFR: 35 ML/MIN/1.73M2 — LOW
EOSINOPHIL # BLD AUTO: 0.18 K/UL — SIGNIFICANT CHANGE UP (ref 0–0.5)
EOSINOPHIL NFR BLD AUTO: 1.8 % — SIGNIFICANT CHANGE UP (ref 0–6)
GLUCOSE BLDC GLUCOMTR-MCNC: 155 MG/DL — HIGH (ref 70–99)
GLUCOSE BLDC GLUCOMTR-MCNC: 190 MG/DL — HIGH (ref 70–99)
GLUCOSE BLDC GLUCOMTR-MCNC: 195 MG/DL — HIGH (ref 70–99)
GLUCOSE BLDC GLUCOMTR-MCNC: 215 MG/DL — HIGH (ref 70–99)
GLUCOSE SERPL-MCNC: 146 MG/DL — HIGH (ref 70–99)
HCT VFR BLD CALC: 25.2 % — LOW (ref 39–50)
HGB BLD-MCNC: 8.2 G/DL — LOW (ref 13–17)
IANC: 7.91 K/UL — HIGH (ref 1.8–7.4)
IMM GRANULOCYTES NFR BLD AUTO: 0.9 % — SIGNIFICANT CHANGE UP (ref 0–0.9)
INR BLD: 1 RATIO — SIGNIFICANT CHANGE UP (ref 0.85–1.16)
LYMPHOCYTES # BLD AUTO: 1.25 K/UL — SIGNIFICANT CHANGE UP (ref 1–3.3)
LYMPHOCYTES # BLD AUTO: 12.2 % — LOW (ref 13–44)
MAGNESIUM SERPL-MCNC: 2.2 MG/DL — SIGNIFICANT CHANGE UP (ref 1.6–2.6)
MCHC RBC-ENTMCNC: 28.3 PG — SIGNIFICANT CHANGE UP (ref 27–34)
MCHC RBC-ENTMCNC: 32.5 G/DL — SIGNIFICANT CHANGE UP (ref 32–36)
MCV RBC AUTO: 86.9 FL — SIGNIFICANT CHANGE UP (ref 80–100)
MONOCYTES # BLD AUTO: 0.76 K/UL — SIGNIFICANT CHANGE UP (ref 0–0.9)
MONOCYTES NFR BLD AUTO: 7.4 % — SIGNIFICANT CHANGE UP (ref 2–14)
NEUTROPHILS # BLD AUTO: 7.91 K/UL — HIGH (ref 1.8–7.4)
NEUTROPHILS NFR BLD AUTO: 77.2 % — HIGH (ref 43–77)
NRBC # BLD AUTO: 0 K/UL — SIGNIFICANT CHANGE UP (ref 0–0)
NRBC # FLD: 0 K/UL — SIGNIFICANT CHANGE UP (ref 0–0)
NRBC BLD AUTO-RTO: 0 /100 WBCS — SIGNIFICANT CHANGE UP (ref 0–0)
PHOSPHATE SERPL-MCNC: 2.9 MG/DL — SIGNIFICANT CHANGE UP (ref 2.5–4.5)
PLATELET # BLD AUTO: 168 K/UL — SIGNIFICANT CHANGE UP (ref 150–400)
POTASSIUM SERPL-MCNC: 3.8 MMOL/L — SIGNIFICANT CHANGE UP (ref 3.5–5.3)
POTASSIUM SERPL-SCNC: 3.8 MMOL/L — SIGNIFICANT CHANGE UP (ref 3.5–5.3)
PROTHROM AB SERPL-ACNC: 11.9 SEC — SIGNIFICANT CHANGE UP (ref 9.9–13.4)
RBC # BLD: 2.9 M/UL — LOW (ref 4.2–5.8)
RBC # FLD: 15.6 % — HIGH (ref 10.3–14.5)
SODIUM SERPL-SCNC: 138 MMOL/L — SIGNIFICANT CHANGE UP (ref 135–145)
SPECIMEN SOURCE: SIGNIFICANT CHANGE UP
WBC # BLD: 10.24 K/UL — SIGNIFICANT CHANGE UP (ref 3.8–10.5)
WBC # FLD AUTO: 10.24 K/UL — SIGNIFICANT CHANGE UP (ref 3.8–10.5)

## 2025-05-23 PROCEDURE — 99233 SBSQ HOSP IP/OBS HIGH 50: CPT | Mod: GC

## 2025-05-23 PROCEDURE — G0545: CPT

## 2025-05-23 PROCEDURE — 99232 SBSQ HOSP IP/OBS MODERATE 35: CPT | Mod: GC

## 2025-05-23 RX ORDER — ENOXAPARIN SODIUM 100 MG/ML
90 INJECTION SUBCUTANEOUS EVERY 12 HOURS
Refills: 0 | Status: DISCONTINUED | OUTPATIENT
Start: 2025-05-23 | End: 2025-06-04

## 2025-05-23 RX ADMIN — INSULIN LISPRO 6 UNIT(S): 100 INJECTION, SOLUTION INTRAVENOUS; SUBCUTANEOUS at 18:19

## 2025-05-23 RX ADMIN — INSULIN LISPRO 2: 100 INJECTION, SOLUTION INTRAVENOUS; SUBCUTANEOUS at 18:19

## 2025-05-23 RX ADMIN — Medication 0.5 MILLIGRAM(S): at 11:00

## 2025-05-23 RX ADMIN — Medication 2 TABLET(S): at 22:35

## 2025-05-23 RX ADMIN — Medication 0.2 MILLIGRAM(S): at 05:22

## 2025-05-23 RX ADMIN — Medication 81 MILLIGRAM(S): at 11:46

## 2025-05-23 RX ADMIN — HEPARIN SODIUM 1600 UNIT(S)/HR: 1000 INJECTION INTRAVENOUS; SUBCUTANEOUS at 07:10

## 2025-05-23 RX ADMIN — Medication 0.2 MILLIGRAM(S): at 18:21

## 2025-05-23 RX ADMIN — Medication 0.5 MILLIGRAM(S): at 15:30

## 2025-05-23 RX ADMIN — AMPICILLIN SODIUM 200 GRAM(S): 1 INJECTION, POWDER, FOR SOLUTION INTRAMUSCULAR; INTRAVENOUS at 18:18

## 2025-05-23 RX ADMIN — Medication 100 MILLIGRAM(S): at 11:46

## 2025-05-23 RX ADMIN — TAMSULOSIN HYDROCHLORIDE 0.4 MILLIGRAM(S): 0.4 CAPSULE ORAL at 22:47

## 2025-05-23 RX ADMIN — Medication 100 MILLIGRAM(S): at 05:22

## 2025-05-23 RX ADMIN — ROSUVASTATIN CALCIUM 10 MILLIGRAM(S): 5 TABLET, FILM COATED ORAL at 22:35

## 2025-05-23 RX ADMIN — Medication 100 MILLIGRAM(S): at 22:35

## 2025-05-23 RX ADMIN — Medication 1 APPLICATION(S): at 11:46

## 2025-05-23 RX ADMIN — AMPICILLIN SODIUM 200 GRAM(S): 1 INJECTION, POWDER, FOR SOLUTION INTRAMUSCULAR; INTRAVENOUS at 00:36

## 2025-05-23 RX ADMIN — Medication 0.5 MILLIGRAM(S): at 20:30

## 2025-05-23 RX ADMIN — INSULIN LISPRO 4: 100 INJECTION, SOLUTION INTRAVENOUS; SUBCUTANEOUS at 13:02

## 2025-05-23 RX ADMIN — HEPARIN SODIUM 1400 UNIT(S)/HR: 1000 INJECTION INTRAVENOUS; SUBCUTANEOUS at 07:33

## 2025-05-23 RX ADMIN — INSULIN GLARGINE-YFGN 28 UNIT(S): 100 INJECTION, SOLUTION SUBCUTANEOUS at 22:34

## 2025-05-23 RX ADMIN — AMPICILLIN SODIUM 200 GRAM(S): 1 INJECTION, POWDER, FOR SOLUTION INTRAMUSCULAR; INTRAVENOUS at 05:19

## 2025-05-23 RX ADMIN — Medication 0.5 MILLIGRAM(S): at 05:17

## 2025-05-23 RX ADMIN — Medication 0.5 MILLIGRAM(S): at 10:19

## 2025-05-23 RX ADMIN — INSULIN LISPRO 1: 100 INJECTION, SOLUTION INTRAVENOUS; SUBCUTANEOUS at 22:59

## 2025-05-23 RX ADMIN — Medication 1 APPLICATION(S): at 11:45

## 2025-05-23 RX ADMIN — ENOXAPARIN SODIUM 90 MILLIGRAM(S): 100 INJECTION SUBCUTANEOUS at 18:18

## 2025-05-23 RX ADMIN — Medication 30 MILLIGRAM(S): at 05:22

## 2025-05-23 RX ADMIN — Medication 5 MILLIGRAM(S): at 22:35

## 2025-05-23 RX ADMIN — Medication 0.5 MILLIGRAM(S): at 20:18

## 2025-05-23 RX ADMIN — INSULIN LISPRO 2: 100 INJECTION, SOLUTION INTRAVENOUS; SUBCUTANEOUS at 08:52

## 2025-05-23 RX ADMIN — INSULIN LISPRO 6 UNIT(S): 100 INJECTION, SOLUTION INTRAVENOUS; SUBCUTANEOUS at 13:02

## 2025-05-23 RX ADMIN — Medication 150 MICROGRAM(S): at 05:22

## 2025-05-23 RX ADMIN — Medication 120 MILLIGRAM(S): at 05:22

## 2025-05-23 RX ADMIN — INSULIN LISPRO 6 UNIT(S): 100 INJECTION, SOLUTION INTRAVENOUS; SUBCUTANEOUS at 08:52

## 2025-05-23 RX ADMIN — AMPICILLIN SODIUM 200 GRAM(S): 1 INJECTION, POWDER, FOR SOLUTION INTRAMUSCULAR; INTRAVENOUS at 11:45

## 2025-05-23 NOTE — PROGRESS NOTE ADULT - ATTENDING COMMENTS
This is a 84 y/o M w/ PMhx HTN, HLD, DM2, hypothyroidism, prostate cancer s/p prostatectomy, CKD, TIA, pacemaker placed in 5/2024 for abnormal arrythmia initially admitted to [facility] on 5/6 for back pain, now transferred to [facility] for orthopedic spine. Labs w/ leukocytosis to 18, BCx w/ E faecalis in multiple sets.     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 S/p PPM removal    #LBP, L3-4 disc bulge pending repeat MRI  #Respiratory failure, improved   #JEROME, improving     Overall, 84 y/o M w/ PMhx HTN, HLD, DM2, hypothyroidism, prostate cancer s/p prostatectomy, CKD, TIA, pacemaker placed in 5/2024 for abnormal arrythmia transferred to [facility] from [facility] for MRI spine and ortho evaluation give LBP and lumbar compression w/ urinary retention. Pt noted to have chills and leukocytosis to 18, now with high grade E faecalis bacteremia. Unclear source however UCx not done, U/A with 10 WBCs, CT A/P w/o IV contrast on 5/10 w/o acute infectious source, MRI L spine here limited exam, findings of stenosis, no clear OM/discitis, however no contrast.   S/p PPM removal, MARIA LUISA w/o vegetations.     Recommendations:   1. Ampicillin 2g q6. Course pending MRI (4-6 weeks likely after neg BCx)   2. MARIA LUISA negative off Ceftriaxone  3. BCx from 5/15 NGTD  4. Pending MRI w/ sedation per primary team     Thank you for consulting us and involving us in the management of this patient's case. In addition to reviewing history, imaging, documents, labs, microbiology, and infection control strategies and potential issues.     ID will continue to follow    Darrin Falk M.D.  Attending Physician  Division of Infectious Diseases  Department of Medicine    Please contact through MS Teams message.  Office: [phone] (after 5 PM or weekend) .

## 2025-05-23 NOTE — PROGRESS NOTE ADULT - SUBJECTIVE AND OBJECTIVE BOX
***Plan not finalized until attending attestation***    Raman Carmichael MD (PGY-1)  Internal Medicine  Contact via [employer] TEAMS    ******************************************    PROGRESS NOTE:     Patient is a 85y old  Male who presents with a chief complaint of back pain (23 May 2025 06:39)    INTERVAL EVENTS: No acute overnight events.     SUBJECTIVE: Patient seen and examined at bedside. This morning, the patient is comfortable and doing well. No acute complaints.    MEDICATIONS  (STANDING):  ampicillin  IVPB 2 Gram(s) IV Intermittent every 6 hours  aspirin  chewable 81 milliGRAM(s) Oral daily  bisacodyl 5 milliGRAM(s) Oral at bedtime  chlorhexidine 2% Cloths 1 Application(s) Topical daily  chlorhexidine 2% Cloths 1 Application(s) Topical daily  cloNIDine 0.2 milliGRAM(s) Oral two times a day  dextrose 5%. 1000 milliLiter(s) (50 mL/Hr) IV Continuous <Continuous>  dextrose 5%. 1000 milliLiter(s) (100 mL/Hr) IV Continuous <Continuous>  dextrose 50% Injectable 25 Gram(s) IV Push once  dextrose 50% Injectable 12.5 Gram(s) IV Push once  dextrose 50% Injectable 25 Gram(s) IV Push once  dextrose Oral Gel 15 Gram(s) Oral once  glucagon  Injectable 1 milliGRAM(s) IntraMuscular once  heparin  Infusion.  Unit(s)/Hr (17 mL/Hr) IV Continuous <Continuous>  hydrALAZINE 100 milliGRAM(s) Oral three times a day  insulin glargine Injectable (LANTUS) 28 Unit(s) SubCutaneous at bedtime  insulin lispro (ADMELOG) corrective regimen sliding scale   SubCutaneous three times a day before meals  insulin lispro (ADMELOG) corrective regimen sliding scale   SubCutaneous at bedtime  insulin lispro Injectable (ADMELOG) 6 Unit(s) SubCutaneous three times a day before meals  levothyroxine 150 MICROGram(s) Oral daily  lidocaine   4% Patch 1 Patch Transdermal daily  NIFEdipine XL 30 milliGRAM(s) Oral daily  polyethylene glycol 3350 17 Gram(s) Oral two times a day  propranolol  milliGRAM(s) Oral daily  rosuvastatin 10 milliGRAM(s) Oral at bedtime  senna 2 Tablet(s) Oral at bedtime  tamsulosin 0.4 milliGRAM(s) Oral at bedtime    MEDICATIONS  (PRN):  heparin   Injectable 7500 Unit(s) IV Push every 6 hours PRN For aPTT less than 40  heparin   Injectable 3500 Unit(s) IV Push every 6 hours PRN For aPTT between 40 - 57  HYDROmorphone  Injectable 0.5 milliGRAM(s) IV Push every 4 hours PRN Severe Pain (7 - 10)      CAPILLARY BLOOD GLUCOSE      POCT Blood Glucose.: 112 mg/dL (22 May 2025 21:17)  POCT Blood Glucose.: 104 mg/dL (22 May 2025 18:04)  POCT Blood Glucose.: 128 mg/dL (22 May 2025 13:17)  POCT Blood Glucose.: 111 mg/dL (22 May 2025 08:47)    I&O's Summary    22 May 2025 07:01  -  23 May 2025 07:00  --------------------------------------------------------  IN: 992 mL / OUT: 975 mL / NET: 17 mL        PHYSICAL EXAM:  Vital Signs Last 24 Hrs  T(C): 36.7 (22 May 2025 21:11), Max: 36.7 (22 May 2025 18:18)  T(F): 98 (22 May 2025 21:11), Max: 98 (22 May 2025 18:18)  HR: 56 (22 May 2025 21:11) (53 - 56)  BP: 127/43 (22 May 2025 21:11) (127/43 - 136/41)  BP(mean): 69 (22 May 2025 11:36) (69 - 69)  RR: 18 (22 May 2025 21:11) (18 - 18)  SpO2: 100% (22 May 2025 21:11) (99% - 100%)    Parameters below as of 22 May 2025 21:11  Patient On (Oxygen Delivery Method): nasal cannula      PHYSICAL EXAM:  GENERAL: NAD, lying in bed comfortably  HEAD: Atraumatic, normocephalic  EYES: EOMI, PERRLA, conjunctiva and sclera clear  ENT: Moist mucous membranes  NECK: Supple, no JVD  HEART: S1, S2, Regular rate and rhythm, no murmurs, rubs, or gallops  LUNGS: Unlabored respirations, clear to auscultation bilaterally, no crackles, wheezing, or rhonchi  ABDOMEN: Soft, nontender, nondistended, +BS  EXTREMITIES: +small hematoma in R groin 2+ peripheral pulses bilaterally. No clubbing, cyanosis, or edema  NERVOUS SYSTEM:  A&Ox3, LE R weaker than left. Did have BMs where he said he didn't feel it come out  SKIN: No rashes or lesions  +Frye    LABS:                        8.3    10.83 )-----------( 181      ( 22 May 2025 06:55 )             26.1     05-22    135  |  104  |  39[H]  ----------------------------<  107[H]  4.1   |  21[L]  |  1.86[H]    Ca    7.9[L]      22 May 2025 06:55  Phos  3.0     05-22  Mg     2.30     05-22      PT/INR - ( 22 May 2025 06:55 )   PT: 12.3 sec;   INR: 1.06 ratio         PTT - ( 22 May 2025 06:55 )  PTT:63.6 sec      Urinalysis Basic - ( 22 May 2025 06:55 )    Color: x / Appearance: x / SG: x / pH: x  Gluc: 107 mg/dL / Ketone: x  / Bili: x / Urobili: x   Blood: x / Protein: x / Nitrite: x   Leuk Esterase: x / RBC: x / WBC x   Sq Epi: x / Non Sq Epi: x / Bacteria: x           ***Plan not finalized until attending attestation***    Raman Carmichael MD (PGY-1)  Internal Medicine  Contact via [employer] TEAMS    ******************************************    PROGRESS NOTE:     Patient is a 85y old  Male who presents with a chief complaint of back pain (23 May 2025 06:39)    INTERVAL EVENTS: No acute overnight events.     SUBJECTIVE: Patient seen and examined at bedside. This morning, the patient is comfortable and doing well. No acute complaints.    MEDICATIONS  (STANDING):  ampicillin  IVPB 2 Gram(s) IV Intermittent every 6 hours  aspirin  chewable 81 milliGRAM(s) Oral daily  bisacodyl 5 milliGRAM(s) Oral at bedtime  chlorhexidine 2% Cloths 1 Application(s) Topical daily  chlorhexidine 2% Cloths 1 Application(s) Topical daily  cloNIDine 0.2 milliGRAM(s) Oral two times a day  dextrose 5%. 1000 milliLiter(s) (50 mL/Hr) IV Continuous <Continuous>  dextrose 5%. 1000 milliLiter(s) (100 mL/Hr) IV Continuous <Continuous>  dextrose 50% Injectable 25 Gram(s) IV Push once  dextrose 50% Injectable 12.5 Gram(s) IV Push once  dextrose 50% Injectable 25 Gram(s) IV Push once  dextrose Oral Gel 15 Gram(s) Oral once  glucagon  Injectable 1 milliGRAM(s) IntraMuscular once  heparin  Infusion.  Unit(s)/Hr (17 mL/Hr) IV Continuous <Continuous>  hydrALAZINE 100 milliGRAM(s) Oral three times a day  insulin glargine Injectable (LANTUS) 28 Unit(s) SubCutaneous at bedtime  insulin lispro (ADMELOG) corrective regimen sliding scale   SubCutaneous three times a day before meals  insulin lispro (ADMELOG) corrective regimen sliding scale   SubCutaneous at bedtime  insulin lispro Injectable (ADMELOG) 6 Unit(s) SubCutaneous three times a day before meals  levothyroxine 150 MICROGram(s) Oral daily  lidocaine   4% Patch 1 Patch Transdermal daily  NIFEdipine XL 30 milliGRAM(s) Oral daily  polyethylene glycol 3350 17 Gram(s) Oral two times a day  propranolol  milliGRAM(s) Oral daily  rosuvastatin 10 milliGRAM(s) Oral at bedtime  senna 2 Tablet(s) Oral at bedtime  tamsulosin 0.4 milliGRAM(s) Oral at bedtime    MEDICATIONS  (PRN):  heparin   Injectable 7500 Unit(s) IV Push every 6 hours PRN For aPTT less than 40  heparin   Injectable 3500 Unit(s) IV Push every 6 hours PRN For aPTT between 40 - 57  HYDROmorphone  Injectable 0.5 milliGRAM(s) IV Push every 4 hours PRN Severe Pain (7 - 10)      CAPILLARY BLOOD GLUCOSE      POCT Blood Glucose.: 112 mg/dL (22 May 2025 21:17)  POCT Blood Glucose.: 104 mg/dL (22 May 2025 18:04)  POCT Blood Glucose.: 128 mg/dL (22 May 2025 13:17)  POCT Blood Glucose.: 111 mg/dL (22 May 2025 08:47)    I&O's Summary    22 May 2025 07:01  -  23 May 2025 07:00  --------------------------------------------------------  IN: 992 mL / OUT: 975 mL / NET: 17 mL        PHYSICAL EXAM:  Vital Signs Last 24 Hrs  T(C): 36.7 (22 May 2025 21:11), Max: 36.7 (22 May 2025 18:18)  T(F): 98 (22 May 2025 21:11), Max: 98 (22 May 2025 18:18)  HR: 56 (22 May 2025 21:11) (53 - 56)  BP: 127/43 (22 May 2025 21:11) (127/43 - 136/41)  BP(mean): 69 (22 May 2025 11:36) (69 - 69)  RR: 18 (22 May 2025 21:11) (18 - 18)  SpO2: 100% (22 May 2025 21:11) (99% - 100%)    Parameters below as of 22 May 2025 21:11  Patient On (Oxygen Delivery Method): nasal cannula    PHYSICAL EXAM:  GENERAL: NAD, lying in bed comfortably  HEAD: Atraumatic, normocephalic  EYES: EOMI, PERRLA, conjunctiva and sclera clear  ENT: Moist mucous membranes  NECK: Supple, no JVD  HEART: S1, S2, Regular rate and rhythm, no murmurs, rubs, or gallops  LUNGS: Unlabored respirations, clear to auscultation bilaterally, no crackles, wheezing, or rhonchi  ABDOMEN: Soft, nontender, nondistended, +BS  EXTREMITIES: +small hematoma in R groin 2+ peripheral pulses bilaterally. No clubbing, cyanosis, or edema  NERVOUS SYSTEM:  A&Ox3, LE R weaker than left. Says that he can feel when he has BMs  SKIN: No rashes or lesions  +Frye    LABS:                        8.3    10.83 )-----------( 181      ( 22 May 2025 06:55 )             26.1     05-22    135  |  104  |  39[H]  ----------------------------<  107[H]  4.1   |  21[L]  |  1.86[H]    Ca    7.9[L]      22 May 2025 06:55  Phos  3.0     05-22  Mg     2.30     05-22      PT/INR - ( 22 May 2025 06:55 )   PT: 12.3 sec;   INR: 1.06 ratio         PTT - ( 22 May 2025 06:55 )  PTT:63.6 sec      Urinalysis Basic - ( 22 May 2025 06:55 )    Color: x / Appearance: x / SG: x / pH: x  Gluc: 107 mg/dL / Ketone: x  / Bili: x / Urobili: x   Blood: x / Protein: x / Nitrite: x   Leuk Esterase: x / RBC: x / WBC x   Sq Epi: x / Non Sq Epi: x / Bacteria: x

## 2025-05-23 NOTE — PROGRESS NOTE ADULT - ATTENDING COMMENTS
85M hx of HTN, HLD, T2DM, prostate ca s/p prostatectomy, CKD, TIA, PPM presents from [facility] for eval of lower back pain 2/2 compression deformities course c/b E. faecalis bacteremia s/p PPM explant and leadless PPM pacemaker course c/b worsening back pain and right lower extremity weakness/urinary retention, pending MRI with sedation.   #Enterococcus bacteremia, unclear source. BCx cleared on 5/17. ID following. MARIA LUISA ruled out endocarditis. D/c ceftriaxone and continue with ampicillin. Also given bacteremia, MRI lumbar spine for pain/compression deformities is recommended but patient cannot tolerate the exam. Pending MRI with anesthesia (possibly Tuesday).   #Lower back pain, CT lumbar spine with disc bulge and vertebral compression deformities - ortho recommending MRI but could not tolerate. Pain control with dilaudid and tylenol. TLSO brace. Planning to obtain MRI with anesthesia given patient unable to ambulate and R>L weakness + urinary retention requiring a wong. Pt. now having bowel incontinence. Discussed with ortho team 5/22. Will re-assess. Plan to start decadron 10q6 d/t concern for cord compression.   #Urinary retention, failed TOV. Continue with wong.   #Anemia, Hgb 10.5-->9.3 --> 7.9. No signs of bleeding. 1U PRBC. Hgb improved to 8-9.   #JEROME, creatinine uptrending in the setting of possible recent contrast vs hypotension. Creatinine improving s/p fluid bolus + 1U PRBC.   #Hypoxic respiratory failure, initially requiring HFNC. Now on 2L. Weaned to RA - 94%. CXR clear. Lungs clear on exam. VQ scan with low probability for PE. Unclear etiology. Possibly atelectasis? Trial ICS. Improved.   #HTN, increase clonidine 0.2mg BID. C/w hydral 100 TID. Continue to hold chlorthalidone and telmisartan in the setting of JEROME. Started on nifedipine 30mg on 5/11 (not home med). Will continue and uptitrate as needed while inpatient but would transition back to home meds when JEROME resolves.   Remainder of plan as stated above. Plan discussed with HS.

## 2025-05-23 NOTE — PROGRESS NOTE ADULT - PROBLEM SELECTOR PLAN 10
- Continue Home Propanolol  - C/w heparin  - ASA 81mg qd - Continue Home Propanolol  - S/p heparin -> switching to lovenox  - ASA 81mg qd

## 2025-05-23 NOTE — PROGRESS NOTE ADULT - ASSESSMENT
85 year old male with 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     Plan:  -recommend MRI CTL spine with and without contrast and MRI brain with/without contrast to rule out infectious versus malignant etiology   -will likely need sedation to tolerate MRI  -WBAT  -Further recommendations pending imaging    Jake Leal PGY1  Orthopedic Surgery  [facility] [place] [id number]  [facility]        [id number]  [facility]  [phone]

## 2025-05-23 NOTE — PROGRESS NOTE ADULT - PROBLEM SELECTOR PLAN 6
- Downtrending hgb  - Notable small hematoma at R groin site on 5/22, CTM  - Will consider CT abd to r/o RP bleed given on heparin if downtrending

## 2025-05-23 NOTE — PROGRESS NOTE ADULT - SUBJECTIVE AND OBJECTIVE BOX
Follow Up:      Interval History/ROS:Patient is a 85y old  Male who presents with a chief complaint of back pain (22 May 2025 08:21)    REVIEW OF SYSTEMS  All other systems negative except as noted above    Allergies  gabapentin (Other)        ANTIMICROBIALS:    ampicillin  IVPB 2 every 6 hours      OTHER MEDS: MEDICATIONS  (STANDING):  aspirin  chewable 81 daily  bisacodyl 5 at bedtime  cloNIDine 0.2 two times a day  dextrose 50% Injectable 25 once  dextrose 50% Injectable 12.5 once  dextrose 50% Injectable 25 once  dextrose Oral Gel 15 once  glucagon  Injectable 1 once  heparin   Injectable 7500 every 6 hours PRN  heparin   Injectable 3500 every 6 hours PRN  heparin  Infusion.  <Continuous>  hydrALAZINE 100 three times a day  HYDROmorphone  Injectable 0.5 every 4 hours PRN  insulin glargine Injectable (LANTUS) 28 at bedtime  insulin lispro (ADMELOG) corrective regimen sliding scale  three times a day before meals  insulin lispro (ADMELOG) corrective regimen sliding scale  at bedtime  insulin lispro Injectable (ADMELOG) 6 three times a day before meals  levothyroxine 150 daily  NIFEdipine XL 30 daily  polyethylene glycol 3350 17 two times a day  propranolol  daily  rosuvastatin 10 at bedtime  senna 2 at bedtime  tamsulosin 0.4 at bedtime      Vital Signs Last 24 Hrs  T(F): 98 (05-22-25 @ 05:00), Max: 98.7 (05-18-25 @ 06:00)    Vital Signs Last 24 Hrs  HR: 53 (05-22-25 @ 05:00) (53 - 65)  BP: 124/41 (05-22-25 @ 05:00) (109/54 - 139/36)  RR: 18 (05-22-25 @ 05:00)  SpO2: 100% (05-22-25 @ 05:00) (96% - 100%)  Wt(kg): --    EXAM:  Physical Exam:  Constitutional:  well preserved, comfortable  Head/Eyes: no icterus, PERRL, EOMI  ENT:  supple; no thrush  LUNGS:  CTA  CVS:  normal S1, S2, no murmur, Pacemaker placement site dressing intact w/o bleeding or surrounding erythema   Abd:  soft, non-tender; non-distended  Ext:  ANDREIA UE edema most notable in forearms and hands, improving  Vascular:  IV site no erythema tenderness or discharge  MSK:  joints without swelling  Neuro: AAO X 3, non- focal    Labs:                        8.3    10.83 )-----------( 181      ( 22 May 2025 06:55 )             26.1     05-22    135  |  104  |  39[H]  ----------------------------<  107[H]  4.1   |  21[L]  |  1.86[H]    Ca    7.9[L]      22 May 2025 06:55  Phos  3.0     05-22  Mg     2.30     05-22        WBC Trend:  WBC Count: 10.83 (05-22-25 @ 06:55)  WBC Count: 12.96 (05-21-25 @ 08:23)  WBC Count: 16.22 (05-20-25 @ 23:54)  WBC Count: 11.64 (05-20-25 @ 04:01)      Creatine Trend:  Creatinine: 1.86 (05-22)  Creatinine: 1.88 (05-21)  Creatinine: 2.38 (05-20)  Creatinine: 2.57 (05-19)      Liver Biochemical Testing Trend:  Alanine Aminotransferase (ALT/SGPT): 8 (05-18)  Alanine Aminotransferase (ALT/SGPT): 10 (05-17)  Alanine Aminotransferase (ALT/SGPT): 8 (05-16)  Alanine Aminotransferase (ALT/SGPT): 10 (05-15)  Alanine Aminotransferase (ALT/SGPT): 8 (05-14)  Aspartate Aminotransferase (AST/SGOT): 18 (05-18-25 @ 06:00)  Aspartate Aminotransferase (AST/SGOT): 25 (05-17-25 @ 07:10)  Aspartate Aminotransferase (AST/SGOT): 13 (05-16-25 @ 05:10)  Aspartate Aminotransferase (AST/SGOT): 15 (05-15-25 @ 06:48)  Aspartate Aminotransferase (AST/SGOT): 14 (05-14-25 @ 07:46)  Bilirubin Total: 0.3 (05-18)  Bilirubin Total: 0.3 (05-17)  Bilirubin Total: 0.3 (05-16)  Bilirubin Total: 0.2 (05-15)  Bilirubin Total: 0.3 (05-14)      Trend LDH      Urinalysis Basic - ( 22 May 2025 06:55 )    Color: x / Appearance: x / SG: x / pH: x  Gluc: 107 mg/dL / Ketone: x  / Bili: x / Urobili: x   Blood: x / Protein: x / Nitrite: x   Leuk Esterase: x / RBC: x / WBC x   Sq Epi: x / Non Sq Epi: x / Bacteria: x        MICROBIOLOGY:    MRSA PCR Result.: Kari (05-14-25 @ 17:32)      Culture - Blood (collected 18 May 2025 06:00)  Source: Blood Blood  Preliminary Report:    No growth at 4 days    Culture - Blood (collected 18 May 2025 06:00)  Source: Blood Blood  Preliminary Report:    No growth at 4 days    Culture - Blood (collected 17 May 2025 18:20)  Source: Blood Blood-Peripheral  Preliminary Report:    No growth at 4 days    Culture - Blood (collected 17 May 2025 18:15)  Source: Blood Blood-Peripheral  Preliminary Report:    No growth at 4 days    Culture - Blood (collected 17 May 2025 07:10)  Source: Blood Blood  Final Report:    No growth at 5 days    Culture - Blood (collected 17 May 2025 07:10)  Source: Blood Blood  Final Report:    No growth at 5 days    Culture - Blood (collected 15 May 2025 21:55)  Source: Blood Blood  Final Report:    No growth at 5 days    Culture - Blood (collected 15 May 2025 21:47)  Source: Blood Blood  Final Report:    No growth at 5 days    Culture - Blood (collected 14 May 2025 10:40)  Source: Blood Blood  Final Report:    Growth in aerobic bottle: Enterococcus faecalis    See previous culture [id number]    Direct identification is available within approximately 3-5    hours either by Blood Panel Multiplexed PCR or Direct    MALDI-TOF. Details: [email or web address]  Organism: Blood Culture PCR  Organism: Blood Culture PCR    Sensitivities:      Method Type: PCR      -  Enterococcus faecalis: Detec    Culture - Blood (collected 14 May 2025 10:26)  Source: Blood Blood  Final Report:    Growth in aerobic and anaerobic bottles: Enterococcus faecalis    Growth in anaerobic bottle: blood culture bottle turned positive after    the final report was released.  Organism: Enterococcus faecalis    Sensitivities:      Method Type: SHAR      -  Ampicillin: S <=2 Predicts results to ampicillin/sulbactam, amoxacillin-clavulanate and  piperacillin-tazobactam.      -  Vancomycin: S 0.5      -  Gentamicin synergy: S <=500      -  Streptomycin synergy: S <=1000  Organism: Enterococcus faecalis                                                RADIOLOGY:  imaging below personally reviewed   Follow Up:      Interval History/ROS:Patient is a 85y old  Male who presents with a chief complaint of back pain (23 May 2025 07:29)      REVIEW OF SYSTEMS  All other systems negative except as noted above    Allergies  gabapentin (Other)        ANTIMICROBIALS:    ampicillin  IVPB 2 every 6 hours      OTHER MEDS: MEDICATIONS  (STANDING):  aspirin  chewable 81 daily  bisacodyl 5 at bedtime  cloNIDine 0.2 two times a day  dextrose 50% Injectable 25 once  dextrose 50% Injectable 12.5 once  dextrose 50% Injectable 25 once  dextrose Oral Gel 15 once  glucagon  Injectable 1 once  heparin   Injectable 7500 every 6 hours PRN  heparin   Injectable 3500 every 6 hours PRN  heparin  Infusion.  <Continuous>  hydrALAZINE 100 three times a day  HYDROmorphone  Injectable 0.5 every 4 hours PRN  insulin glargine Injectable (LANTUS) 28 at bedtime  insulin lispro (ADMELOG) corrective regimen sliding scale  three times a day before meals  insulin lispro (ADMELOG) corrective regimen sliding scale  at bedtime  insulin lispro Injectable (ADMELOG) 6 three times a day before meals  levothyroxine 150 daily  NIFEdipine XL 30 daily  polyethylene glycol 3350 17 two times a day  propranolol  daily  rosuvastatin 10 at bedtime  senna 2 at bedtime  tamsulosin 0.4 at bedtime      Vital Signs Last 24 Hrs  T(F): 97.2 (05-23-25 @ 11:24), Max: 98.7 (05-18-25 @ 06:00)    Vital Signs Last 24 Hrs  HR: 57 (05-23-25 @ 11:24) (55 - 64)  BP: 142/41 (05-23-25 @ 11:24) (127/43 - 165/44)  RR: 18 (05-23-25 @ 11:24)  SpO2: 99% (05-23-25 @ 11:24) (99% - 100%)  Wt(kg): --    EXAM:  Physical Exam:  Constitutional:  well preserved, comfortable  Head/Eyes: no icterus, PERRL, EOMI  ENT:  supple; no thrush  LUNGS:  CTA  CVS:  normal S1, S2, no murmur, Pacemaker placement site dressing intact w/o bleeding or surrounding erythema   Abd:  soft, non-tender; non-distended  Ext:  ANDREIA UE edema most notable in forearms and hands, improving  Vascular:  IV site no erythema tenderness or discharge  MSK:  joints without swelling  Neuro: AAO X 3, non- focal    Labs:                        8.2    10.24 )-----------( 168      ( 23 May 2025 05:30 )             25.2     05-23    138  |  103  |  34[H]  ----------------------------<  146[H]  3.8   |  24  |  1.83[H]    Ca    7.8[L]      23 May 2025 05:30  Phos  2.9     05-23  Mg     2.20     05-23        WBC Trend:  WBC Count: 10.24 (05-23-25 @ 05:30)  WBC Count: 10.83 (05-22-25 @ 06:55)  WBC Count: 12.96 (05-21-25 @ 08:23)  WBC Count: 16.22 (05-20-25 @ 23:54)      Creatine Trend:  Creatinine: 1.83 (05-23)  Creatinine: 1.86 (05-22)  Creatinine: 1.88 (05-21)  Creatinine: 2.38 (05-20)      Liver Biochemical Testing Trend:  Alanine Aminotransferase (ALT/SGPT): 8 (05-18)  Alanine Aminotransferase (ALT/SGPT): 10 (05-17)  Alanine Aminotransferase (ALT/SGPT): 8 (05-16)  Alanine Aminotransferase (ALT/SGPT): 10 (05-15)  Alanine Aminotransferase (ALT/SGPT): 8 (05-14)  Aspartate Aminotransferase (AST/SGOT): 18 (05-18-25 @ 06:00)  Aspartate Aminotransferase (AST/SGOT): 25 (05-17-25 @ 07:10)  Aspartate Aminotransferase (AST/SGOT): 13 (05-16-25 @ 05:10)  Aspartate Aminotransferase (AST/SGOT): 15 (05-15-25 @ 06:48)  Aspartate Aminotransferase (AST/SGOT): 14 (05-14-25 @ 07:46)  Bilirubin Total: 0.3 (05-18)  Bilirubin Total: 0.3 (05-17)  Bilirubin Total: 0.3 (05-16)  Bilirubin Total: 0.2 (05-15)  Bilirubin Total: 0.3 (05-14)      Trend LDH      Urinalysis Basic - ( 23 May 2025 05:30 )    Color: x / Appearance: x / SG: x / pH: x  Gluc: 146 mg/dL / Ketone: x  / Bili: x / Urobili: x   Blood: x / Protein: x / Nitrite: x   Leuk Esterase: x / RBC: x / WBC x   Sq Epi: x / Non Sq Epi: x / Bacteria: x        MICROBIOLOGY:    MRSA PCR Result.: NotLisy (05-14-25 @ 17:32)      Culture - Blood (collected 18 May 2025 06:00)  Source: Blood Blood  Final Report:    No growth at 5 days    Culture - Blood (collected 18 May 2025 06:00)  Source: Blood Blood  Final Report:    No growth at 5 days    Culture - Blood (collected 17 May 2025 18:20)  Source: Blood Blood-Peripheral  Final Report:    No growth at 5 days    Culture - Blood (collected 17 May 2025 18:15)  Source: Blood Blood-Peripheral  Final Report:    No growth at 5 days    Culture - Blood (collected 17 May 2025 07:10)  Source: Blood Blood  Final Report:    No growth at 5 days    Culture - Blood (collected 17 May 2025 07:10)  Source: Blood Blood  Final Report:    No growth at 5 days    Culture - Blood (collected 15 May 2025 21:55)  Source: Blood Blood  Final Report:    No growth at 5 days    Culture - Blood (collected 15 May 2025 21:47)  Source: Blood Blood  Final Report:    No growth at 5 days    Culture - Blood (collected 14 May 2025 10:40)  Source: Blood Blood  Final Report:    Growth in aerobic bottle: Enterococcus faecalis    See previous culture [id number]    Direct identification is available within approximately 3-5    hours either by Blood Panel Multiplexed PCR or Direct    MALDI-TOF. Details: [email or web address]  Organism: Blood Culture PCR  Organism: Blood Culture PCR    Sensitivities:      Method Type: PCR      -  Enterococcus faecalis: Detec    Culture - Blood (collected 14 May 2025 10:26)  Source: Blood Blood  Final Report:    Growth in aerobic and anaerobic bottles: Enterococcus faecalis    Growth in anaerobic bottle: blood culture bottle turned positive after    the final report was released.  Organism: Enterococcus faecalis    Sensitivities:      -  Streptomycin synergy: S <=1000      -  Vancomycin: S 0.5      -  Ampicillin: S <=2 Predicts results to ampicillin/sulbactam, amoxacillin-clavulanate and  piperacillin-tazobactam.      Method Type: SHAR      -  Gentamicin synergy: S <=500  Organism: Enterococcus faecalis                                                RADIOLOGY:  imaging below personally reviewed

## 2025-05-23 NOTE — PROGRESS NOTE ADULT - PROBLEM SELECTOR PLAN 1
CT Lumbar Spine: L3-L4 disc bulge, L4-L5 left paracentral-foraminal disc protrusion, multiple mild lumbar vertebral body compression deformities  . L3-L4 disc bulge, resulting in severe central canal, bilateral lateral recess and moderate bilateral neural foramen stenosis with facet arthrosis and ligamentum flavum hypertrophy.    - Pain: iv tylenol prn, dilaudid .5mg mod pain, dilaudid 1mg severe pain  - Ortho Consulted; recs appreciated  - TLSO brace  - MRI wo con (CrCl 14 so avoiding gadolinium) to r/o cauda equina and malignant disease, gely with history of prostate ca -> MRI was not tolerated given loudness of machine and pain. Pt does not want to go into MRI machine again -> Plan for MRI with general anesthesia  - Will discuss with EP if new PPM is capable for MRI -> PPM cleared, cardiology form sent to [facility] Lumbar Spine: L3-L4 disc bulge, L4-L5 left paracentral-foraminal disc protrusion, multiple mild lumbar vertebral body compression deformities  . L3-L4 disc bulge, resulting in severe central canal, bilateral lateral recess and moderate bilateral neural foramen stenosis with facet arthrosis and ligamentum flavum hypertrophy.    - Pain: iv tylenol prn, dilaudid .5mg mod pain, dilaudid 1mg severe pain  - Ortho Consulted; recs appreciated  - TLSO brace  - MRI wo con (CrCl 14 so avoiding gadolinium) to r/o cauda equina and malignant disease, gely with history of prostate ca -> MRI was not tolerated given loudness of machine and pain. Pt does not want to go into MRI machine again -> Plan for MRI with general anesthesia  - Will discuss with EP if new PPM is capable for MRI -> PPM cleared, cardiology form sent to [facility] who is setting up -> plan for NPO midnight monday

## 2025-05-23 NOTE — PROGRESS NOTE ADULT - ASSESSMENT
Pt is an 85M with HTN, HLD, DM2, prostate cancer s/p prostatectomy, CKD, TIA with a pacemaker who presents transferred from [place] for further evaluation of low back pain secondary to lumbar compression deformities. EP on board to clear PPM for MRI compatibility. Found to have e facaelis bacteremia. On abx, unable to tolerate MRI s/p PPM removal on 5/16 and exchange for leadless PPM. Plan for [facility] on 5/20 -> negative for endocarditis. Will explore sedated MRI Pt is an 85M with HTN, HLD, DM2, prostate cancer s/p prostatectomy, CKD, TIA with a pacemaker who presents transferred from [place] for further evaluation of low back pain secondary to lumbar compression deformities. EP on board to clear PPM for MRI compatibility. Found to have e facaelis bacteremia. On abx, unable to tolerate MRI s/p PPM removal on 5/16 and exchange for leadless PPM. Plan for [facility] on 5/20 -> negative for endocarditis. Pursuing MRI with sedation

## 2025-05-23 NOTE — PROGRESS NOTE ADULT - ASSESSMENT
86 y/o M w/ PMhx HTN, HLD, DM2, hypothyroidism, prostate cancer s/p prostatectomy, CKD, TIA, pacemaker placed in 5/2024 for abnormal arrythmia initially admitted to [facility] on 5/6 for back pain, found to have compression deformities, transferred to [facility] for MR and orthopedic eval, with course complicated by Leukocytosis to 18 and BCx w/ persistent e.Faecalis Bacteremia, now S/p PPM replacement on 5/16.    #E faecalis bacteremia  #LBP, L3-4 disc bulge  #Respiratory failure, resolved  #JEROME, resolving   - Bacteremia iso PPM, w/ concern for PPM infection and possible IE; now s/p PPM replacement on 5/16  - Cx clear since 5/15  - Endocarditis ruled out w/ [facility]  - Pending MR spine to rule out spinal abscess, Discitis, OM    #Bradycardia  #S/p Leadless Micra placement   #Hematoma R groin  -Management per EP and Primary team    Recommendations:   1. Continue w/ Ampicillin 2 g q6 given improvement in CrCl  2. Pending MR spine w/ sedation     ==========================================================  ==========================================================  =====================INCOMPLETE NOTE========================  ==========================================================  ==========================================================   86 y/o M w/ PMhx HTN, HLD, DM2, hypothyroidism, prostate cancer s/p prostatectomy, CKD, TIA, pacemaker placed in 5/2024 for abnormal arrythmia initially admitted to [facility] on 5/6 for back pain, found to have compression deformities, transferred to [facility] for MR and orthopedic eval, with course complicated by Leukocytosis to 18 and BCx w/ persistent e.Faecalis Bacteremia, now S/p PPM replacement on 5/16.    #E faecalis bacteremia  #LBP, L3-4 disc bulge  #Respiratory failure, resolved  #JEROME, resolving   - Bacteremia iso PPM, w/ concern for PPM infection and possible IE; now s/p PPM replacement on 5/16  - Cx clear since 5/15  - Endocarditis ruled out w/ [facility]  - Pending MR spine to rule out spinal abscess, Discitis, OM    #Bradycardia  #S/p Leadless Micra placement   #Hematoma R groin  -Management per EP and Primary team    Recommendations:   1. Continue w/ Ampicillin 2 g q6. Anticipate 4 to 6 week course depending on MR results.  2. Pending MR spine w/ sedation     Plan discussed w/ Attending Physician Dr. Dileep Saldana MD (Jason)   Internal Medicine PGY-2  Department of Medicine [facility]

## 2025-05-23 NOTE — PROGRESS NOTE ADULT - SUBJECTIVE AND OBJECTIVE BOX
ORTHOPEDIC PROGRESS NOTE    SUBJECTIVE:  Pt seen and examined at bedside this am.  No acute events overnight.  States back pain is present, 5/10 on scale, and increased with movement. Denies any bowel incontinence.     OBJECTIVE:  Vital Signs Last 24 Hrs  T(C): 36.7 (22 May 2025 21:11), Max: 36.7 (22 May 2025 18:18)  T(F): 98 (22 May 2025 21:11), Max: 98 (22 May 2025 18:18)  HR: 56 (22 May 2025 21:11) (53 - 56)  BP: 127/43 (22 May 2025 21:11) (127/43 - 136/41)  BP(mean): 69 (22 May 2025 11:36) (69 - 69)  RR: 18 (22 May 2025 21:11) (18 - 18)  SpO2: 100% (22 May 2025 21:11) (99% - 100%)    Parameters below as of 22 May 2025 21:11  Patient On (Oxygen Delivery Method): nasal cannula        Physical Exam:  Gen: No acute distress  Resp: Breathing comfortably on room air  : wong    Motor exam:          Upper extremity         C5 (Shoulder Abd)    C6 (Elbow flex)   C7 (Elbow ext)   C8 (Finger flex) T1 (finger abd)         R         5/5                 5/5                       5/5                      5/5                         5/5          L          5/5                 5/5                      5/5                      5/5                         5/5                   Lower extremity           L2 (Hip flex)  L3 (knee ext)  L4 (Dorsi flex)  L5 (EHL)  S1 (Plantar flex)                                               R        2/5            5/5             2/5                    2/5          4/5      L        3/5            5/5             5/5                   5/5           5/5    Sensory exam:                         C5      C6      C7      C8       T1          RIGHT          2         2        2         2         2          (0=absent, 1=impaired, 2=normal, NT=not testable)  LEFT             2         2        2         2         2          (0=absent, 1=impaired, 2=normal, NT=not testable)                          L2      L3     L4     L5       S1          RIGHT          2         2        2         2         2          (0=absent, 1=impaired, 2=normal, NT=not testable)  LEFT             2         2        2         2         2          (0=absent, 1=impaired, 2=normal, NT=not testable)         LABS                        8.3    10.83 )-----------( 181      ( 22 May 2025 06:55 )             26.1       05-22    135  |  104  |  39[H]  ----------------------------<  107[H]  4.1   |  21[L]  |  1.86[H]    Ca    7.9[L]      22 May 2025 06:55  Phos  3.0     05-22  Mg     2.30     05-22        PT/INR - ( 22 May 2025 06:55 )   PT: 12.3 sec;   INR: 1.06 ratio         PTT - ( 22 May 2025 06:55 )  PTT:63.6 sec    I&O's Summary    21 May 2025 07:01  -  22 May 2025 07:00  --------------------------------------------------------  IN: 0 mL / OUT: 1025 mL / NET: -1025 mL    22 May 2025 07:01  -  23 May 2025 06:40  --------------------------------------------------------  IN: 992 mL / OUT: 975 mL / NET: 17 mL        ampicillin  IVPB 2 Gram(s) IV Intermittent every 6 hours  aspirin  chewable 81 milliGRAM(s) Oral daily  bisacodyl 5 milliGRAM(s) Oral at bedtime  chlorhexidine 2% Cloths 1 Application(s) Topical daily  chlorhexidine 2% Cloths 1 Application(s) Topical daily  cloNIDine 0.2 milliGRAM(s) Oral two times a day  dextrose 5%. 1000 milliLiter(s) IV Continuous <Continuous>  dextrose 5%. 1000 milliLiter(s) IV Continuous <Continuous>  dextrose 50% Injectable 25 Gram(s) IV Push once  dextrose 50% Injectable 12.5 Gram(s) IV Push once  dextrose 50% Injectable 25 Gram(s) IV Push once  dextrose Oral Gel 15 Gram(s) Oral once  glucagon  Injectable 1 milliGRAM(s) IntraMuscular once  heparin   Injectable 7500 Unit(s) IV Push every 6 hours PRN  heparin   Injectable 3500 Unit(s) IV Push every 6 hours PRN  heparin  Infusion.  Unit(s)/Hr IV Continuous <Continuous>  hydrALAZINE 100 milliGRAM(s) Oral three times a day  HYDROmorphone  Injectable 0.5 milliGRAM(s) IV Push every 4 hours PRN  insulin glargine Injectable (LANTUS) 28 Unit(s) SubCutaneous at bedtime  insulin lispro (ADMELOG) corrective regimen sliding scale   SubCutaneous three times a day before meals  insulin lispro (ADMELOG) corrective regimen sliding scale   SubCutaneous at bedtime  insulin lispro Injectable (ADMELOG) 6 Unit(s) SubCutaneous three times a day before meals  levothyroxine 150 MICROGram(s) Oral daily  lidocaine   4% Patch 1 Patch Transdermal daily  NIFEdipine XL 30 milliGRAM(s) Oral daily  polyethylene glycol 3350 17 Gram(s) Oral two times a day  propranolol  milliGRAM(s) Oral daily  rosuvastatin 10 milliGRAM(s) Oral at bedtime  senna 2 Tablet(s) Oral at bedtime  tamsulosin 0.4 milliGRAM(s) Oral at bedtime

## 2025-05-24 LAB
ANION GAP SERPL CALC-SCNC: 11 MMOL/L — SIGNIFICANT CHANGE UP (ref 7–14)
BASOPHILS # BLD AUTO: 0.05 K/UL — SIGNIFICANT CHANGE UP (ref 0–0.2)
BASOPHILS NFR BLD AUTO: 0.5 % — SIGNIFICANT CHANGE UP (ref 0–2)
BUN SERPL-MCNC: 32 MG/DL — HIGH (ref 7–23)
CALCIUM SERPL-MCNC: 8.1 MG/DL — LOW (ref 8.4–10.5)
CHLORIDE SERPL-SCNC: 105 MMOL/L — SIGNIFICANT CHANGE UP (ref 98–107)
CO2 SERPL-SCNC: 24 MMOL/L — SIGNIFICANT CHANGE UP (ref 22–31)
CREAT SERPL-MCNC: 1.68 MG/DL — HIGH (ref 0.5–1.3)
EGFR: 39 ML/MIN/1.73M2 — LOW
EGFR: 39 ML/MIN/1.73M2 — LOW
EOSINOPHIL # BLD AUTO: 0.14 K/UL — SIGNIFICANT CHANGE UP (ref 0–0.5)
EOSINOPHIL NFR BLD AUTO: 1.4 % — SIGNIFICANT CHANGE UP (ref 0–6)
GLUCOSE BLDC GLUCOMTR-MCNC: 130 MG/DL — HIGH (ref 70–99)
GLUCOSE BLDC GLUCOMTR-MCNC: 139 MG/DL — HIGH (ref 70–99)
GLUCOSE BLDC GLUCOMTR-MCNC: 150 MG/DL — HIGH (ref 70–99)
GLUCOSE BLDC GLUCOMTR-MCNC: 151 MG/DL — HIGH (ref 70–99)
GLUCOSE SERPL-MCNC: 143 MG/DL — HIGH (ref 70–99)
HCT VFR BLD CALC: 26.5 % — LOW (ref 39–50)
HGB BLD-MCNC: 8.7 G/DL — LOW (ref 13–17)
IANC: 8 K/UL — HIGH (ref 1.8–7.4)
IMM GRANULOCYTES NFR BLD AUTO: 0.8 % — SIGNIFICANT CHANGE UP (ref 0–0.9)
LYMPHOCYTES # BLD AUTO: 1.18 K/UL — SIGNIFICANT CHANGE UP (ref 1–3.3)
LYMPHOCYTES # BLD AUTO: 11.6 % — LOW (ref 13–44)
MAGNESIUM SERPL-MCNC: 2.2 MG/DL — SIGNIFICANT CHANGE UP (ref 1.6–2.6)
MCHC RBC-ENTMCNC: 28.2 PG — SIGNIFICANT CHANGE UP (ref 27–34)
MCHC RBC-ENTMCNC: 32.8 G/DL — SIGNIFICANT CHANGE UP (ref 32–36)
MCV RBC AUTO: 85.8 FL — SIGNIFICANT CHANGE UP (ref 80–100)
MONOCYTES # BLD AUTO: 0.76 K/UL — SIGNIFICANT CHANGE UP (ref 0–0.9)
MONOCYTES NFR BLD AUTO: 7.4 % — SIGNIFICANT CHANGE UP (ref 2–14)
NEUTROPHILS # BLD AUTO: 8 K/UL — HIGH (ref 1.8–7.4)
NEUTROPHILS NFR BLD AUTO: 78.3 % — HIGH (ref 43–77)
NRBC # BLD AUTO: 0 K/UL — SIGNIFICANT CHANGE UP (ref 0–0)
NRBC # FLD: 0 K/UL — SIGNIFICANT CHANGE UP (ref 0–0)
NRBC BLD AUTO-RTO: 0 /100 WBCS — SIGNIFICANT CHANGE UP (ref 0–0)
PHOSPHATE SERPL-MCNC: 3 MG/DL — SIGNIFICANT CHANGE UP (ref 2.5–4.5)
PLATELET # BLD AUTO: 184 K/UL — SIGNIFICANT CHANGE UP (ref 150–400)
POTASSIUM SERPL-MCNC: 3.8 MMOL/L — SIGNIFICANT CHANGE UP (ref 3.5–5.3)
POTASSIUM SERPL-SCNC: 3.8 MMOL/L — SIGNIFICANT CHANGE UP (ref 3.5–5.3)
RBC # BLD: 3.09 M/UL — LOW (ref 4.2–5.8)
RBC # FLD: 15.8 % — HIGH (ref 10.3–14.5)
SODIUM SERPL-SCNC: 140 MMOL/L — SIGNIFICANT CHANGE UP (ref 135–145)
WBC # BLD: 10.21 K/UL — SIGNIFICANT CHANGE UP (ref 3.8–10.5)
WBC # FLD AUTO: 10.21 K/UL — SIGNIFICANT CHANGE UP (ref 3.8–10.5)

## 2025-05-24 PROCEDURE — 99233 SBSQ HOSP IP/OBS HIGH 50: CPT | Mod: GC

## 2025-05-24 RX ADMIN — AMPICILLIN SODIUM 200 GRAM(S): 1 INJECTION, POWDER, FOR SOLUTION INTRAMUSCULAR; INTRAVENOUS at 00:09

## 2025-05-24 RX ADMIN — Medication 150 MICROGRAM(S): at 04:24

## 2025-05-24 RX ADMIN — Medication 0.2 MILLIGRAM(S): at 17:45

## 2025-05-24 RX ADMIN — Medication 81 MILLIGRAM(S): at 13:33

## 2025-05-24 RX ADMIN — POLYETHYLENE GLYCOL 3350 17 GRAM(S): 17 POWDER, FOR SOLUTION ORAL at 05:44

## 2025-05-24 RX ADMIN — Medication 0.5 MILLIGRAM(S): at 09:53

## 2025-05-24 RX ADMIN — Medication 100 MILLIGRAM(S): at 05:45

## 2025-05-24 RX ADMIN — Medication 2 TABLET(S): at 22:25

## 2025-05-24 RX ADMIN — ENOXAPARIN SODIUM 90 MILLIGRAM(S): 100 INJECTION SUBCUTANEOUS at 18:27

## 2025-05-24 RX ADMIN — Medication 1 APPLICATION(S): at 13:39

## 2025-05-24 RX ADMIN — AMPICILLIN SODIUM 200 GRAM(S): 1 INJECTION, POWDER, FOR SOLUTION INTRAMUSCULAR; INTRAVENOUS at 17:41

## 2025-05-24 RX ADMIN — Medication 0.5 MILLIGRAM(S): at 05:43

## 2025-05-24 RX ADMIN — Medication 0.5 MILLIGRAM(S): at 06:30

## 2025-05-24 RX ADMIN — Medication 0.5 MILLIGRAM(S): at 22:28

## 2025-05-24 RX ADMIN — AMPICILLIN SODIUM 200 GRAM(S): 1 INJECTION, POWDER, FOR SOLUTION INTRAMUSCULAR; INTRAVENOUS at 05:43

## 2025-05-24 RX ADMIN — Medication 120 MILLIGRAM(S): at 05:44

## 2025-05-24 RX ADMIN — Medication 0.5 MILLIGRAM(S): at 23:30

## 2025-05-24 RX ADMIN — Medication 1 APPLICATION(S): at 13:40

## 2025-05-24 RX ADMIN — Medication 5 MILLIGRAM(S): at 22:25

## 2025-05-24 RX ADMIN — Medication 0.5 MILLIGRAM(S): at 16:42

## 2025-05-24 RX ADMIN — INSULIN LISPRO 1: 100 INJECTION, SOLUTION INTRAVENOUS; SUBCUTANEOUS at 22:28

## 2025-05-24 RX ADMIN — Medication 0.5 MILLIGRAM(S): at 10:53

## 2025-05-24 RX ADMIN — Medication 100 MILLIGRAM(S): at 13:34

## 2025-05-24 RX ADMIN — Medication 0.2 MILLIGRAM(S): at 05:43

## 2025-05-24 RX ADMIN — ROSUVASTATIN CALCIUM 10 MILLIGRAM(S): 5 TABLET, FILM COATED ORAL at 22:25

## 2025-05-24 RX ADMIN — ENOXAPARIN SODIUM 90 MILLIGRAM(S): 100 INJECTION SUBCUTANEOUS at 05:44

## 2025-05-24 RX ADMIN — INSULIN LISPRO 6 UNIT(S): 100 INJECTION, SOLUTION INTRAVENOUS; SUBCUTANEOUS at 18:27

## 2025-05-24 RX ADMIN — TAMSULOSIN HYDROCHLORIDE 0.4 MILLIGRAM(S): 0.4 CAPSULE ORAL at 22:25

## 2025-05-24 RX ADMIN — Medication 30 MILLIGRAM(S): at 05:43

## 2025-05-24 RX ADMIN — Medication 0.5 MILLIGRAM(S): at 17:42

## 2025-05-24 RX ADMIN — Medication 100 MILLIGRAM(S): at 22:25

## 2025-05-24 RX ADMIN — INSULIN LISPRO 6 UNIT(S): 100 INJECTION, SOLUTION INTRAVENOUS; SUBCUTANEOUS at 09:34

## 2025-05-24 RX ADMIN — AMPICILLIN SODIUM 200 GRAM(S): 1 INJECTION, POWDER, FOR SOLUTION INTRAMUSCULAR; INTRAVENOUS at 23:37

## 2025-05-24 RX ADMIN — AMPICILLIN SODIUM 200 GRAM(S): 1 INJECTION, POWDER, FOR SOLUTION INTRAMUSCULAR; INTRAVENOUS at 13:30

## 2025-05-24 RX ADMIN — POLYETHYLENE GLYCOL 3350 17 GRAM(S): 17 POWDER, FOR SOLUTION ORAL at 17:45

## 2025-05-24 RX ADMIN — INSULIN GLARGINE-YFGN 28 UNIT(S): 100 INJECTION, SOLUTION SUBCUTANEOUS at 22:28

## 2025-05-24 RX ADMIN — INSULIN LISPRO 6 UNIT(S): 100 INJECTION, SOLUTION INTRAVENOUS; SUBCUTANEOUS at 13:35

## 2025-05-24 NOTE — PROGRESS NOTE ADULT - ATTENDING COMMENTS
85M hx of HTN, HLD, T2DM, prostate ca s/p prostatectomy, CKD, TIA, PPM presents from [facility] for eval of lower back pain 2/2 compression deformities course c/b E. faecalis bacteremia s/p PPM explant and leadless PPM pacemaker course c/b worsening back pain and right lower extremity weakness/urinary retention, pending MRI with sedation.   #Enterococcus bacteremia, unclear source. BCx cleared on 5/17. ID following. MARIA LUISA ruled out endocarditis. D/c ceftriaxone and continue with ampicillin. Also given bacteremia, MRI lumbar spine for pain/compression deformities is recommended but patient cannot tolerate the exam. Pending MRI with anesthesia (possibly Tuesday).   #Lower back pain, CT lumbar spine with disc bulge and vertebral compression deformities - ortho recommending MRI but could not tolerate. Pain control with dilaudid and tylenol. TLSO brace. Planning to obtain MRI with anesthesia given patient unable to ambulate and R>L weakness + urinary retention requiring a wong. Pt. now having bowel incontinence. Discussed with ortho team 5/22. Will re-assess. Plan to start decadron 10q6 d/t concern for cord compression.   #Urinary retention, failed TOV. Continue with wong.   #Anemia, Hgb 10.5-->9.3 --> 7.9. No signs of bleeding. 1U PRBC. Hgb improved to 8-9.   #JEROME, creatinine uptrending in the setting of possible recent contrast vs hypotension. Creatinine improving s/p fluid bolus + 1U PRBC.   #Hypoxic respiratory failure, initially requiring HFNC. Now on 2L. Weaned to RA - 94%. CXR clear. Lungs clear on exam. VQ scan with low probability for PE. Unclear etiology. Possibly atelectasis? Trial ICS. Improved.   #HTN, increase clonidine 0.2mg BID. C/w hydral 100 TID. Continue to hold chlorthalidone and telmisartan in the setting of JEROME. Started on nifedipine 30mg on 5/11 (not home med). Will continue and uptitrate as needed while inpatient but would transition back to home meds when JEROME resolves. #Enterococcus bacteremia, unclear source. BCx cleared on 5/17. ID following. s/p PPM explant and leadless PPM. MARIA LUISA ruled out endocarditis. D/c ceftriaxone and continue with ampicillin. Also given bacteremia, MRI lumbar spine for pain/compression deformities is recommended but patient cannot tolerate the exam. Pending MRI with anesthesia (possibly Tuesday).   #Lower back pain, CT lumbar spine with disc bulge and vertebral compression deformities - ortho recommending MRI but could not tolerate. Pain control with dilaudid and tylenol. TLSO brace. Planning to obtain MRI with anesthesia given patient unable to ambulate and R>L weakness + urinary retention requiring a wong. Pt. now having bowel incontinence.   #Urinary retention, failed TOV. Continue with wong.   #Anemia, no signs of bleeding. s/p 1U PRBC 5/19, monitor CBC  #JEROME, likely d/t ATN, creatinine improving  #Hypoxic respiratory failure, initially requiring HFNC. Now on 2L. CXR clear. VQ scan with low probability for PE. Unclear etiology. Possibly atelectasis? Trial ICS. Improved.   #HTN, increase clonidine 0.2mg BID. C/w hydral 100 TID. Continue to hold chlorthalidone and telmisartan in the setting of JEROME. Started on nifedipine 30mg on 5/11 (not home med). Will continue and uptitrate as needed while inpatient but would transition back to home meds when JEROME resolves.

## 2025-05-24 NOTE — PROGRESS NOTE ADULT - ASSESSMENT
Pt is an 85M with HTN, HLD, DM2, prostate cancer s/p prostatectomy, CKD, TIA with a pacemaker who presents transferred from [place] for further evaluation of low back pain secondary to lumbar compression deformities. EP on board to clear PPM for MRI compatibility. Found to have e facaelis bacteremia. On abx, unable to tolerate MRI s/p PPM removal on 5/16 and exchange for leadless PPM. Plan for MARIA LUISA on 5/20 -> negative for endocarditis. Pursuing MRI with sedation

## 2025-05-24 NOTE — PROGRESS NOTE ADULT - SUBJECTIVE AND OBJECTIVE BOX
Orthopedic Surgery      Pt seen and examined. Pt denies any overnight events. Pt reports pain is well controlled. Pt denies any fever/chills/chest pain/nausea/vomiting.     ICU Vital Signs Last 24 Hrs  T(C): 37 (23 May 2025 20:55), Max: 37 (23 May 2025 20:55)  T(F): 98.6 (23 May 2025 20:55), Max: 98.6 (23 May 2025 20:55)  HR: 56 (23 May 2025 20:55) (56 - 64)  BP: 139/49 (23 May 2025 20:55) (136/42 - 165/44)  BP(mean): 77 (23 May 2025 11:24) (77 - 77)  ABP: --  ABP(mean): --  RR: 18 (23 May 2025 20:55) (18 - 18)  SpO2: 99% (23 May 2025 20:55) (99% - 100%)          Physical exam:     Physical Exam:  Gen: No acute distress  Resp: Breathing comfortably on room air  : wong    Motor exam:          Upper extremity         C5 (Shoulder Abd)    C6 (Elbow flex)   C7 (Elbow ext)   C8 (Finger flex) T1 (finger abd)         R         5/5                 5/5                       5/5                      5/5                         5/5          L          5/5                 5/5                      5/5                      5/5                         5/5                   Lower extremity           L2 (Hip flex)  L3 (knee ext)  L4 (Dorsi flex)  L5 (EHL)  S1 (Plantar flex)                                               R        2/5            5/5             2/5                    2/5          4/5      L        3/5            5/5             5/5                   5/5           5/5    Sensory exam:                         C5      C6      C7      C8       T1          RIGHT          2         2        2         2         2          (0=absent, 1=impaired, 2=normal, NT=not testable)  LEFT             2         2        2         2         2          (0=absent, 1=impaired, 2=normal, NT=not testable)                          L2      L3     L4     L5       S1          RIGHT          2         2        2         2         2          (0=absent, 1=impaired, 2=normal, NT=not testable)  LEFT             2         2        2         2         2          (0=absent, 1=impaired, 2=normal, NT=not testable)         LABS:                        8.2    10.24 )-----------( 168      ( 23 May 2025 05:30 )             25.2     05-23    138  |  103  |  34[H]  ----------------------------<  146[H]  3.8   |  24  |  1.83[H]    Ca    7.8[L]      23 May 2025 05:30  Phos  2.9     05-23  Mg     2.20     05-23      PT/INR - ( 23 May 2025 05:30 )   PT: 11.9 sec;   INR: 1.00 ratio         PTT - ( 23 May 2025 14:30 )  PTT:65.2 sec  Urinalysis Basic - ( 23 May 2025 05:30 )    Color: x / Appearance: x / SG: x / pH: x  Gluc: 146 mg/dL / Ketone: x  / Bili: x / Urobili: x   Blood: x / Protein: x / Nitrite: x   Leuk Esterase: x / RBC: x / WBC x   Sq Epi: x / Non Sq Epi: x / Bacteria: x              Assessment/Plan:   85 year old male with 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     Plan:  -recommend MRI CTL spine with and without contrast and MRI brain with/without contrast to rule out infectious versus malignant etiology   -will likely need sedation to tolerate MRI, plan for Tuesday per primary team  -WBAT  -Further recommendations pending imaging    Ethan Barrientos PGY-2    For any questions please contact the on call orthopedic surgery team, please do not reach out via teams.  [facility] [place]: [phone]  [facility] pager: [phone]  [facility] pager: [phone]

## 2025-05-24 NOTE — PROGRESS NOTE ADULT - PROBLEM SELECTOR PLAN 1
CT Lumbar Spine: L3-L4 disc bulge, L4-L5 left paracentral-foraminal disc protrusion, multiple mild lumbar vertebral body compression deformities  . L3-L4 disc bulge, resulting in severe central canal, bilateral lateral recess and moderate bilateral neural foramen stenosis with facet arthrosis and ligamentum flavum hypertrophy.    - Pain: iv tylenol prn, dilaudid .5mg mod pain, dilaudid 1mg severe pain  - Ortho Consulted; recs appreciated  - TLSO brace  - MRI wo con (CrCl 14 so avoiding gadolinium) to r/o cauda equina and malignant disease, gely with history of prostate ca -> MRI was not tolerated given loudness of machine and pain. Pt does not want to go into MRI machine again -> Plan for MRI with general anesthesia  - Will discuss with EP if new PPM is capable for MRI -> PPM cleared, cardiology form sent to Musa Joel who is setting up -> plan for NPO midnight monday

## 2025-05-24 NOTE — PROGRESS NOTE ADULT - SUBJECTIVE AND OBJECTIVE BOX
Patient is a 85y old  Male who presents with a chief complaint of back pain (24 May 2025 06:33)      SUBJECTIVE / OVERNIGHT EVENTS: NAOE. Worsening LUE swelling today, otherwise no new complaints. Groin hematoma stable. Last BM thursday. Denies fever, chills, chest pain, SOB, abd pain, n/v/d/c     MEDICATIONS  (STANDING):  ampicillin  IVPB 2 Gram(s) IV Intermittent every 6 hours  aspirin  chewable 81 milliGRAM(s) Oral daily  bisacodyl 5 milliGRAM(s) Oral at bedtime  chlorhexidine 2% Cloths 1 Application(s) Topical daily  chlorhexidine 2% Cloths 1 Application(s) Topical daily  cloNIDine 0.2 milliGRAM(s) Oral two times a day  dextrose 5%. 1000 milliLiter(s) (50 mL/Hr) IV Continuous <Continuous>  dextrose 5%. 1000 milliLiter(s) (100 mL/Hr) IV Continuous <Continuous>  dextrose 50% Injectable 25 Gram(s) IV Push once  dextrose 50% Injectable 12.5 Gram(s) IV Push once  dextrose 50% Injectable 25 Gram(s) IV Push once  dextrose Oral Gel 15 Gram(s) Oral once  enoxaparin Injectable 90 milliGRAM(s) SubCutaneous every 12 hours  glucagon  Injectable 1 milliGRAM(s) IntraMuscular once  hydrALAZINE 100 milliGRAM(s) Oral three times a day  insulin glargine Injectable (LANTUS) 28 Unit(s) SubCutaneous at bedtime  insulin lispro (ADMELOG) corrective regimen sliding scale   SubCutaneous three times a day before meals  insulin lispro (ADMELOG) corrective regimen sliding scale   SubCutaneous at bedtime  insulin lispro Injectable (ADMELOG) 6 Unit(s) SubCutaneous three times a day before meals  levothyroxine 150 MICROGram(s) Oral daily  lidocaine   4% Patch 1 Patch Transdermal daily  NIFEdipine XL 30 milliGRAM(s) Oral daily  polyethylene glycol 3350 17 Gram(s) Oral two times a day  propranolol  milliGRAM(s) Oral daily  rosuvastatin 10 milliGRAM(s) Oral at bedtime  senna 2 Tablet(s) Oral at bedtime  tamsulosin 0.4 milliGRAM(s) Oral at bedtime    MEDICATIONS  (PRN):  HYDROmorphone  Injectable 0.5 milliGRAM(s) IV Push every 4 hours PRN Severe Pain (7 - 10)      CAPILLARY BLOOD GLUCOSE      POCT Blood Glucose.: 139 mg/dL (24 May 2025 09:00)  POCT Blood Glucose.: 155 mg/dL (23 May 2025 22:05)  POCT Blood Glucose.: 190 mg/dL (23 May 2025 17:56)  POCT Blood Glucose.: 215 mg/dL (23 May 2025 12:59)    I&O's Summary      Vital Signs Last 24 Hrs  T(C): 36.6 (24 May 2025 05:00), Max: 37 (23 May 2025 20:55)  T(F): 97.8 (24 May 2025 05:00), Max: 98.6 (23 May 2025 20:55)  HR: 57 (24 May 2025 05:00) (56 - 59)  BP: 144/46 (24 May 2025 05:00) (136/42 - 144/46)  BP(mean): 77 (23 May 2025 11:24) (77 - 77)  RR: 19 (24 May 2025 05:00) (18 - 19)  SpO2: 99% (24 May 2025 05:00) (99% - 99%)    Parameters below as of 24 May 2025 05:00  Patient On (Oxygen Delivery Method): nasal cannula  O2 Flow (L/min): 2      PHYSICAL EXAM:  GENERAL: NAD, lying in bed comfortably  HEAD: Atraumatic, normocephalic  EYES: EOMI, PERRLA, conjunctiva and sclera clear  ENT: Moist mucous membranes  NECK: Supple, no JVD  HEART: S1, S2, Regular rate and rhythm, no murmurs, rubs, or gallops  LUNGS: Unlabored respirations, clear to auscultation bilaterally, no crackles, wheezing, or rhonchi  ABDOMEN: Soft, nontender, nondistended, +BS  EXTREMITIES: +small hematoma in R groin 2+ peripheral pulses bilaterally. No clubbing, cyanosis, or edema  NERVOUS SYSTEM:  A&Ox3, LE R weaker than left. Says that he can feel when he has BMs  SKIN: No rashes or lesions  +Frye      LABS:                        8.7    10.21 )-----------( 184      ( 24 May 2025 05:20 )             26.5      05-24    140  |  105  |  32[H]  ----------------------------<  143[H]  3.8   |  24  |  1.68[H]    Ca    8.1[L]      24 May 2025 05:20  Phos  3.0     05-24  Mg     2.20     05-24      PT/INR - ( 23 May 2025 05:30 )   PT: 11.9 sec;   INR: 1.00 ratio         PTT - ( 23 May 2025 14:30 )  PTT:65.2 sec      Urinalysis Basic - ( 24 May 2025 05:20 )    Color: x / Appearance: x / SG: x / pH: x  Gluc: 143 mg/dL / Ketone: x  / Bili: x / Urobili: x   Blood: x / Protein: x / Nitrite: x   Leuk Esterase: x / RBC: x / WBC x   Sq Epi: x / Non Sq Epi: x / Bacteria: x        RADIOLOGY & ADDITIONAL TESTS:    Imaging Personally Reviewed:    Consultant(s) Notes Reviewed:      Care Discussed with Consultants/Other Providers:   Patient is a 85y old  Male who presents with a chief complaint of back pain (24 May 2025 06:33)      SUBJECTIVE / OVERNIGHT EVENTS: NAOE. Worsening LUE swelling today, otherwise no new complaints. Groin hematoma stable. Last BM thursday. Denies fever, chills, chest pain, SOB, abd pain, n/v/d/c     MEDICATIONS  (STANDING):  ampicillin  IVPB 2 Gram(s) IV Intermittent every 6 hours  aspirin  chewable 81 milliGRAM(s) Oral daily  bisacodyl 5 milliGRAM(s) Oral at bedtime  chlorhexidine 2% Cloths 1 Application(s) Topical daily  chlorhexidine 2% Cloths 1 Application(s) Topical daily  cloNIDine 0.2 milliGRAM(s) Oral two times a day  dextrose 5%. 1000 milliLiter(s) (50 mL/Hr) IV Continuous <Continuous>  dextrose 5%. 1000 milliLiter(s) (100 mL/Hr) IV Continuous <Continuous>  dextrose 50% Injectable 25 Gram(s) IV Push once  dextrose 50% Injectable 12.5 Gram(s) IV Push once  dextrose 50% Injectable 25 Gram(s) IV Push once  dextrose Oral Gel 15 Gram(s) Oral once  enoxaparin Injectable 90 milliGRAM(s) SubCutaneous every 12 hours  glucagon  Injectable 1 milliGRAM(s) IntraMuscular once  hydrALAZINE 100 milliGRAM(s) Oral three times a day  insulin glargine Injectable (LANTUS) 28 Unit(s) SubCutaneous at bedtime  insulin lispro (ADMELOG) corrective regimen sliding scale   SubCutaneous three times a day before meals  insulin lispro (ADMELOG) corrective regimen sliding scale   SubCutaneous at bedtime  insulin lispro Injectable (ADMELOG) 6 Unit(s) SubCutaneous three times a day before meals  levothyroxine 150 MICROGram(s) Oral daily  lidocaine   4% Patch 1 Patch Transdermal daily  NIFEdipine XL 30 milliGRAM(s) Oral daily  polyethylene glycol 3350 17 Gram(s) Oral two times a day  propranolol  milliGRAM(s) Oral daily  rosuvastatin 10 milliGRAM(s) Oral at bedtime  senna 2 Tablet(s) Oral at bedtime  tamsulosin 0.4 milliGRAM(s) Oral at bedtime    MEDICATIONS  (PRN):  HYDROmorphone  Injectable 0.5 milliGRAM(s) IV Push every 4 hours PRN Severe Pain (7 - 10)      CAPILLARY BLOOD GLUCOSE      POCT Blood Glucose.: 139 mg/dL (24 May 2025 09:00)  POCT Blood Glucose.: 155 mg/dL (23 May 2025 22:05)  POCT Blood Glucose.: 190 mg/dL (23 May 2025 17:56)  POCT Blood Glucose.: 215 mg/dL (23 May 2025 12:59)    I&O's Summary      Vital Signs Last 24 Hrs  T(C): 36.6 (24 May 2025 05:00), Max: 37 (23 May 2025 20:55)  T(F): 97.8 (24 May 2025 05:00), Max: 98.6 (23 May 2025 20:55)  HR: 57 (24 May 2025 05:00) (56 - 59)  BP: 144/46 (24 May 2025 05:00) (136/42 - 144/46)  BP(mean): 77 (23 May 2025 11:24) (77 - 77)  RR: 19 (24 May 2025 05:00) (18 - 19)  SpO2: 99% (24 May 2025 05:00) (99% - 99%)    Parameters below as of 24 May 2025 05:00  Patient On (Oxygen Delivery Method): nasal cannula  O2 Flow (L/min): 2      PHYSICAL EXAM:  GENERAL: NAD, lying in bed comfortably  HEAD: Atraumatic, normocephalic  EYES: EOMI, PERRLA, conjunctiva and sclera clear  ENT: Moist mucous membranes  NECK: Supple, no JVD  HEART: S1, S2, Regular rate and rhythm, no murmurs, rubs, or gallops  LUNGS: Unlabored respirations, clear to auscultation bilaterally, no crackles, wheezing, or rhonchi  ABDOMEN: Soft, nontender, nondistended, +BS  EXTREMITIES: +small hematoma in R groin 2+ peripheral pulses bilaterally. LUE 2+ edema   NERVOUS SYSTEM:  A&Ox3, LE R weaker than left. Says that he can feel when he has BMs  SKIN: No rashes or lesions  +Frye      LABS:                        8.7    10.21 )-----------( 184      ( 24 May 2025 05:20 )             26.5      05-24    140  |  105  |  32[H]  ----------------------------<  143[H]  3.8   |  24  |  1.68[H]    Ca    8.1[L]      24 May 2025 05:20  Phos  3.0     05-24  Mg     2.20     05-24      PT/INR - ( 23 May 2025 05:30 )   PT: 11.9 sec;   INR: 1.00 ratio         PTT - ( 23 May 2025 14:30 )  PTT:65.2 sec      Urinalysis Basic - ( 24 May 2025 05:20 )    Color: x / Appearance: x / SG: x / pH: x  Gluc: 143 mg/dL / Ketone: x  / Bili: x / Urobili: x   Blood: x / Protein: x / Nitrite: x   Leuk Esterase: x / RBC: x / WBC x   Sq Epi: x / Non Sq Epi: x / Bacteria: x        RADIOLOGY & ADDITIONAL TESTS:    Imaging Personally Reviewed:    Consultant(s) Notes Reviewed:      Care Discussed with Consultants/Other Providers:

## 2025-05-25 DIAGNOSIS — Z29.9 ENCOUNTER FOR PROPHYLACTIC MEASURES, UNSPECIFIED: ICD-10-CM

## 2025-05-25 LAB
ALBUMIN SERPL ELPH-MCNC: 2.4 G/DL — LOW (ref 3.3–5)
ALP SERPL-CCNC: 50 U/L — SIGNIFICANT CHANGE UP (ref 40–120)
ALT FLD-CCNC: 9 U/L — SIGNIFICANT CHANGE UP (ref 4–41)
ANION GAP SERPL CALC-SCNC: 7 MMOL/L — SIGNIFICANT CHANGE UP (ref 7–14)
AST SERPL-CCNC: 16 U/L — SIGNIFICANT CHANGE UP (ref 4–40)
BASOPHILS # BLD AUTO: 0.04 K/UL — SIGNIFICANT CHANGE UP (ref 0–0.2)
BASOPHILS NFR BLD AUTO: 0.4 % — SIGNIFICANT CHANGE UP (ref 0–2)
BILIRUB SERPL-MCNC: 0.3 MG/DL — SIGNIFICANT CHANGE UP (ref 0.2–1.2)
BUN SERPL-MCNC: 29 MG/DL — HIGH (ref 7–23)
CALCIUM SERPL-MCNC: 7.9 MG/DL — LOW (ref 8.4–10.5)
CHLORIDE SERPL-SCNC: 106 MMOL/L — SIGNIFICANT CHANGE UP (ref 98–107)
CO2 SERPL-SCNC: 25 MMOL/L — SIGNIFICANT CHANGE UP (ref 22–31)
CREAT SERPL-MCNC: 1.5 MG/DL — HIGH (ref 0.5–1.3)
EGFR: 45 ML/MIN/1.73M2 — LOW
EGFR: 45 ML/MIN/1.73M2 — LOW
EOSINOPHIL # BLD AUTO: 0.53 K/UL — HIGH (ref 0–0.5)
EOSINOPHIL NFR BLD AUTO: 5.9 % — SIGNIFICANT CHANGE UP (ref 0–6)
GLUCOSE BLDC GLUCOMTR-MCNC: 101 MG/DL — HIGH (ref 70–99)
GLUCOSE BLDC GLUCOMTR-MCNC: 111 MG/DL — HIGH (ref 70–99)
GLUCOSE BLDC GLUCOMTR-MCNC: 130 MG/DL — HIGH (ref 70–99)
GLUCOSE BLDC GLUCOMTR-MCNC: 137 MG/DL — HIGH (ref 70–99)
GLUCOSE SERPL-MCNC: 138 MG/DL — HIGH (ref 70–99)
HCT VFR BLD CALC: 24.3 % — LOW (ref 39–50)
HGB BLD-MCNC: 7.8 G/DL — LOW (ref 13–17)
IANC: 6.19 K/UL — SIGNIFICANT CHANGE UP (ref 1.8–7.4)
IMM GRANULOCYTES NFR BLD AUTO: 0.8 % — SIGNIFICANT CHANGE UP (ref 0–0.9)
LYMPHOCYTES # BLD AUTO: 1.35 K/UL — SIGNIFICANT CHANGE UP (ref 1–3.3)
LYMPHOCYTES # BLD AUTO: 15.2 % — SIGNIFICANT CHANGE UP (ref 13–44)
MAGNESIUM SERPL-MCNC: 2.2 MG/DL — SIGNIFICANT CHANGE UP (ref 1.6–2.6)
MCHC RBC-ENTMCNC: 28.4 PG — SIGNIFICANT CHANGE UP (ref 27–34)
MCHC RBC-ENTMCNC: 32.1 G/DL — SIGNIFICANT CHANGE UP (ref 32–36)
MCV RBC AUTO: 88.4 FL — SIGNIFICANT CHANGE UP (ref 80–100)
MONOCYTES # BLD AUTO: 0.73 K/UL — SIGNIFICANT CHANGE UP (ref 0–0.9)
MONOCYTES NFR BLD AUTO: 8.2 % — SIGNIFICANT CHANGE UP (ref 2–14)
NEUTROPHILS # BLD AUTO: 6.19 K/UL — SIGNIFICANT CHANGE UP (ref 1.8–7.4)
NEUTROPHILS NFR BLD AUTO: 69.5 % — SIGNIFICANT CHANGE UP (ref 43–77)
NRBC # BLD AUTO: 0 K/UL — SIGNIFICANT CHANGE UP (ref 0–0)
NRBC # FLD: 0 K/UL — SIGNIFICANT CHANGE UP (ref 0–0)
NRBC BLD AUTO-RTO: 0 /100 WBCS — SIGNIFICANT CHANGE UP (ref 0–0)
PHOSPHATE SERPL-MCNC: 3.2 MG/DL — SIGNIFICANT CHANGE UP (ref 2.5–4.5)
PLATELET # BLD AUTO: 159 K/UL — SIGNIFICANT CHANGE UP (ref 150–400)
POTASSIUM SERPL-MCNC: 3.9 MMOL/L — SIGNIFICANT CHANGE UP (ref 3.5–5.3)
POTASSIUM SERPL-SCNC: 3.9 MMOL/L — SIGNIFICANT CHANGE UP (ref 3.5–5.3)
PROT SERPL-MCNC: 5.3 G/DL — LOW (ref 6–8.3)
RBC # BLD: 2.75 M/UL — LOW (ref 4.2–5.8)
RBC # FLD: 16.1 % — HIGH (ref 10.3–14.5)
SODIUM SERPL-SCNC: 138 MMOL/L — SIGNIFICANT CHANGE UP (ref 135–145)
WBC # BLD: 8.91 K/UL — SIGNIFICANT CHANGE UP (ref 3.8–10.5)
WBC # FLD AUTO: 8.91 K/UL — SIGNIFICANT CHANGE UP (ref 3.8–10.5)

## 2025-05-25 PROCEDURE — 99233 SBSQ HOSP IP/OBS HIGH 50: CPT | Mod: GC

## 2025-05-25 RX ORDER — MINERAL OIL
133 OIL (ML) MISCELLANEOUS ONCE
Refills: 0 | Status: COMPLETED | OUTPATIENT
Start: 2025-05-25 | End: 2025-05-25

## 2025-05-25 RX ORDER — BISACODYL 5 MG
10 TABLET, DELAYED RELEASE (ENTERIC COATED) ORAL ONCE
Refills: 0 | Status: COMPLETED | OUTPATIENT
Start: 2025-05-25 | End: 2025-05-25

## 2025-05-25 RX ADMIN — AMPICILLIN SODIUM 200 GRAM(S): 1 INJECTION, POWDER, FOR SOLUTION INTRAMUSCULAR; INTRAVENOUS at 23:45

## 2025-05-25 RX ADMIN — Medication 150 MICROGRAM(S): at 04:50

## 2025-05-25 RX ADMIN — Medication 5 MILLIGRAM(S): at 21:06

## 2025-05-25 RX ADMIN — POLYETHYLENE GLYCOL 3350 17 GRAM(S): 17 POWDER, FOR SOLUTION ORAL at 06:45

## 2025-05-25 RX ADMIN — Medication 120 MILLIGRAM(S): at 10:46

## 2025-05-25 RX ADMIN — TAMSULOSIN HYDROCHLORIDE 0.4 MILLIGRAM(S): 0.4 CAPSULE ORAL at 21:05

## 2025-05-25 RX ADMIN — Medication 0.5 MILLIGRAM(S): at 02:57

## 2025-05-25 RX ADMIN — INSULIN GLARGINE-YFGN 28 UNIT(S): 100 INJECTION, SOLUTION SUBCUTANEOUS at 21:54

## 2025-05-25 RX ADMIN — AMPICILLIN SODIUM 200 GRAM(S): 1 INJECTION, POWDER, FOR SOLUTION INTRAMUSCULAR; INTRAVENOUS at 18:10

## 2025-05-25 RX ADMIN — INSULIN LISPRO 6 UNIT(S): 100 INJECTION, SOLUTION INTRAVENOUS; SUBCUTANEOUS at 13:04

## 2025-05-25 RX ADMIN — Medication 2 TABLET(S): at 21:06

## 2025-05-25 RX ADMIN — Medication 0.5 MILLIGRAM(S): at 03:57

## 2025-05-25 RX ADMIN — Medication 1 APPLICATION(S): at 12:22

## 2025-05-25 RX ADMIN — Medication 0.5 MILLIGRAM(S): at 13:02

## 2025-05-25 RX ADMIN — Medication 1 APPLICATION(S): at 12:21

## 2025-05-25 RX ADMIN — Medication 0.5 MILLIGRAM(S): at 22:06

## 2025-05-25 RX ADMIN — INSULIN LISPRO 6 UNIT(S): 100 INJECTION, SOLUTION INTRAVENOUS; SUBCUTANEOUS at 09:26

## 2025-05-25 RX ADMIN — Medication 0.5 MILLIGRAM(S): at 18:05

## 2025-05-25 RX ADMIN — Medication 30 MILLIGRAM(S): at 10:50

## 2025-05-25 RX ADMIN — Medication 0.5 MILLIGRAM(S): at 21:06

## 2025-05-25 RX ADMIN — AMPICILLIN SODIUM 200 GRAM(S): 1 INJECTION, POWDER, FOR SOLUTION INTRAMUSCULAR; INTRAVENOUS at 12:16

## 2025-05-25 RX ADMIN — Medication 0.2 MILLIGRAM(S): at 18:14

## 2025-05-25 RX ADMIN — Medication 0.5 MILLIGRAM(S): at 08:57

## 2025-05-25 RX ADMIN — Medication 81 MILLIGRAM(S): at 12:21

## 2025-05-25 RX ADMIN — Medication 10 MILLIGRAM(S): at 10:50

## 2025-05-25 RX ADMIN — AMPICILLIN SODIUM 200 GRAM(S): 1 INJECTION, POWDER, FOR SOLUTION INTRAMUSCULAR; INTRAVENOUS at 06:38

## 2025-05-25 RX ADMIN — Medication 133 MILLILITER(S): at 18:16

## 2025-05-25 RX ADMIN — POLYETHYLENE GLYCOL 3350 17 GRAM(S): 17 POWDER, FOR SOLUTION ORAL at 18:14

## 2025-05-25 RX ADMIN — Medication 0.5 MILLIGRAM(S): at 09:57

## 2025-05-25 RX ADMIN — ROSUVASTATIN CALCIUM 10 MILLIGRAM(S): 5 TABLET, FILM COATED ORAL at 21:06

## 2025-05-25 RX ADMIN — Medication 100 MILLIGRAM(S): at 13:02

## 2025-05-25 RX ADMIN — ENOXAPARIN SODIUM 90 MILLIGRAM(S): 100 INJECTION SUBCUTANEOUS at 06:45

## 2025-05-25 RX ADMIN — ENOXAPARIN SODIUM 90 MILLIGRAM(S): 100 INJECTION SUBCUTANEOUS at 18:12

## 2025-05-25 RX ADMIN — Medication 0.5 MILLIGRAM(S): at 17:05

## 2025-05-25 RX ADMIN — Medication 100 MILLIGRAM(S): at 21:06

## 2025-05-25 NOTE — PROGRESS NOTE ADULT - SUBJECTIVE AND OBJECTIVE BOX
ORTHOPEDIC PROGRESS NOTE    Overnight events: None    SUBJECTIVE: Pt seen and examined at bedside. Patient is doing well, no acute complaints this AM. Pain is controlled with medication      OBJECTIVE:  Vital Signs Last 24 Hrs  T(C): 36.2 (25 May 2025 12:32), Max: 36.8 (24 May 2025 21:04)  T(F): 97.2 (25 May 2025 12:32), Max: 98.3 (25 May 2025 10:45)  HR: 57 (25 May 2025 13:00) (52 - 60)  BP: 165/60 (25 May 2025 13:00) (110/36 - 165/60)  BP(mean): --  RR: 18 (25 May 2025 12:32) (18 - 18)  SpO2: 98% (25 May 2025 12:32) (95% - 100%)    Parameters below as of 25 May 2025 12:32  Patient On (Oxygen Delivery Method): nasal cannula  O2 Flow (L/min): 2        05-24-25 @ 07:01  -  05-25-25 @ 07:00  --------------------------------------------------------  IN: 200 mL / OUT: 500 mL / NET: -300 mL        Physical Examination:  GEN: NAD, resting quietly  PULM: symmetric chest rise bilaterally, no increased WOB  ABD: nondistended  SPINE:   Motor exam:          Upper extremity         C5 (Shoulder Abd)    C6 (Elbow flex)   C7 (Elbow ext)   C8 (Finger flex) T1 (finger abd)         R         5/5                 5/5                       5/5                      5/5                         5/5          L          5/5                 5/5                      5/5                      5/5                         5/5                   Lower extremity           L2 (Hip flex)  L3 (knee ext)  L4 (Dorsi flex)  L5 (EHL)  S1 (Plantar flex)                                               R        2/5            5/5             2/5                    2/5          4/5      L        3/5            5/5             5/5                   5/5           5/5    Sensory exam:                         C5      C6      C7      C8       T1          RIGHT          2         2        2         2         2          (0=absent, 1=impaired, 2=normal, NT=not testable)  LEFT             2         2        2         2         2          (0=absent, 1=impaired, 2=normal, NT=not testable)                          L2      L3     L4     L5       S1          RIGHT          2         2        2         2         2          (0=absent, 1=impaired, 2=normal, NT=not testable)  LEFT             2         2        2         2         2          (0=absent, 1=impaired, 2=normal, NT=not testable)       LABS:                        7.8    8.91  )-----------( 159      ( 25 May 2025 06:21 )             24.3       05-25    138  |  106  |  29[H]  ----------------------------<  138[H]  3.9   |  25  |  1.50[H]    Ca    7.9[L]      25 May 2025 06:21  Phos  3.2     05-25  Mg     2.20     05-25    TPro  5.3[L]  /  Alb  2.4[L]  /  TBili  0.3  /  DBili  x   /  AST  16  /  ALT  9   /  AlkPhos  50  05-25

## 2025-05-25 NOTE — PROGRESS NOTE ADULT - PROBLEM SELECTOR PLAN 1
CT Lumbar Spine: L3-L4 disc bulge, L4-L5 left paracentral-foraminal disc protrusion, multiple mild lumbar vertebral body compression deformities  . L3-L4 disc bulge, resulting in severe central canal, bilateral lateral recess and moderate bilateral neural foramen stenosis with facet arthrosis and ligamentum flavum hypertrophy.    - Pain: iv tylenol prn, dilaudid .5mg mod pain, dilaudid 1mg severe pain  - Ortho Consulted; recs appreciated  - TLSO brace  - MRI wo con (CrCl 14 so avoiding gadolinium) to r/o cauda equina and malignant disease, gely with history of prostate ca -> MRI was not tolerated given loudness of machine and pain. Pt does not want to go into MRI machine again -> Plan for MRI with general anesthesia  - Will discuss with EP if new PPM is capable for MRI -> PPM cleared, cardiology form sent to Musa Joel who is setting up -> plan for NPO midnight Monday for possible MRI w/ sedation Tuesday CT Lumbar Spine: L3-L4 disc bulge, L4-L5 left paracentral-foraminal disc protrusion, multiple mild lumbar vertebral body compression deformities  . L3-L4 disc bulge, resulting in severe central canal, bilateral lateral recess and moderate bilateral neural foramen stenosis with facet arthrosis and ligamentum flavum hypertrophy.    - Pain: iv tylenol prn, dilaudid .5mg mod pain, dilaudid 1mg severe pain  - Ortho Consulted; recs appreciated  - TLSO brace  - MRI wo con (CrCl 14 so avoiding gadolinium) to r/o cauda equina and malignant disease, gely with history of prostate ca -> MRI was not tolerated given loudness of machine and pain. Pt does not want to go into MRI machine again -> Plan for MRI with general anesthesia  - Will discuss with EP if new PPM is capable for MRI -> PPM cleared, cardiology form sent to Musa Joel who is setting up -> plan for NPO midnight Monday for possible MRI w/ sedation Tuesday 5/27

## 2025-05-25 NOTE — PROGRESS NOTE ADULT - ASSESSMENT
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     Plan:  -recommend MRI CTL spine with and without contrast and MRI brain with/without contrast to rule out infectious versus malignant etiology   -will likely need sedation to tolerate MRI, plan for Tuesday per primary team  -WBAT  -Further recommendations pending imaging    For all questions related to patient care, please reach out to the on-call team via the pager.     Angle Dixon, PGY 3  Orthopaedic Surgery  [facility] [id number]  [facility] [id number]  [facility] [phone]

## 2025-05-25 NOTE — PROGRESS NOTE ADULT - SUBJECTIVE AND OBJECTIVE BOX
***Plan not finalized until attending attestation***    Raman Carmichael MD (PGY-1)  Internal Medicine  Contact via [employer] TEAMS    ******************************************    PROGRESS NOTE:     Patient is a 85y old  Male who presents with a chief complaint of back pain (24 May 2025 06:33)      INTERVAL EVENTS: No acute overnight events.     SUBJECTIVE: Patient seen and examined at bedside. This morning, the patient is comfortable and doing well. No acute complaints.    MEDICATIONS  (STANDING):  ampicillin  IVPB 2 Gram(s) IV Intermittent every 6 hours  aspirin  chewable 81 milliGRAM(s) Oral daily  bisacodyl 5 milliGRAM(s) Oral at bedtime  chlorhexidine 2% Cloths 1 Application(s) Topical daily  chlorhexidine 2% Cloths 1 Application(s) Topical daily  cloNIDine 0.2 milliGRAM(s) Oral two times a day  dextrose 5%. 1000 milliLiter(s) (50 mL/Hr) IV Continuous <Continuous>  dextrose 5%. 1000 milliLiter(s) (100 mL/Hr) IV Continuous <Continuous>  dextrose 50% Injectable 25 Gram(s) IV Push once  dextrose 50% Injectable 12.5 Gram(s) IV Push once  dextrose 50% Injectable 25 Gram(s) IV Push once  dextrose Oral Gel 15 Gram(s) Oral once  enoxaparin Injectable 90 milliGRAM(s) SubCutaneous every 12 hours  glucagon  Injectable 1 milliGRAM(s) IntraMuscular once  hydrALAZINE 100 milliGRAM(s) Oral three times a day  insulin glargine Injectable (LANTUS) 28 Unit(s) SubCutaneous at bedtime  insulin lispro (ADMELOG) corrective regimen sliding scale   SubCutaneous three times a day before meals  insulin lispro (ADMELOG) corrective regimen sliding scale   SubCutaneous at bedtime  insulin lispro Injectable (ADMELOG) 6 Unit(s) SubCutaneous three times a day before meals  levothyroxine 150 MICROGram(s) Oral daily  lidocaine   4% Patch 1 Patch Transdermal daily  NIFEdipine XL 30 milliGRAM(s) Oral daily  polyethylene glycol 3350 17 Gram(s) Oral two times a day  propranolol  milliGRAM(s) Oral daily  rosuvastatin 10 milliGRAM(s) Oral at bedtime  senna 2 Tablet(s) Oral at bedtime  tamsulosin 0.4 milliGRAM(s) Oral at bedtime    MEDICATIONS  (PRN):  HYDROmorphone  Injectable 0.5 milliGRAM(s) IV Push every 4 hours PRN Severe Pain (7 - 10)      CAPILLARY BLOOD GLUCOSE      POCT Blood Glucose.: 151 mg/dL (24 May 2025 21:49)  POCT Blood Glucose.: 150 mg/dL (24 May 2025 18:00)  POCT Blood Glucose.: 130 mg/dL (24 May 2025 13:19)  POCT Blood Glucose.: 139 mg/dL (24 May 2025 09:00)    I&O's Summary      PHYSICAL EXAM:  Vital Signs Last 24 Hrs  T(C): 36.7 (24 May 2025 22:25), Max: 36.8 (24 May 2025 21:04)  T(F): 98 (24 May 2025 22:25), Max: 98.2 (24 May 2025 21:04)  HR: 56 (24 May 2025 22:25) (52 - 58)  BP: 137/52 (24 May 2025 22:25) (124/58 - 152/50)  BP(mean): --  RR: 18 (24 May 2025 22:25) (17 - 18)  SpO2: 100% (24 May 2025 22:25) (98% - 100%)    Parameters below as of 24 May 2025 22:25  Patient On (Oxygen Delivery Method): nasal cannula  O2 Flow (L/min): 2    PHYSICAL EXAM:  GENERAL: NAD, lying in bed comfortably  HEAD: Atraumatic, normocephalic  EYES: EOMI, PERRLA, conjunctiva and sclera clear  ENT: Moist mucous membranes  NECK: Supple, no JVD  HEART: S1, S2, Regular rate and rhythm, no murmurs, rubs, or gallops  LUNGS: Unlabored respirations, clear to auscultation bilaterally, no crackles, wheezing, or rhonchi  ABDOMEN: Soft, nontender, nondistended, +BS  EXTREMITIES: +small hematoma in R groin 2+ peripheral pulses bilaterally. LUE 2+ edema   NERVOUS SYSTEM:  A&Ox3, LE R weaker than left. Says that he can feel when he has BMs  SKIN: No rashes or lesions  +Frye    LABS:                        8.7    10.21 )-----------( 184      ( 24 May 2025 05:20 )             26.5     05-24    140  |  105  |  32[H]  ----------------------------<  143[H]  3.8   |  24  |  1.68[H]    Ca    8.1[L]      24 May 2025 05:20  Phos  3.0     05-24  Mg     2.20     05-24      PTT - ( 23 May 2025 14:30 )  PTT:65.2 sec      Urinalysis Basic - ( 24 May 2025 05:20 )    Color: x / Appearance: x / SG: x / pH: x  Gluc: 143 mg/dL / Ketone: x  / Bili: x / Urobili: x   Blood: x / Protein: x / Nitrite: x   Leuk Esterase: x / RBC: x / WBC x   Sq Epi: x / Non Sq Epi: x / Bacteria: x

## 2025-05-25 NOTE — PROGRESS NOTE ADULT - ASSESSMENT
Pt is an 85M with HTN, HLD, DM2, prostate cancer s/p prostatectomy, CKD, TIA with a pacemaker who presents transferred from [place] for further evaluation of low back pain secondary to lumbar compression deformities. EP on board to clear PPM for MRI compatibility. Found to have e facaelis bacteremia. On abx, unable to tolerate MRI s/p PPM removal on 5/16 and exchange for leadless PPM. Plan for [facility] on 5/20 -> negative for endocarditis. Pursuing MRI with sedation. Pt is an 85M with HTN, HLD, DM2, prostate cancer s/p prostatectomy, CKD, TIA with a pacemaker who presents transferred from [place] for further evaluation of low back pain secondary to lumbar compression deformities. EP on board to clear PPM for MRI compatibility. Found to have e facaelis bacteremia. On abx, unable to tolerate MRI s/p PPM removal on 5/16 and exchange for leadless PPM. Plan for [facility] on 5/20 -> negative for endocarditis. Pursuing MRI with sedation, plan for possibly Tuesday 5/27

## 2025-05-25 NOTE — PROGRESS NOTE ADULT - PROBLEM SELECTOR PLAN 12
- Continue Home Levothyroxine 150mcg DVT ppx: Lovenox  GI ppx: None  Diet: CC  Dispo: Most likely [facility]  DNR//DNI

## 2025-05-25 NOTE — PROGRESS NOTE ADULT - ATTENDING COMMENTS
#Enterococcus bacteremia, unclear source. BCx cleared on 5/17. ID following. s/p PPM explant and leadless PPM. [facility] ruled out endocarditis. D/c ceftriaxone and continue with ampicillin. Also given bacteremia, MRI lumbar spine for pain/compression deformities is recommended but patient cannot tolerate the exam. Pending MRI with anesthesia (possibly Tuesday).   #Lower back pain, CT lumbar spine with disc bulge and vertebral compression deformities - ortho recommending MRI but could not tolerate. Pain control with dilaudid and tylenol. TLSO brace. Planning to obtain MRI with anesthesia given patient unable to ambulate and R>L weakness + urinary retention requiring a wong. Pt. now having bowel incontinence.   #Urinary retention, failed TOV. Continue with wong.   #Anemia, no signs of bleeding. s/p 1U PRBC 5/19, monitor CBC  #JEROME, likely d/t ATN, creatinine improving  #Hypoxic respiratory failure, initially requiring HFNC. Now on 2L. CXR clear. VQ scan with low probability for PE. Unclear etiology. Possibly atelectasis? Trial ICS. Improved.   #HTN, increase clonidine 0.2mg BID. C/w hydral 100 TID. Continue to hold chlorthalidone and telmisartan in the setting of JEROME. Started on nifedipine 30mg on 5/11 (not home med). Will continue and uptitrate as needed while inpatient but would transition back to home meds when JEROME resolves.  #H/o TIA's, continue w/ Lovenox for now

## 2025-05-26 LAB
ALBUMIN SERPL ELPH-MCNC: 2.4 G/DL — LOW (ref 3.3–5)
ALP SERPL-CCNC: 54 U/L — SIGNIFICANT CHANGE UP (ref 40–120)
ALT FLD-CCNC: 15 U/L — SIGNIFICANT CHANGE UP (ref 4–41)
ANION GAP SERPL CALC-SCNC: 9 MMOL/L — SIGNIFICANT CHANGE UP (ref 7–14)
AST SERPL-CCNC: 24 U/L — SIGNIFICANT CHANGE UP (ref 4–40)
BASOPHILS # BLD AUTO: 0.04 K/UL — SIGNIFICANT CHANGE UP (ref 0–0.2)
BASOPHILS NFR BLD AUTO: 0.5 % — SIGNIFICANT CHANGE UP (ref 0–2)
BILIRUB SERPL-MCNC: 0.4 MG/DL — SIGNIFICANT CHANGE UP (ref 0.2–1.2)
BUN SERPL-MCNC: 22 MG/DL — SIGNIFICANT CHANGE UP (ref 7–23)
CALCIUM SERPL-MCNC: 8.1 MG/DL — LOW (ref 8.4–10.5)
CHLORIDE SERPL-SCNC: 104 MMOL/L — SIGNIFICANT CHANGE UP (ref 98–107)
CO2 SERPL-SCNC: 24 MMOL/L — SIGNIFICANT CHANGE UP (ref 22–31)
CREAT SERPL-MCNC: 1.28 MG/DL — SIGNIFICANT CHANGE UP (ref 0.5–1.3)
EGFR: 54 ML/MIN/1.73M2 — LOW
EGFR: 54 ML/MIN/1.73M2 — LOW
EOSINOPHIL # BLD AUTO: 0.32 K/UL — SIGNIFICANT CHANGE UP (ref 0–0.5)
EOSINOPHIL NFR BLD AUTO: 3.7 % — SIGNIFICANT CHANGE UP (ref 0–6)
GLUCOSE BLDC GLUCOMTR-MCNC: 101 MG/DL — HIGH (ref 70–99)
GLUCOSE BLDC GLUCOMTR-MCNC: 117 MG/DL — HIGH (ref 70–99)
GLUCOSE BLDC GLUCOMTR-MCNC: 124 MG/DL — HIGH (ref 70–99)
GLUCOSE BLDC GLUCOMTR-MCNC: 138 MG/DL — HIGH (ref 70–99)
GLUCOSE SERPL-MCNC: 132 MG/DL — HIGH (ref 70–99)
HCT VFR BLD CALC: 25.2 % — LOW (ref 39–50)
HGB BLD-MCNC: 8.2 G/DL — LOW (ref 13–17)
IANC: 6.26 K/UL — SIGNIFICANT CHANGE UP (ref 1.8–7.4)
IMM GRANULOCYTES NFR BLD AUTO: 0.7 % — SIGNIFICANT CHANGE UP (ref 0–0.9)
LYMPHOCYTES # BLD AUTO: 1.18 K/UL — SIGNIFICANT CHANGE UP (ref 1–3.3)
LYMPHOCYTES # BLD AUTO: 13.7 % — SIGNIFICANT CHANGE UP (ref 13–44)
MAGNESIUM SERPL-MCNC: 2 MG/DL — SIGNIFICANT CHANGE UP (ref 1.6–2.6)
MCHC RBC-ENTMCNC: 28.2 PG — SIGNIFICANT CHANGE UP (ref 27–34)
MCHC RBC-ENTMCNC: 32.5 G/DL — SIGNIFICANT CHANGE UP (ref 32–36)
MCV RBC AUTO: 86.6 FL — SIGNIFICANT CHANGE UP (ref 80–100)
MONOCYTES # BLD AUTO: 0.73 K/UL — SIGNIFICANT CHANGE UP (ref 0–0.9)
MONOCYTES NFR BLD AUTO: 8.5 % — SIGNIFICANT CHANGE UP (ref 2–14)
NEUTROPHILS # BLD AUTO: 6.26 K/UL — SIGNIFICANT CHANGE UP (ref 1.8–7.4)
NEUTROPHILS NFR BLD AUTO: 72.9 % — SIGNIFICANT CHANGE UP (ref 43–77)
NRBC # BLD AUTO: 0 K/UL — SIGNIFICANT CHANGE UP (ref 0–0)
NRBC # FLD: 0 K/UL — SIGNIFICANT CHANGE UP (ref 0–0)
NRBC BLD AUTO-RTO: 0 /100 WBCS — SIGNIFICANT CHANGE UP (ref 0–0)
PHOSPHATE SERPL-MCNC: 3.2 MG/DL — SIGNIFICANT CHANGE UP (ref 2.5–4.5)
PLATELET # BLD AUTO: 161 K/UL — SIGNIFICANT CHANGE UP (ref 150–400)
POTASSIUM SERPL-MCNC: 4 MMOL/L — SIGNIFICANT CHANGE UP (ref 3.5–5.3)
POTASSIUM SERPL-SCNC: 4 MMOL/L — SIGNIFICANT CHANGE UP (ref 3.5–5.3)
PROT SERPL-MCNC: 5.4 G/DL — LOW (ref 6–8.3)
RBC # BLD: 2.91 M/UL — LOW (ref 4.2–5.8)
RBC # FLD: 15.9 % — HIGH (ref 10.3–14.5)
SODIUM SERPL-SCNC: 137 MMOL/L — SIGNIFICANT CHANGE UP (ref 135–145)
WBC # BLD: 8.59 K/UL — SIGNIFICANT CHANGE UP (ref 3.8–10.5)
WBC # FLD AUTO: 8.59 K/UL — SIGNIFICANT CHANGE UP (ref 3.8–10.5)

## 2025-05-26 PROCEDURE — 99233 SBSQ HOSP IP/OBS HIGH 50: CPT | Mod: GC

## 2025-05-26 RX ORDER — AMPICILLIN SODIUM 1 G/1
2 INJECTION, POWDER, FOR SOLUTION INTRAMUSCULAR; INTRAVENOUS EVERY 4 HOURS
Refills: 0 | Status: DISCONTINUED | OUTPATIENT
Start: 2025-05-26 | End: 2025-05-28

## 2025-05-26 RX ORDER — INSULIN GLARGINE-YFGN 100 [IU]/ML
24 INJECTION, SOLUTION SUBCUTANEOUS AT BEDTIME
Refills: 0 | Status: DISCONTINUED | OUTPATIENT
Start: 2025-05-26 | End: 2025-05-28

## 2025-05-26 RX ADMIN — Medication 0.5 MILLIGRAM(S): at 01:06

## 2025-05-26 RX ADMIN — TAMSULOSIN HYDROCHLORIDE 0.4 MILLIGRAM(S): 0.4 CAPSULE ORAL at 22:15

## 2025-05-26 RX ADMIN — Medication 0.5 MILLIGRAM(S): at 18:10

## 2025-05-26 RX ADMIN — Medication 2 TABLET(S): at 22:14

## 2025-05-26 RX ADMIN — Medication 0.5 MILLIGRAM(S): at 06:00

## 2025-05-26 RX ADMIN — ENOXAPARIN SODIUM 90 MILLIGRAM(S): 100 INJECTION SUBCUTANEOUS at 18:12

## 2025-05-26 RX ADMIN — Medication 0.5 MILLIGRAM(S): at 02:00

## 2025-05-26 RX ADMIN — Medication 0.5 MILLIGRAM(S): at 05:07

## 2025-05-26 RX ADMIN — Medication 100 MILLIGRAM(S): at 13:00

## 2025-05-26 RX ADMIN — Medication 150 MICROGRAM(S): at 05:09

## 2025-05-26 RX ADMIN — AMPICILLIN SODIUM 200 GRAM(S): 1 INJECTION, POWDER, FOR SOLUTION INTRAMUSCULAR; INTRAVENOUS at 13:02

## 2025-05-26 RX ADMIN — Medication 100 MILLIGRAM(S): at 05:09

## 2025-05-26 RX ADMIN — INSULIN LISPRO 6 UNIT(S): 100 INJECTION, SOLUTION INTRAVENOUS; SUBCUTANEOUS at 09:03

## 2025-05-26 RX ADMIN — Medication 5 MILLIGRAM(S): at 22:15

## 2025-05-26 RX ADMIN — Medication 0.5 MILLIGRAM(S): at 23:32

## 2025-05-26 RX ADMIN — AMPICILLIN SODIUM 200 GRAM(S): 1 INJECTION, POWDER, FOR SOLUTION INTRAMUSCULAR; INTRAVENOUS at 22:16

## 2025-05-26 RX ADMIN — Medication 0.5 MILLIGRAM(S): at 09:15

## 2025-05-26 RX ADMIN — INSULIN LISPRO 6 UNIT(S): 100 INJECTION, SOLUTION INTRAVENOUS; SUBCUTANEOUS at 18:11

## 2025-05-26 RX ADMIN — INSULIN GLARGINE-YFGN 24 UNIT(S): 100 INJECTION, SOLUTION SUBCUTANEOUS at 22:13

## 2025-05-26 RX ADMIN — Medication 0.5 MILLIGRAM(S): at 14:10

## 2025-05-26 RX ADMIN — AMPICILLIN SODIUM 200 GRAM(S): 1 INJECTION, POWDER, FOR SOLUTION INTRAMUSCULAR; INTRAVENOUS at 05:08

## 2025-05-26 RX ADMIN — Medication 100 MILLIGRAM(S): at 22:14

## 2025-05-26 RX ADMIN — Medication 0.5 MILLIGRAM(S): at 13:16

## 2025-05-26 RX ADMIN — LIDOCAINE HYDROCHLORIDE 1 PATCH: 20 JELLY TOPICAL at 09:18

## 2025-05-26 RX ADMIN — Medication 0.5 MILLIGRAM(S): at 22:46

## 2025-05-26 RX ADMIN — POLYETHYLENE GLYCOL 3350 17 GRAM(S): 17 POWDER, FOR SOLUTION ORAL at 05:10

## 2025-05-26 RX ADMIN — ENOXAPARIN SODIUM 90 MILLIGRAM(S): 100 INJECTION SUBCUTANEOUS at 05:07

## 2025-05-26 RX ADMIN — ROSUVASTATIN CALCIUM 10 MILLIGRAM(S): 5 TABLET, FILM COATED ORAL at 22:14

## 2025-05-26 RX ADMIN — Medication 0.5 MILLIGRAM(S): at 10:11

## 2025-05-26 RX ADMIN — Medication 0.2 MILLIGRAM(S): at 05:09

## 2025-05-26 RX ADMIN — LIDOCAINE HYDROCHLORIDE 1 PATCH: 20 JELLY TOPICAL at 22:33

## 2025-05-26 RX ADMIN — Medication 1 APPLICATION(S): at 09:18

## 2025-05-26 RX ADMIN — Medication 0.2 MILLIGRAM(S): at 18:10

## 2025-05-26 RX ADMIN — Medication 81 MILLIGRAM(S): at 13:01

## 2025-05-26 RX ADMIN — INSULIN LISPRO 6 UNIT(S): 100 INJECTION, SOLUTION INTRAVENOUS; SUBCUTANEOUS at 13:02

## 2025-05-26 RX ADMIN — LIDOCAINE HYDROCHLORIDE 1 PATCH: 20 JELLY TOPICAL at 18:53

## 2025-05-26 RX ADMIN — AMPICILLIN SODIUM 200 GRAM(S): 1 INJECTION, POWDER, FOR SOLUTION INTRAMUSCULAR; INTRAVENOUS at 18:10

## 2025-05-26 NOTE — PROGRESS NOTE ADULT - ASSESSMENT
Pt is an 85M with HTN, HLD, DM2, prostate cancer s/p prostatectomy, CKD, TIA with a pacemaker who presents transferred from [place] for further evaluation of low back pain secondary to lumbar compression deformities. EP on board to clear PPM for MRI compatibility. Found to have e facaelis bacteremia. On abx, unable to tolerate MRI s/p PPM removal on 5/16 and exchange for leadless PPM. Plan for [facility] on 5/20 -> negative for endocarditis. Pursuing MRI with sedation, plan for possibly Tuesday 5/27 Pt is an 85M with HTN, HLD, DM2, prostate cancer s/p prostatectomy, CKD, TIA with a pacemaker who presents transferred from [place] for further evaluation of low back pain secondary to lumbar compression deformities. EP on board to clear PPM for MRI compatibility. Found to have e facaelis bacteremia. On abx, unable to tolerate MRI s/p PPM removal on 5/16 and exchange for leadless PPM. Plan for [facility] on 5/20 -> negative for endocarditis. Pursuing MRI with sedation, plan for Tuesday 5/27

## 2025-05-26 NOTE — PROGRESS NOTE ADULT - PROBLEM SELECTOR PLAN 1
CT Lumbar Spine: L3-L4 disc bulge, L4-L5 left paracentral-foraminal disc protrusion, multiple mild lumbar vertebral body compression deformities  . L3-L4 disc bulge, resulting in severe central canal, bilateral lateral recess and moderate bilateral neural foramen stenosis with facet arthrosis and ligamentum flavum hypertrophy.    - Pain: iv tylenol prn, dilaudid .5mg mod pain, dilaudid 1mg severe pain  - Ortho Consulted; recs appreciated  - TLSO brace  - MRI wo con (CrCl 14 so avoiding gadolinium) to r/o cauda equina and malignant disease, gely with history of prostate ca -> MRI was not tolerated given loudness of machine and pain. Pt does not want to go into MRI machine again -> Plan for MRI with general anesthesia  - Will discuss with EP if new PPM is capable for MRI -> PPM cleared, cardiology form sent to [facility] who is setting up -> plan for NPO midnight Monday for possible MRI w/ sedation Tuesday 5/27

## 2025-05-26 NOTE — PROGRESS NOTE ADULT - SUBJECTIVE AND OBJECTIVE BOX
PROGRESS NOTE:     Patient is a 85y old  Male who presents with a chief complaint of back pain (26 May 2025 07:07)      INTERVAL HISTORY: NAEO. VSS. ROS negative.    MEDICATIONS  (STANDING):  ampicillin  IVPB 2 Gram(s) IV Intermittent every 6 hours  aspirin  chewable 81 milliGRAM(s) Oral daily  bisacodyl 5 milliGRAM(s) Oral at bedtime  chlorhexidine 2% Cloths 1 Application(s) Topical daily  chlorhexidine 2% Cloths 1 Application(s) Topical daily  cloNIDine 0.2 milliGRAM(s) Oral two times a day  dextrose 5%. 1000 milliLiter(s) (50 mL/Hr) IV Continuous <Continuous>  dextrose 5%. 1000 milliLiter(s) (100 mL/Hr) IV Continuous <Continuous>  dextrose 50% Injectable 25 Gram(s) IV Push once  dextrose 50% Injectable 12.5 Gram(s) IV Push once  dextrose 50% Injectable 25 Gram(s) IV Push once  dextrose Oral Gel 15 Gram(s) Oral once  enoxaparin Injectable 90 milliGRAM(s) SubCutaneous every 12 hours  glucagon  Injectable 1 milliGRAM(s) IntraMuscular once  hydrALAZINE 100 milliGRAM(s) Oral three times a day  insulin glargine Injectable (LANTUS) 28 Unit(s) SubCutaneous at bedtime  insulin lispro (ADMELOG) corrective regimen sliding scale   SubCutaneous three times a day before meals  insulin lispro (ADMELOG) corrective regimen sliding scale   SubCutaneous at bedtime  insulin lispro Injectable (ADMELOG) 6 Unit(s) SubCutaneous three times a day before meals  levothyroxine 150 MICROGram(s) Oral daily  lidocaine   4% Patch 1 Patch Transdermal daily  NIFEdipine XL 30 milliGRAM(s) Oral daily  polyethylene glycol 3350 17 Gram(s) Oral two times a day  propranolol  milliGRAM(s) Oral daily  rosuvastatin 10 milliGRAM(s) Oral at bedtime  senna 2 Tablet(s) Oral at bedtime  tamsulosin 0.4 milliGRAM(s) Oral at bedtime    MEDICATIONS  (PRN):  HYDROmorphone  Injectable 0.5 milliGRAM(s) IV Push every 4 hours PRN Severe Pain (7 - 10)      CAPILLARY BLOOD GLUCOSE      POCT Blood Glucose.: 137 mg/dL (25 May 2025 21:33)  POCT Blood Glucose.: 101 mg/dL (25 May 2025 17:51)  POCT Blood Glucose.: 111 mg/dL (25 May 2025 12:40)  POCT Blood Glucose.: 130 mg/dL (25 May 2025 08:50)    I&O's Summary    25 May 2025 07:01  -  26 May 2025 07:00  --------------------------------------------------------  IN: 360 mL / OUT: 1750 mL / NET: -1390 mL        PHYSICAL EXAM:  Vital Signs Last 24 Hrs  T(C): 37.1 (26 May 2025 05:00), Max: 37.1 (25 May 2025 20:49)  T(F): 98.7 (26 May 2025 05:00), Max: 98.8 (25 May 2025 20:49)  HR: 57 (26 May 2025 05:00) (57 - 61)  BP: 144/54 (26 May 2025 05:00) (144/54 - 175/61)  BP(mean): --  RR: 17 (26 May 2025 05:00) (17 - 19)  SpO2: 97% (26 May 2025 05:00) (95% - 98%)    Parameters below as of 26 May 2025 05:00  Patient On (Oxygen Delivery Method): nasal cannula  O2 Flow (L/min): 1      CONSTITUTIONAL: NAD, well-developed  HEENT:   RESPIRATORY: Normal respiratory effort; lungs are clear to auscultation bilaterally  CARDIOVASCULAR: Regular rate and rhythm, normal S1 and S2, no murmur/rub/gallop; No lower extremity edema; Peripheral pulses are 2+ bilaterally  ABDOMEN: Nontender to palpation, normoactive bowel sounds, no rebound/guarding; No hepatosplenomegaly  MUSCULOSKELETAL: no clubbing or cyanosis of digits; no joint swelling or tenderness to palpation  PSYCH: A+O to person, place, and time; affect appropriate    LABS:                        8.2    8.59  )-----------( 161      ( 26 May 2025 06:59 )             25.2 05-26    137  |  104  |  22  ----------------------------<  132[H]  4.0   |  24  |  1.28    Ca    8.1[L]      26 May 2025 06:59  Phos  3.2     05-26  Mg     2.00     05-26    TPro  5.4[L]  /  Alb  2.4[L]  /  TBili  0.4  /  DBili  x   /  AST  24  /  ALT  15  /  AlkPhos  54  05-26          Urinalysis Basic - ( 26 May 2025 06:59 )    Color: x / Appearance: x / SG: x / pH: x  Gluc: 132 mg/dL / Ketone: x  / Bili: x / Urobili: x   Blood: x / Protein: x / Nitrite: x   Leuk Esterase: x / RBC: x / WBC x   Sq Epi: x / Non Sq Epi: x / Bacteria: x          RADIOLOGY:        ******************************  Authored By: Jacinto Rodriguez MD PGY1  Internal Medicine  MS Teams  ******************************   PROGRESS NOTE:     Patient is a 85y old  Male who presents with a chief complaint of back pain (26 May 2025 07:07)      INTERVAL HISTORY: Stable back pain. NAEO. VSS. ROS negative.    MEDICATIONS  (STANDING):  ampicillin  IVPB 2 Gram(s) IV Intermittent every 6 hours  aspirin  chewable 81 milliGRAM(s) Oral daily  bisacodyl 5 milliGRAM(s) Oral at bedtime  chlorhexidine 2% Cloths 1 Application(s) Topical daily  chlorhexidine 2% Cloths 1 Application(s) Topical daily  cloNIDine 0.2 milliGRAM(s) Oral two times a day  dextrose 5%. 1000 milliLiter(s) (50 mL/Hr) IV Continuous <Continuous>  dextrose 5%. 1000 milliLiter(s) (100 mL/Hr) IV Continuous <Continuous>  dextrose 50% Injectable 25 Gram(s) IV Push once  dextrose 50% Injectable 12.5 Gram(s) IV Push once  dextrose 50% Injectable 25 Gram(s) IV Push once  dextrose Oral Gel 15 Gram(s) Oral once  enoxaparin Injectable 90 milliGRAM(s) SubCutaneous every 12 hours  glucagon  Injectable 1 milliGRAM(s) IntraMuscular once  hydrALAZINE 100 milliGRAM(s) Oral three times a day  insulin glargine Injectable (LANTUS) 28 Unit(s) SubCutaneous at bedtime  insulin lispro (ADMELOG) corrective regimen sliding scale   SubCutaneous three times a day before meals  insulin lispro (ADMELOG) corrective regimen sliding scale   SubCutaneous at bedtime  insulin lispro Injectable (ADMELOG) 6 Unit(s) SubCutaneous three times a day before meals  levothyroxine 150 MICROGram(s) Oral daily  lidocaine   4% Patch 1 Patch Transdermal daily  NIFEdipine XL 30 milliGRAM(s) Oral daily  polyethylene glycol 3350 17 Gram(s) Oral two times a day  propranolol  milliGRAM(s) Oral daily  rosuvastatin 10 milliGRAM(s) Oral at bedtime  senna 2 Tablet(s) Oral at bedtime  tamsulosin 0.4 milliGRAM(s) Oral at bedtime    MEDICATIONS  (PRN):  HYDROmorphone  Injectable 0.5 milliGRAM(s) IV Push every 4 hours PRN Severe Pain (7 - 10)      CAPILLARY BLOOD GLUCOSE  POCT Blood Glucose.: 137 mg/dL (25 May 2025 21:33)  POCT Blood Glucose.: 101 mg/dL (25 May 2025 17:51)  POCT Blood Glucose.: 111 mg/dL (25 May 2025 12:40)  POCT Blood Glucose.: 130 mg/dL (25 May 2025 08:50)    I&O's Summary    25 May 2025 07:01  -  26 May 2025 07:00  --------------------------------------------------------  IN: 360 mL / OUT: 1750 mL / NET: -1390 mL        PHYSICAL EXAM:  Vital Signs Last 24 Hrs  T(C): 37.1 (26 May 2025 05:00), Max: 37.1 (25 May 2025 20:49)  T(F): 98.7 (26 May 2025 05:00), Max: 98.8 (25 May 2025 20:49)  HR: 57 (26 May 2025 05:00) (57 - 61)  BP: 144/54 (26 May 2025 05:00) (144/54 - 175/61)  BP(mean): --  RR: 17 (26 May 2025 05:00) (17 - 19)  SpO2: 97% (26 May 2025 05:00) (95% - 98%)    Parameters below as of 26 May 2025 05:00  Patient On (Oxygen Delivery Method): nasal cannula  O2 Flow (L/min): 1      GENERAL: NAD, lying in bed comfortably  HEAD: Atraumatic, normocephalic  EYES: EOMI, PERRLA, conjunctiva and sclera clear  ENT: Moist mucous membranes  NECK: Supple, no JVD  HEART: S1, S2, Regular rate and rhythm, no murmurs, rubs, or gallops  LUNGS: Unlabored respirations, clear to auscultation bilaterally, no crackles, wheezing, or rhonchi  ABDOMEN: Soft, nontender, nondistended, +BS  EXTREMITIES: +small hematoma in R groin 2+ peripheral pulses bilaterally. LUE 2+ edema   NERVOUS SYSTEM:  A&Ox3, LE R weaker than left. Says that he can feel when he has BMs  SKIN: No rashes or lesions  +Frye    LABS:                        8.2    8.59  )-----------( 161      ( 26 May 2025 06:59 )             25.2     05-26    137  |  104  |  22  ----------------------------<  132[H]  4.0   |  24  |  1.28    Ca    8.1[L]      26 May 2025 06:59  Phos  3.2     05-26  Mg     2.00     05-26    TPro  5.4[L]  /  Alb  2.4[L]  /  TBili  0.4  /  DBili  x   /  AST  24  /  ALT  15  /  AlkPhos  54  05-26      Urinalysis Basic - ( 26 May 2025 06:59 )  Color: x / Appearance: x / SG: x / pH: x  Gluc: 132 mg/dL / Ketone: x  / Bili: x / Urobili: x   Blood: x / Protein: x / Nitrite: x   Leuk Esterase: x / RBC: x / WBC x   Sq Epi: x / Non Sq Epi: x / Bacteria: x      ******************************  Authored By: Jacinto Rodriguez MD PGY1  Internal Medicine  MS Teams  ******************************

## 2025-05-26 NOTE — PROGRESS NOTE ADULT - SUBJECTIVE AND OBJECTIVE BOX
Orthopedic Surgery      Pt seen and examined. Pt denies any overnight events. Pt reports pain is well controlled. Pt denies any fever/chills/chest pain/nausea/vomiting.      ICU Vital Signs Last 24 Hrs  T(C): 37.1 (26 May 2025 05:00), Max: 37.1 (25 May 2025 20:49)  T(F): 98.7 (26 May 2025 05:00), Max: 98.8 (25 May 2025 20:49)  HR: 57 (26 May 2025 05:00) (57 - 61)  BP: 144/54 (26 May 2025 05:00) (144/54 - 175/61)  BP(mean): --  ABP: --  ABP(mean): --  RR: 17 (26 May 2025 05:00) (17 - 19)  SpO2: 97% (26 May 2025 05:00) (95% - 98%)          Physical exam:   GEN: NAD, resting quietly  PULM: symmetric chest rise bilaterally, no increased WOB  ABD: nondistended  SPINE:   Motor exam:          Upper extremity         C5 (Shoulder Abd)    C6 (Elbow flex)   C7 (Elbow ext)   C8 (Finger flex) T1 (finger abd)         R         5/5                 5/5                       5/5                      5/5                         5/5          L          5/5                 5/5                      5/5                      5/5                         5/5                   Lower extremity           L2 (Hip flex)  L3 (knee ext)  L4 (Dorsi flex)  L5 (EHL)  S1 (Plantar flex)                                               R        2/5            5/5             2/5                    2/5          4/5      L        3/5            5/5             5/5                   5/5           5/5    Sensory exam:                         C5      C6      C7      C8       T1          RIGHT          2         2        2         2         2          (0=absent, 1=impaired, 2=normal, NT=not testable)  LEFT             2         2        2         2         2          (0=absent, 1=impaired, 2=normal, NT=not testable)                          L2      L3     L4     L5       S1          RIGHT          2         2        2         2         2          (0=absent, 1=impaired, 2=normal, NT=not testable)  LEFT             2         2        2         2         2          (0=absent, 1=impaired, 2=normal, NT=not testable)     LABS:                        8.2    8.59  )-----------( 161      ( 26 May 2025 06:59 )             25.2     05-25    138  |  106  |  29[H]  ----------------------------<  138[H]  3.9   |  25  |  1.50[H]    Ca    7.9[L]      25 May 2025 06:21  Phos  3.2     05-25  Mg     2.20     05-25    TPro  5.3[L]  /  Alb  2.4[L]  /  TBili  0.3  /  DBili  x   /  AST  16  /  ALT  9   /  AlkPhos  50  05-25      Urinalysis Basic - ( 25 May 2025 06:21 )    Color: x / Appearance: x / SG: x / pH: x  Gluc: 138 mg/dL / Ketone: x  / Bili: x / Urobili: x   Blood: x / Protein: x / Nitrite: x   Leuk Esterase: x / RBC: x / WBC x   Sq Epi: x / Non Sq Epi: x / Bacteria: x              Assessment/Plan:   85 year old male with 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     Plan:  -recommend MRI CTL spine with and without contrast and MRI brain with/without contrast to rule out infectious versus malignant etiology   -will likely need sedation to tolerate MRI, plan for Tuesday per primary team  -WBAT  -Further recommendations pending imaging    Ethan Barrientos PGY-2    For any questions please contact the on call orthopedic surgery team, please do not reach out via teams.  [facility] [place] pager: [phone]  [facility] pager: [phone]  [facility] pager: [phone]

## 2025-05-26 NOTE — PROGRESS NOTE ADULT - ATTENDING COMMENTS
#Enterococcus bacteremia, unclear source. BCx cleared on 5/17. ID following. s/p PPM explant and leadless PPM. MARIA LUISA ruled out endocarditis. D/c ceftriaxone and continue with ampicillin. Also given bacteremia, MRI lumbar spine for pain/compression deformities is recommended but patient cannot tolerate the exam. Pending MRI with anesthesia (possibly Tuesday).   #Lower back pain, CT lumbar spine with disc bulge and vertebral compression deformities - ortho recommending MRI but could not tolerate. Pain control with dilaudid and tylenol. TLSO brace. Planning to obtain MRI with anesthesia given patient unable to ambulate and R>L weakness + urinary retention requiring a wong. Pt. now having bowel incontinence.   #Urinary retention, failed TOV. Continue with wong.   #Anemia, no signs of bleeding. s/p 1U PRBC 5/19, monitor CBC  #JEROME, likely d/t ATN, creatinine improving  #Hypoxic respiratory failure, initially requiring HFNC. Now on 2L. CXR clear. VQ scan with low probability for PE. Unclear etiology. Possibly atelectasis? Trial ICS. Improved.   #HTN, increase clonidine 0.2mg BID. C/w hydral 100 TID. Continue to hold chlorthalidone and telmisartan in the setting of JEROME. Started on nifedipine 30mg on 5/11 (not home med). Will continue and uptitrate as needed while inpatient but would transition back to home meds when JEROME resolves.  #H/o TIA's, continue w/ Lovenox for now and transition back to Eliquis on d/c

## 2025-05-27 LAB
ALBUMIN SERPL ELPH-MCNC: 2.3 G/DL — LOW (ref 3.3–5)
ALP SERPL-CCNC: 52 U/L — SIGNIFICANT CHANGE UP (ref 40–120)
ALT FLD-CCNC: 22 U/L — SIGNIFICANT CHANGE UP (ref 4–41)
ANION GAP SERPL CALC-SCNC: 7 MMOL/L — SIGNIFICANT CHANGE UP (ref 7–14)
APTT BLD: 29.9 SEC — SIGNIFICANT CHANGE UP (ref 26.1–36.8)
AST SERPL-CCNC: 30 U/L — SIGNIFICANT CHANGE UP (ref 4–40)
BILIRUB SERPL-MCNC: 0.3 MG/DL — SIGNIFICANT CHANGE UP (ref 0.2–1.2)
BUN SERPL-MCNC: 23 MG/DL — SIGNIFICANT CHANGE UP (ref 7–23)
CALCIUM SERPL-MCNC: 8.2 MG/DL — LOW (ref 8.4–10.5)
CHLORIDE SERPL-SCNC: 105 MMOL/L — SIGNIFICANT CHANGE UP (ref 98–107)
CO2 SERPL-SCNC: 25 MMOL/L — SIGNIFICANT CHANGE UP (ref 22–31)
CREAT SERPL-MCNC: 1.36 MG/DL — HIGH (ref 0.5–1.3)
EGFR: 50 ML/MIN/1.73M2 — LOW
EGFR: 50 ML/MIN/1.73M2 — LOW
GLUCOSE BLDC GLUCOMTR-MCNC: 101 MG/DL — HIGH (ref 70–99)
GLUCOSE BLDC GLUCOMTR-MCNC: 115 MG/DL — HIGH (ref 70–99)
GLUCOSE BLDC GLUCOMTR-MCNC: 135 MG/DL — HIGH (ref 70–99)
GLUCOSE BLDC GLUCOMTR-MCNC: 146 MG/DL — HIGH (ref 70–99)
GLUCOSE BLDC GLUCOMTR-MCNC: 204 MG/DL — HIGH (ref 70–99)
GLUCOSE SERPL-MCNC: 124 MG/DL — HIGH (ref 70–99)
HCT VFR BLD CALC: 23 % — LOW (ref 39–50)
HCT VFR BLD CALC: 24.2 % — LOW (ref 39–50)
HGB BLD-MCNC: 7.4 G/DL — LOW (ref 13–17)
HGB BLD-MCNC: 7.6 G/DL — LOW (ref 13–17)
INR BLD: 0.93 RATIO — SIGNIFICANT CHANGE UP (ref 0.85–1.16)
MAGNESIUM SERPL-MCNC: 2 MG/DL — SIGNIFICANT CHANGE UP (ref 1.6–2.6)
MCHC RBC-ENTMCNC: 27.9 PG — SIGNIFICANT CHANGE UP (ref 27–34)
MCHC RBC-ENTMCNC: 28.1 PG — SIGNIFICANT CHANGE UP (ref 27–34)
MCHC RBC-ENTMCNC: 31.4 G/DL — LOW (ref 32–36)
MCHC RBC-ENTMCNC: 32.2 G/DL — SIGNIFICANT CHANGE UP (ref 32–36)
MCV RBC AUTO: 86.8 FL — SIGNIFICANT CHANGE UP (ref 80–100)
MCV RBC AUTO: 89.6 FL — SIGNIFICANT CHANGE UP (ref 80–100)
NRBC # BLD AUTO: 0 K/UL — SIGNIFICANT CHANGE UP (ref 0–0)
NRBC # BLD AUTO: 0 K/UL — SIGNIFICANT CHANGE UP (ref 0–0)
NRBC # FLD: 0 K/UL — SIGNIFICANT CHANGE UP (ref 0–0)
NRBC # FLD: 0 K/UL — SIGNIFICANT CHANGE UP (ref 0–0)
NRBC BLD AUTO-RTO: 0 /100 WBCS — SIGNIFICANT CHANGE UP (ref 0–0)
NRBC BLD AUTO-RTO: 0 /100 WBCS — SIGNIFICANT CHANGE UP (ref 0–0)
PHOSPHATE SERPL-MCNC: 3.2 MG/DL — SIGNIFICANT CHANGE UP (ref 2.5–4.5)
PLATELET # BLD AUTO: 138 K/UL — LOW (ref 150–400)
PLATELET # BLD AUTO: 144 K/UL — LOW (ref 150–400)
POTASSIUM SERPL-MCNC: 4 MMOL/L — SIGNIFICANT CHANGE UP (ref 3.5–5.3)
POTASSIUM SERPL-SCNC: 4 MMOL/L — SIGNIFICANT CHANGE UP (ref 3.5–5.3)
PROT SERPL-MCNC: 5.4 G/DL — LOW (ref 6–8.3)
PROTHROM AB SERPL-ACNC: 11.1 SEC — SIGNIFICANT CHANGE UP (ref 9.9–13.4)
RBC # BLD: 2.65 M/UL — LOW (ref 4.2–5.8)
RBC # BLD: 2.7 M/UL — LOW (ref 4.2–5.8)
RBC # FLD: 16 % — HIGH (ref 10.3–14.5)
RBC # FLD: 16.2 % — HIGH (ref 10.3–14.5)
SODIUM SERPL-SCNC: 137 MMOL/L — SIGNIFICANT CHANGE UP (ref 135–145)
WBC # BLD: 6.69 K/UL — SIGNIFICANT CHANGE UP (ref 3.8–10.5)
WBC # BLD: 6.94 K/UL — SIGNIFICANT CHANGE UP (ref 3.8–10.5)
WBC # FLD AUTO: 6.69 K/UL — SIGNIFICANT CHANGE UP (ref 3.8–10.5)
WBC # FLD AUTO: 6.94 K/UL — SIGNIFICANT CHANGE UP (ref 3.8–10.5)

## 2025-05-27 PROCEDURE — 99232 SBSQ HOSP IP/OBS MODERATE 35: CPT

## 2025-05-27 PROCEDURE — 99233 SBSQ HOSP IP/OBS HIGH 50: CPT | Mod: GC

## 2025-05-27 PROCEDURE — 71045 X-RAY EXAM CHEST 1 VIEW: CPT | Mod: 26

## 2025-05-27 PROCEDURE — G0545: CPT

## 2025-05-27 RX ORDER — HYDROMORPHONE/SOD CHLOR,ISO/PF 2 MG/10 ML
0.2 SYRINGE (ML) INJECTION ONCE
Refills: 0 | Status: DISCONTINUED | OUTPATIENT
Start: 2025-05-27 | End: 2025-05-27

## 2025-05-27 RX ORDER — ACETAMINOPHEN 500 MG/5ML
975 LIQUID (ML) ORAL EVERY 8 HOURS
Refills: 0 | Status: COMPLETED | OUTPATIENT
Start: 2025-05-27 | End: 2025-05-29

## 2025-05-27 RX ORDER — INSULIN LISPRO 100 U/ML
INJECTION, SOLUTION INTRAVENOUS; SUBCUTANEOUS EVERY 6 HOURS
Refills: 0 | Status: DISCONTINUED | OUTPATIENT
Start: 2025-05-27 | End: 2025-05-29

## 2025-05-27 RX ORDER — HYDROMORPHONE/SOD CHLOR,ISO/PF 2 MG/10 ML
0.5 SYRINGE (ML) INJECTION EVERY 4 HOURS
Refills: 0 | Status: DISCONTINUED | OUTPATIENT
Start: 2025-05-27 | End: 2025-05-28

## 2025-05-27 RX ADMIN — Medication 0.5 MILLIGRAM(S): at 03:03

## 2025-05-27 RX ADMIN — Medication 30 MILLIGRAM(S): at 18:22

## 2025-05-27 RX ADMIN — Medication 0.2 MILLIGRAM(S): at 06:18

## 2025-05-27 RX ADMIN — LIDOCAINE HYDROCHLORIDE 1 PATCH: 20 JELLY TOPICAL at 12:50

## 2025-05-27 RX ADMIN — TAMSULOSIN HYDROCHLORIDE 0.4 MILLIGRAM(S): 0.4 CAPSULE ORAL at 21:26

## 2025-05-27 RX ADMIN — INSULIN LISPRO 6 UNIT(S): 100 INJECTION, SOLUTION INTRAVENOUS; SUBCUTANEOUS at 12:48

## 2025-05-27 RX ADMIN — LIDOCAINE HYDROCHLORIDE 1 PATCH: 20 JELLY TOPICAL at 18:25

## 2025-05-27 RX ADMIN — ENOXAPARIN SODIUM 90 MILLIGRAM(S): 100 INJECTION SUBCUTANEOUS at 18:23

## 2025-05-27 RX ADMIN — INSULIN LISPRO 6 UNIT(S): 100 INJECTION, SOLUTION INTRAVENOUS; SUBCUTANEOUS at 18:19

## 2025-05-27 RX ADMIN — AMPICILLIN SODIUM 200 GRAM(S): 1 INJECTION, POWDER, FOR SOLUTION INTRAMUSCULAR; INTRAVENOUS at 02:43

## 2025-05-27 RX ADMIN — Medication 0.5 MILLIGRAM(S): at 20:34

## 2025-05-27 RX ADMIN — Medication 0.2 MILLIGRAM(S): at 14:50

## 2025-05-27 RX ADMIN — ENOXAPARIN SODIUM 90 MILLIGRAM(S): 100 INJECTION SUBCUTANEOUS at 07:14

## 2025-05-27 RX ADMIN — ROSUVASTATIN CALCIUM 10 MILLIGRAM(S): 5 TABLET, FILM COATED ORAL at 21:26

## 2025-05-27 RX ADMIN — Medication 0.5 MILLIGRAM(S): at 08:30

## 2025-05-27 RX ADMIN — AMPICILLIN SODIUM 200 GRAM(S): 1 INJECTION, POWDER, FOR SOLUTION INTRAMUSCULAR; INTRAVENOUS at 18:18

## 2025-05-27 RX ADMIN — INSULIN LISPRO 4: 100 INJECTION, SOLUTION INTRAVENOUS; SUBCUTANEOUS at 12:48

## 2025-05-27 RX ADMIN — Medication 1 APPLICATION(S): at 12:49

## 2025-05-27 RX ADMIN — Medication 0.2 MILLIGRAM(S): at 13:50

## 2025-05-27 RX ADMIN — AMPICILLIN SODIUM 200 GRAM(S): 1 INJECTION, POWDER, FOR SOLUTION INTRAMUSCULAR; INTRAVENOUS at 13:51

## 2025-05-27 RX ADMIN — Medication 975 MILLIGRAM(S): at 21:33

## 2025-05-27 RX ADMIN — Medication 0.5 MILLIGRAM(S): at 07:37

## 2025-05-27 RX ADMIN — AMPICILLIN SODIUM 200 GRAM(S): 1 INJECTION, POWDER, FOR SOLUTION INTRAMUSCULAR; INTRAVENOUS at 09:55

## 2025-05-27 RX ADMIN — Medication 81 MILLIGRAM(S): at 14:47

## 2025-05-27 RX ADMIN — Medication 0.5 MILLIGRAM(S): at 04:29

## 2025-05-27 RX ADMIN — AMPICILLIN SODIUM 200 GRAM(S): 1 INJECTION, POWDER, FOR SOLUTION INTRAMUSCULAR; INTRAVENOUS at 06:19

## 2025-05-27 RX ADMIN — Medication 0.5 MILLIGRAM(S): at 12:13

## 2025-05-27 RX ADMIN — Medication 100 MILLIGRAM(S): at 06:18

## 2025-05-27 RX ADMIN — Medication 100 MILLIGRAM(S): at 21:27

## 2025-05-27 RX ADMIN — Medication 0.5 MILLIGRAM(S): at 21:15

## 2025-05-27 RX ADMIN — Medication 0.5 MILLIGRAM(S): at 16:16

## 2025-05-27 RX ADMIN — Medication 100 MILLIGRAM(S): at 14:47

## 2025-05-27 RX ADMIN — Medication 0.5 MILLIGRAM(S): at 17:10

## 2025-05-27 RX ADMIN — Medication 150 MICROGRAM(S): at 06:18

## 2025-05-27 RX ADMIN — Medication 0.5 MILLIGRAM(S): at 13:00

## 2025-05-27 RX ADMIN — Medication 975 MILLIGRAM(S): at 22:35

## 2025-05-27 RX ADMIN — Medication 0.2 MILLIGRAM(S): at 18:22

## 2025-05-27 RX ADMIN — INSULIN GLARGINE-YFGN 24 UNIT(S): 100 INJECTION, SOLUTION SUBCUTANEOUS at 21:33

## 2025-05-27 RX ADMIN — AMPICILLIN SODIUM 200 GRAM(S): 1 INJECTION, POWDER, FOR SOLUTION INTRAMUSCULAR; INTRAVENOUS at 21:25

## 2025-05-27 NOTE — ADVANCED PRACTICE NURSE CONSULT - ASSESSMENT
General: A&Ox4, able to turn with 3x assistance, patient endorses pain with turning and repositioning, incontinent of stool. Indwelling wong catheter in place. Skin warm, dry with increased moisture in intertriginous folds, scattered areas of hyperpigmentation and hypopigmentation, scattered areas of ecchymosis without hematoma on bilateral upper and lower extremities. Generalized edema noted +3 to upper and lower extremities.     Vascular of b/l lower extremities: Bilateral lower extremities with scattered areas of hyper and hypopigmentation. Dry, flaky skin. Thickened-yellow hyperpigmented overgrown toenails. No temperature changes noted. +3 Edema. Capillary refill less than 3 seconds. Blanchable erythema noted to b/l heels.     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 Presenting as irregularly bordered denudations exposing moist red dermis, yellow fibrin, and <10% moist, slough. Doesn't overly alayna prominences. Scant serosanguinous drainage, no odor. Periwound hyperpigmentation and blanchable erythema. No increased warmth, no induration, no edema, no overt signs of infection noted at this time. Goals of care: monitor for tissue type changes, antimicrobial support, continue measures to decrease friction/shear/pressure.

## 2025-05-27 NOTE — CHART NOTE - NSCHARTNOTEFT_GEN_A_CORE
NUTRITION FOLLOW UP NOTE    Pt seen for nutritional follow-up    SOURCE: [X] Patient [X] Medical record [] RN/PCA [X] Family/Caregiver []Patient unavailable []Patient inappropriate (disoriented, nonverbal, intubated/sedated) [] Other:    Medical Course: Per chart, Pt is 85M with HTN, HLD, DM2, prostate cancer s/p prostatectomy, CKD, TIA with a pacemaker who presents transferred from [place] for further evaluation of low back pain secondary to lumbar compression deformities. EP on board to clear PPM for MRI compatibility. Found to have e facaelis bacteremia. On abx, unable to tolerate MRI s/p PPM removal on 5/16 and exchange for leadless PPM. Plan for MARIA LUISA on 5/20 -> negative for endocarditis    Diet Prescription: Diet, Consistent Carbohydrate w/Evening Snack (05-11-25 @ 04:35)      N-utrition Course: Pt seen at bedside with daughter present. Pt reported good PO intake in house. Per RN flowsheet, Pt with % noted. No new food preferences verbalized at this time. Patient denies difficulty chewing or swallowing at present. Pt denies any nausea, vomiting, diarrhea, or constipation at this time. Per chart, last BM 5/26. Ordered for bowel regimen. Bilateral ankle and foot edema 1+ noted, per RN flowsheet. RDN answered questions/concerns related to diet. RDN remains available and will follow-up per protocol.        Pertinent Medications: MEDICATIONS  (STANDING):  ampicillin  IVPB 2 Gram(s) IV Intermittent every 4 hours  aspirin  chewable 81 milliGRAM(s) Oral daily  bisacodyl 5 milliGRAM(s) Oral at bedtime  chlorhexidine 2% Cloths 1 Application(s) Topical daily  chlorhexidine 2% Cloths 1 Application(s) Topical daily  cloNIDine 0.2 milliGRAM(s) Oral two times a day  dextrose 5%. 1000 milliLiter(s) (100 mL/Hr) IV Continuous <Continuous>  dextrose 5%. 1000 milliLiter(s) (50 mL/Hr) IV Continuous <Continuous>  dextrose 50% Injectable 25 Gram(s) IV Push once  dextrose 50% Injectable 12.5 Gram(s) IV Push once  dextrose 50% Injectable 25 Gram(s) IV Push once  dextrose Oral Gel 15 Gram(s) Oral once  enoxaparin Injectable 90 milliGRAM(s) SubCutaneous every 12 hours  glucagon  Injectable 1 milliGRAM(s) IntraMuscular once  hydrALAZINE 100 milliGRAM(s) Oral three times a day  insulin glargine Injectable (LANTUS) 24 Unit(s) SubCutaneous at bedtime  insulin lispro (ADMELOG) corrective regimen sliding scale   SubCutaneous three times a day before meals  insulin lispro (ADMELOG) corrective regimen sliding scale   SubCutaneous at bedtime  insulin lispro Injectable (ADMELOG) 6 Unit(s) SubCutaneous three times a day before meals  levothyroxine 150 MICROGram(s) Oral daily  lidocaine   4% Patch 1 Patch Transdermal daily  NIFEdipine XL 30 milliGRAM(s) Oral daily  polyethylene glycol 3350 17 Gram(s) Oral two times a day  propranolol  milliGRAM(s) Oral daily  rosuvastatin 10 milliGRAM(s) Oral at bedtime  senna 2 Tablet(s) Oral at bedtime  tamsulosin 0.4 milliGRAM(s) Oral at bedtime    MEDICATIONS  (PRN):  HYDROmorphone  Injectable 0.5 milliGRAM(s) IV Push every 4 hours PRN Severe Pain (7 - 10)      Pertinent Labs: 05-27 Na137 mmol/L Glu 124 mg/dL[H] K+ 4.0 mmol/L Cr  1.36 mg/dL[H] BUN 23 mg/dL 05-27 Phos 3.2 mg/dL 05-27 Alb 2.3 g/dL[L]     A1C with Estimated Average Glucose Result: 6.9 % (05-07-25 @ 06:03)    CAPILLARY BLOOD GLUCOSE  POCT Blood Glucose.: 204 mg/dL (27 May 2025 12:35)  POCT Blood Glucose.: 146 mg/dL (27 May 2025 08:58)  POCT Blood Glucose.: 101 mg/dL (26 May 2025 21:21)  POCT Blood Glucose.: 117 mg/dL (26 May 2025 17:48)      Weight: 92.9kg (5/19 dosing wt). 92.9kg (5/16).   Weight Assessment: Wt relatively stable.   Height: 71in / 180.3cm  IBW: 172lbs / 78.1kg +/-10%  BMI:28.6 kg/m^2    Physical Assessment, per flowsheets:  Edema: Bilateral ankle and foot edema 1+ noted.   Pressure Injury: No PI/DTI noted.     Estimated Needs:   [X] No change since previous assessment, weight 74.8 kg  1870 - 2244 kcal daily @ 25 - 30 kcal/kg, 75 - 90 gm protein daily @ 1.0 - 1.2 gm/kg       Previous Nutrition Diagnosis:   [X]Inadequate Oral Intake  Nutrition Diagnosis is [X] ongoing  [ ] resolved [ ] not applicable   New Nutrition Diagnosis: [X] not applicable     Education:  [ ] Provided pt with verbal / written education on   [X] Provided on previous assessment by RD  [ ] Not applicable secondary to   [ ] Pt refused     Interventions:   1) - Continue current diet order, which remains appropriate at this time.   2) - Please consistently document % PO intake in nursing flowsheets to assess adequacy of nutritional intake/monitor need for further nutritional intervention(s).   3) - RDN services remain available as needed.       Monitor & Evaluate:  PO intake, tolerance to diet/supplement, nutrition related lab values, weight trends, BMs/GI distress, hydration status, skin integrity.    Sawyer Chavez RD, CDN. Available on MS teams,  Pager #[phone]

## 2025-05-27 NOTE — PROGRESS NOTE ADULT - SUBJECTIVE AND OBJECTIVE BOX
PROGRESS NOTE:     Patient is a 85y old  Male who presents with a chief complaint of back pain (27 May 2025 05:24)      INTERVAL HISTORY: NAEO. VSS. ROS negative.    MEDICATIONS  (STANDING):  ampicillin  IVPB 2 Gram(s) IV Intermittent every 4 hours  aspirin  chewable 81 milliGRAM(s) Oral daily  bisacodyl 5 milliGRAM(s) Oral at bedtime  chlorhexidine 2% Cloths 1 Application(s) Topical daily  chlorhexidine 2% Cloths 1 Application(s) Topical daily  cloNIDine 0.2 milliGRAM(s) Oral two times a day  dextrose 5%. 1000 milliLiter(s) (50 mL/Hr) IV Continuous <Continuous>  dextrose 5%. 1000 milliLiter(s) (100 mL/Hr) IV Continuous <Continuous>  dextrose 50% Injectable 25 Gram(s) IV Push once  dextrose 50% Injectable 12.5 Gram(s) IV Push once  dextrose 50% Injectable 25 Gram(s) IV Push once  dextrose Oral Gel 15 Gram(s) Oral once  enoxaparin Injectable 90 milliGRAM(s) SubCutaneous every 12 hours  glucagon  Injectable 1 milliGRAM(s) IntraMuscular once  hydrALAZINE 100 milliGRAM(s) Oral three times a day  insulin glargine Injectable (LANTUS) 24 Unit(s) SubCutaneous at bedtime  insulin lispro (ADMELOG) corrective regimen sliding scale   SubCutaneous three times a day before meals  insulin lispro (ADMELOG) corrective regimen sliding scale   SubCutaneous at bedtime  insulin lispro Injectable (ADMELOG) 6 Unit(s) SubCutaneous three times a day before meals  levothyroxine 150 MICROGram(s) Oral daily  lidocaine   4% Patch 1 Patch Transdermal daily  NIFEdipine XL 30 milliGRAM(s) Oral daily  polyethylene glycol 3350 17 Gram(s) Oral two times a day  propranolol  milliGRAM(s) Oral daily  rosuvastatin 10 milliGRAM(s) Oral at bedtime  senna 2 Tablet(s) Oral at bedtime  tamsulosin 0.4 milliGRAM(s) Oral at bedtime    MEDICATIONS  (PRN):  HYDROmorphone  Injectable 0.5 milliGRAM(s) IV Push every 4 hours PRN Severe Pain (7 - 10)      CAPILLARY BLOOD GLUCOSE      POCT Blood Glucose.: 101 mg/dL (26 May 2025 21:21)  POCT Blood Glucose.: 117 mg/dL (26 May 2025 17:48)  POCT Blood Glucose.: 124 mg/dL (26 May 2025 12:50)  POCT Blood Glucose.: 138 mg/dL (26 May 2025 08:34)    I&O's Summary    26 May 2025 07:01  -  27 May 2025 07:00  --------------------------------------------------------  IN: 0 mL / OUT: 650 mL / NET: -650 mL        PHYSICAL EXAM:  Vital Signs Last 24 Hrs  T(C): 36.5 (27 May 2025 06:06), Max: 36.8 (26 May 2025 11:56)  T(F): 97.7 (27 May 2025 06:06), Max: 98.3 (26 May 2025 11:56)  HR: 59 (27 May 2025 06:06) (57 - 64)  BP: 151/61 (27 May 2025 06:06) (126/48 - 170/60)  BP(mean): --  RR: 18 (27 May 2025 06:06) (17 - 18)  SpO2: 98% (27 May 2025 06:06) (97% - 98%)    Parameters below as of 27 May 2025 06:06  Patient On (Oxygen Delivery Method): nasal cannula  O2 Flow (L/min): 1      CONSTITUTIONAL: NAD, well-developed  HEENT:   RESPIRATORY: Normal respiratory effort; lungs are clear to auscultation bilaterally  CARDIOVASCULAR: Regular rate and rhythm, normal S1 and S2, no murmur/rub/gallop; No lower extremity edema; Peripheral pulses are 2+ bilaterally  ABDOMEN: Nontender to palpation, normoactive bowel sounds, no rebound/guarding; No hepatosplenomegaly  MUSCULOSKELETAL: no clubbing or cyanosis of digits; no joint swelling or tenderness to palpation  PSYCH: A+O to person, place, and time; affect appropriate    LABS:                        7.4    6.69  )-----------( 144      ( 27 May 2025 05:40 )             23.0     05-27    137  |  105  |  23  ----------------------------<  124[H]  4.0   |  25  |  1.36[H]    Ca    8.2[L]      27 May 2025 05:40  Phos  3.2     05-27  Mg     2.00     05-27    TPro  5.4[L]  /  Alb  2.3[L]  /  TBili  0.3  /  DBili  x   /  AST  30  /  ALT  22  /  AlkPhos  52  05-27    PT/INR - ( 27 May 2025 05:40 )   PT: 11.1 sec;   INR: 0.93 ratio         PTT - ( 27 May 2025 05:40 )  PTT:29.9 sec      Urinalysis Basic - ( 27 May 2025 05:40 )    Color: x / Appearance: x / SG: x / pH: x  Gluc: 124 mg/dL / Ketone: x  / Bili: x / Urobili: x   Blood: x / Protein: x / Nitrite: x   Leuk Esterase: x / RBC: x / WBC x   Sq Epi: x / Non Sq Epi: x / Bacteria: x          RADIOLOGY:        ******************************  Authored By: Jacinto Rodriguez MD PGY1  Internal Medicine  MS Teams  ******************************

## 2025-05-27 NOTE — PROGRESS NOTE ADULT - SUBJECTIVE AND OBJECTIVE BOX
Orthopedic Surgery      Pt seen and examined. Pt denies any overnight events. Pt reports pain is well controlled. Pt denies any fever/chills/chest pain/nausea/vomiting. Plan for MRI today    ICU Vital Signs Last 24 Hrs  T(C): 36.6 (26 May 2025 22:41), Max: 36.8 (26 May 2025 11:56)  T(F): 97.8 (26 May 2025 22:41), Max: 98.3 (26 May 2025 11:56)  HR: 59 (26 May 2025 22:41) (57 - 64)  BP: 169/67 (26 May 2025 22:41) (126/48 - 170/60)  BP(mean): --  ABP: --  ABP(mean): --  RR: 17 (26 May 2025 22:41) (17 - 18)  SpO2: 97% (26 May 2025 22:41) (97% - 98%)          Physical exam:   GEN: NAD, resting quietly  PULM: symmetric chest rise bilaterally, no increased WOB  ABD: nondistended  SPINE:   Motor exam:          Upper extremity         C5 (Shoulder Abd)    C6 (Elbow flex)   C7 (Elbow ext)   C8 (Finger flex) T1 (finger abd)         R         5/5                 5/5                       5/5                      5/5                         5/5          L          5/5                 5/5                      5/5                      5/5                         5/5                   Lower extremity           L2 (Hip flex)  L3 (knee ext)  L4 (Dorsi flex)  L5 (EHL)  S1 (Plantar flex)                                               R        2/5            5/5             1/5                    1/5          4/5      L        3/5            5/5             5/5                   5/5           5/5    Sensory exam:                         C5      C6      C7      C8       T1          RIGHT          2         2        2         2         2          (0=absent, 1=impaired, 2=normal, NT=not testable)  LEFT             2         2        2         2         2          (0=absent, 1=impaired, 2=normal, NT=not testable)                          L2      L3     L4     L5       S1          RIGHT          2         2        2         2         2          (0=absent, 1=impaired, 2=normal, NT=not testable)  LEFT             2         2        2         2         2          (0=absent, 1=impaired, 2=normal, NT=not testable)         LABS:                        8.2    8.59  )-----------( 161      ( 26 May 2025 06:59 )             25.2     05-26    137  |  104  |  22  ----------------------------<  132[H]  4.0   |  24  |  1.28    Ca    8.1[L]      26 May 2025 06:59  Phos  3.2     05-26  Mg     2.00     05-26    TPro  5.4[L]  /  Alb  2.4[L]  /  TBili  0.4  /  DBili  x   /  AST  24  /  ALT  15  /  AlkPhos  54  05-26      Urinalysis Basic - ( 26 May 2025 06:59 )    Color: x / Appearance: x / SG: x / pH: x  Gluc: 132 mg/dL / Ketone: x  / Bili: x / Urobili: x   Blood: x / Protein: x / Nitrite: x   Leuk Esterase: x / RBC: x / WBC x   Sq Epi: x / Non Sq Epi: x / Bacteria: x              Assessment/Plan:   85 year old male with 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 Plan for sedated MRI today    Plan:  -recommend MRI CTL spine with and without contrast and MRI brain with/without contrast to rule out infectious versus malignant etiology   -will likely need sedation to tolerate MRI, plan for Tuesday per primary team  -WBAT  -Further recommendations pending imaging    Ethan Barrientos PGY-2    For any questions please contact the on call orthopedic surgery team, please do not reach out via teams.  [facility] [place] pager: [phone]  [facility] pager: [phone]  [facility] pager: [phone]

## 2025-05-27 NOTE — PROGRESS NOTE ADULT - ATTENDING COMMENTS
85M w/ hypertension, type 2 diabetes (A1c 6.9%), CKD (b/l Cr 1.5-2), prostate cancer s/p prostatectomy, h/o TIA, sinus node dysfunction s/p PPM presented to [facility] with lower back pain, found to have imaging with lumbar compression deformities and high grade E faecalis bacteremia, now s/p PPM extraction and replacement with leadless PPM, negative TTE and MARIA LUISA for vegetation, and cleared cultures, course c/b worsening LE weakness (R>L) and persistent urinary retention. Unable to tolerate MRI due to back pain, requires study with anesthesia to better evaluate for possible metastatic site of infection to spine.    - Ortho and ID following  - Team working to coordinate MRI with anesthesia - per team discussion with anesthesiologist this morning they do not feel it is safe to perform MRI brain and total spine under anesthesia as one exam and would need to do as multiple. Will prioritize MRI T/L spine for now.  - Continue ampicillin  - On lovenox for /up TIA (?AF) in place of DOAC pending any need for additional procedures  - Continue wong for urinary retention  - Elevate L arm, warm compresses for superficial venous thrombosis    Discussed with patient and his daughter at bedside    Time-based billing (NON-critical care).   50 minutes spent on total encounter, which excludes teaching and separately reported services. The necessity of the time spent during the encounter on this date of service was due to:   Documentation in [place], reviewing chart and coordinating care with patient/resident and interdisciplinary staff (such as , social workers, etc) as well as reviewing vitals, laboratory data, radiology, medication list, consultants' recommendations and prior records. Interventions were performed as documented above.

## 2025-05-27 NOTE — PROGRESS NOTE ADULT - ASSESSMENT
Pt is an 85M with HTN, HLD, DM2, prostate cancer s/p prostatectomy, CKD, TIA with a pacemaker who presents transferred from [place] for further evaluation of low back pain secondary to lumbar compression deformities. EP on board to clear PPM for MRI compatibility. Found to have e facaelis bacteremia. On abx, unable to tolerate MRI s/p PPM removal on 5/16 and exchange for leadless PPM. Plan for MARIA LUISA on 5/20 -> negative for endocarditis. Pursuing MRI with sedation, plan for Tuesday 5/27

## 2025-05-27 NOTE — ADVANCED PRACTICE NURSE CONSULT - REASON FOR CONSULT
Patient seen on skin care rounds after wound care referral received for assessment of skin impairment and recommendations of topical management. As per H&P, patient is a Pt is an 85M with HTN, HLD, DM2, prostate cancer s/p prostatectomy, CKD, TIA with a pacemaker who presents transferred from [place] for further evaluation of low back pain secondary to lumbar compression deformities. EP on board to clear PPM for MRI compatibility. Chart reviewed: WBC 6.94, H/H 7.6/24.2, Platelets 138, INR 0.93, Serum albumin 2.3, A1C 6.9, BMI 28.6, Bernard 13.

## 2025-05-27 NOTE — PROGRESS NOTE ADULT - ASSESSMENT
This is a 86 y/o M w/ PMhx HTN, HLD, DM2, hypothyroidism, prostate cancer s/p prostatectomy, CKD, TIA, pacemaker placed in 5/2024 for abnormal arrythmia initially admitted to [facility] on 5/6 for back pain, now transferred to [facility] for orthopedic spine. Labs w/ leukocytosis to 18, BCx w/ E faecalis in multiple sets.     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 S/p PPM removal    #LBP, L3-4 disc bulge pending repeat MRI  #Respiratory failure, improved   #JEROME, improving     Overall, 86 y/o M w/ PMhx HTN, HLD, DM2, hypothyroidism, prostate cancer s/p prostatectomy, CKD, TIA, pacemaker placed in 5/2024 for abnormal arrythmia transferred to [facility] from [facility] for MRI spine and ortho evaluation give LBP and lumbar compression w/ urinary retention. Pt noted to have chills and leukocytosis to 18, now with high grade E faecalis bacteremia. Unclear source however UCx not done, U/A with 10 WBCs, CT A/P w/o IV contrast on 5/10 w/o acute infectious source, MRI L spine here limited exam, findings of stenosis, no clear OM/discitis, however no contrast.   S/p PPM removal, MARIA LUISA w/o vegetations.     Recommendations:   1. Ampicillin 2g q4. Course pending MRI (4-6 weeks likely after neg BCx)   2. MARIA LUISA negative off Ceftriaxone  3. BCx from 5/15 NG  4. Pending MRI w/ sedation per primary team     Thank you for consulting us and involving us in the management of this patient's case. In addition to reviewing history, imaging, documents, labs, microbiology, and infection control strategies and potential issues.     ID will continue to follow    Darrin Falk M.D.  Attending Physician  Division of Infectious Diseases  Department of Medicine    Please contact through MS Teams message.  Office: [phone] (after 5 PM or weekend) .

## 2025-05-27 NOTE — ADVANCED PRACTICE NURSE CONSULT - RECOMMEDATIONS
Topical recommendations:     ---Sacrum to b/l buttocks: Cleanse with skin cleanser, pat dry. Apply a thin layer of TRIAD paste/hydrophilic dressing twice a day and PRN with incontinent episodes. With episodes of incontinence only remove soiled layer of Triad, then reinforce with thin layer.   ---Apply Sween 24 Moisturizer to b/l UE and LE daily, avoiding in between the toes.   While inpatient, continue low air loss bed therapy, continue heel elevation, continue to turn & reposition as per protocol, soft pillow between bony prominences, continue moisture management with barrier creams & single breathable pad, continue measures to decrease friction/shear/pressure. Continue with nutritional support as per dietary/orders.     Plan of care discussed with Primary RN Arelis and    Please contact Wound/Ostomy Care Service Line if we can be of further assistance (ext [phone]).  Topical recommendations:     ---Sacrum to b/l buttocks: Cleanse with skin cleanser, pat dry. Apply a thin layer of TRIAD paste/hydrophilic dressing twice a day and PRN with incontinent episodes. With episodes of incontinence only remove soiled layer of Triad, then reinforce with thin layer.   ---Apply Sween 24 Moisturizer to b/l UE and LE daily, avoiding in between the toes.   While inpatient, continue low air loss bed therapy, continue heel elevation, continue to turn & reposition as per protocol, soft pillow between bony prominences, continue moisture management with barrier creams & single breathable pad, continue measures to decrease friction/shear/pressure. Continue with nutritional support as per dietary/orders.     Plan of care discussed with Primary RN Arelis and Marcus Verde).    Please contact Wound/Ostomy Care Service Line if we can be of further assistance (ext [phone]).

## 2025-05-27 NOTE — PROGRESS NOTE ADULT - SUBJECTIVE AND OBJECTIVE BOX
Infectious Diseases Follow Up:    Patient is a 85y old  Male who presents with a chief complaint of back pain (27 May 2025 07:35)      Interval History/ROS:  No acute events     Allergies  gabapentin (Other)        ANTIMICROBIALS:  ampicillin  IVPB 2 every 4 hours      Current Abx:     Previous Abx     OTHER MEDS:  MEDICATIONS  (STANDING):  aspirin  chewable 81 daily  bisacodyl 5 at bedtime  cloNIDine 0.2 two times a day  dextrose 50% Injectable 25 once  dextrose 50% Injectable 12.5 once  dextrose 50% Injectable 25 once  dextrose Oral Gel 15 once  enoxaparin Injectable 90 every 12 hours  glucagon  Injectable 1 once  hydrALAZINE 100 three times a day  HYDROmorphone  Injectable 0.5 every 4 hours PRN  insulin glargine Injectable (LANTUS) 24 at bedtime  insulin lispro (ADMELOG) corrective regimen sliding scale  three times a day before meals  insulin lispro (ADMELOG) corrective regimen sliding scale  at bedtime  insulin lispro Injectable (ADMELOG) 6 three times a day before meals  levothyroxine 150 daily  NIFEdipine XL 30 daily  polyethylene glycol 3350 17 two times a day  propranolol  daily  rosuvastatin 10 at bedtime  senna 2 at bedtime  tamsulosin 0.4 at bedtime      Vital Signs Last 24 Hrs  T(C): 36.5 (27 May 2025 06:06), Max: 36.8 (26 May 2025 11:56)  T(F): 97.7 (27 May 2025 06:06), Max: 98.3 (26 May 2025 11:56)  HR: 59 (27 May 2025 06:06) (57 - 64)  BP: 151/61 (27 May 2025 06:06) (126/48 - 170/60)  BP(mean): --  RR: 18 (27 May 2025 06:06) (17 - 18)  SpO2: 98% (27 May 2025 06:06) (97% - 98%)    Parameters below as of 27 May 2025 06:06  Patient On (Oxygen Delivery Method): nasal cannula  O2 Flow (L/min): 1      PHYSICAL EXAM:  GENERAL: NAD, well-developed  HEAD:  Atraumatic, Normocephalic  EYES: EOMI, conjunctiva and sclera clear  CHEST/LUNG: On NC, not in respiratory distress, clear to auscultation bilaterally;   PSYCH: AAOx3                          7.4    6.69  )-----------( 144      ( 27 May 2025 05:40 )             23.0       05-27    137  |  105  |  23  ----------------------------<  124[H]  4.0   |  25  |  1.36[H]    Ca    8.2[L]      27 May 2025 05:40  Phos  3.2     05-27  Mg     2.00     05-27    TPro  5.4[L]  /  Alb  2.3[L]  /  TBili  0.3  /  DBili  x   /  AST  30  /  ALT  22  /  AlkPhos  52  05-27      Urinalysis Basic - ( 27 May 2025 05:40 )    Color: x / Appearance: x / SG: x / pH: x  Gluc: 124 mg/dL / Ketone: x  / Bili: x / Urobili: x   Blood: x / Protein: x / Nitrite: x   Leuk Esterase: x / RBC: x / WBC x   Sq Epi: x / Non Sq Epi: x / Bacteria: x        MICROBIOLOGY:  v  Blood Blood  05-18-25   No growth at 5 days  --  --      Blood Blood-Peripheral  05-17-25   No growth at 5 days  --  --      Blood Blood-Peripheral  05-17-25   No growth at 5 days  --  --      Blood Blood  05-17-25   No growth at 5 days  --  --      Blood Blood  05-15-25   No growth at 5 days  --  --      Blood Blood  05-15-25   No growth at 5 days  --  --      Blood Blood  05-14-25   Growth in aerobic bottle: Enterococcus faecalis  See previous culture [id number]  Direct identification is available within approximately 3-5  hours either by Blood Panel Multiplexed PCR or Direct  MALDI-TOF. Details: [email or web address]  --  Blood Culture PCR      Blood Blood  05-14-25   Growth in aerobic and anaerobic bottles: Enterococcus faecalis  Growth in anaerobic bottle: blood culture bottle turned positive after  the final report was released.  --  Enterococcus faecalis      Blood Blood-Peripheral  05-12-25   Growth in aerobic and anaerobic bottles: Enterococcus faecalis  See previous culture [id number]  --    Growth in aerobic bottle: Gram Positive Cocci in Pairs and Chains  Growth in anaerobic bottle: Gram Positive Cocci in Pairs and Chains      Blood Blood-Peripheral  05-12-25   Growth in aerobic and anaerobic bottles: Enterococcus faecalis  See previous culture [id number]  --    Growth in aerobic bottle: Gram Positive Cocci in Pairs and Chains  Growth in anaerobic bottle: Gram Positive Cocci in Pairs and Chains      Blood Blood-Peripheral  05-11-25   Growth in aerobic and anaerobic bottles: Enterococcus faecalis  Direct identification is available within approximately 3-5  hours either by Blood Panel Multiplexed PCR or Direct  MALDI-TOF. Details: [email or web address]  --  Blood Culture PCR  Enterococcus faecalis                RADIOLOGY:

## 2025-05-28 ENCOUNTER — APPOINTMENT (OUTPATIENT)
Dept: ELECTROPHYSIOLOGY | Facility: CLINIC | Age: 85
End: 2025-05-28

## 2025-05-28 LAB
ANION GAP SERPL CALC-SCNC: 8 MMOL/L — SIGNIFICANT CHANGE UP (ref 7–14)
BUN SERPL-MCNC: 19 MG/DL — SIGNIFICANT CHANGE UP (ref 7–23)
CALCIUM SERPL-MCNC: 8.2 MG/DL — LOW (ref 8.4–10.5)
CHLORIDE SERPL-SCNC: 104 MMOL/L — SIGNIFICANT CHANGE UP (ref 98–107)
CO2 SERPL-SCNC: 25 MMOL/L — SIGNIFICANT CHANGE UP (ref 22–31)
CREAT SERPL-MCNC: 1.29 MG/DL — SIGNIFICANT CHANGE UP (ref 0.5–1.3)
EGFR: 54 ML/MIN/1.73M2 — LOW
EGFR: 54 ML/MIN/1.73M2 — LOW
GLUCOSE BLDC GLUCOMTR-MCNC: 104 MG/DL — HIGH (ref 70–99)
GLUCOSE BLDC GLUCOMTR-MCNC: 106 MG/DL — HIGH (ref 70–99)
GLUCOSE BLDC GLUCOMTR-MCNC: 70 MG/DL — SIGNIFICANT CHANGE UP (ref 70–99)
GLUCOSE BLDC GLUCOMTR-MCNC: 85 MG/DL — SIGNIFICANT CHANGE UP (ref 70–99)
GLUCOSE BLDC GLUCOMTR-MCNC: 98 MG/DL — SIGNIFICANT CHANGE UP (ref 70–99)
GLUCOSE SERPL-MCNC: 79 MG/DL — SIGNIFICANT CHANGE UP (ref 70–99)
HCT VFR BLD CALC: 25.7 % — LOW (ref 39–50)
HGB BLD-MCNC: 8.3 G/DL — LOW (ref 13–17)
MAGNESIUM SERPL-MCNC: 2 MG/DL — SIGNIFICANT CHANGE UP (ref 1.6–2.6)
MCHC RBC-ENTMCNC: 27.9 PG — SIGNIFICANT CHANGE UP (ref 27–34)
MCHC RBC-ENTMCNC: 32.3 G/DL — SIGNIFICANT CHANGE UP (ref 32–36)
MCV RBC AUTO: 86.5 FL — SIGNIFICANT CHANGE UP (ref 80–100)
NRBC # BLD AUTO: 0 K/UL — SIGNIFICANT CHANGE UP (ref 0–0)
NRBC # FLD: 0 K/UL — SIGNIFICANT CHANGE UP (ref 0–0)
NRBC BLD AUTO-RTO: 0 /100 WBCS — SIGNIFICANT CHANGE UP (ref 0–0)
PHOSPHATE SERPL-MCNC: 3 MG/DL — SIGNIFICANT CHANGE UP (ref 2.5–4.5)
PLATELET # BLD AUTO: 156 K/UL — SIGNIFICANT CHANGE UP (ref 150–400)
POTASSIUM SERPL-MCNC: 3.8 MMOL/L — SIGNIFICANT CHANGE UP (ref 3.5–5.3)
POTASSIUM SERPL-SCNC: 3.8 MMOL/L — SIGNIFICANT CHANGE UP (ref 3.5–5.3)
RBC # BLD: 2.97 M/UL — LOW (ref 4.2–5.8)
RBC # FLD: 16 % — HIGH (ref 10.3–14.5)
SODIUM SERPL-SCNC: 137 MMOL/L — SIGNIFICANT CHANGE UP (ref 135–145)
WBC # BLD: 6.72 K/UL — SIGNIFICANT CHANGE UP (ref 3.8–10.5)
WBC # FLD AUTO: 6.72 K/UL — SIGNIFICANT CHANGE UP (ref 3.8–10.5)

## 2025-05-28 PROCEDURE — 99232 SBSQ HOSP IP/OBS MODERATE 35: CPT | Mod: GC

## 2025-05-28 PROCEDURE — 99232 SBSQ HOSP IP/OBS MODERATE 35: CPT

## 2025-05-28 PROCEDURE — G0545: CPT

## 2025-05-28 RX ORDER — DEXTROSE 50 % IN WATER 50 %
12.5 SYRINGE (ML) INTRAVENOUS ONCE
Refills: 0 | Status: COMPLETED | OUTPATIENT
Start: 2025-05-28 | End: 2025-05-28

## 2025-05-28 RX ORDER — OXYCODONE HYDROCHLORIDE 30 MG/1
2.5 TABLET ORAL EVERY 6 HOURS
Refills: 0 | Status: DISCONTINUED | OUTPATIENT
Start: 2025-05-28 | End: 2025-05-31

## 2025-05-28 RX ORDER — OXYCODONE HYDROCHLORIDE 30 MG/1
5 TABLET ORAL EVERY 6 HOURS
Refills: 0 | Status: DISCONTINUED | OUTPATIENT
Start: 2025-05-28 | End: 2025-05-31

## 2025-05-28 RX ORDER — HYDROMORPHONE/SOD CHLOR,ISO/PF 2 MG/10 ML
0.5 SYRINGE (ML) INJECTION EVERY 4 HOURS
Refills: 0 | Status: DISCONTINUED | OUTPATIENT
Start: 2025-05-28 | End: 2025-06-02

## 2025-05-28 RX ORDER — AMPICILLIN SODIUM 1 G/1
2 INJECTION, POWDER, FOR SOLUTION INTRAMUSCULAR; INTRAVENOUS EVERY 4 HOURS
Refills: 0 | Status: DISCONTINUED | OUTPATIENT
Start: 2025-05-28 | End: 2025-05-28

## 2025-05-28 RX ORDER — AMPICILLIN SODIUM 1 G/1
2 INJECTION, POWDER, FOR SOLUTION INTRAMUSCULAR; INTRAVENOUS EVERY 4 HOURS
Refills: 0 | Status: DISCONTINUED | OUTPATIENT
Start: 2025-05-28 | End: 2025-06-04

## 2025-05-28 RX ORDER — INSULIN GLARGINE-YFGN 100 [IU]/ML
20 INJECTION, SOLUTION SUBCUTANEOUS AT BEDTIME
Refills: 0 | Status: DISCONTINUED | OUTPATIENT
Start: 2025-05-28 | End: 2025-06-06

## 2025-05-28 RX ADMIN — LIDOCAINE HYDROCHLORIDE 1 PATCH: 20 JELLY TOPICAL at 19:00

## 2025-05-28 RX ADMIN — Medication 0.2 MILLIGRAM(S): at 18:34

## 2025-05-28 RX ADMIN — INSULIN LISPRO 6 UNIT(S): 100 INJECTION, SOLUTION INTRAVENOUS; SUBCUTANEOUS at 18:35

## 2025-05-28 RX ADMIN — Medication 30 MILLIGRAM(S): at 10:48

## 2025-05-28 RX ADMIN — Medication 975 MILLIGRAM(S): at 14:04

## 2025-05-28 RX ADMIN — ENOXAPARIN SODIUM 90 MILLIGRAM(S): 100 INJECTION SUBCUTANEOUS at 18:37

## 2025-05-28 RX ADMIN — AMPICILLIN SODIUM 200 GRAM(S): 1 INJECTION, POWDER, FOR SOLUTION INTRAMUSCULAR; INTRAVENOUS at 14:04

## 2025-05-28 RX ADMIN — POLYETHYLENE GLYCOL 3350 17 GRAM(S): 17 POWDER, FOR SOLUTION ORAL at 18:38

## 2025-05-28 RX ADMIN — LIDOCAINE HYDROCHLORIDE 1 PATCH: 20 JELLY TOPICAL at 10:47

## 2025-05-28 RX ADMIN — ROSUVASTATIN CALCIUM 10 MILLIGRAM(S): 5 TABLET, FILM COATED ORAL at 21:51

## 2025-05-28 RX ADMIN — TAMSULOSIN HYDROCHLORIDE 0.4 MILLIGRAM(S): 0.4 CAPSULE ORAL at 23:41

## 2025-05-28 RX ADMIN — Medication 5 MILLIGRAM(S): at 21:51

## 2025-05-28 RX ADMIN — Medication 150 MICROGRAM(S): at 03:25

## 2025-05-28 RX ADMIN — Medication 100 MILLIGRAM(S): at 06:29

## 2025-05-28 RX ADMIN — AMPICILLIN SODIUM 200 GRAM(S): 1 INJECTION, POWDER, FOR SOLUTION INTRAMUSCULAR; INTRAVENOUS at 21:51

## 2025-05-28 RX ADMIN — Medication 975 MILLIGRAM(S): at 07:00

## 2025-05-28 RX ADMIN — AMPICILLIN SODIUM 200 GRAM(S): 1 INJECTION, POWDER, FOR SOLUTION INTRAMUSCULAR; INTRAVENOUS at 10:14

## 2025-05-28 RX ADMIN — Medication 975 MILLIGRAM(S): at 21:50

## 2025-05-28 RX ADMIN — Medication 0.5 MILLIGRAM(S): at 07:00

## 2025-05-28 RX ADMIN — LIDOCAINE HYDROCHLORIDE 1 PATCH: 20 JELLY TOPICAL at 22:00

## 2025-05-28 RX ADMIN — Medication 0.5 MILLIGRAM(S): at 10:43

## 2025-05-28 RX ADMIN — Medication 2 TABLET(S): at 21:51

## 2025-05-28 RX ADMIN — OXYCODONE HYDROCHLORIDE 5 MILLIGRAM(S): 30 TABLET ORAL at 21:50

## 2025-05-28 RX ADMIN — LIDOCAINE HYDROCHLORIDE 1 PATCH: 20 JELLY TOPICAL at 00:00

## 2025-05-28 RX ADMIN — Medication 0.5 MILLIGRAM(S): at 06:31

## 2025-05-28 RX ADMIN — Medication 975 MILLIGRAM(S): at 06:29

## 2025-05-28 RX ADMIN — AMPICILLIN SODIUM 200 GRAM(S): 1 INJECTION, POWDER, FOR SOLUTION INTRAMUSCULAR; INTRAVENOUS at 06:30

## 2025-05-28 RX ADMIN — Medication 120 MILLIGRAM(S): at 06:30

## 2025-05-28 RX ADMIN — OXYCODONE HYDROCHLORIDE 5 MILLIGRAM(S): 30 TABLET ORAL at 15:22

## 2025-05-28 RX ADMIN — OXYCODONE HYDROCHLORIDE 5 MILLIGRAM(S): 30 TABLET ORAL at 22:50

## 2025-05-28 RX ADMIN — Medication 1 APPLICATION(S): at 10:47

## 2025-05-28 RX ADMIN — OXYCODONE HYDROCHLORIDE 5 MILLIGRAM(S): 30 TABLET ORAL at 16:21

## 2025-05-28 RX ADMIN — Medication 0.5 MILLIGRAM(S): at 02:59

## 2025-05-28 RX ADMIN — INSULIN GLARGINE-YFGN 20 UNIT(S): 100 INJECTION, SOLUTION SUBCUTANEOUS at 21:51

## 2025-05-28 RX ADMIN — AMPICILLIN SODIUM 200 GRAM(S): 1 INJECTION, POWDER, FOR SOLUTION INTRAMUSCULAR; INTRAVENOUS at 01:58

## 2025-05-28 RX ADMIN — Medication 81 MILLIGRAM(S): at 10:49

## 2025-05-28 RX ADMIN — Medication 0.5 MILLIGRAM(S): at 01:58

## 2025-05-28 RX ADMIN — ENOXAPARIN SODIUM 90 MILLIGRAM(S): 100 INJECTION SUBCUTANEOUS at 06:30

## 2025-05-28 RX ADMIN — Medication 975 MILLIGRAM(S): at 22:50

## 2025-05-28 RX ADMIN — AMPICILLIN SODIUM 200 GRAM(S): 1 INJECTION, POWDER, FOR SOLUTION INTRAMUSCULAR; INTRAVENOUS at 18:32

## 2025-05-28 RX ADMIN — Medication 0.5 MILLIGRAM(S): at 11:19

## 2025-05-28 RX ADMIN — Medication 0.2 MILLIGRAM(S): at 06:29

## 2025-05-28 RX ADMIN — Medication 12.5 GRAM(S): at 10:58

## 2025-05-28 NOTE — PROGRESS NOTE ADULT - SUBJECTIVE AND OBJECTIVE BOX
Infectious Diseases Follow Up:    Patient is a 85y old  Male who presents with a chief complaint of back pain (28 May 2025 07:32)      Interval History/ROS:  No acute events, awaiting MRI. LUE swelling worsening, still with back pain     Allergies  gabapentin (Other)        ANTIMICROBIALS:  ampicillin  IVPB 2 every 4 hours      Current Abx:     Previous Abx     OTHER MEDS:  MEDICATIONS  (STANDING):  acetaminophen     Tablet .. 975 every 8 hours  aspirin  chewable 81 daily  bisacodyl 5 at bedtime  cloNIDine 0.2 two times a day  dextrose 50% Injectable 25 once  dextrose 50% Injectable 12.5 once  dextrose 50% Injectable 25 once  dextrose Oral Gel 15 once  enoxaparin Injectable 90 every 12 hours  glucagon  Injectable 1 once  hydrALAZINE 100 three times a day  HYDROmorphone  Injectable 0.5 every 4 hours PRN  insulin glargine Injectable (LANTUS) 24 at bedtime  insulin lispro (ADMELOG) corrective regimen sliding scale  every 6 hours  insulin lispro Injectable (ADMELOG) 6 three times a day before meals  levothyroxine 150 daily  NIFEdipine XL 30 daily  polyethylene glycol 3350 17 two times a day  propranolol  daily  rosuvastatin 10 at bedtime  senna 2 at bedtime  tamsulosin 0.4 at bedtime      Vital Signs Last 24 Hrs  T(C): 36.8 (28 May 2025 06:20), Max: 36.8 (27 May 2025 12:00)  T(F): 98.2 (28 May 2025 06:20), Max: 98.3 (27 May 2025 20:30)  HR: 71 (28 May 2025 06:50) (59 - 75)  BP: 139/49 (28 May 2025 06:50) (129/50 - 180/60)  BP(mean): --  RR: 18 (28 May 2025 06:20) (16 - 18)  SpO2: 98% (28 May 2025 06:20) (95% - 98%)    Parameters below as of 28 May 2025 06:20  Patient On (Oxygen Delivery Method): room air          PHYSICAL EXAM:  GENERAL: NAD, well-developed  HEAD:  Atraumatic, Normocephalic  EYES: EOMI, conjunctiva and sclera clear  CHEST/LUNG: On NC, not in respiratory distress, clear to auscultation bilaterally;   ABD: soft, NT/ND  EXTREMITIES: LUE swelling   PSYCH: AAOx3                            8.3    6.72  )-----------( 156      ( 28 May 2025 06:30 )             25.7       05-28    137  |  104  |  19  ----------------------------<  79  3.8   |  25  |  1.29    Ca    8.2[L]      28 May 2025 06:30  Phos  3.0     05-28  Mg     2.00     05-28    TPro  5.4[L]  /  Alb  2.3[L]  /  TBili  0.3  /  DBili  x   /  AST  30  /  ALT  22  /  AlkPhos  52  05-27      Urinalysis Basic - ( 28 May 2025 06:30 )    Color: x / Appearance: x / SG: x / pH: x  Gluc: 79 mg/dL / Ketone: x  / Bili: x / Urobili: x   Blood: x / Protein: x / Nitrite: x   Leuk Esterase: x / RBC: x / WBC x   Sq Epi: x / Non Sq Epi: x / Bacteria: x        MICROBIOLOGY:  v  Blood Blood  05-18-25   No growth at 5 days  --  --      Blood Blood-Peripheral  05-17-25   No growth at 5 days  --  --      Blood Blood-Peripheral  05-17-25   No growth at 5 days  --  --      Blood Blood  05-17-25   No growth at 5 days  --  --      Blood Blood  05-15-25   No growth at 5 days  --  --      Blood Blood  05-15-25   No growth at 5 days  --  --      Blood Blood  05-14-25   Growth in aerobic bottle: Enterococcus faecalis  See previous culture [id number]  Direct identification is available within approximately 3-5  hours either by Blood Panel Multiplexed PCR or Direct  MALDI-TOF. Details: [email or web address]  --  Blood Culture PCR      Blood Blood  05-14-25   Growth in aerobic and anaerobic bottles: Enterococcus faecalis  Growth in anaerobic bottle: blood culture bottle turned positive after  the final report was released.  --  Enterococcus faecalis      Blood Blood-Peripheral  05-12-25   Growth in aerobic and anaerobic bottles: Enterococcus faecalis  See previous culture [id number]  --    Growth in aerobic bottle: Gram Positive Cocci in Pairs and Chains  Growth in anaerobic bottle: Gram Positive Cocci in Pairs and Chains      Blood Blood-Peripheral  05-12-25   Growth in aerobic and anaerobic bottles: Enterococcus faecalis  See previous culture [id number]  --    Growth in aerobic bottle: Gram Positive Cocci in Pairs and Chains  Growth in anaerobic bottle: Gram Positive Cocci in Pairs and Chains      Blood Blood-Peripheral  05-11-25   Growth in aerobic and anaerobic bottles: Enterococcus faecalis  Direct identification is available within approximately 3-5  hours either by Blood Panel Multiplexed PCR or Direct  MALDI-TOF. Details: [email or web address]  --  Blood Culture PCR  Enterococcus faecalis                RADIOLOGY:

## 2025-05-28 NOTE — PROGRESS NOTE ADULT - SUBJECTIVE AND OBJECTIVE BOX
Orthopedic Surgery      Pt seen and examined. Pt denies any overnight events. Pt reports back pain. Pt denies any fever/chills/chest pain/nausea/vomiting.      ICU Vital Signs Last 24 Hrs  T(C): 36.8 (27 May 2025 20:30), Max: 36.8 (27 May 2025 12:00)  T(F): 98.3 (27 May 2025 20:30), Max: 98.3 (27 May 2025 20:30)  HR: 61 (28 May 2025 02:15) (59 - 75)  BP: 141/45 (28 May 2025 02:15) (129/50 - 180/60)  BP(mean): --  ABP: --  ABP(mean): --  RR: 18 (28 May 2025 01:55) (16 - 18)  SpO2: 96% (28 May 2025 01:55) (95% - 98%)          Physical exam:   GEN: NAD, resting quietly  PULM: symmetric chest rise bilaterally, no increased WOB  ABD: nondistended  SPINE:   Motor exam:          Upper extremity         C5 (Shoulder Abd)    C6 (Elbow flex)   C7 (Elbow ext)   C8 (Finger flex) T1 (finger abd)         R         5/5                 5/5                       5/5                      5/5                         5/5          L          5/5                 5/5                      5/5                      5/5                         5/5                   Lower extremity           L2 (Hip flex)  L3 (knee ext)  L4 (Dorsi flex)  L5 (EHL)  S1 (Plantar flex)                                               R        2/5            5/5             1/5                    1/5          4/5      L        3/5            5/5             5/5                   5/5           5/5    Sensory exam:                         C5      C6      C7      C8       T1          RIGHT          2         2        2         2         2          (0=absent, 1=impaired, 2=normal, NT=not testable)  LEFT             2         2        2         2         2          (0=absent, 1=impaired, 2=normal, NT=not testable)                          L2      L3     L4     L5       S1          RIGHT          2         2        2         2         2          (0=absent, 1=impaired, 2=normal, NT=not testable)  LEFT             2         2        2         2         2          (0=absent, 1=impaired, 2=normal, NT=not testable)       LABS:                        7.4    6.69  )-----------( 144      ( 27 May 2025 05:40 )             23.0     05-27    137  |  105  |  23  ----------------------------<  124[H]  4.0   |  25  |  1.36[H]    Ca    8.2[L]      27 May 2025 05:40  Phos  3.2     05-27  Mg     2.00     05-27    TPro  5.4[L]  /  Alb  2.3[L]  /  TBili  0.3  /  DBili  x   /  AST  30  /  ALT  22  /  AlkPhos  52  05-27    PT/INR - ( 27 May 2025 05:40 )   PT: 11.1 sec;   INR: 0.93 ratio         PTT - ( 27 May 2025 05:40 )  PTT:29.9 sec  Urinalysis Basic - ( 27 May 2025 05:40 )    Color: x / Appearance: x / SG: x / pH: x  Gluc: 124 mg/dL / Ketone: x  / Bili: x / Urobili: x   Blood: x / Protein: x / Nitrite: x   Leuk Esterase: x / RBC: x / WBC x   Sq Epi: x / Non Sq Epi: x / Bacteria: x              Assessment/Plan:   85 year old male with 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 Plan for sedated MRI today    Plan:  -recommend MRI CTL spine with and without contrast and MRI brain with/without contrast to rule out infectious versus malignant etiology   -will likely need sedation to tolerate MRI, plan for today per primary team  -WBAT  -Further recommendations pending imaging    Ethan Barrientos PGY-2    For any questions please contact the on call orthopedic surgery team, please do not reach out via teams.  [facility] pager: [phone]  [facility] pager: [phone]  [facility] pager: [phone]

## 2025-05-28 NOTE — PROGRESS NOTE ADULT - ASSESSMENT
This is a 86 y/o M w/ PMhx HTN, HLD, DM2, hypothyroidism, prostate cancer s/p prostatectomy, CKD, TIA, pacemaker placed in 5/2024 for abnormal arrythmia initially admitted to [facility] on 5/6 for back pain, now transferred to [facility] for orthopedic spine. Labs w/ leukocytosis to 18, BCx w/ E faecalis in multiple sets.     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 S/p PPM removal    #LBP, L3-4 disc bulge pending repeat MRI  #Respiratory failure, improved   #JEROME, improving     Overall, 86 y/o M w/ PMhx HTN, HLD, DM2, hypothyroidism, prostate cancer s/p prostatectomy, CKD, TIA, pacemaker placed in 5/2024 for abnormal arrythmia transferred to [facility] from [facility] for MRI spine and ortho evaluation give LBP and lumbar compression w/ urinary retention. Pt noted to have chills and leukocytosis to 18, now with high grade E faecalis bacteremia. Unclear source however UCx not done, U/A with 10 WBCs, CT A/P w/o IV contrast on 5/10 w/o acute infectious source, MRI L spine here limited exam, findings of stenosis, no clear OM/discitis, however no contrast.   S/p PPM removal, MARIA LUISA w/o vegetations.     Recommendations:   1. Ampicillin 2g q4. Course pending MRI (4-6 weeks likely after neg BCx)   2. MARIA LUISA negative off Ceftriaxone  3. BCx from 5/15 NG  4. Pending MRI w/ sedation per primary team     Thank you for consulting us and involving us in the management of this patient's case. In addition to reviewing history, imaging, documents, labs, microbiology, and infection control strategies and potential issues.     I will be away until 6/2/25, my colleagues will be covering.    Darrin Falk M.D.  Attending Physician  Division of Infectious Diseases  Department of Medicine    Please contact through MS Teams message.  Office: [phone] (after 5 PM or weekend) .

## 2025-05-28 NOTE — PROGRESS NOTE ADULT - ASSESSMENT
Pt is an 85M with HTN, HLD, DM2, prostate cancer s/p prostatectomy, CKD, TIA with a pacemaker who presents transferred from [place] for further evaluation of low back pain secondary to lumbar compression deformities. EP on board to clear PPM for MRI compatibility. Found to have e facaelis bacteremia. On abx, unable to tolerate MRI s/p PPM removal on 5/16 and exchange for leadless PPM. Plan for MARIA LUISA on 5/20 -> negative for endocarditis. Pursuing MRI with sedation, plan for Tuesday 5/27

## 2025-05-28 NOTE — PROGRESS NOTE ADULT - ATTENDING COMMENTS
85M w/ hypertension, type 2 diabetes (A1c 6.9%), CKD (b/l Cr 1.5-2), prostate cancer s/p prostatectomy, h/o TIA, sinus node dysfunction s/p PPM presented to [facility] with lower back pain, found to have imaging with lumbar compression deformities and high grade E faecalis bacteremia, now s/p PPM extraction and replacement with leadless PPM, negative TTE and MARIA LUISA for vegetation, and cleared cultures, course c/b worsening LE weakness (R>L) and persistent urinary retention. Unable to tolerate MRI due to back pain, requires study with anesthesia to better evaluate for possible metastatic site of infection to spine.    On evaluation today L arm is more swollen, warm and with good pulses.    - Ortho and ID following  - Team working to coordinate MRI with anesthesia - per team discussion with anesthesiologist this morning they do not feel it is safe to perform MRI brain and total spine under anesthesia as one exam and would need to do as multiple. Will prioritize MRI T/L spine for now.  - Continue ampicillin  - On lovenox for /up TIA (?AF) in place of DOAC pending any need for additional procedures  - Continue Frye for urinary retention  - Repeat duplex LUE to IJ (though less likely worsening clot given he is receiving full dose a/c), elevation/compression    Discussed with patient and his daughter at bedside    Time-based billing (NON-critical care).   35 minutes spent on total encounter, which excludes teaching and separately reported services. The necessity of the time spent during the encounter on this date of service was due to:   Documentation in [place], reviewing chart and coordinating care with patient/resident and interdisciplinary staff (such as , social workers, etc) as well as reviewing vitals, laboratory data, radiology, medication list, consultants' recommendations and prior records. Interventions were performed as documented above.

## 2025-05-28 NOTE — PROGRESS NOTE ADULT - SUBJECTIVE AND OBJECTIVE BOX
PROGRESS NOTE:     Patient is a 85y old  Male who presents with a chief complaint of back pain (28 May 2025 05:17)      INTERVAL HISTORY: NAEO. VSS. ROS negative.    MEDICATIONS  (STANDING):  acetaminophen     Tablet .. 975 milliGRAM(s) Oral every 8 hours  ampicillin  IVPB 2 Gram(s) IV Intermittent every 4 hours  aspirin  chewable 81 milliGRAM(s) Oral daily  bisacodyl 5 milliGRAM(s) Oral at bedtime  chlorhexidine 2% Cloths 1 Application(s) Topical daily  chlorhexidine 2% Cloths 1 Application(s) Topical daily  cloNIDine 0.2 milliGRAM(s) Oral two times a day  dextrose 5%. 1000 milliLiter(s) (50 mL/Hr) IV Continuous <Continuous>  dextrose 5%. 1000 milliLiter(s) (100 mL/Hr) IV Continuous <Continuous>  dextrose 50% Injectable 25 Gram(s) IV Push once  dextrose 50% Injectable 12.5 Gram(s) IV Push once  dextrose 50% Injectable 25 Gram(s) IV Push once  dextrose Oral Gel 15 Gram(s) Oral once  enoxaparin Injectable 90 milliGRAM(s) SubCutaneous every 12 hours  glucagon  Injectable 1 milliGRAM(s) IntraMuscular once  hydrALAZINE 100 milliGRAM(s) Oral three times a day  insulin glargine Injectable (LANTUS) 24 Unit(s) SubCutaneous at bedtime  insulin lispro (ADMELOG) corrective regimen sliding scale   SubCutaneous every 6 hours  insulin lispro Injectable (ADMELOG) 6 Unit(s) SubCutaneous three times a day before meals  levothyroxine 150 MICROGram(s) Oral daily  lidocaine   4% Patch 1 Patch Transdermal daily  NIFEdipine XL 30 milliGRAM(s) Oral daily  polyethylene glycol 3350 17 Gram(s) Oral two times a day  propranolol  milliGRAM(s) Oral daily  rosuvastatin 10 milliGRAM(s) Oral at bedtime  senna 2 Tablet(s) Oral at bedtime  tamsulosin 0.4 milliGRAM(s) Oral at bedtime    MEDICATIONS  (PRN):  HYDROmorphone  Injectable 0.5 milliGRAM(s) IV Push every 4 hours PRN Severe Pain (7 - 10)      CAPILLARY BLOOD GLUCOSE  POCT Blood Glucose.: 85 mg/dL (28 May 2025 06:25)  POCT Blood Glucose.: 135 mg/dL (27 May 2025 23:46)  POCT Blood Glucose.: 115 mg/dL (27 May 2025 21:06)  POCT Blood Glucose.: 101 mg/dL (27 May 2025 17:54)  POCT Blood Glucose.: 204 mg/dL (27 May 2025 12:35)  POCT Blood Glucose.: 146 mg/dL (27 May 2025 08:58)      PHYSICAL EXAM:  Vital Signs Last 24 Hrs  T(C): 36.8 (27 May 2025 20:30), Max: 36.8 (27 May 2025 12:00)  T(F): 98.3 (27 May 2025 20:30), Max: 98.3 (27 May 2025 20:30)  HR: 61 (28 May 2025 02:15) (59 - 75)  BP: 141/45 (28 May 2025 02:15) (129/50 - 180/60)  BP(mean): --  RR: 18 (28 May 2025 01:55) (16 - 18)  SpO2: 96% (28 May 2025 01:55) (95% - 96%)    Parameters below as of 28 May 2025 01:55  Patient On (Oxygen Delivery Method): room air        CONSTITUTIONAL: NAD, well-developed  HEENT:   RESPIRATORY: Normal respiratory effort; lungs are clear to auscultation bilaterally  CARDIOVASCULAR: Regular rate and rhythm, normal S1 and S2, no murmur/rub/gallop; No lower extremity edema; Peripheral pulses are 2+ bilaterally  ABDOMEN: Nontender to palpation, normoactive bowel sounds, no rebound/guarding; No hepatosplenomegaly  MUSCULOSKELETAL: no clubbing or cyanosis of digits; no joint swelling or tenderness to palpation  PSYCH: A+O to person, place, and time; affect appropriate    LABS:                        7.4    6.69  )-----------( 144      ( 27 May 2025 05:40 )             23.0     05-27    137  |  105  |  23  ----------------------------<  124[H]  4.0   |  25  |  1.36[H]    Ca    8.2[L]      27 May 2025 05:40  Phos  3.2     05-27  Mg     2.00     05-27    TPro  5.4[L]  /  Alb  2.3[L]  /  TBili  0.3  /  DBili  x   /  AST  30  /  ALT  22  /  AlkPhos  52  05-27    PT/INR - ( 27 May 2025 05:40 )   PT: 11.1 sec;   INR: 0.93 ratio    PTT - ( 27 May 2025 05:40 )  PTT:29.9 sec      Urinalysis Basic - ( 27 May 2025 05:40 )  Color: x / Appearance: x / SG: x / pH: x  Gluc: 124 mg/dL / Ketone: x  / Bili: x / Urobili: x   Blood: x / Protein: x / Nitrite: x   Leuk Esterase: x / RBC: x / WBC x   Sq Epi: x / Non Sq Epi: x / Bacteria: x          RADIOLOGY:        ******************************  Authored By: Jacinto Rodriguez MD PGY1  Internal Medicine  MS Teams  ******************************

## 2025-05-29 ENCOUNTER — RESULT REVIEW (OUTPATIENT)
Age: 85
End: 2025-05-29

## 2025-05-29 LAB
ANION GAP SERPL CALC-SCNC: 10 MMOL/L — SIGNIFICANT CHANGE UP (ref 7–14)
BUN SERPL-MCNC: 20 MG/DL — SIGNIFICANT CHANGE UP (ref 7–23)
CALCIUM SERPL-MCNC: 7.9 MG/DL — LOW (ref 8.4–10.5)
CHLORIDE SERPL-SCNC: 103 MMOL/L — SIGNIFICANT CHANGE UP (ref 98–107)
CO2 SERPL-SCNC: 23 MMOL/L — SIGNIFICANT CHANGE UP (ref 22–31)
CREAT SERPL-MCNC: 1.45 MG/DL — HIGH (ref 0.5–1.3)
EGFR: 47 ML/MIN/1.73M2 — LOW
EGFR: 47 ML/MIN/1.73M2 — LOW
GLUCOSE BLDC GLUCOMTR-MCNC: 100 MG/DL — HIGH (ref 70–99)
GLUCOSE BLDC GLUCOMTR-MCNC: 102 MG/DL — HIGH (ref 70–99)
GLUCOSE BLDC GLUCOMTR-MCNC: 102 MG/DL — HIGH (ref 70–99)
GLUCOSE BLDC GLUCOMTR-MCNC: 123 MG/DL — HIGH (ref 70–99)
GLUCOSE BLDC GLUCOMTR-MCNC: 128 MG/DL — HIGH (ref 70–99)
GLUCOSE BLDC GLUCOMTR-MCNC: 85 MG/DL — SIGNIFICANT CHANGE UP (ref 70–99)
GLUCOSE SERPL-MCNC: 124 MG/DL — HIGH (ref 70–99)
HCT VFR BLD CALC: 25.2 % — LOW (ref 39–50)
HGB BLD-MCNC: 8.1 G/DL — LOW (ref 13–17)
MAGNESIUM SERPL-MCNC: 2 MG/DL — SIGNIFICANT CHANGE UP (ref 1.6–2.6)
MCHC RBC-ENTMCNC: 28.5 PG — SIGNIFICANT CHANGE UP (ref 27–34)
MCHC RBC-ENTMCNC: 32.1 G/DL — SIGNIFICANT CHANGE UP (ref 32–36)
MCV RBC AUTO: 88.7 FL — SIGNIFICANT CHANGE UP (ref 80–100)
NRBC # BLD AUTO: 0 K/UL — SIGNIFICANT CHANGE UP (ref 0–0)
NRBC # FLD: 0 K/UL — SIGNIFICANT CHANGE UP (ref 0–0)
NRBC BLD AUTO-RTO: 0 /100 WBCS — SIGNIFICANT CHANGE UP (ref 0–0)
PHOSPHATE SERPL-MCNC: 3.1 MG/DL — SIGNIFICANT CHANGE UP (ref 2.5–4.5)
PLATELET # BLD AUTO: 151 K/UL — SIGNIFICANT CHANGE UP (ref 150–400)
POTASSIUM SERPL-MCNC: 4.1 MMOL/L — SIGNIFICANT CHANGE UP (ref 3.5–5.3)
POTASSIUM SERPL-SCNC: 4.1 MMOL/L — SIGNIFICANT CHANGE UP (ref 3.5–5.3)
RBC # BLD: 2.84 M/UL — LOW (ref 4.2–5.8)
RBC # FLD: 16.1 % — HIGH (ref 10.3–14.5)
SODIUM SERPL-SCNC: 136 MMOL/L — SIGNIFICANT CHANGE UP (ref 135–145)
WBC # BLD: 7.96 K/UL — SIGNIFICANT CHANGE UP (ref 3.8–10.5)
WBC # FLD AUTO: 7.96 K/UL — SIGNIFICANT CHANGE UP (ref 3.8–10.5)

## 2025-05-29 PROCEDURE — 99232 SBSQ HOSP IP/OBS MODERATE 35: CPT | Mod: GC

## 2025-05-29 PROCEDURE — 93971 EXTREMITY STUDY: CPT | Mod: 26,LT

## 2025-05-29 PROCEDURE — 72148 MRI LUMBAR SPINE W/O DYE: CPT | Mod: 26

## 2025-05-29 PROCEDURE — 72146 MRI CHEST SPINE W/O DYE: CPT | Mod: 26

## 2025-05-29 RX ORDER — INSULIN LISPRO 100 U/ML
INJECTION, SOLUTION INTRAVENOUS; SUBCUTANEOUS AT BEDTIME
Refills: 0 | Status: DISCONTINUED | OUTPATIENT
Start: 2025-05-29 | End: 2025-06-08

## 2025-05-29 RX ORDER — INSULIN LISPRO 100 U/ML
INJECTION, SOLUTION INTRAVENOUS; SUBCUTANEOUS
Refills: 0 | Status: DISCONTINUED | OUTPATIENT
Start: 2025-05-29 | End: 2025-06-06

## 2025-05-29 RX ORDER — HYDROMORPHONE/SOD CHLOR,ISO/PF 2 MG/10 ML
0.5 SYRINGE (ML) INJECTION ONCE
Refills: 0 | Status: DISCONTINUED | OUTPATIENT
Start: 2025-05-29 | End: 2025-05-29

## 2025-05-29 RX ADMIN — Medication 0.5 MILLIGRAM(S): at 11:10

## 2025-05-29 RX ADMIN — INSULIN LISPRO 6 UNIT(S): 100 INJECTION, SOLUTION INTRAVENOUS; SUBCUTANEOUS at 18:25

## 2025-05-29 RX ADMIN — Medication 975 MILLIGRAM(S): at 08:15

## 2025-05-29 RX ADMIN — Medication 1 APPLICATION(S): at 13:30

## 2025-05-29 RX ADMIN — ENOXAPARIN SODIUM 90 MILLIGRAM(S): 100 INJECTION SUBCUTANEOUS at 08:15

## 2025-05-29 RX ADMIN — AMPICILLIN SODIUM 200 GRAM(S): 1 INJECTION, POWDER, FOR SOLUTION INTRAMUSCULAR; INTRAVENOUS at 21:01

## 2025-05-29 RX ADMIN — Medication 975 MILLIGRAM(S): at 10:55

## 2025-05-29 RX ADMIN — Medication 0.5 MILLIGRAM(S): at 10:55

## 2025-05-29 RX ADMIN — TAMSULOSIN HYDROCHLORIDE 0.4 MILLIGRAM(S): 0.4 CAPSULE ORAL at 21:01

## 2025-05-29 RX ADMIN — Medication 5 MILLIGRAM(S): at 21:01

## 2025-05-29 RX ADMIN — ROSUVASTATIN CALCIUM 10 MILLIGRAM(S): 5 TABLET, FILM COATED ORAL at 21:01

## 2025-05-29 RX ADMIN — AMPICILLIN SODIUM 200 GRAM(S): 1 INJECTION, POWDER, FOR SOLUTION INTRAMUSCULAR; INTRAVENOUS at 08:11

## 2025-05-29 RX ADMIN — OXYCODONE HYDROCHLORIDE 5 MILLIGRAM(S): 30 TABLET ORAL at 08:20

## 2025-05-29 RX ADMIN — AMPICILLIN SODIUM 200 GRAM(S): 1 INJECTION, POWDER, FOR SOLUTION INTRAMUSCULAR; INTRAVENOUS at 13:27

## 2025-05-29 RX ADMIN — Medication 0.2 MILLIGRAM(S): at 18:18

## 2025-05-29 RX ADMIN — Medication 81 MILLIGRAM(S): at 13:29

## 2025-05-29 RX ADMIN — Medication 2 TABLET(S): at 21:01

## 2025-05-29 RX ADMIN — INSULIN GLARGINE-YFGN 20 UNIT(S): 100 INJECTION, SOLUTION SUBCUTANEOUS at 21:48

## 2025-05-29 RX ADMIN — Medication 150 MICROGRAM(S): at 04:00

## 2025-05-29 RX ADMIN — Medication 0.5 MILLIGRAM(S): at 14:55

## 2025-05-29 RX ADMIN — Medication 975 MILLIGRAM(S): at 13:28

## 2025-05-29 RX ADMIN — Medication 100 MILLIGRAM(S): at 13:30

## 2025-05-29 RX ADMIN — Medication 0.5 MILLIGRAM(S): at 15:10

## 2025-05-29 RX ADMIN — AMPICILLIN SODIUM 200 GRAM(S): 1 INJECTION, POWDER, FOR SOLUTION INTRAMUSCULAR; INTRAVENOUS at 01:34

## 2025-05-29 RX ADMIN — ENOXAPARIN SODIUM 90 MILLIGRAM(S): 100 INJECTION SUBCUTANEOUS at 18:19

## 2025-05-29 RX ADMIN — Medication 100 MILLIGRAM(S): at 21:01

## 2025-05-29 RX ADMIN — AMPICILLIN SODIUM 200 GRAM(S): 1 INJECTION, POWDER, FOR SOLUTION INTRAMUSCULAR; INTRAVENOUS at 18:17

## 2025-05-29 RX ADMIN — Medication 120 MILLIGRAM(S): at 13:28

## 2025-05-29 RX ADMIN — OXYCODONE HYDROCHLORIDE 5 MILLIGRAM(S): 30 TABLET ORAL at 10:55

## 2025-05-29 RX ADMIN — Medication 30 MILLIGRAM(S): at 13:29

## 2025-05-29 RX ADMIN — POLYETHYLENE GLYCOL 3350 17 GRAM(S): 17 POWDER, FOR SOLUTION ORAL at 18:19

## 2025-05-29 RX ADMIN — OXYCODONE HYDROCHLORIDE 5 MILLIGRAM(S): 30 TABLET ORAL at 18:17

## 2025-05-29 NOTE — PROGRESS NOTE ADULT - SUBJECTIVE AND OBJECTIVE BOX
PROGRESS NOTE:     Patient is a 85y old  Male who presents with a chief complaint of back pain (28 May 2025 09:28)      INTERVAL HISTORY: NAEO. VSS. ROS negative.    MEDICATIONS  (STANDING):  acetaminophen     Tablet .. 975 milliGRAM(s) Oral every 8 hours  ampicillin  IVPB 2 Gram(s) IV Intermittent every 4 hours  aspirin  chewable 81 milliGRAM(s) Oral daily  bisacodyl 5 milliGRAM(s) Oral at bedtime  chlorhexidine 2% Cloths 1 Application(s) Topical daily  chlorhexidine 2% Cloths 1 Application(s) Topical daily  cloNIDine 0.2 milliGRAM(s) Oral two times a day  dextrose 5%. 1000 milliLiter(s) (50 mL/Hr) IV Continuous <Continuous>  dextrose 5%. 1000 milliLiter(s) (100 mL/Hr) IV Continuous <Continuous>  dextrose 50% Injectable 25 Gram(s) IV Push once  dextrose 50% Injectable 12.5 Gram(s) IV Push once  dextrose 50% Injectable 25 Gram(s) IV Push once  dextrose Oral Gel 15 Gram(s) Oral once  enoxaparin Injectable 90 milliGRAM(s) SubCutaneous every 12 hours  glucagon  Injectable 1 milliGRAM(s) IntraMuscular once  hydrALAZINE 100 milliGRAM(s) Oral three times a day  insulin glargine Injectable (LANTUS) 20 Unit(s) SubCutaneous at bedtime  insulin lispro (ADMELOG) corrective regimen sliding scale   SubCutaneous every 6 hours  insulin lispro Injectable (ADMELOG) 6 Unit(s) SubCutaneous three times a day before meals  levothyroxine 150 MICROGram(s) Oral daily  lidocaine   4% Patch 1 Patch Transdermal daily  NIFEdipine XL 30 milliGRAM(s) Oral daily  polyethylene glycol 3350 17 Gram(s) Oral two times a day  propranolol  milliGRAM(s) Oral daily  rosuvastatin 10 milliGRAM(s) Oral at bedtime  senna 2 Tablet(s) Oral at bedtime  tamsulosin 0.4 milliGRAM(s) Oral at bedtime    MEDICATIONS  (PRN):  HYDROmorphone  Injectable 0.5 milliGRAM(s) IV Push every 4 hours PRN Severe Pain (7 - 10)  oxyCODONE    IR 5 milliGRAM(s) Oral every 6 hours PRN Severe Pain (7 - 10)  oxyCODONE    IR 2.5 milliGRAM(s) Oral every 6 hours PRN Moderate Pain (4 - 6)      CAPILLARY BLOOD GLUCOSE      POCT Blood Glucose.: 128 mg/dL (29 May 2025 01:01)  POCT Blood Glucose.: 106 mg/dL (28 May 2025 21:35)  POCT Blood Glucose.: 104 mg/dL (28 May 2025 18:07)  POCT Blood Glucose.: 98 mg/dL (28 May 2025 12:27)  POCT Blood Glucose.: 70 mg/dL (28 May 2025 10:48)    I&O's Summary    28 May 2025 07:01  -  29 May 2025 07:00  --------------------------------------------------------  IN: 0 mL / OUT: 675 mL / NET: -675 mL        PHYSICAL EXAM:  Vital Signs Last 24 Hrs  T(C): 36.6 (28 May 2025 21:23), Max: 36.6 (28 May 2025 21:23)  T(F): 97.8 (28 May 2025 21:23), Max: 97.8 (28 May 2025 21:23)  HR: 58 (28 May 2025 23:30) (53 - 70)  BP: 126/48 (28 May 2025 23:30) (118/43 - 148/44)  BP(mean): 62 (28 May 2025 11:48) (62 - 62)  RR: 18 (28 May 2025 21:23) (18 - 18)  SpO2: 95% (28 May 2025 21:23) (95% - 98%)    Parameters below as of 28 May 2025 21:23  Patient On (Oxygen Delivery Method): room air        CONSTITUTIONAL: NAD, well-developed  HEENT:   RESPIRATORY: Normal respiratory effort; lungs are clear to auscultation bilaterally  CARDIOVASCULAR: Regular rate and rhythm, normal S1 and S2, no murmur/rub/gallop; No lower extremity edema; Peripheral pulses are 2+ bilaterally  ABDOMEN: Nontender to palpation, normoactive bowel sounds, no rebound/guarding; No hepatosplenomegaly  MUSCULOSKELETAL: no clubbing or cyanosis of digits; no joint swelling or tenderness to palpation  PSYCH: A+O to person, place, and time; affect appropriate    LABS:                        8.3    6.72  )-----------( 156      ( 28 May 2025 06:30 )             25.7     05-28    137  |  104  |  19  ----------------------------<  79  3.8   |  25  |  1.29    Ca    8.2[L]      28 May 2025 06:30  Phos  3.0     05-28  Mg     2.00     05-28        Urinalysis Basic - ( 28 May 2025 06:30 )  Color: x / Appearance: x / SG: x / pH: x  Gluc: 79 mg/dL / Ketone: x  / Bili: x / Urobili: x   Blood: x / Protein: x / Nitrite: x   Leuk Esterase: x / RBC: x / WBC x   Sq Epi: x / Non Sq Epi: x / Bacteria: x      ******************************  Authored By: Jacinto Rodriguez MD PGY1  Internal Medicine  MS Teams  ******************************

## 2025-05-29 NOTE — PROGRESS NOTE ADULT - ASSESSMENT
Pt is an 85M with HTN, HLD, DM2, prostate cancer s/p prostatectomy, CKD, TIA with a pacemaker who presents transferred from [place] for further evaluation of low back pain secondary to lumbar compression deformities. EP on board to clear PPM for MRI compatibility. Found to have e facaelis bacteremia. On abx, unable to tolerate MRI s/p PPM removal on 5/16 and exchange for leadless PPM. Plan for MARIA LUISA on 5/20 -> negative for endocarditis. Pursuing MRI with sedation, plan for Tuesday 5/27

## 2025-05-29 NOTE — PROGRESS NOTE ADULT - ATTENDING COMMENTS
85M w/ hypertension, type 2 diabetes (A1c 6.9%), CKD (b/l Cr 1.5-2), prostate cancer s/p prostatectomy, h/o TIA, sinus node dysfunction s/p PPM presented to [facility] with lower back pain, found to have imaging with lumbar compression deformities and high grade E faecalis bacteremia, now s/p PPM extraction and replacement with leadless PPM, negative TTE and MARIA LUISA for vegetation, and cleared cultures, course c/b worsening LE weakness (R>L) and persistent urinary retention c/f possible epidural abscess. Unable to tolerate MRI due to back pain, requires study with anesthesia to better evaluate for possible metastatic site of infection to spine.    - Ortho and ID following  - Team working to coordinate MRI with anesthesia - per team discussion with anesthesiologist this morning they do not feel it is safe to perform MRI brain and total spine under anesthesia as one exam and would need to do as multiple. Will prioritize MRI T/L spine for now.  - Continue ampicillin  - On lovenox for /up TIA (?AF) in place of DOAC pending any need for additional procedures  - Continue Frye for urinary retention  - Repeat duplex LUE to IJ (though less likely worsening clot given he is receiving full dose a/c), elevation/compression    Discussed with patient and his daughter at bedside    Time-based billing (NON-critical care).   35 minutes spent on total encounter, which excludes teaching and separately reported services. The necessity of the time spent during the encounter on this date of service was due to:   Documentation in [place], reviewing chart and coordinating care with patient/resident and interdisciplinary staff (such as , social workers, etc) as well as reviewing vitals, laboratory data, radiology, medication list, consultants' recommendations and prior records. Interventions were performed as documented above.

## 2025-05-30 LAB
ANION GAP SERPL CALC-SCNC: 8 MMOL/L — SIGNIFICANT CHANGE UP (ref 7–14)
BUN SERPL-MCNC: 18 MG/DL — SIGNIFICANT CHANGE UP (ref 7–23)
CALCIUM SERPL-MCNC: 7.8 MG/DL — LOW (ref 8.4–10.5)
CHLORIDE SERPL-SCNC: 105 MMOL/L — SIGNIFICANT CHANGE UP (ref 98–107)
CO2 SERPL-SCNC: 24 MMOL/L — SIGNIFICANT CHANGE UP (ref 22–31)
CREAT SERPL-MCNC: 1.42 MG/DL — HIGH (ref 0.5–1.3)
EGFR: 48 ML/MIN/1.73M2 — LOW
EGFR: 48 ML/MIN/1.73M2 — LOW
GLUCOSE BLDC GLUCOMTR-MCNC: 120 MG/DL — HIGH (ref 70–99)
GLUCOSE BLDC GLUCOMTR-MCNC: 128 MG/DL — HIGH (ref 70–99)
GLUCOSE BLDC GLUCOMTR-MCNC: 137 MG/DL — HIGH (ref 70–99)
GLUCOSE BLDC GLUCOMTR-MCNC: 138 MG/DL — HIGH (ref 70–99)
GLUCOSE SERPL-MCNC: 153 MG/DL — HIGH (ref 70–99)
HCT VFR BLD CALC: 25.3 % — LOW (ref 39–50)
HGB BLD-MCNC: 7.9 G/DL — LOW (ref 13–17)
MAGNESIUM SERPL-MCNC: 2 MG/DL — SIGNIFICANT CHANGE UP (ref 1.6–2.6)
MCHC RBC-ENTMCNC: 28 PG — SIGNIFICANT CHANGE UP (ref 27–34)
MCHC RBC-ENTMCNC: 31.2 G/DL — LOW (ref 32–36)
MCV RBC AUTO: 89.7 FL — SIGNIFICANT CHANGE UP (ref 80–100)
NRBC # BLD AUTO: 0 K/UL — SIGNIFICANT CHANGE UP (ref 0–0)
NRBC # FLD: 0 K/UL — SIGNIFICANT CHANGE UP (ref 0–0)
NRBC BLD AUTO-RTO: 0 /100 WBCS — SIGNIFICANT CHANGE UP (ref 0–0)
PHOSPHATE SERPL-MCNC: 2.9 MG/DL — SIGNIFICANT CHANGE UP (ref 2.5–4.5)
PLATELET # BLD AUTO: 151 K/UL — SIGNIFICANT CHANGE UP (ref 150–400)
POTASSIUM SERPL-MCNC: 4.1 MMOL/L — SIGNIFICANT CHANGE UP (ref 3.5–5.3)
POTASSIUM SERPL-SCNC: 4.1 MMOL/L — SIGNIFICANT CHANGE UP (ref 3.5–5.3)
RBC # BLD: 2.82 M/UL — LOW (ref 4.2–5.8)
RBC # FLD: 16 % — HIGH (ref 10.3–14.5)
SODIUM SERPL-SCNC: 137 MMOL/L — SIGNIFICANT CHANGE UP (ref 135–145)
WBC # BLD: 6.39 K/UL — SIGNIFICANT CHANGE UP (ref 3.8–10.5)
WBC # FLD AUTO: 6.39 K/UL — SIGNIFICANT CHANGE UP (ref 3.8–10.5)

## 2025-05-30 PROCEDURE — 99232 SBSQ HOSP IP/OBS MODERATE 35: CPT | Mod: GC

## 2025-05-30 RX ADMIN — Medication 1 APPLICATION(S): at 12:59

## 2025-05-30 RX ADMIN — ROSUVASTATIN CALCIUM 10 MILLIGRAM(S): 5 TABLET, FILM COATED ORAL at 21:59

## 2025-05-30 RX ADMIN — OXYCODONE HYDROCHLORIDE 5 MILLIGRAM(S): 30 TABLET ORAL at 13:50

## 2025-05-30 RX ADMIN — Medication 0.2 MILLIGRAM(S): at 18:44

## 2025-05-30 RX ADMIN — Medication 2 TABLET(S): at 21:59

## 2025-05-30 RX ADMIN — AMPICILLIN SODIUM 200 GRAM(S): 1 INJECTION, POWDER, FOR SOLUTION INTRAMUSCULAR; INTRAVENOUS at 01:16

## 2025-05-30 RX ADMIN — AMPICILLIN SODIUM 200 GRAM(S): 1 INJECTION, POWDER, FOR SOLUTION INTRAMUSCULAR; INTRAVENOUS at 18:37

## 2025-05-30 RX ADMIN — Medication 100 MILLIGRAM(S): at 21:58

## 2025-05-30 RX ADMIN — Medication 30 MILLIGRAM(S): at 13:05

## 2025-05-30 RX ADMIN — AMPICILLIN SODIUM 200 GRAM(S): 1 INJECTION, POWDER, FOR SOLUTION INTRAMUSCULAR; INTRAVENOUS at 10:16

## 2025-05-30 RX ADMIN — OXYCODONE HYDROCHLORIDE 5 MILLIGRAM(S): 30 TABLET ORAL at 02:00

## 2025-05-30 RX ADMIN — Medication 100 MILLIGRAM(S): at 15:16

## 2025-05-30 RX ADMIN — Medication 81 MILLIGRAM(S): at 12:58

## 2025-05-30 RX ADMIN — OXYCODONE HYDROCHLORIDE 5 MILLIGRAM(S): 30 TABLET ORAL at 01:00

## 2025-05-30 RX ADMIN — INSULIN LISPRO 6 UNIT(S): 100 INJECTION, SOLUTION INTRAVENOUS; SUBCUTANEOUS at 09:01

## 2025-05-30 RX ADMIN — ENOXAPARIN SODIUM 90 MILLIGRAM(S): 100 INJECTION SUBCUTANEOUS at 18:36

## 2025-05-30 RX ADMIN — INSULIN GLARGINE-YFGN 20 UNIT(S): 100 INJECTION, SOLUTION SUBCUTANEOUS at 21:59

## 2025-05-30 RX ADMIN — OXYCODONE HYDROCHLORIDE 5 MILLIGRAM(S): 30 TABLET ORAL at 19:10

## 2025-05-30 RX ADMIN — OXYCODONE HYDROCHLORIDE 5 MILLIGRAM(S): 30 TABLET ORAL at 12:57

## 2025-05-30 RX ADMIN — Medication 100 MILLIGRAM(S): at 05:26

## 2025-05-30 RX ADMIN — OXYCODONE HYDROCHLORIDE 5 MILLIGRAM(S): 30 TABLET ORAL at 23:42

## 2025-05-30 RX ADMIN — OXYCODONE HYDROCHLORIDE 5 MILLIGRAM(S): 30 TABLET ORAL at 19:35

## 2025-05-30 RX ADMIN — AMPICILLIN SODIUM 200 GRAM(S): 1 INJECTION, POWDER, FOR SOLUTION INTRAMUSCULAR; INTRAVENOUS at 05:25

## 2025-05-30 RX ADMIN — AMPICILLIN SODIUM 200 GRAM(S): 1 INJECTION, POWDER, FOR SOLUTION INTRAMUSCULAR; INTRAVENOUS at 15:12

## 2025-05-30 RX ADMIN — TAMSULOSIN HYDROCHLORIDE 0.4 MILLIGRAM(S): 0.4 CAPSULE ORAL at 21:59

## 2025-05-30 RX ADMIN — INSULIN LISPRO 6 UNIT(S): 100 INJECTION, SOLUTION INTRAVENOUS; SUBCUTANEOUS at 12:57

## 2025-05-30 RX ADMIN — POLYETHYLENE GLYCOL 3350 17 GRAM(S): 17 POWDER, FOR SOLUTION ORAL at 05:27

## 2025-05-30 RX ADMIN — ENOXAPARIN SODIUM 90 MILLIGRAM(S): 100 INJECTION SUBCUTANEOUS at 05:27

## 2025-05-30 RX ADMIN — AMPICILLIN SODIUM 200 GRAM(S): 1 INJECTION, POWDER, FOR SOLUTION INTRAMUSCULAR; INTRAVENOUS at 22:00

## 2025-05-30 RX ADMIN — INSULIN LISPRO 6 UNIT(S): 100 INJECTION, SOLUTION INTRAVENOUS; SUBCUTANEOUS at 18:36

## 2025-05-30 RX ADMIN — Medication 5 MILLIGRAM(S): at 21:59

## 2025-05-30 RX ADMIN — Medication 0.2 MILLIGRAM(S): at 05:26

## 2025-05-30 RX ADMIN — POLYETHYLENE GLYCOL 3350 17 GRAM(S): 17 POWDER, FOR SOLUTION ORAL at 18:37

## 2025-05-30 RX ADMIN — Medication 150 MICROGRAM(S): at 04:20

## 2025-05-30 NOTE — PROGRESS NOTE ADULT - ATTENDING COMMENTS
85M w/ hypertension, type 2 diabetes (A1c 6.9%), CKD (b/l Cr 1.5-2), prostate cancer s/p prostatectomy, h/o TIA, sinus node dysfunction s/p PPM presented to [facility] with acute onset lower back pain and bilateral leg weakness (R>L), found to have imaging with lumbar compression deformities and high grade E faecalis bacteremia, now s/p PPM extraction and replacement with leadless PPM, negative TTE and MARIA LUISA for vegetation, and cleared cultures, course c/b persistent and severe back pain, lower extremity weakness, and urinary retention. Unable to tolerate MRI T/L spine complete study despite performing with anesthesia to better evaluate for cord compression and/or metastatic site of infection in spine.    Went for MRI with anesthesia yesterday, but was unable to tolerate complete study despite conscious sedation due to severe back pain. Awaiting radiology read for images that were obtained.     - Ortho and ID following  - F/up MRI read - d/w ortho next steps given was not able to tolerate full MRI despite anesthesia  - Continue ampicillin  - On lovenox for /up TIA (?AF) in place of DOAC pending any need for additional procedures  - Continue Frye for urinary retention    Discussed with patient and his daughter at bedside    Time-based billing (NON-critical care).   35 minutes spent on total encounter, which excludes teaching and separately reported services. The necessity of the time spent during the encounter on this date of service was due to:   Documentation in [place], reviewing chart and coordinating care with patient/resident and interdisciplinary staff (such as , social workers, etc) as well as reviewing vitals, laboratory data, radiology, medication list, consultants' recommendations and prior records. Interventions were performed as documented above.

## 2025-05-30 NOTE — PROGRESS NOTE ADULT - SUBJECTIVE AND OBJECTIVE BOX
Orthopedic Surgery      Pt seen and examined. Pt denies any overnight events.  Pt denies any fever/chills/chest pain/nausea/vomiting.  Patient went for MRI yesterday but was unable to tolerate full procedure due to pain. Incomplete lumbar MRI series and no post contrast images were obtained.    ICU Vital Signs Last 24 Hrs  T(C): 36.8 (29 May 2025 20:50), Max: 37.1 (29 May 2025 05:00)  T(F): 98.3 (29 May 2025 20:50), Max: 98.8 (29 May 2025 05:00)  HR: 60 (29 May 2025 20:50) (56 - 64)  BP: 125/48 (29 May 2025 20:50) (121/47 - 172/49)  BP(mean): --  ABP: --  ABP(mean): --  RR: 18 (29 May 2025 20:50) (18 - 18)  SpO2: 98% (29 May 2025 20:50) (93% - 98%)        Physical exam:   GEN: NAD, resting quietly  PULM: symmetric chest rise bilaterally, no increased WOB  ABD: nondistended  SPINE:   Motor exam:          Upper extremity         C5 (Shoulder Abd)    C6 (Elbow flex)   C7 (Elbow ext)   C8 (Finger flex) T1 (finger abd)         R         5/5                 5/5                       5/5                      5/5                         5/5          L          5/5                 5/5                      5/5                      5/5                         5/5                   Lower extremity           L2 (Hip flex)  L3 (knee ext)  L4 (Dorsi flex)  L5 (EHL)  S1 (Plantar flex)                                               R        2/5            5/5             1/5                    1/5          4/5      L        3/5            5/5             5/5                   5/5           5/5    Sensory exam:                         C5      C6      C7      C8       T1          RIGHT          2         2        2         2         2          (0=absent, 1=impaired, 2=normal, NT=not testable)  LEFT             2         2        2         2         2          (0=absent, 1=impaired, 2=normal, NT=not testable)                          L2      L3     L4     L5       S1          RIGHT          2         2        2         2         2          (0=absent, 1=impaired, 2=normal, NT=not testable)  LEFT             2         2        2         2         2          (0=absent, 1=impaired, 2=normal, NT=not testable)         LABS:                        8.1    7.96  )-----------( 151      ( 29 May 2025 07:15 )             25.2     05-29    136  |  103  |  20  ----------------------------<  124[H]  4.1   |  23  |  1.45[H]    Ca    7.9[L]      29 May 2025 07:15  Phos  3.1     05-29  Mg     2.00     05-29        Urinalysis Basic - ( 29 May 2025 07:15 )    Color: x / Appearance: x / SG: x / pH: x  Gluc: 124 mg/dL / Ketone: x  / Bili: x / Urobili: x   Blood: x / Protein: x / Nitrite: x   Leuk Esterase: x / RBC: x / WBC x   Sq Epi: x / Non Sq Epi: x / Bacteria: x              Assessment/Plan:   85 year old male with 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 Patient had MRI performed yesterday but was only able to tolerate T spine study and sagital views of lumbar spine with no post contrast imaging. Limited study pending review by radiology.     Plan:  -WBAT  -Further recommendations pending imaging, patient may require completion of MRI studies    Ethan Barrientos PGY-2    For any questions please contact the on call orthopedic surgery team, please do not reach out via teams.  [facility] [place] pager: [phone]  [facility] pager: [phone]  [facility] pager: [phone]

## 2025-05-30 NOTE — PROGRESS NOTE ADULT - SUBJECTIVE AND OBJECTIVE BOX
PROGRESS NOTE:     Patient is a 85y old  Male who presents with a chief complaint of back pain (30 May 2025 04:50)      INTERVAL HISTORY: NAEO. VSS. ROS negative.    MEDICATIONS  (STANDING):  ampicillin  IVPB 2 Gram(s) IV Intermittent every 4 hours  aspirin  chewable 81 milliGRAM(s) Oral daily  bisacodyl 5 milliGRAM(s) Oral at bedtime  chlorhexidine 2% Cloths 1 Application(s) Topical daily  chlorhexidine 2% Cloths 1 Application(s) Topical daily  cloNIDine 0.2 milliGRAM(s) Oral two times a day  dextrose 5%. 1000 milliLiter(s) (50 mL/Hr) IV Continuous <Continuous>  dextrose 5%. 1000 milliLiter(s) (100 mL/Hr) IV Continuous <Continuous>  dextrose 50% Injectable 25 Gram(s) IV Push once  dextrose 50% Injectable 12.5 Gram(s) IV Push once  dextrose 50% Injectable 25 Gram(s) IV Push once  dextrose Oral Gel 15 Gram(s) Oral once  enoxaparin Injectable 90 milliGRAM(s) SubCutaneous every 12 hours  glucagon  Injectable 1 milliGRAM(s) IntraMuscular once  hydrALAZINE 100 milliGRAM(s) Oral three times a day  insulin glargine Injectable (LANTUS) 20 Unit(s) SubCutaneous at bedtime  insulin lispro (ADMELOG) corrective regimen sliding scale   SubCutaneous three times a day before meals  insulin lispro (ADMELOG) corrective regimen sliding scale   SubCutaneous at bedtime  insulin lispro Injectable (ADMELOG) 6 Unit(s) SubCutaneous three times a day before meals  levothyroxine 150 MICROGram(s) Oral daily  lidocaine   4% Patch 1 Patch Transdermal daily  NIFEdipine XL 30 milliGRAM(s) Oral daily  polyethylene glycol 3350 17 Gram(s) Oral two times a day  propranolol  milliGRAM(s) Oral daily  rosuvastatin 10 milliGRAM(s) Oral at bedtime  senna 2 Tablet(s) Oral at bedtime  tamsulosin 0.4 milliGRAM(s) Oral at bedtime    MEDICATIONS  (PRN):  HYDROmorphone  Injectable 0.5 milliGRAM(s) IV Push every 4 hours PRN Severe Pain (7 - 10)  oxyCODONE    IR 5 milliGRAM(s) Oral every 6 hours PRN Severe Pain (7 - 10)  oxyCODONE    IR 2.5 milliGRAM(s) Oral every 6 hours PRN Moderate Pain (4 - 6)      CAPILLARY BLOOD GLUCOSE      POCT Blood Glucose.: 102 mg/dL (29 May 2025 21:47)  POCT Blood Glucose.: 85 mg/dL (29 May 2025 20:54)  POCT Blood Glucose.: 100 mg/dL (29 May 2025 18:20)  POCT Blood Glucose.: 102 mg/dL (29 May 2025 12:09)  POCT Blood Glucose.: 123 mg/dL (29 May 2025 08:25)    I&O's Summary      PHYSICAL EXAM:  Vital Signs Last 24 Hrs  T(C): 36.7 (30 May 2025 05:20), Max: 36.9 (29 May 2025 13:20)  T(F): 98.1 (30 May 2025 05:20), Max: 98.5 (29 May 2025 13:20)  HR: 58 (30 May 2025 05:20) (57 - 64)  BP: 141/56 (30 May 2025 05:20) (125/48 - 172/49)  BP(mean): --  RR: 17 (30 May 2025 05:20) (17 - 18)  SpO2: 96% (30 May 2025 05:20) (93% - 98%)    Parameters below as of 30 May 2025 05:20  Patient On (Oxygen Delivery Method): room air        CONSTITUTIONAL: NAD, well-developed  HEENT:   RESPIRATORY: Normal respiratory effort; lungs are clear to auscultation bilaterally  CARDIOVASCULAR: Regular rate and rhythm, normal S1 and S2, no murmur/rub/gallop; No lower extremity edema; Peripheral pulses are 2+ bilaterally  ABDOMEN: Nontender to palpation, normoactive bowel sounds, no rebound/guarding; No hepatosplenomegaly  MUSCULOSKELETAL: no clubbing or cyanosis of digits; no joint swelling or tenderness to palpation  PSYCH: A+O to person, place, and time; affect appropriate    LABS:                        8.1    7.96  )-----------( 151      ( 29 May 2025 07:15 )             25.2     05-29    136  |  103  |  20  ----------------------------<  124[H]  4.1   |  23  |  1.45[H]    Ca    7.9[L]      29 May 2025 07:15  Phos  3.1     05-29  Mg     2.00     05-29      Urinalysis Basic - ( 29 May 2025 07:15 )  Color: x / Appearance: x / SG: x / pH: x  Gluc: 124 mg/dL / Ketone: x  / Bili: x / Urobili: x   Blood: x / Protein: x / Nitrite: x   Leuk Esterase: x / RBC: x / WBC x   Sq Epi: x / Non Sq Epi: x / Bacteria: x        ******************************  Authored By: Jacinto Rodriguez MD PGY1  Internal Medicine  MS Teams  ******************************

## 2025-05-30 NOTE — PROGRESS NOTE ADULT - ASSESSMENT
Pt is an 85M with HTN, HLD, DM2, prostate cancer s/p prostatectomy, CKD, TIA with a pacemaker who presents transferred from [place] for further evaluation of low back pain secondary to lumbar compression deformities. EP on board to clear PPM for MRI compatibility. Found to have e facaelis bacteremia. On abx, unable to tolerate MRI s/p PPM removal on 5/16 and exchange for leadless PPM. Plan for MARIA LUISA on 5/20 -> negative for endocarditis. Pursuing MRI with sedation, plan for Tuesday 5/27

## 2025-05-31 LAB
ANION GAP SERPL CALC-SCNC: 11 MMOL/L — SIGNIFICANT CHANGE UP (ref 7–14)
BUN SERPL-MCNC: 19 MG/DL — SIGNIFICANT CHANGE UP (ref 7–23)
CALCIUM SERPL-MCNC: 7.9 MG/DL — LOW (ref 8.4–10.5)
CHLORIDE SERPL-SCNC: 102 MMOL/L — SIGNIFICANT CHANGE UP (ref 98–107)
CO2 SERPL-SCNC: 23 MMOL/L — SIGNIFICANT CHANGE UP (ref 22–31)
CREAT SERPL-MCNC: 1.5 MG/DL — HIGH (ref 0.5–1.3)
EGFR: 45 ML/MIN/1.73M2 — LOW
EGFR: 45 ML/MIN/1.73M2 — LOW
GLUCOSE BLDC GLUCOMTR-MCNC: 108 MG/DL — HIGH (ref 70–99)
GLUCOSE BLDC GLUCOMTR-MCNC: 108 MG/DL — HIGH (ref 70–99)
GLUCOSE BLDC GLUCOMTR-MCNC: 70 MG/DL — SIGNIFICANT CHANGE UP (ref 70–99)
GLUCOSE BLDC GLUCOMTR-MCNC: 75 MG/DL — SIGNIFICANT CHANGE UP (ref 70–99)
GLUCOSE BLDC GLUCOMTR-MCNC: 88 MG/DL — SIGNIFICANT CHANGE UP (ref 70–99)
GLUCOSE BLDC GLUCOMTR-MCNC: 92 MG/DL — SIGNIFICANT CHANGE UP (ref 70–99)
GLUCOSE BLDC GLUCOMTR-MCNC: 93 MG/DL — SIGNIFICANT CHANGE UP (ref 70–99)
GLUCOSE SERPL-MCNC: 86 MG/DL — SIGNIFICANT CHANGE UP (ref 70–99)
HCT VFR BLD CALC: 24.7 % — LOW (ref 39–50)
HGB BLD-MCNC: 7.8 G/DL — LOW (ref 13–17)
MAGNESIUM SERPL-MCNC: 2 MG/DL — SIGNIFICANT CHANGE UP (ref 1.6–2.6)
MCHC RBC-ENTMCNC: 28.7 PG — SIGNIFICANT CHANGE UP (ref 27–34)
MCHC RBC-ENTMCNC: 31.6 G/DL — LOW (ref 32–36)
MCV RBC AUTO: 90.8 FL — SIGNIFICANT CHANGE UP (ref 80–100)
NRBC # BLD AUTO: 0 K/UL — SIGNIFICANT CHANGE UP (ref 0–0)
NRBC # FLD: 0 K/UL — SIGNIFICANT CHANGE UP (ref 0–0)
NRBC BLD AUTO-RTO: 0 /100 WBCS — SIGNIFICANT CHANGE UP (ref 0–0)
PHOSPHATE SERPL-MCNC: 2.9 MG/DL — SIGNIFICANT CHANGE UP (ref 2.5–4.5)
PLATELET # BLD AUTO: 154 K/UL — SIGNIFICANT CHANGE UP (ref 150–400)
POTASSIUM SERPL-MCNC: 3.8 MMOL/L — SIGNIFICANT CHANGE UP (ref 3.5–5.3)
POTASSIUM SERPL-SCNC: 3.8 MMOL/L — SIGNIFICANT CHANGE UP (ref 3.5–5.3)
RBC # BLD: 2.72 M/UL — LOW (ref 4.2–5.8)
RBC # FLD: 16.5 % — HIGH (ref 10.3–14.5)
SODIUM SERPL-SCNC: 136 MMOL/L — SIGNIFICANT CHANGE UP (ref 135–145)
WBC # BLD: 6.26 K/UL — SIGNIFICANT CHANGE UP (ref 3.8–10.5)
WBC # FLD AUTO: 6.26 K/UL — SIGNIFICANT CHANGE UP (ref 3.8–10.5)

## 2025-05-31 PROCEDURE — 99233 SBSQ HOSP IP/OBS HIGH 50: CPT | Mod: GC

## 2025-05-31 RX ORDER — BENZONATATE 100 MG
100 CAPSULE ORAL THREE TIMES A DAY
Refills: 0 | Status: DISCONTINUED | OUTPATIENT
Start: 2025-05-31 | End: 2025-06-06

## 2025-05-31 RX ORDER — OXYCODONE HYDROCHLORIDE 30 MG/1
2.5 TABLET ORAL EVERY 4 HOURS
Refills: 0 | Status: DISCONTINUED | OUTPATIENT
Start: 2025-05-31 | End: 2025-06-02

## 2025-05-31 RX ORDER — OXYCODONE HYDROCHLORIDE 30 MG/1
5 TABLET ORAL EVERY 4 HOURS
Refills: 0 | Status: DISCONTINUED | OUTPATIENT
Start: 2025-05-31 | End: 2025-06-02

## 2025-05-31 RX ORDER — LACTULOSE 10 G/15ML
20 SOLUTION ORAL ONCE
Refills: 0 | Status: COMPLETED | OUTPATIENT
Start: 2025-05-31 | End: 2025-05-31

## 2025-05-31 RX ADMIN — Medication 0.2 MILLIGRAM(S): at 17:58

## 2025-05-31 RX ADMIN — AMPICILLIN SODIUM 200 GRAM(S): 1 INJECTION, POWDER, FOR SOLUTION INTRAMUSCULAR; INTRAVENOUS at 06:34

## 2025-05-31 RX ADMIN — Medication 100 MILLIGRAM(S): at 06:33

## 2025-05-31 RX ADMIN — INSULIN LISPRO 6 UNIT(S): 100 INJECTION, SOLUTION INTRAVENOUS; SUBCUTANEOUS at 17:59

## 2025-05-31 RX ADMIN — OXYCODONE HYDROCHLORIDE 5 MILLIGRAM(S): 30 TABLET ORAL at 13:32

## 2025-05-31 RX ADMIN — INSULIN LISPRO 6 UNIT(S): 100 INJECTION, SOLUTION INTRAVENOUS; SUBCUTANEOUS at 13:38

## 2025-05-31 RX ADMIN — OXYCODONE HYDROCHLORIDE 5 MILLIGRAM(S): 30 TABLET ORAL at 14:30

## 2025-05-31 RX ADMIN — OXYCODONE HYDROCHLORIDE 5 MILLIGRAM(S): 30 TABLET ORAL at 07:26

## 2025-05-31 RX ADMIN — AMPICILLIN SODIUM 200 GRAM(S): 1 INJECTION, POWDER, FOR SOLUTION INTRAMUSCULAR; INTRAVENOUS at 09:17

## 2025-05-31 RX ADMIN — Medication 0.5 MILLIGRAM(S): at 15:40

## 2025-05-31 RX ADMIN — AMPICILLIN SODIUM 200 GRAM(S): 1 INJECTION, POWDER, FOR SOLUTION INTRAMUSCULAR; INTRAVENOUS at 20:21

## 2025-05-31 RX ADMIN — AMPICILLIN SODIUM 200 GRAM(S): 1 INJECTION, POWDER, FOR SOLUTION INTRAMUSCULAR; INTRAVENOUS at 17:57

## 2025-05-31 RX ADMIN — ROSUVASTATIN CALCIUM 10 MILLIGRAM(S): 5 TABLET, FILM COATED ORAL at 22:42

## 2025-05-31 RX ADMIN — OXYCODONE HYDROCHLORIDE 5 MILLIGRAM(S): 30 TABLET ORAL at 01:26

## 2025-05-31 RX ADMIN — Medication 100 MILLIGRAM(S): at 13:44

## 2025-05-31 RX ADMIN — Medication 0.5 MILLIGRAM(S): at 15:23

## 2025-05-31 RX ADMIN — POLYETHYLENE GLYCOL 3350 17 GRAM(S): 17 POWDER, FOR SOLUTION ORAL at 17:57

## 2025-05-31 RX ADMIN — Medication 2 TABLET(S): at 22:42

## 2025-05-31 RX ADMIN — AMPICILLIN SODIUM 200 GRAM(S): 1 INJECTION, POWDER, FOR SOLUTION INTRAMUSCULAR; INTRAVENOUS at 02:02

## 2025-05-31 RX ADMIN — INSULIN LISPRO 6 UNIT(S): 100 INJECTION, SOLUTION INTRAVENOUS; SUBCUTANEOUS at 09:16

## 2025-05-31 RX ADMIN — ENOXAPARIN SODIUM 90 MILLIGRAM(S): 100 INJECTION SUBCUTANEOUS at 17:58

## 2025-05-31 RX ADMIN — TAMSULOSIN HYDROCHLORIDE 0.4 MILLIGRAM(S): 0.4 CAPSULE ORAL at 22:43

## 2025-05-31 RX ADMIN — OXYCODONE HYDROCHLORIDE 5 MILLIGRAM(S): 30 TABLET ORAL at 17:54

## 2025-05-31 RX ADMIN — LACTULOSE 20 GRAM(S): 10 SOLUTION ORAL at 15:23

## 2025-05-31 RX ADMIN — AMPICILLIN SODIUM 200 GRAM(S): 1 INJECTION, POWDER, FOR SOLUTION INTRAMUSCULAR; INTRAVENOUS at 13:41

## 2025-05-31 RX ADMIN — Medication 1 APPLICATION(S): at 13:47

## 2025-05-31 RX ADMIN — POLYETHYLENE GLYCOL 3350 17 GRAM(S): 17 POWDER, FOR SOLUTION ORAL at 06:32

## 2025-05-31 RX ADMIN — INSULIN GLARGINE-YFGN 20 UNIT(S): 100 INJECTION, SOLUTION SUBCUTANEOUS at 23:11

## 2025-05-31 RX ADMIN — Medication 150 MICROGRAM(S): at 06:35

## 2025-05-31 RX ADMIN — Medication 0.2 MILLIGRAM(S): at 06:32

## 2025-05-31 RX ADMIN — OXYCODONE HYDROCHLORIDE 5 MILLIGRAM(S): 30 TABLET ORAL at 18:22

## 2025-05-31 RX ADMIN — OXYCODONE HYDROCHLORIDE 5 MILLIGRAM(S): 30 TABLET ORAL at 22:42

## 2025-05-31 RX ADMIN — Medication 100 MILLIGRAM(S): at 22:43

## 2025-05-31 RX ADMIN — ENOXAPARIN SODIUM 90 MILLIGRAM(S): 100 INJECTION SUBCUTANEOUS at 06:34

## 2025-05-31 RX ADMIN — Medication 5 MILLIGRAM(S): at 22:42

## 2025-05-31 RX ADMIN — OXYCODONE HYDROCHLORIDE 5 MILLIGRAM(S): 30 TABLET ORAL at 01:57

## 2025-05-31 RX ADMIN — Medication 81 MILLIGRAM(S): at 13:45

## 2025-05-31 NOTE — PROGRESS NOTE ADULT - ASSESSMENT
85 year old male with 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 Patient had MRI performed yesterday but was only able to tolerate T spine study and sagittal views of lumbar spine with no post contrast imaging.      Plan:  -WBAT  -Further recommendations pending imaging, recommend patient completes MRI studies, will coordinate with anesthesia for full sedation   -ortho to follow       Rosalba Gibbs MD, PGY-2  Orthopaedic Surgery  [facility] [id number]  [facility]        [id number]  [facility]  [phone]/ [phone]

## 2025-05-31 NOTE — PROGRESS NOTE ADULT - PROBLEM SELECTOR PLAN 9
- lantus 28u qhs, humalog 6u tid with meals, low ISS  - Tending to be hyperglycemic on FSG - lantus 20u qhs, humalog 6u tid with meals, low ISS

## 2025-05-31 NOTE — PROGRESS NOTE ADULT - PROBLEM SELECTOR PLAN 1
CT Lumbar Spine: L3-L4 disc bulge, L4-L5 left paracentral-foraminal disc protrusion, multiple mild lumbar vertebral body compression deformities  . L3-L4 disc bulge, resulting in severe central canal, bilateral lateral recess and moderate bilateral neural foramen stenosis with facet arthrosis and ligamentum flavum hypertrophy.    - Pain: iv tylenol prn, dilaudid .5mg mod pain, dilaudid 1mg severe pain  - Ortho Consulted; recs appreciated  - TLSO brace  - MRI wo con (CrCl 14 so avoiding gadolinium) to r/o cauda equina and malignant disease, gely with history of prostate ca -> MRI was not tolerated given loudness of machine and pain. Pt does not want to go into MRI machine again -> Plan for MRI with general anesthesia  - Will discuss with EP if new PPM is capable for MRI -> PPM cleared, cardiology form sent to [facility] who is setting up -> plan for NPO midnight Monday for possible MRI w/ sedation Tuesday 5/27 Stable, severe  - CT Lumbar Spine: (5/6/25): L3-L4 disc bulge, L4-L5 left paracentral-foraminal disc protrusion, multiple mild lumbar vertebral body compression deformities  . L3-L4 disc bulge, resulting in severe central canal, bilateral lateral recess and moderate bilateral neural foramen stenosis with facet arthrosis and ligamentum flavum hypertrophy.  - MRI attempted 5/13 to r/o cauda equina and malignant disease, gely with history of prostate cancer, however not tolerated due to loudness of machine and pain. Pt does not want to go into MRI machine again. Plan for MRI with general anesthesia   - MRI T+L Spine attempted under conscious sedation with anesthesia on 5/29 however aborted early due to patient tolerability for similar reasons  - MRI (5/29): Limited study but available results showing findings suspicious for OM and discitis at L4 and L5 levels, as well as severe stenosis of L3-L4 and L4-L5 levels with cauda equina impingement  - Discussed results with ortho who is requesting complete MRI L spine study  Plan:  - Ortho to coordinate MRI under complete anesthesia with anesthesia department  - Ortho to discuss treatment options with patient and daughter  - TLSO brace  - Pain Control: Oxy 2.5 q6h PRN moderate, Oxy 5 q6h severe, Dilaudid 0.5mg IV q4h for breakthrough

## 2025-05-31 NOTE — PROGRESS NOTE ADULT - PROBLEM SELECTOR PLAN 6
- Downtrending hgb  - Notable small hematoma at R groin site on 5/22, CTM  - Will consider CT abd to r/o RP bleed given on heparin if downtrending Stable   - Notable small hematoma at R groin site on 5/22, CTM  - Will consider CT abd to r/o RP bleed given on heparin if downtrending

## 2025-05-31 NOTE — PROGRESS NOTE ADULT - SUBJECTIVE AND OBJECTIVE BOX
SUBJECTIVE  Patient seen and examined at bedside. No acute events overnight. States pain is controlled. Denies fevers/chills, chest pain, or dyspnea. Patient states he plans to refuse further MR imaging.     OBJECTIVE  Vital Signs Last 24 Hrs  T(C): 36.9 (31 May 2025 01:18), Max: 37.2 (30 May 2025 20:47)  T(F): 98.4 (31 May 2025 01:18), Max: 98.9 (30 May 2025 20:47)  HR: 67 (31 May 2025 01:18) (57 - 67)  BP: 112/54 (31 May 2025 01:18) (109/39 - 152/50)  BP(mean): --  RR: 17 (31 May 2025 01:18) (17 - 18)  SpO2: 96% (31 May 2025 01:18) (94% - 98%)    Parameters below as of 31 May 2025 01:18  Patient On (Oxygen Delivery Method): room air        PHYSICAL EXAM  GEN: NAD, resting quietly  PULM: symmetric chest rise bilaterally, no increased WOB  SPINE:   Motor exam:          Upper extremity         C5 (Shoulder Abd)    C6 (Elbow flex)   C7 (Elbow ext)   C8 (Finger flex) T1 (finger abd)         R         5/5                 5/5                       5/5                      5/5                         5/5          L          5/5                 5/5                      5/5                      5/5                         5/5                   Lower extremity           L2 (Hip flex)  L3 (knee ext)  L4 (Dorsi flex)  L5 (EHL)  S1 (Plantar flex)                                               R        2/5            5/5             1/5                    1/5          4/5      L        3/5            5/5             5/5                   5/5           5/5    Sensory exam:                         C5      C6      C7      C8       T1          RIGHT          2         2        2         2         2          (0=absent, 1=impaired, 2=normal, NT=not testable)  LEFT             2         2        2         2         2          (0=absent, 1=impaired, 2=normal, NT=not testable)                          L2      L3     L4     L5       S1          RIGHT          2         2        2         2         2          (0=absent, 1=impaired, 2=normal, NT=not testable)  LEFT             2         2        2         2         2          (0=absent, 1=impaired, 2=normal, NT=not testable)           LABS                        7.9    6.39  )-----------( 151      ( 30 May 2025 07:32 )             25.3     05-30    137  |  105  |  18  ----------------------------<  153[H]  4.1   |  24  |  1.42[H]    Ca    7.8[L]      30 May 2025 07:32  Phos  2.9     05-30  Mg     2.00     05-30

## 2025-05-31 NOTE — PROGRESS NOTE ADULT - PROBLEM SELECTOR PLAN 3
acute urinary retention in the setting of back pain and immobility    - TOV 5/19  - Had episodes of urinary retention to 600cc and 400cc  - Frye replaced - acute urinary retention in the setting of back pain and immobility  - TOV 5/19 unsuccesful, wong replaced  Plan:  - Maintain wong while lumbar impingement remains

## 2025-05-31 NOTE — PROGRESS NOTE ADULT - ASSESSMENT
Pt is an 85M with HTN, HLD, DM2, prostate cancer s/p prostatectomy, CKD, TIA with a pacemaker who presents transferred from [place] for further evaluation of low back pain secondary to lumbar compression deformities. EP on board to clear PPM for MRI compatibility. Found to have e facaelis bacteremia. On abx, unable to tolerate MRI s/p PPM removal on 5/16 and exchange for leadless PPM. Plan for MARIA LUISA on 5/20 -> negative for endocarditis. Pursuing MRI with sedation, plan for Tuesday 5/27

## 2025-05-31 NOTE — PROGRESS NOTE ADULT - PROBLEM SELECTOR PLAN 2
- BCx positive for E faecalis . Only 1 bottle was sent and is positive -> repeat Bcx 5/15 negative   - s/p removal of PPM on 5/16 and exchange to leadless pacemaker with EP   - c/w ampicillin 2g q8 (renally dosed) and ceftriaxone per ID  - CTX dc'ed as MARIA LUISA neg for endocarditis  - Will increase amp to q6 after JEROME resolves  - TTE ordered -> EF 64% mild LV systolic dysfunction  - MARIA LUISA ordered per ID, plan for Monday -> needs transfusion before proceeding per anesthesia, plan for 5/20 -> responded well to transfusion -> MARIA LUISA neg for endocarditis - BCx positive for E faecalis . Only 1 bottle was sent and is positive -> repeat Bcx 5/15 negative   - s/p removal of PPM on 5/16 and exchange to leadless pacemaker with EP   - TTE ordered -> EF 64% mild LV systolic dysfunction  - MARIA LUISA neg for endocarditis  - s/p CTX  - ID following, recs included below  Plan:  - Continue Ampicillin 2g q4h

## 2025-05-31 NOTE — PROGRESS NOTE ADULT - PROBLEM SELECTOR PLAN 7
Pt hypoxic on presentation for unclear reason; started on high flow nasal cannula 40/40    - CXR unimpressive  - lungs clear on exam  - poss bronchiectasis vs pna  - VQ scan to rule out PE performed on 5/11: very low probability of PE  - Improved RESOLVED  - Pt hypoxic on presentation for unclear reason; started on high flow nasal cannula 40/40  - CXR unimpressive  - poss bronchiectasis vs pna  - VQ scan to rule out PE performed on 5/11: very low probability of PE

## 2025-05-31 NOTE — PROGRESS NOTE ADULT - SUBJECTIVE AND OBJECTIVE BOX
PROGRESS NOTE:     Patient is a 85y old  Male who presents with a chief complaint of back pain (31 May 2025 05:27)      INTERVAL HISTORY: NAEO. VSS. ROS negative.    MEDICATIONS  (STANDING):  ampicillin  IVPB 2 Gram(s) IV Intermittent every 4 hours  aspirin  chewable 81 milliGRAM(s) Oral daily  bisacodyl 5 milliGRAM(s) Oral at bedtime  chlorhexidine 2% Cloths 1 Application(s) Topical daily  chlorhexidine 2% Cloths 1 Application(s) Topical daily  cloNIDine 0.2 milliGRAM(s) Oral two times a day  dextrose 5%. 1000 milliLiter(s) (50 mL/Hr) IV Continuous <Continuous>  dextrose 5%. 1000 milliLiter(s) (100 mL/Hr) IV Continuous <Continuous>  dextrose 50% Injectable 25 Gram(s) IV Push once  dextrose 50% Injectable 12.5 Gram(s) IV Push once  dextrose 50% Injectable 25 Gram(s) IV Push once  dextrose Oral Gel 15 Gram(s) Oral once  enoxaparin Injectable 90 milliGRAM(s) SubCutaneous every 12 hours  glucagon  Injectable 1 milliGRAM(s) IntraMuscular once  hydrALAZINE 100 milliGRAM(s) Oral three times a day  insulin glargine Injectable (LANTUS) 20 Unit(s) SubCutaneous at bedtime  insulin lispro (ADMELOG) corrective regimen sliding scale   SubCutaneous three times a day before meals  insulin lispro (ADMELOG) corrective regimen sliding scale   SubCutaneous at bedtime  insulin lispro Injectable (ADMELOG) 6 Unit(s) SubCutaneous three times a day before meals  levothyroxine 150 MICROGram(s) Oral daily  lidocaine   4% Patch 1 Patch Transdermal daily  NIFEdipine XL 30 milliGRAM(s) Oral daily  polyethylene glycol 3350 17 Gram(s) Oral two times a day  propranolol  milliGRAM(s) Oral daily  rosuvastatin 10 milliGRAM(s) Oral at bedtime  senna 2 Tablet(s) Oral at bedtime  tamsulosin 0.4 milliGRAM(s) Oral at bedtime    MEDICATIONS  (PRN):  HYDROmorphone  Injectable 0.5 milliGRAM(s) IV Push every 4 hours PRN Severe Pain (7 - 10)  oxyCODONE    IR 5 milliGRAM(s) Oral every 6 hours PRN Severe Pain (7 - 10)  oxyCODONE    IR 2.5 milliGRAM(s) Oral every 6 hours PRN Moderate Pain (4 - 6)      CAPILLARY BLOOD GLUCOSE      POCT Blood Glucose.: 128 mg/dL (30 May 2025 21:05)  POCT Blood Glucose.: 120 mg/dL (30 May 2025 17:57)  POCT Blood Glucose.: 138 mg/dL (30 May 2025 12:33)  POCT Blood Glucose.: 137 mg/dL (30 May 2025 08:54)    I&O's Summary    30 May 2025 07:01  -  31 May 2025 07:00  --------------------------------------------------------  IN: 200 mL / OUT: 1000 mL / NET: -800 mL        PHYSICAL EXAM:  Vital Signs Last 24 Hrs  T(C): 36.8 (31 May 2025 06:29), Max: 37.2 (30 May 2025 20:47)  T(F): 98.3 (31 May 2025 06:29), Max: 98.9 (30 May 2025 20:47)  HR: 56 (31 May 2025 06:29) (56 - 67)  BP: 149/52 (31 May 2025 06:29) (109/39 - 152/50)  BP(mean): --  RR: 18 (31 May 2025 06:29) (17 - 18)  SpO2: 95% (31 May 2025 06:29) (94% - 98%)    Parameters below as of 31 May 2025 06:29  Patient On (Oxygen Delivery Method): room air        CONSTITUTIONAL: NAD, well-developed  HEENT:   RESPIRATORY: Normal respiratory effort; lungs are clear to auscultation bilaterally  CARDIOVASCULAR: Regular rate and rhythm, normal S1 and S2, no murmur/rub/gallop; No lower extremity edema; Peripheral pulses are 2+ bilaterally  ABDOMEN: Nontender to palpation, normoactive bowel sounds, no rebound/guarding; No hepatosplenomegaly  MUSCULOSKELETAL: no clubbing or cyanosis of digits; no joint swelling or tenderness to palpation  PSYCH: A+O to person, place, and time; affect appropriate    LABS:                        7.9    6.39  )-----------( 151      ( 30 May 2025 07:32 )             25.3     05-30    137  |  105  |  18  ----------------------------<  153[H]  4.1   |  24  |  1.42[H]    Ca    7.8[L]      30 May 2025 07:32  Phos  2.9     05-30  Mg     2.00     05-30      Urinalysis Basic - ( 30 May 2025 07:32 )  Color: x / Appearance: x / SG: x / pH: x  Gluc: 153 mg/dL / Ketone: x  / Bili: x / Urobili: x   Blood: x / Protein: x / Nitrite: x   Leuk Esterase: x / RBC: x / WBC x   Sq Epi: x / Non Sq Epi: x / Bacteria: x      ******************************  Authored By: Jacinto Rodriguez MD PGY1  Internal Medicine  MS Teams  ******************************   PROGRESS NOTE:     Patient is a 85y old  Male who presents with a chief complaint of back pain (31 May 2025 05:27)      INTERVAL HISTORY: NAEO. VSS. ROS negative.    MEDICATIONS  (STANDING):  ampicillin  IVPB 2 Gram(s) IV Intermittent every 4 hours  aspirin  chewable 81 milliGRAM(s) Oral daily  bisacodyl 5 milliGRAM(s) Oral at bedtime  chlorhexidine 2% Cloths 1 Application(s) Topical daily  chlorhexidine 2% Cloths 1 Application(s) Topical daily  cloNIDine 0.2 milliGRAM(s) Oral two times a day  dextrose 5%. 1000 milliLiter(s) (50 mL/Hr) IV Continuous <Continuous>  dextrose 5%. 1000 milliLiter(s) (100 mL/Hr) IV Continuous <Continuous>  dextrose 50% Injectable 25 Gram(s) IV Push once  dextrose 50% Injectable 12.5 Gram(s) IV Push once  dextrose 50% Injectable 25 Gram(s) IV Push once  dextrose Oral Gel 15 Gram(s) Oral once  enoxaparin Injectable 90 milliGRAM(s) SubCutaneous every 12 hours  glucagon  Injectable 1 milliGRAM(s) IntraMuscular once  hydrALAZINE 100 milliGRAM(s) Oral three times a day  insulin glargine Injectable (LANTUS) 20 Unit(s) SubCutaneous at bedtime  insulin lispro (ADMELOG) corrective regimen sliding scale   SubCutaneous three times a day before meals  insulin lispro (ADMELOG) corrective regimen sliding scale   SubCutaneous at bedtime  insulin lispro Injectable (ADMELOG) 6 Unit(s) SubCutaneous three times a day before meals  levothyroxine 150 MICROGram(s) Oral daily  lidocaine   4% Patch 1 Patch Transdermal daily  NIFEdipine XL 30 milliGRAM(s) Oral daily  polyethylene glycol 3350 17 Gram(s) Oral two times a day  propranolol  milliGRAM(s) Oral daily  rosuvastatin 10 milliGRAM(s) Oral at bedtime  senna 2 Tablet(s) Oral at bedtime  tamsulosin 0.4 milliGRAM(s) Oral at bedtime    MEDICATIONS  (PRN):  HYDROmorphone  Injectable 0.5 milliGRAM(s) IV Push every 4 hours PRN Severe Pain (7 - 10)  oxyCODONE    IR 5 milliGRAM(s) Oral every 6 hours PRN Severe Pain (7 - 10)  oxyCODONE    IR 2.5 milliGRAM(s) Oral every 6 hours PRN Moderate Pain (4 - 6)      CAPILLARY BLOOD GLUCOSE  POCT Blood Glucose.: 128 mg/dL (30 May 2025 21:05)  POCT Blood Glucose.: 120 mg/dL (30 May 2025 17:57)  POCT Blood Glucose.: 138 mg/dL (30 May 2025 12:33)  POCT Blood Glucose.: 137 mg/dL (30 May 2025 08:54)    I&O's Summary    30 May 2025 07:01  -  31 May 2025 07:00  --------------------------------------------------------  IN: 200 mL / OUT: 1000 mL / NET: -800 mL        PHYSICAL EXAM:  Vital Signs Last 24 Hrs  T(C): 36.8 (31 May 2025 06:29), Max: 37.2 (30 May 2025 20:47)  T(F): 98.3 (31 May 2025 06:29), Max: 98.9 (30 May 2025 20:47)  HR: 56 (31 May 2025 06:29) (56 - 67)  BP: 149/52 (31 May 2025 06:29) (109/39 - 152/50)  BP(mean): --  RR: 18 (31 May 2025 06:29) (17 - 18)  SpO2: 95% (31 May 2025 06:29) (94% - 98%)    Parameters below as of 31 May 2025 06:29  Patient On (Oxygen Delivery Method): room air    GENERAL: NAD, lying in bed comfortably  HEAD: Atraumatic, normocephalic  EYES: EOMI, PERRLA, conjunctiva and sclera clear  ENT: Moist mucous membranes  NECK: Supple, no JVD  HEART: S1, S2, Regular rate and rhythm, no murmurs, rubs, or gallops  LUNGS: Unlabored respirations, clear to auscultation bilaterally, no crackles, wheezing, or rhonchi  ABDOMEN: Soft, nontender, nondistended, +BS  EXTREMITIES: +small hematoma in R groin 2+ peripheral pulses bilaterally. LUE 2+ edema   NERVOUS SYSTEM:  A&Ox3, LE R weaker than left. Says that he can feel when he has BMs  SKIN: No rashes or lesions  +Frye    LABS:                        7.9    6.39  )-----------( 151      ( 30 May 2025 07:32 )             25.3     05-30    137  |  105  |  18  ----------------------------<  153[H]  4.1   |  24  |  1.42[H]    Ca    7.8[L]      30 May 2025 07:32  Phos  2.9     05-30  Mg     2.00     05-30      Urinalysis Basic - ( 30 May 2025 07:32 )  Color: x / Appearance: x / SG: x / pH: x  Gluc: 153 mg/dL / Ketone: x  / Bili: x / Urobili: x   Blood: x / Protein: x / Nitrite: x   Leuk Esterase: x / RBC: x / WBC x   Sq Epi: x / Non Sq Epi: x / Bacteria: x      ******************************  Authored By: Jacinto Rodriguez MD PGY1  Internal Medicine  MS Teams  ******************************

## 2025-05-31 NOTE — PROGRESS NOTE ADULT - ATTENDING COMMENTS
85M with HTN, HLD, DM2, prostate cancer s/p prostatectomy, CKD, TIA with a pacemaker who presents transferred from [place] for further evaluation of low back pain secondary to lumbar compression deformities. 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 S/p PPM removal on 5/16 and exchange for leadless PPM. Unable to complete MRI studies despite conscious sedation.    pt seen and examined at bedside. pt's daughter at bedside. pt does not want to have any further MRIs done, even if done under general anesthesia. back pain is relatively well controlled, but pain meds seem to wear off around 4 hours after getting them. last BM was 4 days ago. swelling of LUE has not improved much.    #E. faecalis bacteremia  #OM/discitis of L4, L5  #History of prostate cancer  #Urinary retention    -spoke with ortho resident, no plan for any surgical intervention if MRIs are not completed  -c/w ampicillin for E. faecalis bacteremia for 4-6 weeks from negative blood cultures  -c/w oxycodone for pain control- interval changed from q6h to q4h  -needs more aggressive bowel regimen- will add lactulose today, monitor for BMs  -VA duplex neg for DVT of LUE on 5/29, swelling remains, may be due to anasarca or poor lymph drainage after PPM placement    d/w HS 7

## 2025-06-01 LAB
ANION GAP SERPL CALC-SCNC: 14 MMOL/L — SIGNIFICANT CHANGE UP (ref 7–14)
BUN SERPL-MCNC: 17 MG/DL — SIGNIFICANT CHANGE UP (ref 7–23)
CALCIUM SERPL-MCNC: 7.9 MG/DL — LOW (ref 8.4–10.5)
CHLORIDE SERPL-SCNC: 100 MMOL/L — SIGNIFICANT CHANGE UP (ref 98–107)
CO2 SERPL-SCNC: 21 MMOL/L — LOW (ref 22–31)
CREAT SERPL-MCNC: 1.37 MG/DL — HIGH (ref 0.5–1.3)
EGFR: 50 ML/MIN/1.73M2 — LOW
EGFR: 50 ML/MIN/1.73M2 — LOW
GLUCOSE BLDC GLUCOMTR-MCNC: 105 MG/DL — HIGH (ref 70–99)
GLUCOSE BLDC GLUCOMTR-MCNC: 113 MG/DL — HIGH (ref 70–99)
GLUCOSE BLDC GLUCOMTR-MCNC: 118 MG/DL — HIGH (ref 70–99)
GLUCOSE BLDC GLUCOMTR-MCNC: 163 MG/DL — HIGH (ref 70–99)
GLUCOSE SERPL-MCNC: 118 MG/DL — HIGH (ref 70–99)
HCT VFR BLD CALC: 27.2 % — LOW (ref 39–50)
HGB BLD-MCNC: 8.6 G/DL — LOW (ref 13–17)
MAGNESIUM SERPL-MCNC: 2 MG/DL — SIGNIFICANT CHANGE UP (ref 1.6–2.6)
MCHC RBC-ENTMCNC: 28.2 PG — SIGNIFICANT CHANGE UP (ref 27–34)
MCHC RBC-ENTMCNC: 31.6 G/DL — LOW (ref 32–36)
MCV RBC AUTO: 89.2 FL — SIGNIFICANT CHANGE UP (ref 80–100)
NRBC # BLD AUTO: 0 K/UL — SIGNIFICANT CHANGE UP (ref 0–0)
NRBC # FLD: 0 K/UL — SIGNIFICANT CHANGE UP (ref 0–0)
NRBC BLD AUTO-RTO: 0 /100 WBCS — SIGNIFICANT CHANGE UP (ref 0–0)
PHOSPHATE SERPL-MCNC: 3 MG/DL — SIGNIFICANT CHANGE UP (ref 2.5–4.5)
PLATELET # BLD AUTO: 173 K/UL — SIGNIFICANT CHANGE UP (ref 150–400)
POTASSIUM SERPL-MCNC: 4 MMOL/L — SIGNIFICANT CHANGE UP (ref 3.5–5.3)
POTASSIUM SERPL-SCNC: 4 MMOL/L — SIGNIFICANT CHANGE UP (ref 3.5–5.3)
RBC # BLD: 3.05 M/UL — LOW (ref 4.2–5.8)
RBC # FLD: 16.5 % — HIGH (ref 10.3–14.5)
SODIUM SERPL-SCNC: 135 MMOL/L — SIGNIFICANT CHANGE UP (ref 135–145)
WBC # BLD: 6.67 K/UL — SIGNIFICANT CHANGE UP (ref 3.8–10.5)
WBC # FLD AUTO: 6.67 K/UL — SIGNIFICANT CHANGE UP (ref 3.8–10.5)

## 2025-06-01 PROCEDURE — 99233 SBSQ HOSP IP/OBS HIGH 50: CPT | Mod: GC

## 2025-06-01 RX ORDER — BISACODYL 5 MG
10 TABLET, DELAYED RELEASE (ENTERIC COATED) ORAL ONCE
Refills: 0 | Status: COMPLETED | OUTPATIENT
Start: 2025-06-01 | End: 2025-06-01

## 2025-06-01 RX ADMIN — OXYCODONE HYDROCHLORIDE 5 MILLIGRAM(S): 30 TABLET ORAL at 13:01

## 2025-06-01 RX ADMIN — Medication 0.5 MILLIGRAM(S): at 05:17

## 2025-06-01 RX ADMIN — ROSUVASTATIN CALCIUM 10 MILLIGRAM(S): 5 TABLET, FILM COATED ORAL at 22:20

## 2025-06-01 RX ADMIN — Medication 100 MILLIGRAM(S): at 05:20

## 2025-06-01 RX ADMIN — INSULIN LISPRO 6 UNIT(S): 100 INJECTION, SOLUTION INTRAVENOUS; SUBCUTANEOUS at 12:51

## 2025-06-01 RX ADMIN — Medication 10 MILLIGRAM(S): at 12:51

## 2025-06-01 RX ADMIN — AMPICILLIN SODIUM 200 GRAM(S): 1 INJECTION, POWDER, FOR SOLUTION INTRAMUSCULAR; INTRAVENOUS at 12:49

## 2025-06-01 RX ADMIN — AMPICILLIN SODIUM 200 GRAM(S): 1 INJECTION, POWDER, FOR SOLUTION INTRAMUSCULAR; INTRAVENOUS at 22:22

## 2025-06-01 RX ADMIN — OXYCODONE HYDROCHLORIDE 5 MILLIGRAM(S): 30 TABLET ORAL at 09:05

## 2025-06-01 RX ADMIN — Medication 0.2 MILLIGRAM(S): at 05:20

## 2025-06-01 RX ADMIN — INSULIN LISPRO 6 UNIT(S): 100 INJECTION, SOLUTION INTRAVENOUS; SUBCUTANEOUS at 08:56

## 2025-06-01 RX ADMIN — OXYCODONE HYDROCHLORIDE 5 MILLIGRAM(S): 30 TABLET ORAL at 14:00

## 2025-06-01 RX ADMIN — Medication 120 MILLIGRAM(S): at 05:20

## 2025-06-01 RX ADMIN — Medication 100 MILLIGRAM(S): at 12:52

## 2025-06-01 RX ADMIN — Medication 2 TABLET(S): at 22:20

## 2025-06-01 RX ADMIN — Medication 100 MILLIGRAM(S): at 22:20

## 2025-06-01 RX ADMIN — OXYCODONE HYDROCHLORIDE 5 MILLIGRAM(S): 30 TABLET ORAL at 17:14

## 2025-06-01 RX ADMIN — Medication 30 MILLIGRAM(S): at 05:20

## 2025-06-01 RX ADMIN — AMPICILLIN SODIUM 200 GRAM(S): 1 INJECTION, POWDER, FOR SOLUTION INTRAMUSCULAR; INTRAVENOUS at 16:32

## 2025-06-01 RX ADMIN — INSULIN GLARGINE-YFGN 20 UNIT(S): 100 INJECTION, SOLUTION SUBCUTANEOUS at 22:21

## 2025-06-01 RX ADMIN — OXYCODONE HYDROCHLORIDE 5 MILLIGRAM(S): 30 TABLET ORAL at 10:04

## 2025-06-01 RX ADMIN — OXYCODONE HYDROCHLORIDE 5 MILLIGRAM(S): 30 TABLET ORAL at 18:08

## 2025-06-01 RX ADMIN — AMPICILLIN SODIUM 200 GRAM(S): 1 INJECTION, POWDER, FOR SOLUTION INTRAMUSCULAR; INTRAVENOUS at 04:23

## 2025-06-01 RX ADMIN — Medication 81 MILLIGRAM(S): at 12:52

## 2025-06-01 RX ADMIN — OXYCODONE HYDROCHLORIDE 5 MILLIGRAM(S): 30 TABLET ORAL at 23:00

## 2025-06-01 RX ADMIN — Medication 0.5 MILLIGRAM(S): at 18:34

## 2025-06-01 RX ADMIN — Medication 0.2 MILLIGRAM(S): at 17:15

## 2025-06-01 RX ADMIN — POLYETHYLENE GLYCOL 3350 17 GRAM(S): 17 POWDER, FOR SOLUTION ORAL at 05:19

## 2025-06-01 RX ADMIN — Medication 150 MICROGRAM(S): at 04:23

## 2025-06-01 RX ADMIN — AMPICILLIN SODIUM 200 GRAM(S): 1 INJECTION, POWDER, FOR SOLUTION INTRAMUSCULAR; INTRAVENOUS at 08:16

## 2025-06-01 RX ADMIN — TAMSULOSIN HYDROCHLORIDE 0.4 MILLIGRAM(S): 0.4 CAPSULE ORAL at 22:20

## 2025-06-01 RX ADMIN — Medication 0.5 MILLIGRAM(S): at 18:50

## 2025-06-01 RX ADMIN — Medication 5 MILLIGRAM(S): at 22:19

## 2025-06-01 RX ADMIN — Medication 1 APPLICATION(S): at 12:49

## 2025-06-01 RX ADMIN — ENOXAPARIN SODIUM 90 MILLIGRAM(S): 100 INJECTION SUBCUTANEOUS at 05:19

## 2025-06-01 RX ADMIN — Medication 0.5 MILLIGRAM(S): at 23:37

## 2025-06-01 RX ADMIN — ENOXAPARIN SODIUM 90 MILLIGRAM(S): 100 INJECTION SUBCUTANEOUS at 17:15

## 2025-06-01 RX ADMIN — AMPICILLIN SODIUM 200 GRAM(S): 1 INJECTION, POWDER, FOR SOLUTION INTRAMUSCULAR; INTRAVENOUS at 00:59

## 2025-06-01 RX ADMIN — Medication 0.5 MILLIGRAM(S): at 06:17

## 2025-06-01 RX ADMIN — OXYCODONE HYDROCHLORIDE 5 MILLIGRAM(S): 30 TABLET ORAL at 22:20

## 2025-06-01 RX ADMIN — LIDOCAINE HYDROCHLORIDE 1 PATCH: 20 JELLY TOPICAL at 12:50

## 2025-06-01 NOTE — PROGRESS NOTE ADULT - SUBJECTIVE AND OBJECTIVE BOX
PROGRESS NOTE:     Patient is a 85y old  Male who presents with a chief complaint of back pain (01 Jun 2025 03:42)      SUBJECTIVE / OVERNIGHT EVENTS:    OVERNIGHT: No acute overnight events.      Patient was examined at bedside and feels well this morning. Denies fever, chills, chest pain, SOB, nausea, vomiting. ROS otherwise negative and pt is amenable to current treatment plan.      REVIEW OF SYSTEMS:    CONSTITUTIONAL:  No weakness, fevers, or chills  EYES/ENT:  No visual changes, vertigo, or throat pain   NECK:  No pain or stiffness  RESPIRATORY:  No SOB, cough, wheezing, or hemoptysis  CARDIOVASCULAR:  No chest pain or palpitations  GASTROINTESTINAL:  No abdominal pain, nausea, vomiting, or hematemesis; no diarrhea, constipation, melena, or hematochezia  GENITOURINARY:  No dysuria, polyuria, or hematuria  NEUROLOGICAL:  No numbness or weakness  SKIN:  No itching or rashes      MEDICATIONS  (STANDING):  ampicillin  IVPB 2 Gram(s) IV Intermittent every 4 hours  aspirin  chewable 81 milliGRAM(s) Oral daily  bisacodyl 5 milliGRAM(s) Oral at bedtime  chlorhexidine 2% Cloths 1 Application(s) Topical daily  chlorhexidine 2% Cloths 1 Application(s) Topical daily  cloNIDine 0.2 milliGRAM(s) Oral two times a day  dextrose 5%. 1000 milliLiter(s) (50 mL/Hr) IV Continuous <Continuous>  dextrose 5%. 1000 milliLiter(s) (100 mL/Hr) IV Continuous <Continuous>  dextrose 50% Injectable 25 Gram(s) IV Push once  dextrose 50% Injectable 12.5 Gram(s) IV Push once  dextrose 50% Injectable 25 Gram(s) IV Push once  dextrose Oral Gel 15 Gram(s) Oral once  enoxaparin Injectable 90 milliGRAM(s) SubCutaneous every 12 hours  glucagon  Injectable 1 milliGRAM(s) IntraMuscular once  hydrALAZINE 100 milliGRAM(s) Oral three times a day  insulin glargine Injectable (LANTUS) 20 Unit(s) SubCutaneous at bedtime  insulin lispro (ADMELOG) corrective regimen sliding scale   SubCutaneous three times a day before meals  insulin lispro (ADMELOG) corrective regimen sliding scale   SubCutaneous at bedtime  insulin lispro Injectable (ADMELOG) 6 Unit(s) SubCutaneous three times a day before meals  levothyroxine 150 MICROGram(s) Oral daily  lidocaine   4% Patch 1 Patch Transdermal daily  NIFEdipine XL 30 milliGRAM(s) Oral daily  polyethylene glycol 3350 17 Gram(s) Oral two times a day  propranolol  milliGRAM(s) Oral daily  rosuvastatin 10 milliGRAM(s) Oral at bedtime  senna 2 Tablet(s) Oral at bedtime  tamsulosin 0.4 milliGRAM(s) Oral at bedtime    MEDICATIONS  (PRN):  benzonatate 100 milliGRAM(s) Oral three times a day PRN Cough  HYDROmorphone  Injectable 0.5 milliGRAM(s) IV Push every 4 hours PRN Severe Pain (7 - 10)  oxyCODONE    IR 5 milliGRAM(s) Oral every 4 hours PRN Severe Pain (7 - 10)  oxyCODONE    IR 2.5 milliGRAM(s) Oral every 4 hours PRN Moderate Pain (4 - 6)      CAPILLARY BLOOD GLUCOSE      POCT Blood Glucose.: 118 mg/dL (01 Jun 2025 08:48)  POCT Blood Glucose.: 108 mg/dL (31 May 2025 23:07)  POCT Blood Glucose.: 88 mg/dL (31 May 2025 21:48)  POCT Blood Glucose.: 70 mg/dL (31 May 2025 21:07)  POCT Blood Glucose.: 75 mg/dL (31 May 2025 21:06)  POCT Blood Glucose.: 92 mg/dL (31 May 2025 17:50)  POCT Blood Glucose.: 108 mg/dL (31 May 2025 13:23)    I&O's Summary    31 May 2025 07:01  -  01 Jun 2025 07:00  --------------------------------------------------------  IN: 0 mL / OUT: 700 mL / NET: -700 mL        PHYSICAL EXAM:  Vital Signs Last 24 Hrs  T(C): 36.4 (01 Jun 2025 05:00), Max: 37 (31 May 2025 11:34)  T(F): 97.5 (01 Jun 2025 05:00), Max: 98.6 (31 May 2025 11:34)  HR: 64 (01 Jun 2025 05:00) (57 - 66)  BP: 142/42 (01 Jun 2025 08:15) (126/38 - 195/61)  BP(mean): 61 (31 May 2025 11:34) (61 - 61)  RR: 18 (01 Jun 2025 05:00) (18 - 18)  SpO2: 95% (01 Jun 2025 05:00) (94% - 97%)    Parameters below as of 01 Jun 2025 05:00  Patient On (Oxygen Delivery Method): room air        CONSTITUTIONAL: NAD; well-developed  HEENT: PERRL, clear conjunctiva  RESPIRATORY: Normal respiratory effort; lungs are clear to auscultation bilaterally; No crackles/rhonchi/wheezing  CARDIOVASCULAR: Regular rate and rhythm, normal S1 and S2, no murmur/rub/gallop; No lower extremity edema; Peripheral pulses are 2+ bilaterally  ABDOMEN: Nontender to palpation, normoactive bowel sounds, no rebound/guarding; No hepatosplenomegaly  MUSCULOSKELETAL: No clubbing or cyanosis of digits; no joint swelling or tenderness to palpation  EXTREMITY: Lower extremities non-tender to palpation; non-erythematous B/L  NEURO: A&Ox3; no focal deficits   PSYCH: Normal mood; affect appropriate    LABS:                        8.6    6.67  )-----------( 173      ( 01 Jun 2025 05:30 )             27.2     06-01    135  |  100  |  17  ----------------------------<  118[H]  4.0   |  21[L]  |  1.37[H]    Ca    7.9[L]      01 Jun 2025 05:30  Phos  3.0     06-01  Mg     2.00     06-01            Urinalysis Basic - ( 01 Jun 2025 05:30 )    Color: x / Appearance: x / SG: x / pH: x  Gluc: 118 mg/dL / Ketone: x  / Bili: x / Urobili: x   Blood: x / Protein: x / Nitrite: x   Leuk Esterase: x / RBC: x / WBC x   Sq Epi: x / Non Sq Epi: x / Bacteria: x          RADIOLOGY & ADDITIONAL TESTS:    N/A PROGRESS NOTE:     Patient is a 85y old  Male who presents with a chief complaint of back pain (01 Jun 2025 03:42)      SUBJECTIVE / OVERNIGHT EVENTS:    OVERNIGHT: No acute overnight events.      Patient was examined at bedside and feels well this morning. ROS otherwise negative.      MEDICATIONS  (STANDING):  ampicillin  IVPB 2 Gram(s) IV Intermittent every 4 hours  aspirin  chewable 81 milliGRAM(s) Oral daily  bisacodyl 5 milliGRAM(s) Oral at bedtime  chlorhexidine 2% Cloths 1 Application(s) Topical daily  chlorhexidine 2% Cloths 1 Application(s) Topical daily  cloNIDine 0.2 milliGRAM(s) Oral two times a day  dextrose 5%. 1000 milliLiter(s) (50 mL/Hr) IV Continuous <Continuous>  dextrose 5%. 1000 milliLiter(s) (100 mL/Hr) IV Continuous <Continuous>  dextrose 50% Injectable 25 Gram(s) IV Push once  dextrose 50% Injectable 12.5 Gram(s) IV Push once  dextrose 50% Injectable 25 Gram(s) IV Push once  dextrose Oral Gel 15 Gram(s) Oral once  enoxaparin Injectable 90 milliGRAM(s) SubCutaneous every 12 hours  glucagon  Injectable 1 milliGRAM(s) IntraMuscular once  hydrALAZINE 100 milliGRAM(s) Oral three times a day  insulin glargine Injectable (LANTUS) 20 Unit(s) SubCutaneous at bedtime  insulin lispro (ADMELOG) corrective regimen sliding scale   SubCutaneous three times a day before meals  insulin lispro (ADMELOG) corrective regimen sliding scale   SubCutaneous at bedtime  insulin lispro Injectable (ADMELOG) 6 Unit(s) SubCutaneous three times a day before meals  levothyroxine 150 MICROGram(s) Oral daily  lidocaine   4% Patch 1 Patch Transdermal daily  NIFEdipine XL 30 milliGRAM(s) Oral daily  polyethylene glycol 3350 17 Gram(s) Oral two times a day  propranolol  milliGRAM(s) Oral daily  rosuvastatin 10 milliGRAM(s) Oral at bedtime  senna 2 Tablet(s) Oral at bedtime  tamsulosin 0.4 milliGRAM(s) Oral at bedtime    MEDICATIONS  (PRN):  benzonatate 100 milliGRAM(s) Oral three times a day PRN Cough  HYDROmorphone  Injectable 0.5 milliGRAM(s) IV Push every 4 hours PRN Severe Pain (7 - 10)  oxyCODONE    IR 5 milliGRAM(s) Oral every 4 hours PRN Severe Pain (7 - 10)  oxyCODONE    IR 2.5 milliGRAM(s) Oral every 4 hours PRN Moderate Pain (4 - 6)      CAPILLARY BLOOD GLUCOSE      POCT Blood Glucose.: 118 mg/dL (01 Jun 2025 08:48)  POCT Blood Glucose.: 108 mg/dL (31 May 2025 23:07)  POCT Blood Glucose.: 88 mg/dL (31 May 2025 21:48)  POCT Blood Glucose.: 70 mg/dL (31 May 2025 21:07)  POCT Blood Glucose.: 75 mg/dL (31 May 2025 21:06)  POCT Blood Glucose.: 92 mg/dL (31 May 2025 17:50)  POCT Blood Glucose.: 108 mg/dL (31 May 2025 13:23)    I&O's Summary    31 May 2025 07:01  -  01 Jun 2025 07:00  --------------------------------------------------------  IN: 0 mL / OUT: 700 mL / NET: -700 mL        PHYSICAL EXAM:  Vital Signs Last 24 Hrs  T(C): 36.4 (01 Jun 2025 05:00), Max: 37 (31 May 2025 11:34)  T(F): 97.5 (01 Jun 2025 05:00), Max: 98.6 (31 May 2025 11:34)  HR: 64 (01 Jun 2025 05:00) (57 - 66)  BP: 142/42 (01 Jun 2025 08:15) (126/38 - 195/61)  BP(mean): 61 (31 May 2025 11:34) (61 - 61)  RR: 18 (01 Jun 2025 05:00) (18 - 18)  SpO2: 95% (01 Jun 2025 05:00) (94% - 97%)    Parameters below as of 01 Jun 2025 05:00  Patient On (Oxygen Delivery Method): room air        CONSTITUTIONAL: NAD; well-developed  HEENT: PERRL, clear conjunctiva  RESPIRATORY: Normal respiratory effort; lungs are clear to auscultation bilaterally; No crackles/rhonchi/wheezing  CARDIOVASCULAR: Regular rate and rhythm, normal S1 and S2, no murmur/rub/gallop; No lower extremity edema; Peripheral pulses are 2+ bilaterally  ABDOMEN: Nontender to palpation, normoactive bowel sounds, no rebound/guarding; No hepatosplenomegaly  MUSCULOSKELETAL: No clubbing or cyanosis of digits; no joint swelling or tenderness to palpation  EXTREMITY: Lower extremities non-tender to palpation; non-erythematous B/L  NEURO: A&Ox3; b/l LE weakness, R>L; sensation intact  PSYCH: Normal mood; affect appropriate    LABS:                        8.6    6.67  )-----------( 173      ( 01 Jun 2025 05:30 )             27.2     06-01    135  |  100  |  17  ----------------------------<  118[H]  4.0   |  21[L]  |  1.37[H]    Ca    7.9[L]      01 Jun 2025 05:30  Phos  3.0     06-01  Mg     2.00     06-01            Urinalysis Basic - ( 01 Jun 2025 05:30 )    Color: x / Appearance: x / SG: x / pH: x  Gluc: 118 mg/dL / Ketone: x  / Bili: x / Urobili: x   Blood: x / Protein: x / Nitrite: x   Leuk Esterase: x / RBC: x / WBC x   Sq Epi: x / Non Sq Epi: x / Bacteria: x          RADIOLOGY & ADDITIONAL TESTS:    N/A

## 2025-06-01 NOTE — PROGRESS NOTE ADULT - PROBLEM SELECTOR PLAN 6
Stable   - Notable small hematoma at R groin site on 5/22, CTM  - Will consider CT abd to r/o RP bleed given on heparin if downtrending

## 2025-06-01 NOTE — PROGRESS NOTE ADULT - PROBLEM SELECTOR PLAN 7
RESOLVED  - Pt hypoxic on presentation for unclear reason; started on high flow nasal cannula 40/40  - CXR unimpressive  - poss bronchiectasis vs pna  - VQ scan to rule out PE performed on 5/11: very low probability of PE

## 2025-06-01 NOTE — PROGRESS NOTE ADULT - ASSESSMENT
Pt is an 85M with HTN, HLD, DM2, prostate cancer s/p prostatectomy, CKD, TIA with a pacemaker who presents transferred from [place] for further evaluation of low back pain secondary to lumbar compression deformities. EP on board to clear PPM for MRI compatibility. Found to have e facaelis bacteremia. On abx, unable to tolerate MRI s/p PPM removal on 5/16 and exchange for leadless PPM. Plan for MARIA LUISA on 5/20 -> negative for endocarditis. Pursuing MRI with sedation, plan for Tuesday 5/27

## 2025-06-01 NOTE — PROGRESS NOTE ADULT - PROBLEM SELECTOR PLAN 2
- BCx positive for E faecalis . Only 1 bottle was sent and is positive -> repeat Bcx 5/15 negative   - s/p removal of PPM on 5/16 and exchange to leadless pacemaker with EP   - TTE ordered -> EF 64% mild LV systolic dysfunction  - MARIA LUISA neg for endocarditis  - s/p CTX  - ID following, recs included below  Plan:  - Continue Ampicillin 2g q4h

## 2025-06-01 NOTE — PROGRESS NOTE ADULT - PROBLEM SELECTOR PLAN 3
- acute urinary retention in the setting of back pain and immobility  - TOV 5/19 unsuccesful, wong replaced  Plan:  - Maintain wong while lumbar impingement remains

## 2025-06-01 NOTE — PROGRESS NOTE ADULT - PROBLEM SELECTOR PLAN 1
Stable, severe  - CT Lumbar Spine: (5/6/25): L3-L4 disc bulge, L4-L5 left paracentral-foraminal disc protrusion, multiple mild lumbar vertebral body compression deformities  . L3-L4 disc bulge, resulting in severe central canal, bilateral lateral recess and moderate bilateral neural foramen stenosis with facet arthrosis and ligamentum flavum hypertrophy.  - MRI attempted 5/13 to r/o cauda equina and malignant disease, gely with history of prostate cancer, however not tolerated due to loudness of machine and pain. Pt does not want to go into MRI machine again. Plan for MRI with general anesthesia   - MRI T+L Spine attempted under conscious sedation with anesthesia on 5/29 however aborted early due to patient tolerability for similar reasons  - MRI (5/29): Limited study but available results showing findings suspicious for OM and discitis at L4 and L5 levels, as well as severe stenosis of L3-L4 and L4-L5 levels with cauda equina impingement  - Discussed results with ortho who is requesting complete MRI L spine study  Plan:  - Ortho to coordinate MRI under complete anesthesia with anesthesia department  - Ortho to discuss treatment options with patient and daughter  - TLSO brace  - Pain Control: Oxy 2.5 q6h PRN moderate, Oxy 5 q6h severe, Dilaudid 0.5mg IV q4h for breakthrough

## 2025-06-01 NOTE — PROGRESS NOTE ADULT - ATTENDING COMMENTS
85M with HTN, HLD, DM2, prostate cancer s/p prostatectomy, CKD, TIA with a pacemaker who presents transferred from [place] for further evaluation of low back pain secondary to lumbar compression deformities. 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 S/p PPM removal on 5/16 and exchange for leadless PPM. Unable to complete MRI studies despite conscious sedation.    pt seen and examined at bedside. pt's family at bedside. pt and family state that ortho team spoke to them yesterday regarding possible surgery, and they have discussed it amongst themselves and feel that they would want to proceed with surgery so that the patient has a chance to be able to walk again. pt's pain is controlled currently. no BM for 5 days, ordered for Dulcolax suppository.    #E. faecalis bacteremia  #OM/discitis of L4, L5  #History of prostate cancer  #Urinary retention    -patient and his family would like to pursue surgery at this time- will need to f/u with ortho team tomorrow  -c/w ampicillin for E. faecalis bacteremia for 4-6 weeks from negative blood cultures  -c/w oxycodone for pain control- interval changed from q6h to q4h  -needs more aggressive bowel regimen- will give Dulcolax suppository today  -VA duplex neg for DVT of LUE on 5/29, swelling remains, may be due to anasarca or poor lymph drainage after PPM placement    d/w HS 7

## 2025-06-01 NOTE — PROGRESS NOTE ADULT - SUBJECTIVE AND OBJECTIVE BOX
Orthopedic Surgery Progress Note     S: Patient seen and examined today. No acute events overnight. Pain is well controlled. Denies f/c, chest pain, shortness of breath, dizziness. Yesterday afternoon, pt stated that he will not undergo further MRIs. Would like to have a plan based on the imaging that was already done. Spoke to pt and family member at length and explained the option of surgical spinal decompression w possible fusion to treat pt's pain and symptoms. Family noted that they are hesitant about surgery. Stated that the alternative would be to continue with antibiotics to treat possible OM/discitis. Family noted that they would like to discuss further amongst themselves before making a decision.    MEDICATIONS  (STANDING):  ampicillin  IVPB 2 Gram(s) IV Intermittent every 4 hours  aspirin  chewable 81 milliGRAM(s) Oral daily  bisacodyl 5 milliGRAM(s) Oral at bedtime  chlorhexidine 2% Cloths 1 Application(s) Topical daily  chlorhexidine 2% Cloths 1 Application(s) Topical daily  cloNIDine 0.2 milliGRAM(s) Oral two times a day  dextrose 5%. 1000 milliLiter(s) (50 mL/Hr) IV Continuous <Continuous>  dextrose 5%. 1000 milliLiter(s) (100 mL/Hr) IV Continuous <Continuous>  dextrose 50% Injectable 25 Gram(s) IV Push once  dextrose 50% Injectable 12.5 Gram(s) IV Push once  dextrose 50% Injectable 25 Gram(s) IV Push once  dextrose Oral Gel 15 Gram(s) Oral once  enoxaparin Injectable 90 milliGRAM(s) SubCutaneous every 12 hours  glucagon  Injectable 1 milliGRAM(s) IntraMuscular once  hydrALAZINE 100 milliGRAM(s) Oral three times a day  insulin glargine Injectable (LANTUS) 20 Unit(s) SubCutaneous at bedtime  insulin lispro (ADMELOG) corrective regimen sliding scale   SubCutaneous three times a day before meals  insulin lispro (ADMELOG) corrective regimen sliding scale   SubCutaneous at bedtime  insulin lispro Injectable (ADMELOG) 6 Unit(s) SubCutaneous three times a day before meals  levothyroxine 150 MICROGram(s) Oral daily  lidocaine   4% Patch 1 Patch Transdermal daily  NIFEdipine XL 30 milliGRAM(s) Oral daily  polyethylene glycol 3350 17 Gram(s) Oral two times a day  propranolol  milliGRAM(s) Oral daily  rosuvastatin 10 milliGRAM(s) Oral at bedtime  senna 2 Tablet(s) Oral at bedtime  tamsulosin 0.4 milliGRAM(s) Oral at bedtime    MEDICATIONS  (PRN):  benzonatate 100 milliGRAM(s) Oral three times a day PRN Cough  HYDROmorphone  Injectable 0.5 milliGRAM(s) IV Push every 4 hours PRN Severe Pain (7 - 10)  oxyCODONE    IR 5 milliGRAM(s) Oral every 4 hours PRN Severe Pain (7 - 10)  oxyCODONE    IR 2.5 milliGRAM(s) Oral every 4 hours PRN Moderate Pain (4 - 6)      Vital Signs Last 24 Hrs  T(C): 36.7 (31 May 2025 21:45), Max: 37 (31 May 2025 11:34)  T(F): 98 (31 May 2025 21:45), Max: 98.6 (31 May 2025 11:34)  HR: 66 (31 May 2025 21:45) (56 - 66)  BP: 156/47 (31 May 2025 21:45) (126/38 - 156/47)  BP(mean): 61 (31 May 2025 11:34) (61 - 61)  RR: 18 (31 May 2025 21:45) (18 - 18)  SpO2: 95% (31 May 2025 21:45) (94% - 97%)    Parameters below as of 31 May 2025 21:45  Patient On (Oxygen Delivery Method): room air        05-30-25 @ 07:01  -  05-31-25 @ 07:00  --------------------------------------------------------  IN: 200 mL / OUT: 1000 mL / NET: -800 mL        Physical Exam:  GEN: NAD, resting quietly  PULM: symmetric chest rise bilaterally, no increased WOB  SPINE:   Motor exam:          Upper extremity         C5 (Shoulder Abd)    C6 (Elbow flex)   C7 (Elbow ext)   C8 (Finger flex) T1 (finger abd)         R         5/5                 5/5                       5/5                      5/5                         5/5          L          5/5                 5/5                      5/5                      5/5                         5/5                   Lower extremity           L2 (Hip flex)  L3 (knee ext)  L4 (Dorsi flex)  L5 (EHL)  S1 (Plantar flex)                                               R        2/5            5/5             1/5                    1/5          4/5      L        3/5            5/5             5/5                   5/5           5/5    Sensory exam:                         C5      C6      C7      C8       T1          RIGHT          2         2        2         2         2          (0=absent, 1=impaired, 2=normal, NT=not testable)  LEFT             2         2        2         2         2          (0=absent, 1=impaired, 2=normal, NT=not testable)                          L2      L3     L4     L5       S1          RIGHT          2         2        2         2         2          (0=absent, 1=impaired, 2=normal, NT=not testable)  LEFT             2         2        2         2         2          (0=absent, 1=impaired, 2=normal, NT=not testable)       LABS:                        7.8    6.26  )-----------( 154      ( 31 May 2025 07:00 )             24.7     05-31    136  |  102  |  19  ----------------------------<  86  3.8   |  23  |  1.50[H]    Ca    7.9[L]      31 May 2025 07:00  Phos  2.9     05-31  Mg     2.00     05-31

## 2025-06-01 NOTE — PROGRESS NOTE ADULT - ASSESSMENT
85 year old male with 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 Patient had MRI performed yesterday but was only able to tolerate T spine study and sagittal views of lumbar spine with no post contrast imaging. Pt refusing any further imaging.    Plan:  -WBAT  -Recommended spinal surgery, but pt and family unsure. Spoke to pt and family member at length and explained the option of surgical spinal decompression w possible fusion to treat pt's pain and symptoms. Family noted that they are hesitant about surgery. We stated that the alternative would be to continue with antibiotics to treat possible OM/discitis. Family noted that they would like to discuss further amongst themselves before making a decision.  -ortho to follow     Jodi Dominguez, PGY-2  Orthopedic Surgery    [facility]: [id number]  [facility]: [id number]  [facility]:  [id number]/ [phone]   BMI of 43 41  Lifestyle/diet modifications

## 2025-06-02 LAB
ANION GAP SERPL CALC-SCNC: 9 MMOL/L — SIGNIFICANT CHANGE UP (ref 7–14)
BUN SERPL-MCNC: 17 MG/DL — SIGNIFICANT CHANGE UP (ref 7–23)
CALCIUM SERPL-MCNC: 8 MG/DL — LOW (ref 8.4–10.5)
CHLORIDE SERPL-SCNC: 103 MMOL/L — SIGNIFICANT CHANGE UP (ref 98–107)
CO2 SERPL-SCNC: 23 MMOL/L — SIGNIFICANT CHANGE UP (ref 22–31)
CREAT SERPL-MCNC: 1.34 MG/DL — HIGH (ref 0.5–1.3)
EGFR: 51 ML/MIN/1.73M2 — LOW
EGFR: 51 ML/MIN/1.73M2 — LOW
GLUCOSE BLDC GLUCOMTR-MCNC: 110 MG/DL — HIGH (ref 70–99)
GLUCOSE BLDC GLUCOMTR-MCNC: 124 MG/DL — HIGH (ref 70–99)
GLUCOSE BLDC GLUCOMTR-MCNC: 127 MG/DL — HIGH (ref 70–99)
GLUCOSE BLDC GLUCOMTR-MCNC: 186 MG/DL — HIGH (ref 70–99)
GLUCOSE SERPL-MCNC: 195 MG/DL — HIGH (ref 70–99)
HCT VFR BLD CALC: 26.2 % — LOW (ref 39–50)
HGB BLD-MCNC: 8.5 G/DL — LOW (ref 13–17)
MAGNESIUM SERPL-MCNC: 2 MG/DL — SIGNIFICANT CHANGE UP (ref 1.6–2.6)
MCHC RBC-ENTMCNC: 28.2 PG — SIGNIFICANT CHANGE UP (ref 27–34)
MCHC RBC-ENTMCNC: 32.4 G/DL — SIGNIFICANT CHANGE UP (ref 32–36)
MCV RBC AUTO: 87 FL — SIGNIFICANT CHANGE UP (ref 80–100)
NRBC # BLD AUTO: 0 K/UL — SIGNIFICANT CHANGE UP (ref 0–0)
NRBC # FLD: 0 K/UL — SIGNIFICANT CHANGE UP (ref 0–0)
NRBC BLD AUTO-RTO: 0 /100 WBCS — SIGNIFICANT CHANGE UP (ref 0–0)
PHOSPHATE SERPL-MCNC: 2.9 MG/DL — SIGNIFICANT CHANGE UP (ref 2.5–4.5)
PLATELET # BLD AUTO: 176 K/UL — SIGNIFICANT CHANGE UP (ref 150–400)
POTASSIUM SERPL-MCNC: 4.1 MMOL/L — SIGNIFICANT CHANGE UP (ref 3.5–5.3)
POTASSIUM SERPL-SCNC: 4.1 MMOL/L — SIGNIFICANT CHANGE UP (ref 3.5–5.3)
RBC # BLD: 3.01 M/UL — LOW (ref 4.2–5.8)
RBC # FLD: 16.5 % — HIGH (ref 10.3–14.5)
SODIUM SERPL-SCNC: 135 MMOL/L — SIGNIFICANT CHANGE UP (ref 135–145)
WBC # BLD: 7.29 K/UL — SIGNIFICANT CHANGE UP (ref 3.8–10.5)
WBC # FLD AUTO: 7.29 K/UL — SIGNIFICANT CHANGE UP (ref 3.8–10.5)

## 2025-06-02 PROCEDURE — 99232 SBSQ HOSP IP/OBS MODERATE 35: CPT | Mod: GC

## 2025-06-02 PROCEDURE — 99497 ADVNCD CARE PLAN 30 MIN: CPT | Mod: 25

## 2025-06-02 PROCEDURE — 99223 1ST HOSP IP/OBS HIGH 75: CPT

## 2025-06-02 PROCEDURE — G0545: CPT

## 2025-06-02 PROCEDURE — 99232 SBSQ HOSP IP/OBS MODERATE 35: CPT

## 2025-06-02 RX ORDER — LACTULOSE 10 G/15ML
10 SOLUTION ORAL DAILY
Refills: 0 | Status: DISCONTINUED | OUTPATIENT
Start: 2025-06-03 | End: 2025-06-04

## 2025-06-02 RX ORDER — LACTULOSE 10 G/15ML
SOLUTION ORAL
Refills: 0 | Status: DISCONTINUED | OUTPATIENT
Start: 2025-06-02 | End: 2025-06-04

## 2025-06-02 RX ORDER — HYDROMORPHONE/SOD CHLOR,ISO/PF 2 MG/10 ML
0.5 SYRINGE (ML) INJECTION ONCE
Refills: 0 | Status: DISCONTINUED | OUTPATIENT
Start: 2025-06-02 | End: 2025-06-02

## 2025-06-02 RX ORDER — HYDROMORPHONE/SOD CHLOR,ISO/PF 2 MG/10 ML
0.5 SYRINGE (ML) INJECTION EVERY 4 HOURS
Refills: 0 | Status: DISCONTINUED | OUTPATIENT
Start: 2025-06-02 | End: 2025-06-06

## 2025-06-02 RX ORDER — LACTULOSE 10 G/15ML
10 SOLUTION ORAL ONCE
Refills: 0 | Status: COMPLETED | OUTPATIENT
Start: 2025-06-02 | End: 2025-06-02

## 2025-06-02 RX ORDER — OXYCODONE HYDROCHLORIDE 30 MG/1
10 TABLET ORAL EVERY 4 HOURS
Refills: 0 | Status: DISCONTINUED | OUTPATIENT
Start: 2025-06-02 | End: 2025-06-06

## 2025-06-02 RX ORDER — OXYCODONE HYDROCHLORIDE 30 MG/1
5 TABLET ORAL EVERY 4 HOURS
Refills: 0 | Status: DISCONTINUED | OUTPATIENT
Start: 2025-06-02 | End: 2025-06-06

## 2025-06-02 RX ADMIN — INSULIN GLARGINE-YFGN 20 UNIT(S): 100 INJECTION, SOLUTION SUBCUTANEOUS at 21:42

## 2025-06-02 RX ADMIN — Medication 0.5 MILLIGRAM(S): at 00:00

## 2025-06-02 RX ADMIN — INSULIN LISPRO 6 UNIT(S): 100 INJECTION, SOLUTION INTRAVENOUS; SUBCUTANEOUS at 13:00

## 2025-06-02 RX ADMIN — Medication 100 MILLIGRAM(S): at 06:20

## 2025-06-02 RX ADMIN — Medication 81 MILLIGRAM(S): at 13:03

## 2025-06-02 RX ADMIN — Medication 0.5 MILLIGRAM(S): at 07:50

## 2025-06-02 RX ADMIN — INSULIN LISPRO 1: 100 INJECTION, SOLUTION INTRAVENOUS; SUBCUTANEOUS at 09:07

## 2025-06-02 RX ADMIN — Medication 150 MICROGRAM(S): at 04:45

## 2025-06-02 RX ADMIN — OXYCODONE HYDROCHLORIDE 10 MILLIGRAM(S): 30 TABLET ORAL at 19:26

## 2025-06-02 RX ADMIN — Medication 2 TABLET(S): at 21:44

## 2025-06-02 RX ADMIN — OXYCODONE HYDROCHLORIDE 10 MILLIGRAM(S): 30 TABLET ORAL at 20:10

## 2025-06-02 RX ADMIN — AMPICILLIN SODIUM 200 GRAM(S): 1 INJECTION, POWDER, FOR SOLUTION INTRAMUSCULAR; INTRAVENOUS at 21:38

## 2025-06-02 RX ADMIN — OXYCODONE HYDROCHLORIDE 10 MILLIGRAM(S): 30 TABLET ORAL at 23:53

## 2025-06-02 RX ADMIN — AMPICILLIN SODIUM 200 GRAM(S): 1 INJECTION, POWDER, FOR SOLUTION INTRAMUSCULAR; INTRAVENOUS at 13:04

## 2025-06-02 RX ADMIN — OXYCODONE HYDROCHLORIDE 5 MILLIGRAM(S): 30 TABLET ORAL at 06:25

## 2025-06-02 RX ADMIN — AMPICILLIN SODIUM 200 GRAM(S): 1 INJECTION, POWDER, FOR SOLUTION INTRAMUSCULAR; INTRAVENOUS at 09:12

## 2025-06-02 RX ADMIN — Medication 30 MILLIGRAM(S): at 06:18

## 2025-06-02 RX ADMIN — OXYCODONE HYDROCHLORIDE 5 MILLIGRAM(S): 30 TABLET ORAL at 02:22

## 2025-06-02 RX ADMIN — OXYCODONE HYDROCHLORIDE 5 MILLIGRAM(S): 30 TABLET ORAL at 03:00

## 2025-06-02 RX ADMIN — OXYCODONE HYDROCHLORIDE 10 MILLIGRAM(S): 30 TABLET ORAL at 15:04

## 2025-06-02 RX ADMIN — AMPICILLIN SODIUM 200 GRAM(S): 1 INJECTION, POWDER, FOR SOLUTION INTRAMUSCULAR; INTRAVENOUS at 17:27

## 2025-06-02 RX ADMIN — AMPICILLIN SODIUM 200 GRAM(S): 1 INJECTION, POWDER, FOR SOLUTION INTRAMUSCULAR; INTRAVENOUS at 01:30

## 2025-06-02 RX ADMIN — ENOXAPARIN SODIUM 90 MILLIGRAM(S): 100 INJECTION SUBCUTANEOUS at 17:23

## 2025-06-02 RX ADMIN — ROSUVASTATIN CALCIUM 10 MILLIGRAM(S): 5 TABLET, FILM COATED ORAL at 21:43

## 2025-06-02 RX ADMIN — LIDOCAINE HYDROCHLORIDE 1 PATCH: 20 JELLY TOPICAL at 15:30

## 2025-06-02 RX ADMIN — Medication 0.5 MILLIGRAM(S): at 17:08

## 2025-06-02 RX ADMIN — Medication 0.5 MILLIGRAM(S): at 22:07

## 2025-06-02 RX ADMIN — Medication 0.2 MILLIGRAM(S): at 06:18

## 2025-06-02 RX ADMIN — Medication 0.5 MILLIGRAM(S): at 04:41

## 2025-06-02 RX ADMIN — Medication 5 MILLIGRAM(S): at 21:43

## 2025-06-02 RX ADMIN — Medication 0.5 MILLIGRAM(S): at 07:32

## 2025-06-02 RX ADMIN — Medication 120 MILLIGRAM(S): at 06:18

## 2025-06-02 RX ADMIN — INSULIN LISPRO 6 UNIT(S): 100 INJECTION, SOLUTION INTRAVENOUS; SUBCUTANEOUS at 17:21

## 2025-06-02 RX ADMIN — Medication 1 APPLICATION(S): at 13:04

## 2025-06-02 RX ADMIN — OXYCODONE HYDROCHLORIDE 10 MILLIGRAM(S): 30 TABLET ORAL at 10:37

## 2025-06-02 RX ADMIN — LACTULOSE 10 GRAM(S): 10 SOLUTION ORAL at 13:00

## 2025-06-02 RX ADMIN — Medication 100 MILLIGRAM(S): at 15:04

## 2025-06-02 RX ADMIN — ENOXAPARIN SODIUM 90 MILLIGRAM(S): 100 INJECTION SUBCUTANEOUS at 06:18

## 2025-06-02 RX ADMIN — Medication 0.2 MILLIGRAM(S): at 17:25

## 2025-06-02 RX ADMIN — AMPICILLIN SODIUM 200 GRAM(S): 1 INJECTION, POWDER, FOR SOLUTION INTRAMUSCULAR; INTRAVENOUS at 06:17

## 2025-06-02 RX ADMIN — Medication 0.5 MILLIGRAM(S): at 21:37

## 2025-06-02 RX ADMIN — Medication 0.5 MILLIGRAM(S): at 05:30

## 2025-06-02 RX ADMIN — INSULIN LISPRO 6 UNIT(S): 100 INJECTION, SOLUTION INTRAVENOUS; SUBCUTANEOUS at 09:07

## 2025-06-02 RX ADMIN — LIDOCAINE HYDROCHLORIDE 1 PATCH: 20 JELLY TOPICAL at 19:30

## 2025-06-02 RX ADMIN — Medication 100 MILLIGRAM(S): at 21:44

## 2025-06-02 RX ADMIN — TAMSULOSIN HYDROCHLORIDE 0.4 MILLIGRAM(S): 0.4 CAPSULE ORAL at 21:43

## 2025-06-02 NOTE — PROGRESS NOTE ADULT - ASSESSMENT
85 year old male with 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 After family discussions they wish to proceed with surgery.     Plan:  -please document medical and cardiac clearance for planned OR ASAP  -surgical date pending   -WBAT  -pain control prn  -antibiotics per ID  -continue PT/OT  -dispo pending    Jake Leal PGY1  Orthopedic Surgery  [facility] [id number]  [facility]        [id number]  [facility]  [phone]

## 2025-06-02 NOTE — CONSULT NOTE ADULT - NSCONSULTADDITIONALINFOA_GEN_ALL_CORE
-advanced care planning was discussed with patient and family.  Advanced care planning forms were reviewed and discussed. Food impaction of esophagus, initial encounter

## 2025-06-02 NOTE — PROGRESS NOTE ADULT - SUBJECTIVE AND OBJECTIVE BOX
ORTHOPEDIC PROGRESS NOTE    SUBJECTIVE:  Pt seen and examined at bedside this am.  Doing well.  No acute events overnight.  Pt states pain is well controlled. Worked with PT/OT but has not been able to sit up much in bed. Notes stable numbness/tingling and weakness R distal leg>L, L proximal leg>R      OBJECTIVE:  Vital Signs Last 24 Hrs  T(C): 36.8 (02 Jun 2025 07:46), Max: 36.8 (01 Jun 2025 17:15)  T(F): 98.2 (02 Jun 2025 07:46), Max: 98.3 (01 Jun 2025 18:30)  HR: 66 (02 Jun 2025 07:46) (57 - 68)  BP: 160/60 (02 Jun 2025 07:46) (103/35 - 162/58)  BP(mean): 48 (01 Jun 2025 11:29) (48 - 48)  RR: 18 (02 Jun 2025 07:46) (18 - 19)  SpO2: 98% (02 Jun 2025 07:46) (95% - 99%)    Parameters below as of 02 Jun 2025 07:46  Patient On (Oxygen Delivery Method): room air        Physical Exam:  GEN: NAD, resting quietly  PULM: symmetric chest rise bilaterally, no increased WOB, on NC  SPINE:   Motor exam:          Upper extremity         C5 (Shoulder Abd)    C6 (Elbow flex)   C7 (Elbow ext)   C8 (Finger flex) T1 (finger abd)         R         5/5                 5/5                       5/5                      5/5                         5/5          L          5/5                 5/5                      5/5                      5/5                         5/5                   Lower extremity           L2 (Hip flex)  L3 (knee ext)  L4 (Dorsi flex)  L5 (EHL)  S1 (Plantar flex)                                               R        2/5            5/5             1/5                    1/5          4/5      L        3/5            5/5             5/5                   5/5           5/5    Sensory exam:                         C5      C6      C7      C8       T1          RIGHT          2         2        2         2         2          (0=absent, 1=impaired, 2=normal, NT=not testable)  LEFT             2         2        2         2         2          (0=absent, 1=impaired, 2=normal, NT=not testable)                          L2      L3     L4     L5       S1          RIGHT          2         2        2         2         2          (0=absent, 1=impaired, 2=normal, NT=not testable)  LEFT             2         2        2         2         2          (0=absent, 1=impaired, 2=normal, NT=not testable)           LABS                        8.5    7.29  )-----------( 176      ( 02 Jun 2025 06:45 )             26.2       06-02    135  |  103  |  17  ----------------------------<  195[H]  4.1   |  23  |  1.34[H]    Ca    8.0[L]      02 Jun 2025 06:45  Phos  2.9     06-02  Mg     2.00     06-02            I&O's Summary      ampicillin  IVPB 2 Gram(s) IV Intermittent every 4 hours  aspirin  chewable 81 milliGRAM(s) Oral daily  benzonatate 100 milliGRAM(s) Oral three times a day PRN  bisacodyl 5 milliGRAM(s) Oral at bedtime  chlorhexidine 2% Cloths 1 Application(s) Topical daily  cloNIDine 0.2 milliGRAM(s) Oral two times a day  dextrose 5%. 1000 milliLiter(s) IV Continuous <Continuous>  dextrose 5%. 1000 milliLiter(s) IV Continuous <Continuous>  dextrose 50% Injectable 12.5 Gram(s) IV Push once  dextrose 50% Injectable 25 Gram(s) IV Push once  dextrose 50% Injectable 25 Gram(s) IV Push once  dextrose Oral Gel 15 Gram(s) Oral once  enoxaparin Injectable 90 milliGRAM(s) SubCutaneous every 12 hours  glucagon  Injectable 1 milliGRAM(s) IntraMuscular once  hydrALAZINE 100 milliGRAM(s) Oral three times a day  HYDROmorphone  Injectable 0.5 milliGRAM(s) IV Push every 4 hours PRN  insulin glargine Injectable (LANTUS) 20 Unit(s) SubCutaneous at bedtime  insulin lispro (ADMELOG) corrective regimen sliding scale   SubCutaneous three times a day before meals  insulin lispro (ADMELOG) corrective regimen sliding scale   SubCutaneous at bedtime  insulin lispro Injectable (ADMELOG) 6 Unit(s) SubCutaneous three times a day before meals  lactulose Syrup      lactulose Syrup 10 Gram(s) Oral once  levothyroxine 150 MICROGram(s) Oral daily  lidocaine   4% Patch 1 Patch Transdermal daily  NIFEdipine XL 30 milliGRAM(s) Oral daily  oxyCODONE    IR 10 milliGRAM(s) Oral every 4 hours PRN  oxyCODONE    IR 5 milliGRAM(s) Oral every 4 hours PRN  polyethylene glycol 3350 17 Gram(s) Oral two times a day  propranolol  milliGRAM(s) Oral daily  rosuvastatin 10 milliGRAM(s) Oral at bedtime  senna 2 Tablet(s) Oral at bedtime  tamsulosin 0.4 milliGRAM(s) Oral at bedtime

## 2025-06-02 NOTE — PROGRESS NOTE ADULT - ATTENDING COMMENTS
85M w/ hypertension, type 2 diabetes (A1c 6.9%), CKD (b/l Cr 1.5-2), prostate cancer s/p prostatectomy, h/o strokes (on apixaban), sinus node dysfunction s/p PPM presented to [facility] with acute onset lower back pain and bilateral leg weakness (R>L), found to have imaging with lumbar compression deformities and high grade E faecalis bacteremia, now s/p PPM extraction and replacement with leadless PPM, negative TTE and MARIA LUISA for vegetation, and cleared cultures, course c/b persistent and severe back pain, lower extremity weakness, and urinary retention. 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    Worsening back pain overnight. Also noted to have area of redness and skin breakdown over PPM insertion site    - Ortho and ID following  - Plan for OR with ortho  - EP consult for skin changes over PPM replacement site  - Continue ampicillin  - On lovenox in place of DOAC, will hold in advance of OR once there is date planned  - Continue Frye for urinary retention  - Increase oxycodone dose given worsening pain  - Intensify bowel regimen given constipation    Discussed with patient and his daughter at bedside    Time-based billing (NON-critical care).   35 minutes spent on total encounter, which excludes teaching and separately reported services. The necessity of the time spent during the encounter on this date of service was due to:   Documentation in [place], reviewing chart and coordinating care with patient/resident and interdisciplinary staff (such as , social workers, etc) as well as reviewing vitals, laboratory data, radiology, medication list, consultants' recommendations and prior records. Interventions were performed as documented above. 85M w/ hypertension, type 2 diabetes (A1c 6.9%), CKD (b/l Cr 1.5-2), prostate cancer s/p prostatectomy, h/o strokes (on apixaban), sinus node dysfunction s/p PPM presented to [facility] with acute onset lower back pain and bilateral leg weakness (R>L), found to have imaging with lumbar compression deformities and high grade E faecalis bacteremia, now s/p PPM extraction and replacement with leadless PPM, negative TTE and MARIA LUISA for vegetation, and cleared cultures, course c/b persistent and severe back pain, lower extremity weakness, and urinary retention. 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    Worsening back pain overnight. Also noted to have area of redness and skin breakdown over PPM insertion site    - Ortho and ID following  - Plan for OR with ortho  - EP consult for skin changes over PPM replacement site  - Continue ampicillin  - On lovenox in place of DOAC, will hold in advance of OR once there is date planned  - Continue Frye for urinary retention  - Increase oxycodone dose given worsening pain  - Intensify bowel regimen given constipation      Eliz-operative risk assessment:  RCRI 1 or 2 (history of TIA stroke +/- pre-operative treatment with insulin as he is not on home insulin but receiving inpatient) correlating with 6-10% risk of major cardiac event  Mejia/GERMAN risk assessment 3.3% risk of MI/cardiac arrest    Unable to perform 4 METs limited by lower extremity weakness and pain. Not having any chest pain, shortness of breath, or palpitations. Reviewed EKG from 5/16 showing sinus bradycardia without ischemic changes or Q waves and ECHO 5/14 showing EF 64% and mild diastolic dysfunction.     Overall, elevated risk for surgery based on risk calculator scores. No further cardiac testing recommended prior to proceeding to OR.  Currently medically optimized for OR.    Discussed with patient and his daughter at bedside    Time-based billing (NON-critical care).   35 minutes spent on total encounter, which excludes teaching and separately reported services. The necessity of the time spent during the encounter on this date of service was due to:   Documentation in [place], reviewing chart and coordinating care with patient/resident and interdisciplinary staff (such as , social workers, etc) as well as reviewing vitals, laboratory data, radiology, medication list, consultants' recommendations and prior records. Interventions were performed as documented above.

## 2025-06-02 NOTE — PROGRESS NOTE ADULT - PROBLEM SELECTOR PLAN 6
Stable   - Notable small hematoma at R groin site on 5/22, CTM  - Will consider CT abd to r/o RP bleed given on heparin if downtrending Stable   - Notable small hematoma at R groin site on 5/22, CTM

## 2025-06-02 NOTE — CHART NOTE - NSCHARTNOTEFT_GEN_A_CORE
EP team called for purulent discharge by patient "arm pit". PPM extraction site was clean, dry and intact. No discharge was noted at the extraction site. Patient was noted to have skin breakdown at the left axillary region. No EP intervention needed. EP team called for purulent discharge by patient "arm pit". PPM extraction site was clean, dry and intact. No discharge was noted at the extraction site. Patient was noted to have skin breakdown at the left axillary region. Right groin site where Leadless PPM was inserted was also clean, dry and intact with no hematoma or ecchymosis. No EP intervention needed.

## 2025-06-02 NOTE — PROGRESS NOTE ADULT - SUBJECTIVE AND OBJECTIVE BOX
PROGRESS NOTE:     Patient is a 85y old  Male who presents with a chief complaint of back pain (01 Jun 2025 10:23)      INTERVAL HISTORY: NAEO. VSS. ROS negative.    MEDICATIONS  (STANDING):  ampicillin  IVPB 2 Gram(s) IV Intermittent every 4 hours  aspirin  chewable 81 milliGRAM(s) Oral daily  bisacodyl 5 milliGRAM(s) Oral at bedtime  chlorhexidine 2% Cloths 1 Application(s) Topical daily  cloNIDine 0.2 milliGRAM(s) Oral two times a day  dextrose 5%. 1000 milliLiter(s) (50 mL/Hr) IV Continuous <Continuous>  dextrose 5%. 1000 milliLiter(s) (100 mL/Hr) IV Continuous <Continuous>  dextrose 50% Injectable 12.5 Gram(s) IV Push once  dextrose 50% Injectable 25 Gram(s) IV Push once  dextrose 50% Injectable 25 Gram(s) IV Push once  dextrose Oral Gel 15 Gram(s) Oral once  enoxaparin Injectable 90 milliGRAM(s) SubCutaneous every 12 hours  glucagon  Injectable 1 milliGRAM(s) IntraMuscular once  hydrALAZINE 100 milliGRAM(s) Oral three times a day  HYDROmorphone  Injectable 0.5 milliGRAM(s) IV Push once  insulin glargine Injectable (LANTUS) 20 Unit(s) SubCutaneous at bedtime  insulin lispro (ADMELOG) corrective regimen sliding scale   SubCutaneous three times a day before meals  insulin lispro (ADMELOG) corrective regimen sliding scale   SubCutaneous at bedtime  insulin lispro Injectable (ADMELOG) 6 Unit(s) SubCutaneous three times a day before meals  levothyroxine 150 MICROGram(s) Oral daily  lidocaine   4% Patch 1 Patch Transdermal daily  NIFEdipine XL 30 milliGRAM(s) Oral daily  polyethylene glycol 3350 17 Gram(s) Oral two times a day  propranolol  milliGRAM(s) Oral daily  rosuvastatin 10 milliGRAM(s) Oral at bedtime  senna 2 Tablet(s) Oral at bedtime  tamsulosin 0.4 milliGRAM(s) Oral at bedtime    MEDICATIONS  (PRN):  benzonatate 100 milliGRAM(s) Oral three times a day PRN Cough  HYDROmorphone  Injectable 0.5 milliGRAM(s) IV Push every 4 hours PRN Severe Pain (7 - 10)  oxyCODONE    IR 5 milliGRAM(s) Oral every 4 hours PRN Severe Pain (7 - 10)  oxyCODONE    IR 2.5 milliGRAM(s) Oral every 4 hours PRN Moderate Pain (4 - 6)      CAPILLARY BLOOD GLUCOSE      POCT Blood Glucose.: 163 mg/dL (01 Jun 2025 21:38)  POCT Blood Glucose.: 113 mg/dL (01 Jun 2025 18:13)  POCT Blood Glucose.: 105 mg/dL (01 Jun 2025 12:42)  POCT Blood Glucose.: 118 mg/dL (01 Jun 2025 08:48)    I&O's Summary      PHYSICAL EXAM:  Vital Signs Last 24 Hrs  T(C): 36.7 (02 Jun 2025 06:00), Max: 36.8 (01 Jun 2025 17:15)  T(F): 98.1 (02 Jun 2025 06:00), Max: 98.3 (01 Jun 2025 18:30)  HR: 65 (02 Jun 2025 06:00) (57 - 66)  BP: 139/79 (02 Jun 2025 06:00) (103/35 - 148/54)  BP(mean): 48 (01 Jun 2025 11:29) (48 - 48)  RR: 19 (02 Jun 2025 06:00) (18 - 19)  SpO2: 99% (02 Jun 2025 06:00) (95% - 99%)    Parameters below as of 02 Jun 2025 06:00  Patient On (Oxygen Delivery Method): room air        CONSTITUTIONAL: NAD, well-developed  HEENT:   RESPIRATORY: Normal respiratory effort; lungs are clear to auscultation bilaterally  CARDIOVASCULAR: Regular rate and rhythm, normal S1 and S2, no murmur/rub/gallop; No lower extremity edema; Peripheral pulses are 2+ bilaterally  ABDOMEN: Nontender to palpation, normoactive bowel sounds, no rebound/guarding; No hepatosplenomegaly  MUSCULOSKELETAL: no clubbing or cyanosis of digits; no joint swelling or tenderness to palpation  PSYCH: A+O to person, place, and time; affect appropriate    LABS:                        8.5    7.29  )-----------( 176      ( 02 Jun 2025 06:45 )             26.2     06-01    135  |  100  |  17  ----------------------------<  118[H]  4.0   |  21[L]  |  1.37[H]    Ca    7.9[L]      01 Jun 2025 05:30  Phos  3.0     06-01  Mg     2.00     06-01      Urinalysis Basic - ( 01 Jun 2025 05:30 )  Color: x / Appearance: x / SG: x / pH: x  Gluc: 118 mg/dL / Ketone: x  / Bili: x / Urobili: x   Blood: x / Protein: x / Nitrite: x   Leuk Esterase: x / RBC: x / WBC x   Sq Epi: x / Non Sq Epi: x / Bacteria: x      ******************************  Authored By: Jacinto Rodriguez MD PGY1  Internal Medicine  MS Teams  ******************************   PROGRESS NOTE:     Patient is a 85y old  Male who presents with a chief complaint of back pain (01 Jun 2025 10:23)      INTERVAL HISTORY: NAEO. VSS. ROS negative.    MEDICATIONS  (STANDING):  ampicillin  IVPB 2 Gram(s) IV Intermittent every 4 hours  aspirin  chewable 81 milliGRAM(s) Oral daily  bisacodyl 5 milliGRAM(s) Oral at bedtime  chlorhexidine 2% Cloths 1 Application(s) Topical daily  cloNIDine 0.2 milliGRAM(s) Oral two times a day  dextrose 5%. 1000 milliLiter(s) (50 mL/Hr) IV Continuous <Continuous>  dextrose 5%. 1000 milliLiter(s) (100 mL/Hr) IV Continuous <Continuous>  dextrose 50% Injectable 12.5 Gram(s) IV Push once  dextrose 50% Injectable 25 Gram(s) IV Push once  dextrose 50% Injectable 25 Gram(s) IV Push once  dextrose Oral Gel 15 Gram(s) Oral once  enoxaparin Injectable 90 milliGRAM(s) SubCutaneous every 12 hours  glucagon  Injectable 1 milliGRAM(s) IntraMuscular once  hydrALAZINE 100 milliGRAM(s) Oral three times a day  HYDROmorphone  Injectable 0.5 milliGRAM(s) IV Push once  insulin glargine Injectable (LANTUS) 20 Unit(s) SubCutaneous at bedtime  insulin lispro (ADMELOG) corrective regimen sliding scale   SubCutaneous three times a day before meals  insulin lispro (ADMELOG) corrective regimen sliding scale   SubCutaneous at bedtime  insulin lispro Injectable (ADMELOG) 6 Unit(s) SubCutaneous three times a day before meals  levothyroxine 150 MICROGram(s) Oral daily  lidocaine   4% Patch 1 Patch Transdermal daily  NIFEdipine XL 30 milliGRAM(s) Oral daily  polyethylene glycol 3350 17 Gram(s) Oral two times a day  propranolol  milliGRAM(s) Oral daily  rosuvastatin 10 milliGRAM(s) Oral at bedtime  senna 2 Tablet(s) Oral at bedtime  tamsulosin 0.4 milliGRAM(s) Oral at bedtime    MEDICATIONS  (PRN):  benzonatate 100 milliGRAM(s) Oral three times a day PRN Cough  HYDROmorphone  Injectable 0.5 milliGRAM(s) IV Push every 4 hours PRN Severe Pain (7 - 10)  oxyCODONE    IR 5 milliGRAM(s) Oral every 4 hours PRN Severe Pain (7 - 10)  oxyCODONE    IR 2.5 milliGRAM(s) Oral every 4 hours PRN Moderate Pain (4 - 6)      CAPILLARY BLOOD GLUCOSE  POCT Blood Glucose.: 163 mg/dL (01 Jun 2025 21:38)  POCT Blood Glucose.: 113 mg/dL (01 Jun 2025 18:13)  POCT Blood Glucose.: 105 mg/dL (01 Jun 2025 12:42)  POCT Blood Glucose.: 118 mg/dL (01 Jun 2025 08:48)      PHYSICAL EXAM:  Vital Signs Last 24 Hrs  T(C): 36.7 (02 Jun 2025 06:00), Max: 36.8 (01 Jun 2025 17:15)  T(F): 98.1 (02 Jun 2025 06:00), Max: 98.3 (01 Jun 2025 18:30)  HR: 65 (02 Jun 2025 06:00) (57 - 66)  BP: 139/79 (02 Jun 2025 06:00) (103/35 - 148/54)  BP(mean): 48 (01 Jun 2025 11:29) (48 - 48)  RR: 19 (02 Jun 2025 06:00) (18 - 19)  SpO2: 99% (02 Jun 2025 06:00) (95% - 99%)    Parameters below as of 02 Jun 2025 06:00  Patient On (Oxygen Delivery Method): room air    GENERAL: NAD, lying in bed comfortably  HEAD: Atraumatic, normocephalic  EYES: EOMI, PERRLA, conjunctiva and sclera clear  ENT: Moist mucous membranes  NECK: Supple, no JVD  HEART: S1, S2, Regular rate and rhythm, no murmurs, rubs, or gallops  LUNGS: Unlabored respirations, clear to auscultation bilaterally, no crackles, wheezing, or rhonchi  ABDOMEN: Soft, nontender, nondistended, +BS  EXTREMITIES: +small hematoma in R groin 2+ peripheral pulses bilaterally. LUE 2+ edema   NERVOUS SYSTEM:  A&Ox3, LE R weaker than left. Says that he can feel when he has BMs  SKIN: Purulent drainage under left axilla at skin tear  +Frye    LABS:             8.5    7.29  )-----------( 176      ( 02 Jun 2025 06:45 )             26.2     06-01    135  |  100  |  17  ----------------------------<  118[H]  4.0   |  21[L]  |  1.37[H]    Ca    7.9[L]      01 Jun 2025 05:30  Phos  3.0     06-01  Mg     2.00     06-01      Urinalysis Basic - ( 01 Jun 2025 05:30 )  Color: x / Appearance: x / SG: x / pH: x  Gluc: 118 mg/dL / Ketone: x  / Bili: x / Urobili: x   Blood: x / Protein: x / Nitrite: x   Leuk Esterase: x / RBC: x / WBC x   Sq Epi: x / Non Sq Epi: x / Bacteria: x      ******************************  Authored By: Jacinto Rodriguez MD PGY1  Internal Medicine  MS Teams  ******************************

## 2025-06-02 NOTE — CONSULT NOTE ADULT - ASSESSMENT
85M w/ HTN, HLD, T2DM, prostate CA, CKD, TIA and SND s/p PPM transferred here for lumbar spinal cord compression and bacteremia s/p PPM explant and implant of Micra PPM. Now pending OR with orthopedic for decompression     -EKG w/ SR, no significant STT changes   -ECHO w/ normal EF no significant valvular heart disease   -s/p leadless PPM w/ EP given his SND and pacing dependency   -cont AC, hold preoperatively and resume when able   -RCRI=2, pt is at elevated CV risk, no further modifiable risk factors, optimized from cardiac standpoint to proceed     Davey Angela MD [facility]  Attending Interventional Cardiologist, [facility].   [employer] Team

## 2025-06-02 NOTE — PROGRESS NOTE ADULT - SUBJECTIVE AND OBJECTIVE BOX
Infectious Diseases Follow Up:    Patient is a 85y old  Male who presents with a chief complaint of back pain (02 Jun 2025 07:29)      Interval History/ROS:  No acute events     Allergies  gabapentin (Other)        ANTIMICROBIALS:  ampicillin  IVPB 2 every 4 hours      Current Abx:     Previous Abx     OTHER MEDS:  MEDICATIONS  (STANDING):  aspirin  chewable 81 daily  benzonatate 100 three times a day PRN  bisacodyl 5 at bedtime  cloNIDine 0.2 two times a day  dextrose 50% Injectable 12.5 once  dextrose 50% Injectable 25 once  dextrose 50% Injectable 25 once  dextrose Oral Gel 15 once  enoxaparin Injectable 90 every 12 hours  glucagon  Injectable 1 once  hydrALAZINE 100 three times a day  HYDROmorphone  Injectable 0.5 every 4 hours PRN  insulin glargine Injectable (LANTUS) 20 at bedtime  insulin lispro (ADMELOG) corrective regimen sliding scale  three times a day before meals  insulin lispro (ADMELOG) corrective regimen sliding scale  at bedtime  insulin lispro Injectable (ADMELOG) 6 three times a day before meals  levothyroxine 150 daily  NIFEdipine XL 30 daily  oxyCODONE    IR 5 every 4 hours PRN  oxyCODONE    IR 2.5 every 4 hours PRN  polyethylene glycol 3350 17 two times a day  propranolol  daily  rosuvastatin 10 at bedtime  senna 2 at bedtime  tamsulosin 0.4 at bedtime      Vital Signs Last 24 Hrs  T(C): 36.8 (02 Jun 2025 07:46), Max: 36.8 (01 Jun 2025 17:15)  T(F): 98.2 (02 Jun 2025 07:46), Max: 98.3 (01 Jun 2025 18:30)  HR: 66 (02 Jun 2025 07:46) (57 - 68)  BP: 160/60 (02 Jun 2025 07:46) (103/35 - 162/58)  BP(mean): 48 (01 Jun 2025 11:29) (48 - 48)  RR: 18 (02 Jun 2025 07:46) (18 - 19)  SpO2: 98% (02 Jun 2025 07:46) (95% - 99%)    Parameters below as of 02 Jun 2025 07:46  Patient On (Oxygen Delivery Method): room air        PHYSICAL EXAM:  GENERAL: NAD, well-developed  HEAD:  Atraumatic, Normocephalic  EYES: EOMI, conjunctiva and sclera clear  CHEST/LUNG: On RA, not in respiratory distress, clear to auscultation bilaterally;   PSYCH: AAOx3                            8.5    7.29  )-----------( 176      ( 02 Jun 2025 06:45 )             26.2       06-02    135  |  103  |  17  ----------------------------<  195[H]  4.1   |  23  |  1.34[H]    Ca    8.0[L]      02 Jun 2025 06:45  Phos  2.9     06-02  Mg     2.00     06-02        Urinalysis Basic - ( 02 Jun 2025 06:45 )    Color: x / Appearance: x / SG: x / pH: x  Gluc: 195 mg/dL / Ketone: x  / Bili: x / Urobili: x   Blood: x / Protein: x / Nitrite: x   Leuk Esterase: x / RBC: x / WBC x   Sq Epi: x / Non Sq Epi: x / Bacteria: x        MICROBIOLOGY:  v  Blood Blood  05-18-25   No growth at 5 days  --  --      Blood Blood-Peripheral  05-17-25   No growth at 5 days  --  --      Blood Blood-Peripheral  05-17-25   No growth at 5 days  --  --      Blood Blood  05-17-25   No growth at 5 days  --  --      Blood Blood  05-15-25   No growth at 5 days  --  --      Blood Blood  05-15-25   No growth at 5 days  --  --      Blood Blood  05-14-25   Growth in aerobic bottle: Enterococcus faecalis  See previous culture [id number]  Direct identification is available within approximately 3-5  hours either by Blood Panel Multiplexed PCR or Direct  MALDI-TOF. Details: [email or web address]  --  Blood Culture PCR      Blood Blood  05-14-25   Growth in aerobic and anaerobic bottles: Enterococcus faecalis  Growth in anaerobic bottle: blood culture bottle turned positive after  the final report was released.  --  Enterococcus faecalis      Blood Blood-Peripheral  05-12-25   Growth in aerobic and anaerobic bottles: Enterococcus faecalis  See previous culture [id number]  --    Growth in aerobic bottle: Gram Positive Cocci in Pairs and Chains  Growth in anaerobic bottle: Gram Positive Cocci in Pairs and Chains      Blood Blood-Peripheral  05-12-25   Growth in aerobic and anaerobic bottles: Enterococcus faecalis  See previous culture [id number]  --    Growth in aerobic bottle: Gram Positive Cocci in Pairs and Chains  Growth in anaerobic bottle: Gram Positive Cocci in Pairs and Chains      Blood Blood-Peripheral  05-11-25   Growth in aerobic and anaerobic bottles: Enterococcus faecalis  Direct identification is available within approximately 3-5  hours either by Blood Panel Multiplexed PCR or Direct  MALDI-TOF. Details: [email or web address]  --  Blood Culture PCR  Enterococcus faecalis                RADIOLOGY:    < from: MR Lumbar Spine No Cont (05.29.25 @ 17:26) >  FINDINGS:    THORACIC SPINE:    The normal spinal curvature is maintained.    Posterior elements are aligned. No spondylolisthesis.    Vertebral body heightsare maintained.  No acute fracture or marrow   edema. Scattered vertebral body hemangiomata, measuring up to 1.6 cm in   T11.    Disc level evaluation:    Mild multilevel disc desiccation in the mid thoracic spine without   significant loss of height.    Disc osteophyte complex C7-T1 results in mild bilateral foraminal   narrowing.    Disc osteophyte complex asymmetric to the right at T7-T8 mildly effaces   the ventral thecal sac and minimally flattens the right hemicord. No cord   edema.    Discosteophyte complex asymmetric to the left at T8-9 effaces the   ventral thecal sac and mildly flattens the left hemicord. No cord edema.    Ligamentum flavum thickening right greater than left at T11-T12 mildly   effaces the right posterior thecal sac without cord compression.    The spinal cord is normal in caliber and signal.    No epidural hematoma.    The prevertebral and paravertebral soft tissues are unremarkable.    Acute minimally displaced proximal left clavicular fracture and left   sternoclavicular joint effusion (15-12).    Additional findings: Moderate left pleural effusion. A 2.3 cm cystic   lesion in the pancreatic body as demonstrated on prior CT (15-55).    LUMBAR SPINE:    Evaluation is limited to the sagittal plane.    The normal lumbar lordosis is maintained.    Posterior elements are aligned. No spondylolisthesis.    Patchy type II Modic changes throughout most of the lumbosacral spine.    Mild chronic appearing loss of height at L2 and L3.  Edema within the mid   to inferior L4 vertebral body and throughout the L5 vertebral body with   mild loss of vertebral body height at the superior endplate of L5. Loss   of endplate cortical hypointense signal at L4-L5 and edema within the   L4-L5 disc space with small erosions. Linear STIR signal in the posterior   aspect of the L4-L5 disc suggestive of annular tear. No significant bony   retropulsion.    Disc level evaluation:    L1-L2: No significant central or neuroforaminal stenosis.  L2-L3: Diffuse disc bulge and facet arthrosis results in mild bilateral   foraminal narrowing. Ligamentum flavum thickening contributes to mild   central stenosis.  L3-L4: Diffuse disc bulge and severe ligamentum flavum thickening results   in severe central stenosis and cauda equina impingement. Moderate   bilateral foraminal foraminal narrowing.  L4-L5: Diffuse disc bulge and severe ligamentum flavum thickening results   in severe central stenosis and cauda equina impingement. Severe bilateral   foraminal narrowing.  L5-S1: Diffuse disc bulge and bilateral facet arthrosis results in mild   bilateral foraminal narrowing. No significant central stenosis.    Visualized sacroiliac joints are preserved.    The conus medullaris terminates at L1-L2.    No epidural hematoma.    Left psoas muscle and presacral edema. Bilateral paraspinal muscular   edema.    IMPRESSION:    THORACIC SPINE:    No acute fracture or traumatic subluxation in the thoracic spine.  Partially visualized left proximal clavicular fracture and   sternoclavicular joint effusion.  Moderate left pleural effusion, increased compared to prior CT abdomen   and pelvis on 5/10/2025.  Recommend dedicated CT of the chest for further evaluation.    LUMBAR SPINE:    Limited by evaluation only in the axial plane due to premature   termination of study.    Mild loss of height of the L4 and L5 vertebral bodies with associated   edema compatible and erosions. FINDINGS suspicious for osteomyelitis and   discitis. Correlate with ESR/CRP.  Edema in the bilateral paraspinal musculature, right psoas musculature,   and presacral space may be infectious/inflammatory.  Severe spinal canal stenosis at L3-L4 and L4-L5 with impingement of the   cauda equina at these levels.    < end of copied text >

## 2025-06-02 NOTE — PROGRESS NOTE ADULT - ASSESSMENT
Pt is an 85M with HTN, HLD, DM2, prostate cancer s/p prostatectomy, CKD, TIA with a pacemaker who presents transferred from [place] for further evaluation of low back pain secondary to lumbar compression deformities. EP on board to clear PPM for MRI compatibility. Found to have e facaelis bacteremia. On abx, unable to tolerate MRI s/p PPM removal on 5/16 and exchange for leadless PPM. Plan for [facility] on 5/20 -> negative for endocarditis. Pursuing MRI with sedation, plan for Tuesday 5/27 Pt is an 85M with HTN, HLD, DM2, prostate cancer s/p prostatectomy, CKD, TIA with a pacemaker who presents transferred from [place] for further evaluation of low back pain secondary to lumbar compression deformities. EP on board to clear PPM for MRI compatibility. Found to have e facaelis bacteremia. On abx, unable to tolerate MRI s/p PPM removal on 5/16 and exchange for leadless PPM. Plan for [facility] on 5/20 -> negative for endocarditis. MRI Lumbar spine attempted 5/29 under conscious sedation however only partially complete again limited by pain and noise. Images obtained showing osteomyelitis and cauda equina impingement. Orthopedics planning for surgical intervention 6/5.

## 2025-06-02 NOTE — CONSULT NOTE ADULT - SUBJECTIVE AND OBJECTIVE BOX
[facility] Attending Consultation Note     Patient seen and evaluated at bedside    Chief Complaint: preop cardiac eval     HPI:  Pt is an 85M with HTN, HLD, DM2, prostate cancer s/p prostatectomy, CKD, TIA with a pacemaker who presents transferred from [place] for further evaluation of low back pain. The pain began when he was going to the bathroom and worsened when he was in bed and rolled over. The pain is worse when he is walking or moving. He has never had back pain like this before. Patient normally ambulates with a walker or with a cane. Of note, patient endorses a fall in February where he fell into a pile of snow. Patient denies any injury after that event and did not have any imaging at that time. He usually has one BM every 3-4 days and this has not changed. Wakes up 2-3x/night to urinate. No episodes of bladder or bowel incontinence. He says he has diabetic neuropathy and has seen pain management. Says he has tried gabapentin but it made his legs very weak and he almost fell.    At [place], CT showed multiple lumbar compression deformities. MRI was unable to be performed 2/2 ppm compatibility. He was transferred to [facility] for MRI and orthopedic eval.  (11 May 2025 03:10)      PMHx:   No pertinent past medical history    HTN (hypertension)    HLD (hyperlipidemia)    DM (diabetes mellitus)    TIA (transient ischemic attack)    Prostate cancer        PSHx:   No significant past surgical history    S/P prostatectomy    Pacemaker    H/O thyroidectomy        Allergies:  gabapentin (Other)      Home Meds:    Current Medications:   ampicillin  IVPB 2 Gram(s) IV Intermittent every 4 hours  aspirin  chewable 81 milliGRAM(s) Oral daily  benzonatate 100 milliGRAM(s) Oral three times a day PRN  bisacodyl 5 milliGRAM(s) Oral at bedtime  chlorhexidine 2% Cloths 1 Application(s) Topical daily  cloNIDine 0.2 milliGRAM(s) Oral two times a day  dextrose 5%. 1000 milliLiter(s) IV Continuous <Continuous>  dextrose 5%. 1000 milliLiter(s) IV Continuous <Continuous>  dextrose 50% Injectable 12.5 Gram(s) IV Push once  dextrose 50% Injectable 25 Gram(s) IV Push once  dextrose 50% Injectable 25 Gram(s) IV Push once  dextrose Oral Gel 15 Gram(s) Oral once  enoxaparin Injectable 90 milliGRAM(s) SubCutaneous every 12 hours  glucagon  Injectable 1 milliGRAM(s) IntraMuscular once  hydrALAZINE 100 milliGRAM(s) Oral three times a day  HYDROmorphone  Injectable 0.5 milliGRAM(s) IV Push every 4 hours PRN  insulin glargine Injectable (LANTUS) 20 Unit(s) SubCutaneous at bedtime  insulin lispro (ADMELOG) corrective regimen sliding scale   SubCutaneous three times a day before meals  insulin lispro (ADMELOG) corrective regimen sliding scale   SubCutaneous at bedtime  insulin lispro Injectable (ADMELOG) 6 Unit(s) SubCutaneous three times a day before meals  lactulose Syrup      levothyroxine 150 MICROGram(s) Oral daily  lidocaine   4% Patch 1 Patch Transdermal daily  NIFEdipine XL 30 milliGRAM(s) Oral daily  oxyCODONE    IR 10 milliGRAM(s) Oral every 4 hours PRN  oxyCODONE    IR 5 milliGRAM(s) Oral every 4 hours PRN  polyethylene glycol 3350 17 Gram(s) Oral two times a day  propranolol  milliGRAM(s) Oral daily  rosuvastatin 10 milliGRAM(s) Oral at bedtime  senna 2 Tablet(s) Oral at bedtime  tamsulosin 0.4 milliGRAM(s) Oral at bedtime      FAMILY HISTORY:  FHx: heart disease (Father, Mother)        Social History: Personally reviewed   No tobacco, EtOH or IVDU     REVIEW OF SYSTEMS:  Constitutional:     [x ] negative [ ] fevers [ ] chills [ ] weight loss [ ] weight gain  HEENT:                  [x ] negative [ ] dry eyes [ ] eye irritation [ ] postnasal drip [ ] nasal congestion  CV:                         [ x] negative  [ ] chest pain [ ] orthopnea [ ] palpitations [ ] murmur  Resp:                     [x ] negative [ ] cough [ ] shortness of breath [ ] dyspnea [ ] wheezing [ ] sputum [ ]hemoptysis  GI:                          [ x] negative [ ] nausea [ ] vomiting [ ] diarrhea [ ] constipation [ ] abd pain [ ] dysphagia   :                        [ x] negative [ ] dysuria [ ] nocturia [ ] hematuria [ ] increased urinary frequency  Musculoskeletal: [x ] negative [ ] back pain [ ] myalgias [ ] arthralgias [ ] fracture  Skin:                       [ x] negative [ ] rash [ ] itch  Neurological:        [ x] negative [ ] headache [ ] dizziness [ ] syncope [ ] weakness [ ] numbness  Psychiatric:           [ x] negative [ ] anxiety [ ] depression  Endocrine:            [ x] negative [ ] diabetes [ ] thyroid problem  Heme/Lymph:      [ x] negative [ ] anemia [ ] bleeding problem  Allergic/Immune: [ x] negative [ ] itchy eyes [ ] nasal discharge [ ] hives [ ] angioedema    [ x] All other systems negative  [ ] Unable to assess ROS due to      Physical Exam:  T(F): 97.7 (06-02), Max: 98.3 (06-01)  HR: 56 (06-02) (56 - 68)  BP: 146/69 (06-02) (137/47 - 162/58)  RR: 18 (06-02)  SpO2: 97% (06-02)    Gen: Well appearing   HENNT: No JVP   CV: regular rate, regular rhtyhm, no murmur   Pulm: clear to auscultation bilaterally   Abdomen: Non-tender, non-distended   Ext: no edema b/l   Neuro: grossly non-focal     Cardiovascular Diagnostic Testing:    CONCLUSIONS:      1. Left ventricular systolic function is normal. There are no regional wall motion abnormalities seen.   2. Normal right ventricular cavity size and normal right ventricular systolic function.   3. Structurally normal mitral valve with normal leaflet excursion. No vegetations seen in association with the mitral valve. There is calcification of the mitral valve annulus. There is mild mitral regurgitation.   4. The aortic valve appears trileaflet with normal systolic excursion. There is calcification of the aortic valve leaflets. No vegetations seen in association with the aortic valve. There is trace aortic regurgitation.   5. No atheroma in the visualized portions of the proximal ascending aorta. Mild non-mobile atheroma in the visualized portions of the transverse aortic arch. Mild non-mobile atheroma in the visualized portions of the descending aorta.   6. The left atrium is normal in size. There is no evidence of left atrial or left atrial appendage thrombus. The left atrial appendage emptying velocity is normal.   7. Agitated saline injection was negative for intracardiac shunt.   8. No echocardiographic evidence of vegetations.    ____________________________________________________________________  MARIA LUISA Procedure:  After discussion of the risks and benefits of the MARIA LUISA, an informed consent was obtained. Study was performed with anesthesia (please see anesthesia record).  The adult Aidee MARIA LUISA probe was introduced and passed into the esophagus. The MARIA LUISA probe was passed without difficulty. Images were obtained with the patient in a left lateral decubitus position.Image quality was good. The patient's vital signs; including heart rate, blood pressure, and oxygen saturation; remained stable throughout the procedure. The patient tolerated the procedure well and without complications.         Labs: Personally reviewed                        8.5    7.29  )-----------( 176      ( 02 Jun 2025 06:45 )             26.2     06-02    135  |  103  |  17  ----------------------------<  195[H]  4.1   |  23  |  1.34[H]    Ca    8.0[L]      02 Jun 2025 06:45  Phos  2.9     06-02  Mg     2.00     06-02

## 2025-06-02 NOTE — PROGRESS NOTE ADULT - ASSESSMENT
This is a 84 y/o M w/ PMhx HTN, HLD, DM2, hypothyroidism, prostate cancer s/p prostatectomy, CKD, TIA, pacemaker placed in 5/2024 for abnormal arrythmia initially admitted to [facility] on 5/6 for back pain, now transferred to [facility] for orthopedic spine. Labs w/ leukocytosis to 18, BCx w/ E faecalis in multiple sets.     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 S/p PPM removal    #Respiratory failure, improved   #JEROME, improving     Overall, 84 y/o M w/ PMhx HTN, HLD, DM2, hypothyroidism, prostate cancer s/p prostatectomy, CKD, TIA, pacemaker placed in 5/2024 for abnormal arrythmia transferred to [facility] from [facility] for MRI spine and ortho evaluation give LBP and lumbar compression w/ urinary retention. Pt noted to have chills and leukocytosis to 18, now with high grade E faecalis bacteremia. Unclear source however UCx not done, U/A with 10 WBCs, CT A/P w/o IV contrast on 5/10 w/o acute infectious source, MRI L spine here limited exam, findings of stenosis, no clear OM/discitis, however no contrast.   S/p PPM removal, MARIA LUISA w/o vegetations.     Recommendations:   1. Ampicillin 2g q4 for now, plan TBD, will need 6 weeks minimum, may extend if plans for surgery and OR Cx+ or gross findings of infection   2. MARIA LUISA w/o IE   3. BCx from 5/15     Thank you for consulting us and involving us in the management of this patient's case. In addition to reviewing history, imaging, documents, labs, microbiology, and infection control strategies and potential issues.     ID will continue to follow    Darrin Falk M.D.  Attending Physician  Division of Infectious Diseases  Department of Medicine    Please contact through MS Teams message.  Office: [phone] (after 5 PM or weekend)

## 2025-06-02 NOTE — PROGRESS NOTE ADULT - PROBLEM SELECTOR PLAN 1
Stable, severe  - CT Lumbar Spine: (5/6/25): L3-L4 disc bulge, L4-L5 left paracentral-foraminal disc protrusion, multiple mild lumbar vertebral body compression deformities  . L3-L4 disc bulge, resulting in severe central canal, bilateral lateral recess and moderate bilateral neural foramen stenosis with facet arthrosis and ligamentum flavum hypertrophy.  - MRI attempted 5/13 to r/o cauda equina and malignant disease, gely with history of prostate cancer, however not tolerated due to loudness of machine and pain. Pt does not want to go into MRI machine again. Plan for MRI with general anesthesia   - MRI T+L Spine attempted under conscious sedation with anesthesia on 5/29 however aborted early due to patient tolerability for similar reasons  - MRI (5/29): Limited study but available results showing findings suspicious for OM and discitis at L4 and L5 levels, as well as severe stenosis of L3-L4 and L4-L5 levels with cauda equina impingement  - Discussed results with ortho who is requesting complete MRI L spine study  Plan:  - Ortho to coordinate MRI under complete anesthesia with anesthesia department  - Ortho to discuss treatment options with patient and daughter  - TLSO brace  - Pain Control: Oxy 2.5 q6h PRN moderate, Oxy 5 q6h severe, Dilaudid 0.5mg IV q4h for breakthrough Stable, severe  - CT Lumbar Spine: (5/6/25): L3-L4 disc bulge, L4-L5 left paracentral-foraminal disc protrusion, multiple mild lumbar vertebral body compression deformities  . L3-L4 disc bulge, resulting in severe central canal, bilateral lateral recess and moderate bilateral neural foramen stenosis with facet arthrosis and ligamentum flavum hypertrophy.  - MRI attempted 5/13 to r/o cauda equina and malignant disease, gely with history of prostate cancer, however not tolerated due to loudness of machine and pain. Pt does not want to go into MRI machine again. Plan for MRI with general anesthesia   - MRI T+L Spine attempted under conscious sedation with anesthesia on 5/29 however aborted early due to patient tolerability for similar reasons  - MRI (5/29): Limited study but available results showing findings suspicious for OM and discitis at L4 and L5 levels, as well as severe stenosis of L3-L4 and L4-L5 levels with cauda equina impingement  Plan:  - Ortho planning for surgical intervention on 6/5  - Patient and daughter not amenable to any further imaging  - TLSO brace  - Pain Control: Oxy 5 q6h PRN moderate, Oxy 10 q6h severe, Dilaudid 0.5mg IV q4h for breakthrough

## 2025-06-03 LAB
ANION GAP SERPL CALC-SCNC: 11 MMOL/L — SIGNIFICANT CHANGE UP (ref 7–14)
BUN SERPL-MCNC: 15 MG/DL — SIGNIFICANT CHANGE UP (ref 7–23)
CALCIUM SERPL-MCNC: 8.1 MG/DL — LOW (ref 8.4–10.5)
CHLORIDE SERPL-SCNC: 102 MMOL/L — SIGNIFICANT CHANGE UP (ref 98–107)
CO2 SERPL-SCNC: 23 MMOL/L — SIGNIFICANT CHANGE UP (ref 22–31)
CREAT SERPL-MCNC: 1.39 MG/DL — HIGH (ref 0.5–1.3)
EGFR: 49 ML/MIN/1.73M2 — LOW
EGFR: 49 ML/MIN/1.73M2 — LOW
GLUCOSE BLDC GLUCOMTR-MCNC: 118 MG/DL — HIGH (ref 70–99)
GLUCOSE BLDC GLUCOMTR-MCNC: 179 MG/DL — HIGH (ref 70–99)
GLUCOSE BLDC GLUCOMTR-MCNC: 71 MG/DL — SIGNIFICANT CHANGE UP (ref 70–99)
GLUCOSE BLDC GLUCOMTR-MCNC: 71 MG/DL — SIGNIFICANT CHANGE UP (ref 70–99)
GLUCOSE BLDC GLUCOMTR-MCNC: 82 MG/DL — SIGNIFICANT CHANGE UP (ref 70–99)
GLUCOSE SERPL-MCNC: 86 MG/DL — SIGNIFICANT CHANGE UP (ref 70–99)
HCT VFR BLD CALC: 25.8 % — LOW (ref 39–50)
HGB BLD-MCNC: 8.1 G/DL — LOW (ref 13–17)
MCHC RBC-ENTMCNC: 28.2 PG — SIGNIFICANT CHANGE UP (ref 27–34)
MCHC RBC-ENTMCNC: 31.4 G/DL — LOW (ref 32–36)
MCV RBC AUTO: 89.9 FL — SIGNIFICANT CHANGE UP (ref 80–100)
NRBC # BLD AUTO: 0 K/UL — SIGNIFICANT CHANGE UP (ref 0–0)
NRBC # FLD: 0 K/UL — SIGNIFICANT CHANGE UP (ref 0–0)
NRBC BLD AUTO-RTO: 0 /100 WBCS — SIGNIFICANT CHANGE UP (ref 0–0)
PLATELET # BLD AUTO: 182 K/UL — SIGNIFICANT CHANGE UP (ref 150–400)
POTASSIUM SERPL-MCNC: 3.7 MMOL/L — SIGNIFICANT CHANGE UP (ref 3.5–5.3)
POTASSIUM SERPL-SCNC: 3.7 MMOL/L — SIGNIFICANT CHANGE UP (ref 3.5–5.3)
RBC # BLD: 2.87 M/UL — LOW (ref 4.2–5.8)
RBC # FLD: 16.4 % — HIGH (ref 10.3–14.5)
SODIUM SERPL-SCNC: 136 MMOL/L — SIGNIFICANT CHANGE UP (ref 135–145)
WBC # BLD: 7.64 K/UL — SIGNIFICANT CHANGE UP (ref 3.8–10.5)
WBC # FLD AUTO: 7.64 K/UL — SIGNIFICANT CHANGE UP (ref 3.8–10.5)

## 2025-06-03 PROCEDURE — 93010 ELECTROCARDIOGRAM REPORT: CPT

## 2025-06-03 PROCEDURE — 99232 SBSQ HOSP IP/OBS MODERATE 35: CPT | Mod: GC

## 2025-06-03 PROCEDURE — 99232 SBSQ HOSP IP/OBS MODERATE 35: CPT

## 2025-06-03 PROCEDURE — G0545: CPT

## 2025-06-03 RX ORDER — MINERAL OIL
133 OIL (ML) MISCELLANEOUS ONCE
Refills: 0 | Status: COMPLETED | OUTPATIENT
Start: 2025-06-03 | End: 2025-06-03

## 2025-06-03 RX ADMIN — TAMSULOSIN HYDROCHLORIDE 0.4 MILLIGRAM(S): 0.4 CAPSULE ORAL at 22:19

## 2025-06-03 RX ADMIN — Medication 0.5 MILLIGRAM(S): at 22:00

## 2025-06-03 RX ADMIN — Medication 100 MILLIGRAM(S): at 03:01

## 2025-06-03 RX ADMIN — LACTULOSE 10 GRAM(S): 10 SOLUTION ORAL at 13:14

## 2025-06-03 RX ADMIN — INSULIN LISPRO 6 UNIT(S): 100 INJECTION, SOLUTION INTRAVENOUS; SUBCUTANEOUS at 13:19

## 2025-06-03 RX ADMIN — OXYCODONE HYDROCHLORIDE 10 MILLIGRAM(S): 30 TABLET ORAL at 13:54

## 2025-06-03 RX ADMIN — ENOXAPARIN SODIUM 90 MILLIGRAM(S): 100 INJECTION SUBCUTANEOUS at 05:04

## 2025-06-03 RX ADMIN — AMPICILLIN SODIUM 200 GRAM(S): 1 INJECTION, POWDER, FOR SOLUTION INTRAMUSCULAR; INTRAVENOUS at 05:08

## 2025-06-03 RX ADMIN — AMPICILLIN SODIUM 200 GRAM(S): 1 INJECTION, POWDER, FOR SOLUTION INTRAMUSCULAR; INTRAVENOUS at 09:31

## 2025-06-03 RX ADMIN — Medication 0.2 MILLIGRAM(S): at 05:06

## 2025-06-03 RX ADMIN — OXYCODONE HYDROCHLORIDE 10 MILLIGRAM(S): 30 TABLET ORAL at 18:45

## 2025-06-03 RX ADMIN — Medication 20 MILLIGRAM(S): at 19:28

## 2025-06-03 RX ADMIN — Medication 0.5 MILLIGRAM(S): at 21:11

## 2025-06-03 RX ADMIN — AMPICILLIN SODIUM 200 GRAM(S): 1 INJECTION, POWDER, FOR SOLUTION INTRAMUSCULAR; INTRAVENOUS at 17:57

## 2025-06-03 RX ADMIN — OXYCODONE HYDROCHLORIDE 10 MILLIGRAM(S): 30 TABLET ORAL at 17:58

## 2025-06-03 RX ADMIN — Medication 150 MICROGRAM(S): at 05:06

## 2025-06-03 RX ADMIN — LIDOCAINE HYDROCHLORIDE 1 PATCH: 20 JELLY TOPICAL at 19:46

## 2025-06-03 RX ADMIN — Medication 30 MILLIGRAM(S): at 05:06

## 2025-06-03 RX ADMIN — AMPICILLIN SODIUM 200 GRAM(S): 1 INJECTION, POWDER, FOR SOLUTION INTRAMUSCULAR; INTRAVENOUS at 13:10

## 2025-06-03 RX ADMIN — OXYCODONE HYDROCHLORIDE 10 MILLIGRAM(S): 30 TABLET ORAL at 00:53

## 2025-06-03 RX ADMIN — Medication 133 MILLILITER(S): at 22:24

## 2025-06-03 RX ADMIN — LIDOCAINE HYDROCHLORIDE 1 PATCH: 20 JELLY TOPICAL at 12:27

## 2025-06-03 RX ADMIN — ENOXAPARIN SODIUM 90 MILLIGRAM(S): 100 INJECTION SUBCUTANEOUS at 18:03

## 2025-06-03 RX ADMIN — Medication 100 MILLIGRAM(S): at 13:18

## 2025-06-03 RX ADMIN — Medication 1 APPLICATION(S): at 13:21

## 2025-06-03 RX ADMIN — Medication 0.2 MILLIGRAM(S): at 18:03

## 2025-06-03 RX ADMIN — OXYCODONE HYDROCHLORIDE 10 MILLIGRAM(S): 30 TABLET ORAL at 09:50

## 2025-06-03 RX ADMIN — AMPICILLIN SODIUM 200 GRAM(S): 1 INJECTION, POWDER, FOR SOLUTION INTRAMUSCULAR; INTRAVENOUS at 00:55

## 2025-06-03 RX ADMIN — OXYCODONE HYDROCHLORIDE 10 MILLIGRAM(S): 30 TABLET ORAL at 05:05

## 2025-06-03 RX ADMIN — OXYCODONE HYDROCHLORIDE 10 MILLIGRAM(S): 30 TABLET ORAL at 14:50

## 2025-06-03 RX ADMIN — Medication 100 MILLIGRAM(S): at 22:19

## 2025-06-03 RX ADMIN — ROSUVASTATIN CALCIUM 10 MILLIGRAM(S): 5 TABLET, FILM COATED ORAL at 22:19

## 2025-06-03 RX ADMIN — Medication 81 MILLIGRAM(S): at 13:16

## 2025-06-03 RX ADMIN — OXYCODONE HYDROCHLORIDE 10 MILLIGRAM(S): 30 TABLET ORAL at 06:05

## 2025-06-03 RX ADMIN — AMPICILLIN SODIUM 200 GRAM(S): 1 INJECTION, POWDER, FOR SOLUTION INTRAMUSCULAR; INTRAVENOUS at 21:12

## 2025-06-03 RX ADMIN — Medication 0.5 MILLIGRAM(S): at 03:30

## 2025-06-03 RX ADMIN — Medication 0.5 MILLIGRAM(S): at 03:00

## 2025-06-03 RX ADMIN — OXYCODONE HYDROCHLORIDE 10 MILLIGRAM(S): 30 TABLET ORAL at 23:17

## 2025-06-03 RX ADMIN — INSULIN LISPRO 6 UNIT(S): 100 INJECTION, SOLUTION INTRAVENOUS; SUBCUTANEOUS at 18:33

## 2025-06-03 RX ADMIN — INSULIN LISPRO 1: 100 INJECTION, SOLUTION INTRAVENOUS; SUBCUTANEOUS at 18:33

## 2025-06-03 RX ADMIN — Medication 100 MILLIGRAM(S): at 05:06

## 2025-06-03 RX ADMIN — OXYCODONE HYDROCHLORIDE 10 MILLIGRAM(S): 30 TABLET ORAL at 10:50

## 2025-06-03 NOTE — PROGRESS NOTE ADULT - SUBJECTIVE AND OBJECTIVE BOX
PROGRESS NOTE:     Patient is a 85y old  Male who presents with a chief complaint of back pain (03 Jun 2025 06:17)      INTERVAL HISTORY: NAEO. VSS. ROS negative.    MEDICATIONS  (STANDING):  ampicillin  IVPB 2 Gram(s) IV Intermittent every 4 hours  aspirin  chewable 81 milliGRAM(s) Oral daily  bisacodyl 5 milliGRAM(s) Oral at bedtime  chlorhexidine 2% Cloths 1 Application(s) Topical daily  cloNIDine 0.2 milliGRAM(s) Oral two times a day  dextrose 5%. 1000 milliLiter(s) (50 mL/Hr) IV Continuous <Continuous>  dextrose 5%. 1000 milliLiter(s) (100 mL/Hr) IV Continuous <Continuous>  dextrose 50% Injectable 12.5 Gram(s) IV Push once  dextrose 50% Injectable 25 Gram(s) IV Push once  dextrose 50% Injectable 25 Gram(s) IV Push once  dextrose Oral Gel 15 Gram(s) Oral once  enoxaparin Injectable 90 milliGRAM(s) SubCutaneous every 12 hours  glucagon  Injectable 1 milliGRAM(s) IntraMuscular once  hydrALAZINE 100 milliGRAM(s) Oral three times a day  insulin glargine Injectable (LANTUS) 20 Unit(s) SubCutaneous at bedtime  insulin lispro (ADMELOG) corrective regimen sliding scale   SubCutaneous three times a day before meals  insulin lispro (ADMELOG) corrective regimen sliding scale   SubCutaneous at bedtime  insulin lispro Injectable (ADMELOG) 6 Unit(s) SubCutaneous three times a day before meals  lactulose Syrup      lactulose Syrup 10 Gram(s) Oral daily  levothyroxine 150 MICROGram(s) Oral daily  lidocaine   4% Patch 1 Patch Transdermal daily  NIFEdipine XL 30 milliGRAM(s) Oral daily  polyethylene glycol 3350 17 Gram(s) Oral two times a day  propranolol  milliGRAM(s) Oral daily  rosuvastatin 10 milliGRAM(s) Oral at bedtime  senna 2 Tablet(s) Oral at bedtime  tamsulosin 0.4 milliGRAM(s) Oral at bedtime    MEDICATIONS  (PRN):  benzonatate 100 milliGRAM(s) Oral three times a day PRN Cough  HYDROmorphone  Injectable 0.5 milliGRAM(s) IV Push every 4 hours PRN Severe Pain (7 - 10)  oxyCODONE    IR 10 milliGRAM(s) Oral every 4 hours PRN Severe Pain (7 - 10)  oxyCODONE    IR 5 milliGRAM(s) Oral every 4 hours PRN Moderate Pain (4 - 6)      CAPILLARY BLOOD GLUCOSE      POCT Blood Glucose.: 110 mg/dL (02 Jun 2025 21:12)  POCT Blood Glucose.: 124 mg/dL (02 Jun 2025 17:12)  POCT Blood Glucose.: 127 mg/dL (02 Jun 2025 12:27)  POCT Blood Glucose.: 186 mg/dL (02 Jun 2025 08:33)    I&O's Summary    02 Jun 2025 07:01  -  03 Jun 2025 07:00  --------------------------------------------------------  IN: 0 mL / OUT: 1775 mL / NET: -1775 mL        PHYSICAL EXAM:  Vital Signs Last 24 Hrs  T(C): 36.6 (03 Jun 2025 05:00), Max: 36.8 (02 Jun 2025 17:25)  T(F): 97.9 (03 Jun 2025 05:00), Max: 98.2 (02 Jun 2025 17:25)  HR: 59 (03 Jun 2025 05:00) (56 - 66)  BP: 135/52 (03 Jun 2025 05:00) (119/40 - 155/61)  BP(mean): --  RR: 18 (03 Jun 2025 05:00) (17 - 19)  SpO2: 96% (03 Jun 2025 05:00) (96% - 98%)    Parameters below as of 03 Jun 2025 05:00  Patient On (Oxygen Delivery Method): room air        CONSTITUTIONAL: NAD, well-developed  HEENT:   RESPIRATORY: Normal respiratory effort; lungs are clear to auscultation bilaterally  CARDIOVASCULAR: Regular rate and rhythm, normal S1 and S2, no murmur/rub/gallop; No lower extremity edema; Peripheral pulses are 2+ bilaterally  ABDOMEN: Nontender to palpation, normoactive bowel sounds, no rebound/guarding; No hepatosplenomegaly  MUSCULOSKELETAL: no clubbing or cyanosis of digits; no joint swelling or tenderness to palpation  PSYCH: A+O to person, place, and time; affect appropriate    LABS:                        8.1    7.64  )-----------( 182      ( 03 Jun 2025 05:35 )             25.8     06-03    136  |  102  |  15  ----------------------------<  86  3.7   |  23  |  1.39[H]    Ca    8.1[L]      03 Jun 2025 05:35  Phos  2.9     06-02  Mg     2.00     06-02            Urinalysis Basic - ( 03 Jun 2025 05:35 )    Color: x / Appearance: x / SG: x / pH: x  Gluc: 86 mg/dL / Ketone: x  / Bili: x / Urobili: x   Blood: x / Protein: x / Nitrite: x   Leuk Esterase: x / RBC: x / WBC x   Sq Epi: x / Non Sq Epi: x / Bacteria: x          RADIOLOGY:        ******************************  Authored By: Jacinto Rodriguez MD PGY1  Internal Medicine  MS Teams  ******************************

## 2025-06-03 NOTE — ADVANCED PRACTICE NURSE CONSULT - ASSESSMENT
General: A&Ox4, able to turn with 3x assistance, patient endorses pain with turning and repositioning, incontinent of stool. Indwelling wong catheter in place. Skin warm, dry with increased moisture in intertriginous folds, scattered areas of hyperpigmentation and hypopigmentation, scattered areas of ecchymosis without hematoma on bilateral upper and lower extremities. Generalized edema noted +3 to upper and lower extremities.     Left axilla: moisture associated dermatitis as evidenced by scattered areas of erythema and increased moisture. Friable skin tag noted with scant serosanguinous drainage. No evidence of candidiasis.     Patient allowed for limited assessment to axilla, deferred turning for assessment at this time, reports RNs are managing his other wounds appropriately.  General: A&Ox4, able to turn with 3x assistance, patient endorses pain with turning and repositioning, incontinent of stool. Indwelling wong catheter in place. Skin warm, dry with increased moisture in intertriginous folds, scattered areas of hyperpigmentation and hypopigmentation, scattered areas of ecchymosis without hematoma on bilateral upper and lower extremities. Generalized edema noted +3 to upper and lower extremities.     Left axilla: moisture associated dermatitis as evidenced by scattered areas of erythema and increased moisture. Friable skin tag noted with scant serosanguinous drainage. No evidence of candidiasis.     Patient allowed for limited assessment to axilla, deferred turning for assessment at this time, reports RNs are managing his other wounds appropriately. Patient wishes respected.  On assessment patient on a low air loss surface, heel offloading devices in place, Z-flow positioning pillow utilized, moisture management in place with use of one breathable incontinence pad. Turned and positioned per protocol.

## 2025-06-03 NOTE — PROGRESS NOTE ADULT - PROBLEM SELECTOR PLAN 1
Stable, severe  - CT Lumbar Spine: (5/6/25): L3-L4 disc bulge, L4-L5 left paracentral-foraminal disc protrusion, multiple mild lumbar vertebral body compression deformities  . L3-L4 disc bulge, resulting in severe central canal, bilateral lateral recess and moderate bilateral neural foramen stenosis with facet arthrosis and ligamentum flavum hypertrophy.  - MRI attempted 5/13 to r/o cauda equina and malignant disease, gely with history of prostate cancer, however not tolerated due to loudness of machine and pain. Pt does not want to go into MRI machine again. Plan for MRI with general anesthesia   - MRI T+L Spine attempted under conscious sedation with anesthesia on 5/29 however aborted early due to patient tolerability for similar reasons  - MRI (5/29): Limited study but available results showing findings suspicious for OM and discitis at L4 and L5 levels, as well as severe stenosis of L3-L4 and L4-L5 levels with cauda equina impingement  Plan:  - Ortho planning for surgical intervention on 6/5  - Patient and daughter not amenable to any further imaging  - TLSO brace  - Pain Control: Oxy 5 q6h PRN moderate, Oxy 10 q6h severe, Dilaudid 0.5mg IV q4h for breakthrough

## 2025-06-03 NOTE — PROGRESS NOTE ADULT - SUBJECTIVE AND OBJECTIVE BOX
Infectious Diseases Follow Up:    Patient is a 85y old  Male who presents with a chief complaint of back pain (03 Jun 2025 07:49)      Interval History/ROS:  No acute events noted.     Allergies  gabapentin (Other)        ANTIMICROBIALS:  ampicillin  IVPB 2 every 4 hours      Current Abx:     Previous Abx     OTHER MEDS:  MEDICATIONS  (STANDING):  aspirin  chewable 81 daily  benzonatate 100 three times a day PRN  bisacodyl 5 at bedtime  cloNIDine 0.2 two times a day  dextrose 50% Injectable 12.5 once  dextrose 50% Injectable 25 once  dextrose 50% Injectable 25 once  dextrose Oral Gel 15 once  enoxaparin Injectable 90 every 12 hours  glucagon  Injectable 1 once  hydrALAZINE 100 three times a day  HYDROmorphone  Injectable 0.5 every 4 hours PRN  insulin glargine Injectable (LANTUS) 20 at bedtime  insulin lispro (ADMELOG) corrective regimen sliding scale  three times a day before meals  insulin lispro (ADMELOG) corrective regimen sliding scale  at bedtime  insulin lispro Injectable (ADMELOG) 6 three times a day before meals  lactulose Syrup    lactulose Syrup 10 daily  levothyroxine 150 daily  NIFEdipine XL 30 daily  oxyCODONE    IR 10 every 4 hours PRN  oxyCODONE    IR 5 every 4 hours PRN  polyethylene glycol 3350 17 two times a day  propranolol  daily  rosuvastatin 10 at bedtime  senna 2 at bedtime  tamsulosin 0.4 at bedtime      Vital Signs Last 24 Hrs  T(C): 36.6 (03 Jun 2025 05:00), Max: 36.8 (02 Jun 2025 17:25)  T(F): 97.9 (03 Jun 2025 05:00), Max: 98.2 (02 Jun 2025 17:25)  HR: 59 (03 Jun 2025 05:00) (56 - 66)  BP: 135/52 (03 Jun 2025 05:00) (119/40 - 155/61)  BP(mean): --  RR: 18 (03 Jun 2025 05:00) (17 - 19)  SpO2: 96% (03 Jun 2025 05:00) (96% - 98%)    Parameters below as of 03 Jun 2025 05:00  Patient On (Oxygen Delivery Method): room air        PHYSICAL EXAM:  GENERAL: NAD, well-developed  HEAD:  Atraumatic, Normocephalic  EYES: EOMI, conjunctiva and sclera clear  CHEST/LUNG: On RA, not in respiratory distress, clear to auscultation bilaterally; R chest ICD site without purulent drainage   HEART: Regular rate and rhythm;   ABDOMEN: Soft, Nontender, Nondistended;   PSYCH: AAOx3                            8.1    7.64  )-----------( 182      ( 03 Jun 2025 05:35 )             25.8       06-03    136  |  102  |  15  ----------------------------<  86  3.7   |  23  |  1.39[H]    Ca    8.1[L]      03 Jun 2025 05:35  Phos  2.9     06-02  Mg     2.00     06-02        Urinalysis Basic - ( 03 Jun 2025 05:35 )    Color: x / Appearance: x / SG: x / pH: x  Gluc: 86 mg/dL / Ketone: x  / Bili: x / Urobili: x   Blood: x / Protein: x / Nitrite: x   Leuk Esterase: x / RBC: x / WBC x   Sq Epi: x / Non Sq Epi: x / Bacteria: x        MICROBIOLOGY:  v  Blood Blood  05-18-25   No growth at 5 days  --  --      Blood Blood-Peripheral  05-17-25   No growth at 5 days  --  --      Blood Blood-Peripheral  05-17-25   No growth at 5 days  --  --      Blood Blood  05-17-25   No growth at 5 days  --  --      Blood Blood  05-15-25   No growth at 5 days  --  --      Blood Blood  05-15-25   No growth at 5 days  --  --      Blood Blood  05-14-25   Growth in aerobic bottle: Enterococcus faecalis  See previous culture [id number]  Direct identification is available within approximately 3-5  hours either by Blood Panel Multiplexed PCR or Direct  MALDI-TOF. Details: [email or web address]  --  Blood Culture PCR      Blood Blood  05-14-25   Growth in aerobic and anaerobic bottles: Enterococcus faecalis  Growth in anaerobic bottle: blood culture bottle turned positive after  the final report was released.  --  Enterococcus faecalis      Blood Blood-Peripheral  05-12-25   Growth in aerobic and anaerobic bottles: Enterococcus faecalis  See previous culture [id number]  --    Growth in aerobic bottle: Gram Positive Cocci in Pairs and Chains  Growth in anaerobic bottle: Gram Positive Cocci in Pairs and Chains      Blood Blood-Peripheral  05-12-25   Growth in aerobic and anaerobic bottles: Enterococcus faecalis  See previous culture [id number]  --    Growth in aerobic bottle: Gram Positive Cocci in Pairs and Chains  Growth in anaerobic bottle: Gram Positive Cocci in Pairs and Chains      Blood Blood-Peripheral  05-11-25   Growth in aerobic and anaerobic bottles: Enterococcus faecalis  Direct identification is available within approximately 3-5  hours either by Blood Panel Multiplexed PCR or Direct  MALDI-TOF. Details: [email or web address]  --  Blood Culture PCR  Enterococcus faecalis                RADIOLOGY:

## 2025-06-03 NOTE — PROGRESS NOTE ADULT - SUBJECTIVE AND OBJECTIVE BOX
[facility] Attending Follow-up Note     Patient seen and examined at bedside. 6/3/2025     Overnight Events:     events noted     REVIEW OF SYSTEMS:  Constitutional:     [x ] negative [ ] fevers [ ] chills [ ] weight loss [ ] weight gain  HEENT:                  [x ] negative [ ] dry eyes [ ] eye irritation [ ] postnasal drip [ ] nasal congestion  CV:                         [ x] negative  [ ] chest pain [ ] orthopnea [ ] palpitations [ ] murmur  Resp:                     [ x] negative [ ] cough [ ] shortness of breath [ ] dyspnea [ ] wheezing [ ] sputum [ ]hemoptysis  GI:                          [ x] negative [ ] nausea [ ] vomiting [ ] diarrhea [ ] constipation [ ] abd pain [ ] dysphagia   :                        [ x] negative [ ] dysuria [ ] nocturia [ ] hematuria [ ] increased urinary frequency  Musculoskeletal: [ x] negative [ ] back pain [ ] myalgias [ ] arthralgias [ ] fracture  Skin:                       [ x] negative [ ] rash [ ] itch  Neurological:        [x ] negative [ ] headache [ ] dizziness [ ] syncope [ ] weakness [ ] numbness  Psychiatric:           [ x] negative [ ] anxiety [ ] depression  Endocrine:            [ x] negative [ ] diabetes [ ] thyroid problem  Heme/Lymph:      [ x] negative [ ] anemia [ ] bleeding problem  Allergic/Immune: [ x] negative [ ] itchy eyes [ ] nasal discharge [ ] hives [ ] angioedema    [ x] All other systems negative  [ ] Unable to assess ROS due to    Current Meds:  albuterol/ipratropium for Nebulization 3 milliLiter(s) Nebulizer every 6 hours PRN  aspirin  chewable 81 milliGRAM(s) Oral daily  chlorhexidine 2% Cloths 1 Application(s) Topical daily  cloNIDine Patch 0.3 mG/24Hr(s) 1 patch Transdermal every 7 days  dextrose 5% + sodium chloride 0.45%. 1000 milliLiter(s) IV Continuous <Continuous>  heparin   Injectable 5000 Unit(s) SubCutaneous every 8 hours  hydrALAZINE Injectable 20 milliGRAM(s) IV Push every 4 hours  HYDROmorphone  Injectable 0.5 milliGRAM(s) IV Push every 4 hours PRN  HYDROmorphone  Injectable 1 milliGRAM(s) IV Push every 4 hours PRN  insulin glargine Injectable (LANTUS) 13 Unit(s) SubCutaneous at bedtime  insulin lispro (ADMELOG) corrective regimen sliding scale   SubCutaneous every 6 hours  labetalol Injectable 20 milliGRAM(s) IV Push every 4 hours  levothyroxine Injectable 112 MICROGram(s) IV Push at bedtime  lidocaine   4% Patch 1 Patch Transdermal daily  pantoprazole  Injectable 40 milliGRAM(s) IV Push daily  piperacillin/tazobactam IVPB.. 3.375 Gram(s) IV Intermittent every 8 hours  povidone iodine 10% Nasal Swab 1 Application(s) Both Nostrils once  sodium chloride 3%  Inhalation 4 milliLiter(s) Inhalation every 12 hours      PAST MEDICAL & SURGICAL HISTORY:  HTN (hypertension)      HLD (hyperlipidemia)      DM (diabetes mellitus)      TIA (transient ischemic attack)      Prostate cancer      S/P prostatectomy      Pacemaker      H/O thyroidectomy          Vitals:  T(F): 96.9 (06-08), Max: 97.3 (06-08)  HR: 62 (06-08) (57 - 83)  BP: 173/62 (06-08) (173/62 - 214/69)  RR: 19 (06-08)  SpO2: 98% (06-08)  I&O's Summary    07 Jun 2025 07:01  -  08 Jun 2025 07:00  --------------------------------------------------------  IN: 3090 mL / OUT: 3850 mL / NET: -760 mL    08 Jun 2025 07:01  -  08 Jun 2025 17:37  --------------------------------------------------------  IN: 470 mL / OUT: 1000 mL / NET: -530 mL        Physical Exam:  Appearance: No acute distress  HENT: No JVD   Cardiovascular: RRR, S1/S2, no murmurs  Respiratory: CTABL  Gastrointestinal: soft, NT ND, +BS  Musculoskeletal: No clubbing, no edema   Neurologic: Non-focal  Skin: No rashes, ecchymoses, or cyanosis                          9.5    8.99  )-----------( 206      ( 08 Jun 2025 00:45 )             27.8     06-08    133[L]  |  96[L]  |  43[H]  ----------------------------<  216[H]  3.3[L]   |  23  |  2.88[H]    Ca    7.2[L]      08 Jun 2025 00:45  Phos  3.7     06-08  Mg     2.20     06-08    TPro  5.4[L]  /  Alb  2.7[L]  /  TBili  0.4  /  DBili  x   /  AST  27  /  ALT  81[H]  /  AlkPhos  306[H]  06-08                  Cardiovascular Testings:

## 2025-06-03 NOTE — ADVANCED PRACTICE NURSE CONSULT - RECOMMEDATIONS
Topical recommendations:   ---Left axilla: After cleansing, apply Interdry textile sheeting, under intertriginous folds leaving 2 inches exposed at ends to wick, remove to wash & dry affected area, then replace. Individual sheeting may be used for up to 5 days unless soiled. Inspect skin daily.     Plan discussed with patient and his daughter at bedside. All questions answered.   Please contact Wound/Ostomy Care Service Line if we can be of further assistance (ext [phone]). Please reconsult if changes to tissue type noted.

## 2025-06-03 NOTE — PROGRESS NOTE ADULT - SUBJECTIVE AND OBJECTIVE BOX
ORTHOPEDIC PROGRESS NOTE    SUBJECTIVE:  Pt seen and examined at bedside this am.  Doing well.  No acute events overnight.  Pt states pain is well controlled. Worked with PT/OT but has not been able to sit up much in bed. Notes stable numbness/tingling and weakness R distal leg>L, L proximal leg>R      OBJECTIVE:  Vital Signs Last 24 Hrs  T(C): 36.5 (02 Jun 2025 20:56), Max: 36.8 (02 Jun 2025 07:30)  T(F): 97.7 (02 Jun 2025 20:56), Max: 98.2 (02 Jun 2025 07:30)D  HR: 59 (03 Jun 2025 03:10) (56 - 68)  BP: 119/40 (03 Jun 2025 03:10) (119/40 - 162/58)  BP(mean): --  RR: 18 (03 Jun 2025 03:10) (17 - 19)  SpO2: 98% (03 Jun 2025 03:10) (97% - 98%)    Parameters below as of 03 Jun 2025 03:10  Patient On (Oxygen Delivery Method): room air    Physical Exam:  GEN: NAD, resting quietly  PULM: symmetric chest rise bilaterally, no increased WOB, on NC  SPINE:   Motor exam:          Upper extremity         C5 (Shoulder Abd)    C6 (Elbow flex)   C7 (Elbow ext)   C8 (Finger flex) T1 (finger abd)         R         5/5                 5/5                       5/5                      5/5                         5/5          L          5/5                 5/5                      5/5                      5/5                         5/5                   Lower extremity           L2 (Hip flex)  L3 (knee ext)  L4 (Dorsi flex)  L5 (EHL)  S1 (Plantar flex)                                               R        2/5            5/5             1/5                    1/5          4/5      L        3/5            5/5             5/5                   5/5           5/5    Sensory exam:                         C5      C6      C7      C8       T1          RIGHT          2         2        2         2         2          (0=absent, 1=impaired, 2=normal, NT=not testable)  LEFT             2         2        2         2         2          (0=absent, 1=impaired, 2=normal, NT=not testable)                          L2      L3     L4     L5       S1          RIGHT          2         2        2         2         2          (0=absent, 1=impaired, 2=normal, NT=not testable)  LEFT             2         2        2         2         2          (0=absent, 1=impaired, 2=normal, NT=not testable)             LABS:  cret                        8.5    7.29  )-----------( 176      ( 02 Jun 2025 06:45 )             26.2     06-02    135  |  103  |  17  ----------------------------<  195[H]  4.1   |  23  |  1.34[H]    Ca    8.0[L]      02 Jun 2025 06:45  Phos  2.9     06-02  Mg     2.00     06-02

## 2025-06-03 NOTE — PROGRESS NOTE ADULT - ASSESSMENT
85 year old male with 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 After family discussions they wish to proceed with surgery.     Plan:  -please document medical and cardiac clearance for planned OR ASAP  -surgical date pending   -WBAT  -pain control prn  -antibiotics per ID  -continue PT/OT  -dispo pending    For all questions related to patient care, please reach out via the on-call pager.*     Brenda Mcgraw PGY2  Orthopedic Surgery  [facility]: [id number]  [facility]: [phone]  [facility] [place]: [id number]

## 2025-06-03 NOTE — PROGRESS NOTE ADULT - ASSESSMENT
This is a 84 y/o M w/ PMhx HTN, HLD, DM2, hypothyroidism, prostate cancer s/p prostatectomy, CKD, TIA, pacemaker placed in 5/2024 for abnormal arrythmia initially admitted to [facility] on 5/6 for back pain, now transferred to [facility] for orthopedic spine. Labs w/ leukocytosis to 18, BCx w/ E faecalis in multiple sets.     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 S/p PPM removal    #Respiratory failure, improved   #JEROME, improving     Overall, 84 y/o M w/ PMhx HTN, HLD, DM2, hypothyroidism, prostate cancer s/p prostatectomy, CKD, TIA, pacemaker placed in 5/2024 for abnormal arrythmia transferred to [facility] from [facility] for MRI spine and ortho evaluation give LBP and lumbar compression w/ urinary retention. Pt noted to have chills and leukocytosis to 18, now with high grade E faecalis bacteremia. Unclear source however UCx not done, U/A with 10 WBCs, CT A/P w/o IV contrast on 5/10 w/o acute infectious source, MRI L spine here limited exam, findings of stenosis, no clear OM/discitis, however no contrast.   S/p PPM removal, MARIA LUISA w/o vegetations.     Recommendations:   1. Ampicillin 2g q4 for now, plan TBD, will need 6 weeks minimum, may extend if plans for surgery and OR Cx+ or gross findings of infection   2. MARIA LUISA w/o IE   3. BCx from 5/15     Thank you for consulting us and involving us in the management of this patient's case. In addition to reviewing history, imaging, documents, labs, microbiology, and infection control strategies and potential issues.     ID will continue to follow    Darrin Falk M.D.  Attending Physician  Division of Infectious Diseases  Department of Medicine    Please contact through MS Teams message.  Office: [phone] (after 5 PM or weekend)

## 2025-06-03 NOTE — PROGRESS NOTE ADULT - ASSESSMENT
Pt is an 85M with HTN, HLD, DM2, prostate cancer s/p prostatectomy, CKD, TIA with a pacemaker who presents transferred from [place] for further evaluation of low back pain secondary to lumbar compression deformities. EP on board to clear PPM for MRI compatibility. Found to have e facaelis bacteremia. On abx, unable to tolerate MRI s/p PPM removal on 5/16 and exchange for leadless PPM. Plan for MARIA LUISA on 5/20 -> negative for endocarditis. MRI Lumbar spine attempted 5/29 under conscious sedation however only partially complete again limited by pain and noise. Images obtained showing osteomyelitis and cauda equina impingement. Orthopedics planning for surgical intervention 6/5.

## 2025-06-03 NOTE — ADVANCED PRACTICE NURSE CONSULT - REASON FOR CONSULT
Patient seen on skin care rounds after wound care referral received for assessment of skin impairment and recommendations of topical management of left axilla. Patient is a  85M with HTN, HLD, DM2, prostate cancer s/p prostatectomy, CKD, TIA with a pacemaker who presents transferred from [place] for further evaluation of low back pain secondary to lumbar compression deformities. EP on board to clear PPM for MRI compatibility. Found to have e facaelis bacteremia. On abx, unable to tolerate MRI s/p PPM removal on 5/16 and exchange for leadless PPM. Plan for MARIA LUISA on 5/20 -> negative for endocarditis. MRI with sedation in phases currently underway, thoracic and lumbar spine performed 5/29, showing cauda equina impingement and possible osteomyelitis of lumbar vertebrae. Patient known to [facility] service line last seen on 5/27/2025. Chart reviewed: WBC 7.64, H/H 8.1/25.8, INR 0.93, Platelets 182, hA1c 6.9, BMI 28.6, manas 12.

## 2025-06-03 NOTE — PROGRESS NOTE ADULT - ASSESSMENT
85M w/ HTN, HLD, T2DM, prostate CA, CKD, TIA and SND s/p PPM transferred here for lumbar spinal cord compression and bacteremia s/p PPM explant and implant of Micra PPM. Now pending OR with orthopedic for decompression     -EKG w/ SR, no significant STT changes   -ECHO w/ normal EF no significant valvular heart disease   -s/p leadless PPM w/ EP given his SND and pacing dependency   -cont AC, hold preoperatively and resume when able   -RCRI=2, pt is at elevated CV risk, no further modifiable risk factors, optimized from cardiac standpoint to proceed     Davey Angela MD [facility]  Attending Interventional Cardiologist, Abran-NS/OLVIN.   Avaliable on [facility] Team

## 2025-06-03 NOTE — PROVIDER CONTACT NOTE (OTHER) - REASON
c/o 6/10 right chest pain radiating to mid chest that came on spontaneously, has not felt this pain before

## 2025-06-03 NOTE — PROGRESS NOTE ADULT - ATTENDING COMMENTS
85M w/ hypertension, type 2 diabetes (A1c 6.9%), CKD (b/l Cr 1.5-2), prostate cancer s/p prostatectomy, h/o strokes (on apixaban), sinus node dysfunction s/p PPM presented to [facility] with acute onset lower back pain and bilateral leg weakness (R>L), found to have imaging with lumbar compression deformities and high grade E faecalis bacteremia, now s/p PPM extraction and replacement with leadless PPM, negative TTE and MARIA LUISA for vegetation, and cleared cultures, course c/b persistent and severe back pain, lower extremity weakness, and urinary retention. MRI T/L spine incomplete (could not tolerate due to pain despite performing with anesthesia) but showed likely L4/5 OM/discitis and L3/4 and L4/5 severe spinal stenosis stenosis w/ cauda equina impingement - ortho planning for laminectomy/decompression possibly Thursday.    - Ortho and ID following  - Plan for OR with ortho  - Continue ampicillin  - On lovenox in place of DOAC, will hold in advance of OR once there is date planned  - Continue Frye for urinary retention  - Continue current pain management  - Intensify bowel regimen given constipation    Discussed with patient and his daughter at bedside    Time-based billing (NON-critical care).   35 minutes spent on total encounter, which excludes teaching and separately reported services. The necessity of the time spent during the encounter on this date of service was due to:   Documentation in [place], reviewing chart and coordinating care with patient/resident and interdisciplinary staff (such as , social workers, etc) as well as reviewing vitals, laboratory data, radiology, medication list, consultants' recommendations and prior records. Interventions were performed as documented above.

## 2025-06-03 NOTE — PROVIDER CONTACT NOTE (OTHER) - SITUATION
c/o 6/10 right chest pain radiating to mid chest that came on spontaneously, has not felt this pain before.

## 2025-06-04 LAB
ADD ON TEST-SPECIMEN IN LAB: SIGNIFICANT CHANGE UP
ALBUMIN SERPL ELPH-MCNC: 2.8 G/DL — LOW (ref 3.3–5)
ALBUMIN SERPL ELPH-MCNC: 2.8 G/DL — LOW (ref 3.3–5)
ALP SERPL-CCNC: 704 U/L — HIGH (ref 40–120)
ALP SERPL-CCNC: 731 U/L — HIGH (ref 40–120)
ALT FLD-CCNC: 378 U/L — HIGH (ref 4–41)
ALT FLD-CCNC: 453 U/L — HIGH (ref 4–41)
ANION GAP SERPL CALC-SCNC: 12 MMOL/L — SIGNIFICANT CHANGE UP (ref 7–14)
ANION GAP SERPL CALC-SCNC: 13 MMOL/L — SIGNIFICANT CHANGE UP (ref 7–14)
APTT BLD: 36 SEC — SIGNIFICANT CHANGE UP (ref 26.1–36.8)
AST SERPL-CCNC: 328 U/L — HIGH (ref 4–40)
AST SERPL-CCNC: 500 U/L — HIGH (ref 4–40)
BASOPHILS # BLD AUTO: 0.03 K/UL — SIGNIFICANT CHANGE UP (ref 0–0.2)
BASOPHILS NFR BLD AUTO: 0.2 % — SIGNIFICANT CHANGE UP (ref 0–2)
BILIRUB DIRECT SERPL-MCNC: 1.4 MG/DL — HIGH (ref 0–0.3)
BILIRUB INDIRECT FLD-MCNC: 0.5 MG/DL — SIGNIFICANT CHANGE UP (ref 0–1)
BILIRUB SERPL-MCNC: 1.9 MG/DL — HIGH (ref 0.2–1.2)
BILIRUB SERPL-MCNC: 2.5 MG/DL — HIGH (ref 0.2–1.2)
BLD GP AB SCN SERPL QL: NEGATIVE — SIGNIFICANT CHANGE UP
BUN SERPL-MCNC: 16 MG/DL — SIGNIFICANT CHANGE UP (ref 7–23)
BUN SERPL-MCNC: 19 MG/DL — SIGNIFICANT CHANGE UP (ref 7–23)
CALCIUM SERPL-MCNC: 8.2 MG/DL — LOW (ref 8.4–10.5)
CALCIUM SERPL-MCNC: 8.4 MG/DL — SIGNIFICANT CHANGE UP (ref 8.4–10.5)
CHLORIDE SERPL-SCNC: 100 MMOL/L — SIGNIFICANT CHANGE UP (ref 98–107)
CHLORIDE SERPL-SCNC: 101 MMOL/L — SIGNIFICANT CHANGE UP (ref 98–107)
CO2 SERPL-SCNC: 22 MMOL/L — SIGNIFICANT CHANGE UP (ref 22–31)
CO2 SERPL-SCNC: 22 MMOL/L — SIGNIFICANT CHANGE UP (ref 22–31)
CREAT SERPL-MCNC: 1.3 MG/DL — SIGNIFICANT CHANGE UP (ref 0.5–1.3)
CREAT SERPL-MCNC: 1.37 MG/DL — HIGH (ref 0.5–1.3)
EGFR: 50 ML/MIN/1.73M2 — LOW
EGFR: 50 ML/MIN/1.73M2 — LOW
EGFR: 53 ML/MIN/1.73M2 — LOW
EGFR: 53 ML/MIN/1.73M2 — LOW
EOSINOPHIL # BLD AUTO: 0 K/UL — SIGNIFICANT CHANGE UP (ref 0–0.5)
EOSINOPHIL NFR BLD AUTO: 0 % — SIGNIFICANT CHANGE UP (ref 0–6)
GLUCOSE BLDC GLUCOMTR-MCNC: 154 MG/DL — HIGH (ref 70–99)
GLUCOSE BLDC GLUCOMTR-MCNC: 166 MG/DL — HIGH (ref 70–99)
GLUCOSE BLDC GLUCOMTR-MCNC: 167 MG/DL — HIGH (ref 70–99)
GLUCOSE BLDC GLUCOMTR-MCNC: 174 MG/DL — HIGH (ref 70–99)
GLUCOSE BLDC GLUCOMTR-MCNC: 175 MG/DL — HIGH (ref 70–99)
GLUCOSE BLDC GLUCOMTR-MCNC: 175 MG/DL — HIGH (ref 70–99)
GLUCOSE BLDC GLUCOMTR-MCNC: 93 MG/DL — SIGNIFICANT CHANGE UP (ref 70–99)
GLUCOSE SERPL-MCNC: 163 MG/DL — HIGH (ref 70–99)
GLUCOSE SERPL-MCNC: 177 MG/DL — HIGH (ref 70–99)
HCT VFR BLD CALC: 29.2 % — LOW (ref 39–50)
HCT VFR BLD CALC: 29.4 % — LOW (ref 39–50)
HGB BLD-MCNC: 9.3 G/DL — LOW (ref 13–17)
HGB BLD-MCNC: 9.5 G/DL — LOW (ref 13–17)
IANC: 16.95 K/UL — HIGH (ref 1.8–7.4)
IMM GRANULOCYTES NFR BLD AUTO: 0.6 % — SIGNIFICANT CHANGE UP (ref 0–0.9)
INR BLD: 1.11 RATIO — SIGNIFICANT CHANGE UP (ref 0.85–1.16)
LACTATE SERPL-SCNC: 1.3 MMOL/L — SIGNIFICANT CHANGE UP (ref 0.5–2)
LIDOCAIN IGE QN: 4 U/L — LOW (ref 7–60)
LYMPHOCYTES # BLD AUTO: 0.8 K/UL — LOW (ref 1–3.3)
LYMPHOCYTES # BLD AUTO: 4.2 % — LOW (ref 13–44)
MCHC RBC-ENTMCNC: 28.5 PG — SIGNIFICANT CHANGE UP (ref 27–34)
MCHC RBC-ENTMCNC: 28.6 PG — SIGNIFICANT CHANGE UP (ref 27–34)
MCHC RBC-ENTMCNC: 31.8 G/DL — LOW (ref 32–36)
MCHC RBC-ENTMCNC: 32.3 G/DL — SIGNIFICANT CHANGE UP (ref 32–36)
MCV RBC AUTO: 88.3 FL — SIGNIFICANT CHANGE UP (ref 80–100)
MCV RBC AUTO: 89.8 FL — SIGNIFICANT CHANGE UP (ref 80–100)
MONOCYTES # BLD AUTO: 0.97 K/UL — HIGH (ref 0–0.9)
MONOCYTES NFR BLD AUTO: 5.1 % — SIGNIFICANT CHANGE UP (ref 2–14)
NEUTROPHILS # BLD AUTO: 16.95 K/UL — HIGH (ref 1.8–7.4)
NEUTROPHILS NFR BLD AUTO: 89.9 % — HIGH (ref 43–77)
NRBC # BLD AUTO: 0 K/UL — SIGNIFICANT CHANGE UP (ref 0–0)
NRBC # BLD AUTO: 0 K/UL — SIGNIFICANT CHANGE UP (ref 0–0)
NRBC # FLD: 0 K/UL — SIGNIFICANT CHANGE UP (ref 0–0)
NRBC # FLD: 0 K/UL — SIGNIFICANT CHANGE UP (ref 0–0)
NRBC BLD AUTO-RTO: 0 /100 WBCS — SIGNIFICANT CHANGE UP (ref 0–0)
NRBC BLD AUTO-RTO: 0 /100 WBCS — SIGNIFICANT CHANGE UP (ref 0–0)
PLATELET # BLD AUTO: 204 K/UL — SIGNIFICANT CHANGE UP (ref 150–400)
PLATELET # BLD AUTO: 212 K/UL — SIGNIFICANT CHANGE UP (ref 150–400)
POTASSIUM SERPL-MCNC: 3.8 MMOL/L — SIGNIFICANT CHANGE UP (ref 3.5–5.3)
POTASSIUM SERPL-MCNC: 3.8 MMOL/L — SIGNIFICANT CHANGE UP (ref 3.5–5.3)
POTASSIUM SERPL-SCNC: 3.8 MMOL/L — SIGNIFICANT CHANGE UP (ref 3.5–5.3)
POTASSIUM SERPL-SCNC: 3.8 MMOL/L — SIGNIFICANT CHANGE UP (ref 3.5–5.3)
PROT SERPL-MCNC: 6.1 G/DL — SIGNIFICANT CHANGE UP (ref 6–8.3)
PROT SERPL-MCNC: 6.2 G/DL — SIGNIFICANT CHANGE UP (ref 6–8.3)
PROTHROM AB SERPL-ACNC: 13.2 SEC — SIGNIFICANT CHANGE UP (ref 9.9–13.4)
RBC # BLD: 3.25 M/UL — LOW (ref 4.2–5.8)
RBC # BLD: 3.33 M/UL — LOW (ref 4.2–5.8)
RBC # FLD: 16.8 % — HIGH (ref 10.3–14.5)
RBC # FLD: 17.1 % — HIGH (ref 10.3–14.5)
RH IG SCN BLD-IMP: POSITIVE — SIGNIFICANT CHANGE UP
SODIUM SERPL-SCNC: 135 MMOL/L — SIGNIFICANT CHANGE UP (ref 135–145)
SODIUM SERPL-SCNC: 135 MMOL/L — SIGNIFICANT CHANGE UP (ref 135–145)
WBC # BLD: 18.85 K/UL — HIGH (ref 3.8–10.5)
WBC # BLD: 21.87 K/UL — HIGH (ref 3.8–10.5)
WBC # FLD AUTO: 18.85 K/UL — HIGH (ref 3.8–10.5)
WBC # FLD AUTO: 21.87 K/UL — HIGH (ref 3.8–10.5)

## 2025-06-04 PROCEDURE — 99232 SBSQ HOSP IP/OBS MODERATE 35: CPT | Mod: GC

## 2025-06-04 PROCEDURE — 99233 SBSQ HOSP IP/OBS HIGH 50: CPT

## 2025-06-04 PROCEDURE — S2900 ROBOTIC SURGICAL SYSTEM: CPT | Mod: NC

## 2025-06-04 PROCEDURE — 74177 CT ABD & PELVIS W/CONTRAST: CPT | Mod: 26

## 2025-06-04 PROCEDURE — 47562 LAPAROSCOPIC CHOLECYSTECTOMY: CPT

## 2025-06-04 PROCEDURE — 88304 TISSUE EXAM BY PATHOLOGIST: CPT | Mod: 26

## 2025-06-04 PROCEDURE — 74019 RADEX ABDOMEN 2 VIEWS: CPT | Mod: 26

## 2025-06-04 PROCEDURE — 99232 SBSQ HOSP IP/OBS MODERATE 35: CPT

## 2025-06-04 PROCEDURE — 93970 EXTREMITY STUDY: CPT | Mod: 26

## 2025-06-04 PROCEDURE — 76705 ECHO EXAM OF ABDOMEN: CPT | Mod: 26

## 2025-06-04 PROCEDURE — G0545: CPT

## 2025-06-04 PROCEDURE — 99222 1ST HOSP IP/OBS MODERATE 55: CPT | Mod: 57

## 2025-06-04 DEVICE — LIGATING CLIPS WECK HEMOLOK POLYMER MEDIUM-LARGE (GREEN) 6: Type: IMPLANTABLE DEVICE | Status: FUNCTIONAL

## 2025-06-04 DEVICE — LIGATING CLIPS WECK HEMOLOK POLYMER LARGE (PURPLE) 6: Type: IMPLANTABLE DEVICE | Status: FUNCTIONAL

## 2025-06-04 DEVICE — SURGICEL POWDER 3 GRAMS: Type: IMPLANTABLE DEVICE | Status: FUNCTIONAL

## 2025-06-04 RX ORDER — PIPERACILLIN-TAZO-DEXTROSE,ISO 3.375G/5
3.38 IV SOLUTION, PIGGYBACK PREMIX FROZEN(ML) INTRAVENOUS ONCE
Refills: 0 | Status: COMPLETED | OUTPATIENT
Start: 2025-06-04 | End: 2025-06-04

## 2025-06-04 RX ORDER — PIPERACILLIN-TAZO-DEXTROSE,ISO 3.375G/5
3.38 IV SOLUTION, PIGGYBACK PREMIX FROZEN(ML) INTRAVENOUS EVERY 8 HOURS
Refills: 0 | Status: COMPLETED | OUTPATIENT
Start: 2025-06-04 | End: 2025-06-09

## 2025-06-04 RX ORDER — MAGNESIUM, ALUMINUM HYDROXIDE 200-200 MG
30 TABLET,CHEWABLE ORAL ONCE
Refills: 0 | Status: DISCONTINUED | OUTPATIENT
Start: 2025-06-04 | End: 2025-06-06

## 2025-06-04 RX ORDER — INSULIN GLARGINE-YFGN 100 [IU]/ML
8 INJECTION, SOLUTION SUBCUTANEOUS ONCE
Refills: 0 | Status: COMPLETED | OUTPATIENT
Start: 2025-06-04 | End: 2025-06-04

## 2025-06-04 RX ORDER — LACTULOSE 10 G/15ML
10 SOLUTION ORAL THREE TIMES A DAY
Refills: 0 | Status: DISCONTINUED | OUTPATIENT
Start: 2025-06-04 | End: 2025-06-05

## 2025-06-04 RX ORDER — POVIDONE-IODINE 7.5 %
1 SOLUTION, NON-ORAL TOPICAL ONCE
Refills: 0 | Status: COMPLETED | OUTPATIENT
Start: 2025-06-04 | End: 2025-06-04

## 2025-06-04 RX ORDER — HYDROMORPHONE/SOD CHLOR,ISO/PF 2 MG/10 ML
0.5 SYRINGE (ML) INJECTION ONCE
Refills: 0 | Status: DISCONTINUED | OUTPATIENT
Start: 2025-06-04 | End: 2025-06-04

## 2025-06-04 RX ADMIN — OXYCODONE HYDROCHLORIDE 10 MILLIGRAM(S): 30 TABLET ORAL at 09:40

## 2025-06-04 RX ADMIN — OXYCODONE HYDROCHLORIDE 10 MILLIGRAM(S): 30 TABLET ORAL at 08:43

## 2025-06-04 RX ADMIN — INSULIN LISPRO 6 UNIT(S): 100 INJECTION, SOLUTION INTRAVENOUS; SUBCUTANEOUS at 09:15

## 2025-06-04 RX ADMIN — TAMSULOSIN HYDROCHLORIDE 0.4 MILLIGRAM(S): 0.4 CAPSULE ORAL at 22:01

## 2025-06-04 RX ADMIN — Medication 100 MILLIGRAM(S): at 14:19

## 2025-06-04 RX ADMIN — AMPICILLIN SODIUM 200 GRAM(S): 1 INJECTION, POWDER, FOR SOLUTION INTRAMUSCULAR; INTRAVENOUS at 01:17

## 2025-06-04 RX ADMIN — Medication 0.5 MILLIGRAM(S): at 12:30

## 2025-06-04 RX ADMIN — Medication 0.5 MILLIGRAM(S): at 05:45

## 2025-06-04 RX ADMIN — Medication 81 MILLIGRAM(S): at 14:20

## 2025-06-04 RX ADMIN — Medication 120 MILLIGRAM(S): at 05:40

## 2025-06-04 RX ADMIN — ROSUVASTATIN CALCIUM 10 MILLIGRAM(S): 5 TABLET, FILM COATED ORAL at 22:01

## 2025-06-04 RX ADMIN — Medication 0.2 MILLIGRAM(S): at 18:25

## 2025-06-04 RX ADMIN — INSULIN GLARGINE-YFGN 8 UNIT(S): 100 INJECTION, SOLUTION SUBCUTANEOUS at 01:16

## 2025-06-04 RX ADMIN — OXYCODONE HYDROCHLORIDE 10 MILLIGRAM(S): 30 TABLET ORAL at 00:00

## 2025-06-04 RX ADMIN — ENOXAPARIN SODIUM 90 MILLIGRAM(S): 100 INJECTION SUBCUTANEOUS at 05:42

## 2025-06-04 RX ADMIN — Medication 100 MILLIGRAM(S): at 05:41

## 2025-06-04 RX ADMIN — AMPICILLIN SODIUM 200 GRAM(S): 1 INJECTION, POWDER, FOR SOLUTION INTRAMUSCULAR; INTRAVENOUS at 05:35

## 2025-06-04 RX ADMIN — Medication 30 MILLIGRAM(S): at 05:39

## 2025-06-04 RX ADMIN — Medication 0.5 MILLIGRAM(S): at 11:38

## 2025-06-04 RX ADMIN — Medication 150 MICROGRAM(S): at 05:39

## 2025-06-04 RX ADMIN — Medication 0.5 MILLIGRAM(S): at 04:47

## 2025-06-04 RX ADMIN — OXYCODONE HYDROCHLORIDE 10 MILLIGRAM(S): 30 TABLET ORAL at 03:12

## 2025-06-04 RX ADMIN — Medication 0.5 MILLIGRAM(S): at 14:57

## 2025-06-04 RX ADMIN — Medication 1 APPLICATION(S): at 11:40

## 2025-06-04 RX ADMIN — Medication 0.2 MILLIGRAM(S): at 05:39

## 2025-06-04 RX ADMIN — OXYCODONE HYDROCHLORIDE 10 MILLIGRAM(S): 30 TABLET ORAL at 19:03

## 2025-06-04 RX ADMIN — AMPICILLIN SODIUM 200 GRAM(S): 1 INJECTION, POWDER, FOR SOLUTION INTRAMUSCULAR; INTRAVENOUS at 09:18

## 2025-06-04 RX ADMIN — Medication 25 GRAM(S): at 14:17

## 2025-06-04 RX ADMIN — OXYCODONE HYDROCHLORIDE 10 MILLIGRAM(S): 30 TABLET ORAL at 18:24

## 2025-06-04 RX ADMIN — INSULIN LISPRO 1: 100 INJECTION, SOLUTION INTRAVENOUS; SUBCUTANEOUS at 09:16

## 2025-06-04 RX ADMIN — INSULIN GLARGINE-YFGN 20 UNIT(S): 100 INJECTION, SOLUTION SUBCUTANEOUS at 22:02

## 2025-06-04 RX ADMIN — INSULIN LISPRO 1: 100 INJECTION, SOLUTION INTRAVENOUS; SUBCUTANEOUS at 18:26

## 2025-06-04 RX ADMIN — OXYCODONE HYDROCHLORIDE 10 MILLIGRAM(S): 30 TABLET ORAL at 04:15

## 2025-06-04 RX ADMIN — LIDOCAINE HYDROCHLORIDE 1 PATCH: 20 JELLY TOPICAL at 00:00

## 2025-06-04 RX ADMIN — LIDOCAINE HYDROCHLORIDE 1 PATCH: 20 JELLY TOPICAL at 11:41

## 2025-06-04 RX ADMIN — Medication 25 GRAM(S): at 22:01

## 2025-06-04 RX ADMIN — Medication 200 GRAM(S): at 11:09

## 2025-06-04 NOTE — CONSULT NOTE ADULT - ASSESSMENT
85M PMH HTN, DM2, CKD (baseline creatinine 1.5-2), TIAs (on Eliquis at home), sinus node dysfunction (s/p PPM ~1 year ago), prostate cancer s/p prostectomy (~early 2000s), total thyroidectomy (~2013) who presented to [facility] after falling and found to have lumbar decompression deformity. Bacteremic with E faeclis with unclear source, cultures eventually cleared and scheduled for OR with orthopedic surgery 6/5 for lumbar stenosis with cauda equina impingement however now with acute cholecystitis with rising leukocytosis and LFTs. CTAP concerning for acute cholecystitis.    PLAN:  - OR tonight for robotic cholecystectomy (if tonight, plan for tomorrow OR)  - Consent obtained, in chart  - NPO/Zosyn  - Continue to hold anti-coagulation  - Spoke with EP - not pacemaker dependent with underlying sinus bradycardia  - Remainder of care per primary    Discussed with Dr. Ammon LUIS Team [id number]

## 2025-06-04 NOTE — PROGRESS NOTE ADULT - SUBJECTIVE AND OBJECTIVE BOX
Infectious Diseases Follow Up:    Patient is a 85y old  Male who presents with a chief complaint of back pain (04 Jun 2025 07:26)      Interval History/ROS:  Pt with abdominal pain (RUQ/epigastric) since yesterday afternoon, w/ vomiting.   Noted to have leukocytosis and transaminitis     Allergies  gabapentin (Other)        ANTIMICROBIALS:  piperacillin/tazobactam IVPB.- 3.375 once  piperacillin/tazobactam IVPB.. 3.375 every 8 hours      Current Abx:     Previous Abx     OTHER MEDS:  MEDICATIONS  (STANDING):  aluminum hydroxide/magnesium hydroxide/simethicone Suspension 30 once PRN  aspirin  chewable 81 daily  benzonatate 100 three times a day PRN  bisacodyl 5 at bedtime  cloNIDine 0.2 two times a day  dextrose 50% Injectable 25 once  dextrose 50% Injectable 12.5 once  dextrose 50% Injectable 25 once  dextrose Oral Gel 15 once  glucagon  Injectable 1 once  hydrALAZINE 100 three times a day  HYDROmorphone  Injectable 0.5 every 4 hours PRN  HYDROmorphone  Injectable 0.5 once  insulin glargine Injectable (LANTUS) 20 at bedtime  insulin lispro (ADMELOG) corrective regimen sliding scale  three times a day before meals  insulin lispro (ADMELOG) corrective regimen sliding scale  at bedtime  insulin lispro Injectable (ADMELOG) 6 three times a day before meals  lactulose Syrup 10 three times a day  levothyroxine 150 daily  NIFEdipine XL 30 daily  oxyCODONE    IR 10 every 4 hours PRN  oxyCODONE    IR 5 every 4 hours PRN  polyethylene glycol 3350 17 two times a day  propranolol  daily  rosuvastatin 10 at bedtime  senna 2 at bedtime  tamsulosin 0.4 at bedtime      Vital Signs Last 24 Hrs  T(C): 36.2 (04 Jun 2025 05:39), Max: 36.4 (03 Jun 2025 18:55)  T(F): 97.1 (04 Jun 2025 05:39), Max: 97.6 (03 Jun 2025 18:55)  HR: 60 (04 Jun 2025 11:35) (54 - 81)  BP: 175/77 (04 Jun 2025 11:35) (162/50 - 175/77)  BP(mean): --  RR: 18 (04 Jun 2025 11:35) (18 - 18)  SpO2: 95% (04 Jun 2025 11:35) (94% - 96%)    Parameters below as of 04 Jun 2025 11:35  Patient On (Oxygen Delivery Method): room air        PHYSICAL EXAM:  GENERAL: NAD, well-developed  HEAD:  Atraumatic, Normocephalic  EYES: EOMI, conjunctiva and sclera clear  CHEST/LUNG: On RA, not in respiratory distress  ABDOMEN: Soft, mildly distended?, severe pain to epigastric/RUQ region   PSYCH: AAOx3                          9.5    18.85 )-----------( 212      ( 04 Jun 2025 06:50 )             29.4       06-04    135  |  101  |  16  ----------------------------<  163[H]  3.8   |  22  |  1.30    Ca    8.2[L]      04 Jun 2025 06:50    TPro  6.1  /  Alb  2.8[L]  /  TBili  1.9[H]  /  DBili  1.4[H]  /  AST  500[H]  /  ALT  453[H]  /  AlkPhos  731[H]  06-04      Urinalysis Basic - ( 04 Jun 2025 06:50 )    Color: x / Appearance: x / SG: x / pH: x  Gluc: 163 mg/dL / Ketone: x  / Bili: x / Urobili: x   Blood: x / Protein: x / Nitrite: x   Leuk Esterase: x / RBC: x / WBC x   Sq Epi: x / Non Sq Epi: x / Bacteria: x        MICROBIOLOGY:  v  Blood Blood  05-18-25   No growth at 5 days  --  --      Blood Blood-Peripheral  05-17-25   No growth at 5 days  --  --      Blood Blood-Peripheral  05-17-25   No growth at 5 days  --  --      Blood Blood  05-17-25   No growth at 5 days  --  --      Blood Blood  05-15-25   No growth at 5 days  --  --      Blood Blood  05-15-25   No growth at 5 days  --  --      Blood Blood  05-14-25   Growth in aerobic bottle: Enterococcus faecalis  See previous culture [id number]  Direct identification is available within approximately 3-5  hours either by Blood Panel Multiplexed PCR or Direct  MALDI-TOF. Details: [email or web address]  --  Blood Culture PCR      Blood Blood  05-14-25   Growth in aerobic and anaerobic bottles: Enterococcus faecalis  Growth in anaerobic bottle: blood culture bottle turned positive after  the final report was released.  --  Enterococcus faecalis      Blood Blood-Peripheral  05-12-25   Growth in aerobic and anaerobic bottles: Enterococcus faecalis  See previous culture [id number]  --    Growth in aerobic bottle: Gram Positive Cocci in Pairs and Chains  Growth in anaerobic bottle: Gram Positive Cocci in Pairs and Chains      Blood Blood-Peripheral  05-12-25   Growth in aerobic and anaerobic bottles: Enterococcus faecalis  See previous culture [id number]  --    Growth in aerobic bottle: Gram Positive Cocci in Pairs and Chains  Growth in anaerobic bottle: Gram Positive Cocci in Pairs and Chains      Blood Blood-Peripheral  05-11-25   Growth in aerobic and anaerobic bottles: Enterococcus faecalis  Direct identification is available within approximately 3-5  hours either by Blood Panel Multiplexed PCR or Direct  MALDI-TOF. Details: [email or web address]  --  Blood Culture PCR  Enterococcus faecalis                RADIOLOGY:    < from: US Abdomen Upper Quadrant Right (06.04.25 @ 10:27) >    IMPRESSION:  Intraluminal echogenicity within the gallbladder measuring 6.7 x 3.9 cm   without color Doppler flow. Findings are suggestive of tumefactive   sludge. Further evaluation can be considered with either CT or MR abdomen.  No evidence of acute cholecystitis.    < end of copied text >

## 2025-06-04 NOTE — PROGRESS NOTE ADULT - ASSESSMENT
This is a 84 y/o M w/ PMhx HTN, HLD, DM2, hypothyroidism, prostate cancer s/p prostatectomy, CKD, TIA, pacemaker placed in 5/2024 for abnormal arrythmia initially admitted to [facility] on 5/6 for back pain, now transferred to [facility] for orthopedic spine. Labs w/ leukocytosis to 18, BCx w/ E faecalis in multiple sets.     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 S/p PPM removal    #Respiratory failure, improved   #JEROME, improving     Overall, 84 y/o M w/ PMhx HTN, HLD, DM2, hypothyroidism, prostate cancer s/p prostatectomy, CKD, TIA, pacemaker placed in 5/2024 for abnormal arrythmia transferred to [facility] from [facility] for MRI spine and ortho evaluation give LBP and lumbar compression w/ urinary retention. Pt noted to have chills and leukocytosis to 18, now with high grade E faecalis bacteremia. Unclear source however UCx not done, U/A with 10 WBCs, CT A/P w/o IV contrast on 5/10 w/o acute infectious source, s/p PPM removal, MARIA LUISA w/o vegetations.   Repeat MRI with OM/discitis, L4/L5, severe spinal canal stenosis L3-L4, L-5, impingement of cauda equina, planned for OR w/ orthopedics, however c/b 6/4 w/ abdominal pain, N/V, leukocytosis, transaminitis. RUQ US w/o clear signs of acute geetha, pending CT A/P. Differential includes: acute geetha, pancreatitis, SBO, ischemic colitis     Recommendations:   1. Zosyn 3.375 g q8   2. CT A/P w/ IV contrast as soon as possible   3. Repeat CBC, CMP, lactate, lipase, BCx x 2 today.   4. Low threshold for surgery c/s    Thank you for consulting us and involving us in the management of this patient's case. In addition to reviewing history, imaging, documents, labs, microbiology, and infection control strategies and potential issues.     ID will continue to follow    Drarin Falk M.D.  Attending Physician  Division of Infectious Diseases  Department of Medicine    Please contact through MS Teams message.  Office: [phone] (after 5 PM or weekend)

## 2025-06-04 NOTE — PROGRESS NOTE ADULT - SUBJECTIVE AND OBJECTIVE BOX
PROGRESS NOTE:     Patient is a 85y old  Male who presents with a chief complaint of back pain (04 Jun 2025 07:10)      INTERVAL HISTORY: NAEO. VSS. ROS negative.    MEDICATIONS  (STANDING):  ampicillin  IVPB 2 Gram(s) IV Intermittent every 4 hours  aspirin  chewable 81 milliGRAM(s) Oral daily  bisacodyl 5 milliGRAM(s) Oral at bedtime  chlorhexidine 2% Cloths 1 Application(s) Topical daily  cloNIDine 0.2 milliGRAM(s) Oral two times a day  dextrose 5%. 1000 milliLiter(s) (100 mL/Hr) IV Continuous <Continuous>  dextrose 5%. 1000 milliLiter(s) (50 mL/Hr) IV Continuous <Continuous>  dextrose 50% Injectable 25 Gram(s) IV Push once  dextrose 50% Injectable 12.5 Gram(s) IV Push once  dextrose 50% Injectable 25 Gram(s) IV Push once  dextrose Oral Gel 15 Gram(s) Oral once  enoxaparin Injectable 90 milliGRAM(s) SubCutaneous every 12 hours  glucagon  Injectable 1 milliGRAM(s) IntraMuscular once  hydrALAZINE 100 milliGRAM(s) Oral three times a day  insulin glargine Injectable (LANTUS) 20 Unit(s) SubCutaneous at bedtime  insulin lispro (ADMELOG) corrective regimen sliding scale   SubCutaneous three times a day before meals  insulin lispro (ADMELOG) corrective regimen sliding scale   SubCutaneous at bedtime  insulin lispro Injectable (ADMELOG) 6 Unit(s) SubCutaneous three times a day before meals  lactulose Syrup      lactulose Syrup 10 Gram(s) Oral daily  levothyroxine 150 MICROGram(s) Oral daily  lidocaine   4% Patch 1 Patch Transdermal daily  NIFEdipine XL 30 milliGRAM(s) Oral daily  polyethylene glycol 3350 17 Gram(s) Oral two times a day  propranolol  milliGRAM(s) Oral daily  rosuvastatin 10 milliGRAM(s) Oral at bedtime  senna 2 Tablet(s) Oral at bedtime  tamsulosin 0.4 milliGRAM(s) Oral at bedtime    MEDICATIONS  (PRN):  aluminum hydroxide/magnesium hydroxide/simethicone Suspension 30 milliLiter(s) Oral once PRN Dyspepsia  benzonatate 100 milliGRAM(s) Oral three times a day PRN Cough  HYDROmorphone  Injectable 0.5 milliGRAM(s) IV Push every 4 hours PRN Severe Pain (7 - 10)  oxyCODONE    IR 10 milliGRAM(s) Oral every 4 hours PRN Severe Pain (7 - 10)  oxyCODONE    IR 5 milliGRAM(s) Oral every 4 hours PRN Moderate Pain (4 - 6)      CAPILLARY BLOOD GLUCOSE      POCT Blood Glucose.: 93 mg/dL (04 Jun 2025 00:13)  POCT Blood Glucose.: 82 mg/dL (03 Jun 2025 22:16)  POCT Blood Glucose.: 71 mg/dL (03 Jun 2025 21:45)  POCT Blood Glucose.: 179 mg/dL (03 Jun 2025 18:14)  POCT Blood Glucose.: 118 mg/dL (03 Jun 2025 12:24)  POCT Blood Glucose.: 71 mg/dL (03 Jun 2025 08:53)    I&O's Summary    03 Jun 2025 07:01  -  04 Jun 2025 07:00  --------------------------------------------------------  IN: 100 mL / OUT: 950 mL / NET: -850 mL        PHYSICAL EXAM:  Vital Signs Last 24 Hrs  T(C): 36.2 (04 Jun 2025 05:39), Max: 36.4 (03 Jun 2025 13:15)  T(F): 97.1 (04 Jun 2025 05:39), Max: 97.6 (03 Jun 2025 13:15)  HR: 81 (04 Jun 2025 05:39) (52 - 81)  BP: 168/74 (04 Jun 2025 05:39) (133/42 - 169/72)  BP(mean): 64 (03 Jun 2025 12:15) (64 - 64)  RR: 18 (04 Jun 2025 05:39) (18 - 18)  SpO2: 94% (04 Jun 2025 05:39) (94% - 97%)    Parameters below as of 04 Jun 2025 05:39  Patient On (Oxygen Delivery Method): room air        CONSTITUTIONAL: NAD, well-developed  HEENT:   RESPIRATORY: Normal respiratory effort; lungs are clear to auscultation bilaterally  CARDIOVASCULAR: Regular rate and rhythm, normal S1 and S2, no murmur/rub/gallop; No lower extremity edema; Peripheral pulses are 2+ bilaterally  ABDOMEN: Nontender to palpation, normoactive bowel sounds, no rebound/guarding; No hepatosplenomegaly  MUSCULOSKELETAL: no clubbing or cyanosis of digits; no joint swelling or tenderness to palpation  PSYCH: A+O to person, place, and time; affect appropriate    LABS:                        9.5    x     )-----------( 212      ( 04 Jun 2025 06:50 )             29.4     06-04    135  |  101  |  16  ----------------------------<  163[H]  3.8   |  22  |  1.30    Ca    8.2[L]      04 Jun 2025 06:50            Urinalysis Basic - ( 04 Jun 2025 06:50 )    Color: x / Appearance: x / SG: x / pH: x  Gluc: 163 mg/dL / Ketone: x  / Bili: x / Urobili: x   Blood: x / Protein: x / Nitrite: x   Leuk Esterase: x / RBC: x / WBC x   Sq Epi: x / Non Sq Epi: x / Bacteria: x          RADIOLOGY:        ******************************  Authored By: Jacinto Rodriguez MD PGY1  Internal Medicine  MS Teams  ******************************

## 2025-06-04 NOTE — PROGRESS NOTE ADULT - ASSESSMENT
85 year old male with 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 After family discussions they wish to proceed with surgery.     Plan:  -OR tomorrow 6/5 for L3-5 lami/fusion  -hold Tlovenox prior to OR  -please document medical and cardiac clearance for planned OR   -WBAT  -pain control prn  -bowel reg  -antibiotics per ID  -continue PT/OT  -dispo pending    Jake Leal PGY1  Orthopedic Surgery  [facility] [place] [id number]  [facility]        [id number]  [facility]  [id number]/[phone]   85 year old male with 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 After family discussions they wish to proceed with surgery.     Plan:  -OR tomorrow 6/5 for L3-5 lami/fusion  -hold Tlovenox prior to OR  -please document medical clearance for planned OR   -cards cleared, appreciate care  -WBAT  -pain control prn  -bowel reg  -antibiotics per ID  -continue PT/OT  -dispo pending    Jake Leal PGY1  Orthopedic Surgery  [facility] [id number]  [facility]        [id number]  [facility]  [id number]/[phone]

## 2025-06-04 NOTE — CHART NOTE - NSCHARTNOTEFT_GEN_A_CORE
Orthopedics spoke with EP regarding leadless pacemaker. Since pacemaker placed within the last month, EP deferred any interrogation and stated to call [phone] when patient is in [facility] for them to turn off pacemaker.      For all questions related to patient care, please reach out to the on-call team via the pager.     Angle Dixon, PGY 3  Orthopaedic Surgery  [facility] [id number]  [facility] [id number]  [facility] [phone]

## 2025-06-04 NOTE — PROGRESS NOTE ADULT - ASSESSMENT
85M w/ HTN, HLD, T2DM, prostate CA, CKD, TIA and SND s/p PPM transferred here for lumbar spinal cord compression and bacteremia s/p PPM explant and implant of Micra PPM. Now pending OR with orthopedic for decompression     -EKG w/ SR, no significant STT changes   -ECHO w/ normal EF no significant valvular heart disease   -s/p leadless PPM w/ EP given his SND and pacing dependency   -cont AC, hold preoperatively and resume when able   -RCRI=2, pt is at elevated CV risk, no further modifiable risk factors, optimized from cardiac standpoint to proceed     Davey Angeal MD [facility]  Attending Interventional Cardiologist, [facility]an-NS/OLVIN.   Avaliable on [facility] Team

## 2025-06-04 NOTE — CHART NOTE - NSCHARTNOTEFT_GEN_A_CORE
Discussed with EP and Medicine team who stated that the patient is not pacemaker dependent. EP stated that the pacemaker does not need to be turned off for the OR. Also discussed with Anesthesiologist over the phone who stated that as long as patient is not pacemaker dependent and does not also have AICD, ok to proceed with OR.     Patient signed consent form and agreed to proceed with OR.  DNR/DNI status revoked for OR.    B Team Surgery [id number]

## 2025-06-04 NOTE — PROGRESS NOTE ADULT - ASSESSMENT
Pt is an 85M with HTN, HLD, DM2, prostate cancer s/p prostatectomy, CKD, TIA with a pacemaker who presents transferred from [place] for further evaluation of low back pain secondary to lumbar compression deformities. EP on board to clear PPM for MRI compatibility. Found to have e facaelis bacteremia. On abx, unable to tolerate MRI s/p PPM removal on 5/16 and exchange for leadless PPM. Plan for MARIA LUISA on 5/20 -> negative for endocarditis. MRI Lumbar spine attempted 5/29 under conscious sedation however only partially complete again limited by pain and noise. Images obtained showing osteomyelitis and cauda equina impingement. Orthopedics planning for surgical intervention 6/5.

## 2025-06-04 NOTE — PROGRESS NOTE ADULT - SUBJECTIVE AND OBJECTIVE BOX
ORTHOPEDIC PROGRESS NOTE    SUBJECTIVE:  Pt seen and examined at bedside this am.  Doing well.  No acute events overnight.  Pt states pain is unchanged. He had some abd pain overnight with axr showing stool burden, enemas not helping at this point      OBJECTIVE:  Vital Signs Last 24 Hrs  T(C): 36.2 (04 Jun 2025 05:39), Max: 36.4 (03 Jun 2025 13:15)  T(F): 97.1 (04 Jun 2025 05:39), Max: 97.6 (03 Jun 2025 13:15)  HR: 81 (04 Jun 2025 05:39) (52 - 81)  BP: 168/74 (04 Jun 2025 05:39) (133/42 - 169/72)  BP(mean): 64 (03 Jun 2025 12:15) (64 - 64)  RR: 18 (04 Jun 2025 05:39) (18 - 18)  SpO2: 94% (04 Jun 2025 05:39) (94% - 97%)    Parameters below as of 04 Jun 2025 05:39  Patient On (Oxygen Delivery Method): room air        Physical Exam:  GEN: NAD, resting quietly  PULM: symmetric chest rise bilaterally, no increased WOB, on NC  SPINE:   Motor exam:          Upper extremity         C5 (Shoulder Abd)    C6 (Elbow flex)   C7 (Elbow ext)   C8 (Finger flex) T1 (finger abd)         R         5/5                 5/5                       5/5                      5/5                         5/5          L          5/5                 5/5                      5/5                      5/5                         5/5                   Lower extremity           L2 (Hip flex)  L3 (knee ext)  L4 (Dorsi flex)  L5 (EHL)  S1 (Plantar flex)                                               R        3/5            4/5             1/5                    1/5          4/5      L        3/5            4/5             5/5                   5/5           5/5    Sensory exam:                         C5      C6      C7      C8       T1          RIGHT          2         2        2         2         2          (0=absent, 1=impaired, 2=normal, NT=not testable)  LEFT             2         2        2         2         2          (0=absent, 1=impaired, 2=normal, NT=not testable)                          L2      L3     L4     L5       S1          RIGHT          2         2        2         2         2          (0=absent, 1=impaired, 2=normal, NT=not testable)  LEFT             2         2        2         2         2          (0=absent, 1=impaired, 2=normal, NT=not testable)       LABS                        9.5    x     )-----------( 212      ( 04 Jun 2025 06:50 )             29.4       06-03    136  |  102  |  15  ----------------------------<  86  3.7   |  23  |  1.39[H]    Ca    8.1[L]      03 Jun 2025 05:35            I&O's Summary    03 Jun 2025 07:01  -  04 Jun 2025 07:00  --------------------------------------------------------  IN: 100 mL / OUT: 950 mL / NET: -850 mL        aluminum hydroxide/magnesium hydroxide/simethicone Suspension 30 milliLiter(s) Oral once PRN  ampicillin  IVPB 2 Gram(s) IV Intermittent every 4 hours  aspirin  chewable 81 milliGRAM(s) Oral daily  benzonatate 100 milliGRAM(s) Oral three times a day PRN  bisacodyl 5 milliGRAM(s) Oral at bedtime  chlorhexidine 2% Cloths 1 Application(s) Topical daily  cloNIDine 0.2 milliGRAM(s) Oral two times a day  dextrose 5%. 1000 milliLiter(s) IV Continuous <Continuous>  dextrose 5%. 1000 milliLiter(s) IV Continuous <Continuous>  dextrose 50% Injectable 25 Gram(s) IV Push once  dextrose 50% Injectable 12.5 Gram(s) IV Push once  dextrose 50% Injectable 25 Gram(s) IV Push once  dextrose Oral Gel 15 Gram(s) Oral once  enoxaparin Injectable 90 milliGRAM(s) SubCutaneous every 12 hours  glucagon  Injectable 1 milliGRAM(s) IntraMuscular once  hydrALAZINE 100 milliGRAM(s) Oral three times a day  HYDROmorphone  Injectable 0.5 milliGRAM(s) IV Push every 4 hours PRN  insulin glargine Injectable (LANTUS) 20 Unit(s) SubCutaneous at bedtime  insulin lispro (ADMELOG) corrective regimen sliding scale   SubCutaneous three times a day before meals  insulin lispro (ADMELOG) corrective regimen sliding scale   SubCutaneous at bedtime  insulin lispro Injectable (ADMELOG) 6 Unit(s) SubCutaneous three times a day before meals  lactulose Syrup      lactulose Syrup 10 Gram(s) Oral daily  levothyroxine 150 MICROGram(s) Oral daily  lidocaine   4% Patch 1 Patch Transdermal daily  NIFEdipine XL 30 milliGRAM(s) Oral daily  oxyCODONE    IR 10 milliGRAM(s) Oral every 4 hours PRN  oxyCODONE    IR 5 milliGRAM(s) Oral every 4 hours PRN  polyethylene glycol 3350 17 Gram(s) Oral two times a day  propranolol  milliGRAM(s) Oral daily  rosuvastatin 10 milliGRAM(s) Oral at bedtime  senna 2 Tablet(s) Oral at bedtime  tamsulosin 0.4 milliGRAM(s) Oral at bedtime

## 2025-06-04 NOTE — CHART NOTE - NSCHARTNOTEFT_GEN_A_CORE
NUTRITION FOLLOW UP NOTE    Pt seen for follow up    SOURCE: [x] Medical record [x] RN/PCA [x] Family/Caregiver[x] Patient inappropriate (appears tired)     Medical Course: per chart review, pt is an 85M with HTN, HLD, DM2, prostate cancer s/p prostatectomy, CKD, TIA with a pacemaker who presents transferred from [place] for further evaluation of low back pain secondary to lumbar compression deformities. EP on board to clear PPM for MRI compatibility. Found to have e facaelis bacteremia. On abx, unable to tolerate MRI s/p PPM removal on 5/16 and exchange for leadless PPM. Plan for MARIA LUISA on 5/20 -> negative for endocarditis. MRI Lumbar spine attempted 5/29 under conscious sedation however only partially complete again limited by pain and noise. Images obtained showing osteomyelitis and cauda equina impingement. Orthopedics planning for surgical intervention 6/5.     Diet Prescription: Diet, NPO:   Except Medications (06-04-25 @ 09:11)  Diet, NPO after Midnight:      NPO Start Date: 04-Jun-2025,   NPO Start Time: 23:59  Except Medications (06-04-25 @ 08:08)      Nutrition Course: Met patient and his wife at bedside. Patient alert, but appears tired, not willing to talk. Comprehensive chart review completed. Obtained collateral from his wife at bedside. Patient is current NPO for procedure tomorrow. As per wife, patient has normal appetite in hospital when he receives po diet. Per wife, patient has in house nausea and vomiting today, but denies diarrhea, or constipation. Bowel regimen is in placed. No reported chewing/swallowing issues. Recommend advance diet as tolerated when medically feasible. RD to remain available for further nutritional interventions as indicated       Food Allergy/Intolerance: NKFA  -    Pertinent Medications: MEDICATIONS  (STANDING):  aspirin  chewable 81 milliGRAM(s) Oral daily  bisacodyl 5 milliGRAM(s) Oral at bedtime  chlorhexidine 2% Cloths 1 Application(s) Topical daily  cloNIDine 0.2 milliGRAM(s) Oral two times a day  dextrose 5%. 1000 milliLiter(s) (100 mL/Hr) IV Continuous <Continuous>  dextrose 5%. 1000 milliLiter(s) (50 mL/Hr) IV Continuous <Continuous>  dextrose 50% Injectable 25 Gram(s) IV Push once  dextrose 50% Injectable 12.5 Gram(s) IV Push once  dextrose 50% Injectable 25 Gram(s) IV Push once  dextrose Oral Gel 15 Gram(s) Oral once  glucagon  Injectable 1 milliGRAM(s) IntraMuscular once  hydrALAZINE 100 milliGRAM(s) Oral three times a day  insulin glargine Injectable (LANTUS) 20 Unit(s) SubCutaneous at bedtime  insulin lispro (ADMELOG) corrective regimen sliding scale   SubCutaneous three times a day before meals  insulin lispro (ADMELOG) corrective regimen sliding scale   SubCutaneous at bedtime  insulin lispro Injectable (ADMELOG) 6 Unit(s) SubCutaneous three times a day before meals  lactulose Syrup 10 Gram(s) Oral three times a day  levothyroxine 150 MICROGram(s) Oral daily  lidocaine   4% Patch 1 Patch Transdermal daily  NIFEdipine XL 30 milliGRAM(s) Oral daily  piperacillin/tazobactam IVPB.. 3.375 Gram(s) IV Intermittent every 8 hours  polyethylene glycol 3350 17 Gram(s) Oral two times a day  povidone iodine 10% Nasal Swab 1 Application(s) Both Nostrils once  propranolol  milliGRAM(s) Oral daily  rosuvastatin 10 milliGRAM(s) Oral at bedtime  senna 2 Tablet(s) Oral at bedtime  tamsulosin 0.4 milliGRAM(s) Oral at bedtime    MEDICATIONS  (PRN):  aluminum hydroxide/magnesium hydroxide/simethicone Suspension 30 milliLiter(s) Oral once PRN Dyspepsia  benzonatate 100 milliGRAM(s) Oral three times a day PRN Cough  HYDROmorphone  Injectable 0.5 milliGRAM(s) IV Push every 4 hours PRN Severe Pain (7 - 10)  oxyCODONE    IR 10 milliGRAM(s) Oral every 4 hours PRN Severe Pain (7 - 10)  oxyCODONE    IR 5 milliGRAM(s) Oral every 4 hours PRN Moderate Pain (4 - 6)    [x] Relevant notes on medications: insulin    Pertinent Labs: 06-04 Na135 mmol/L Glu 177 mg/dL[H] K+ 3.8 mmol/L Cr  1.37 mg/dL[H] BUN 19 mg/dL 06-02 Phos 2.9 mg/dL 06-04 Alb 2.8 g/dL[L]     A1C with Estimated Average Glucose Result: 6.9 % (05-07-25 @ 06:03)      POCT Blood Glucose.: 174 mg/dL (04 Jun 2025 12:30)  POCT Blood Glucose.: 154 mg/dL (04 Jun 2025 11:05)  POCT Blood Glucose.: 175 mg/dL (04 Jun 2025 09:02)  POCT Blood Glucose.: 93 mg/dL (04 Jun 2025 00:13)  POCT Blood Glucose.: 82 mg/dL (03 Jun 2025 22:16)  POCT Blood Glucose.: 71 mg/dL (03 Jun 2025 21:45)  POCT Blood Glucose.: 179 mg/dL (03 Jun 2025 18:14)    [x] Relevant notes on labs: Labs reviewed, A1c 6.9% indicating fair control, fingersticks between 71-186mg/dL    Weight: 92.9kg (5/19, dosing weight)  Weight Assessment: no new weight assessment  Height: 71in / 180.3cm  IBW: 172lbs / 78.1kg +/-10%  BMI: 28.6kg/m^2    Physical Assessment, per flowsheets:  Edema: 3+ generalized, R/L foot, R/L ankle, R/L leg per RN flowsheets 6/3  Pressure Injury: No pressure injuries/DTI noted per RN flowsheets. Sacrum severe-MAD/IAS mixed etiology; Lt axilla-abrasion  noted per RN flowsheets.       Estimated Needs:   [ x] recalculated,  based on ideal body weight 172lbs/78.1kg   1953-2343kcal daily @ 25-30kcal/kg,  78.1-94gm protein daily @ 1.0-1.2gm/kg     Previous Nutrition Diagnosis:    [x ]Inadequate Oral Intake   Nutrition Diagnosis is [ x] ongoing  [ ] resolved [ ] not applicable   New Nutrition Diagnosis: [x ] not applicable     Education:  [ x] Provided on previous assessment by RD      Interventions:   1) Recommend advance diet as tolerated when medically feasible  2) Obtain daily weights           Monitor & Evaluate:  PO intake, tolerance to diet/supplement, nutrition related lab values, weight trends, BMs/GI distress, hydration status, skin integrity.    Baljit Mejia, MS, RDN, CDN    Also available on [employer] Teams

## 2025-06-04 NOTE — CONSULT NOTE ADULT - SUBJECTIVE AND OBJECTIVE BOX
GENERAL SURGERY CONSULT NOTE  --------------------------------------------------------------------------------------------  HPI:  85M PMH HTN, DM2, CKD (baseline creatinine 1.5-2), TIAs (on Eliquis at home), sinus node dysfunction (s/p PPM ~1 year ago), prostate cancer s/p prostectomy (~early 2000s), total thyroidectomy (~2013) who presented to [facility] after falling and found to have lumbar decompression deformity. Transferred to [facility] as MRI machine at [place] incompatible with his PPM. 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 Since then he's had TTE/MARIA LUISA negative for vegetations and had his PPM switched to a leadless PPM.    Hypoxic on HFNC when arrived to hospital but CXR clear; VQ scan low probability for PE and has been on room air for about a week    MRI here L4/5 OM/discitis and L3/4 and L4/5 severe spinal stenosis stenosis w/ cauda equina impingement  OR tomorrow with orthopedic surgery however developed abdominal pain yesterday, located on the right side.    No recent fevers and he is normotensive, not tachycardic  On RA    Yesterday his WBC normal at 7 however today it's 21  Cr 1.37  Tbili 2.5 (fractionated this morning and direct bili was 1.4)    AST/ALT 300s  Lactate 1.3    LFTS ~10 days ago wnl    Ampicillin -> Zosyn  TLovenox in lieu of Eliquis held today (last dose 6AM)    ***      PAST MEDICAL & SURGICAL HISTORY:  HTN (hypertension)      HLD (hyperlipidemia)      DM (diabetes mellitus)      TIA (transient ischemic attack)      Prostate cancer      S/P prostatectomy      Pacemaker      H/O thyroidectomy        FAMILY HISTORY:  FHx: heart disease (Father, Mother)    [] Family history not pertinent as reviewed with the patient and family    SOCIAL HISTORY:  ***    ALLERGIES: gabapentin (Other)      HOME MEDICATIONS: ***    CURRENT MEDICATIONS  MEDICATIONS (STANDING): aspirin  chewable 81 milliGRAM(s) Oral daily  bisacodyl 5 milliGRAM(s) Oral at bedtime  cloNIDine 0.2 milliGRAM(s) Oral two times a day  dextrose 5%. 1000 milliLiter(s) IV Continuous <Continuous>  dextrose 5%. 1000 milliLiter(s) IV Continuous <Continuous>  dextrose 50% Injectable 25 Gram(s) IV Push once  dextrose 50% Injectable 12.5 Gram(s) IV Push once  dextrose 50% Injectable 25 Gram(s) IV Push once  dextrose Oral Gel 15 Gram(s) Oral once  glucagon  Injectable 1 milliGRAM(s) IntraMuscular once  hydrALAZINE 100 milliGRAM(s) Oral three times a day  insulin glargine Injectable (LANTUS) 20 Unit(s) SubCutaneous at bedtime  insulin lispro (ADMELOG) corrective regimen sliding scale   SubCutaneous three times a day before meals  insulin lispro (ADMELOG) corrective regimen sliding scale   SubCutaneous at bedtime  insulin lispro Injectable (ADMELOG) 6 Unit(s) SubCutaneous three times a day before meals  lactulose Syrup 10 Gram(s) Oral three times a day  levothyroxine 150 MICROGram(s) Oral daily  NIFEdipine XL 30 milliGRAM(s) Oral daily  piperacillin/tazobactam IVPB.. 3.375 Gram(s) IV Intermittent every 8 hours  polyethylene glycol 3350 17 Gram(s) Oral two times a day  propranolol  milliGRAM(s) Oral daily  rosuvastatin 10 milliGRAM(s) Oral at bedtime  senna 2 Tablet(s) Oral at bedtime  tamsulosin 0.4 milliGRAM(s) Oral at bedtime    MEDICATIONS (PRN):aluminum hydroxide/magnesium hydroxide/simethicone Suspension 30 milliLiter(s) Oral once PRN Dyspepsia  benzonatate 100 milliGRAM(s) Oral three times a day PRN Cough  HYDROmorphone  Injectable 0.5 milliGRAM(s) IV Push every 4 hours PRN Severe Pain (7 - 10)  oxyCODONE    IR 10 milliGRAM(s) Oral every 4 hours PRN Severe Pain (7 - 10)  oxyCODONE    IR 5 milliGRAM(s) Oral every 4 hours PRN Moderate Pain (4 - 6)    --------------------------------------------------------------------------------------------    Vitals:   T(C): 36.2 (06-04-25 @ 05:39), Max: 36.4 (06-03-25 @ 18:55)  HR: 57 (06-04-25 @ 18:25) (57 - 81)  BP: 133/45 (06-04-25 @ 18:25) (133/45 - 175/77)  RR: 16 (06-04-25 @ 18:25) (16 - 18)  SpO2: 95% (06-04-25 @ 18:25) (94% - 96%)  CAPILLARY BLOOD GLUCOSE      POCT Blood Glucose.: 175 mg/dL (04 Jun 2025 18:22)  POCT Blood Glucose.: 174 mg/dL (04 Jun 2025 12:30)  POCT Blood Glucose.: 154 mg/dL (04 Jun 2025 11:05)  POCT Blood Glucose.: 175 mg/dL (04 Jun 2025 09:02)  POCT Blood Glucose.: 93 mg/dL (04 Jun 2025 00:13)  POCT Blood Glucose.: 82 mg/dL (03 Jun 2025 22:16)  POCT Blood Glucose.: 71 mg/dL (03 Jun 2025 21:45)      06-03 @ 07:01  -  06-04 @ 07:00  --------------------------------------------------------  IN:    Oral Fluid: 100 mL  Total IN: 100 mL    OUT:    Indwelling Catheter - Urethral (mL): 950 mL  Total OUT: 950 mL    Total NET: -850 mL              PHYSICAL EXAM:  General: NAD, Lying in bed comfortably  Neuro: Alert and answering questions appropriately   HEENT: Grossly normal  Cardio: No peripheral edema  Resp: Good effort, no signs of respiratory distress  GI/Abd: Soft, non-distended, tender in RUQ, +Sam sign, midline infraumbilical scar  Vascular: All 4 extremities warm  --------------------------------------------------------------------------------------------    LABS  CBC (06-04 @ 14:14)                              9.3[L]                         21.87[H]  )----------------(  204        --    % Neutrophils, --    % Lymphocytes, ANC: --                                  29.2[L]  CBC (06-04 @ 06:50)                              9.5[L]                         18.85[H]  )----------------(  212        --    % Neutrophils, --    % Lymphocytes, ANC: --                                  29.4[L]    BMP (06-04 @ 14:14)             135     |  100     |  19    		Ca++ --      Ca 8.4                ---------------------------------( 177[H]		Mg --                 3.8     |  22      |  1.37[H]			Ph --      BMP (06-04 @ 06:50)             135     |  101     |  16    		Ca++ --      Ca 8.2[L]             ---------------------------------( 163[H]		Mg --                 3.8     |  22      |  1.30  			Ph --        LFTs (06-04 @ 14:14)      TPro 6.2 / Alb 2.8[L] / TBili 2.5[H] / DBili -- / [H] / [H] / AlkPhos 704[H]  LFTs (06-04 @ 06:50)      TPro 6.1 / Alb 2.8[L] / TBili 1.9[H] / DBili 1.4[H] / [H] / [H] / AlkPhos 731[H]        ABG (06-04 @ 14:14)      /  /  /  /  / %     Lactate:  1.3      --------------------------------------------------------------------------------------------    MICROBIOLOGY  Urinalysis (06-04 @ 14:14):     Color:  / Appearance:  / SG:  / pH:  / Gluc: 177[H] / Ketones:  / Bili:  / Urobili:  / Protein : / Nitrites:  / Leuk.Est:  / RBC:  / WBC:  / Sq Epi:  / Non Sq Epi:  / Bacteria          --------------------------------------------------------------------------------------------    IMAGING  < from: CT Abdomen and Pelvis w/ IV Cont (06.04.25 @ 16:45) >  IMPRESSION:  Very distended gallbladder with wall edema and pericholecystic fluid,   suspicious for cholecystitis.    Wedge-shaped hypodensities in the spleen and right kidney, not well   evaluated on prior imaging due to noncontrast technique. Findings   concerning for infarcts.        --- End of Report ---    < end of copied text >  < from: US Abdomen Upper Quadrant Right (06.04.25 @ 10:27) >  IMPRESSION:  Intraluminal echogenicity within the gallbladder measuring 6.7 x 3.9 cm   without color Doppler flow. Findings are suggestive of tumefactive   sludge. Further evaluation can be considered with either CT or MR abdomen.  No evidence of acute cholecystitis.        --- End of Report ---    < end of copied text >      -------------------------------------------------------------------------------------------- GENERAL SURGERY CONSULT NOTE  --------------------------------------------------------------------------------------------  HPI:  85M PMH HTN, DM2, CKD (baseline creatinine 1.5-2), TIAs (on Eliquis at home), sinus node dysfunction (s/p PPM ~1 year ago), prostate cancer s/p prostectomy (~early 2000s), total thyroidectomy (~2013) who presented to [facility] after falling and found to have lumbar decompression deformity. Transferred to [facility] as MRI machine at [place] incompatible with his PPM. 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 Since then he's had TTE/MARIA LUISA negative for vegetations and had his PPM switched to a leadless PPM.    Hypoxic on HFNC when arrived to hospital but CXR clear; VQ scan low probability for PE and has been on room air for about a week    MRI here L4/5 OM/discitis and L3/4 and L4/5 severe spinal stenosis stenosis w/ cauda equina impingement  OR tomorrow with orthopedic surgery however developed abdominal pain yesterday, located on the right side.    No recent fevers and he is normotensive, not tachycardic  On RA    Yesterday his WBC normal at 7 however today it's 21  Cr 1.37  Tbili 2.5 (fractionated this morning and direct bili was 1.4)    AST/ALT 300s  Lactate 1.3    LFTS ~10 days ago wnl    Ampicillin -> Zosyn  TLovenox in lieu of Eliquis held today (last dose 6AM)    ***      PAST MEDICAL & SURGICAL HISTORY:  HTN (hypertension)      HLD (hyperlipidemia)      DM (diabetes mellitus)      TIA (transient ischemic attack)      Prostate cancer      S/P prostatectomy      Pacemaker      H/O thyroidectomy        FAMILY HISTORY:  FHx: heart disease (Father, Mother)      ALLERGIES: gabapentin (Other)        CURRENT MEDICATIONS  MEDICATIONS (STANDING): aspirin  chewable 81 milliGRAM(s) Oral daily  bisacodyl 5 milliGRAM(s) Oral at bedtime  cloNIDine 0.2 milliGRAM(s) Oral two times a day  dextrose 5%. 1000 milliLiter(s) IV Continuous <Continuous>  dextrose 5%. 1000 milliLiter(s) IV Continuous <Continuous>  dextrose 50% Injectable 25 Gram(s) IV Push once  dextrose 50% Injectable 12.5 Gram(s) IV Push once  dextrose 50% Injectable 25 Gram(s) IV Push once  dextrose Oral Gel 15 Gram(s) Oral once  glucagon  Injectable 1 milliGRAM(s) IntraMuscular once  hydrALAZINE 100 milliGRAM(s) Oral three times a day  insulin glargine Injectable (LANTUS) 20 Unit(s) SubCutaneous at bedtime  insulin lispro (ADMELOG) corrective regimen sliding scale   SubCutaneous three times a day before meals  insulin lispro (ADMELOG) corrective regimen sliding scale   SubCutaneous at bedtime  insulin lispro Injectable (ADMELOG) 6 Unit(s) SubCutaneous three times a day before meals  lactulose Syrup 10 Gram(s) Oral three times a day  levothyroxine 150 MICROGram(s) Oral daily  NIFEdipine XL 30 milliGRAM(s) Oral daily  piperacillin/tazobactam IVPB.. 3.375 Gram(s) IV Intermittent every 8 hours  polyethylene glycol 3350 17 Gram(s) Oral two times a day  propranolol  milliGRAM(s) Oral daily  rosuvastatin 10 milliGRAM(s) Oral at bedtime  senna 2 Tablet(s) Oral at bedtime  tamsulosin 0.4 milliGRAM(s) Oral at bedtime    MEDICATIONS (PRN):aluminum hydroxide/magnesium hydroxide/simethicone Suspension 30 milliLiter(s) Oral once PRN Dyspepsia  benzonatate 100 milliGRAM(s) Oral three times a day PRN Cough  HYDROmorphone  Injectable 0.5 milliGRAM(s) IV Push every 4 hours PRN Severe Pain (7 - 10)  oxyCODONE    IR 10 milliGRAM(s) Oral every 4 hours PRN Severe Pain (7 - 10)  oxyCODONE    IR 5 milliGRAM(s) Oral every 4 hours PRN Moderate Pain (4 - 6)    --------------------------------------------------------------------------------------------    Vitals:   T(C): 36.2 (06-04-25 @ 05:39), Max: 36.4 (06-03-25 @ 18:55)  HR: 57 (06-04-25 @ 18:25) (57 - 81)  BP: 133/45 (06-04-25 @ 18:25) (133/45 - 175/77)  RR: 16 (06-04-25 @ 18:25) (16 - 18)  SpO2: 95% (06-04-25 @ 18:25) (94% - 96%)  CAPILLARY BLOOD GLUCOSE      POCT Blood Glucose.: 175 mg/dL (04 Jun 2025 18:22)  POCT Blood Glucose.: 174 mg/dL (04 Jun 2025 12:30)  POCT Blood Glucose.: 154 mg/dL (04 Jun 2025 11:05)  POCT Blood Glucose.: 175 mg/dL (04 Jun 2025 09:02)  POCT Blood Glucose.: 93 mg/dL (04 Jun 2025 00:13)  POCT Blood Glucose.: 82 mg/dL (03 Jun 2025 22:16)  POCT Blood Glucose.: 71 mg/dL (03 Jun 2025 21:45)      06-03 @ 07:01  -  06-04 @ 07:00  --------------------------------------------------------  IN:    Oral Fluid: 100 mL  Total IN: 100 mL    OUT:    Indwelling Catheter - Urethral (mL): 950 mL  Total OUT: 950 mL    Total NET: -850 mL              PHYSICAL EXAM:  General: NAD, Lying in bed comfortably  Neuro: Alert and answering questions appropriately   HEENT: Grossly normal  Cardio: No peripheral edema  Resp: Good effort, no signs of respiratory distress  GI/Abd: Soft, non-distended, tender in RUQ, +Sam sign, midline infraumbilical scar  Vascular: All 4 extremities warm  --------------------------------------------------------------------------------------------    LABS  CBC (06-04 @ 14:14)                              9.3[L]                         21.87[H]  )----------------(  204        --    % Neutrophils, --    % Lymphocytes, ANC: --                                  29.2[L]  CBC (06-04 @ 06:50)                              9.5[L]                         18.85[H]  )----------------(  212        --    % Neutrophils, --    % Lymphocytes, ANC: --                                  29.4[L]    BMP (06-04 @ 14:14)             135     |  100     |  19    		Ca++ --      Ca 8.4                ---------------------------------( 177[H]		Mg --                 3.8     |  22      |  1.37[H]			Ph --      BMP (06-04 @ 06:50)             135     |  101     |  16    		Ca++ --      Ca 8.2[L]             ---------------------------------( 163[H]		Mg --                 3.8     |  22      |  1.30  			Ph --        LFTs (06-04 @ 14:14)      TPro 6.2 / Alb 2.8[L] / TBili 2.5[H] / DBili -- / [H] / [H] / AlkPhos 704[H]  LFTs (06-04 @ 06:50)      TPro 6.1 / Alb 2.8[L] / TBili 1.9[H] / DBili 1.4[H] / [H] / [H] / AlkPhos 731[H]        ABG (06-04 @ 14:14)      /  /  /  /  / %     Lactate:  1.3      --------------------------------------------------------------------------------------------    MICROBIOLOGY  Urinalysis (06-04 @ 14:14):     Color:  / Appearance:  / SG:  / pH:  / Gluc: 177[H] / Ketones:  / Bili:  / Urobili:  / Protein : / Nitrites:  / Leuk.Est:  / RBC:  / WBC:  / Sq Epi:  / Non Sq Epi:  / Bacteria          --------------------------------------------------------------------------------------------    IMAGING  < from: CT Abdomen and Pelvis w/ IV Cont (06.04.25 @ 16:45) >  IMPRESSION:  Very distended gallbladder with wall edema and pericholecystic fluid,   suspicious for cholecystitis.    Wedge-shaped hypodensities in the spleen and right kidney, not well   evaluated on prior imaging due to noncontrast technique. Findings   concerning for infarcts.        --- End of Report ---    < end of copied text >  < from: US Abdomen Upper Quadrant Right (06.04.25 @ 10:27) >  IMPRESSION:  Intraluminal echogenicity within the gallbladder measuring 6.7 x 3.9 cm   without color Doppler flow. Findings are suggestive of tumefactive   sludge. Further evaluation can be considered with either CT or MR abdomen.  No evidence of acute cholecystitis.        --- End of Report ---    < end of copied text >      --------------------------------------------------------------------------------------------

## 2025-06-04 NOTE — PROGRESS NOTE ADULT - SUBJECTIVE AND OBJECTIVE BOX
[facility] Attending Follow-up Note     Patient seen and examined at bedside. 6/4/2025    Overnight Events:     events noted     REVIEW OF SYSTEMS:  Constitutional:     [x ] negative [ ] fevers [ ] chills [ ] weight loss [ ] weight gain  HEENT:                  [x ] negative [ ] dry eyes [ ] eye irritation [ ] postnasal drip [ ] nasal congestion  CV:                         [ x] negative  [ ] chest pain [ ] orthopnea [ ] palpitations [ ] murmur  Resp:                     [ x] negative [ ] cough [ ] shortness of breath [ ] dyspnea [ ] wheezing [ ] sputum [ ]hemoptysis  GI:                          [ x] negative [ ] nausea [ ] vomiting [ ] diarrhea [ ] constipation [ ] abd pain [ ] dysphagia   :                        [ x] negative [ ] dysuria [ ] nocturia [ ] hematuria [ ] increased urinary frequency  Musculoskeletal: [ x] negative [ ] back pain [ ] myalgias [ ] arthralgias [ ] fracture  Skin:                       [ x] negative [ ] rash [ ] itch  Neurological:        [x ] negative [ ] headache [ ] dizziness [ ] syncope [ ] weakness [ ] numbness  Psychiatric:           [ x] negative [ ] anxiety [ ] depression  Endocrine:            [ x] negative [ ] diabetes [ ] thyroid problem  Heme/Lymph:      [ x] negative [ ] anemia [ ] bleeding problem  Allergic/Immune: [ x] negative [ ] itchy eyes [ ] nasal discharge [ ] hives [ ] angioedema    [ x] All other systems negative  [ ] Unable to assess ROS due to    Current Meds:  albuterol/ipratropium for Nebulization 3 milliLiter(s) Nebulizer every 6 hours PRN  aspirin  chewable 81 milliGRAM(s) Oral daily  chlorhexidine 2% Cloths 1 Application(s) Topical daily  cloNIDine Patch 0.3 mG/24Hr(s) 1 patch Transdermal every 7 days  dextrose 5% + sodium chloride 0.45%. 1000 milliLiter(s) IV Continuous <Continuous>  heparin   Injectable 5000 Unit(s) SubCutaneous every 8 hours  hydrALAZINE Injectable 20 milliGRAM(s) IV Push every 4 hours  HYDROmorphone  Injectable 0.5 milliGRAM(s) IV Push every 4 hours PRN  HYDROmorphone  Injectable 1 milliGRAM(s) IV Push every 4 hours PRN  insulin glargine Injectable (LANTUS) 13 Unit(s) SubCutaneous at bedtime  insulin lispro (ADMELOG) corrective regimen sliding scale   SubCutaneous every 6 hours  labetalol Injectable 20 milliGRAM(s) IV Push every 4 hours  levothyroxine Injectable 112 MICROGram(s) IV Push at bedtime  lidocaine   4% Patch 1 Patch Transdermal daily  pantoprazole  Injectable 40 milliGRAM(s) IV Push daily  piperacillin/tazobactam IVPB.. 3.375 Gram(s) IV Intermittent every 8 hours  povidone iodine 10% Nasal Swab 1 Application(s) Both Nostrils once  sodium chloride 3%  Inhalation 4 milliLiter(s) Inhalation every 12 hours      PAST MEDICAL & SURGICAL HISTORY:  HTN (hypertension)      HLD (hyperlipidemia)      DM (diabetes mellitus)      TIA (transient ischemic attack)      Prostate cancer      S/P prostatectomy      Pacemaker      H/O thyroidectomy          Vitals:  T(F): 96.9 (06-08), Max: 97.3 (06-08)  HR: 62 (06-08) (57 - 83)  BP: 173/62 (06-08) (173/62 - 214/69)  RR: 19 (06-08)  SpO2: 98% (06-08)  I&O's Summary    07 Jun 2025 07:01  -  08 Jun 2025 07:00  --------------------------------------------------------  IN: 3090 mL / OUT: 3850 mL / NET: -760 mL    08 Jun 2025 07:01  -  08 Jun 2025 17:38  --------------------------------------------------------  IN: 470 mL / OUT: 1000 mL / NET: -530 mL        Physical Exam:  Appearance: No acute distress  HENT: No JVD   Cardiovascular: RRR, S1/S2, no murmurs  Respiratory: CTABL  Gastrointestinal: soft, NT ND, +BS  Musculoskeletal: No clubbing, no edema   Neurologic: Non-focal  Skin: No rashes, ecchymoses, or cyanosis                          9.5    8.99  )-----------( 206      ( 08 Jun 2025 00:45 )             27.8     06-08    133[L]  |  96[L]  |  43[H]  ----------------------------<  216[H]  3.3[L]   |  23  |  2.88[H]    Ca    7.2[L]      08 Jun 2025 00:45  Phos  3.7     06-08  Mg     2.20     06-08    TPro  5.4[L]  /  Alb  2.7[L]  /  TBili  0.4  /  DBili  x   /  AST  27  /  ALT  81[H]  /  AlkPhos  306[H]  06-08                  Cardiovascular Testings:

## 2025-06-04 NOTE — PROGRESS NOTE ADULT - ATTENDING COMMENTS
85M w/ hypertension, type 2 diabetes (A1c 6.9%), CKD (b/l Cr 1.5-2), prostate cancer s/p prostatectomy, h/o strokes (on apixaban), sinus node dysfunction s/p PPM presented to [facility] with acute onset lower back pain and bilateral leg weakness (R>L), found to have imaging with lumbar compression deformities and high grade E faecalis bacteremia, now s/p PPM extraction and replacement with leadless PPM, negative TTE and MARIA LUISA for vegetation, and cleared cultures, course c/b persistent and severe back pain, lower extremity weakness, and urinary retention. MRI T/L spine incomplete (could not tolerate due to pain despite performing with anesthesia) but showed likely L4/5 OM/discitis and L3/4 and L4/5 severe spinal stenosis stenosis w/ cauda equina impingement - ortho planning for laminectomy/fusion 6/5, but course c/b new RUQ pain, leukocytosis, LFT abnormalities c/f acute cholecystitis (although RUQ u/s negative) or other acute intra-abdominal pathology.    - CT A/P  - Surgery consulted  - Broaden abx to Zosyn  - Trend LFTs and WBC count, check lipase  - Ortho and ID following  - Plan for OR with ortho  - On lovenox in place of DOAC, will hold in advance of OR once there is date planned  - Continue Frye for urinary retention  - Continue current pain management  - Intensify bowel regimen given constipation    Discussed with patient and his daughter at bedside    Time-based billing (NON-critical care).   35 minutes spent on total encounter, which excludes teaching and separately reported services. The necessity of the time spent during the encounter on this date of service was due to:   Documentation in [place], reviewing chart and coordinating care with patient/resident and interdisciplinary staff (such as , social workers, etc) as well as reviewing vitals, laboratory data, radiology, medication list, consultants' recommendations and prior records. Interventions were performed as documented above. 85M w/ hypertension, type 2 diabetes (A1c 6.9%), CKD (b/l Cr 1.5-2), prostate cancer s/p prostatectomy, h/o strokes (on apixaban), sinus node dysfunction s/p PPM presented to [facility] with acute onset lower back pain and bilateral leg weakness (R>L), found to have imaging with lumbar compression deformities and high grade E faecalis bacteremia, now s/p PPM extraction and replacement with leadless PPM, negative TTE and MARIA LUISA for vegetation, and cleared cultures, course c/b persistent and severe back pain, lower extremity weakness, and urinary retention. MRI T/L spine incomplete (could not tolerate due to pain despite performing with anesthesia) but showed likely L4/5 OM/discitis and L3/4 and L4/5 severe spinal stenosis stenosis w/ cauda equina impingement - ortho planning for laminectomy/fusion 6/5, but course c/b new RUQ pain, leukocytosis, LFT abnormalities c/f acute cholecystitis (although RUQ u/s negative) or other acute intra-abdominal pathology.    - CT A/P  - Broaden abx to Zosyn  - Trend LFTs and WBC count, check lipase, lactate, blood cultures  - Consider surgery consult pending CT result  - Ortho and ID following  - Plan for OR with ortho  - On lovenox in place of DOAC, will hold in advance of OR once there is date planned  - Continue Frye for urinary retention  - Continue current pain management  - Intensify bowel regimen given constipation    Discussed with patient and his daughter at bedside    Time-based billing (NON-critical care).   35 minutes spent on total encounter, which excludes teaching and separately reported services. The necessity of the time spent during the encounter on this date of service was due to:   Documentation in [place], reviewing chart and coordinating care with patient/resident and interdisciplinary staff (such as , social workers, etc) as well as reviewing vitals, laboratory data, radiology, medication list, consultants' recommendations and prior records. Interventions were performed as documented above.

## 2025-06-05 ENCOUNTER — TRANSCRIPTION ENCOUNTER (OUTPATIENT)
Age: 85
End: 2025-06-05

## 2025-06-05 DIAGNOSIS — K81.0 ACUTE CHOLECYSTITIS: ICD-10-CM

## 2025-06-05 DIAGNOSIS — R93.89 ABNORMAL FINDINGS ON DIAGNOSTIC IMAGING OF OTHER SPECIFIED BODY STRUCTURES: ICD-10-CM

## 2025-06-05 DIAGNOSIS — K59.00 CONSTIPATION, UNSPECIFIED: ICD-10-CM

## 2025-06-05 LAB
BLD GP AB SCN SERPL QL: NEGATIVE — SIGNIFICANT CHANGE UP
BLOOD GAS ARTERIAL - LYTES,HGB,ICA,LACT RESULT: SIGNIFICANT CHANGE UP
GLUCOSE BLDC GLUCOMTR-MCNC: 118 MG/DL — HIGH (ref 70–99)
GLUCOSE BLDC GLUCOMTR-MCNC: 127 MG/DL — HIGH (ref 70–99)
GLUCOSE BLDC GLUCOMTR-MCNC: 154 MG/DL — HIGH (ref 70–99)
GLUCOSE BLDC GLUCOMTR-MCNC: 156 MG/DL — HIGH (ref 70–99)
GLUCOSE BLDC GLUCOMTR-MCNC: 191 MG/DL — HIGH (ref 70–99)
GLUCOSE BLDC GLUCOMTR-MCNC: 197 MG/DL — HIGH (ref 70–99)
GLUCOSE BLDC GLUCOMTR-MCNC: 91 MG/DL — SIGNIFICANT CHANGE UP (ref 70–99)
HCT VFR BLD CALC: 27.3 % — LOW (ref 39–50)
HGB BLD-MCNC: 8.6 G/DL — LOW (ref 13–17)
MCHC RBC-ENTMCNC: 28.2 PG — SIGNIFICANT CHANGE UP (ref 27–34)
MCHC RBC-ENTMCNC: 31.5 G/DL — LOW (ref 32–36)
MCV RBC AUTO: 89.5 FL — SIGNIFICANT CHANGE UP (ref 80–100)
NRBC # BLD AUTO: 0 K/UL — SIGNIFICANT CHANGE UP (ref 0–0)
NRBC # FLD: 0 K/UL — SIGNIFICANT CHANGE UP (ref 0–0)
NRBC BLD AUTO-RTO: 0 /100 WBCS — SIGNIFICANT CHANGE UP (ref 0–0)
PLATELET # BLD AUTO: 189 K/UL — SIGNIFICANT CHANGE UP (ref 150–400)
RBC # BLD: 3.05 M/UL — LOW (ref 4.2–5.8)
RBC # FLD: 17.4 % — HIGH (ref 10.3–14.5)
RH IG SCN BLD-IMP: POSITIVE — SIGNIFICANT CHANGE UP
WBC # BLD: 17.71 K/UL — HIGH (ref 3.8–10.5)
WBC # FLD AUTO: 17.71 K/UL — HIGH (ref 3.8–10.5)

## 2025-06-05 PROCEDURE — 99232 SBSQ HOSP IP/OBS MODERATE 35: CPT | Mod: GC

## 2025-06-05 PROCEDURE — G0545: CPT

## 2025-06-05 PROCEDURE — 99232 SBSQ HOSP IP/OBS MODERATE 35: CPT

## 2025-06-05 RX ORDER — SODIUM CHLORIDE 9 G/1000ML
1000 INJECTION, SOLUTION INTRAVENOUS ONCE
Refills: 0 | Status: COMPLETED | OUTPATIENT
Start: 2025-06-05 | End: 2025-06-05

## 2025-06-05 RX ORDER — HYDROMORPHONE/SOD CHLOR,ISO/PF 2 MG/10 ML
0.5 SYRINGE (ML) INJECTION
Refills: 0 | Status: DISCONTINUED | OUTPATIENT
Start: 2025-06-05 | End: 2025-06-05

## 2025-06-05 RX ORDER — BISACODYL 5 MG
10 TABLET, DELAYED RELEASE (ENTERIC COATED) ORAL DAILY
Refills: 0 | Status: DISCONTINUED | OUTPATIENT
Start: 2025-06-05 | End: 2025-06-06

## 2025-06-05 RX ORDER — FENTANYL CITRATE-0.9 % NACL/PF 100MCG/2ML
25 SYRINGE (ML) INTRAVENOUS
Refills: 0 | Status: DISCONTINUED | OUTPATIENT
Start: 2025-06-05 | End: 2025-06-05

## 2025-06-05 RX ORDER — MAGNESIUM HYDROXIDE 400 MG/5ML
30 SUSPENSION ORAL DAILY
Refills: 0 | Status: DISCONTINUED | OUTPATIENT
Start: 2025-06-05 | End: 2025-06-06

## 2025-06-05 RX ADMIN — OXYCODONE HYDROCHLORIDE 10 MILLIGRAM(S): 30 TABLET ORAL at 12:31

## 2025-06-05 RX ADMIN — Medication 81 MILLIGRAM(S): at 12:32

## 2025-06-05 RX ADMIN — Medication 25 GRAM(S): at 22:28

## 2025-06-05 RX ADMIN — INSULIN LISPRO 6 UNIT(S): 100 INJECTION, SOLUTION INTRAVENOUS; SUBCUTANEOUS at 13:06

## 2025-06-05 RX ADMIN — INSULIN LISPRO 1: 100 INJECTION, SOLUTION INTRAVENOUS; SUBCUTANEOUS at 09:29

## 2025-06-05 RX ADMIN — INSULIN GLARGINE-YFGN 20 UNIT(S): 100 INJECTION, SOLUTION SUBCUTANEOUS at 22:52

## 2025-06-05 RX ADMIN — Medication 100 MILLIGRAM(S): at 13:08

## 2025-06-05 RX ADMIN — Medication 120 MILLIGRAM(S): at 07:25

## 2025-06-05 RX ADMIN — ROSUVASTATIN CALCIUM 10 MILLIGRAM(S): 5 TABLET, FILM COATED ORAL at 22:28

## 2025-06-05 RX ADMIN — INSULIN LISPRO 6 UNIT(S): 100 INJECTION, SOLUTION INTRAVENOUS; SUBCUTANEOUS at 18:29

## 2025-06-05 RX ADMIN — LIDOCAINE HYDROCHLORIDE 1 PATCH: 20 JELLY TOPICAL at 12:33

## 2025-06-05 RX ADMIN — OXYCODONE HYDROCHLORIDE 10 MILLIGRAM(S): 30 TABLET ORAL at 13:30

## 2025-06-05 RX ADMIN — Medication 0.2 MILLIGRAM(S): at 18:28

## 2025-06-05 RX ADMIN — Medication 2 TABLET(S): at 22:28

## 2025-06-05 RX ADMIN — SODIUM CHLORIDE 1000 MILLILITER(S): 9 INJECTION, SOLUTION INTRAVENOUS at 22:49

## 2025-06-05 RX ADMIN — Medication 25 GRAM(S): at 07:19

## 2025-06-05 RX ADMIN — INSULIN LISPRO 6 UNIT(S): 100 INJECTION, SOLUTION INTRAVENOUS; SUBCUTANEOUS at 09:29

## 2025-06-05 RX ADMIN — Medication 150 MICROGRAM(S): at 07:25

## 2025-06-05 RX ADMIN — INSULIN LISPRO 1: 100 INJECTION, SOLUTION INTRAVENOUS; SUBCUTANEOUS at 13:07

## 2025-06-05 RX ADMIN — Medication 0.2 MILLIGRAM(S): at 07:23

## 2025-06-05 RX ADMIN — Medication 25 GRAM(S): at 14:42

## 2025-06-05 RX ADMIN — Medication 1 APPLICATION(S): at 12:35

## 2025-06-05 RX ADMIN — Medication 100 MILLIGRAM(S): at 07:23

## 2025-06-05 RX ADMIN — Medication 30 MILLIGRAM(S): at 07:24

## 2025-06-05 RX ADMIN — TAMSULOSIN HYDROCHLORIDE 0.4 MILLIGRAM(S): 0.4 CAPSULE ORAL at 22:28

## 2025-06-05 NOTE — PROGRESS NOTE ADULT - PROBLEM SELECTOR PLAN 6
Stable   - Notable small hematoma at R groin site on 5/22, CTM LUE swelling, nursing examined IV in LUE and it is functioning appropriately low c/f infiltration. Of note, pt is s/p PPM extraction but no fluctuance or edema at PPM site. Neurovascularly intact.  - LUE duplex showing superficial clot  - Edema is gravity sensitive and resolves with elevation, very unlikely related to superficial clot  Plan:  - Elevate arm as much as possible

## 2025-06-05 NOTE — PROGRESS NOTE ADULT - ATTENDING COMMENTS
85M w/ hypertension, type 2 diabetes (A1c 6.9%), CKD (b/l Cr 1.5-2), prostate cancer s/p prostatectomy, h/o strokes (on apixaban, though no clear diagnosis of AF), sinus node dysfunction s/p PPM presented to [facility] with acute onset lower back pain and bilateral leg weakness (R>L), found to have imaging with lumbar compression deformities and high grade E faecalis bacteremia, now s/p PPM extraction/replacement with leadless PPM, negative TTE and MARIA LUISA for vegetation, and cleared cultures, course c/b persistent and severe back pain, lower extremity weakness, and urinary retention. MRI T/L spine with likely L4/5 OM/discitis and L3/4 and L4/5 severe spinal stenosis w/ cauda equina impingement - ortho was planning for laminectomy/fusion 6/5, but course c/b acute acalculous cholecystitis on 6/4 s/p robotic cholecystectomy, now stable and ortho planning for new OR date.    Reviewed CT A/P from yesterday with cholecystitis and wedge-shaped hypodensities in the spleen and R concerning for infarcts (unclear if new or old due to prior study non-contrast)    - Surgery following for post-cholecystitis  - ID following for enterococcal bacteremia w/ likely OM/discitis spine and cholecystitis  - Ortho following for likely OM/discitis spine and severe spinal stenosis w/ cauda equina impingement  - Trend LFTs/WBC count  - Continue Zosyn x5 day course from [facility], then narrow back to ampicillin for enterococcal bacteremia with likely OM/discitis of spine  - Plan for OR with ortho, likely early next week  - Therapeutic lovenox held for [facility], d/w surgery when safe to resume, will need to hold again in advance of OR with ortho; holding apixaban given surgical plans (indication: ?prior strokes w/out clear history of AF as well as now splenic/renal infarcts seen on imaging - possibly in setting of holding a/c earlier this hospitalization, MARIA LUISA was neg so less likely septic emboli)  - Continue Frye for urinary retention  - Continue current pain management  - Constipation - now having BMs, adjust bowel regimen per patient preferences    Discussed with patient and his daughter at bedside    Time-based billing (NON-critical care).   35 minutes spent on total encounter, which excludes teaching and separately reported services. The necessity of the time spent during the encounter on this date of service was due to:   Documentation in [place], reviewing chart and coordinating care with patient/resident and interdisciplinary staff (such as , social workers, etc) as well as reviewing vitals, laboratory data, radiology, medication list, consultants' recommendations and prior records. Interventions were performed as documented above.

## 2025-06-05 NOTE — PROGRESS NOTE ADULT - PROBLEM SELECTOR PLAN 1
Stable, severe  - CT Lumbar Spine: (5/6/25): L3-L4 disc bulge, L4-L5 left paracentral-foraminal disc protrusion, multiple mild lumbar vertebral body compression deformities  . L3-L4 disc bulge, resulting in severe central canal, bilateral lateral recess and moderate bilateral neural foramen stenosis with facet arthrosis and ligamentum flavum hypertrophy.  - MRI attempted 5/13 to r/o cauda equina and malignant disease, gely with history of prostate cancer, however not tolerated due to loudness of machine and pain. Pt does not want to go into MRI machine again. Plan for MRI with general anesthesia   - MRI T+L Spine attempted under conscious sedation with anesthesia on 5/29 however aborted early due to patient tolerability for similar reasons  - MRI (5/29): Limited study but available results showing findings suspicious for OM and discitis at L4 and L5 levels, as well as severe stenosis of L3-L4 and L4-L5 levels with cauda equina impingement  Plan:  - Ortho planning for surgical intervention on 6/5  - Patient and daughter not amenable to any further imaging  - TLSO brace  - Pain Control: Oxy 5 q6h PRN moderate, Oxy 10 q6h severe, Dilaudid 0.5mg IV q4h for breakthrough - 6/4: Severe RUQ pain, (+) Sam sign  - TBili: 1.9 (6/4) ->   - AST/ALT: 500/453 (6/4) ->  - RUQ US (6/4): Intraluminal echogenicity within the gallbladder measuring 6.7 x 3.9 cm without color Doppler flow. Findings are suggestive of tumefactive sludge. No evidence of acute cholecystitis.  - CT AP (6/4): Very distended gallbladder with wall edema and pericholecystic fluid, suspicious for cholecystitis.  - s/p robotic cholecystectomy overnight 6/4, no complications  Plan:  - Continue Pip/Tazo 3.375g q8h through 6/9  - Resume AC per surgery, re-evaluate 6/6  - Resume Ampicillin 2g q4h on 6/10 for E faecalis bacteremia

## 2025-06-05 NOTE — PROGRESS NOTE ADULT - ASSESSMENT
85 year old male with 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 After family discussions they wish to proceed with surgery.     Plan:  -OR for L3-5 lami/fusion to be rescheduled, new date pending  -med and cards cleared, appreciate care  -will need to hold Tlovenox prior to OR if restarted  -WBAT  -pain control prn  -bowel reg prn  -s/p lap geetha per gen surg  -antibiotics per ID - zosyn   -continue PT/OT  -dispo pending    Jake Leal PGY1  Orthopedic Surgery  [facility] [id number]  [facility]        [id number]  [facility]  [phone]

## 2025-06-05 NOTE — PROGRESS NOTE ADULT - PROBLEM SELECTOR PLAN 2
- BCx positive for E faecalis . Only 1 bottle was sent and is positive -> repeat Bcx 5/15 negative   - s/p removal of PPM on 5/16 and exchange to leadless pacemaker with EP   - TTE ordered -> EF 64% mild LV systolic dysfunction  - MARIA LUISA neg for endocarditis  - s/p CTX  - ID following, recs included below  Plan:  - Continue Ampicillin 2g q4h Stable, severe  - CT Lumbar Spine: (5/6/25): L3-L4 disc bulge, L4-L5 left paracentral-foraminal disc protrusion, multiple mild lumbar vertebral body compression deformities  . L3-L4 disc bulge, resulting in severe central canal, bilateral lateral recess and moderate bilateral neural foramen stenosis with facet arthrosis and ligamentum flavum hypertrophy.  - MRI attempted 5/13 to r/o cauda equina and malignant disease, gely with history of prostate cancer, however not tolerated due to loudness of machine and pain. Pt does not want to go into MRI machine again. Plan for MRI with general anesthesia   - MRI T+L Spine attempted under conscious sedation with anesthesia on 5/29 however aborted early due to patient tolerability for similar reasons  - MRI (5/29): Limited study but available results showing findings suspicious for OM and discitis at L4 and L5 levels, as well as severe stenosis of L3-L4 and L4-L5 levels with cauda equina impingement  - Patient and daughter not amenable to any further imaging  Plan:  - Ortho planning for surgical intervention, pending rescheduling  - TLSO brace  - Pain Control: Oxy 5 q6h PRN moderate, Oxy 10 q6h severe, Dilaudid 0.5mg IV q4h for breakthrough

## 2025-06-05 NOTE — PROGRESS NOTE ADULT - PROBLEM SELECTOR PLAN 8
#HTN     - Has labile blood pressures that are high in the afternoon and low in the morning  - Continue Home Hydralazine 100mg tid  - Continue Home Clonidine .2mg tid -> decrease to 0.1mg for hypotension  - Was placed on nifedipine 30mg QD in the hospital while holding nephrotoxic meds  - Hold Telmisartan 40mg qd in setting of JEROME   - Hold Chlorthalidone 25mg qd in setting of JEROME  - On 5/17, pt intermittently nauseous and unable to tolerate PO, requiring IV 10 labetalol PRN -> CT head ordered to r/o bleed -> Pt improved, no CT head needed - lantus 20u qhs, humalog 6u tid with meals, low ISS

## 2025-06-05 NOTE — PROGRESS NOTE ADULT - SUBJECTIVE AND OBJECTIVE BOX
PROGRESS NOTE:     Patient is a 85y old  Male who presents with a chief complaint of back pain (05 Jun 2025 06:33)      INTERVAL HISTORY: NAEO. VSS. ROS negative.    MEDICATIONS  (STANDING):  aspirin  chewable 81 milliGRAM(s) Oral daily  bisacodyl 5 milliGRAM(s) Oral at bedtime  chlorhexidine 2% Cloths 1 Application(s) Topical daily  cloNIDine 0.2 milliGRAM(s) Oral two times a day  dextrose 5%. 1000 milliLiter(s) (100 mL/Hr) IV Continuous <Continuous>  dextrose 5%. 1000 milliLiter(s) (50 mL/Hr) IV Continuous <Continuous>  dextrose 50% Injectable 25 Gram(s) IV Push once  dextrose 50% Injectable 12.5 Gram(s) IV Push once  dextrose 50% Injectable 25 Gram(s) IV Push once  dextrose Oral Gel 15 Gram(s) Oral once  glucagon  Injectable 1 milliGRAM(s) IntraMuscular once  hydrALAZINE 100 milliGRAM(s) Oral three times a day  insulin glargine Injectable (LANTUS) 20 Unit(s) SubCutaneous at bedtime  insulin lispro (ADMELOG) corrective regimen sliding scale   SubCutaneous three times a day before meals  insulin lispro (ADMELOG) corrective regimen sliding scale   SubCutaneous at bedtime  insulin lispro Injectable (ADMELOG) 6 Unit(s) SubCutaneous three times a day before meals  lactulose Syrup 10 Gram(s) Oral three times a day  levothyroxine 150 MICROGram(s) Oral daily  lidocaine   4% Patch 1 Patch Transdermal daily  NIFEdipine XL 30 milliGRAM(s) Oral daily  piperacillin/tazobactam IVPB.. 3.375 Gram(s) IV Intermittent every 8 hours  polyethylene glycol 3350 17 Gram(s) Oral two times a day  povidone iodine 10% Nasal Swab 1 Application(s) Both Nostrils once  propranolol  milliGRAM(s) Oral daily  rosuvastatin 10 milliGRAM(s) Oral at bedtime  senna 2 Tablet(s) Oral at bedtime  tamsulosin 0.4 milliGRAM(s) Oral at bedtime    MEDICATIONS  (PRN):  aluminum hydroxide/magnesium hydroxide/simethicone Suspension 30 milliLiter(s) Oral once PRN Dyspepsia  benzonatate 100 milliGRAM(s) Oral three times a day PRN Cough  HYDROmorphone  Injectable 0.5 milliGRAM(s) IV Push every 4 hours PRN Severe Pain (7 - 10)  oxyCODONE    IR 10 milliGRAM(s) Oral every 4 hours PRN Severe Pain (7 - 10)  oxyCODONE    IR 5 milliGRAM(s) Oral every 4 hours PRN Moderate Pain (4 - 6)      CAPILLARY BLOOD GLUCOSE      POCT Blood Glucose.: 197 mg/dL (05 Jun 2025 05:21)  POCT Blood Glucose.: 156 mg/dL (05 Jun 2025 03:37)  POCT Blood Glucose.: 166 mg/dL (04 Jun 2025 22:52)  POCT Blood Glucose.: 167 mg/dL (04 Jun 2025 21:01)  POCT Blood Glucose.: 175 mg/dL (04 Jun 2025 18:22)  POCT Blood Glucose.: 174 mg/dL (04 Jun 2025 12:30)  POCT Blood Glucose.: 154 mg/dL (04 Jun 2025 11:05)  POCT Blood Glucose.: 175 mg/dL (04 Jun 2025 09:02)    I&O's Summary    04 Jun 2025 07:01  -  05 Jun 2025 07:00  --------------------------------------------------------  IN: 0 mL / OUT: 280 mL / NET: -280 mL        PHYSICAL EXAM:  Vital Signs Last 24 Hrs  T(C): 36.9 (05 Jun 2025 04:45), Max: 36.9 (05 Jun 2025 04:45)  T(F): 98.4 (05 Jun 2025 04:45), Max: 98.4 (05 Jun 2025 04:45)  HR: 82 (05 Jun 2025 05:00) (57 - 83)  BP: 133/48 (05 Jun 2025 05:00) (107/67 - 175/77)  BP(mean): 75 (05 Jun 2025 05:00) (72 - 99)  RR: 16 (05 Jun 2025 05:00) (15 - 20)  SpO2: 93% (05 Jun 2025 05:00) (93% - 100%)    Parameters below as of 05 Jun 2025 05:00  Patient On (Oxygen Delivery Method): room air        CONSTITUTIONAL: NAD, well-developed  HEENT:   RESPIRATORY: Normal respiratory effort; lungs are clear to auscultation bilaterally  CARDIOVASCULAR: Regular rate and rhythm, normal S1 and S2, no murmur/rub/gallop; No lower extremity edema; Peripheral pulses are 2+ bilaterally  ABDOMEN: Nontender to palpation, normoactive bowel sounds, no rebound/guarding; No hepatosplenomegaly  MUSCULOSKELETAL: no clubbing or cyanosis of digits; no joint swelling or tenderness to palpation  PSYCH: A+O to person, place, and time; affect appropriate    LABS:                        9.3    21.87 )-----------( 204      ( 04 Jun 2025 14:14 )             29.2     06-04    135  |  100  |  19  ----------------------------<  177[H]  3.8   |  22  |  1.37[H]    Ca    8.4      04 Jun 2025 14:14    TPro  6.2  /  Alb  2.8[L]  /  TBili  2.5[H]  /  DBili  x   /  AST  328[H]  /  ALT  378[H]  /  AlkPhos  704[H]  06-04    PT/INR - ( 04 Jun 2025 18:54 )   PT: 13.2 sec;   INR: 1.11 ratio         PTT - ( 04 Jun 2025 18:54 )  PTT:36.0 sec      Urinalysis Basic - ( 04 Jun 2025 14:14 )    Color: x / Appearance: x / SG: x / pH: x  Gluc: 177 mg/dL / Ketone: x  / Bili: x / Urobili: x   Blood: x / Protein: x / Nitrite: x   Leuk Esterase: x / RBC: x / WBC x   Sq Epi: x / Non Sq Epi: x / Bacteria: x          RADIOLOGY:        ******************************  Authored By: Jacinto Rodriguez MD PGY1  Internal Medicine  MS Teams  ******************************   PROGRESS NOTE:     Patient is a 85y old  Male who presents with a chief complaint of back pain (05 Jun 2025 06:33)      INTERVAL HISTORY: CT last night showed cholecystitis. Went for robotic cholecystectomy overnight. No surgical complications no back on floor feeling ok. NAEO. VSS. ROS negative.    MEDICATIONS  (STANDING):  aspirin  chewable 81 milliGRAM(s) Oral daily  bisacodyl 5 milliGRAM(s) Oral at bedtime  chlorhexidine 2% Cloths 1 Application(s) Topical daily  cloNIDine 0.2 milliGRAM(s) Oral two times a day  dextrose 5%. 1000 milliLiter(s) (100 mL/Hr) IV Continuous <Continuous>  dextrose 5%. 1000 milliLiter(s) (50 mL/Hr) IV Continuous <Continuous>  dextrose 50% Injectable 25 Gram(s) IV Push once  dextrose 50% Injectable 12.5 Gram(s) IV Push once  dextrose 50% Injectable 25 Gram(s) IV Push once  dextrose Oral Gel 15 Gram(s) Oral once  glucagon  Injectable 1 milliGRAM(s) IntraMuscular once  hydrALAZINE 100 milliGRAM(s) Oral three times a day  insulin glargine Injectable (LANTUS) 20 Unit(s) SubCutaneous at bedtime  insulin lispro (ADMELOG) corrective regimen sliding scale   SubCutaneous three times a day before meals  insulin lispro (ADMELOG) corrective regimen sliding scale   SubCutaneous at bedtime  insulin lispro Injectable (ADMELOG) 6 Unit(s) SubCutaneous three times a day before meals  lactulose Syrup 10 Gram(s) Oral three times a day  levothyroxine 150 MICROGram(s) Oral daily  lidocaine   4% Patch 1 Patch Transdermal daily  NIFEdipine XL 30 milliGRAM(s) Oral daily  piperacillin/tazobactam IVPB.. 3.375 Gram(s) IV Intermittent every 8 hours  polyethylene glycol 3350 17 Gram(s) Oral two times a day  povidone iodine 10% Nasal Swab 1 Application(s) Both Nostrils once  propranolol  milliGRAM(s) Oral daily  rosuvastatin 10 milliGRAM(s) Oral at bedtime  senna 2 Tablet(s) Oral at bedtime  tamsulosin 0.4 milliGRAM(s) Oral at bedtime    MEDICATIONS  (PRN):  aluminum hydroxide/magnesium hydroxide/simethicone Suspension 30 milliLiter(s) Oral once PRN Dyspepsia  benzonatate 100 milliGRAM(s) Oral three times a day PRN Cough  HYDROmorphone  Injectable 0.5 milliGRAM(s) IV Push every 4 hours PRN Severe Pain (7 - 10)  oxyCODONE    IR 10 milliGRAM(s) Oral every 4 hours PRN Severe Pain (7 - 10)  oxyCODONE    IR 5 milliGRAM(s) Oral every 4 hours PRN Moderate Pain (4 - 6)      CAPILLARY BLOOD GLUCOSE  POCT Blood Glucose.: 197 mg/dL (05 Jun 2025 05:21)  POCT Blood Glucose.: 156 mg/dL (05 Jun 2025 03:37)  POCT Blood Glucose.: 166 mg/dL (04 Jun 2025 22:52)  POCT Blood Glucose.: 167 mg/dL (04 Jun 2025 21:01)  POCT Blood Glucose.: 175 mg/dL (04 Jun 2025 18:22)  POCT Blood Glucose.: 174 mg/dL (04 Jun 2025 12:30)  POCT Blood Glucose.: 154 mg/dL (04 Jun 2025 11:05)  POCT Blood Glucose.: 175 mg/dL (04 Jun 2025 09:02)    I&O's Summary    04 Jun 2025 07:01  -  05 Jun 2025 07:00  --------------------------------------------------------  IN: 0 mL / OUT: 280 mL / NET: -280 mL        PHYSICAL EXAM:  Vital Signs Last 24 Hrs  T(C): 36.9 (05 Jun 2025 04:45), Max: 36.9 (05 Jun 2025 04:45)  T(F): 98.4 (05 Jun 2025 04:45), Max: 98.4 (05 Jun 2025 04:45)  HR: 82 (05 Jun 2025 05:00) (57 - 83)  BP: 133/48 (05 Jun 2025 05:00) (107/67 - 175/77)  BP(mean): 75 (05 Jun 2025 05:00) (72 - 99)  RR: 16 (05 Jun 2025 05:00) (15 - 20)  SpO2: 93% (05 Jun 2025 05:00) (93% - 100%)    Parameters below as of 05 Jun 2025 05:00  Patient On (Oxygen Delivery Method): room air    GENERAL: NAD, lying in bed comfortably  HEAD: Atraumatic, normocephalic  EYES: EOMI, PERRLA, conjunctiva and sclera clear  ENT: Moist mucous membranes  NECK: Supple, no JVD  HEART: S1, S2, Regular rate and rhythm, no murmurs, rubs, or gallops  LUNGS: Unlabored respirations, clear to auscultation bilaterally, no crackles, wheezing, or rhonchi  ABDOMEN: Soft, mildly tender, nondistended, +BS.   EXTREMITIES: +small hematoma in R groin 2+ peripheral pulses bilaterally. LUE 2+ edema   NERVOUS SYSTEM:  A&Ox3, LE R weaker than left. Says that he can feel when he has BMs  SKIN: 3 lap incisions present on abdomen, small amount of blood oozing from sites  +Frye    LABS:                        9.3    21.87 )-----------( 204      ( 04 Jun 2025 14:14 )             29.2     06-04    135  |  100  |  19  ----------------------------<  177[H]  3.8   |  22  |  1.37[H]    Ca    8.4      04 Jun 2025 14:14    TPro  6.2  /  Alb  2.8[L]  /  TBili  2.5[H]  /  DBili  x   /  AST  328[H]  /  ALT  378[H]  /  AlkPhos  704[H]  06-04    PT/INR - ( 04 Jun 2025 18:54 )   PT: 13.2 sec;   INR: 1.11 ratio         PTT - ( 04 Jun 2025 18:54 )  PTT:36.0 sec      Urinalysis Basic - ( 04 Jun 2025 14:14 )    Color: x / Appearance: x / SG: x / pH: x  Gluc: 177 mg/dL / Ketone: x  / Bili: x / Urobili: x   Blood: x / Protein: x / Nitrite: x   Leuk Esterase: x / RBC: x / WBC x   Sq Epi: x / Non Sq Epi: x / Bacteria: x          RADIOLOGY:        ******************************  Authored By: Jacinto Rodriguez MD PGY1  Internal Medicine  MS Teams  ******************************

## 2025-06-05 NOTE — PROGRESS NOTE ADULT - PROBLEM SELECTOR PLAN 5
- Most likely i/s/o contrast nephropathy after EP procedure vs. lower BP than baseline given BP meds  - Reduced clonidine from 0.2mg TID to 0.1mg -> increased back to 0.2 given higher BPs  - Urine studies show indeterminate FENA of 1%, improved with 500cc bolus  - SCr improving - acute urinary retention in the setting of back pain and immobility  - TOV 5/19 unsuccesful, wong replaced  Plan:  - Maintain wong while lumbar impingement remains

## 2025-06-05 NOTE — PROGRESS NOTE ADULT - ASSESSMENT
Pt is an 85M with HTN, HLD, DM2, prostate cancer s/p prostatectomy, CKD, TIA with a pacemaker who presents transferred from [place] for further evaluation of low back pain secondary to lumbar compression deformities. EP on board to clear PPM for MRI compatibility. Found to have e facaelis bacteremia. On abx, unable to tolerate MRI s/p PPM removal on 5/16 and exchange for leadless PPM. Plan for MARIA LUISA on 5/20 -> negative for endocarditis. MRI Lumbar spine attempted 5/29 under conscious sedation however only partially complete again limited by pain and noise. Images obtained showing osteomyelitis and cauda equina impingement. Orthopedics planning for surgical intervention 6/5.  Pt is an 85M with HTN, HLD, DM2, prostate cancer s/p prostatectomy, CKD, TIA with a pacemaker who presents transferred from [place] for further evaluation of low back pain secondary to lumbar compression deformities. EP on board to clear PPM for MRI compatibility. Found to have e facaelis bacteremia. On abx, unable to tolerate MRI s/p PPM removal on 5/16 and exchange for leadless PPM. Plan for MARIA LUISA on 5/20 -> negative for endocarditis. MRI Lumbar spine attempted 5/29 under conscious sedation however only partially complete again limited by pain and noise. Images obtained showing osteomyelitis and cauda equina impingement. Orthopedics planning for surgical intervention, however course complicated by acute acalculous cholecystitis 6/4, now s/p robotic cholecystectomy, broadened to zosyn, pending rescheduling of laminectomy and fusion with ortho.

## 2025-06-05 NOTE — PROGRESS NOTE ADULT - ASSESSMENT
This is a 86 y/o M w/ PMhx HTN, HLD, DM2, hypothyroidism, prostate cancer s/p prostatectomy, CKD, TIA, pacemaker placed in 5/2024 for abnormal arrythmia initially admitted to [facility] on 5/6 for back pain, now transferred to [facility] for orthopedic spine. Labs w/ leukocytosis to 18, BCx w/ E faecalis in multiple sets.     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 S/p PPM removal   #Acute cholecystitis s/p lap geetha 6/4   #Respiratory failure, improved   #JEROME, improving     Overall, 86 y/o M w/ PMhx HTN, HLD, DM2, hypothyroidism, prostate cancer s/p prostatectomy, CKD, TIA, pacemaker placed in 5/2024 for abnormal arrythmia transferred to [facility] from [facility] for MRI spine and ortho evaluation give LBP and lumbar compression w/ urinary retention. Pt noted to have chills and leukocytosis to 18, now with high grade E faecalis bacteremia. Unclear source however UCx not done, U/A with 10 WBCs, CT A/P w/o IV contrast on 5/10 w/o acute infectious source, s/p PPM removal, MARIA LUISA w/o vegetations.   Repeat MRI with OM/discitis, L4/L5, severe spinal canal stenosis L3-L4, L-5, impingement of cauda equina, planned for OR w/ orthopedics, however c/b acute cholecystitis s/p lap geetha 6/4   CT findings of possible infarcts kidney/spleen, pt with previous MARIA LUISA w/o findings of IE.     Recommendations:   1. Zosyn 3.375 g q8 for likely 5 day course after OR, then switch back to Ampicillin 2 g q4 on 6/10     Thank you for consulting us and involving us in the management of this patient's case. In addition to reviewing history, imaging, documents, labs, microbiology, and infection control strategies and potential issues.     ID will continue to follow    Darrin Falk M.D.  Attending Physician  Division of Infectious Diseases  Department of Medicine    Please contact through MS Teams message.  Office: [phone] (after 5 PM or weekend)

## 2025-06-05 NOTE — PROGRESS NOTE ADULT - SUBJECTIVE AND OBJECTIVE BOX
ORTHOPEDIC PROGRESS NOTE    SUBJECTIVE:  Pt seen and examined at bedside this am.  Had lap geetha overnight for acute cholecystitis, procedure went well and returned to floor. Ortho surgery will be rescheduled      OBJECTIVE:  Vital Signs Last 24 Hrs  T(C): 36.9 (05 Jun 2025 04:45), Max: 36.9 (05 Jun 2025 04:45)  T(F): 98.4 (05 Jun 2025 04:45), Max: 98.4 (05 Jun 2025 04:45)  HR: 82 (05 Jun 2025 05:00) (57 - 83)  BP: 133/48 (05 Jun 2025 05:00) (107/67 - 175/77)  BP(mean): 75 (05 Jun 2025 05:00) (72 - 99)  RR: 16 (05 Jun 2025 05:00) (15 - 20)  SpO2: 93% (05 Jun 2025 05:00) (93% - 100%)    Parameters below as of 05 Jun 2025 05:00  Patient On (Oxygen Delivery Method): room air        Physical Exam:  GEN: NAD, resting quietly  PULM: symmetric chest rise bilaterally, no increased WOB, on NC  SPINE:   Motor exam:          Upper extremity         C5 (Shoulder Abd)    C6 (Elbow flex)   C7 (Elbow ext)   C8 (Finger flex) T1 (finger abd)         R         5/5                 5/5                       5/5                      5/5                         5/5          L          5/5                 5/5                      5/5                      5/5                         5/5                   Lower extremity           L2 (Hip flex)  L3 (knee ext)  L4 (Dorsi flex)  L5 (EHL)  S1 (Plantar flex)                                               R        3/5            4/5             1/5                    1/5          4/5      L        3/5            4/5             5/5                   5/5           5/5    Sensory exam:                         C5      C6      C7      C8       T1          RIGHT          2         2        2         2         2          (0=absent, 1=impaired, 2=normal, NT=not testable)  LEFT             2         2        2         2         2          (0=absent, 1=impaired, 2=normal, NT=not testable)                          L2      L3     L4     L5       S1          RIGHT          2         2        2         2         2          (0=absent, 1=impaired, 2=normal, NT=not testable)  LEFT             2         2        2         2         2          (0=absent, 1=impaired, 2=normal, NT=not testable)         LABS                        9.3    21.87 )-----------( 204      ( 04 Jun 2025 14:14 )             29.2       06-04    135  |  100  |  19  ----------------------------<  177[H]  3.8   |  22  |  1.37[H]    Ca    8.4      04 Jun 2025 14:14    TPro  6.2  /  Alb  2.8[L]  /  TBili  2.5[H]  /  DBili  x   /  AST  328[H]  /  ALT  378[H]  /  AlkPhos  704[H]  06-04      PT/INR - ( 04 Jun 2025 18:54 )   PT: 13.2 sec;   INR: 1.11 ratio         PTT - ( 04 Jun 2025 18:54 )  PTT:36.0 sec    I&O's Summary    03 Jun 2025 07:01  -  04 Jun 2025 07:00  --------------------------------------------------------  IN: 100 mL / OUT: 950 mL / NET: -850 mL    04 Jun 2025 07:01  -  05 Jun 2025 06:33  --------------------------------------------------------  IN: 0 mL / OUT: 280 mL / NET: -280 mL        aluminum hydroxide/magnesium hydroxide/simethicone Suspension 30 milliLiter(s) Oral once PRN  aspirin  chewable 81 milliGRAM(s) Oral daily  benzonatate 100 milliGRAM(s) Oral three times a day PRN  bisacodyl 5 milliGRAM(s) Oral at bedtime  chlorhexidine 2% Cloths 1 Application(s) Topical daily  cloNIDine 0.2 milliGRAM(s) Oral two times a day  dextrose 5%. 1000 milliLiter(s) IV Continuous <Continuous>  dextrose 5%. 1000 milliLiter(s) IV Continuous <Continuous>  dextrose 50% Injectable 25 Gram(s) IV Push once  dextrose 50% Injectable 12.5 Gram(s) IV Push once  dextrose 50% Injectable 25 Gram(s) IV Push once  dextrose Oral Gel 15 Gram(s) Oral once  fentaNYL    Injectable 25 MICROGram(s) IV Push every 5 minutes PRN  glucagon  Injectable 1 milliGRAM(s) IntraMuscular once  hydrALAZINE 100 milliGRAM(s) Oral three times a day  HYDROmorphone  Injectable 0.5 milliGRAM(s) IV Push every 10 minutes PRN  HYDROmorphone  Injectable 0.5 milliGRAM(s) IV Push every 4 hours PRN  insulin glargine Injectable (LANTUS) 20 Unit(s) SubCutaneous at bedtime  insulin lispro (ADMELOG) corrective regimen sliding scale   SubCutaneous three times a day before meals  insulin lispro (ADMELOG) corrective regimen sliding scale   SubCutaneous at bedtime  insulin lispro Injectable (ADMELOG) 6 Unit(s) SubCutaneous three times a day before meals  lactulose Syrup 10 Gram(s) Oral three times a day  levothyroxine 150 MICROGram(s) Oral daily  lidocaine   4% Patch 1 Patch Transdermal daily  NIFEdipine XL 30 milliGRAM(s) Oral daily  oxyCODONE    IR 10 milliGRAM(s) Oral every 4 hours PRN  oxyCODONE    IR 5 milliGRAM(s) Oral every 4 hours PRN  piperacillin/tazobactam IVPB.. 3.375 Gram(s) IV Intermittent every 8 hours  polyethylene glycol 3350 17 Gram(s) Oral two times a day  povidone iodine 10% Nasal Swab 1 Application(s) Both Nostrils once  propranolol  milliGRAM(s) Oral daily  rosuvastatin 10 milliGRAM(s) Oral at bedtime  senna 2 Tablet(s) Oral at bedtime  tamsulosin 0.4 milliGRAM(s) Oral at bedtime

## 2025-06-05 NOTE — PROGRESS NOTE ADULT - SUBJECTIVE AND OBJECTIVE BOX
Infectious Diseases Follow Up:    Patient is a 85y old  Male who presents with a chief complaint of back pain (05 Jun 2025 07:46)      Interval History/ROS:  S/p OR for lap geetha, pt seen in the AM doing well, has mild abdominal pain     Allergies  gabapentin (Other)        ANTIMICROBIALS:  piperacillin/tazobactam IVPB.. 3.375 every 8 hours      Current Abx:     Previous Abx     OTHER MEDS:  MEDICATIONS  (STANDING):  aluminum hydroxide/magnesium hydroxide/simethicone Suspension 30 once PRN  aspirin  chewable 81 daily  benzonatate 100 three times a day PRN  bisacodyl 5 at bedtime  cloNIDine 0.2 two times a day  dextrose 50% Injectable 25 once  dextrose 50% Injectable 12.5 once  dextrose 50% Injectable 25 once  dextrose Oral Gel 15 once  glucagon  Injectable 1 once  hydrALAZINE 100 three times a day  HYDROmorphone  Injectable 0.5 every 4 hours PRN  insulin glargine Injectable (LANTUS) 20 at bedtime  insulin lispro (ADMELOG) corrective regimen sliding scale  three times a day before meals  insulin lispro (ADMELOG) corrective regimen sliding scale  at bedtime  insulin lispro Injectable (ADMELOG) 6 three times a day before meals  lactulose Syrup 10 three times a day  levothyroxine 150 daily  NIFEdipine XL 30 daily  oxyCODONE    IR 10 every 4 hours PRN  oxyCODONE    IR 5 every 4 hours PRN  polyethylene glycol 3350 17 two times a day  propranolol  daily  rosuvastatin 10 at bedtime  senna 2 at bedtime  tamsulosin 0.4 at bedtime      Vital Signs Last 24 Hrs  T(C): 36.8 (05 Jun 2025 06:45), Max: 36.9 (05 Jun 2025 04:45)  T(F): 98.2 (05 Jun 2025 06:45), Max: 98.4 (05 Jun 2025 04:45)  HR: 70 (05 Jun 2025 06:45) (57 - 83)  BP: 155/61 (05 Jun 2025 06:45) (107/67 - 175/77)  BP(mean): 75 (05 Jun 2025 05:00) (72 - 99)  RR: 18 (05 Jun 2025 06:45) (15 - 20)  SpO2: 98% (05 Jun 2025 06:45) (93% - 100%)    Parameters below as of 05 Jun 2025 06:45  Patient On (Oxygen Delivery Method): room air        PHYSICAL EXAM:  GENERAL: NAD, well-developed  HEAD:  Atraumatic, Normocephalic  EYES: EOMI, conjunctiva and sclera clear  CHEST/LUNG: On RA, not in respiratory distress, c  ABDOMEN: Soft, mild tenderness, Nondistended; steri strips over lap port site   PSYCH: AAOx3                          8.6    17.71 )-----------( 189      ( 05 Jun 2025 09:03 )             27.3       06-04    135  |  100  |  19  ----------------------------<  177[H]  3.8   |  22  |  1.37[H]    Ca    8.4      04 Jun 2025 14:14    TPro  6.2  /  Alb  2.8[L]  /  TBili  2.5[H]  /  DBili  x   /  AST  328[H]  /  ALT  378[H]  /  AlkPhos  704[H]  06-04      Urinalysis Basic - ( 04 Jun 2025 14:14 )    Color: x / Appearance: x / SG: x / pH: x  Gluc: 177 mg/dL / Ketone: x  / Bili: x / Urobili: x   Blood: x / Protein: x / Nitrite: x   Leuk Esterase: x / RBC: x / WBC x   Sq Epi: x / Non Sq Epi: x / Bacteria: x        MICROBIOLOGY:  v  Blood Blood  05-18-25   No growth at 5 days  --  --      Blood Blood-Peripheral  05-17-25   No growth at 5 days  --  --      Blood Blood-Peripheral  05-17-25   No growth at 5 days  --  --      Blood Blood  05-17-25   No growth at 5 days  --  --      Blood Blood  05-15-25   No growth at 5 days  --  --      Blood Blood  05-15-25   No growth at 5 days  --  --      Blood Blood  05-14-25   Growth in aerobic bottle: Enterococcus faecalis  See previous culture [id number]  Direct identification is available within approximately 3-5  hours either by Blood Panel Multiplexed PCR or Direct  MALDI-TOF. Details: [email or web address]  --  Blood Culture PCR      Blood Blood  05-14-25   Growth in aerobic and anaerobic bottles: Enterococcus faecalis  Growth in anaerobic bottle: blood culture bottle turned positive after  the final report was released.  --  Enterococcus faecalis      Blood Blood-Peripheral  05-12-25   Growth in aerobic and anaerobic bottles: Enterococcus faecalis  See previous culture [id number]  --    Growth in aerobic bottle: Gram Positive Cocci in Pairs and Chains  Growth in anaerobic bottle: Gram Positive Cocci in Pairs and Chains      Blood Blood-Peripheral  05-12-25   Growth in aerobic and anaerobic bottles: Enterococcus faecalis  See previous culture [id number]  --    Growth in aerobic bottle: Gram Positive Cocci in Pairs and Chains  Growth in anaerobic bottle: Gram Positive Cocci in Pairs and Chains      Blood Blood-Peripheral  05-11-25   Growth in aerobic and anaerobic bottles: Enterococcus faecalis  Direct identification is available within approximately 3-5  hours either by Blood Panel Multiplexed PCR or Direct  MALDI-TOF. Details: [email or web address]  --  Blood Culture PCR  Enterococcus faecalis                RADIOLOGY:  < from: CT Abdomen and Pelvis w/ IV Cont (06.04.25 @ 16:45) >  FINDINGS:  LOWER CHEST: Left moderate and right trace pleural effusions. Cardiac   device.    LIVER: Within normal limits.  BILE DUCTS: Normal caliber.  GALLBLADDER: Distended. Cholelithiasis. Trace gallbladder wall edema and   pericholecystic fluid.  SPLEEN: Wedge-shaped hypodensity, new since 5/10/2025.  PANCREAS: Atrophic. 1.5 cm and 1 cm pancreatic tail cystic lesions,   unchanged.  ADRENALS: Within normal limits.  KIDNEYS/URETERS: Wedge-shaped hypodensity in the right midpole. Right   subcentimeter hypodensities too small to characterize.    BLADDER: Frye catheter.  REPRODUCTIVE ORGANS: Prostate within normal limits.    BOWEL: No bowel obstruction. Appendix is normal. Large colonic stool   burden with rectal wall distention. Distended stomach.  PERITONEUM/RETROPERITONEUM: Trace ascites.  VESSELS: Atherosclerotic changes.  LYMPH NODES: No lymphadenopathy.  ABDOMINAL WALL: Subcutaneous edema.  BONES: Degenerative changes.    IMPRESSION:  Very distended gallbladder with wall edema and pericholecystic fluid,   suspicious for cholecystitis.    Wedge-shaped hypodensities in the spleen and right kidney, not well   evaluated on prior imaging due to noncontrast technique. Findings   concerning for infarcts.      < end of copied text >

## 2025-06-05 NOTE — PROGRESS NOTE ADULT - ASSESSMENT
Mr. Liu is an 85 year old man with multiple chronic medical comorbidities including hypertension, sinus node dysfunction, type two diabetes, and chronic kidney disease (baseline creatinine 1.5-2.0) presently admitted for lower back pain associated with lumbar compression deformities with concern for cauda equina impingement. He was found to be bacteremic (E faecalis) due to acute acalculous cholecystitis, and is now seen following laparoscopic cholecystectomy with Dr. Triny Verde 6/5/2025.     Recommendations  - No evidence of acute post-surgical pathology. May advance diet as tolerated.   - Remainder of global care per primary team.     Case discussed with attending acute care surgeon Dr. Donato Arguelles.     Alexandr Chowdhury, PGY-2  Acute Care Surgery ([id number])

## 2025-06-05 NOTE — PROGRESS NOTE ADULT - PROBLEM SELECTOR PLAN 12
DVT ppx: Lovenox  GI ppx: None  Diet: CC  Dispo: Most likely PONCE  DNR//DNI Diet: Consistent Carb  DVT PPx: On eliquis at home for stroke history, resume Lovenox full AC dose when cleared by surgery  Dispo: Pending clinical course

## 2025-06-05 NOTE — PROGRESS NOTE ADULT - PROBLEM SELECTOR PLAN 3
- acute urinary retention in the setting of back pain and immobility  - TOV 5/19 unsuccesful, wong replaced  Plan:  - Maintain wong while lumbar impingement remains - BCx positive for E faecalis . Only 1 bottle was sent and is positive -> repeat Bcx 5/15 negative   - s/p removal of PPM on 5/16 and exchange to leadless pacemaker with EP   - TTE ordered -> EF 64% mild LV systolic dysfunction  - MARIA LUISA neg for endocarditis  - s/p CTX  - ID following, recs included below  Plan:  - Resume Ampicillin 2g q4h on 6/10 after completion of Zosyn for cholecystitis

## 2025-06-05 NOTE — PROGRESS NOTE ADULT - PROBLEM SELECTOR PLAN 11
cont home rosuvastatin 5mg qhs (atorva inpatient) - CT AP (6/4): Wedge-shaped hypodensities in the spleen and right kidney, not well evaluated on prior imaging due to noncontrast technique. Findings concerning for infarcts.  - Possibly iso brief period off AC during admission  - EKGs showing patient in sinus rhthym  - MARIA LUISA negative for IE

## 2025-06-05 NOTE — PROGRESS NOTE ADULT - PROBLEM SELECTOR PLAN 10
- Continue Home Propanolol  - S/p heparin -> switching to lovenox  - ASA 81mg qd - Continue Home Propanolol  - Hold home eliquis  - S/p heparin -> switching to lovenox  - ASA 81mg qd

## 2025-06-05 NOTE — PROGRESS NOTE ADULT - SUBJECTIVE AND OBJECTIVE BOX
General Surgery Progress Note    Subjective and Interval  Overnight went for laparoscopic cholecystectomy for acalculous cholecystitis, surgery uneventful. Examined at three hours post-operatively and denied fevers, chills, chest pain, shortness of breath, nausea, vomiting, abdominal pain, melena, hematochezia, oliguria, dysuria, or hematuria. Had a normal post-operative bowel movement.   ___________________________________________________  Vital Signs  T(C): 36.8 (06:45), Max: 36.9 (04:45)  HR: 70 (06:45) (57 - 83)  BP: 155/61 (06:45) (107/67 - 155/61)  RR: 18 (06:45) (15 - 20)  SpO2: 98% (06:45) (93% - 100%)    Physical Exam  General: Resting comfortably supine in bed in no acute distress.   Neurologic: Alert, oriented, and appropriately responds to questions.   Psychiatric: Euthymic mood, calm affect, linear thought process, appropriate thought content.   Cardiac: Warm and well-perfused.   Respiratory: Equal chest wall expansion bilaterally with no accessory muscle use, no tachypnea, and no grossly increased work of breathing on room air.   Abdomen: Soft and nondistended, appropriate incisional tenderness to palpation   ___________________________________________________  Laboratory studies  CBC Basic (06-05 @ 09:03)  WBC: 17.71 Hgb: 8.6 Hct: 27.3 Plt: 189    Metabolic Panel (06-04 @ 14:14)  135  |  100  |  19  ----------------------------<  177  3.8   |  22  |  1.37  Ca: 8.4/Phos: x/Magnesium: x    Liver Chemistries (06-04 @ 14:14)  Total protein 6.2 | Albumin 2.8  TBili 2.5 | DBili x   |  | AlkPhos 704    Coagulation Studies (06-04 @ 18:54)  PT/INR: 13.2/1.11, aPTT: 36.0

## 2025-06-05 NOTE — BRIEF OPERATIVE NOTE - OPERATION/FINDINGS
Acute acalculous cholecystitis, greatly distended GB, top down approach, duct and artery triply clipped and divided. Fascia closed with 4x 0-vicryl.

## 2025-06-05 NOTE — PROGRESS NOTE ADULT - PROBLEM SELECTOR PLAN 4
LUE swelling, nursing examined IV in LUE and it is functioning appropriately low c/f infiltration. Of note, pt is s/p PPM extraction but no fluctuance or edema at PPM site. Neurovascularly intact.    Plan:  -f/u LUE duplex -> superificial clot -> warm packed and restart AC after MARIA LUISA -> AC restarted - Bowel movement 6/4 after SMOG enema  - Remains at high risk for constipation due to opiate regimen  - Trialed Miralax, PO Dulcolax, PO Lactulose all of which patient does not like  Plan:  - Senna qhs  - Dulcolax suppository - Bowel movement 6/4 after SMOG enema  - Remains at high risk for constipation due to opiate regimen  - Trialed Miralax, PO Dulcolax, PO Lactulose all of which patient does not like  Plan:  - Senna qhs  - Dulcolax suppository daily, hold for >3 BM/day  - Enemas PRN for prolonged constipation. SMOG more effective than Fleet

## 2025-06-05 NOTE — PROGRESS NOTE ADULT - PROBLEM SELECTOR PLAN 9
- lantus 20u qhs, humalog 6u tid with meals, low ISS - Most likely i/s/o contrast nephropathy after EP procedure vs. lower BP than baseline given BP meds  - Reduced clonidine from 0.2mg TID to 0.1mg -> increased back to 0.2 given higher BPs  - Urine studies show indeterminate FENA of 1%, improved with 500cc bolus  - SCr improving

## 2025-06-05 NOTE — PROGRESS NOTE ADULT - PROBLEM SELECTOR PLAN 7
RESOLVED  - Pt hypoxic on presentation for unclear reason; started on high flow nasal cannula 40/40  - CXR unimpressive  - poss bronchiectasis vs pna  - VQ scan to rule out PE performed on 5/11: very low probability of PE - Has labile blood pressures that are high in the afternoon and low in the morning  - Continue Home Hydralazine 100mg tid  - Continue Home Clonidine .2mg tid -> decrease to 0.1mg for hypotension  - Was placed on nifedipine 30mg QD in the hospital while holding nephrotoxic meds  - Hold Telmisartan 40mg qd in setting of JEROME   - Hold Chlorthalidone 25mg qd in setting of JEROME  - On 5/17, pt intermittently nauseous and unable to tolerate PO, requiring IV 10 labetalol PRN -> CT head ordered to r/o bleed -> Pt improved, no CT head needed

## 2025-06-06 ENCOUNTER — RESULT REVIEW (OUTPATIENT)
Age: 85
End: 2025-06-06

## 2025-06-06 LAB
ADD ON TEST-SPECIMEN IN LAB: SIGNIFICANT CHANGE UP
ADD ON TEST-SPECIMEN IN LAB: SIGNIFICANT CHANGE UP
ALBUMIN SERPL ELPH-MCNC: 2.1 G/DL — LOW (ref 3.3–5)
ALBUMIN SERPL ELPH-MCNC: 2.5 G/DL — LOW (ref 3.3–5)
ALBUMIN SERPL ELPH-MCNC: 2.5 G/DL — LOW (ref 3.3–5)
ALBUMIN SERPL ELPH-MCNC: 2.7 G/DL — LOW (ref 3.3–5)
ALP SERPL-CCNC: 317 U/L — HIGH (ref 40–120)
ALP SERPL-CCNC: 355 U/L — HIGH (ref 40–120)
ALP SERPL-CCNC: 361 U/L — HIGH (ref 40–120)
ALP SERPL-CCNC: 399 U/L — HIGH (ref 40–120)
ALT FLD-CCNC: 114 U/L — HIGH (ref 4–41)
ALT FLD-CCNC: 139 U/L — HIGH (ref 4–41)
ALT FLD-CCNC: 154 U/L — HIGH (ref 4–41)
ALT FLD-CCNC: 163 U/L — HIGH (ref 4–41)
ANION GAP SERPL CALC-SCNC: 10 MMOL/L — SIGNIFICANT CHANGE UP (ref 7–14)
ANION GAP SERPL CALC-SCNC: 12 MMOL/L — SIGNIFICANT CHANGE UP (ref 7–14)
ANION GAP SERPL CALC-SCNC: 13 MMOL/L — SIGNIFICANT CHANGE UP (ref 7–14)
ANION GAP SERPL CALC-SCNC: 14 MMOL/L — SIGNIFICANT CHANGE UP (ref 7–14)
APTT BLD: 27 SEC — SIGNIFICANT CHANGE UP (ref 26.1–36.8)
APTT BLD: 28.2 SEC — SIGNIFICANT CHANGE UP (ref 26.1–36.8)
AST SERPL-CCNC: 43 U/L — HIGH (ref 4–40)
AST SERPL-CCNC: 56 U/L — HIGH (ref 4–40)
AST SERPL-CCNC: 66 U/L — HIGH (ref 4–40)
AST SERPL-CCNC: 68 U/L — HIGH (ref 4–40)
BILIRUB DIRECT SERPL-MCNC: 0.3 MG/DL — SIGNIFICANT CHANGE UP (ref 0–0.3)
BILIRUB DIRECT SERPL-MCNC: 0.4 MG/DL — HIGH (ref 0–0.3)
BILIRUB INDIRECT FLD-MCNC: 0.3 MG/DL — SIGNIFICANT CHANGE UP (ref 0–1)
BILIRUB INDIRECT FLD-MCNC: 0.3 MG/DL — SIGNIFICANT CHANGE UP (ref 0–1)
BILIRUB SERPL-MCNC: 0.5 MG/DL — SIGNIFICANT CHANGE UP (ref 0.2–1.2)
BILIRUB SERPL-MCNC: 0.6 MG/DL — SIGNIFICANT CHANGE UP (ref 0.2–1.2)
BILIRUB SERPL-MCNC: 0.6 MG/DL — SIGNIFICANT CHANGE UP (ref 0.2–1.2)
BILIRUB SERPL-MCNC: 0.7 MG/DL — SIGNIFICANT CHANGE UP (ref 0.2–1.2)
BLOOD GAS ARTERIAL COMPREHENSIVE RESULT: SIGNIFICANT CHANGE UP
BLOOD GAS ARTERIAL COMPREHENSIVE RESULT: SIGNIFICANT CHANGE UP
BUN SERPL-MCNC: 38 MG/DL — HIGH (ref 7–23)
BUN SERPL-MCNC: 40 MG/DL — HIGH (ref 7–23)
BUN SERPL-MCNC: 42 MG/DL — HIGH (ref 7–23)
BUN SERPL-MCNC: 42 MG/DL — HIGH (ref 7–23)
CALCIUM SERPL-MCNC: 7.1 MG/DL — LOW (ref 8.4–10.5)
CALCIUM SERPL-MCNC: 7.2 MG/DL — LOW (ref 8.4–10.5)
CALCIUM SERPL-MCNC: 7.2 MG/DL — LOW (ref 8.4–10.5)
CALCIUM SERPL-MCNC: 7.3 MG/DL — LOW (ref 8.4–10.5)
CHLORIDE SERPL-SCNC: 100 MMOL/L — SIGNIFICANT CHANGE UP (ref 98–107)
CHLORIDE SERPL-SCNC: 100 MMOL/L — SIGNIFICANT CHANGE UP (ref 98–107)
CHLORIDE SERPL-SCNC: 99 MMOL/L — SIGNIFICANT CHANGE UP (ref 98–107)
CHLORIDE SERPL-SCNC: 99 MMOL/L — SIGNIFICANT CHANGE UP (ref 98–107)
CO2 SERPL-SCNC: 21 MMOL/L — LOW (ref 22–31)
CO2 SERPL-SCNC: 22 MMOL/L — SIGNIFICANT CHANGE UP (ref 22–31)
CO2 SERPL-SCNC: 22 MMOL/L — SIGNIFICANT CHANGE UP (ref 22–31)
CO2 SERPL-SCNC: 23 MMOL/L — SIGNIFICANT CHANGE UP (ref 22–31)
CREAT SERPL-MCNC: 2.53 MG/DL — HIGH (ref 0.5–1.3)
CREAT SERPL-MCNC: 2.7 MG/DL — HIGH (ref 0.5–1.3)
CREAT SERPL-MCNC: 2.84 MG/DL — HIGH (ref 0.5–1.3)
CREAT SERPL-MCNC: 3.15 MG/DL — HIGH (ref 0.5–1.3)
EGFR: 18 ML/MIN/1.73M2 — LOW
EGFR: 18 ML/MIN/1.73M2 — LOW
EGFR: 21 ML/MIN/1.73M2 — LOW
EGFR: 21 ML/MIN/1.73M2 — LOW
EGFR: 22 ML/MIN/1.73M2 — LOW
EGFR: 22 ML/MIN/1.73M2 — LOW
EGFR: 24 ML/MIN/1.73M2 — LOW
EGFR: 24 ML/MIN/1.73M2 — LOW
GAS PNL BLDV: SIGNIFICANT CHANGE UP
GAS PNL BLDV: SIGNIFICANT CHANGE UP
GLUCOSE BLDC GLUCOMTR-MCNC: 105 MG/DL — HIGH (ref 70–99)
GLUCOSE BLDC GLUCOMTR-MCNC: 121 MG/DL — HIGH (ref 70–99)
GLUCOSE BLDC GLUCOMTR-MCNC: 164 MG/DL — HIGH (ref 70–99)
GLUCOSE BLDC GLUCOMTR-MCNC: 168 MG/DL — HIGH (ref 70–99)
GLUCOSE BLDC GLUCOMTR-MCNC: 191 MG/DL — HIGH (ref 70–99)
GLUCOSE SERPL-MCNC: 120 MG/DL — HIGH (ref 70–99)
GLUCOSE SERPL-MCNC: 151 MG/DL — HIGH (ref 70–99)
GLUCOSE SERPL-MCNC: 169 MG/DL — HIGH (ref 70–99)
GLUCOSE SERPL-MCNC: 169 MG/DL — HIGH (ref 70–99)
HCT VFR BLD CALC: 19.6 % — CRITICAL LOW (ref 39–50)
HCT VFR BLD CALC: 23.7 % — LOW (ref 39–50)
HCT VFR BLD CALC: 24.5 % — LOW (ref 39–50)
HCT VFR BLD CALC: 26.5 % — LOW (ref 39–50)
HGB BLD-MCNC: 6.4 G/DL — CRITICAL LOW (ref 13–17)
HGB BLD-MCNC: 7.9 G/DL — LOW (ref 13–17)
HGB BLD-MCNC: 8.2 G/DL — LOW (ref 13–17)
HGB BLD-MCNC: 8.7 G/DL — LOW (ref 13–17)
INR BLD: <0.9 RATIO — SIGNIFICANT CHANGE UP (ref 0.85–1.16)
INR BLD: <0.9 RATIO — SIGNIFICANT CHANGE UP (ref 0.85–1.16)
MAGNESIUM SERPL-MCNC: 2.3 MG/DL — SIGNIFICANT CHANGE UP (ref 1.6–2.6)
MCHC RBC-ENTMCNC: 28.6 PG — SIGNIFICANT CHANGE UP (ref 27–34)
MCHC RBC-ENTMCNC: 28.7 PG — SIGNIFICANT CHANGE UP (ref 27–34)
MCHC RBC-ENTMCNC: 29.2 PG — SIGNIFICANT CHANGE UP (ref 27–34)
MCHC RBC-ENTMCNC: 29.4 PG — SIGNIFICANT CHANGE UP (ref 27–34)
MCHC RBC-ENTMCNC: 32.7 G/DL — SIGNIFICANT CHANGE UP (ref 32–36)
MCHC RBC-ENTMCNC: 32.8 G/DL — SIGNIFICANT CHANGE UP (ref 32–36)
MCHC RBC-ENTMCNC: 33.3 G/DL — SIGNIFICANT CHANGE UP (ref 32–36)
MCHC RBC-ENTMCNC: 33.5 G/DL — SIGNIFICANT CHANGE UP (ref 32–36)
MCV RBC AUTO: 85.4 FL — SIGNIFICANT CHANGE UP (ref 80–100)
MCV RBC AUTO: 87.5 FL — SIGNIFICANT CHANGE UP (ref 80–100)
MCV RBC AUTO: 87.9 FL — SIGNIFICANT CHANGE UP (ref 80–100)
MCV RBC AUTO: 89.5 FL — SIGNIFICANT CHANGE UP (ref 80–100)
NRBC # BLD AUTO: 0 K/UL — SIGNIFICANT CHANGE UP (ref 0–0)
NRBC # FLD: 0 K/UL — SIGNIFICANT CHANGE UP (ref 0–0)
NRBC BLD AUTO-RTO: 0 /100 WBCS — SIGNIFICANT CHANGE UP (ref 0–0)
NT-PROBNP SERPL-SCNC: 1398 PG/ML — HIGH
PHOSPHATE SERPL-MCNC: 4.3 MG/DL — SIGNIFICANT CHANGE UP (ref 2.5–4.5)
PHOSPHATE SERPL-MCNC: 4.4 MG/DL — SIGNIFICANT CHANGE UP (ref 2.5–4.5)
PHOSPHATE SERPL-MCNC: 4.7 MG/DL — HIGH (ref 2.5–4.5)
PLATELET # BLD AUTO: 141 K/UL — LOW (ref 150–400)
PLATELET # BLD AUTO: 144 K/UL — LOW (ref 150–400)
PLATELET # BLD AUTO: 170 K/UL — SIGNIFICANT CHANGE UP (ref 150–400)
PLATELET # BLD AUTO: 171 K/UL — SIGNIFICANT CHANGE UP (ref 150–400)
POTASSIUM SERPL-MCNC: 3.7 MMOL/L — SIGNIFICANT CHANGE UP (ref 3.5–5.3)
POTASSIUM SERPL-MCNC: 3.8 MMOL/L — SIGNIFICANT CHANGE UP (ref 3.5–5.3)
POTASSIUM SERPL-SCNC: 3.7 MMOL/L — SIGNIFICANT CHANGE UP (ref 3.5–5.3)
POTASSIUM SERPL-SCNC: 3.8 MMOL/L — SIGNIFICANT CHANGE UP (ref 3.5–5.3)
PROT SERPL-MCNC: 4.7 G/DL — LOW (ref 6–8.3)
PROT SERPL-MCNC: 5.2 G/DL — LOW (ref 6–8.3)
PROT SERPL-MCNC: 5.4 G/DL — LOW (ref 6–8.3)
PROT SERPL-MCNC: 5.5 G/DL — LOW (ref 6–8.3)
PROTHROM AB SERPL-ACNC: 10.5 SEC — SIGNIFICANT CHANGE UP (ref 9.9–13.4)
PROTHROM AB SERPL-ACNC: 10.5 SEC — SIGNIFICANT CHANGE UP (ref 9.9–13.4)
RBC # BLD: 2.23 M/UL — LOW (ref 4.2–5.8)
RBC # BLD: 2.71 M/UL — LOW (ref 4.2–5.8)
RBC # BLD: 2.87 M/UL — LOW (ref 4.2–5.8)
RBC # BLD: 2.96 M/UL — LOW (ref 4.2–5.8)
RBC # FLD: 16.3 % — HIGH (ref 10.3–14.5)
RBC # FLD: 16.3 % — HIGH (ref 10.3–14.5)
RBC # FLD: 16.6 % — HIGH (ref 10.3–14.5)
RBC # FLD: 17.3 % — HIGH (ref 10.3–14.5)
SODIUM SERPL-SCNC: 133 MMOL/L — LOW (ref 135–145)
SODIUM SERPL-SCNC: 133 MMOL/L — LOW (ref 135–145)
SODIUM SERPL-SCNC: 134 MMOL/L — LOW (ref 135–145)
SODIUM SERPL-SCNC: 135 MMOL/L — SIGNIFICANT CHANGE UP (ref 135–145)
TROPONIN T, HIGH SENSITIVITY RESULT: 43 NG/L — SIGNIFICANT CHANGE UP
TROPONIN T, HIGH SENSITIVITY RESULT: 48 NG/L — SIGNIFICANT CHANGE UP
WBC # BLD: 11.08 K/UL — HIGH (ref 3.8–10.5)
WBC # BLD: 11.32 K/UL — HIGH (ref 3.8–10.5)
WBC # BLD: 8.16 K/UL — SIGNIFICANT CHANGE UP (ref 3.8–10.5)
WBC # BLD: 8.49 K/UL — SIGNIFICANT CHANGE UP (ref 3.8–10.5)
WBC # FLD AUTO: 11.08 K/UL — HIGH (ref 3.8–10.5)
WBC # FLD AUTO: 11.32 K/UL — HIGH (ref 3.8–10.5)
WBC # FLD AUTO: 8.16 K/UL — SIGNIFICANT CHANGE UP (ref 3.8–10.5)
WBC # FLD AUTO: 8.49 K/UL — SIGNIFICANT CHANGE UP (ref 3.8–10.5)

## 2025-06-06 PROCEDURE — 99292 CRITICAL CARE ADDL 30 MIN: CPT | Mod: 25

## 2025-06-06 PROCEDURE — 36620 INSERTION CATHETER ARTERY: CPT | Mod: GC

## 2025-06-06 PROCEDURE — 99291 CRITICAL CARE FIRST HOUR: CPT | Mod: 25

## 2025-06-06 PROCEDURE — 99233 SBSQ HOSP IP/OBS HIGH 50: CPT

## 2025-06-06 PROCEDURE — 93308 TTE F-UP OR LMTD: CPT | Mod: 26,GC

## 2025-06-06 PROCEDURE — G0545: CPT

## 2025-06-06 PROCEDURE — 93010 ELECTROCARDIOGRAM REPORT: CPT

## 2025-06-06 PROCEDURE — 76376 3D RENDER W/INTRP POSTPROCES: CPT | Mod: 26

## 2025-06-06 PROCEDURE — 93321 DOPPLER ECHO F-UP/LMTD STD: CPT | Mod: 26

## 2025-06-06 RX ORDER — LEVOTHYROXINE SODIUM 300 MCG
112 TABLET ORAL AT BEDTIME
Refills: 0 | Status: DISCONTINUED | OUTPATIENT
Start: 2025-06-06 | End: 2025-06-10

## 2025-06-06 RX ORDER — FUROSEMIDE 10 MG/ML
40 INJECTION INTRAMUSCULAR; INTRAVENOUS ONCE
Refills: 0 | Status: COMPLETED | OUTPATIENT
Start: 2025-06-06 | End: 2025-06-06

## 2025-06-06 RX ORDER — INSULIN LISPRO 100 U/ML
INJECTION, SOLUTION INTRAVENOUS; SUBCUTANEOUS EVERY 6 HOURS
Refills: 0 | Status: DISCONTINUED | OUTPATIENT
Start: 2025-06-06 | End: 2025-06-08

## 2025-06-06 RX ORDER — SODIUM CHLORIDE 9 G/1000ML
1000 INJECTION, SOLUTION INTRAVENOUS
Refills: 0 | Status: DISCONTINUED | OUTPATIENT
Start: 2025-06-06 | End: 2025-06-08

## 2025-06-06 RX ORDER — HYDROMORPHONE/SOD CHLOR,ISO/PF 2 MG/10 ML
0.5 SYRINGE (ML) INJECTION EVERY 4 HOURS
Refills: 0 | Status: DISCONTINUED | OUTPATIENT
Start: 2025-06-06 | End: 2025-06-07

## 2025-06-06 RX ORDER — NOREPINEPHRINE BITARTRATE 8 MG
0.05 SOLUTION INTRAVENOUS
Qty: 16 | Refills: 0 | Status: DISCONTINUED | OUTPATIENT
Start: 2025-06-06 | End: 2025-06-07

## 2025-06-06 RX ORDER — ACETAMINOPHEN 500 MG/5ML
1000 LIQUID (ML) ORAL EVERY 6 HOURS
Refills: 0 | Status: COMPLETED | OUTPATIENT
Start: 2025-06-06 | End: 2025-06-07

## 2025-06-06 RX ORDER — SODIUM CHLORIDE 9 G/1000ML
1000 INJECTION, SOLUTION INTRAVENOUS ONCE
Refills: 0 | Status: COMPLETED | OUTPATIENT
Start: 2025-06-06 | End: 2025-06-06

## 2025-06-06 RX ORDER — SODIUM CHLORIDE 9 G/1000ML
1000 INJECTION, SOLUTION INTRAVENOUS
Refills: 0 | Status: DISCONTINUED | OUTPATIENT
Start: 2025-06-06 | End: 2025-06-06

## 2025-06-06 RX ORDER — HYDROMORPHONE/SOD CHLOR,ISO/PF 2 MG/10 ML
1 SYRINGE (ML) INJECTION EVERY 4 HOURS
Refills: 0 | Status: DISCONTINUED | OUTPATIENT
Start: 2025-06-06 | End: 2025-06-07

## 2025-06-06 RX ORDER — ALBUMIN (HUMAN) 12.5 G/50ML
500 INJECTION, SOLUTION INTRAVENOUS ONCE
Refills: 0 | Status: COMPLETED | OUTPATIENT
Start: 2025-06-06 | End: 2025-06-06

## 2025-06-06 RX ORDER — INSULIN GLARGINE-YFGN 100 [IU]/ML
10 INJECTION, SOLUTION SUBCUTANEOUS AT BEDTIME
Refills: 0 | Status: DISCONTINUED | OUTPATIENT
Start: 2025-06-06 | End: 2025-06-08

## 2025-06-06 RX ADMIN — ALBUMIN (HUMAN) 250 MILLILITER(S): 12.5 INJECTION, SOLUTION INTRAVENOUS at 09:10

## 2025-06-06 RX ADMIN — SODIUM CHLORIDE 100 MILLILITER(S): 9 INJECTION, SOLUTION INTRAVENOUS at 09:16

## 2025-06-06 RX ADMIN — Medication 25 GRAM(S): at 21:33

## 2025-06-06 RX ADMIN — Medication 400 MILLIGRAM(S): at 12:09

## 2025-06-06 RX ADMIN — SODIUM CHLORIDE 1000 MILLILITER(S): 9 INJECTION, SOLUTION INTRAVENOUS at 00:38

## 2025-06-06 RX ADMIN — Medication 1 APPLICATION(S): at 12:12

## 2025-06-06 RX ADMIN — Medication 25 GRAM(S): at 13:12

## 2025-06-06 RX ADMIN — FUROSEMIDE 40 MILLIGRAM(S): 10 INJECTION INTRAMUSCULAR; INTRAVENOUS at 18:03

## 2025-06-06 RX ADMIN — Medication 0.5 MILLIGRAM(S): at 23:25

## 2025-06-06 RX ADMIN — INSULIN LISPRO 1: 100 INJECTION, SOLUTION INTRAVENOUS; SUBCUTANEOUS at 12:11

## 2025-06-06 RX ADMIN — INSULIN GLARGINE-YFGN 10 UNIT(S): 100 INJECTION, SOLUTION SUBCUTANEOUS at 23:44

## 2025-06-06 RX ADMIN — NOREPINEPHRINE BITARTRATE 4.36 MICROGRAM(S)/KG/MIN: 8 SOLUTION at 09:10

## 2025-06-06 RX ADMIN — Medication 400 MILLIGRAM(S): at 23:19

## 2025-06-06 RX ADMIN — Medication 112 MICROGRAM(S): at 23:19

## 2025-06-06 RX ADMIN — Medication 400 MILLIGRAM(S): at 18:03

## 2025-06-06 NOTE — PROGRESS NOTE ADULT - ASSESSMENT
This is a 86 y/o M w/ PMhx HTN, HLD, DM2, hypothyroidism, prostate cancer s/p prostatectomy, CKD, TIA, pacemaker placed in 5/2024 for abnormal arrythmia initially admitted to [facility] on 5/6 for back pain, now transferred to [facility] for orthopedic spine. Labs w/ leukocytosis to 18, BCx w/ E faecalis in multiple sets.     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 S/p PPM removal   #Acute cholecystitis s/p lap geetha 6/4   #Respiratory failure, improved   #JEROME, improving     Overall, 86 y/o M w/ PMhx HTN, HLD, DM2, hypothyroidism, prostate cancer s/p prostatectomy, CKD, TIA, pacemaker placed in 5/2024 for abnormal arrythmia transferred to [facility] from [facility] for MRI spine and ortho evaluation give LBP and lumbar compression w/ urinary retention. Pt noted to have chills and leukocytosis to 18, now with high grade E faecalis bacteremia. Unclear source however UCx not done, U/A with 10 WBCs, CT A/P w/o IV contrast on 5/10 w/o acute infectious source, s/p PPM removal, MARIA LUISA w/o vegetations.   Repeat MRI with OM/discitis, L4/L5, severe spinal canal stenosis L3-L4, L-5, impingement of cauda equina, planned for OR w/ orthopedics, however c/b acute cholecystitis s/p lap geetha 6/4   CT findings of possible infarcts kidney/spleen, pt with previous MARIA LUISA w/o findings of IE. C/b post bleeding, transferred to SICU for pressor support     Recommendations:   1. Zosyn 3.375 g q8 for likely 5 day course after OR, then switch back to Ampicillin 2 g q4 on 6/10   2. Appreciate SICU care     Thank you for consulting us and involving us in the management of this patient's case. In addition to reviewing history, imaging, documents, labs, microbiology, and infection control strategies and potential issues.     ID will continue to follow    Darrin Falk M.D.  Attending Physician  Division of Infectious Diseases  Department of Medicine    Please contact through MS Teams message.  Office: [phone] (after 5 PM or weekend)

## 2025-06-06 NOTE — PROGRESS NOTE ADULT - SUBJECTIVE AND OBJECTIVE BOX
INTERVAL EVENTS: RRT and patient transferred to SICU  SUBJECTIVE: Patient seen and examined at bedside with surgical team    OBJECTIVE:    Vital Signs Last 24 Hrs  T(C): 36.1 (06 Jun 2025 08:00), Max: 36.9 (05 Jun 2025 18:20)  T(F): 97 (06 Jun 2025 08:00), Max: 98.5 (05 Jun 2025 18:20)  HR: 59 (06 Jun 2025 08:00) (51 - 78)  BP: 109/49 (06 Jun 2025 08:00) (80/50 - 142/45)  BP(mean): 67 (06 Jun 2025 08:00) (65 - 76)  RR: 14 (06 Jun 2025 08:00) (14 - 19)  SpO2: 91% (06 Jun 2025 08:00) (91% - 96%)    Parameters below as of 06 Jun 2025 08:00  Patient On (Oxygen Delivery Method): nasal cannula w/ humidification  O2 Flow (L/min): 6  I&O's Detail  MEDICATIONS  (STANDING):  acetaminophen   IVPB .. 1000 milliGRAM(s) IV Intermittent every 6 hours  albumin human  5% IVPB 500 milliLiter(s) IV Intermittent once  aspirin  chewable 81 milliGRAM(s) Oral daily  chlorhexidine 2% Cloths 1 Application(s) Topical daily  dextrose 5%. 1000 milliLiter(s) (100 mL/Hr) IV Continuous <Continuous>  dextrose 5%. 1000 milliLiter(s) (50 mL/Hr) IV Continuous <Continuous>  dextrose 50% Injectable 25 Gram(s) IV Push once  dextrose 50% Injectable 12.5 Gram(s) IV Push once  dextrose 50% Injectable 25 Gram(s) IV Push once  dextrose Oral Gel 15 Gram(s) Oral once  glucagon  Injectable 1 milliGRAM(s) IntraMuscular once  insulin glargine Injectable (LANTUS) 20 Unit(s) SubCutaneous at bedtime  insulin lispro (ADMELOG) corrective regimen sliding scale   SubCutaneous three times a day before meals  insulin lispro (ADMELOG) corrective regimen sliding scale   SubCutaneous at bedtime  insulin lispro Injectable (ADMELOG) 6 Unit(s) SubCutaneous three times a day before meals  lactated ringers. 1000 milliLiter(s) (100 mL/Hr) IV Continuous <Continuous>  levothyroxine Injectable 112 MICROGram(s) IV Push at bedtime  lidocaine   4% Patch 1 Patch Transdermal daily  norepinephrine Infusion 0.05 MICROgram(s)/kG/Min (4.36 mL/Hr) IV Continuous <Continuous>  piperacillin/tazobactam IVPB.. 3.375 Gram(s) IV Intermittent every 8 hours  povidone iodine 10% Nasal Swab 1 Application(s) Both Nostrils once    MEDICATIONS  (PRN):  HYDROmorphone  Injectable 0.5 milliGRAM(s) IV Push every 4 hours PRN Moderate Pain (4 - 6)  HYDROmorphone  Injectable 1 milliGRAM(s) IV Push every 4 hours PRN Severe Pain (7 - 10)  HYDROmorphone  Injectable 0.5 milliGRAM(s) IV Push every 4 hours PRN Severe Pain (7 - 10)      PHYSICAL EXAM:  NEUROLOGY: Normal, NAD, alert, oriented x 3, no focal deficits.   RESPIRATORY: Lungs clear to auscultation, Normal expansion/effort.  Saturating well on room air  CARDIOVASCULAR: S1, S2.  Regular rate and rhythm.  Peripheral edema   GI/NUTRITION: Abdomen soft, appropriately tender, port site with some strikethrough on right sided site      LABS:                        8.7    11.08 )-----------( 170      ( 06 Jun 2025 06:40 )             26.5     06-06    135  |  99  |  40[H]  ----------------------------<  169[H]  3.8   |  22  |  2.70[H]    Ca    7.3[L]      06 Jun 2025 06:40  Phos  4.4     06-06  Mg     2.30     06-06    TPro  5.4[L]  /  Alb  2.5[L]  /  TBili  0.6  /  DBili  0.3  /  AST  68[H]  /  ALT  163[H]  /  AlkPhos  399[H]  06-06    PT/INR - ( 06 Jun 2025 06:40 )   PT: 10.5 sec;   INR: <0.90 ratio         PTT - ( 06 Jun 2025 06:40 )  PTT:28.2 sec  LIVER FUNCTIONS - ( 06 Jun 2025 06:40 )  Alb: 2.5 g/dL / Pro: 5.4 g/dL / ALK PHOS: 399 U/L / ALT: 163 U/L / AST: 68 U/L / GGT: x           Urinalysis Basic - ( 06 Jun 2025 06:40 )    Color: x / Appearance: x / SG: x / pH: x  Gluc: 169 mg/dL / Ketone: x  / Bili: x / Urobili: x   Blood: x / Protein: x / Nitrite: x   Leuk Esterase: x / RBC: x / WBC x   Sq Epi: x / Non Sq Epi: x / Bacteria: x

## 2025-06-06 NOTE — PROGRESS NOTE ADULT - ASSESSMENT
85 year old male with 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 After family discussions they wish to proceed with surgery.     Plan:  -OR for L3-5 lami/fusion to be rescheduled, new date pending  -med and cards cleared, appreciate care  -will need to hold Tlovenox prior to OR if restarted  -WBAT  -pain control prn  -bowel reg prn  -s/p lap geetha per gen surg  -antibiotics per ID - zosyn   -continue PT/OT  -dispo pending    For all questions related to patient care, please reach out via the on-call pager.*     Brenda Mcgraw PGY2  Orthopedic Surgery  [facility]: [phone]  [facility]: [phone]  [facility]: [phone]

## 2025-06-06 NOTE — PROVIDER CONTACT NOTE (CRITICAL VALUE NOTIFICATION) - BACKGROUND
Wedge compression fracture of first lumbar vertebra, initial encounter for closed fracture
85M with pmhx of prostate cancer transferred from [place] for MRI back and ortho eval
patient admitted for wedge compression fracture of first lumbar vertebra
pt admit with prostate cancer transferred from [place] for MRI back and ortho eval
85M with pmhx of prostate cancer transferred from [place] for MRI back and ortho eval
pt admit with prostate cancer transferred from [place] for MRI back and ortho eval
Pt is an 85M with HTN, HLD, DM2, prostate cancer s/p prostatectomy, CKD, TIA with a pacemaker who presents transferred from [facility] for further evaluation of low back pain. s/p Cholecystectomy.

## 2025-06-06 NOTE — PROVIDER CONTACT NOTE (CRITICAL VALUE NOTIFICATION) - NAME OF MD/NP/PA/DO NOTIFIED:
Lili Pace via TEAMS
MD Everardo Bates
MD Tahira Velasquez
Mala Reynolds MD
Lili Dsouza
MD Lili Dsouza
Raman Choppa

## 2025-06-06 NOTE — PROGRESS NOTE ADULT - ASSESSMENT
85 year old man with multiple chronic medical comorbidities including hypertension, sinus node dysfunction, type two diabetes, and chronic kidney disease (baseline creatinine 1.5-2.0) presently admitted for lower back pain associated with lumbar compression deformities with concern for cauda equina impingement. He was found to be bacteremic (E faecalis) due to acute acalculous cholecystitis, and is now seen following laparoscopic cholecystectomy with Dr. Triny Verde 6/5/2025.     PLAN:  - Zosyn  - NPO/ IVF  - ISS  - Pain Control    B Team Surgery  [id number]

## 2025-06-06 NOTE — RAPID RESPONSE TEAM SUMMARY - NSSITUATIONBACKGROUNDRRT_GEN_ALL_CORE
Pt is an 85M with HTN, HLD, DM2, prostate cancer s/p prostatectomy, CKD, TIA with a pacemaker who presents transferred from [place] for further evaluation of low back pain secondary to lumbar compression deformities. EP on board to clear PPM for MRI compatibility. Found to have e facaelis bacteremia. On abx, unable to tolerate MRI s/p PPM removal on 5/16 and exchange for leadless PPM. Plan for MARIA LUISA on 5/20 -> negative for endocarditis. MRI Lumbar spine attempted 5/29 under conscious sedation however only partially complete again limited by pain and noise. Images obtained showing osteomyelitis and cauda equina impingement. Orthopedics planning for surgical intervention, however course complicated by acute acalculous cholecystitis 6/4, now s/p robotic cholecystectomy, broadened to zosyn.  Pt found to be newly hypotensive overnight. Initially given 2L bolus as pressures were 80/50. Pt finished the bolus but pressures were not fluid responsive. Stat labs collected including CBC/CMP/Blood gas. Hgb noted to be low when labs resulted. Patient also noted to be a bit more lethargic. RRT initially called and 1u pRBC given. Patient also intermittently bradycardic--he had received clonidine around 6 pm which probably also blunted the resultant tachycardia iso probable bleed. Surgery was paged and evaluated bedside. Intially recommended blood cultures. Second RRT called as patient was not able to improve pressures despite giving 1u pRBC and imminently needing levophed. Additional 2u pRBC pressure baggerd as patient also become increasingly lethargic and bradycardic to 30s. Pt given 1mg atropine with good response to HR now up to 60s also with improvement in pressures. Pt also uptitrated on levophed to as high as .15 in order to stabilize.  Pt is an 85M with HTN, HLD, DM2, prostate cancer s/p prostatectomy, CKD, TIA with a pacemaker who presents transferred from [place] for further evaluation of low back pain secondary to lumbar compression deformities. EP on board to clear PPM for MRI compatibility. Found to have e facaelis bacteremia. On abx, unable to tolerate MRI s/p PPM removal on 5/16 and exchange for leadless PPM. Plan for MARIA LUISA on 5/20 -> negative for endocarditis. MRI Lumbar spine attempted 5/29 under conscious sedation however only partially complete again limited by pain and noise. Images obtained showing osteomyelitis and cauda equina impingement. Orthopedics planning for surgical intervention, however course complicated by acute acalculous cholecystitis 6/4, now s/p robotic cholecystectomy, broadened to zosyn.  Pt found to be newly hypotensive overnight. Initially given 2L bolus as pressures were 80/50. Pt finished the bolus but pressures were not fluid responsive. Stat labs collected including CBC/CMP/Blood gas. Hgb noted to be low when labs resulted. Patient also noted to be a bit more lethargic. RRT initially called and 1u pRBC given. Patient also intermittently bradycardic--he had received clonidine around 6 pm which probably also blunted the resultant tachycardia iso probable bleed. Surgery was paged and evaluated bedside. Second RRT called as patient was not able to improve pressures despite giving 1u pRBC and imminently needing levophed. Additional 2u pRBC pressure bagged as patient also become increasingly lethargic and bradycardic to 30s. Pt given 1mg atropine with good response to HR now up to 60s also with improvement in pressures. Pt also uptitrated on levophed to as high as .15 in order to stabilize. Pt accepted to SICU. BP improved to 105/70s.

## 2025-06-06 NOTE — CHART NOTE - NSCHARTNOTEFT_GEN_A_CORE
Received a call for reassessment of Mr Liu in view of hypotension to 80s systolic. Mr Liu underwent robotic geetha for acalculous cholecystitis 24 hours back. Was oozy through the case.    Medical RRT was called when he didn't respond to 2 lit of fluids. Stat cbc - Hb drop from 8.6 to 6.4. Creat up from 1.37 to 2.53.     Current HR 93, BP 99/40. Lactate is 1.     Subjectively, patient feels fine, has some incisional pain.  Abdominal exam - soft and distended. Dressing has some dried blood. Bowel function -/- since surgery.     Plan:  - Going to be transfused 1 unit.  - Will check post transfusion CBC, if inadequate response, will consider iv CT (A+P)    [id number]

## 2025-06-06 NOTE — PROVIDER CONTACT NOTE (CRITICAL VALUE NOTIFICATION) - PERSON GIVING RESULT:
Lili Dsouza
VLADIMIRH
Cally Teran
Remedios Chambers (Michelle)
Dunia Hollis / [facility]
Balta Tang- Lab
Josiane Chisholm (Lab)

## 2025-06-06 NOTE — PROVIDER CONTACT NOTE (CRITICAL VALUE NOTIFICATION) - SITUATION
Pt on heparin gtt running at 17ml/hr with aptt 162.4
aerobic growth gram (+) cocci in pairs and chains
2 positive blood culture sets collected on 5/14, prelim showing growth in aerobic bottle gram + cocci in pairs and chains. 2nd set - amended, growth in aerobic and anaerobic bottle blood culture bottle turned positive after final result was released
HGB 6.5 HCT 20.0 from [facility]
Blood Cultures 5/14 - Growth in Aerobic Bottle Gram Positive Cocci in Pairs and Chains
blood culture from 5/15: prelim result growth in aerobic bottle enterococcus faecalis. Growth in anaerobic bottle gram positive cocci in pairs and chains. Blood culture bottle turned positive after final report released
Lab reported gram positive cocci in pairs and chains in aerobic bottle from blood culture taken on 5/11.

## 2025-06-06 NOTE — PROGRESS NOTE ADULT - SUBJECTIVE AND OBJECTIVE BOX
Infectious Diseases Follow Up:    Patient is a 85y old  Male who presents with a chief complaint of back pain (06 Jun 2025 09:05)      Interval History/ROS:  Pt with RRT for hypotension 2/2 anemia bleeding from port site. Given pRBCs, transferred to SICU for pressor support     Allergies  gabapentin (Other)        ANTIMICROBIALS:  piperacillin/tazobactam IVPB.. 3.375 every 8 hours      Current Abx:     Previous Abx     OTHER MEDS:  MEDICATIONS  (STANDING):  acetaminophen   IVPB .. 1000 every 6 hours  aspirin  chewable 81 daily  dextrose 50% Injectable 25 once  dextrose 50% Injectable 12.5 once  dextrose 50% Injectable 25 once  dextrose Oral Gel 15 once  glucagon  Injectable 1 once  HYDROmorphone  Injectable 0.5 every 4 hours PRN  HYDROmorphone  Injectable 0.5 every 4 hours PRN  HYDROmorphone  Injectable 1 every 4 hours PRN  insulin glargine Injectable (LANTUS) 20 at bedtime  insulin lispro (ADMELOG) corrective regimen sliding scale  three times a day before meals  insulin lispro (ADMELOG) corrective regimen sliding scale  at bedtime  insulin lispro Injectable (ADMELOG) 6 three times a day before meals  levothyroxine Injectable 112 at bedtime  norepinephrine Infusion 0.05 <Continuous>      Vital Signs Last 24 Hrs  T(C): 36.1 (06 Jun 2025 08:00), Max: 36.9 (05 Jun 2025 18:20)  T(F): 97 (06 Jun 2025 08:00), Max: 98.5 (05 Jun 2025 18:20)  HR: 56 (06 Jun 2025 10:00) (51 - 78)  BP: 115/52 (06 Jun 2025 10:00) (80/50 - 142/45)  BP(mean): 70 (06 Jun 2025 10:00) (65 - 76)  RR: 12 (06 Jun 2025 10:00) (12 - 22)  SpO2: 93% (06 Jun 2025 10:00) (91% - 96%)    Parameters below as of 06 Jun 2025 09:00    O2 Flow (L/min): 4      PHYSICAL EXAM:  GENERAL: NAD, well-developed  HEAD:  Atraumatic, Normocephalic  EYES: EOMI, conjunctiva and sclera clear  CHEST/LUNG: On RA, not in respiratory distress, c  ABDOMEN: mildly distended, painful to touch midline, lower dressing saturated w/ blood   PSYCH: AAOx3                            8.7    11.08 )-----------( 170      ( 06 Jun 2025 06:40 )             26.5       06-06    135  |  99  |  40[H]  ----------------------------<  169[H]  3.8   |  22  |  2.70[H]    Ca    7.3[L]      06 Jun 2025 06:40  Phos  4.4     06-06  Mg     2.30     06-06    TPro  5.4[L]  /  Alb  2.5[L]  /  TBili  0.6  /  DBili  0.3  /  AST  68[H]  /  ALT  163[H]  /  AlkPhos  399[H]  06-06      Urinalysis Basic - ( 06 Jun 2025 06:40 )    Color: x / Appearance: x / SG: x / pH: x  Gluc: 169 mg/dL / Ketone: x  / Bili: x / Urobili: x   Blood: x / Protein: x / Nitrite: x   Leuk Esterase: x / RBC: x / WBC x   Sq Epi: x / Non Sq Epi: x / Bacteria: x        MICROBIOLOGY:  v  Blood Blood-Peripheral  06-04-25   No growth at 24 hours  --  --      Blood Blood-Peripheral  06-04-25   No growth at 24 hours  --  --      Blood Blood  05-18-25   No growth at 5 days  --  --      Blood Blood-Peripheral  05-17-25   No growth at 5 days  --  --      Blood Blood-Peripheral  05-17-25   No growth at 5 days  --  --      Blood Blood  05-17-25   No growth at 5 days  --  --      Blood Blood  05-15-25   No growth at 5 days  --  --      Blood Blood  05-15-25   No growth at 5 days  --  --      Blood Blood  05-14-25   Growth in aerobic bottle: Enterococcus faecalis  See previous culture [id number]  Direct identification is available within approximately 3-5  hours either by Blood Panel Multiplexed PCR or Direct  MALDI-TOF. Details: [email or web address]  --  Blood Culture PCR      Blood Blood  05-14-25   Growth in aerobic and anaerobic bottles: Enterococcus faecalis  Growth in anaerobic bottle: blood culture bottle turned positive after  the final report was released.  --  Enterococcus faecalis      Blood Blood-Peripheral  05-12-25   Growth in aerobic and anaerobic bottles: Enterococcus faecalis  See previous culture [id number]  --    Growth in aerobic bottle: Gram Positive Cocci in Pairs and Chains  Growth in anaerobic bottle: Gram Positive Cocci in Pairs and Chains      Blood Blood-Peripheral  05-12-25   Growth in aerobic and anaerobic bottles: Enterococcus faecalis  See previous culture [id number]  --    Growth in aerobic bottle: Gram Positive Cocci in Pairs and Chains  Growth in anaerobic bottle: Gram Positive Cocci in Pairs and Chains      Blood Blood-Peripheral  05-11-25   Growth in aerobic and anaerobic bottles: Enterococcus faecalis  Direct identification is available within approximately 3-5  hours either by Blood Panel Multiplexed PCR or Direct  MALDI-TOF. Details: [email or web address]  --  Blood Culture PCR  Enterococcus faecalis                RADIOLOGY:

## 2025-06-06 NOTE — PROVIDER CONTACT NOTE (CRITICAL VALUE NOTIFICATION) - RECOMMENDATIONS
continue IV abx and continue to monitor blood cultures results
MD Everardo Bates made aware.
awaiting orders.
notify provider
Hold for 1hr, decrease rate by 3ml/hr and resume at 14ml/hr
Packed red cell 1 unit.
[employer]

## 2025-06-06 NOTE — PROCEDURE NOTE - ADDITIONAL PROCEDURE DETAILS
Dual Chamber pacemaker in AAI <> DDD mode   Normal sensing and pacing via iterative testing  Excellent threshold capture  No events recorded   No reprogramming   Device is MRI conditional
Attempted emergent introducer, sheath kinked, able to place ultrasound guided triple lumen cvc in left femoral vein on second stick. No hematoma, secured in place.

## 2025-06-06 NOTE — CONSULT NOTE ADULT - ASSESSMENT
ASSESSMENT:  85y Male     PLAN:  ***  Neurologic:   Respiratory:   Cardiovascular:   Gastrointestinal/Nutrition:   Renal/Genitourinary:   Hematologic:   Infectious Disease:   Tubes/Lines/Drains:   Endocrine:   Disposition:     --------------------------------------------------------------------------------------    Critical Care Diagnoses:   ASSESSMENT:   85M PMH HTN, DM2, CKD (baseline creatinine 1.5-2), TIAs (on Eliquis at home), sinus node dysfunction (s/p PPM ~1 year ago), prostate cancer s/p prostectomy (~early 2000s), total thyroidectomy (~2013), initially presented after a fall and found to have lumbar stenosis with cauda equina impingement however on 6/4 developed acute abdominal pain and had a CT abdomen and pelvis that had signs of acute cholecystitis. On 6/5 patient underwent a robotic cholecystectomy, POD 1 patient became hypotensive, unresponsive to 2L fluid resuscitation and 3 u pRBC and was started on vasopressors and transferred to SICU.    PLAN:   Neurologic:   - A&Ox3  - Tylenol and prn dilaudid     Respiratory:   - O2 sat >92%  - On nasal cannula    Cardiovascular:   - Holding home antihypertensives  - Repeat echo  - Check pro BNP  - trops negative  - wean pressors as tolerated    Gastrointestinal/Nutrition:   -NPO    Renal/Genitourinary:   - Maintain wong  - Monitor UOP    Hematologic:   - s/p 3 u pRBC  - Monitor H+H    Infectious Disease:   - Continue zosyn for 4 days post op  - Monitor WBC    Tubes/Lines/Drains:   - Right fem triple lumen  - right radial a line  - PIVs    Endocrine:   - 20 units lantus at home  - 6 premeal (hold if NPO)  - continue home levothyroxine    Disposition: SICU    --------------------------------------------------------------------------------------

## 2025-06-06 NOTE — PROVIDER CONTACT NOTE (CRITICAL VALUE NOTIFICATION) - TEST AND RESULT REPORTED:
HGB 6.5 HCT 20.0 from [facility]
blood culture from 5/15: prelim result growth in aerobic bottle enterococcus faecalis. Growth in anaerobic bottle gram positive cocci in pairs and chains. Blood culture bottle turned positive after final report released
2 positive blood culture sets collected on 5/14, prelim showing growth in aerobic bottle gram + cocci in pairs and chains. 2nd set - amended, growth in aerobic and anaerobic bottle blood culture bottle turned positive after final result was released
blood culture aerobic growth   aerobic growth gram (+) cocci in pairs and chains
Blood Cultures 5/14 - Growth in Aerobic Bottle Gram Positive Cocci in Pairs and Chains
aptt 162.4
Blood Culture 5/11 (gram + cocci in pairs and chains)

## 2025-06-06 NOTE — PROVIDER CONTACT NOTE (CRITICAL VALUE NOTIFICATION) - ASSESSMENT
A&Ox4. vital signs stable. NSR on telemetry monitoring.
Pt A&Ox4, no s/s of bleeding or bruising noted, no s/s of acute distress noted at this time
Alert and oriented x 4, denies chest pain, no SOB, no signs of bleeding noted.  Blood pressure is low-- see flowsheet.
Patient AOx4. Patient shows no s/s of infection, vitals stable.
see flowsheet
patient resting in bed, VS as documented. no complaints of pain or discomfort at this time

## 2025-06-06 NOTE — PROGRESS NOTE ADULT - ATTENDING COMMENTS
Reviewing the chart, interpreting lab data, discussing case with patient's daughter, interview and examination of patient, and documentation. Pt is at high risk of mortality due to his disease.

## 2025-06-06 NOTE — PROVIDER CONTACT NOTE (CRITICAL VALUE NOTIFICATION) - ACTION/TREATMENT ORDERED:
no orders at this time
Packed red cell 1 unit
Repeat blood cultures STAT ordered by MD Everardo Bates, and sent to lab.
Hold for 1hr, decrease rate by 3ml/hr and resume at 14ml/hr
no further orders at this time

## 2025-06-06 NOTE — PROGRESS NOTE ADULT - SUBJECTIVE AND OBJECTIVE BOX
ORTHOPEDIC PROGRESS NOTE    SUBJECTIVE:  RRT overnight for hypotension 70-80s/50s, bradycardia HR 30s. Received 2L IVF, 3uPRBC for Hgb 6.4. Received atropine x1 for bradycardia, started on levophed.     OBJECTIVE:  Vital Signs Last 24 Hrs  T(C): 36.8 (06 Jun 2025 03:48), Max: 36.9 (05 Jun 2025 18:20)  T(F): 98.2 (06 Jun 2025 03:48), Max: 98.5 (05 Jun 2025 18:20)  HR: 54 (06 Jun 2025 03:48) (51 - 78)  BP: 86/38 (06 Jun 2025 03:48) (80/50 - 142/45)  BP(mean): --  RR: 17 (06 Jun 2025 03:48) (17 - 19)  SpO2: 95% (06 Jun 2025 03:48) (94% - 96%)    Parameters below as of 06 Jun 2025 03:48  Patient On (Oxygen Delivery Method): nasal cannula  O2 Flow (L/min): 2    Physical Exam:  GEN: appears grey  SPINE:   Unable to assess due to ongoing RRT      LABS:  cret                        6.4    8.49  )-----------( 141      ( 06 Jun 2025 02:40 )             19.6     06-06    134[L]  |  100  |  38[H]  ----------------------------<  151[H]  3.7   |  22  |  2.53[H]    Ca    7.1[L]      06 Jun 2025 02:40    TPro  4.7[L]  /  Alb  2.1[L]  /  TBili  0.5  /  DBili  x   /  AST  66[H]  /  ALT  154[H]  /  AlkPhos  361[H]  06-06    PT/INR - ( 04 Jun 2025 18:54 )   PT: 13.2 sec;   INR: 1.11 ratio         PTT - ( 04 Jun 2025 18:54 )  PTT:36.0 sec

## 2025-06-06 NOTE — CONSULT NOTE ADULT - SUBJECTIVE AND OBJECTIVE BOX
SICU Consultation Note  =====================================================  HPI:  85M PMH HTN, DM2, CKD (baseline creatinine 1.5-2), TIAs (on Eliquis at home), sinus node dysfunction (s/p PPM ~1 year ago), prostate cancer s/p prostectomy (~early 2000s), total thyroidectomy (~2013) who presented to [facility] after falling and found to have lumbar decompression deformity. Bacteremic with E faeclis with unclear source, cultures eventually cleared and scheduled for OR with orthopedic surgery 6/5 for lumbar stenosis with cauda equina impingement however on 6/4 developed acute abdominal pain and had a CT abdomen and pelvis that had signs of acute cholecystitis. On 6/5 patient underwent a robotic cholecystectomy. That evening patient became hypotensive to 80's/60s that was unresponsive to 2 L of crystalloid resuscitation, stat labs drawn and were significant for Hb drop from 8.6 to 6.4. Creat up from 1.37 to 2.53. Patient was then transfused a total of 3 u pRBC and started on norepinphrine. Emergent CVC placed on the floor and patient was transferred to SICU.        Surgery Information  ***  Case Duration: 	EBL: 	IV Fluids: 	Blood Products: 	Urine Output:   Complications:     HISTORY  85y Male  HPI:  Pt is an 85M with HTN, HLD, DM2, prostate cancer s/p prostatectomy, CKD, TIA with a pacemaker who presents transferred from [facility] for further evaluation of low back pain. The pain began when he was going to the bathroom and worsened when he was in bed and rolled over. The pain is worse when he is walking or moving. He has never had back pain like this before. Patient normally ambulates with a walker or with a cane. Of note, patient endorses a fall in February where he fell into a pile of snow. Patient denies any injury after that event and did not have any imaging at that time. He usually has one BM every 3-4 days and this has not changed. Wakes up 2-3x/night to urinate. No episodes of bladder or bowel incontinence. He says he has diabetic neuropathy and has seen pain management. Says he has tried gabapentin but it made his legs very weak and he almost fell.    At [facility], CT showed multiple lumbar compression deformities. MRI was unable to be performed 2/2 ppm compatibility. He was transferred to [facility] for MRI and orthopedic eval.  (11 May 2025 03:10)    Allergies: gabapentin (Other)    PAST MEDICAL & SURGICAL HISTORY:  HTN (hypertension)      HLD (hyperlipidemia)      DM (diabetes mellitus)      TIA (transient ischemic attack)      Prostate cancer      S/P prostatectomy      Pacemaker      H/O thyroidectomy        FAMILY HISTORY:  FHx: heart disease (Father, Mother)        SOCIAL HISTORY:  ***    ADVANCE DIRECTIVES: Full Code  ***    REVIEW OF SYSTEMS:  ***  Constitutional Symptoms: Denies weight loss/gain, fatigue, fever/chills, sweats, headache, appetite changes  Allergic/Immunologic: Denies drug/food/pet allergies, hayfever  Integumentary: Denies color changes, itch, rash, sores, hives, moles, alopecia  Eyes: Denies vision changes, blurriness, dryness, pain, cataracts, glasses/contacts, photophobia   ENT: Denies tinnitus, hearing difficulty, ear discharge/pain/obstruction, sinusitis, PND, epistaxis, sore throat, dysphagia, canker sores  Cardiovascular: Denies chest pain upon exertion, murmurs, angina, HTN, MI, palpitations, thrombosis, PVD, DVT  Respiratory: Denies dyspnea, cough, infection, sputum, wheezing, SOB, hemoptysis, congestion  Musculoskeletal: Denies pain, weakness, cramps, joint pain, swelling, arthritis, strain/sprain/fracture, scoliosis, atrophy  Gastrointestinal: Denies bloating, nausea, vomiting, heartburn, diarrhea, constipation, abdominal pain, hematemesis, BRBPR  Neurological: Denies syncope, seizures, vertigo, coordination, memory loss, paralysis, tremors, blackouts, ataxia, numbness  Genitourinary: Denies dysuria, hematuria, frequency, burning, urgency, renal stones, STD, infertility  Endocrine: Denies diabetes, excessive hunger/thirst/urination, goiter  Hematologic/Lymphatic: Denies bleeding disorders, anemia  Psychiatric: Denies depression, anxiety, hallucinations, suicidal ideation, tension, mood changes, substance abuse, addictions    HOME MEDICATIONS:  ***  --------------------------------------------------------------------------------------  Home Medications:  apixaban 2.5 mg oral tablet: 1 tab(s) orally 2 times a day (11 May 2025 03:43)  aspirin 81 mg oral tablet: 1 tab(s) orally once a day (11 May 2025 03:38)  chlorthalidone 25 mg oral tablet: 1 tab(s) orally once a day (11 May 2025 03:43)  cloNIDine 0.2 mg oral tablet: 1 tab(s) orally 3 times a day (11 May 2025 03:43)  ergocalciferol 1.25 mg (50,000 intl units) oral tablet: 1 tab(s) orally once a week (11 May 2025 03:43)  HumaLOG 100 units/mL injectable solution: 12 unit(s) injectable 3 times a day (with meals) (11 May 2025 03:43)  hydrALAZINE 100 mg oral tablet: 1 tab(s) orally 3 times a day (11 May 2025 03:43)  Lantus 100 units/mL subcutaneous solution: 24 unit(s) subcutaneous once a day (at bedtime) (11 May 2025 03:43)  Levothroid 150 mcg (0.15 mg) oral tablet: 1 tab(s) orally once a day (11 May 2025 03:43)  propranolol 120 mg oral capsule, extended release: 1 cap(s) orally once a day (11 May 2025 03:38)  rosuvastatin 10 mg oral tablet: 1 tab(s) orally once a day (at bedtime) (11 May 2025 03:43)  telmisartan 40 mg oral tablet: 1 tab(s) orally once a day (11 May 2025 03:43)  Tradjenta 5 mg oral tablet: 1 tab(s) orally once a day (11 May 2025 03:43)    --------------------------------------------------------------------------------------    CURRENT MEDICATIONS:   --------------------------------------------------------------------------------------  Neurologic Medications  HYDROmorphone  Injectable 0.5 milliGRAM(s) IV Push every 4 hours PRN Severe Pain (7 - 10)  oxyCODONE    IR 10 milliGRAM(s) Oral every 4 hours PRN Severe Pain (7 - 10)  oxyCODONE    IR 5 milliGRAM(s) Oral every 4 hours PRN Moderate Pain (4 - 6)    Respiratory Medications  benzonatate 100 milliGRAM(s) Oral three times a day PRN Cough    Cardiovascular Medications  cloNIDine 0.2 milliGRAM(s) Oral two times a day  hydrALAZINE 100 milliGRAM(s) Oral three times a day  NIFEdipine XL 30 milliGRAM(s) Oral daily  propranolol  milliGRAM(s) Oral daily    Gastrointestinal Medications  aluminum hydroxide/magnesium hydroxide/simethicone Suspension 30 milliLiter(s) Oral once PRN Dyspepsia  bisacodyl Suppository 10 milliGRAM(s) Rectal daily  dextrose 5%. 1000 milliLiter(s) IV Continuous <Continuous>  dextrose 5%. 1000 milliLiter(s) IV Continuous <Continuous>  lactated ringers. 1000 milliLiter(s) IV Continuous <Continuous>  magnesium hydroxide Suspension 30 milliLiter(s) Oral daily  senna 2 Tablet(s) Oral at bedtime    Genitourinary Medications  tamsulosin 0.4 milliGRAM(s) Oral at bedtime    Hematologic/Oncologic Medications  aspirin  chewable 81 milliGRAM(s) Oral daily    Antimicrobial/Immunologic Medications  piperacillin/tazobactam IVPB.. 3.375 Gram(s) IV Intermittent every 8 hours    Endocrine/Metabolic Medications  dextrose 50% Injectable 25 Gram(s) IV Push once  dextrose 50% Injectable 12.5 Gram(s) IV Push once  dextrose 50% Injectable 25 Gram(s) IV Push once  dextrose Oral Gel 15 Gram(s) Oral once  glucagon  Injectable 1 milliGRAM(s) IntraMuscular once  insulin glargine Injectable (LANTUS) 20 Unit(s) SubCutaneous at bedtime  insulin lispro (ADMELOG) corrective regimen sliding scale   SubCutaneous three times a day before meals  insulin lispro (ADMELOG) corrective regimen sliding scale   SubCutaneous at bedtime  insulin lispro Injectable (ADMELOG) 6 Unit(s) SubCutaneous three times a day before meals  levothyroxine 150 MICROGram(s) Oral daily  rosuvastatin 10 milliGRAM(s) Oral at bedtime    Topical/Other Medications  chlorhexidine 2% Cloths 1 Application(s) Topical daily  lidocaine   4% Patch 1 Patch Transdermal daily  povidone iodine 10% Nasal Swab 1 Application(s) Both Nostrils once    --------------------------------------------------------------------------------------    VITAL SIGNS, INS/OUTS (last 24 hours):  --------------------------------------------------------------------------------------  ((Insert SICU Vitals / Is+Os here)) ***  --------------------------------------------------------------------------------------    EXAM  NEUROLOGY  RASS:   	GCS:    Exam: Normal, NAD, alert, oriented x 3, no focal deficits. ***    HEENT  Exam: Normocephalic, atraumatic.  EOMI ***    RESPIRATORY  Exam: Lungs clear to auscultation, Normal expansion/effort.  ***  Mechanical Ventilation:     CARDIOVASCULAR  Exam: S1, S2.  Regular rate and rhythm.  Peripheral edema  ***    GI/NUTRITION  Exam: Abdomen soft, Non-tender, Non-distended.  ***  Wound:   ***  Current Diet:  NPO ***    VASCULAR  Exam: Extremities warm, pink, well-perfused.  ***    MUSCULOSKELETAL  Exam: All extremities moving spontaneously without limitations.  ***    SKIN:  Exam: Good skin turgor, no skin breakdown.  ***    METABOLIC/FLUIDS/ELECTROLYTES  dextrose 5%. 1000 milliLiter(s) IV Continuous <Continuous>  dextrose 5%. 1000 milliLiter(s) IV Continuous <Continuous>  lactated ringers. 1000 milliLiter(s) IV Continuous <Continuous>      HEMATOLOGIC  [x] DVT Prophylaxis: aspirin  chewable 81 milliGRAM(s) Oral daily    Transfusions:	[] PRBC	[] Platelets		[] FFP	[] Cryoprecipitate    INFECTIOUS DISEASE  Antimicrobials/Immunologic Medications:  piperacillin/tazobactam IVPB.. 3.375 Gram(s) IV Intermittent every 8 hours    Day #  	of    ***    Tubes/Lines/Drains  ***  [x] Peripheral IV  [] Central Venous Line     	[] R	[] L	[] IJ	[] Fem	[] SC	Date Placed:   [] Arterial Line		[] R	[] L	[] Fem	[] Rad	[] Ax	Date Placed:   [] PICC:         	[] Midline		[] Mediport  [] Urinary Catheter		Date Placed:     LABS  --------------------------------------------------------------------------------------  ((Insert SICU Labs here))***  --------------------------------------------------------------------------------------    OTHER LABS    IMAGING RESULTS  Echo:   CT:   Xray:     ASSESSMENT:  85y Male ***    PLAN:  ***  Neurologic:   Respiratory:   Cardiovascular:   Gastrointestinal/Nutrition:   Renal/Genitourinary:   Hematologic:   Infectious Disease:   Tubes/Lines/Drains:   Endocrine:   Disposition:     --------------------------------------------------------------------------------------    Critical Care Diagnoses:   SICU Consultation Note  =====================================================  HPI:  85M PMH HTN, DM2, CKD (baseline creatinine 1.5-2), TIAs (on Eliquis at home), sinus node dysfunction (s/p PPM ~1 year ago), prostate cancer s/p prostectomy (~early 2000s), total thyroidectomy (~2013) who presented to [facility] after falling and found to have lumbar decompression deformity. Bacteremic with E faeclis with unclear source, cultures eventually cleared and scheduled for OR with orthopedic surgery 6/5 for lumbar stenosis with cauda equina impingement however on 6/4 developed acute abdominal pain and had a CT abdomen and pelvis that had signs of acute cholecystitis. On 6/5 patient underwent a robotic cholecystectomy. That evening patient became hypotensive to 80's/60s that was unresponsive to 2 L of crystalloid resuscitation, stat labs drawn and were significant for Hb drop from 8.6 to 6.4. Creat up from 1.37 to 2.53. Patient was then transfused a total of 3 u pRBC and started on norepinphrine. Emergent CVC placed on the floor and patient was transferred to SICU.    Subjective: Patient seen and examined, A&ox3, complaining of back pain, but denies SOB, light headedness chest pain, fever or chills.      Allergies: gabapentin (Other)    PAST MEDICAL & SURGICAL HISTORY:  HTN (hypertension)      HLD (hyperlipidemia)      DM (diabetes mellitus)      TIA (transient ischemic attack)      Prostate cancer      S/P prostatectomy      Pacemaker      H/O thyroidectomy        FAMILY HISTORY:  FHx: heart disease (Father, Mother)      HOME MEDICATIONS:   --------------------------------------------------------------------------------------  Home Medications:  apixaban 2.5 mg oral tablet: 1 tab(s) orally 2 times a day (11 May 2025 03:43)  aspirin 81 mg oral tablet: 1 tab(s) orally once a day (11 May 2025 03:38)  chlorthalidone 25 mg oral tablet: 1 tab(s) orally once a day (11 May 2025 03:43)  cloNIDine 0.2 mg oral tablet: 1 tab(s) orally 3 times a day (11 May 2025 03:43)  ergocalciferol 1.25 mg (50,000 intl units) oral tablet: 1 tab(s) orally once a week (11 May 2025 03:43)  HumaLOG 100 units/mL injectable solution: 12 unit(s) injectable 3 times a day (with meals) (11 May 2025 03:43)  hydrALAZINE 100 mg oral tablet: 1 tab(s) orally 3 times a day (11 May 2025 03:43)  Lantus 100 units/mL subcutaneous solution: 24 unit(s) subcutaneous once a day (at bedtime) (11 May 2025 03:43)  Levothroid 150 mcg (0.15 mg) oral tablet: 1 tab(s) orally once a day (11 May 2025 03:43)  propranolol 120 mg oral capsule, extended release: 1 cap(s) orally once a day (11 May 2025 03:38)  rosuvastatin 10 mg oral tablet: 1 tab(s) orally once a day (at bedtime) (11 May 2025 03:43)  telmisartan 40 mg oral tablet: 1 tab(s) orally once a day (11 May 2025 03:43)  Tradjenta 5 mg oral tablet: 1 tab(s) orally once a day (11 May 2025 03:43)    --------------------------------------------------------------------------------------    CURRENT MEDICATIONS:   --------------------------------------------------------------------------------------  Neurologic Medications  HYDROmorphone  Injectable 0.5 milliGRAM(s) IV Push every 4 hours PRN Severe Pain (7 - 10)  oxyCODONE    IR 10 milliGRAM(s) Oral every 4 hours PRN Severe Pain (7 - 10)  oxyCODONE    IR 5 milliGRAM(s) Oral every 4 hours PRN Moderate Pain (4 - 6)    Respiratory Medications  benzonatate 100 milliGRAM(s) Oral three times a day PRN Cough    Cardiovascular Medications  cloNIDine 0.2 milliGRAM(s) Oral two times a day  hydrALAZINE 100 milliGRAM(s) Oral three times a day  NIFEdipine XL 30 milliGRAM(s) Oral daily  propranolol  milliGRAM(s) Oral daily    Gastrointestinal Medications  aluminum hydroxide/magnesium hydroxide/simethicone Suspension 30 milliLiter(s) Oral once PRN Dyspepsia  bisacodyl Suppository 10 milliGRAM(s) Rectal daily  dextrose 5%. 1000 milliLiter(s) IV Continuous <Continuous>  dextrose 5%. 1000 milliLiter(s) IV Continuous <Continuous>  lactated ringers. 1000 milliLiter(s) IV Continuous <Continuous>  magnesium hydroxide Suspension 30 milliLiter(s) Oral daily  senna 2 Tablet(s) Oral at bedtime    Genitourinary Medications  tamsulosin 0.4 milliGRAM(s) Oral at bedtime    Hematologic/Oncologic Medications  aspirin  chewable 81 milliGRAM(s) Oral daily    Antimicrobial/Immunologic Medications  piperacillin/tazobactam IVPB.. 3.375 Gram(s) IV Intermittent every 8 hours    Endocrine/Metabolic Medications  dextrose 50% Injectable 25 Gram(s) IV Push once  dextrose 50% Injectable 12.5 Gram(s) IV Push once  dextrose 50% Injectable 25 Gram(s) IV Push once  dextrose Oral Gel 15 Gram(s) Oral once  glucagon  Injectable 1 milliGRAM(s) IntraMuscular once  insulin glargine Injectable (LANTUS) 20 Unit(s) SubCutaneous at bedtime  insulin lispro (ADMELOG) corrective regimen sliding scale   SubCutaneous three times a day before meals  insulin lispro (ADMELOG) corrective regimen sliding scale   SubCutaneous at bedtime  insulin lispro Injectable (ADMELOG) 6 Unit(s) SubCutaneous three times a day before meals  levothyroxine 150 MICROGram(s) Oral daily  rosuvastatin 10 milliGRAM(s) Oral at bedtime    Topical/Other Medications  chlorhexidine 2% Cloths 1 Application(s) Topical daily  lidocaine   4% Patch 1 Patch Transdermal daily  povidone iodine 10% Nasal Swab 1 Application(s) Both Nostrils once    --------------------------------------------------------------------------------------    VITAL SIGNS, INS/OUTS (last 24 hours):  --------------------------------------------------------------------------------------                        6.4    8.49  )-----------( 141      ( 06 Jun 2025 02:40 )             19.6   06-06    134[L]  |  100  |  38[H]  ----------------------------<  151[H]  3.7   |  22  |  2.53[H]    Ca    7.1[L]      06 Jun 2025 02:40    TPro  4.7[L]  /  Alb  2.1[L]  /  TBili  0.5  /  DBili  x   /  AST  66[H]  /  ALT  154[H]  /  AlkPhos  361[H]  06-06    --------------------------------------------------------------------------------------    EXAM  NEUROLOGY  Exam: Normal, NAD, alert, oriented x 3, no focal deficits.     HEENT  Exam: Normocephalic, atraumatic.  EOMI     RESPIRATORY  Exam: Lungs clear to auscultation, Normal expansion/effort.  Saturating well on room air      CARDIOVASCULAR  Exam: S1, S2.  Regular rate and rhythm.  Peripheral edema     GI/NUTRITION  Exam: Abdomen soft, appropriately tender, port site with some strikethrough on right sided site    VASCULAR  Exam: Extremities warm, pink, well-perfused.      MUSCULOSKELETAL  Exam: All extremities moving spontaneously without limitations.      SKIN:  Exam: Good skin turgor, no skin breakdown.      METABOLIC/FLUIDS/ELECTROLYTES  dextrose 5%. 1000 milliLiter(s) IV Continuous <Continuous>  dextrose 5%. 1000 milliLiter(s) IV Continuous <Continuous>  lactated ringers. 1000 milliLiter(s) IV Continuous <Continuous>      HEMATOLOGIC  [x] DVT Prophylaxis: aspirin  chewable 81 milliGRAM(s) Oral daily    Transfusions:	[] PRBC	[] Platelets		[] FFP	[] Cryoprecipitate    INFECTIOUS DISEASE  Antimicrobials/Immunologic Medications:  piperacillin/tazobactam IVPB.. 3.375 Gram(s) IV Intermittent every 8 hours    Day #  	of    ***    Tubes/Lines/Drains  ***  [x] Peripheral IV  [x] Central Venous Line     	[x] R	[] L	[] IJ	[] Fem	[] SC	Date Placed: 6/6  [] Arterial Line		[] R	[] L	[] Fem	[] Rad	[] Ax	Date Placed:   [] PICC:         	[] Midline		[] Mediport  [] Urinary Catheter		Date Placed:     LABS  --------------------------------------------------------------------------------------                        8.7    11.08 )-----------( 170      ( 06 Jun 2025 06:40 )             26.5   06-06    134[L]  |  100  |  38[H]  ----------------------------<  151[H]  3.7   |  22  |  2.53[H]    Ca    7.1[L]      06 Jun 2025 02:40    TPro  4.7[L]  /  Alb  2.1[L]  /  TBili  0.5  /  DBili  x   /  AST  66[H]  /  ALT  154[H]  /  AlkPhos  361[H]  06-06    --------------------------------------------------------------------------------------

## 2025-06-07 LAB
ALBUMIN SERPL ELPH-MCNC: 2.7 G/DL — LOW (ref 3.3–5)
ALP SERPL-CCNC: 343 U/L — HIGH (ref 40–120)
ALT FLD-CCNC: 114 U/L — HIGH (ref 4–41)
ANION GAP SERPL CALC-SCNC: 15 MMOL/L — HIGH (ref 7–14)
AST SERPL-CCNC: 43 U/L — HIGH (ref 4–40)
BILIRUB DIRECT SERPL-MCNC: 0.3 MG/DL — SIGNIFICANT CHANGE UP (ref 0–0.3)
BILIRUB INDIRECT FLD-MCNC: 0.2 MG/DL — SIGNIFICANT CHANGE UP (ref 0–1)
BILIRUB SERPL-MCNC: 0.5 MG/DL — SIGNIFICANT CHANGE UP (ref 0.2–1.2)
BLOOD GAS ARTERIAL - LYTES,HGB,ICA,LACT RESULT: SIGNIFICANT CHANGE UP
BUN SERPL-MCNC: 45 MG/DL — HIGH (ref 7–23)
CALCIUM SERPL-MCNC: 7 MG/DL — LOW (ref 8.4–10.5)
CHLORIDE SERPL-SCNC: 100 MMOL/L — SIGNIFICANT CHANGE UP (ref 98–107)
CHLORIDE UR-SCNC: 50 MMOL/L — SIGNIFICANT CHANGE UP
CO2 SERPL-SCNC: 21 MMOL/L — LOW (ref 22–31)
CREAT ?TM UR-MCNC: 66 MG/DL — SIGNIFICANT CHANGE UP
CREAT SERPL-MCNC: 3.17 MG/DL — HIGH (ref 0.5–1.3)
EGFR: 18 ML/MIN/1.73M2 — LOW
EGFR: 18 ML/MIN/1.73M2 — LOW
GLUCOSE BLDC GLUCOMTR-MCNC: 131 MG/DL — HIGH (ref 70–99)
GLUCOSE BLDC GLUCOMTR-MCNC: 169 MG/DL — HIGH (ref 70–99)
GLUCOSE BLDC GLUCOMTR-MCNC: 193 MG/DL — HIGH (ref 70–99)
GLUCOSE BLDC GLUCOMTR-MCNC: 218 MG/DL — HIGH (ref 70–99)
GLUCOSE SERPL-MCNC: 109 MG/DL — HIGH (ref 70–99)
HCT VFR BLD CALC: 25.2 % — LOW (ref 39–50)
HGB BLD-MCNC: 8.4 G/DL — LOW (ref 13–17)
MAGNESIUM SERPL-MCNC: 2.3 MG/DL — SIGNIFICANT CHANGE UP (ref 1.6–2.6)
MCHC RBC-ENTMCNC: 28.9 PG — SIGNIFICANT CHANGE UP (ref 27–34)
MCHC RBC-ENTMCNC: 33.3 G/DL — SIGNIFICANT CHANGE UP (ref 32–36)
MCV RBC AUTO: 86.6 FL — SIGNIFICANT CHANGE UP (ref 80–100)
NRBC # BLD AUTO: 0 K/UL — SIGNIFICANT CHANGE UP (ref 0–0)
NRBC # FLD: 0 K/UL — SIGNIFICANT CHANGE UP (ref 0–0)
NRBC BLD AUTO-RTO: 0 /100 WBCS — SIGNIFICANT CHANGE UP (ref 0–0)
OSMOLALITY SERPL: 291 MOSM/KG — SIGNIFICANT CHANGE UP (ref 275–295)
OSMOLALITY UR: 321 MOSM/KG — SIGNIFICANT CHANGE UP (ref 50–1200)
PHOSPHATE SERPL-MCNC: 4.5 MG/DL — SIGNIFICANT CHANGE UP (ref 2.5–4.5)
PLATELET # BLD AUTO: 159 K/UL — SIGNIFICANT CHANGE UP (ref 150–400)
POTASSIUM SERPL-MCNC: 3.8 MMOL/L — SIGNIFICANT CHANGE UP (ref 3.5–5.3)
POTASSIUM SERPL-SCNC: 3.8 MMOL/L — SIGNIFICANT CHANGE UP (ref 3.5–5.3)
POTASSIUM UR-SCNC: 39.8 MMOL/L — SIGNIFICANT CHANGE UP
PROT ?TM UR-MCNC: 95 MG/DL — SIGNIFICANT CHANGE UP
PROT SERPL-MCNC: 5.3 G/DL — LOW (ref 6–8.3)
PROT/CREAT UR-RTO: 1.4 RATIO — HIGH (ref 0–0.2)
RBC # BLD: 2.91 M/UL — LOW (ref 4.2–5.8)
RBC # FLD: 16.5 % — HIGH (ref 10.3–14.5)
SODIUM SERPL-SCNC: 136 MMOL/L — SIGNIFICANT CHANGE UP (ref 135–145)
SODIUM UR-SCNC: 45 MMOL/L — SIGNIFICANT CHANGE UP
WBC # BLD: 9.47 K/UL — SIGNIFICANT CHANGE UP (ref 3.8–10.5)
WBC # FLD AUTO: 9.47 K/UL — SIGNIFICANT CHANGE UP (ref 3.8–10.5)

## 2025-06-07 PROCEDURE — 74018 RADEX ABDOMEN 1 VIEW: CPT | Mod: 26

## 2025-06-07 PROCEDURE — 71045 X-RAY EXAM CHEST 1 VIEW: CPT | Mod: 26

## 2025-06-07 PROCEDURE — 99232 SBSQ HOSP IP/OBS MODERATE 35: CPT | Mod: GC

## 2025-06-07 RX ORDER — LABETALOL HYDROCHLORIDE 200 MG/1
10 TABLET, FILM COATED ORAL EVERY 4 HOURS
Refills: 0 | Status: DISCONTINUED | OUTPATIENT
Start: 2025-06-07 | End: 2025-06-07

## 2025-06-07 RX ORDER — OXYCODONE HYDROCHLORIDE 30 MG/1
5 TABLET ORAL EVERY 4 HOURS
Refills: 0 | Status: DISCONTINUED | OUTPATIENT
Start: 2025-06-07 | End: 2025-06-07

## 2025-06-07 RX ORDER — ACETAMINOPHEN 500 MG/5ML
1000 LIQUID (ML) ORAL EVERY 6 HOURS
Refills: 0 | Status: COMPLETED | OUTPATIENT
Start: 2025-06-07 | End: 2025-06-08

## 2025-06-07 RX ORDER — CALCIUM GLUCONATE 20 MG/ML
1 INJECTION, SOLUTION INTRAVENOUS ONCE
Refills: 0 | Status: COMPLETED | OUTPATIENT
Start: 2025-06-07 | End: 2025-06-07

## 2025-06-07 RX ORDER — LABETALOL HYDROCHLORIDE 200 MG/1
20 TABLET, FILM COATED ORAL EVERY 4 HOURS
Refills: 0 | Status: DISCONTINUED | OUTPATIENT
Start: 2025-06-07 | End: 2025-06-08

## 2025-06-07 RX ORDER — ONDANSETRON HCL/PF 4 MG/2 ML
4 VIAL (ML) INJECTION ONCE
Refills: 0 | Status: COMPLETED | OUTPATIENT
Start: 2025-06-07 | End: 2025-06-07

## 2025-06-07 RX ORDER — IPRATROPIUM BROMIDE AND ALBUTEROL SULFATE .5; 2.5 MG/3ML; MG/3ML
3 SOLUTION RESPIRATORY (INHALATION) EVERY 6 HOURS
Refills: 0 | Status: COMPLETED | OUTPATIENT
Start: 2025-06-07 | End: 2025-06-10

## 2025-06-07 RX ORDER — HYDROMORPHONE/SOD CHLOR,ISO/PF 2 MG/10 ML
1 SYRINGE (ML) INJECTION EVERY 4 HOURS
Refills: 0 | Status: DISCONTINUED | OUTPATIENT
Start: 2025-06-07 | End: 2025-06-13

## 2025-06-07 RX ORDER — ACETAMINOPHEN 500 MG/5ML
975 LIQUID (ML) ORAL EVERY 6 HOURS
Refills: 0 | Status: DISCONTINUED | OUTPATIENT
Start: 2025-06-07 | End: 2025-06-07

## 2025-06-07 RX ORDER — OXYCODONE HYDROCHLORIDE 30 MG/1
10 TABLET ORAL EVERY 4 HOURS
Refills: 0 | Status: DISCONTINUED | OUTPATIENT
Start: 2025-06-07 | End: 2025-06-07

## 2025-06-07 RX ORDER — LABETALOL HYDROCHLORIDE 200 MG/1
10 TABLET, FILM COATED ORAL ONCE
Refills: 0 | Status: COMPLETED | OUTPATIENT
Start: 2025-06-07 | End: 2025-06-07

## 2025-06-07 RX ORDER — HYDROMORPHONE/SOD CHLOR,ISO/PF 2 MG/10 ML
0.5 SYRINGE (ML) INJECTION EVERY 4 HOURS
Refills: 0 | Status: DISCONTINUED | OUTPATIENT
Start: 2025-06-07 | End: 2025-06-13

## 2025-06-07 RX ADMIN — Medication 1 PATCH: at 18:13

## 2025-06-07 RX ADMIN — Medication 10 MILLIGRAM(S): at 17:44

## 2025-06-07 RX ADMIN — INSULIN LISPRO 1: 100 INJECTION, SOLUTION INTRAVENOUS; SUBCUTANEOUS at 11:02

## 2025-06-07 RX ADMIN — Medication 4 MILLIGRAM(S): at 14:26

## 2025-06-07 RX ADMIN — Medication 0.2 MILLIGRAM(S): at 11:01

## 2025-06-07 RX ADMIN — Medication 25 GRAM(S): at 21:05

## 2025-06-07 RX ADMIN — Medication 1 PATCH: at 16:00

## 2025-06-07 RX ADMIN — Medication 400 MILLIGRAM(S): at 17:26

## 2025-06-07 RX ADMIN — Medication 100 MILLIGRAM(S): at 14:27

## 2025-06-07 RX ADMIN — Medication 975 MILLIGRAM(S): at 11:01

## 2025-06-07 RX ADMIN — Medication 1000 MILLIGRAM(S): at 00:00

## 2025-06-07 RX ADMIN — SODIUM CHLORIDE 100 MILLILITER(S): 9 INJECTION, SOLUTION INTRAVENOUS at 05:10

## 2025-06-07 RX ADMIN — Medication 50 MILLIGRAM(S): at 11:01

## 2025-06-07 RX ADMIN — Medication 1 APPLICATION(S): at 11:02

## 2025-06-07 RX ADMIN — Medication 81 MILLIGRAM(S): at 11:01

## 2025-06-07 RX ADMIN — Medication 4 MILLILITER(S): at 21:49

## 2025-06-07 RX ADMIN — IPRATROPIUM BROMIDE AND ALBUTEROL SULFATE 3 MILLILITER(S): .5; 2.5 SOLUTION RESPIRATORY (INHALATION) at 07:31

## 2025-06-07 RX ADMIN — Medication 40 MILLIGRAM(S): at 17:27

## 2025-06-07 RX ADMIN — Medication 1 MILLIGRAM(S): at 05:28

## 2025-06-07 RX ADMIN — Medication 1 MILLIGRAM(S): at 10:00

## 2025-06-07 RX ADMIN — INSULIN GLARGINE-YFGN 10 UNIT(S): 100 INJECTION, SOLUTION SUBCUTANEOUS at 23:28

## 2025-06-07 RX ADMIN — CALCIUM GLUCONATE 100 GRAM(S): 20 INJECTION, SOLUTION INTRAVENOUS at 05:07

## 2025-06-07 RX ADMIN — INSULIN LISPRO 1: 100 INJECTION, SOLUTION INTRAVENOUS; SUBCUTANEOUS at 17:25

## 2025-06-07 RX ADMIN — LABETALOL HYDROCHLORIDE 20 MILLIGRAM(S): 200 TABLET, FILM COATED ORAL at 21:07

## 2025-06-07 RX ADMIN — Medication 10 MILLIGRAM(S): at 05:51

## 2025-06-07 RX ADMIN — Medication 10 MILLIGRAM(S): at 23:27

## 2025-06-07 RX ADMIN — Medication 25 GRAM(S): at 14:57

## 2025-06-07 RX ADMIN — Medication 0.5 MILLIGRAM(S): at 00:00

## 2025-06-07 RX ADMIN — OXYCODONE HYDROCHLORIDE 10 MILLIGRAM(S): 30 TABLET ORAL at 14:27

## 2025-06-07 RX ADMIN — Medication 50 MILLIGRAM(S): at 07:58

## 2025-06-07 RX ADMIN — OXYCODONE HYDROCHLORIDE 10 MILLIGRAM(S): 30 TABLET ORAL at 15:00

## 2025-06-07 RX ADMIN — Medication 10 MILLIGRAM(S): at 15:55

## 2025-06-07 RX ADMIN — Medication 25 GRAM(S): at 05:07

## 2025-06-07 RX ADMIN — Medication 400 MILLIGRAM(S): at 23:29

## 2025-06-07 RX ADMIN — Medication 1 MILLIGRAM(S): at 09:33

## 2025-06-07 RX ADMIN — LABETALOL HYDROCHLORIDE 10 MILLIGRAM(S): 200 TABLET, FILM COATED ORAL at 15:54

## 2025-06-07 RX ADMIN — Medication 1000 MILLIGRAM(S): at 05:25

## 2025-06-07 RX ADMIN — Medication 975 MILLIGRAM(S): at 11:13

## 2025-06-07 RX ADMIN — Medication 1000 MILLIGRAM(S): at 18:00

## 2025-06-07 RX ADMIN — LABETALOL HYDROCHLORIDE 10 MILLIGRAM(S): 200 TABLET, FILM COATED ORAL at 09:53

## 2025-06-07 RX ADMIN — INSULIN LISPRO 2: 100 INJECTION, SOLUTION INTRAVENOUS; SUBCUTANEOUS at 23:28

## 2025-06-07 RX ADMIN — Medication 112 MICROGRAM(S): at 23:29

## 2025-06-07 RX ADMIN — Medication 1 MILLIGRAM(S): at 05:45

## 2025-06-07 RX ADMIN — Medication 400 MILLIGRAM(S): at 05:07

## 2025-06-07 NOTE — PROGRESS NOTE ADULT - ATTENDING COMMENTS
Patient responded to fluid boluses, on 4l nc, off vasopressors, resumed on home antihypertensives clonidine and hydralazine. D/c a line. Floor eligible.    I have personally interviewed when possible and examined the patient, reviewed data and laboratory tests/x-rays and all pertinent electronic images.  I was physically present for the key portions of the evaluation and management (E/M) service provided.   The SICU team has a constant risk benefit analyzes discussion with the primary team, all consultants, House Staff and PA's on all decisions.  These diagnoses are unrelated to the surgical procedure noted above.  I meet with family if needed to get further history, discuss the case and make care decisions for this patient who might not be able to participate.  Time involved in performance of separately billable procedures was not counted toward my critical care time. There is no overlap.  I spent 30 minutes ( 0800Hrs-0915Hrs in AM/ 1600Hrs-1715Hrs in PM, or other time indicated) of critical care time for the diagnoses, assessment, plan and interventions.  This time excludes time spent on separate procedures and teaching.

## 2025-06-07 NOTE — PROGRESS NOTE ADULT - ASSESSMENT
85M PMH HTN, DM2, CKD (baseline creatinine 1.5-2), TIAs (on Eliquis at home), sinus node dysfunction (s/p PPM ~1 year ago), prostate cancer s/p prostectomy (~early 2000s), total thyroidectomy (~2013), initially presented after a fall and found to have lumbar stenosis with cauda equina impingement however on 6/4 developed acute abdominal pain and had a CT abdomen and pelvis that had signs of acute cholecystitis. On 6/5 patient underwent a robotic cholecystectomy, POD 1 patient became hypotensive, unresponsive to 2L fluid resuscitation and 3 u pRBC and was started on vasopressors and transferred to SICU.      Neurologic:   - A&Ox3  - Tylenol and prn dilaudid     Respiratory:   - O2 sat >92%  - On nasal cannula    Cardiovascular:   - Holding home antihypertensives  - Repeat echo  - Check pro BNP  - trops negative  - wean pressors as tolerated    Gastrointestinal/Nutrition:   -NPO    Renal/Genitourinary:   - Maintain wong  - Monitor UOP    Hematologic:   - s/p 3 u pRBC  - Monitor H+H    Infectious Disease:   - Continue zosyn for 4 days post op  - Monitor WBC    Endocrine:   - 20 units lantus at home  - 6 premeal (hold if NPO)  - continue home levothyroxine      Tubes/Lines/Drains:   - Right fem triple lumen  - right radial a line  - PIVs            DISPO: SICU

## 2025-06-07 NOTE — PROGRESS NOTE ADULT - ASSESSMENT
85 year old male with 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 After family discussions they wished to proceed with surgery, but this was subsequently canceled due to RRT on 6/6 for acute cholecystitis requiring lap geetha per gen surg    Plan:  -OR for L3-5 lami/fusion to be rescheduled, new date pending  -med and cards cleared, appreciate care  -will need to hold Tlovenox prior to OR if restarted  -WBAT  -pain control prn  -bowel reg prn  -antibiotics per ID - zosyn   -continue PT/OT  -dispo pending    Jodi Dominguez, PGY-2  Orthopedic Surgery    [facility] [place]: [id number]  [facility]: [id number]  [facility]:  [phone]/ [phone]

## 2025-06-07 NOTE — PROGRESS NOTE ADULT - SUBJECTIVE AND OBJECTIVE BOX
SICU Daily Progress Note  =====================================================  Interval/Overnight Events:      - POCUS > euvolemic; echo showed normal LV and RV function 5/2025  - given albumin 500  - Levo tritate to MAP >50 given low DBP, add vaso  -decrease UO, elevate crt., worsening JEROME. Urine now slowly picking up  -Serum Osmolarity and Urine lytes sent    ALLERGIES:  gabapentin (Other)      --------------------------------------------------------------------------------------    MEDICATIONS:    Neurologic Medications  acetaminophen   IVPB .. 1000 milliGRAM(s) IV Intermittent every 6 hours  HYDROmorphone  Injectable 0.5 milliGRAM(s) IV Push every 4 hours PRN Moderate Pain (4 - 6)  HYDROmorphone  Injectable 1 milliGRAM(s) IV Push every 4 hours PRN Severe Pain (7 - 10)    Respiratory Medications    Cardiovascular Medications  norepinephrine Infusion 0.05 MICROgram(s)/kG/Min IV Continuous <Continuous>    Gastrointestinal Medications  dextrose 5% + lactated ringers. 1000 milliLiter(s) IV Continuous <Continuous>    Genitourinary Medications    Hematologic/Oncologic Medications  aspirin  chewable 81 milliGRAM(s) Oral daily    Antimicrobial/Immunologic Medications  piperacillin/tazobactam IVPB.. 3.375 Gram(s) IV Intermittent every 8 hours    Endocrine/Metabolic Medications  dextrose 50% Injectable 25 Gram(s) IV Push once  insulin glargine Injectable (LANTUS) 10 Unit(s) SubCutaneous at bedtime  insulin lispro (ADMELOG) corrective regimen sliding scale   SubCutaneous every 6 hours  insulin lispro (ADMELOG) corrective regimen sliding scale   SubCutaneous at bedtime  levothyroxine Injectable 112 MICROGram(s) IV Push at bedtime    Topical/Other Medications  chlorhexidine 2% Cloths 1 Application(s) Topical daily  lidocaine   4% Patch 1 Patch Transdermal daily  povidone iodine 10% Nasal Swab 1 Application(s) Both Nostrils once    --------------------------------------------------------------------------------------    VITAL SIGNS:  T(C): 36.1 (06-07-25 @ 00:00), Max: 36.8 (06-06-25 @ 03:28)  HR: 63 (06-07-25 @ 01:00) (51 - 85)  BP: 152/51 (06-07-25 @ 01:00) (80/50 - 158/59)  RR: 14 (06-07-25 @ 01:00) (12 - 22)  SpO2: 95% (06-07-25 @ 01:00) (91% - 99%)  --------------------------------------------------------------------------------------    INS AND OUTS:    06-05-25 @ 07:01  -  06-06-25 @ 07:00  --------------------------------------------------------  IN: 100 mL / OUT: 35 mL / NET: 65 mL    06-06-25 @ 07:01  -  06-07-25 @ 01:17  --------------------------------------------------------  IN: 2899.8 mL / OUT: 203 mL / NET: 2696.8 mL      --------------------------------------------------------------------------------------    EXAM    NEURO: NAD,   HEENT: NC/AT  RESPIRATORY: nonlabored respirations, normal CW expansion  CARDIO: RRR, S1S2  ABDOMEN: soft, nontender, nondistended, surgical dressing c/d/i  EXTREMITIES: no gross deformities    --------------------------------------------------------------------------------------    LABS                        8.4    9.47  )-----------( 159      ( 07 Jun 2025 00:37 )             25.2   06-06    133[L]  |  100  |  42[H]  ----------------------------<  120[H]  3.8   |  23  |  3.15[H]    Ca    7.2[L]      06 Jun 2025 18:20  Phos  4.7     06-06  Mg     2.30     06-06    TPro  5.2[L]  /  Alb  2.5[L]  /  TBili  0.6  /  DBili  x   /  AST  43[H]  /  ALT  114[H]  /  AlkPhos  317[H]  06-06    --------------------------------------------------------------------------------------

## 2025-06-07 NOTE — PROGRESS NOTE ADULT - SUBJECTIVE AND OBJECTIVE BOX
Surgery Progress Note     Overnight events: NAEO    Interval/Subjective:  Patient seen and examined. Resting comfortably in bed. Had some nausea, but no vomiting.  __________________________________________________________________  Vitals:  T(C): 37.2 (16:00), Max: 37.2 (16:00)  HR: 69 (18:00) (53 - 75)  BP: 159/96 (04:00) (140/56 - 159/96)  RR: 14 (18:00) (10 - 26)  SpO2: 95% (18:00) (87% - 99%)    I & O's:  IN:    dextrose 5% + lactated ringers: 800 mL    IV PiggyBack: 1250 mL    Lactated Ringers: 1600 mL    Norepinephrine: 99.8 mL  Total IN: 3749.8 mL    OUT:    Indwelling Catheter - Urethral (mL): 523 mL  Total OUT: 523 mL        Physical Exam:  General: NAD, resting comfortably in bed  HEENT: Normocephalic atraumatic  Respiratory: Nonlabored respirations  Cardio: pulse present  Abdomen: soft, slightly distended, minimal tenderness to palpation,   Vascular: extremities are warm and well perfused.       __________________________________________________________________ Surgery Progress Note     Overnight events: NAEO    Interval/Subjective:  Patient seen and examined. Resting comfortably in bed. Had some nausea, but no vomiting.  __________________________________________________________________  Vitals:  T(C): 37.2 (16:00), Max: 37.2 (16:00)  HR: 69 (18:00) (53 - 75)  BP: 159/96 (04:00) (140/56 - 159/96)  RR: 14 (18:00) (10 - 26)  SpO2: 95% (18:00) (87% - 99%)    I & O's:  IN:    dextrose 5% + lactated ringers: 800 mL    IV PiggyBack: 1250 mL    Lactated Ringers: 1600 mL    Norepinephrine: 99.8 mL  Total IN: 3749.8 mL    OUT:    Indwelling Catheter - Urethral (mL): 523 mL  Total OUT: 523 mL        Physical Exam:  General: NAD, resting comfortably in bed  HEENT: Normocephalic atraumatic  Respiratory: Nonlabored respirations  Cardio: pulse present  Abdomen: soft, slightly distended, minimal tenderness to palpation, incision with prevena in place  Vascular: extremities are warm and well perfused.       __________________________________________________________________

## 2025-06-07 NOTE — PROGRESS NOTE ADULT - SUBJECTIVE AND OBJECTIVE BOX
Orthopedic Surgery Progress Note     S: Patient seen and examined today. Having nausea and vomiting overnight. Denies f/c, chest pain, shortness of breath, dizziness.    MEDICATIONS  (STANDING):  acetaminophen     Tablet .. 975 milliGRAM(s) Oral every 6 hours  aspirin  chewable 81 milliGRAM(s) Oral daily  chlorhexidine 2% Cloths 1 Application(s) Topical daily  dextrose 5% + lactated ringers. 1000 milliLiter(s) (100 mL/Hr) IV Continuous <Continuous>  hydrALAZINE 50 milliGRAM(s) Oral every 8 hours  insulin glargine Injectable (LANTUS) 10 Unit(s) SubCutaneous at bedtime  insulin lispro (ADMELOG) corrective regimen sliding scale   SubCutaneous at bedtime  insulin lispro (ADMELOG) corrective regimen sliding scale   SubCutaneous every 6 hours  levothyroxine Injectable 112 MICROGram(s) IV Push at bedtime  lidocaine   4% Patch 1 Patch Transdermal daily  piperacillin/tazobactam IVPB.. 3.375 Gram(s) IV Intermittent every 8 hours  povidone iodine 10% Nasal Swab 1 Application(s) Both Nostrils once    MEDICATIONS  (PRN):  albuterol/ipratropium for Nebulization 3 milliLiter(s) Nebulizer every 6 hours PRN Shortness of Breath and/or Wheezing  HYDROmorphone  Injectable 0.5 milliGRAM(s) IV Push every 4 hours PRN Moderate Pain (4 - 6)  HYDROmorphone  Injectable 1 milliGRAM(s) IV Push every 4 hours PRN Severe Pain (7 - 10)      Vital Signs Last 24 Hrs  T(C): 36 (07 Jun 2025 08:00), Max: 36.1 (06 Jun 2025 20:00)  T(F): 96.8 (07 Jun 2025 08:00), Max: 97 (07 Jun 2025 00:00)  HR: 67 (07 Jun 2025 08:00) (52 - 85)  BP: 159/96 (07 Jun 2025 04:00) (109/47 - 159/96)  BP(mean): 103 (07 Jun 2025 04:00) (66 - 103)  RR: 18 (07 Jun 2025 08:00) (11 - 26)  SpO2: 97% (07 Jun 2025 08:00) (91% - 99%)    Parameters below as of 07 Jun 2025 08:00  Patient On (Oxygen Delivery Method): nasal cannula w/ humidification  O2 Flow (L/min): 4      06-06-25 @ 07:01  -  06-07-25 @ 07:00  --------------------------------------------------------  IN: 3749.8 mL / OUT: 523 mL / NET: 3226.8 mL        Physical Exam:  GEN: NAD, resting quietly  PULM: symmetric chest rise bilaterally, no increased WOB, on NC  SPINE:   Motor exam:          Upper extremity         C5 (Shoulder Abd)    C6 (Elbow flex)   C7 (Elbow ext)   C8 (Finger flex) T1 (finger abd)         R         5/5                 5/5                       5/5                      5/5                         5/5          L          5/5                 5/5                      5/5                      5/5                         5/5                   Lower extremity           L2 (Hip flex)  L3 (knee ext)  L4 (Dorsi flex)  L5 (EHL)  S1 (Plantar flex)                                               R        3/5            4/5             1/5                    1/5          4/5      L        3/5            4/5             5/5                   5/5           5/5    Sensory exam:                         C5      C6      C7      C8       T1          RIGHT          2         2        2         2         2          (0=absent, 1=impaired, 2=normal, NT=not testable)  LEFT             2         2        2         2         2          (0=absent, 1=impaired, 2=normal, NT=not testable)                          L2      L3     L4     L5       S1          RIGHT          2         2        2         2         2          (0=absent, 1=impaired, 2=normal, NT=not testable)  LEFT             2         2        2         2         2          (0=absent, 1=impaired, 2=normal, NT=not testable)         LABS:                        8.4    9.47  )-----------( 159      ( 07 Jun 2025 00:37 )             25.2     06-07    136  |  100  |  45[H]  ----------------------------<  109[H]  3.8   |  21[L]  |  3.17[H]    Ca    7.0[L]      07 Jun 2025 00:37  Phos  4.5     06-07  Mg     2.30     06-07    TPro  5.3[L]  /  Alb  2.7[L]  /  TBili  0.5  /  DBili  0.3  /  AST  43[H]  /  ALT  114[H]  /  AlkPhos  343[H]  06-07

## 2025-06-08 LAB
ALBUMIN SERPL ELPH-MCNC: 2.7 G/DL — LOW (ref 3.3–5)
ALP SERPL-CCNC: 306 U/L — HIGH (ref 40–120)
ALT FLD-CCNC: 81 U/L — HIGH (ref 4–41)
ANION GAP SERPL CALC-SCNC: 14 MMOL/L — SIGNIFICANT CHANGE UP (ref 7–14)
AST SERPL-CCNC: 27 U/L — SIGNIFICANT CHANGE UP (ref 4–40)
BILIRUB SERPL-MCNC: 0.4 MG/DL — SIGNIFICANT CHANGE UP (ref 0.2–1.2)
BLOOD GAS ARTERIAL COMPREHENSIVE RESULT: SIGNIFICANT CHANGE UP
BUN SERPL-MCNC: 43 MG/DL — HIGH (ref 7–23)
CALCIUM SERPL-MCNC: 7.2 MG/DL — LOW (ref 8.4–10.5)
CHLORIDE SERPL-SCNC: 96 MMOL/L — LOW (ref 98–107)
CO2 SERPL-SCNC: 23 MMOL/L — SIGNIFICANT CHANGE UP (ref 22–31)
CREAT SERPL-MCNC: 2.88 MG/DL — HIGH (ref 0.5–1.3)
EGFR: 20 ML/MIN/1.73M2 — LOW
EGFR: 20 ML/MIN/1.73M2 — LOW
GLUCOSE BLDC GLUCOMTR-MCNC: 111 MG/DL — HIGH (ref 70–99)
GLUCOSE BLDC GLUCOMTR-MCNC: 112 MG/DL — HIGH (ref 70–99)
GLUCOSE BLDC GLUCOMTR-MCNC: 152 MG/DL — HIGH (ref 70–99)
GLUCOSE BLDC GLUCOMTR-MCNC: 207 MG/DL — HIGH (ref 70–99)
GLUCOSE SERPL-MCNC: 216 MG/DL — HIGH (ref 70–99)
HCT VFR BLD CALC: 27.8 % — LOW (ref 39–50)
HGB BLD-MCNC: 9.5 G/DL — LOW (ref 13–17)
MAGNESIUM SERPL-MCNC: 2.2 MG/DL — SIGNIFICANT CHANGE UP (ref 1.6–2.6)
MCHC RBC-ENTMCNC: 28.4 PG — SIGNIFICANT CHANGE UP (ref 27–34)
MCHC RBC-ENTMCNC: 34.2 G/DL — SIGNIFICANT CHANGE UP (ref 32–36)
MCV RBC AUTO: 83.2 FL — SIGNIFICANT CHANGE UP (ref 80–100)
NRBC # BLD AUTO: 0 K/UL — SIGNIFICANT CHANGE UP (ref 0–0)
NRBC # FLD: 0 K/UL — SIGNIFICANT CHANGE UP (ref 0–0)
NRBC BLD AUTO-RTO: 0 /100 WBCS — SIGNIFICANT CHANGE UP (ref 0–0)
PHOSPHATE SERPL-MCNC: 3.7 MG/DL — SIGNIFICANT CHANGE UP (ref 2.5–4.5)
PLATELET # BLD AUTO: 206 K/UL — SIGNIFICANT CHANGE UP (ref 150–400)
POTASSIUM SERPL-MCNC: 3.3 MMOL/L — LOW (ref 3.5–5.3)
POTASSIUM SERPL-SCNC: 3.3 MMOL/L — LOW (ref 3.5–5.3)
PROT SERPL-MCNC: 5.4 G/DL — LOW (ref 6–8.3)
RBC # BLD: 3.34 M/UL — LOW (ref 4.2–5.8)
RBC # FLD: 16.2 % — HIGH (ref 10.3–14.5)
SODIUM SERPL-SCNC: 133 MMOL/L — LOW (ref 135–145)
WBC # BLD: 8.99 K/UL — SIGNIFICANT CHANGE UP (ref 3.8–10.5)
WBC # FLD AUTO: 8.99 K/UL — SIGNIFICANT CHANGE UP (ref 3.8–10.5)

## 2025-06-08 PROCEDURE — 99291 CRITICAL CARE FIRST HOUR: CPT | Mod: 25

## 2025-06-08 RX ORDER — FUROSEMIDE 10 MG/ML
40 INJECTION INTRAMUSCULAR; INTRAVENOUS ONCE
Refills: 0 | Status: COMPLETED | OUTPATIENT
Start: 2025-06-08 | End: 2025-06-08

## 2025-06-08 RX ORDER — LABETALOL HYDROCHLORIDE 200 MG/1
20 TABLET, FILM COATED ORAL EVERY 4 HOURS
Refills: 0 | Status: DISCONTINUED | OUTPATIENT
Start: 2025-06-08 | End: 2025-06-10

## 2025-06-08 RX ORDER — LABETALOL HYDROCHLORIDE 200 MG/1
30 TABLET, FILM COATED ORAL EVERY 4 HOURS
Refills: 0 | Status: DISCONTINUED | OUTPATIENT
Start: 2025-06-08 | End: 2025-06-08

## 2025-06-08 RX ORDER — SODIUM CHLORIDE 9 G/1000ML
1000 INJECTION, SOLUTION INTRAVENOUS
Refills: 0 | Status: DISCONTINUED | OUTPATIENT
Start: 2025-06-08 | End: 2025-06-09

## 2025-06-08 RX ORDER — AMPICILLIN SODIUM 1 G/1
2 INJECTION, POWDER, FOR SOLUTION INTRAMUSCULAR; INTRAVENOUS EVERY 6 HOURS
Refills: 0 | Status: DISCONTINUED | OUTPATIENT
Start: 2025-06-10 | End: 2025-06-12

## 2025-06-08 RX ORDER — HEPARIN SODIUM 1000 [USP'U]/ML
5000 INJECTION INTRAVENOUS; SUBCUTANEOUS EVERY 8 HOURS
Refills: 0 | Status: DISCONTINUED | OUTPATIENT
Start: 2025-06-08 | End: 2025-06-17

## 2025-06-08 RX ORDER — INSULIN LISPRO 100 U/ML
INJECTION, SOLUTION INTRAVENOUS; SUBCUTANEOUS EVERY 6 HOURS
Refills: 0 | Status: DISCONTINUED | OUTPATIENT
Start: 2025-06-08 | End: 2025-06-12

## 2025-06-08 RX ORDER — INSULIN GLARGINE-YFGN 100 [IU]/ML
13 INJECTION, SOLUTION SUBCUTANEOUS AT BEDTIME
Refills: 0 | Status: DISCONTINUED | OUTPATIENT
Start: 2025-06-08 | End: 2025-06-09

## 2025-06-08 RX ORDER — ACETAMINOPHEN 500 MG/5ML
1000 LIQUID (ML) ORAL EVERY 6 HOURS
Refills: 0 | Status: COMPLETED | OUTPATIENT
Start: 2025-06-08 | End: 2025-06-09

## 2025-06-08 RX ADMIN — Medication 25 GRAM(S): at 13:48

## 2025-06-08 RX ADMIN — LIDOCAINE HYDROCHLORIDE 1 PATCH: 20 JELLY TOPICAL at 19:44

## 2025-06-08 RX ADMIN — SODIUM CHLORIDE 30 MILLILITER(S): 9 INJECTION, SOLUTION INTRAVENOUS at 11:31

## 2025-06-08 RX ADMIN — LABETALOL HYDROCHLORIDE 20 MILLIGRAM(S): 200 TABLET, FILM COATED ORAL at 21:45

## 2025-06-08 RX ADMIN — Medication 10 MILLIGRAM(S): at 05:48

## 2025-06-08 RX ADMIN — Medication 81 MILLIGRAM(S): at 11:32

## 2025-06-08 RX ADMIN — Medication 0.5 MILLIGRAM(S): at 11:31

## 2025-06-08 RX ADMIN — Medication 1 MILLIGRAM(S): at 09:00

## 2025-06-08 RX ADMIN — LIDOCAINE HYDROCHLORIDE 1 PATCH: 20 JELLY TOPICAL at 23:06

## 2025-06-08 RX ADMIN — Medication 1 MILLIGRAM(S): at 15:30

## 2025-06-08 RX ADMIN — LABETALOL HYDROCHLORIDE 20 MILLIGRAM(S): 200 TABLET, FILM COATED ORAL at 13:04

## 2025-06-08 RX ADMIN — INSULIN LISPRO 2: 100 INJECTION, SOLUTION INTRAVENOUS; SUBCUTANEOUS at 11:53

## 2025-06-08 RX ADMIN — Medication 1000 MILLIGRAM(S): at 06:15

## 2025-06-08 RX ADMIN — Medication 20 MILLIGRAM(S): at 22:57

## 2025-06-08 RX ADMIN — LABETALOL HYDROCHLORIDE 20 MILLIGRAM(S): 200 TABLET, FILM COATED ORAL at 09:18

## 2025-06-08 RX ADMIN — INSULIN LISPRO 2: 100 INJECTION, SOLUTION INTRAVENOUS; SUBCUTANEOUS at 05:47

## 2025-06-08 RX ADMIN — INSULIN GLARGINE-YFGN 13 UNIT(S): 100 INJECTION, SOLUTION SUBCUTANEOUS at 22:58

## 2025-06-08 RX ADMIN — Medication 1 PATCH: at 19:44

## 2025-06-08 RX ADMIN — Medication 1 PATCH: at 15:59

## 2025-06-08 RX ADMIN — Medication 20 MILLIGRAM(S): at 09:50

## 2025-06-08 RX ADMIN — Medication 4 MILLILITER(S): at 07:36

## 2025-06-08 RX ADMIN — Medication 4 MILLILITER(S): at 20:12

## 2025-06-08 RX ADMIN — LIDOCAINE HYDROCHLORIDE 1 PATCH: 20 JELLY TOPICAL at 11:25

## 2025-06-08 RX ADMIN — Medication 1 MILLIGRAM(S): at 04:00

## 2025-06-08 RX ADMIN — Medication 1 MILLIGRAM(S): at 03:30

## 2025-06-08 RX ADMIN — Medication 1 APPLICATION(S): at 11:25

## 2025-06-08 RX ADMIN — Medication 1 MILLIGRAM(S): at 21:00

## 2025-06-08 RX ADMIN — Medication 1000 MILLIGRAM(S): at 11:45

## 2025-06-08 RX ADMIN — Medication 1 PATCH: at 16:01

## 2025-06-08 RX ADMIN — Medication 1 PATCH: at 07:54

## 2025-06-08 RX ADMIN — Medication 112 MICROGRAM(S): at 21:45

## 2025-06-08 RX ADMIN — LABETALOL HYDROCHLORIDE 20 MILLIGRAM(S): 200 TABLET, FILM COATED ORAL at 06:22

## 2025-06-08 RX ADMIN — LABETALOL HYDROCHLORIDE 20 MILLIGRAM(S): 200 TABLET, FILM COATED ORAL at 17:00

## 2025-06-08 RX ADMIN — Medication 100 MILLIEQUIVALENT(S): at 02:03

## 2025-06-08 RX ADMIN — LABETALOL HYDROCHLORIDE 20 MILLIGRAM(S): 200 TABLET, FILM COATED ORAL at 01:39

## 2025-06-08 RX ADMIN — HEPARIN SODIUM 5000 UNIT(S): 1000 INJECTION INTRAVENOUS; SUBCUTANEOUS at 13:48

## 2025-06-08 RX ADMIN — Medication 100 MILLIEQUIVALENT(S): at 03:07

## 2025-06-08 RX ADMIN — FUROSEMIDE 40 MILLIGRAM(S): 10 INJECTION INTRAMUSCULAR; INTRAVENOUS at 09:50

## 2025-06-08 RX ADMIN — Medication 1000 MILLIGRAM(S): at 00:00

## 2025-06-08 RX ADMIN — Medication 100 MILLIEQUIVALENT(S): at 04:16

## 2025-06-08 RX ADMIN — Medication 20 MILLIGRAM(S): at 13:49

## 2025-06-08 RX ADMIN — Medication 1 MILLIGRAM(S): at 20:26

## 2025-06-08 RX ADMIN — HEPARIN SODIUM 5000 UNIT(S): 1000 INJECTION INTRAVENOUS; SUBCUTANEOUS at 21:46

## 2025-06-08 RX ADMIN — Medication 25 GRAM(S): at 21:45

## 2025-06-08 RX ADMIN — Medication 20 MILLIGRAM(S): at 17:48

## 2025-06-08 RX ADMIN — Medication 400 MILLIGRAM(S): at 11:25

## 2025-06-08 RX ADMIN — Medication 25 GRAM(S): at 05:47

## 2025-06-08 RX ADMIN — Medication 400 MILLIGRAM(S): at 05:47

## 2025-06-08 RX ADMIN — Medication 1000 MILLIGRAM(S): at 23:20

## 2025-06-08 RX ADMIN — Medication 0.5 MILLIGRAM(S): at 11:45

## 2025-06-08 RX ADMIN — Medication 10 MILLIGRAM(S): at 15:27

## 2025-06-08 RX ADMIN — Medication 40 MILLIGRAM(S): at 11:24

## 2025-06-08 RX ADMIN — Medication 1 MILLIGRAM(S): at 16:00

## 2025-06-08 RX ADMIN — FUROSEMIDE 40 MILLIGRAM(S): 10 INJECTION INTRAMUSCULAR; INTRAVENOUS at 02:03

## 2025-06-08 RX ADMIN — Medication 1 MILLIGRAM(S): at 08:47

## 2025-06-08 RX ADMIN — Medication 400 MILLIGRAM(S): at 22:57

## 2025-06-08 RX ADMIN — Medication 10 MILLIGRAM(S): at 23:44

## 2025-06-08 NOTE — PROGRESS NOTE ADULT - ASSESSMENT
Assessment: 85M PMH HTN, DM2, CKD (baseline creatinine 1.5-2), TIAs (on Eliquis at home), sinus node dysfunction (s/p PPM ~1 year ago), prostate cancer s/p prostatectomy (~early 2000s), total thyroidectomy (~2013), initially presented after a fall and found to have lumbar stenosis with cauda equina impingement however on 6/4 developed acute abdominal pain and had a CT abdomen and pelvis that had signs of acute cholecystitis. On 6/5 patient underwent a robotic cholecystectomy, POD 1 patient became hypotensive, unresponsive to 2L fluid resuscitation and 3U pRBC and was started on vasopressors and transferred to SICU. Patient now weaned off pressors, hypertensive    Plan:  Neurologic:   - A&Ox3  - Tylenol and prn dilaudid   -Lidocaine patch    Respiratory:   - O2 sat >92%  - On nasal cannula  - Chest PT, Saline mist    Cardiovascular:   -Previous PPM s/p removed due to bacteremia, repeat MARIA LUISA no IE  - HTN: Labetalo, Hydralazine, Clonidine Patch  - ASA    Gastrointestinal/Nutrition:   - NPO, NGT decompression  - PPI    Renal/Genitourinary:   - Maintain wong  - Monitor UOP  - s/p lasix challenge for worsening JEROME and lack of urine output. Now improving  - D5LR @100    Hematologic:   - s/p 3 u pRBC  - Monitor H+H    Infectious Disease:   -Previous multiple + BC E.Faecalis, Multiple susequent neg. thus far  - Per ID rec: Continue zosyn for 4 days post op then switch to Ampicillin 2g q4 6/10  - Monitor WBC    Endocrine:   - 20 units lantus at home (10 U while npo)  - 6 premeal (hold if NPO)  - continue home levothyroxine    Tubes/Lines/Drains:   - right radial a line  - PIVs    DISPO: SICU

## 2025-06-08 NOTE — PROGRESS NOTE ADULT - SUBJECTIVE AND OBJECTIVE BOX
SICU PROGRESS NOTE:    24 HOUR INTERVAL EVENTS:  - KUB --> dilated stomach and loops of bowel  - NGT placed- dark, blood tinged bilious material 700cc on placement  - Home PO BP meds converted to IV due to emesis, labetalol uptitrated  - SQH DVT ppx  - CVC d/c'ed  - Lasix 40    NEURO  Exam: awake, alert, oriented  Meds: acetaminophen   IVPB .. 1000 milliGRAM(s) IV Intermittent every 6 hours  HYDROmorphone  Injectable 0.5 milliGRAM(s) IV Push every 4 hours PRN Moderate Pain (4 - 6)  HYDROmorphone  Injectable 1 milliGRAM(s) IV Push every 4 hours PRN Severe Pain (7 - 10)  [x] Adequacy of sedation and pain control has been assessed and adjusted    RESPIRATORY  RR: 15 (06-08-25 @ 01:39) (10 - 26)  SpO2: 97% (06-08-25 @ 01:39) (87% - 99%)  Wt(kg): --  Exam: unlabored, symmetric chest rise, on 4L NC  Mechanical Ventilation:   ABG - ( 08 Jun 2025 00:45 )  pH: 7.45  /  pCO2: 40    /  pO2: 131   / HCO3: 28    / Base Excess: 3.5   /  SaO2: 98.4    Lactate: x        Meds: albuterol/ipratropium for Nebulization 3 milliLiter(s) Nebulizer every 6 hours PRN Shortness of Breath and/or Wheezing  sodium chloride 3%  Inhalation 4 milliLiter(s) Inhalation every 12 hours    CARDIOVASCULAR  HR: 71 (06-08-25 @ 01:39) (63 - 80)  BP: 206/84 (06-08-25 @ 00:00) (159/96 - 206/84)  BP(mean): 120 (06-08-25 @ 00:00) (103 - 120)  ABP: 191/53 (06-08-25 @ 01:39) (150/33 - 192/55)  ABP(mean): 107 (06-08-25 @ 01:39) (76 - 113)  VBG - ( 06 Jun 2025 06:40 )  pH: 7.32  /  pCO2: 49    /  pO2: 40    / HCO3: 25    / Base Excess: -1.1  /  SaO2: 67.8   Lactate: 1.1      Exam: regular rate, hypertensive  Perfusion     [x]Adequate   [ ]Inadequate  Mentation   [x]Normal       [ ]Reduced  Extremities  [x]Warm         [ ]Cool  Volume Status [ ]Hypervolemic [x]Euvolemic [ ]Hypovolemic  Meds: hydrALAZINE Injectable 10 milliGRAM(s) IV Push every 6 hours  labetalol Injectable 20 milliGRAM(s) IV Push every 4 hours    GI/NUTRITION  Exam: soft, R incisional tenderness, mild distension, NGT in place  Diet: NPO  Meds: pantoprazole  Injectable 40 milliGRAM(s) IV Push daily    GENITOURINARY  I&O's Detail    06-06 @ 07:01  -  06-07 @ 07:00  --------------------------------------------------------  IN:    dextrose 5% + lactated ringers: 800 mL    IV PiggyBack: 1250 mL    Lactated Ringers: 1600 mL    Norepinephrine: 99.8 mL  Total IN: 3749.8 mL    OUT:    Indwelling Catheter - Urethral (mL): 523 mL  Total OUT: 523 mL    Total NET: 3226.8 mL    06-07 @ 07:01 - 06-08 @ 02:11  --------------------------------------------------------  IN:    dextrose 5% + lactated ringers: 1800 mL    IV PiggyBack: 400 mL    Oral Fluid: 90 mL  Total IN: 2290 mL    OUT:    Indwelling Catheter - Urethral (mL): 1125 mL    Nasogastric/Oral tube (mL): 1200 mL  Total OUT: 2325 mL    Total NET: -35 mL    06-08    133[L]  |  96[L]  |  43[H]  ----------------------------<  216[H]  3.3[L]   |  23  |  2.88[H]    Ca    7.2[L]      08 Jun 2025 00:45  Phos  3.7     06-08  Mg     2.20     06-08    TPro  5.4[L]  /  Alb  2.7[L]  /  TBili  0.4  /  DBili  x   /  AST  27  /  ALT  81[H]  /  AlkPhos  306[H]  06-08    [x] Frye catheter, indication: N/A  Meds: dextrose 5% + lactated ringers. 1000 milliLiter(s) IV Continuous <Continuous>  potassium chloride  10 mEq/100 mL IVPB 10 milliEquivalent(s) IV Intermittent every 1 hour  potassium chloride  10 mEq/100 mL IVPB 10 milliEquivalent(s) IV Intermittent every 1 hour    HEMATOLOGIC  Meds: aspirin  chewable 81 milliGRAM(s) Oral daily                        9.5    8.99  )-----------( 206      ( 08 Jun 2025 00:45 )             27.8     PT/INR - ( 06 Jun 2025 11:50 )   PT: 10.5 sec;   INR: <0.90 ratio         PTT - ( 06 Jun 2025 11:50 )  PTT:27.0 sec  Transfusion     [ ] PRBC   [ ] Platelets   [ ] FFP   [ ] Cryoprecipitate    INFECTIOUS DISEASES  WBC Count: 8.99 K/uL (06-08 @ 00:45)    RECENT CULTURES:  Specimen Source: Blood Blood-Peripheral  Date/Time: 06-06 @ 02:30  Culture Results:   No growth at 24 hours  Gram Stain: --  Organism: --  Specimen Source: Blood Blood-Peripheral  Date/Time: 06-04 @ 14:58  Culture Results:   No growth at 72 Hours  Gram Stain: --  Organism: --  Specimen Source: Blood Blood-Peripheral  Date/Time: 06-04 @ 14:26  Culture Results:   No growth at 72 Hours  Gram Stain: --  Organism: --    Meds: piperacillin/tazobactam IVPB.. 3.375 Gram(s) IV Intermittent every 8 hours    ENDOCRINE  CAPILLARY BLOOD GLUCOSE  POCT Blood Glucose.: 218 mg/dL (07 Jun 2025 23:24)  POCT Blood Glucose.: 193 mg/dL (07 Jun 2025 17:22)  POCT Blood Glucose.: 169 mg/dL (07 Jun 2025 10:57)  POCT Blood Glucose.: 131 mg/dL (07 Jun 2025 05:32)    Meds: insulin glargine Injectable (LANTUS) 10 Unit(s) SubCutaneous at bedtime  insulin lispro (ADMELOG) corrective regimen sliding scale   SubCutaneous at bedtime  insulin lispro (ADMELOG) corrective regimen sliding scale   SubCutaneous every 6 hours  levothyroxine Injectable 112 MICROGram(s) IV Push at bedtime

## 2025-06-08 NOTE — PHYSICAL THERAPY INITIAL EVALUATION ADULT - PLANNED THERAPY INTERVENTIONS, PT EVAL
Addended by: ALYCIA HAMMER on: 6/14/2019 05:00 PM     Modules accepted: Orders    
balance training/bed mobility training/gait training/ROM/strengthening/stretching
balance training/bed mobility training/gait training/strengthening/transfer training

## 2025-06-08 NOTE — PHYSICAL THERAPY INITIAL EVALUATION ADULT - ADDITIONAL COMMENTS
Pt lives with daughter in a house with 5 stairs to enter + chair-lift inside. Ambulates with a cane for short distances and rolling walker for longer distance. Independent with ADLs.    After session, patient left semi supine in bed with all lines intact in NAD. +Bed alarm. RN aware.
Pt lives with daughter in a house with 5 stairs to enter + chair-lift inside. Ambulates with a cane for short distances and rolling walker for longer distance. Independent with ADLs.    After session, patient left semi supine in bed with all lines intact in NAD. +Bed alarm. RN aware.  Patients Current SpO2: 99%

## 2025-06-08 NOTE — PROGRESS NOTE ADULT - ASSESSMENT
85 year old male with 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 After family discussions they wished to proceed with surgery, but this was subsequently canceled due to RRT on 6/6 for acute cholecystitis requiring lap geetha per gen surg    Plan:  -OR for L3-5 lami/fusion to be rescheduled, new date pending medical stabilization   -med and cards cleared, appreciate care  -will need to hold Tlovenox prior to OR if restarted  -WBAT  -pain control prn  -bowel reg prn  -antibiotics per ID - zosyn   -continue PT/OT  -dispo pending SICU stay    Jake Leal PGY1  Orthopedic Surgery  [facility] [id number]  [facility]        [id number]  [facility]  [id number]/[phone]

## 2025-06-08 NOTE — PROGRESS NOTE ADULT - ASSESSMENT
85 year old man with multiple chronic medical comorbidities including hypertension, sinus node dysfunction, type two diabetes, and chronic kidney disease (baseline creatinine 1.5-2.0) presently admitted for lower back pain associated with lumbar compression deformities with concern for cauda equina impingement. He was found to be bacteremic (E faecalis) due to acute acalculous cholecystitis, and is now seen following laparoscopic cholecystectomy with Dr. Triny Verde 6/5/2025. POD 1 patient became hypotensive, unresponsive to 2L fluid resuscitation and 3 u pRBC and was started on vasopressors and transferred to SICU.      PLAN:  - Zosyn  - NPO/ IVF  - advance diet if tolerated - currently NPO except meds  - ISS  - ASA 81  - Pain Control  - care per SICU - listed to the floor    B Team Surgery  [id number]

## 2025-06-08 NOTE — PHYSICAL THERAPY INITIAL EVALUATION ADULT - GENERAL OBSERVATIONS, REHAB EVAL
Pt received in bed all lines intact NAD all precautions observed RN made aware, call bell left within reach, PT will continue to follow.
Patient received semi supine in bed with all lines intact in NAD. HR 60

## 2025-06-08 NOTE — PHYSICAL THERAPY INITIAL EVALUATION ADULT - LEVEL OF INDEPENDENCE: SUPINE/SIT, REHAB EVAL
Unable to sit directly upright at this time.  Poor abdominal control.  Pt requiring assistance to sit upright.  Pt performed knee extensions x 20 b/l.  Pt given stress ball to perform hand squeezes x 30.  Lower extremity sensation grossly intact.  Will continue to monitor patients functional abilities./unable to perform
Held due to pt pending MRI of lumbar and thoracic spine/unable to perform

## 2025-06-08 NOTE — PROGRESS NOTE ADULT - SUBJECTIVE AND OBJECTIVE BOX
Surgery Progress Note    SUBJECTIVE:  - Patient seen and examined at bedside   --------------------------------------------------------------------------------------------------  OBJECTIVE:   Physical Exam:  General: AAOx3, NAD, lying comfortably in bed  HEENT: NC/AT  Respiratory: nonlabored breathing  Abdomen: soft, slightly distended, minimal tenderness to palpation, incision with prevena in place  Extremities: WWP, no edema  --------------------------------------------------------------------------------------------------  V/S:  Vital Signs Last 24 Hrs  T(C): 36.1 (08 Jun 2025 08:00), Max: 37.2 (07 Jun 2025 16:00)  T(F): 97 (08 Jun 2025 08:00), Max: 99 (07 Jun 2025 16:00)  HR: 57 (08 Jun 2025 09:00) (57 - 83)  BP: 206/84 (08 Jun 2025 00:00) (199/78 - 206/84)  BP(mean): 120 (08 Jun 2025 00:00) (114 - 120)  RR: 10 (08 Jun 2025 09:00) (10 - 23)  SpO2: 95% (08 Jun 2025 09:00) (93% - 98%)    Parameters below as of 08 Jun 2025 10:00  Patient On (Oxygen Delivery Method): nasal cannula w/ humidification  O2 Flow (L/min): 4      --------------------------------------------------------------------------------------------------  I/Os:    07 Jun 2025 07:01  -  08 Jun 2025 07:00  --------------------------------------------------------  IN:    dextrose 5% + lactated ringers: 2100 mL    IV PiggyBack: 900 mL    Oral Fluid: 90 mL  Total IN: 3090 mL    OUT:    Indwelling Catheter - Urethral (mL): 2350 mL    Nasogastric/Oral tube (mL): 1500 mL  Total OUT: 3850 mL    Total NET: -760 mL      08 Jun 2025 07:01  -  08 Jun 2025 12:02  --------------------------------------------------------  IN:  Total IN: 0 mL    OUT:    Indwelling Catheter - Urethral (mL): 300 mL  Total OUT: 300 mL    Total NET: -300 mL        --------------------------------------------------------------------------------------------------  LABS:                        9.5    8.99  )-----------( 206      ( 08 Jun 2025 00:45 )             27.8     08 Jun 2025 00:45    133    |  96     |  43     ----------------------------<  216    3.3     |  23     |  2.88     Ca    7.2        08 Jun 2025 00:45  Phos  3.7       08 Jun 2025 00:45  Mg     2.20      08 Jun 2025 00:45    TPro  5.4    /  Alb  2.7    /  TBili  0.4    /  DBili  x      /  AST  27     /  ALT  81     /  AlkPhos  306    08 Jun 2025 00:45      CAPILLARY BLOOD GLUCOSE      POCT Blood Glucose.: 152 mg/dL (08 Jun 2025 11:50)  POCT Blood Glucose.: 207 mg/dL (08 Jun 2025 05:43)  POCT Blood Glucose.: 218 mg/dL (07 Jun 2025 23:24)  POCT Blood Glucose.: 193 mg/dL (07 Jun 2025 17:22)        LIVER FUNCTIONS - ( 08 Jun 2025 00:45 )  Alb: 2.7 g/dL / Pro: 5.4 g/dL / ALK PHOS: 306 U/L / ALT: 81 U/L / AST: 27 U/L / GGT: x             Culture - Blood (collected 06 Jun 2025 02:30)  Source: Blood Blood-Peripheral  Preliminary Report (08 Jun 2025 10:00):    No growth at 48 Hours      Urinalysis Basic - ( 08 Jun 2025 00:45 )    Color: x / Appearance: x / SG: x / pH: x  Gluc: 216 mg/dL / Ketone: x  / Bili: x / Urobili: x   Blood: x / Protein: x / Nitrite: x   Leuk Esterase: x / RBC: x / WBC x   Sq Epi: x / Non Sq Epi: x / Bacteria: x      --------------------------------------------------------------------------------------------------  MEDICATIONS  (STANDING):  aspirin  chewable 81 milliGRAM(s) Oral daily  chlorhexidine 2% Cloths 1 Application(s) Topical daily  dextrose 5% + sodium chloride 0.45%. 1000 milliLiter(s) (30 mL/Hr) IV Continuous <Continuous>  heparin   Injectable 5000 Unit(s) SubCutaneous every 8 hours  hydrALAZINE Injectable 20 milliGRAM(s) IV Push every 4 hours  insulin glargine Injectable (LANTUS) 13 Unit(s) SubCutaneous at bedtime  insulin lispro (ADMELOG) corrective regimen sliding scale   SubCutaneous every 6 hours  labetalol Injectable 20 milliGRAM(s) IV Push every 4 hours  levothyroxine Injectable 112 MICROGram(s) IV Push at bedtime  lidocaine   4% Patch 1 Patch Transdermal daily  pantoprazole  Injectable 40 milliGRAM(s) IV Push daily  piperacillin/tazobactam IVPB.. 3.375 Gram(s) IV Intermittent every 8 hours  povidone iodine 10% Nasal Swab 1 Application(s) Both Nostrils once  sodium chloride 3%  Inhalation 4 milliLiter(s) Inhalation every 12 hours    MEDICATIONS  (PRN):  albuterol/ipratropium for Nebulization 3 milliLiter(s) Nebulizer every 6 hours PRN Shortness of Breath and/or Wheezing  HYDROmorphone  Injectable 0.5 milliGRAM(s) IV Push every 4 hours PRN Moderate Pain (4 - 6)  HYDROmorphone  Injectable 1 milliGRAM(s) IV Push every 4 hours PRN Severe Pain (7 - 10)    --------------------------------------------------------------------------------------------------

## 2025-06-08 NOTE — PHYSICAL THERAPY INITIAL EVALUATION ADULT - RANGE OF MOTION EXAMINATION, REHAB EVAL
bilateral upper extremity ROM was WFL (within functional limits)/bilateral lower extremity ROM was WFL (within functional limits)
LE limited by onset of back pain/bilateral upper extremity ROM was WFL (within functional limits)

## 2025-06-08 NOTE — PROGRESS NOTE ADULT - SUBJECTIVE AND OBJECTIVE BOX
ORTHOPEDIC PROGRESS NOTE    SUBJECTIVE:  Pt seen and examined at bedside this am.  Doing well.  Elevated BPs overnight, remains on NC      OBJECTIVE:  Vital Signs Last 24 Hrs  T(C): 36.1 (08 Jun 2025 08:00), Max: 37.2 (07 Jun 2025 16:00)  T(F): 97 (08 Jun 2025 08:00), Max: 99 (07 Jun 2025 16:00)  HR: 57 (08 Jun 2025 09:00) (57 - 83)  BP: 206/84 (08 Jun 2025 00:00) (199/78 - 206/84)  BP(mean): 120 (08 Jun 2025 00:00) (114 - 120)  RR: 10 (08 Jun 2025 09:00) (10 - 23)  SpO2: 95% (08 Jun 2025 09:00) (93% - 98%)    Parameters below as of 08 Jun 2025 10:00  Patient On (Oxygen Delivery Method): nasal cannula w/ humidification  O2 Flow (L/min): 4      Physical Exam:  GEN: NAD, resting quietly  PULM: symmetric chest rise bilaterally, no increased WOB, on NC  SPINE:   Motor exam:          Upper extremity         C5 (Shoulder Abd)    C6 (Elbow flex)   C7 (Elbow ext)   C8 (Finger flex) T1 (finger abd)         R         5/5                 5/5                       5/5                      5/5                         5/5          L          5/5                 5/5                      5/5                      5/5                         5/5                   Lower extremity           L2 (Hip flex)  L3 (knee ext)  L4 (Dorsi flex)  L5 (EHL)  S1 (Plantar flex)                                               R        3/5            4/5             1/5                    1/5          4/5      L        3/5            4/5             5/5                   5/5           5/5    Sensory exam:                         C5      C6      C7      C8       T1          RIGHT          2         2        2         2         2          (0=absent, 1=impaired, 2=normal, NT=not testable)  LEFT             2         2        2         2         2          (0=absent, 1=impaired, 2=normal, NT=not testable)                          L2      L3     L4     L5       S1          RIGHT          2         2        2         2         2          (0=absent, 1=impaired, 2=normal, NT=not testable)  LEFT             2         2        2         2         2          (0=absent, 1=impaired, 2=normal, NT=not testable)     LABS                        9.5    8.99  )-----------( 206      ( 08 Jun 2025 00:45 )             27.8       06-08    133[L]  |  96[L]  |  43[H]  ----------------------------<  216[H]  3.3[L]   |  23  |  2.88[H]    Ca    7.2[L]      08 Jun 2025 00:45  Phos  3.7     06-08  Mg     2.20     06-08    TPro  5.4[L]  /  Alb  2.7[L]  /  TBili  0.4  /  DBili  x   /  AST  27  /  ALT  81[H]  /  AlkPhos  306[H]  06-08      PT/INR - ( 06 Jun 2025 11:50 )   PT: 10.5 sec;   INR: <0.90 ratio         PTT - ( 06 Jun 2025 11:50 )  PTT:27.0 sec    I&O's Summary    07 Jun 2025 07:01  -  08 Jun 2025 07:00  --------------------------------------------------------  IN: 3090 mL / OUT: 3850 mL / NET: -760 mL    08 Jun 2025 07:01  -  08 Jun 2025 10:16  --------------------------------------------------------  IN: 0 mL / OUT: 300 mL / NET: -300 mL        acetaminophen   IVPB .. 1000 milliGRAM(s) IV Intermittent every 6 hours  albuterol/ipratropium for Nebulization 3 milliLiter(s) Nebulizer every 6 hours PRN  aspirin  chewable 81 milliGRAM(s) Oral daily  chlorhexidine 2% Cloths 1 Application(s) Topical daily  dextrose 5% + sodium chloride 0.45%. 1000 milliLiter(s) IV Continuous <Continuous>  heparin   Injectable 5000 Unit(s) SubCutaneous every 8 hours  hydrALAZINE Injectable 20 milliGRAM(s) IV Push every 4 hours  HYDROmorphone  Injectable 0.5 milliGRAM(s) IV Push every 4 hours PRN  HYDROmorphone  Injectable 1 milliGRAM(s) IV Push every 4 hours PRN  insulin glargine Injectable (LANTUS) 10 Unit(s) SubCutaneous at bedtime  insulin lispro (ADMELOG) corrective regimen sliding scale   SubCutaneous every 6 hours  labetalol Injectable 20 milliGRAM(s) IV Push every 4 hours  levothyroxine Injectable 112 MICROGram(s) IV Push at bedtime  lidocaine   4% Patch 1 Patch Transdermal daily  pantoprazole  Injectable 40 milliGRAM(s) IV Push daily  piperacillin/tazobactam IVPB.. 3.375 Gram(s) IV Intermittent every 8 hours  povidone iodine 10% Nasal Swab 1 Application(s) Both Nostrils once  sodium chloride 3%  Inhalation 4 milliLiter(s) Inhalation every 12 hours

## 2025-06-08 NOTE — PHYSICAL THERAPY INITIAL EVALUATION ADULT - MANUAL MUSCLE TESTING RESULTS, REHAB EVAL
UE at least 3+/5 throughout. LE not assessed due to pain (grossly 3-/5)
Upper and lower extremities grossly assessed.  Bilaterally, pt presents with a 3/5 on MMT score./grossly assessed due to

## 2025-06-08 NOTE — PHYSICAL THERAPY INITIAL EVALUATION ADULT - PERTINENT HX OF CURRENT PROBLEM, REHAB EVAL
85 year old Male with pmhx of prostate cancer transferred from [place] for MRI back and ortho eval
Pt is an 85M with HTN, HLD, DM2, prostate cancer s/p prostatectomy, CKD, TIA with a pacemaker who presents transferred from [place] for further evaluation of low back pain. The pain began when he was going to the bathroom and worsened when he was in bed and rolled over. The pain is worse when he is walking or moving. He has never had back pain like this before. Patient normally ambulates with a walker or with a cane. Of note, patient endorses a fall in February where he fell into a pile of snow. Patient denies any injury after that event and did not have any imaging at that time. He usually has one BM every 3-4 days and this has not changed. Wakes up 2-3x/night to urinate. No episodes of bladder or bowel incontinence. He says he has diabetic neuropathy and has seen pain management. Says he has tried gabapentin but it made his legs very weak and he almost fell.

## 2025-06-08 NOTE — PROGRESS NOTE ADULT - ATTENDING COMMENTS
Patient with postop ileus s/p robo geetha. Patient hypertensive.  N mentating well pain controlled  resp on 4L encourage incentive, pt eval, oob with tslo brace pending  cv hemodynamically stable, hypertensive, clonidine patch and increase hydralazine and labatelol  gi npo, ivf, ngt, d51/2ns  gu/renal, improving creatinine and renal function  heme vte ppx  id zosyn   endo no changes  The patient is a critical care patient with life threatening hemodynamic and metabolic instability in SICU.  I have personally interviewed when possible and examined the patient, reviewed data and laboratory tests/x-rays and all pertinent electronic images.  I was physically present for the key portions of the evaluation and management (E/M) service provided.   The SICU team has a constant risk benefit analyzes discussion with the primary team, all consultants, House Staff and PA's on all decisions.  These diagnoses are unrelated to the surgical procedure noted above.  I meet with family if needed to get further history, discuss the case and make care decisions for this patient who might not be able to participate.  Time involved in performance of separately billable procedures was not counted toward my critical care time. There is no overlap.  I spent 55-75 minutes ( 0800Hrs-0915Hrs in AM/ 1600Hrs-1715Hrs in PM, or other time indicated) of critical care time for the diagnoses, assessment, plan and interventions.  This time excludes time spent on separate procedures and teaching.

## 2025-06-09 LAB
ALBUMIN SERPL ELPH-MCNC: 2.7 G/DL — LOW (ref 3.3–5)
ALP SERPL-CCNC: 260 U/L — HIGH (ref 40–120)
ALT FLD-CCNC: 64 U/L — HIGH (ref 4–41)
ANION GAP SERPL CALC-SCNC: 14 MMOL/L — SIGNIFICANT CHANGE UP (ref 7–14)
AST SERPL-CCNC: 19 U/L — SIGNIFICANT CHANGE UP (ref 4–40)
BILIRUB SERPL-MCNC: 0.5 MG/DL — SIGNIFICANT CHANGE UP (ref 0.2–1.2)
BLD GP AB SCN SERPL QL: NEGATIVE — SIGNIFICANT CHANGE UP
BUN SERPL-MCNC: 39 MG/DL — HIGH (ref 7–23)
CALCIUM SERPL-MCNC: 7.4 MG/DL — LOW (ref 8.4–10.5)
CHLORIDE SERPL-SCNC: 98 MMOL/L — SIGNIFICANT CHANGE UP (ref 98–107)
CO2 SERPL-SCNC: 24 MMOL/L — SIGNIFICANT CHANGE UP (ref 22–31)
CREAT SERPL-MCNC: 2.52 MG/DL — HIGH (ref 0.5–1.3)
CULTURE RESULTS: SIGNIFICANT CHANGE UP
CULTURE RESULTS: SIGNIFICANT CHANGE UP
EGFR: 24 ML/MIN/1.73M2 — LOW
EGFR: 24 ML/MIN/1.73M2 — LOW
GLUCOSE BLDC GLUCOMTR-MCNC: 102 MG/DL — HIGH (ref 70–99)
GLUCOSE BLDC GLUCOMTR-MCNC: 127 MG/DL — HIGH (ref 70–99)
GLUCOSE BLDC GLUCOMTR-MCNC: 129 MG/DL — HIGH (ref 70–99)
GLUCOSE BLDC GLUCOMTR-MCNC: 77 MG/DL — SIGNIFICANT CHANGE UP (ref 70–99)
GLUCOSE BLDC GLUCOMTR-MCNC: 85 MG/DL — SIGNIFICANT CHANGE UP (ref 70–99)
GLUCOSE SERPL-MCNC: 110 MG/DL — HIGH (ref 70–99)
HCT VFR BLD CALC: 27.3 % — LOW (ref 39–50)
HGB BLD-MCNC: 9.2 G/DL — LOW (ref 13–17)
MAGNESIUM SERPL-MCNC: 2.2 MG/DL — SIGNIFICANT CHANGE UP (ref 1.6–2.6)
MCHC RBC-ENTMCNC: 28.5 PG — SIGNIFICANT CHANGE UP (ref 27–34)
MCHC RBC-ENTMCNC: 33.7 G/DL — SIGNIFICANT CHANGE UP (ref 32–36)
MCV RBC AUTO: 84.5 FL — SIGNIFICANT CHANGE UP (ref 80–100)
NRBC # BLD AUTO: 0 K/UL — SIGNIFICANT CHANGE UP (ref 0–0)
NRBC # FLD: 0 K/UL — SIGNIFICANT CHANGE UP (ref 0–0)
NRBC BLD AUTO-RTO: 0 /100 WBCS — SIGNIFICANT CHANGE UP (ref 0–0)
PHOSPHATE SERPL-MCNC: 3.4 MG/DL — SIGNIFICANT CHANGE UP (ref 2.5–4.5)
PLATELET # BLD AUTO: 202 K/UL — SIGNIFICANT CHANGE UP (ref 150–400)
POTASSIUM SERPL-MCNC: 3.3 MMOL/L — LOW (ref 3.5–5.3)
POTASSIUM SERPL-SCNC: 3.3 MMOL/L — LOW (ref 3.5–5.3)
PROT SERPL-MCNC: 5.5 G/DL — LOW (ref 6–8.3)
RBC # BLD: 3.23 M/UL — LOW (ref 4.2–5.8)
RBC # FLD: 16.1 % — HIGH (ref 10.3–14.5)
RH IG SCN BLD-IMP: POSITIVE — SIGNIFICANT CHANGE UP
SODIUM SERPL-SCNC: 136 MMOL/L — SIGNIFICANT CHANGE UP (ref 135–145)
SPECIMEN SOURCE: SIGNIFICANT CHANGE UP
SPECIMEN SOURCE: SIGNIFICANT CHANGE UP
WBC # BLD: 11.4 K/UL — HIGH (ref 3.8–10.5)
WBC # FLD AUTO: 11.4 K/UL — HIGH (ref 3.8–10.5)

## 2025-06-09 PROCEDURE — 99233 SBSQ HOSP IP/OBS HIGH 50: CPT

## 2025-06-09 PROCEDURE — 99232 SBSQ HOSP IP/OBS MODERATE 35: CPT

## 2025-06-09 PROCEDURE — G0545: CPT

## 2025-06-09 RX ORDER — DEXTROSE 50 % IN WATER 50 %
25 SYRINGE (ML) INTRAVENOUS ONCE
Refills: 0 | Status: COMPLETED | OUTPATIENT
Start: 2025-06-09 | End: 2025-06-09

## 2025-06-09 RX ORDER — INSULIN GLARGINE-YFGN 100 [IU]/ML
10 INJECTION, SOLUTION SUBCUTANEOUS AT BEDTIME
Refills: 0 | Status: DISCONTINUED | OUTPATIENT
Start: 2025-06-09 | End: 2025-06-11

## 2025-06-09 RX ORDER — SODIUM CHLORIDE 9 G/1000ML
1000 INJECTION, SOLUTION INTRAVENOUS
Refills: 0 | Status: DISCONTINUED | OUTPATIENT
Start: 2025-06-09 | End: 2025-06-11

## 2025-06-09 RX ORDER — ACETAMINOPHEN 500 MG/5ML
1000 LIQUID (ML) ORAL EVERY 6 HOURS
Refills: 0 | Status: COMPLETED | OUTPATIENT
Start: 2025-06-09 | End: 2025-06-10

## 2025-06-09 RX ORDER — HYDROMORPHONE/SOD CHLOR,ISO/PF 2 MG/10 ML
0.5 SYRINGE (ML) INJECTION ONCE
Refills: 0 | Status: DISCONTINUED | OUTPATIENT
Start: 2025-06-09 | End: 2025-06-09

## 2025-06-09 RX ADMIN — Medication 10 MILLIGRAM(S): at 16:31

## 2025-06-09 RX ADMIN — Medication 20 MILLIGRAM(S): at 22:01

## 2025-06-09 RX ADMIN — SODIUM CHLORIDE 30 MILLILITER(S): 9 INJECTION, SOLUTION INTRAVENOUS at 17:59

## 2025-06-09 RX ADMIN — LIDOCAINE HYDROCHLORIDE 1 PATCH: 20 JELLY TOPICAL at 12:03

## 2025-06-09 RX ADMIN — LIDOCAINE HYDROCHLORIDE 1 PATCH: 20 JELLY TOPICAL at 19:16

## 2025-06-09 RX ADMIN — Medication 1 MILLIGRAM(S): at 01:49

## 2025-06-09 RX ADMIN — LABETALOL HYDROCHLORIDE 20 MILLIGRAM(S): 200 TABLET, FILM COATED ORAL at 01:10

## 2025-06-09 RX ADMIN — Medication 100 MILLIEQUIVALENT(S): at 01:41

## 2025-06-09 RX ADMIN — LABETALOL HYDROCHLORIDE 20 MILLIGRAM(S): 200 TABLET, FILM COATED ORAL at 16:32

## 2025-06-09 RX ADMIN — Medication 1000 MILLIGRAM(S): at 12:32

## 2025-06-09 RX ADMIN — Medication 20 MILLIGRAM(S): at 10:42

## 2025-06-09 RX ADMIN — Medication 1 PATCH: at 08:00

## 2025-06-09 RX ADMIN — Medication 1 MILLIGRAM(S): at 02:30

## 2025-06-09 RX ADMIN — Medication 4 MILLILITER(S): at 21:22

## 2025-06-09 RX ADMIN — SODIUM CHLORIDE 30 MILLILITER(S): 9 INJECTION, SOLUTION INTRAVENOUS at 22:01

## 2025-06-09 RX ADMIN — HEPARIN SODIUM 5000 UNIT(S): 1000 INJECTION INTRAVENOUS; SUBCUTANEOUS at 14:13

## 2025-06-09 RX ADMIN — Medication 25 GRAM(S): at 22:01

## 2025-06-09 RX ADMIN — Medication 81 MILLIGRAM(S): at 12:02

## 2025-06-09 RX ADMIN — Medication 1 MILLIGRAM(S): at 19:53

## 2025-06-09 RX ADMIN — Medication 100 MILLIEQUIVALENT(S): at 04:21

## 2025-06-09 RX ADMIN — Medication 400 MILLIGRAM(S): at 23:13

## 2025-06-09 RX ADMIN — LABETALOL HYDROCHLORIDE 20 MILLIGRAM(S): 200 TABLET, FILM COATED ORAL at 04:35

## 2025-06-09 RX ADMIN — Medication 100 MILLIEQUIVALENT(S): at 02:39

## 2025-06-09 RX ADMIN — Medication 1 MILLIGRAM(S): at 10:42

## 2025-06-09 RX ADMIN — Medication 0.5 MILLIGRAM(S): at 04:00

## 2025-06-09 RX ADMIN — Medication 20 MILLIGRAM(S): at 01:10

## 2025-06-09 RX ADMIN — Medication 400 MILLIGRAM(S): at 05:16

## 2025-06-09 RX ADMIN — Medication 1 MILLIGRAM(S): at 07:00

## 2025-06-09 RX ADMIN — Medication 4 MILLILITER(S): at 09:25

## 2025-06-09 RX ADMIN — Medication 1 MILLIGRAM(S): at 11:12

## 2025-06-09 RX ADMIN — LABETALOL HYDROCHLORIDE 20 MILLIGRAM(S): 200 TABLET, FILM COATED ORAL at 12:07

## 2025-06-09 RX ADMIN — Medication 1 MILLIGRAM(S): at 14:29

## 2025-06-09 RX ADMIN — INSULIN GLARGINE-YFGN 10 UNIT(S): 100 INJECTION, SOLUTION SUBCUTANEOUS at 23:13

## 2025-06-09 RX ADMIN — Medication 1 APPLICATION(S): at 16:41

## 2025-06-09 RX ADMIN — Medication 1 MILLIGRAM(S): at 06:31

## 2025-06-09 RX ADMIN — Medication 25 GRAM(S): at 05:16

## 2025-06-09 RX ADMIN — Medication 1 PATCH: at 19:50

## 2025-06-09 RX ADMIN — Medication 1 MILLIGRAM(S): at 14:59

## 2025-06-09 RX ADMIN — Medication 20 MILLIGRAM(S): at 17:42

## 2025-06-09 RX ADMIN — Medication 1000 MILLIGRAM(S): at 06:00

## 2025-06-09 RX ADMIN — Medication 400 MILLIGRAM(S): at 12:02

## 2025-06-09 RX ADMIN — LABETALOL HYDROCHLORIDE 20 MILLIGRAM(S): 200 TABLET, FILM COATED ORAL at 20:39

## 2025-06-09 RX ADMIN — Medication 112 MICROGRAM(S): at 22:59

## 2025-06-09 RX ADMIN — HEPARIN SODIUM 5000 UNIT(S): 1000 INJECTION INTRAVENOUS; SUBCUTANEOUS at 22:01

## 2025-06-09 RX ADMIN — Medication 20 MILLIGRAM(S): at 04:35

## 2025-06-09 RX ADMIN — Medication 0.5 MILLIGRAM(S): at 03:23

## 2025-06-09 RX ADMIN — Medication 20 MILLIGRAM(S): at 14:13

## 2025-06-09 RX ADMIN — Medication 40 MILLIGRAM(S): at 12:02

## 2025-06-09 RX ADMIN — Medication 25 MILLILITER(S): at 17:59

## 2025-06-09 RX ADMIN — LABETALOL HYDROCHLORIDE 20 MILLIGRAM(S): 200 TABLET, FILM COATED ORAL at 23:13

## 2025-06-09 RX ADMIN — Medication 25 GRAM(S): at 14:13

## 2025-06-09 RX ADMIN — HEPARIN SODIUM 5000 UNIT(S): 1000 INJECTION INTRAVENOUS; SUBCUTANEOUS at 05:16

## 2025-06-09 RX ADMIN — Medication 1 MILLIGRAM(S): at 20:30

## 2025-06-09 NOTE — PROGRESS NOTE ADULT - ASSESSMENT
85M PMH HTN, DM2, CKD (baseline creatinine 1.5-2), TIAs (on Eliquis at home), sinus node dysfunction (s/p PPM ~1 year ago), prostate cancer s/p prostectomy (~early 2000s), total thyroidectomy (~2013), initially presented after a fall and found to have lumbar stenosis with cauda equina impingement however on 6/4 developed acute abdominal pain and had a CT abdomen and pelvis that had signs of acute cholecystitis. On 6/5 patient underwent a robotic cholecystectomy, POD 1 patient became hypotensive, unresponsive to 2L fluid resuscitation and 3 u pRBC and was started on vasopressors and transferred to SICU.      PLAN:   Neurologic:   - A&Ox3  - Tylenol and prn dilaudid   - Lidocaine patch    Respiratory:   - O2 sat >92%  - On nasal cannula  - Chest PT, Saline mist    Cardiovascular:   -Previous PPM s/p removed due to bacteremia, repeat MARIA LUISA no IE  - HTN: Labetalol 20q4, Hydralazine 20q4, Clonidine Patch 0.3mg  - ASA    Gastrointestinal/Nutrition:   - NPO, NGT decompression  - PPI    Renal/Genitourinary:   - Failed TOV > Frye replaced  - D5 1/2NS @ 30cc/hr  - Monitor UOP  - s/p 40 lasix     Hematologic:   - Monitor H+H  - DVT ppx: SQH  - ASA    Infectious Disease:   - Previous multiple + BC E.Faecalis, Multiple susequent neg. thus far  - Per ID rec: Continue zosyn for 4 days post op then switch to Ampicillin 2g q4 6/10  - Monitor WBC    Endocrine:   - 20 units lantus at home (13 U while npo)  - 6 premeal (hold if NPO)  - continue home levothyroxine    Tubes/Lines/Drains:   - right radial a line  - PIVs  - Frye    DISPO: SICU

## 2025-06-09 NOTE — PROGRESS NOTE ADULT - ATTENDING COMMENTS
I agree with the detailed interval history, physical, and plan, which I have reviewed and edited where appropriate'; also agree with notes/assessment with my team on service.  I have personally examined the patient.  I was physically present for the key portions of the evaluation and management (E/M) service provided.  I reviewed all the pertinent data.  The patient is a critical care patient with life threatening hemodynamic and metabolic instability in SICU.  The SICU team has a constant risk benefit analyzes discussion and coordinating care with the primary team and all consultants.   The patient is in SICU with the chief complaint and diagnosis mentioned in the note.   The plan will be specified in the note.  85M PMH HTN, DM2, CKD, prostate cancer s/p prostatectomy, s/p robotic cholecystectomy in SICU for HD monitoring and post op care.  AO3  Lung clear  Heart RR  Abd soft  Ext warm    PLAN:   Neurologic:   - Tylenol  -Dilaudid   - Lidocaine patch  Respiratory:   - O2 sat >92%  Cardiovascular:   -Labetalol  -Hydralazine  -Clonidine  - ASA  Gastrointestinal/Nutrition:   - NPO  -NGT   - PPI  Renal/Genitourinary:   - D5 1/2NS @ 30cc/hr  - Monitor UOP  Hematologic:   - Monitor H/H  - SQH  - ASA  Infectious Disease:   - Ampicillin   - Monitor WBC  Endocrine:   - Lantus  -Levothyroxine    DISPO: DC

## 2025-06-09 NOTE — PROGRESS NOTE ADULT - SUBJECTIVE AND OBJECTIVE BOX
B SURGERY PROGRESS NOTE ([id number])    SUBJECTIVE:  hypertensive ovn to SBP 180s, Frye replaced ovn. Patient reports pain is well controlled.   Denies nausea, vomiting. Denies gas, denies BM    OBJECTIVE:  Vital Signs Last 24 Hrs  T(C): 36.4 (09 Jun 2025 04:00), Max: 36.4 (09 Jun 2025 04:00)  T(F): 97.5 (09 Jun 2025 04:00), Max: 97.5 (09 Jun 2025 04:00)  HR: 64 (09 Jun 2025 11:00) (57 - 68)  BP: 193/83 (09 Jun 2025 11:00) (158/52 - 205/51)  BP(mean): 113 (09 Jun 2025 11:00) (82 - 113)  RR: 13 (09 Jun 2025 11:00) (9 - 21)  SpO2: 97% (09 Jun 2025 11:00) (93% - 100%)    Parameters below as of 09 Jun 2025 08:00  Patient On (Oxygen Delivery Method): nasal cannula  O2 Flow (L/min): 2      Physical Examination:  GEN: NAD, resting quietly, NGT w/ bilious output  NEURO: AAOx3, CN II-XII grossly intact, no focal deficits  PULM: symmetric chest rise bilaterally, no increased WOB  ABD: soft, nontender, nondistended, incision CDI  EXTR: no lower extremity edema, moving all extremities

## 2025-06-09 NOTE — PROGRESS NOTE ADULT - SUBJECTIVE AND OBJECTIVE BOX
---___---___---___---___---___---___ ---___---___---___---___---___---___---___---___---                  S U R G I C A L   I C U   P R O G R E S S   N O T E  ---___---___---___---___---___---___ ---___---___---___---___---___---___---___---___---    INTERVAL EVENTS:  - Increase hydralazine 20q4, additional 10 x 1 given  - Increase clonidine patch 0.3mg  - 40 lasix  - D5 1/2NS @ 30cc/hr  - DVT ppx resumed  - Increase Lantus 13U  - Wong out; retained 1500 cc; wong replaced       [ ] Due to altered mental status/intubation, subjective information were not able to be obtained from the patient. History was obtained, to the extent possible, from review of the chart and collateral sources of information.    OBJECTIVE:    VITALS:  Vital Signs Last 24 Hrs  T(C): 36 (08 Jun 2025 20:00), Max: 36.3 (08 Jun 2025 04:00)  T(F): 96.8 (08 Jun 2025 20:00), Max: 97.3 (08 Jun 2025 04:00)  HR: 57 (09 Jun 2025 00:00) (57 - 83)  BP: 168/51 (09 Jun 2025 00:00) (159/56 - 214/69)  BP(mean): 87 (09 Jun 2025 00:00) (87 - 109)  RR: 14 (09 Jun 2025 00:00) (9 - 21)  SpO2: 95% (09 Jun 2025 00:00) (93% - 100%)    Parameters below as of 09 Jun 2025 00:00  Patient On (Oxygen Delivery Method): nasal cannula w/ humidification  O2 Flow (L/min): 4      I&O's Summary    07 Jun 2025 07:01  -  08 Jun 2025 07:00  --------------------------------------------------------  IN: 3090 mL / OUT: 3850 mL / NET: -760 mL    08 Jun 2025 07:01  -  09 Jun 2025 00:33  --------------------------------------------------------  IN: 850 mL / OUT: 3025 mL / NET: -2175 mL        PHYSICAL EXAM:    NEURO  Exam:  A&Ox3    RESPIRATORY  Exam:  nonlabored breathing on 3L NC  Mechanical Ventilation:     CARDIOVASCULAR  Exam:  RRR, warm and well perfused  Cardiac Rhythm:      GI/NUTRITION  Exam:  soft, appropriate incisional tenderness, mild distension, NGT in place  Diet: NPO    GENITOURINARY  Exam: Wong in place    ACCESS DEVICES:  [x] Peripheral Line  [ ] Central Venous Line	[ ] R	[ ] L	[ ] IJ	[ ] Fem	[ ] SC	Placed:   [ x] Arterial Line		[ ] R	[ ] L	[ ] Fem	[ ] Rad	[ ] Ax	Placed:   [ ] PICC:					[ ] Mediport  [x ] Urinary Catheter, Date Placed:   [x] Necessity of urinary, arterial, and venous catheters discussed      LABS:                        9.5    8.99  )-----------( 206      ( 08 Jun 2025 00:45 )             27.8   06-08    133[L]  |  96[L]  |  43[H]  ----------------------------<  216[H]  3.3[L]   |  23  |  2.88[H]    Ca    7.2[L]      08 Jun 2025 00:45  Phos  3.7     06-08  Mg     2.20     06-08    TPro  5.4[L]  /  Alb  2.7[L]  /  TBili  0.4  /  DBili  x   /  AST  27  /  ALT  81[H]  /  AlkPhos  306[H]  06-08    CAPILLARY BLOOD GLUCOSE      POCT Blood Glucose.: 111 mg/dL (08 Jun 2025 22:56)  POCT Blood Glucose.: 112 mg/dL (08 Jun 2025 16:56)  POCT Blood Glucose.: 152 mg/dL (08 Jun 2025 11:50)  POCT Blood Glucose.: 207 mg/dL (08 Jun 2025 05:43)      MEDICATIONS:   MEDICATIONS  (STANDING):  acetaminophen   IVPB .. 1000 milliGRAM(s) IV Intermittent every 6 hours  aspirin  chewable 81 milliGRAM(s) Oral daily  chlorhexidine 2% Cloths 1 Application(s) Topical daily  cloNIDine Patch 0.3 mG/24Hr(s) 1 patch Transdermal every 7 days  dextrose 5% + sodium chloride 0.45%. 1000 milliLiter(s) (30 mL/Hr) IV Continuous <Continuous>  heparin   Injectable 5000 Unit(s) SubCutaneous every 8 hours  hydrALAZINE Injectable 20 milliGRAM(s) IV Push every 4 hours  insulin glargine Injectable (LANTUS) 13 Unit(s) SubCutaneous at bedtime  insulin lispro (ADMELOG) corrective regimen sliding scale   SubCutaneous every 6 hours  labetalol Injectable 20 milliGRAM(s) IV Push every 4 hours  levothyroxine Injectable 112 MICROGram(s) IV Push at bedtime  lidocaine   4% Patch 1 Patch Transdermal daily  pantoprazole  Injectable 40 milliGRAM(s) IV Push daily  piperacillin/tazobactam IVPB.. 3.375 Gram(s) IV Intermittent every 8 hours  povidone iodine 10% Nasal Swab 1 Application(s) Both Nostrils once  sodium chloride 3%  Inhalation 4 milliLiter(s) Inhalation every 12 hours    MEDICATIONS  (PRN):  albuterol/ipratropium for Nebulization 3 milliLiter(s) Nebulizer every 6 hours PRN Shortness of Breath and/or Wheezing  HYDROmorphone  Injectable 1 milliGRAM(s) IV Push every 4 hours PRN Severe Pain (7 - 10)  HYDROmorphone  Injectable 0.5 milliGRAM(s) IV Push every 4 hours PRN Moderate Pain (4 - 6)

## 2025-06-09 NOTE — ADVANCED PRACTICE NURSE CONSULT - REASON FOR CONSULT
Patient known to [facility] service line last seen on 6/3/25, seen for re-evaluation of sacrum.    Acute Skin Failure Markers: OR time 250 mins 6/4/25, 6/5/25 hypotension, 6/6/25 hypotension w SBP 80s, x2 RRT called for hypotension with HGB drop from 8.6 -> 6.4, anemia, vasopressors, MAP <60 (), JEROME, acute respiratory failure w/ hypoxia, diabetes Patient known to [facility] service line last seen on 6/3/25, seen for re-evaluation of sacrum IAD s/p acute skin failure marker events.     Acute Skin Failure Markers: OR time 250 mins 6/4/25, 6/5/25 hypotension, 6/6/25 hypotension w SBP 80s, x2 RRT called for hypotension with HGB drop from 8.6 -> 6.4, anemia, vasopressors, MAP <60 (), JEROME, acute respiratory failure w/ hypoxia, diabetes    Chart reviewed: WBC: 11.40, H&H: 9.2/27.3, Platelets: 202, INR: <0.90, A1C: 6.9, BMI: 28.6, Bernard 16.

## 2025-06-09 NOTE — PROGRESS NOTE ADULT - ASSESSMENT
This is a 86 y/o M w/ PMhx HTN, HLD, DM2, hypothyroidism, prostate cancer s/p prostatectomy, CKD, TIA, pacemaker placed in 5/2024 for abnormal arrythmia initially admitted to [facility] on 5/6 for back pain, now transferred to [facility] for orthopedic spine. Labs w/ leukocytosis to 18, BCx w/ E faecalis in multiple sets.     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 S/p PPM removal   #Acute cholecystitis s/p lap geetha 6/4   #Respiratory failure, improved   #JEROME, improving     Overall, 86 y/o M w/ PMhx HTN, HLD, DM2, hypothyroidism, prostate cancer s/p prostatectomy, CKD, TIA, pacemaker placed in 5/2024 for abnormal arrythmia transferred to [facility] from [facility] for MRI spine and ortho evaluation give LBP and lumbar compression w/ urinary retention. Pt noted to have chills and leukocytosis to 18, now with high grade E faecalis bacteremia. Unclear source however UCx not done, U/A with 10 WBCs, CT A/P w/o IV contrast on 5/10 w/o acute infectious source, s/p PPM removal, MARIA LUISA w/o vegetations.   Repeat MRI with OM/discitis, L4/L5, severe spinal canal stenosis L3-L4, L-5, impingement of cauda equina, planned for OR w/ orthopedics, however c/b acute cholecystitis s/p lap geetha 6/4   CT findings of possible infarcts kidney/spleen, pt with previous MARIA LUISA w/o findings of IE. C/b post bleeding, transferred to SICU for pressor support     Recommendations:   1. Zosyn 3.375 g q8 for likely 5 day course after OR, then switch back to Ampicillin 2 g q8 on 6/10   2. Appreciate SICU care   3. Pending ortho intervention     Thank you for consulting us and involving us in the management of this patient's case. In addition to reviewing history, imaging, documents, labs, microbiology, and infection control strategies and potential issues.     ID will continue to follow    Darrin Falk M.D.  Attending Physician  Division of Infectious Diseases  Department of Medicine    Please contact through MS Teams message.  Office: [phone] (after 5 PM or weekend)

## 2025-06-09 NOTE — PROGRESS NOTE ADULT - ASSESSMENT
85M w/ HTN, HLD, T2DM, prostate CA, CKD, TIA and SND s/p PPM transferred here for lumbar spinal cord compression and bacteremia s/p PPM explant and implant of Micra PPM. Now pending OR with orthopedic for decompression     -EKG w/ SR, no significant STT changes   -ECHO w/ normal EF no significant valvular heart disease   -s/p leadless PPM w/ EP given his SND and pacing dependency   -s/p cholecystectomy   - on IV bp meds hydral 20mg q4, labetolol 20mg q4, clonidine 0.3mg patch

## 2025-06-09 NOTE — ADVANCED PRACTICE NURSE CONSULT - RECOMMEDATIONS
Topical Recommendations    R Nasogastric Tube: Cleanse nare with NS. Pat dry. Apply Liquid barrier film to periwound skin. Apply Stat-lock NGT gill over center of nare, not touch any skin circumferentially. Change every three days or prn if soiled or compromised.     Sacrum to BL buttocks: Cleanse with skin cleanser, pat dry. Apply TRIAD paste twice a day and PRN with incontinent episodes. Cover denudations with silicone foam with borders after TRIAD to protect from frictional forces. With episodes of incontinence only remove soiled layer of Triad, then reinforce with thin layer.     While inpatient, Continue low air loss bed therapy, continue heel elevation, continue to turn & reposition per protocol, soft pillow between bony prominences, continue moisture management with barrier creams & single breathable pad, continue measures to decrease friction/shear/pressure. Continue with nutritional support as per dietary/orders.    Plan of care discussed with patient and daughter at bedside, all questions provided. Education provided on frictional forces, acute skin failure and the role of hypoxia 2/2 critical illness for skin, indication for TRIAD use and for silicone foam use to assist with frictional forces. Verbalized understanding with teachback.    Please contact Wound Care Service Line if we can be of further assistance (ext [phone]).

## 2025-06-09 NOTE — PROGRESS NOTE ADULT - SUBJECTIVE AND OBJECTIVE BOX
Orthopedic Surgery      Pt seen and examined. Pt denies any overnight events. Pt reports pain is well controlled. Pt denies any fever/chills/chest pain/nausea/vomiting.     ICU Vital Signs Last 24 Hrs  T(C): 36.4 (09 Jun 2025 04:00), Max: 36.4 (09 Jun 2025 04:00)  T(F): 97.5 (09 Jun 2025 04:00), Max: 97.5 (09 Jun 2025 04:00)  HR: 58 (09 Jun 2025 06:20) (57 - 83)  BP: 173/58 (09 Jun 2025 06:20) (158/52 - 214/69)  BP(mean): 92 (09 Jun 2025 06:20) (82 - 109)  ABP: 169/45 (08 Jun 2025 18:00) (154/42 - 193/52)  ABP(mean): 90 (08 Jun 2025 18:00) (81 - 107)  RR: 12 (09 Jun 2025 06:20) (9 - 21)  SpO2: 97% (09 Jun 2025 06:20) (93% - 100%)          Physical exam:   Gen: No acute distress, NGT in place  Resp: Breathing comfortably on room air    Motor exam:          Upper extremity         C5 (Shoulder Abd)    C6 (Elbow flex)   C7 (Elbow ext)   C8 (Finger flex) T1 (finger abd)         R         5/5                 5/5                       5/5                      5/5                         5/5          L          5/5                 5/5                      5/5                      5/5                         5/5                   Lower extremity           L2 (Hip flex)  L3 (knee ext)  L4 (Dorsi flex)  L5 (EHL)  S1 (Plantar flex)                                               R        5/5            5/5             5/5                    5/5          5/5      L        5/5            5/5             5/5                   5/5           5/5      Sensory exam:                        C5      C6      C7      C8       T1          RIGHT          2         2        2         2         2          (0=absent, 1=impaired, 2=normal, NT=not testable)  LEFT             2         2        2         2         2          (0=absent, 1=impaired, 2=normal, NT=not testable)                          L2      L3     L4     L5       S1          RIGHT          2         2        2         2         2          (0=absent, 1=impaired, 2=normal, NT=not testable)  LEFT             2         2        2         2         2          (0=absent, 1=impaired, 2=normal, NT=not testable)     LABS:                        9.2    11.40 )-----------( 202      ( 09 Jun 2025 00:35 )             27.3     06-09    136  |  98  |  39[H]  ----------------------------<  110[H]  3.3[L]   |  24  |  2.52[H]    Ca    7.4[L]      09 Jun 2025 00:35  Phos  3.4     06-09  Mg     2.20     06-09    TPro  5.5[L]  /  Alb  2.7[L]  /  TBili  0.5  /  DBili  x   /  AST  19  /  ALT  64[H]  /  AlkPhos  260[H]  06-09      Urinalysis Basic - ( 09 Jun 2025 00:35 )    Color: x / Appearance: x / SG: x / pH: x  Gluc: 110 mg/dL / Ketone: x  / Bili: x / Urobili: x   Blood: x / Protein: x / Nitrite: x   Leuk Esterase: x / RBC: x / WBC x   Sq Epi: x / Non Sq Epi: x / Bacteria: x              Assessment/Plan:   85 year old male with 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 After family discussions they wished to proceed with surgery, but this was subsequently canceled due to RRT on 6/6 for acute cholecystitis requiring lap geetha per gen surg  -OR for L3-5 lami/fusion to be rescheduled, new date pending medical stabilization   -med and cards cleared, appreciate care  -WBAT  -pain control prn  -bowel reg prn  -antibiotics per ID - zosyn   -continue PT/OT  -dispo pending SICU stay    Ethan Barrientos PGY-2    For any questions please contact the on call orthopedic surgery team, please do not reach out via teams.  [facility] [place] pager: [phone]  [facility] pager: [phone]  [facility] pager: [phone] Orthopedic Surgery      Pt seen and examined. Pt denies any overnight events. Pt reports pain is well controlled. Pt denies any fever/chills/chest pain/nausea/vomiting.     ICU Vital Signs Last 24 Hrs  T(C): 36.4 (09 Jun 2025 04:00), Max: 36.4 (09 Jun 2025 04:00)  T(F): 97.5 (09 Jun 2025 04:00), Max: 97.5 (09 Jun 2025 04:00)  HR: 58 (09 Jun 2025 06:20) (57 - 83)  BP: 173/58 (09 Jun 2025 06:20) (158/52 - 214/69)  BP(mean): 92 (09 Jun 2025 06:20) (82 - 109)  ABP: 169/45 (08 Jun 2025 18:00) (154/42 - 193/52)  ABP(mean): 90 (08 Jun 2025 18:00) (81 - 107)  RR: 12 (09 Jun 2025 06:20) (9 - 21)  SpO2: 97% (09 Jun 2025 06:20) (93% - 100%)        Physical Exam:  GEN: NAD, resting quietly, NGT in place  PULM: symmetric chest rise bilaterally, no increased WOB,   SPINE:   Motor exam:          Upper extremity         C5 (Shoulder Abd)    C6 (Elbow flex)   C7 (Elbow ext)   C8 (Finger flex) T1 (finger abd)         R         5/5                 5/5                       5/5                      5/5                         5/5          L          5/5                 5/5                      5/5                      5/5                         5/5                   Lower extremity           L2 (Hip flex)  L3 (knee ext)  L4 (Dorsi flex)  L5 (EHL)  S1 (Plantar flex)                                               R        3/5            4/5             1/5                    1/5          4/5      L        3/5            4/5             5/5                   5/5           5/5      Sensory exam:                        C5      C6      C7      C8       T1          RIGHT          2         2        2         2         2          (0=absent, 1=impaired, 2=normal, NT=not testable)  LEFT             2         2        2         2         2          (0=absent, 1=impaired, 2=normal, NT=not testable)                          L2      L3     L4     L5       S1          RIGHT          2         2        2         2         2          (0=absent, 1=impaired, 2=normal, NT=not testable)  LEFT             2         2        2         2         2          (0=absent, 1=impaired, 2=normal, NT=not testable)     LABS:                        9.2    11.40 )-----------( 202      ( 09 Jun 2025 00:35 )             27.3     06-09    136  |  98  |  39[H]  ----------------------------<  110[H]  3.3[L]   |  24  |  2.52[H]    Ca    7.4[L]      09 Jun 2025 00:35  Phos  3.4     06-09  Mg     2.20     06-09    TPro  5.5[L]  /  Alb  2.7[L]  /  TBili  0.5  /  DBili  x   /  AST  19  /  ALT  64[H]  /  AlkPhos  260[H]  06-09      Urinalysis Basic - ( 09 Jun 2025 00:35 )    Color: x / Appearance: x / SG: x / pH: x  Gluc: 110 mg/dL / Ketone: x  / Bili: x / Urobili: x   Blood: x / Protein: x / Nitrite: x   Leuk Esterase: x / RBC: x / WBC x   Sq Epi: x / Non Sq Epi: x / Bacteria: x              Assessment/Plan:   85 year old male with 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 After family discussions they wished to proceed with surgery, but this was subsequently canceled due to RRT on 6/6 for acute cholecystitis requiring lap geetha per gen surg  -OR for L3-5 lami/fusion to be rescheduled, new date pending medical stabilization   -med and cards cleared, appreciate care  -WBAT  -pain control prn  -bowel reg prn  -antibiotics per ID - zosyn   -continue PT/OT  -dispo pending SICU stay    Ethan Barrientos PGY-2    For any questions please contact the on call orthopedic surgery team, please do not reach out via teams.  [facility] pager[place] [phone]  [facility] pager: [phone]  [facility] pager: [phone]

## 2025-06-09 NOTE — ADVANCED PRACTICE NURSE CONSULT - ASSESSMENT
General: A&Ox4, R NG tube, center of nare with no evidence of skin breakdown or discoloration noted, able to turn with 2-3x assistance, patient endorses pain with turning and repositioning, and lowering HOB. Patient is incontinent of stool and urine. Skin warm, dry with increased moisture in intertriginous folds, scattered areas of hyperpigmentation and hypopigmentation, scattered areas of ecchymosis and general serosanguinous scabbing without hematoma on bilateral upper and lower extremities. Generalized edema noted +3 to upper and lower extremities.     Sacrum to BL buttocks: IAD/MAD c/b frictional forces and non pressure related hypoxia 2/2 critical illness, presenting with scattered clusters of irregularly bordered denudations, not overlaying alayna prominence, largest measuring 2.5cmx2.5cmx0.3cm with 80% pink dermis and 20% yellow moist fibrin. Scant serosanguinous drainage with no odor noted. Periwound intact. No induration, no erythema, no crepitus, no fluctuance, no edema, no temperature changes, no overt signs of infection noted.     Patient continues to be critically ill: at high risk for skin breakdown. On assessment patient on a low air loss surface, heel offloading boots in place, Z-flow positioning pillow utilized, moisture management in place with use of one incontinence pad. Turning and positioning as tolerated.

## 2025-06-09 NOTE — PROGRESS NOTE ADULT - ASSESSMENT
85 year old man with multiple chronic medical comorbidities including hypertension, sinus node dysfunction, type two diabetes, and chronic kidney disease (baseline creatinine 1.5-2.0) presently admitted for lower back pain associated with lumbar compression deformities with concern for cauda equina impingement. He was found to be bacteremic (E faecalis) due to acute acalculous cholecystitis, and is now seen following laparoscopic cholecystectomy with Dr. Triny Verde 6/5/2025. POD 1 patient became hypotensive, unresponsive to 2L fluid resuscitation and 3 u pRBC and was started on vasopressors and transferred to SICU.Patient weaned off pressors, but now with ileus.    Plan:   - Zosyn to Unasyn today per ID  - NPO/IVF/NGT  - c/w [id number],   - increase antihypertensives  - c/w wong  - ortho recs re: spinal surgery appreciated  - appreciate SICU care    B team   [phone]

## 2025-06-09 NOTE — PROGRESS NOTE ADULT - SUBJECTIVE AND OBJECTIVE BOX
Timothy Mojica MD  Interventional Cardiology / Advance Heart Failure and Cardiac Transplant Specialist  [place] Office : [phone] [place] [address] [place]. [address]  Tel:   [place] Office : [address] [facility] [place] [address]  Tel: [phone]       Pt is lying in bed s/p NG tube   	  MEDICATIONS:  aspirin  chewable 81 milliGRAM(s) Oral daily  cloNIDine Patch 0.3 mG/24Hr(s) 1 patch Transdermal every 7 days  heparin   Injectable 5000 Unit(s) SubCutaneous every 8 hours  hydrALAZINE Injectable 20 milliGRAM(s) IV Push every 4 hours  labetalol Injectable 20 milliGRAM(s) IV Push every 4 hours      albuterol/ipratropium for Nebulization 3 milliLiter(s) Nebulizer every 6 hours PRN  sodium chloride 3%  Inhalation 4 milliLiter(s) Inhalation every 12 hours    acetaminophen   IVPB .. 1000 milliGRAM(s) IV Intermittent every 6 hours  HYDROmorphone  Injectable 0.5 milliGRAM(s) IV Push every 4 hours PRN  HYDROmorphone  Injectable 1 milliGRAM(s) IV Push every 4 hours PRN    pantoprazole  Injectable 40 milliGRAM(s) IV Push daily    insulin glargine Injectable (LANTUS) 10 Unit(s) SubCutaneous at bedtime  insulin lispro (ADMELOG) corrective regimen sliding scale   SubCutaneous every 6 hours  levothyroxine Injectable 112 MICROGram(s) IV Push at bedtime    chlorhexidine 2% Cloths 1 Application(s) Topical daily  dextrose 5% + lactated ringers. 1000 milliLiter(s) IV Continuous <Continuous>  lidocaine   4% Patch 1 Patch Transdermal daily  povidone iodine 10% Nasal Swab 1 Application(s) Both Nostrils once      PAST MEDICAL/SURGICAL HISTORY  PAST MEDICAL & SURGICAL HISTORY:  HTN (hypertension)      HLD (hyperlipidemia)      DM (diabetes mellitus)      TIA (transient ischemic attack)      Prostate cancer      S/P prostatectomy      Pacemaker      H/O thyroidectomy          SOCIAL HISTORY: Substance Use (street drugs): ( x ) never used  (  ) other:    FAMILY HISTORY:  FHx: heart disease (Father, Mother)      PHYSICAL EXAM:  T(C): 36.1 (06-09-25 @ 20:00), Max: 36.5 (06-09-25 @ 16:00)  HR: 66 (06-09-25 @ 23:00) (56 - 71)  BP: 170/55 (06-09-25 @ 23:00) (151/46 - 193/83)  RR: 14 (06-09-25 @ 23:00) (8 - 19)  SpO2: 94% (06-09-25 @ 23:00) (88% - 100%)  Wt(kg): --  I&O's Summary    08 Jun 2025 07:01  -  09 Jun 2025 07:00  --------------------------------------------------------  IN: 1440 mL / OUT: 3880 mL / NET: -2440 mL    09 Jun 2025 07:01  -  09 Jun 2025 23:29  --------------------------------------------------------  IN: 190 mL / OUT: 1475 mL / NET: -1285 mL          GENERAL: s/p ng tube   EYES:   PERRLA   ENMT:   Moist mucous membranes, Good dentition, No lesions  Cardiovascular: Normal S1 S2, No JVD, No murmurs, No edema  Respiratory: b/l rhonchi   Gastrointestinal:  s/p cholecystectomy   Extremities: no edema                                    9.2    11.40 )-----------( 202      ( 09 Jun 2025 00:35 )             27.3     06-09    136  |  98  |  39[H]  ----------------------------<  110[H]  3.3[L]   |  24  |  2.52[H]    Ca    7.4[L]      09 Jun 2025 00:35  Phos  3.4     06-09  Mg     2.20     06-09    TPro  5.5[L]  /  Alb  2.7[L]  /  TBili  0.5  /  DBili  x   /  AST  19  /  ALT  64[H]  /  AlkPhos  260[H]  06-09    proBNP:   Lipid Profile:   HgA1c:   TSH:     Consultant(s) Notes Reviewed:  [x ] YES  [ ] NO    Care Discussed with Consultants/Other Providers [ x] YES  [ ] NO    Imaging Personally Reviewed independently:  [x] YES  [ ] NO    All labs, radiologic studies, vitals, orders and medications list reviewed. Patient is seen and examined at bedside. Case discussed with medical team.

## 2025-06-09 NOTE — CHART NOTE - NSCHARTNOTEFT_GEN_A_CORE
NUTRITION FOLLOW UP NOTE     REASON FOR ASSESSMENT: ICU follow up     SOURCE:   [X] Medical record [X] Patient [X] Interdisciplinary team    MEDICAL COURSE: Per chart, patient is a "85 year old man with multiple chronic medical comorbidities including hypertension, sinus node dysfunction, type two diabetes, and chronic kidney disease (baseline creatinine 1.5-2.0) presently admitted for lower back pain associated with lumbar compression deformities with concern for cauda equina impingement. He was found to be bacteremic (E faecalis) due to acute acalculous cholecystitis, and is now s/p laparoscopic cholecystectomy 6/5/2025. POD 1 patient became hypotensive, unresponsive to 2L fluid resuscitation and 3 u pRBC and was started on vasopressors and transferred to SICU. Patient weaned off pressors, but now with ileus."    DIET PRESCRIPTION:  Diet, NPO:   Except Medications (06-07-25 @ 05:45)      NUTRITION COURSE:  Nutrition interview: No recent episodes of nausea, vomiting, diarrhea, or constipation. Last BM noted ___ per RN flowsheets. Denies any chewing/swallowing difficulties. Food preferences explored, ___ new preferences at present. Intake varies between - (_-_%) per RN flowsheets and per Pt. Feeding Skills:       EN: Current nutrition prescription provides mL formula, kcal, gm protein, mL free water. This meets kcal/kg, gm protein/kg @ dosing/current/ideal weight __ kg.       PERTINENT MEDICATIONS:  MEDICATIONS  (STANDING):  aspirin  chewable 81 milliGRAM(s) Oral daily  chlorhexidine 2% Cloths 1 Application(s) Topical daily  cloNIDine Patch 0.3 mG/24Hr(s) 1 patch Transdermal every 7 days  heparin   Injectable 5000 Unit(s) SubCutaneous every 8 hours  hydrALAZINE Injectable 20 milliGRAM(s) IV Push every 4 hours  insulin glargine Injectable (LANTUS) 13 Unit(s) SubCutaneous at bedtime  insulin lispro (ADMELOG) corrective regimen sliding scale   SubCutaneous every 6 hours  labetalol Injectable 20 milliGRAM(s) IV Push every 4 hours  levothyroxine Injectable 112 MICROGram(s) IV Push at bedtime  lidocaine   4% Patch 1 Patch Transdermal daily  pantoprazole  Injectable 40 milliGRAM(s) IV Push daily  piperacillin/tazobactam IVPB.. 3.375 Gram(s) IV Intermittent every 8 hours  povidone iodine 10% Nasal Swab 1 Application(s) Both Nostrils once  sodium chloride 3%  Inhalation 4 milliLiter(s) Inhalation every 12 hours    MEDICATIONS  (PRN):  albuterol/ipratropium for Nebulization 3 milliLiter(s) Nebulizer every 6 hours PRN Shortness of Breath and/or Wheezing  HYDROmorphone  Injectable 1 milliGRAM(s) IV Push every 4 hours PRN Severe Pain (7 - 10)  HYDROmorphone  Injectable 0.5 milliGRAM(s) IV Push every 4 hours PRN Moderate Pain (4 - 6)    [] Relevant notes on medications:     PERTINENT LABS:  06-09 Na136 mmol/L Glu 110 mg/dL[H] K+ 3.3 mmol/L[L] Cr  2.52 mg/dL[H] BUN 39 mg/dL[H] 06-09 Phos 3.4 mg/dL 06-09 Alb 2.7 g/dL[L]     A1C with Estimated Average Glucose Result: 6.9 % (05-07-25 @ 06:03)         CAPILLARY BLOOD GLUCOSE      POCT Blood Glucose.: 85 mg/dL (09 Jun 2025 12:14)  POCT Blood Glucose.: 102 mg/dL (09 Jun 2025 05:15)  POCT Blood Glucose.: 111 mg/dL (08 Jun 2025 22:56)  POCT Blood Glucose.: 112 mg/dL (08 Jun 2025 16:56)    [] Relevant notes on labs:     ANTHROPOMETRICS:  Weight: Height (cm): 180.3 (05-19 @ 12:47), 180.3 (05-06 @ 22:29)  Weight (kg): 92.9 (05-19 @ 12:47), 102.3 (05-06 @ 22:29)  BMI (kg/m2): 28.6 (05-19 @ 12:47), 31.5 (05-06 @ 22:29)  Weight Assessment:    PHYSICAL ASSESSMENT, per flowsheets:  Edema:  Pressure Injury:  Nutrition Focused Physical Exam: [] conducted   Muscle wasting [] temporal [] clavicle [] shoulder [] scapula [] thigh [] calf [] dorsal hand    Fat Loss [] buccal [] orbital [] triceps [] ribs     ESTIMATED NEEDS:  [X] No change since previous assessment, based on dosing weight  lbs / kg   kcal daily @ kcal/kg,  gm protein daily @ gm/kg   [ ] recalculated, with consideration for, based on weight    PREVIOUS NUTRITION DX:  [ ] Inadequate Energy Intake [ ]Inadequate Oral Intake [ ] Excessive Energy Intake   [ ] Underweight [ ] Increased Nutrient Needs [ ] Overweight/Obesity   [ ] Altered GI Function [ ] Unintended Weight Loss [ ] Food & Nutrition Related Knowledge Deficit [ ] Malnutrition   Nutrition Diagnosis is [ ] ongoing  [ ] resolved [ ] not applicable   New Nutrition Diagnosis: [ ] not applicable     EDUCATION:  [ ] Provided pt with verbal / written education on   [ ] Provided on previous assessment by RD  [ ] Not applicable secondary to   [ ] Pt refused     RECOMMENDATIONS/INTERVENTIONS:  1)   2)  3)   [] Current medical condition precludes nutrition intervention at this time.     MONITORING & EVALUATING:  PO intake, tolerance to diet/supplement, nutrition related lab values, weight trends, BMs/GI distress, hydration status, skin integrity.    Tahira Schneider MS, RD | available on Teams | RD office #: [id number] NUTRITION FOLLOW UP NOTE     REASON FOR ASSESSMENT: Critical care assessment, follow up    SOURCE:   [X] Medical record [X] Patient [X] Interdisciplinary team    MEDICAL COURSE: Per chart, patient is a "85 year old man with multiple chronic medical comorbidities including hypertension, sinus node dysfunction, type two diabetes, and chronic kidney disease (baseline creatinine 1.5-2.0) presently admitted for lower back pain associated with lumbar compression deformities with concern for cauda equina impingement. He was found to be bacteremic (E faecalis) due to acute acalculous cholecystitis, and is now s/p laparoscopic cholecystectomy 6/5/2025. POD 1 patient became hypotensive, unresponsive to 2L fluid resuscitation and 3 u pRBC and was started on vasopressors and transferred to SICU. Patient weaned off pressors, but now with ileus."    DIET PRESCRIPTION:  Diet, NPO:   Except Medications (06-07-25 @ 05:45)    NUTRITION COURSE:  Visited patient at bedside today. Patient NPO with NGT in place for decompression. Noted still draining. Patient has been NPO since 6/6. Anticipating diet advancement. Educated pt on current course as pt with ileus, and potential diet advancement. Denies nausea, vomiting. No BMs yet since surgery, not passing gas.     PERTINENT MEDICATIONS:  MEDICATIONS  (STANDING):  aspirin  chewable 81 milliGRAM(s) Oral daily  chlorhexidine 2% Cloths 1 Application(s) Topical daily  cloNIDine Patch 0.3 mG/24Hr(s) 1 patch Transdermal every 7 days  heparin   Injectable 5000 Unit(s) SubCutaneous every 8 hours  hydrALAZINE Injectable 20 milliGRAM(s) IV Push every 4 hours  insulin glargine Injectable (LANTUS) 13 Unit(s) SubCutaneous at bedtime  insulin lispro (ADMELOG) corrective regimen sliding scale   SubCutaneous every 6 hours  labetalol Injectable 20 milliGRAM(s) IV Push every 4 hours  levothyroxine Injectable 112 MICROGram(s) IV Push at bedtime  lidocaine   4% Patch 1 Patch Transdermal daily  pantoprazole  Injectable 40 milliGRAM(s) IV Push daily  piperacillin/tazobactam IVPB.. 3.375 Gram(s) IV Intermittent every 8 hours  povidone iodine 10% Nasal Swab 1 Application(s) Both Nostrils once  sodium chloride 3%  Inhalation 4 milliLiter(s) Inhalation every 12 hours    MEDICATIONS  (PRN):  albuterol/ipratropium for Nebulization 3 milliLiter(s) Nebulizer every 6 hours PRN Shortness of Breath and/or Wheezing  HYDROmorphone  Injectable 1 milliGRAM(s) IV Push every 4 hours PRN Severe Pain (7 - 10)  HYDROmorphone  Injectable 0.5 milliGRAM(s) IV Push every 4 hours PRN Moderate Pain (4 - 6)    PERTINENT LABS:  06-09 Na136 mmol/L Glu 110 mg/dL[H] K+ 3.3 mmol/L[L] Cr  2.52 mg/dL[H] BUN 39 mg/dL[H] 06-09 Phos 3.4 mg/dL 06-09 Alb 2.7 g/dL[L]     A1C with Estimated Average Glucose Result: 6.9 % (05-07-25 @ 06:03)    POCT Blood Glucose.: 85 mg/dL (09 Jun 2025 12:14)  POCT Blood Glucose.: 102 mg/dL (09 Jun 2025 05:15)  POCT Blood Glucose.: 111 mg/dL (08 Jun 2025 22:56)  POCT Blood Glucose.: 112 mg/dL (08 Jun 2025 16:56)    [X] Relevant notes on labs: hypokalemic, elevated BUN + creatinine    ANTHROPOMETRICS:  Height (cm): 180.3 (05-19 @ 12:47), 180.3 (05-06 @ 22:29)  Weight (kg): 92.9 (05-19 @ 12:47), 102.3 (05-06 @ 22:29), 102.4kg 6/8, 103.3kg 6/9  BMI (kg/m2): 28.6 (05-19 @ 12:47), 31.5 (05-06 @ 22:29)  Weight Assessment: weight fluctuations likely 2/2 fluid shifts and bed scale discrepancies     PHYSICAL ASSESSMENT, per flowsheets:  Edema: 2 + edema documented per flowsheets   Pressure Injury: PI entry in flowsheets was d/c'd     ESTIMATED NEEDS:  [X] recalculated, with consideration for patient s/p surgery, based on ideal body weight 172lbs/78.1kg. 2187-2500kcals daily @ 28-32kcals/kg, 109-125g protein daily @ 1.4-1.6g/kg.     PREVIOUS NUTRITION DX:  [X] Inadequate Energy Intake    Nutrition Diagnosis is [X] ongoing   New Nutrition Diagnosis: [X]   1. Inadequate oral intake related to clinical course requiring NPO status as evidenced by pt meeting <75% of estimated energy needs at this time  2. Increased nutrient needs related to surgical demand for healing as evidenced by pt s/p cholecystectomy    EDUCATION:  [X] Provided pt with verbal education on potential diet progression    RECOMMENDATIONS/INTERVENTIONS:  1) Continue NPO status as ordered  2) If diet unable to be advanced, consider alternate means of nutrition  3) Obtain new weight using calibration as able  4) Electrolyte corrections per MD team    MONITORING & EVALUATING:  Monitor diet progression, nutrition related lab values, weight trends, BMs/GI distress, hydration status, skin integrity.    Tahira Schneider MS, RD | available on Teams | RD office #: [id number] NUTRITION FOLLOW UP NOTE     REASON FOR ASSESSMENT: Critical care assessment, follow up    SOURCE:   [X] Medical record [X] Patient [X] Interdisciplinary team    MEDICAL COURSE: Per chart, patient is a "85 year old man with multiple chronic medical comorbidities including hypertension, sinus node dysfunction, type two diabetes, and chronic kidney disease (baseline creatinine 1.5-2.0) presently admitted for lower back pain associated with lumbar compression deformities with concern for cauda equina impingement. He was found to be bacteremic (E faecalis) due to acute acalculous cholecystitis, and is now s/p laparoscopic cholecystectomy 6/5/2025. POD 1 patient became hypotensive, unresponsive to 2L fluid resuscitation and 3 u pRBC and was started on vasopressors and transferred to SICU. Patient weaned off pressors, but now with ileus."    DIET PRESCRIPTION:  Diet, NPO:   Except Medications (06-07-25 @ 05:45)    NUTRITION COURSE:  Visited patient at bedside today. Patient NPO with NGT in place for decompression. Noted still draining. Patient has been NPO since 6/6. Anticipating diet advancement. Educated pt on current course as pt with ileus, and potential diet advancement. Denies nausea, vomiting. No BMs yet since surgery, not passing gas.     PERTINENT MEDICATIONS:  MEDICATIONS  (STANDING):  aspirin  chewable 81 milliGRAM(s) Oral daily  chlorhexidine 2% Cloths 1 Application(s) Topical daily  cloNIDine Patch 0.3 mG/24Hr(s) 1 patch Transdermal every 7 days  heparin   Injectable 5000 Unit(s) SubCutaneous every 8 hours  hydrALAZINE Injectable 20 milliGRAM(s) IV Push every 4 hours  insulin glargine Injectable (LANTUS) 13 Unit(s) SubCutaneous at bedtime  insulin lispro (ADMELOG) corrective regimen sliding scale   SubCutaneous every 6 hours  labetalol Injectable 20 milliGRAM(s) IV Push every 4 hours  levothyroxine Injectable 112 MICROGram(s) IV Push at bedtime  lidocaine   4% Patch 1 Patch Transdermal daily  pantoprazole  Injectable 40 milliGRAM(s) IV Push daily  piperacillin/tazobactam IVPB.. 3.375 Gram(s) IV Intermittent every 8 hours  povidone iodine 10% Nasal Swab 1 Application(s) Both Nostrils once  sodium chloride 3%  Inhalation 4 milliLiter(s) Inhalation every 12 hours    MEDICATIONS  (PRN):  albuterol/ipratropium for Nebulization 3 milliLiter(s) Nebulizer every 6 hours PRN Shortness of Breath and/or Wheezing  HYDROmorphone  Injectable 1 milliGRAM(s) IV Push every 4 hours PRN Severe Pain (7 - 10)  HYDROmorphone  Injectable 0.5 milliGRAM(s) IV Push every 4 hours PRN Moderate Pain (4 - 6)    PERTINENT LABS:  06-09 Na136 mmol/L Glu 110 mg/dL[H] K+ 3.3 mmol/L[L] Cr  2.52 mg/dL[H] BUN 39 mg/dL[H] 06-09 Phos 3.4 mg/dL 06-09 Alb 2.7 g/dL[L]     A1C with Estimated Average Glucose Result: 6.9 % (05-07-25 @ 06:03)    POCT Blood Glucose.: 85 mg/dL (09 Jun 2025 12:14)  POCT Blood Glucose.: 102 mg/dL (09 Jun 2025 05:15)  POCT Blood Glucose.: 111 mg/dL (08 Jun 2025 22:56)  POCT Blood Glucose.: 112 mg/dL (08 Jun 2025 16:56)    [X] Relevant notes on labs: hypokalemic, elevated BUN + creatinine    ANTHROPOMETRICS:  Height (cm): 180.3 (05-19 @ 12:47), 180.3 (05-06 @ 22:29)  Weight (kg): 92.9 (05-19 @ 12:47), 102.3 (05-06 @ 22:29), 102.4kg 6/8, 103.3kg 6/9  BMI (kg/m2): 28.6 (05-19 @ 12:47), 31.5 (05-06 @ 22:29)  Weight Assessment: weight fluctuations likely 2/2 fluid shifts and bed scale discrepancies     PHYSICAL ASSESSMENT, per flowsheets:  Edema: 2 + edema documented per flowsheets   Pressure Injury: PI entry in flowsheets was d/c'd     ESTIMATED NEEDS:  [X] recalculated, with consideration for patient s/p surgery, based on ideal body weight 172lbs/78.1kg. 2187-2500kcals daily @ 28-32kcals/kg, 109-125g protein daily @ 1.4-1.6g/kg.     PREVIOUS NUTRITION DX:  [X] Inadequate Energy Intake    Nutrition Diagnosis is [X] ongoing   New Nutrition Diagnosis: [X]   1. Inadequate oral intake related to clinical course requiring NPO status as evidenced by pt meeting <75% of estimated energy needs at this time  2. Increased nutrient needs related to surgical demand for healing as evidenced by pt s/p cholecystectomy    EDUCATION:  [X] Provided pt with verbal education on potential diet progression    RECOMMENDATIONS/INTERVENTIONS:  1) Continue NPO status as ordered  2) If diet unable to be advanced, consider alternate means of nutrition  3) Obtain new weight using calibration as able  4) Electrolyte corrections per MD team  *pt at high risk for malnutrition, RD to continue to monitor and reassess    MONITORING & EVALUATING:  Monitor diet progression, nutrition related lab values, weight trends, BMs/GI distress, hydration status, skin integrity.    Tahira Schneider MS, RD | available on Teams | RD office #: [id number]

## 2025-06-09 NOTE — PROGRESS NOTE ADULT - ATTENDING COMMENTS
Pt seen and examined. H/H remains stable. NGT 1L out. Has been NPO since about 6/4.     - BP control per SICU   - Awaiting return of GI function and reduction in NGT output   - Appreciate ID input     The Acute Care Surgery (B Team) Attending Group Practice:  Dr. Ladonna Fuchs  [id number]

## 2025-06-09 NOTE — PROGRESS NOTE ADULT - SUBJECTIVE AND OBJECTIVE BOX
Infectious Diseases Follow Up:    Patient is a 85y old  Male who presents with a chief complaint of back pain (09 Jun 2025 06:47)      Interval History/ROS:  No acute events, pt hypertensive in SICU. Denies any abdominal pain     Allergies  gabapentin (Other)        ANTIMICROBIALS:  piperacillin/tazobactam IVPB.. 3.375 every 8 hours      Current Abx:     Previous Abx     OTHER MEDS:  MEDICATIONS  (STANDING):  acetaminophen   IVPB .. 1000 every 6 hours  albuterol/ipratropium for Nebulization 3 every 6 hours PRN  aspirin  chewable 81 daily  cloNIDine Patch 0.3 mG/24Hr(s) 1 every 7 days  heparin   Injectable 5000 every 8 hours  hydrALAZINE Injectable 20 every 4 hours  HYDROmorphone  Injectable 0.5 every 4 hours PRN  HYDROmorphone  Injectable 1 every 4 hours PRN  insulin glargine Injectable (LANTUS) 13 at bedtime  insulin lispro (ADMELOG) corrective regimen sliding scale  every 6 hours  labetalol Injectable 20 every 4 hours  levothyroxine Injectable 112 at bedtime  pantoprazole  Injectable 40 daily  sodium chloride 3%  Inhalation 4 every 12 hours      Vital Signs Last 24 Hrs  T(C): 36.4 (09 Jun 2025 04:00), Max: 36.4 (09 Jun 2025 04:00)  T(F): 97.5 (09 Jun 2025 04:00), Max: 97.5 (09 Jun 2025 04:00)  HR: 64 (09 Jun 2025 11:00) (57 - 68)  BP: 193/83 (09 Jun 2025 11:00) (158/52 - 205/51)  BP(mean): 113 (09 Jun 2025 11:00) (82 - 113)  RR: 13 (09 Jun 2025 11:00) (9 - 21)  SpO2: 97% (09 Jun 2025 11:00) (93% - 100%)    Parameters below as of 09 Jun 2025 08:00  Patient On (Oxygen Delivery Method): nasal cannula  O2 Flow (L/min): 2      PHYSICAL EXAM:  GENERAL: NAD, well-developed  HEAD:  Atraumatic, Normocephalic  EYES: EOMI, conjunctiva and sclera clear  NOSE: NG w/ bilious output   CHEST/LUNG: On NC, not in respiratory distress, c  ABDOMEN: Soft, NT/ND  PSYCH: AAOx3                          9.2    11.40 )-----------( 202      ( 09 Jun 2025 00:35 )             27.3       06-09    136  |  98  |  39[H]  ----------------------------<  110[H]  3.3[L]   |  24  |  2.52[H]    Ca    7.4[L]      09 Jun 2025 00:35  Phos  3.4     06-09  Mg     2.20     06-09    TPro  5.5[L]  /  Alb  2.7[L]  /  TBili  0.5  /  DBili  x   /  AST  19  /  ALT  64[H]  /  AlkPhos  260[H]  06-09      Urinalysis Basic - ( 09 Jun 2025 00:35 )    Color: x / Appearance: x / SG: x / pH: x  Gluc: 110 mg/dL / Ketone: x  / Bili: x / Urobili: x   Blood: x / Protein: x / Nitrite: x   Leuk Esterase: x / RBC: x / WBC x   Sq Epi: x / Non Sq Epi: x / Bacteria: x        MICROBIOLOGY:  v  Blood Blood-Peripheral  06-06-25   No growth at 72 Hours  --  --      Blood Blood-Peripheral  06-04-25   No growth at 4 days  --  --      Blood Blood-Peripheral  06-04-25   No growth at 4 days  --  --      Blood Blood  05-18-25   No growth at 5 days  --  --      Blood Blood-Peripheral  05-17-25   No growth at 5 days  --  --      Blood Blood-Peripheral  05-17-25   No growth at 5 days  --  --      Blood Blood  05-17-25   No growth at 5 days  --  --      Blood Blood  05-15-25   No growth at 5 days  --  --      Blood Blood  05-15-25   No growth at 5 days  --  --      Blood Blood  05-14-25   Growth in aerobic bottle: Enterococcus faecalis  See previous culture [id number]  Direct identification is available within approximately 3-5  hours either by Blood Panel Multiplexed PCR or Direct  MALDI-TOF. Details: [email or web address]  --  Blood Culture PCR      Blood Blood  05-14-25   Growth in aerobic and anaerobic bottles: Enterococcus faecalis  Growth in anaerobic bottle: blood culture bottle turned positive after  the final report was released.  --  Enterococcus faecalis      Blood Blood-Peripheral  05-12-25   Growth in aerobic and anaerobic bottles: Enterococcus faecalis  See previous culture [id number]  --    Growth in aerobic bottle: Gram Positive Cocci in Pairs and Chains  Growth in anaerobic bottle: Gram Positive Cocci in Pairs and Chains      Blood Blood-Peripheral  05-12-25   Growth in aerobic and anaerobic bottles: Enterococcus faecalis  See previous culture [id number]  --    Growth in aerobic bottle: Gram Positive Cocci in Pairs and Chains  Growth in anaerobic bottle: Gram Positive Cocci in Pairs and Chains      Blood Blood-Peripheral  05-11-25   Growth in aerobic and anaerobic bottles: Enterococcus faecalis  Direct identification is available within approximately 3-5  hours either by Blood Panel Multiplexed PCR or Direct  MALDI-TOF. Details: [email or web address]  --  Blood Culture PCR  Enterococcus faecalis                RADIOLOGY:

## 2025-06-10 LAB
ALBUMIN SERPL ELPH-MCNC: 2.5 G/DL — LOW (ref 3.3–5)
ALP SERPL-CCNC: 221 U/L — HIGH (ref 40–120)
ALT FLD-CCNC: 48 U/L — HIGH (ref 4–41)
ANION GAP SERPL CALC-SCNC: 11 MMOL/L — SIGNIFICANT CHANGE UP (ref 7–14)
AST SERPL-CCNC: 21 U/L — SIGNIFICANT CHANGE UP (ref 4–40)
BILIRUB SERPL-MCNC: 0.5 MG/DL — SIGNIFICANT CHANGE UP (ref 0.2–1.2)
BUN SERPL-MCNC: 30 MG/DL — HIGH (ref 7–23)
CALCIUM SERPL-MCNC: 7.4 MG/DL — LOW (ref 8.4–10.5)
CHLORIDE SERPL-SCNC: 99 MMOL/L — SIGNIFICANT CHANGE UP (ref 98–107)
CO2 SERPL-SCNC: 26 MMOL/L — SIGNIFICANT CHANGE UP (ref 22–31)
CREAT SERPL-MCNC: 1.78 MG/DL — HIGH (ref 0.5–1.3)
EGFR: 36 ML/MIN/1.73M2 — LOW
EGFR: 36 ML/MIN/1.73M2 — LOW
GLUCOSE BLDC GLUCOMTR-MCNC: 108 MG/DL — HIGH (ref 70–99)
GLUCOSE BLDC GLUCOMTR-MCNC: 156 MG/DL — HIGH (ref 70–99)
GLUCOSE BLDC GLUCOMTR-MCNC: 86 MG/DL — SIGNIFICANT CHANGE UP (ref 70–99)
GLUCOSE BLDC GLUCOMTR-MCNC: 86 MG/DL — SIGNIFICANT CHANGE UP (ref 70–99)
GLUCOSE SERPL-MCNC: 139 MG/DL — HIGH (ref 70–99)
HCT VFR BLD CALC: 26.2 % — LOW (ref 39–50)
HCT VFR BLD CALC: 29.2 % — LOW (ref 39–50)
HGB BLD-MCNC: 8.7 G/DL — LOW (ref 13–17)
HGB BLD-MCNC: 9.3 G/DL — LOW (ref 13–17)
MAGNESIUM SERPL-MCNC: 2.1 MG/DL — SIGNIFICANT CHANGE UP (ref 1.6–2.6)
MCHC RBC-ENTMCNC: 28.4 PG — SIGNIFICANT CHANGE UP (ref 27–34)
MCHC RBC-ENTMCNC: 28.9 PG — SIGNIFICANT CHANGE UP (ref 27–34)
MCHC RBC-ENTMCNC: 31.8 G/DL — LOW (ref 32–36)
MCHC RBC-ENTMCNC: 33.2 G/DL — SIGNIFICANT CHANGE UP (ref 32–36)
MCV RBC AUTO: 87 FL — SIGNIFICANT CHANGE UP (ref 80–100)
MCV RBC AUTO: 89 FL — SIGNIFICANT CHANGE UP (ref 80–100)
NRBC # BLD AUTO: 0 K/UL — SIGNIFICANT CHANGE UP (ref 0–0)
NRBC # BLD AUTO: 0 K/UL — SIGNIFICANT CHANGE UP (ref 0–0)
NRBC # FLD: 0 K/UL — SIGNIFICANT CHANGE UP (ref 0–0)
NRBC # FLD: 0 K/UL — SIGNIFICANT CHANGE UP (ref 0–0)
NRBC BLD AUTO-RTO: 0 /100 WBCS — SIGNIFICANT CHANGE UP (ref 0–0)
NRBC BLD AUTO-RTO: 0 /100 WBCS — SIGNIFICANT CHANGE UP (ref 0–0)
PHOSPHATE SERPL-MCNC: 2.8 MG/DL — SIGNIFICANT CHANGE UP (ref 2.5–4.5)
PLATELET # BLD AUTO: 151 K/UL — SIGNIFICANT CHANGE UP (ref 150–400)
POTASSIUM SERPL-MCNC: 3.4 MMOL/L — LOW (ref 3.5–5.3)
POTASSIUM SERPL-SCNC: 3.4 MMOL/L — LOW (ref 3.5–5.3)
PROT SERPL-MCNC: 5.3 G/DL — LOW (ref 6–8.3)
RBC # BLD: 3.01 M/UL — LOW (ref 4.2–5.8)
RBC # BLD: 3.28 M/UL — LOW (ref 4.2–5.8)
RBC # FLD: 16.1 % — HIGH (ref 10.3–14.5)
RBC # FLD: 16.4 % — HIGH (ref 10.3–14.5)
SODIUM SERPL-SCNC: 136 MMOL/L — SIGNIFICANT CHANGE UP (ref 135–145)
SURGICAL PATHOLOGY STUDY: SIGNIFICANT CHANGE UP
WBC # BLD: 9.05 K/UL — SIGNIFICANT CHANGE UP (ref 3.8–10.5)
WBC # BLD: 9.39 K/UL — SIGNIFICANT CHANGE UP (ref 3.8–10.5)
WBC # FLD AUTO: 9.05 K/UL — SIGNIFICANT CHANGE UP (ref 3.8–10.5)
WBC # FLD AUTO: 9.39 K/UL — SIGNIFICANT CHANGE UP (ref 3.8–10.5)

## 2025-06-10 PROCEDURE — 93010 ELECTROCARDIOGRAM REPORT: CPT

## 2025-06-10 PROCEDURE — G0545: CPT

## 2025-06-10 PROCEDURE — 99232 SBSQ HOSP IP/OBS MODERATE 35: CPT

## 2025-06-10 PROCEDURE — 99233 SBSQ HOSP IP/OBS HIGH 50: CPT

## 2025-06-10 RX ORDER — POTASSIUM PHOSPHATE, MONOBASIC POTASSIUM PHOSPHATE, DIBASIC INJECTION, 236; 224 MG/ML; MG/ML
15 SOLUTION, CONCENTRATE INTRAVENOUS ONCE
Refills: 0 | Status: COMPLETED | OUTPATIENT
Start: 2025-06-10 | End: 2025-06-10

## 2025-06-10 RX ORDER — ACETAMINOPHEN 500 MG/5ML
1000 LIQUID (ML) ORAL EVERY 6 HOURS
Refills: 0 | Status: COMPLETED | OUTPATIENT
Start: 2025-06-10 | End: 2025-06-11

## 2025-06-10 RX ORDER — LEVOTHYROXINE SODIUM 300 MCG
125 TABLET ORAL DAILY
Refills: 0 | Status: DISCONTINUED | OUTPATIENT
Start: 2025-06-10 | End: 2025-06-15

## 2025-06-10 RX ORDER — LABETALOL HYDROCHLORIDE 200 MG/1
100 TABLET, FILM COATED ORAL
Refills: 0 | Status: DISCONTINUED | OUTPATIENT
Start: 2025-06-10 | End: 2025-06-16

## 2025-06-10 RX ADMIN — INSULIN LISPRO 2: 100 INJECTION, SOLUTION INTRAVENOUS; SUBCUTANEOUS at 05:19

## 2025-06-10 RX ADMIN — LABETALOL HYDROCHLORIDE 20 MILLIGRAM(S): 200 TABLET, FILM COATED ORAL at 03:23

## 2025-06-10 RX ADMIN — HEPARIN SODIUM 5000 UNIT(S): 1000 INJECTION INTRAVENOUS; SUBCUTANEOUS at 15:46

## 2025-06-10 RX ADMIN — Medication 1 APPLICATION(S): at 12:12

## 2025-06-10 RX ADMIN — Medication 1 MILLIGRAM(S): at 16:08

## 2025-06-10 RX ADMIN — Medication 100 MILLIEQUIVALENT(S): at 05:20

## 2025-06-10 RX ADMIN — Medication 0.5 MILLIGRAM(S): at 05:15

## 2025-06-10 RX ADMIN — Medication 400 MILLIGRAM(S): at 05:39

## 2025-06-10 RX ADMIN — SODIUM CHLORIDE 30 MILLILITER(S): 9 INJECTION, SOLUTION INTRAVENOUS at 23:35

## 2025-06-10 RX ADMIN — AMPICILLIN SODIUM 200 GRAM(S): 1 INJECTION, POWDER, FOR SOLUTION INTRAMUSCULAR; INTRAVENOUS at 05:19

## 2025-06-10 RX ADMIN — Medication 40 MILLIGRAM(S): at 12:11

## 2025-06-10 RX ADMIN — Medication 1000 MILLIGRAM(S): at 05:55

## 2025-06-10 RX ADMIN — LIDOCAINE HYDROCHLORIDE 1 PATCH: 20 JELLY TOPICAL at 19:01

## 2025-06-10 RX ADMIN — Medication 1 MILLIGRAM(S): at 09:02

## 2025-06-10 RX ADMIN — Medication 20 MILLIGRAM(S): at 03:23

## 2025-06-10 RX ADMIN — Medication 10 MILLIGRAM(S): at 00:36

## 2025-06-10 RX ADMIN — LIDOCAINE HYDROCHLORIDE 1 PATCH: 20 JELLY TOPICAL at 00:56

## 2025-06-10 RX ADMIN — Medication 1 PATCH: at 07:07

## 2025-06-10 RX ADMIN — Medication 100 MILLIGRAM(S): at 20:45

## 2025-06-10 RX ADMIN — Medication 400 MILLIGRAM(S): at 23:36

## 2025-06-10 RX ADMIN — LIDOCAINE HYDROCHLORIDE 1 PATCH: 20 JELLY TOPICAL at 23:42

## 2025-06-10 RX ADMIN — Medication 4 MILLILITER(S): at 09:27

## 2025-06-10 RX ADMIN — LIDOCAINE HYDROCHLORIDE 1 PATCH: 20 JELLY TOPICAL at 12:11

## 2025-06-10 RX ADMIN — Medication 400 MILLIGRAM(S): at 12:12

## 2025-06-10 RX ADMIN — LABETALOL HYDROCHLORIDE 20 MILLIGRAM(S): 200 TABLET, FILM COATED ORAL at 12:11

## 2025-06-10 RX ADMIN — Medication 0.5 MILLIGRAM(S): at 04:47

## 2025-06-10 RX ADMIN — Medication 1 MILLIGRAM(S): at 00:36

## 2025-06-10 RX ADMIN — Medication 0.2 MILLIGRAM(S): at 20:45

## 2025-06-10 RX ADMIN — Medication 1000 MILLIGRAM(S): at 00:00

## 2025-06-10 RX ADMIN — Medication 400 MILLIGRAM(S): at 17:19

## 2025-06-10 RX ADMIN — Medication 1000 MILLIGRAM(S): at 17:49

## 2025-06-10 RX ADMIN — AMPICILLIN SODIUM 200 GRAM(S): 1 INJECTION, POWDER, FOR SOLUTION INTRAMUSCULAR; INTRAVENOUS at 15:45

## 2025-06-10 RX ADMIN — Medication 100 MILLIEQUIVALENT(S): at 06:46

## 2025-06-10 RX ADMIN — Medication 1 MILLIGRAM(S): at 01:00

## 2025-06-10 RX ADMIN — Medication 20 MILLIGRAM(S): at 07:38

## 2025-06-10 RX ADMIN — Medication 1 PATCH: at 17:27

## 2025-06-10 RX ADMIN — Medication 100 MILLIEQUIVALENT(S): at 07:37

## 2025-06-10 RX ADMIN — Medication 20 MILLIGRAM(S): at 15:46

## 2025-06-10 RX ADMIN — LABETALOL HYDROCHLORIDE 20 MILLIGRAM(S): 200 TABLET, FILM COATED ORAL at 15:45

## 2025-06-10 RX ADMIN — Medication 4 MILLILITER(S): at 21:54

## 2025-06-10 RX ADMIN — POTASSIUM PHOSPHATE, MONOBASIC POTASSIUM PHOSPHATE, DIBASIC INJECTION, 62.5 MILLIMOLE(S): 236; 224 SOLUTION, CONCENTRATE INTRAVENOUS at 05:20

## 2025-06-10 RX ADMIN — Medication 1 MILLIGRAM(S): at 09:32

## 2025-06-10 RX ADMIN — Medication 20 MILLIGRAM(S): at 12:11

## 2025-06-10 RX ADMIN — HEPARIN SODIUM 5000 UNIT(S): 1000 INJECTION INTRAVENOUS; SUBCUTANEOUS at 23:35

## 2025-06-10 RX ADMIN — LABETALOL HYDROCHLORIDE 100 MILLIGRAM(S): 200 TABLET, FILM COATED ORAL at 17:19

## 2025-06-10 RX ADMIN — Medication 1000 MILLIGRAM(S): at 12:32

## 2025-06-10 RX ADMIN — HEPARIN SODIUM 5000 UNIT(S): 1000 INJECTION INTRAVENOUS; SUBCUTANEOUS at 05:19

## 2025-06-10 RX ADMIN — AMPICILLIN SODIUM 200 GRAM(S): 1 INJECTION, POWDER, FOR SOLUTION INTRAMUSCULAR; INTRAVENOUS at 23:35

## 2025-06-10 RX ADMIN — INSULIN GLARGINE-YFGN 10 UNIT(S): 100 INJECTION, SOLUTION SUBCUTANEOUS at 23:35

## 2025-06-10 RX ADMIN — LABETALOL HYDROCHLORIDE 20 MILLIGRAM(S): 200 TABLET, FILM COATED ORAL at 07:38

## 2025-06-10 RX ADMIN — Medication 1 MILLIGRAM(S): at 15:38

## 2025-06-10 NOTE — PROGRESS NOTE ADULT - SUBJECTIVE AND OBJECTIVE BOX
B SURGERY PROGRESS NOTE ([id number])    SUBJECTIVE:  Hypertensive to SBP 180s ovn, hydralazine given ovn. This AM, patient reports pain is well controlled.   Denies nausea, vomiting. Is passing gas, denies BM    OBJECTIVE:  Vital Signs Last 24 Hrs  T(C): 36.2 (10 Heriberto 2025 08:00), Max: 36.5 (09 Jun 2025 16:00)  T(F): 97.2 (10 Heriberto 2025 08:00), Max: 97.7 (09 Jun 2025 16:00)  HR: 60 (10 Heriberto 2025 09:27) (56 - 71)  BP: 160/48 (10 Heriberto 2025 08:00) (151/46 - 189/65)  BP(mean): 78 (10 Heriberto 2025 08:00) (78 - 137)  RR: 11 (10 Heriberto 2025 08:00) (8 - 21)  SpO2: 96% (10 Heriberto 2025 09:27) (88% - 100%)    Parameters below as of 10 Heriberto 2025 08:00  Patient On (Oxygen Delivery Method): nasal cannula  O2 Flow (L/min): 2      Physical Examination:  GEN: NAD, resting quietly, NGT w/ bilious output  NEURO: AAOx3, CN II-XII grossly intact, no focal deficits  PULM: symmetric chest rise bilaterally, no increased WOB  ABD: soft, nontender, nondistended, incision CDI  EXTR: no lower extremity edema, moving all extremities

## 2025-06-10 NOTE — PROGRESS NOTE ADULT - ATTENDING COMMENTS
I agree with the detailed interval history, physical, and plan, which I have reviewed and edited where appropriate'; also agree with notes/assessment with my team on service.  I have personally examined the patient.  I was physically present for the key portions of the evaluation and management (E/M) service provided.  I reviewed all the pertinent data.  The patient is a critical care patient with life threatening hemodynamic and metabolic instability in SICU.  The SICU team has a constant risk benefit analyzes discussion and coordinating care with the primary team and all consultants.   The patient is in SICU with the chief complaint and diagnosis mentioned in the note.   The plan will be specified in the note.  85M PMH HTN, DM2, CKD, prostate cancer s/p prostatectomy, s/p robotic cholecystectomy in SICU for HD monitoring and post op care.  AO3  Lung clear  Heart RR  Abd soft  Ext warm    PLAN:   Neurologic:   - Tylenol  -Dilaudid   Respiratory:   - O2 sat >92%  Cardiovascular:   -Labetalol  -Hydralazine  -Clonidine  - ASA  Gastrointestinal/Nutrition:   - NPO  -NGT   - PPI  Renal/Genitourinary:   - D5 1/2NS @ 30cc/hr  - Monitor UOP  Hematologic:   - SQH  - ASA  Infectious Disease:   - Ampicillin   - Monitor WBC  Endocrine:   - Lantus  -Levothyroxine    DISPO: DC

## 2025-06-10 NOTE — PROGRESS NOTE ADULT - ASSESSMENT
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 Labs w/ leukocytosis to 18, BCx w/ E faecalis in multiple sets.     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 Pt noted to have chills and leukocytosis to 18, now with high grade E faecalis bacteremia. Unclear source however UCx not done, U/A with 10 WBCs, CT A/P w/o IV contrast on 5/10 w/o acute infectious source, s/p PPM removal, MARIA LUISA w/o vegetations.   Repeat MRI with OM/discitis, L4/L5, severe spinal canal stenosis L3-L4, L-5, impingement of cauda equina, planned for OR w/ orthopedics, however c/b acute cholecystitis s/p lap geetha 6/4   CT findings of possible infarcts kidney/spleen, pt with previous MARIA LUISA w/o findings of IE. C/b post bleeding, transferred to SICU for pressor support     Recommendations:   1. Ampicillin 2 g q6, likely 6 week course, pending orthopedics for potential surgery   2. Appreciate SICU care     Thank you for consulting us and involving us in the management of this patient's case. In addition to reviewing history, imaging, documents, labs, microbiology, and infection control strategies and potential issues.     ID will continue to follow    Darrin Falk M.D.  Attending Physician  Division of Infectious Diseases  Department of Medicine    Please contact through [facility].  Office: [phone] (after 5 PM or weekend)

## 2025-06-10 NOTE — PROGRESS NOTE ADULT - SUBJECTIVE AND OBJECTIVE BOX
Timothy Mojica MD  Interventional Cardiology / Advance Heart Failure and Cardiac Transplant Specialist  [place] Office : [phone] [place] [address] [place]. [address]  Tel:   [place] Office : [address] [facility] [place] [address]  Tel: [phone]       Pt is lying in bed NAD  	  MEDICATIONS:  aspirin  chewable 81 milliGRAM(s) Oral daily  cloNIDine Patch 0.3 mG/24Hr(s) 1 patch Transdermal every 7 days  heparin   Injectable 5000 Unit(s) SubCutaneous every 8 hours  hydrALAZINE Injectable 20 milliGRAM(s) IV Push every 4 hours  labetalol Injectable 20 milliGRAM(s) IV Push every 4 hours    ampicillin  IVPB 2 Gram(s) IV Intermittent every 6 hours    albuterol/ipratropium for Nebulization 3 milliLiter(s) Nebulizer every 6 hours PRN  sodium chloride 3%  Inhalation 4 milliLiter(s) Inhalation every 12 hours    acetaminophen   IVPB .. 1000 milliGRAM(s) IV Intermittent every 6 hours  HYDROmorphone  Injectable 0.5 milliGRAM(s) IV Push every 4 hours PRN  HYDROmorphone  Injectable 1 milliGRAM(s) IV Push every 4 hours PRN    pantoprazole  Injectable 40 milliGRAM(s) IV Push daily    insulin glargine Injectable (LANTUS) 10 Unit(s) SubCutaneous at bedtime  insulin lispro (ADMELOG) corrective regimen sliding scale   SubCutaneous every 6 hours  levothyroxine Injectable 112 MICROGram(s) IV Push at bedtime    chlorhexidine 2% Cloths 1 Application(s) Topical daily  dextrose 5% + lactated ringers. 1000 milliLiter(s) IV Continuous <Continuous>  lidocaine   4% Patch 1 Patch Transdermal daily  povidone iodine 10% Nasal Swab 1 Application(s) Both Nostrils once      PAST MEDICAL/SURGICAL HISTORY  PAST MEDICAL & SURGICAL HISTORY:  HTN (hypertension)      HLD (hyperlipidemia)      DM (diabetes mellitus)      TIA (transient ischemic attack)      Prostate cancer      S/P prostatectomy      Pacemaker      H/O thyroidectomy          SOCIAL HISTORY: Substance Use (street drugs): ( x ) never used  (  ) other:    FAMILY HISTORY:  FHx: heart disease (Father, Mother)           PHYSICAL EXAM:  T(C): 36.1 (06-10-25 @ 12:00), Max: 36.3 (06-10-25 @ 04:00)  HR: 62 (06-10-25 @ 12:00) (57 - 71)  BP: 145/46 (06-10-25 @ 12:00) (145/46 - 189/65)  RR: 13 (06-10-25 @ 12:00) (10 - 21)  SpO2: 96% (06-10-25 @ 12:00) (88% - 100%)  Wt(kg): --  I&O's Summary    09 Jun 2025 07:01  -  10 Heriberto 2025 07:00  --------------------------------------------------------  IN: 1210 mL / OUT: 2255 mL / NET: -1045 mL    10 Heriberto 2025 07:01  -  10 Heriberto 2025 16:15  --------------------------------------------------------  IN: 180 mL / OUT: 475 mL / NET: -295 mL          GENERAL: NAD  EYES:  EOMI  ENMT: NGT Moist mucous membranes  Cardiovascular: Normal S1 S2, No JVD, No murmurs, +b/l LUE edema L>R, LE edema  Respiratory: Lungs clear to auscultation	  Gastrointestinal:  Soft, Non-tender, + BS	  Extremities: b/l UE, LE edema                                    9.3    9.39  )-----------( 151      ( 10 Heriberto 2025 03:45 )             29.2     06-10    136  |  99  |  30[H]  ----------------------------<  139[H]  3.4[L]   |  26  |  1.78[H]    Ca    7.4[L]      10 Heriberto 2025 03:45  Phos  2.8     06-10  Mg     2.10     06-10    TPro  5.3[L]  /  Alb  2.5[L]  /  TBili  0.5  /  DBili  x   /  AST  21  /  ALT  48[H]  /  AlkPhos  221[H]  06-10    proBNP:   Lipid Profile:   HgA1c:   TSH:     Consultant(s) Notes Reviewed:  [x ] YES  [ ] NO    Care Discussed with Consultants/Other Providers [ x] YES  [ ] NO    Imaging Personally Reviewed independently:  [x] YES  [ ] NO    All labs, radiologic studies, vitals, orders and medications list reviewed. Patient is seen and examined at bedside. Case discussed with medical team.

## 2025-06-10 NOTE — PROGRESS NOTE ADULT - SUBJECTIVE AND OBJECTIVE BOX
Orthopedic Surgery      Pt seen and examined. PT remains in SICU with NGT in place. Pt denies any overnight events. Pt reports pain is well controlled. Pt denies any fever/chills/chest pain/nausea/vomiting.      ICU Vital Signs Last 24 Hrs  T(C): 36.3 (10 Heriberto 2025 04:00), Max: 36.5 (09 Jun 2025 16:00)  T(F): 97.3 (10 Heriberto 2025 04:00), Max: 97.7 (09 Jun 2025 16:00)  HR: 63 (10 Heriberto 2025 04:00) (56 - 71)  BP: 164/51 (10 Heriberto 2025 04:00) (151/46 - 193/83)  BP(mean): 84 (10 Heriberto 2025 04:00) (78 - 137)  ABP: --  ABP(mean): --  RR: 20 (10 Heriberto 2025 04:00) (8 - 21)  SpO2: 94% (10 Heriberto 2025 04:00) (88% - 100%)          Physical exam:   Physical Exam:  GEN: NAD, resting quietly, NGT in place  PULM: symmetric chest rise bilaterally, no increased WOB,   SPINE:   Motor exam:          Upper extremity         C5 (Shoulder Abd)    C6 (Elbow flex)   C7 (Elbow ext)   C8 (Finger flex) T1 (finger abd)         R         5/5                 5/5                       5/5                      5/5                         5/5          L          5/5                 5/5                      5/5                      5/5                         5/5                   Lower extremity           L2 (Hip flex)  L3 (knee ext)  L4 (Dorsi flex)  L5 (EHL)  S1 (Plantar flex)                                               R        3/5            4/5             1/5                    1/5          4/5      L        3/5            4/5             5/5                   5/5           5/5      Sensory exam:                        C5      C6      C7      C8       T1          RIGHT          2         2        2         2         2          (0=absent, 1=impaired, 2=normal, NT=not testable)  LEFT             2         2        2         2         2          (0=absent, 1=impaired, 2=normal, NT=not testable)                          L2      L3     L4     L5       S1          RIGHT          2         2        2         2         2          (0=absent, 1=impaired, 2=normal, NT=not testable)  LEFT             2         2        2         2         2          (0=absent, 1=impaired, 2=normal, NT=not testable)     LABS:                        9.3    9.39  )-----------( 151      ( 10 Heriberto 2025 03:45 )             29.2     06-10    136  |  99  |  30[H]  ----------------------------<  139[H]  3.4[L]   |  26  |  1.78[H]    Ca    7.4[L]      10 Heriberto 2025 03:45  Phos  2.8     06-10  Mg     2.10     06-10    TPro  5.3[L]  /  Alb  2.5[L]  /  TBili  0.5  /  DBili  x   /  AST  21  /  ALT  48[H]  /  AlkPhos  221[H]  06-10      Urinalysis Basic - ( 10 Heriberto 2025 03:45 )    Color: x / Appearance: x / SG: x / pH: x  Gluc: 139 mg/dL / Ketone: x  / Bili: x / Urobili: x   Blood: x / Protein: x / Nitrite: x   Leuk Esterase: x / RBC: x / WBC x   Sq Epi: x / Non Sq Epi: x / Bacteria: x              Assessment/Plan:   85 year old male with 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 After family discussions they wished to proceed with surgery, but this was subsequently canceled due to RRT on 6/6 for acute cholecystitis requiring lap geetha per gen surg  -Operative plan for patient pending discussion between Dr. Gruber and patient  -med and cards cleared, appreciate care  -WBAT  -pain control prn  -bowel reg prn  -antibiotics per ID - zosyn   -continue PT/OT  -dispo pending SICU stay    Ethan Barrientos PGY-2    For any questions please contact the on call orthopedic surgery team, please do not reach out via teams.  [facility] pager: [phone]  [facility] pager: [phone]  [facility] pager: [phone]

## 2025-06-10 NOTE — PROGRESS NOTE ADULT - SUBJECTIVE AND OBJECTIVE BOX
Infectious Diseases Follow Up:    Patient is a 85y old  Male who presents with a chief complaint of back pain (10 Heriberto 2025 06:04)      Interval History/ROS:  No acute events noted, pt denies abdominal pain     Allergies  gabapentin (Other)        ANTIMICROBIALS:  ampicillin  IVPB 2 every 6 hours      Current Abx:     Previous Abx     OTHER MEDS:  MEDICATIONS  (STANDING):  acetaminophen   IVPB .. 1000 every 6 hours  albuterol/ipratropium for Nebulization 3 every 6 hours PRN  aspirin  chewable 81 daily  cloNIDine Patch 0.3 mG/24Hr(s) 1 every 7 days  heparin   Injectable 5000 every 8 hours  hydrALAZINE Injectable 20 every 4 hours  HYDROmorphone  Injectable 0.5 every 4 hours PRN  HYDROmorphone  Injectable 1 every 4 hours PRN  insulin glargine Injectable (LANTUS) 10 at bedtime  insulin lispro (ADMELOG) corrective regimen sliding scale  every 6 hours  labetalol Injectable 20 every 4 hours  levothyroxine Injectable 112 at bedtime  pantoprazole  Injectable 40 daily  sodium chloride 3%  Inhalation 4 every 12 hours      Vital Signs Last 24 Hrs  T(C): 36.2 (10 Heriberto 2025 08:00), Max: 36.5 (09 Jun 2025 16:00)  T(F): 97.2 (10 Heriberto 2025 08:00), Max: 97.7 (09 Jun 2025 16:00)  HR: 57 (10 Heriberto 2025 08:00) (56 - 71)  BP: 160/48 (10 Heriberto 2025 08:00) (151/46 - 193/83)  BP(mean): 78 (10 Heriberto 2025 08:00) (78 - 137)  RR: 11 (10 Heriberto 2025 08:00) (8 - 21)  SpO2: 95% (10 Heriberto 2025 08:00) (88% - 100%)    Parameters below as of 10 Heriberto 2025 08:00  Patient On (Oxygen Delivery Method): nasal cannula  O2 Flow (L/min): 2      PHYSICAL EXAM:  GENERAL: NAD, well-developed  HEAD:  Atraumatic, Normocephalic  EYES: EOMI, conjunctiva and sclera clear  NOSE: NG w/ bilious output   CHEST/LUNG: On NC, not in respiratory distress  ABDOMEN: Soft, NT/ND, steri strips over port site, no bleeding seen   PSYCH: AAOx3                          9.3    9.39  )-----------( 151      ( 10 Heriberto 2025 03:45 )             29.2       06-10    136  |  99  |  30[H]  ----------------------------<  139[H]  3.4[L]   |  26  |  1.78[H]    Ca    7.4[L]      10 Heriberto 2025 03:45  Phos  2.8     06-10  Mg     2.10     06-10    TPro  5.3[L]  /  Alb  2.5[L]  /  TBili  0.5  /  DBili  x   /  AST  21  /  ALT  48[H]  /  AlkPhos  221[H]  06-10      Urinalysis Basic - ( 10 Heriberto 2025 03:45 )    Color: x / Appearance: x / SG: x / pH: x  Gluc: 139 mg/dL / Ketone: x  / Bili: x / Urobili: x   Blood: x / Protein: x / Nitrite: x   Leuk Esterase: x / RBC: x / WBC x   Sq Epi: x / Non Sq Epi: x / Bacteria: x        MICROBIOLOGY:  v  Blood Blood-Peripheral  06-06-25   No growth at 72 Hours  --  --      Blood Blood-Peripheral  06-04-25   No growth at 5 days  --  --      Blood Blood-Peripheral  06-04-25   No growth at 5 days  --  --      Blood Blood  05-18-25   No growth at 5 days  --  --      Blood Blood-Peripheral  05-17-25   No growth at 5 days  --  --      Blood Blood-Peripheral  05-17-25   No growth at 5 days  --  --      Blood Blood  05-17-25   No growth at 5 days  --  --      Blood Blood  05-15-25   No growth at 5 days  --  --      Blood Blood  05-15-25   No growth at 5 days  --  --      Blood Blood  05-14-25   Growth in aerobic bottle: Enterococcus faecalis  See previous culture [id number]  Direct identification is available within approximately 3-5  hours either by Blood Panel Multiplexed PCR or Direct  MALDI-TOF. Details: [email or web address]  --  Blood Culture PCR      Blood Blood  05-14-25   Growth in aerobic and anaerobic bottles: Enterococcus faecalis  Growth in anaerobic bottle: blood culture bottle turned positive after  the final report was released.  --  Enterococcus faecalis      Blood Blood-Peripheral  05-12-25   Growth in aerobic and anaerobic bottles: Enterococcus faecalis  See previous culture [id number]  --    Growth in aerobic bottle: Gram Positive Cocci in Pairs and Chains  Growth in anaerobic bottle: Gram Positive Cocci in Pairs and Chains      Blood Blood-Peripheral  05-12-25   Growth in aerobic and anaerobic bottles: Enterococcus faecalis  See previous culture [id number]  --    Growth in aerobic bottle: Gram Positive Cocci in Pairs and Chains  Growth in anaerobic bottle: Gram Positive Cocci in Pairs and Chains      Blood Blood-Peripheral  05-11-25   Growth in aerobic and anaerobic bottles: Enterococcus faecalis  Direct identification is available within approximately 3-5  hours either by Blood Panel Multiplexed PCR or Direct  MALDI-TOF. Details: [email or web address]  --  Blood Culture PCR  Enterococcus faecalis                RADIOLOGY:

## 2025-06-10 NOTE — PROGRESS NOTE ADULT - ASSESSMENT
85 year old man with multiple chronic medical comorbidities including hypertension, sinus node dysfunction, type two diabetes, and chronic kidney disease (baseline creatinine 1.5-2.0) presently admitted for lower back pain associated with lumbar compression deformities with concern for cauda equina impingement. He was found to be bacteremic (E faecalis) due to acute acalculous cholecystitis, and is now seen following laparoscopic cholecystectomy with Dr. Triny Verde 6/5/2025. POD 1 patient became hypotensive, unresponsive to 2L fluid resuscitation and 3 u pRBC and was started on vasopressors and transferred to SICU.Patient weaned off pressors, but now with ileus.    Plan:   - NPO/IVF/NGT --> NGT clamp trial this AM  - c/w ASA81  - c/w Unasyn  - increase antihypertensives per cards  - c/w wong  - ortho recs re: spinal surgery appreciated  - appreciate SICU care    B team   [id number]   85 year old man with multiple chronic medical comorbidities including hypertension, sinus node dysfunction, type two diabetes, and chronic kidney disease (baseline creatinine 1.5-2.0) presently admitted for lower back pain associated with lumbar compression deformities with concern for cauda equina impingement. He was found to be bacteremic (E faecalis) due to acute acalculous cholecystitis, and is now seen following laparoscopic cholecystectomy with Dr. Triny Verde 6/5/2025. POD 1 patient became hypotensive, unresponsive to 2L fluid resuscitation and 3 u pRBC and was started on vasopressors and transferred to SICU.Patient weaned off pressors, but now with ileus.    Plan:   - NPO/IVF/NGT --> NGT clamp trial this AM  - c/w ASA81  - c/w Ampicillin  - increase antihypertensives per cards  - c/w wong  - ortho recs re: spinal surgery appreciated  - appreciate SICU care    B team   [id number]

## 2025-06-10 NOTE — PROGRESS NOTE ADULT - SUBJECTIVE AND OBJECTIVE BOX
SICU PROGRESS NOTE:    24 HOUR INTERVAL EVENTS:  INTERVAL:  - Standing hydralazine 20 q4, s/p rescue dose of 10 @15:00 6/8  - D5 1/2NS @ 30cc/hr      SUBJECTIVE/ROS:  [x] A ten-point review of systems was otherwise negative except as noted.  [ ] Due to altered mental status/intubation, subjective information were not able to be obtained from the patient. History was obtained, to the extent possible, from review of the chart and collateral sources of information.    NEURO  Exam: awake, alert, oriented  Meds: acetaminophen   IVPB .. 1000 milliGRAM(s) IV Intermittent every 6 hours  HYDROmorphone  Injectable 0.5 milliGRAM(s) IV Push every 4 hours PRN Moderate Pain (4 - 6)  HYDROmorphone  Injectable 1 milliGRAM(s) IV Push every 4 hours PRN Severe Pain (7 - 10)    [x] Adequacy of sedation and pain control has been assessed and adjusted    RESPIRATORY  RR: 13 (06-10-25 @ 00:00) (8 - 19)  SpO2: 94% (06-10-25 @ 00:00) (88% - 100%)  Wt(kg): --  Exam: unlabored, clear to auscultation bilaterally  Mechanical Ventilation:   ABG - ( 08 Jun 2025 00:45 )  pH: 7.45  /  pCO2: 40    /  pO2: 131   / HCO3: 28    / Base Excess: 3.5   /  SaO2: 98.4      Meds: albuterol/ipratropium for Nebulization 3 milliLiter(s) Nebulizer every 6 hours PRN Shortness of Breath and/or Wheezing  sodium chloride 3%  Inhalation 4 milliLiter(s) Inhalation every 12 hours      CARDIOVASCULAR  HR: 60 (06-10-25 @ 00:00) (56 - 71)  BP: 177/56 (06-10-25 @ 00:00) (151/46 - 193/83)  BP(mean): 91 (06-10-25 @ 00:00) (78 - 137)    Exam: regular rate and rhythm  Cardiac Rhythm: sinus  Perfusion     [x]Adequate   [ ]Inadequate  Mentation   [x]Normal       [ ]Reduced  Extremities  [x]Warm         [ ]Cool  Volume Status [ ]Hypervolemic [x]Euvolemic [ ]Hypovolemic  Meds: cloNIDine Patch 0.3 mG/24Hr(s) 1 patch Transdermal every 7 days  hydrALAZINE Injectable 20 milliGRAM(s) IV Push every 4 hours  labetalol Injectable 20 milliGRAM(s) IV Push every 4 hours      GI/NUTRITION  Exam: soft, nontender, nondistended, incision C/D/I  Diet: NPO  Meds: pantoprazole  Injectable 40 milliGRAM(s) IV Push daily      GENITOURINARY  I&O's Detail    06-08 @ 07:01  -  06-09 @ 07:00  --------------------------------------------------------  IN:    dextrose 5% + sodium chloride 0.45%: 630 mL    IV PiggyBack: 810 mL  Total IN: 1440 mL    OUT:    Indwelling Catheter - Urethral (mL): 1000 mL    Intermittent Catheterization - Urethral (mL): 1880 mL    Nasogastric/Oral tube (mL): 1000 mL  Total OUT: 3880 mL    Total NET: -2440 mL      06-09 @ 07:01  -  06-10 @ 00:25  --------------------------------------------------------  IN:    dextrose 5% + lactated ringers: 180 mL    IV PiggyBack: 300 mL  Total IN: 480 mL    OUT:    Intermittent Catheterization - Urethral (mL): 1405 mL    Nasogastric/Oral tube (mL): 350 mL  Total OUT: 1755 mL    Total NET: -1275 mL          06-09    136  |  98  |  39[H]  ----------------------------<  110[H]  3.3[L]   |  24  |  2.52[H]    Ca    7.4[L]      09 Jun 2025 00:35  Phos  3.4     06-09  Mg     2.20     06-09    TPro  5.5[L]  /  Alb  2.7[L]  /  TBili  0.5  /  DBili  x   /  AST  19  /  ALT  64[H]  /  AlkPhos  260[H]  06-09    [x] Frye catheter, indication: N/A  Meds: dextrose 5% + lactated ringers. 1000 milliLiter(s) IV Continuous <Continuous>      HEMATOLOGIC  Meds: aspirin  chewable 81 milliGRAM(s) Oral daily  heparin   Injectable 5000 Unit(s) SubCutaneous every 8 hours    [x] VTE Prophylaxis                        9.2    11.40 )-----------( 202      ( 09 Jun 2025 00:35 )             27.3       Transfusion     [ ] PRBC   [ ] Platelets   [ ] FFP   [ ] Cryoprecipitate    INFECTIOUS DISEASES  WBC Count: 11.40 K/uL (06-09 @ 00:35)    RECENT CULTURES:  Specimen Source: Blood Blood-Peripheral  Date/Time: 06-06 @ 02:30  Culture Results:   No growth at 72 Hours  Gram Stain: --  Organism: --    Meds: ampicillin  IVPB 2 Gram(s) IV Intermittent every 6 hours      ENDOCRINE  CAPILLARY BLOOD GLUCOSE      POCT Blood Glucose.: 129 mg/dL (09 Jun 2025 22:58)  POCT Blood Glucose.: 127 mg/dL (09 Jun 2025 18:19)  POCT Blood Glucose.: 77 mg/dL (09 Jun 2025 17:42)  POCT Blood Glucose.: 85 mg/dL (09 Jun 2025 12:14)  POCT Blood Glucose.: 102 mg/dL (09 Jun 2025 05:15)    Meds: insulin glargine Injectable (LANTUS) 10 Unit(s) SubCutaneous at bedtime  insulin lispro (ADMELOG) corrective regimen sliding scale   SubCutaneous every 6 hours  levothyroxine Injectable 112 MICROGram(s) IV Push at bedtime      ACCESS DEVICES:  [x] Peripheral IV  [ ] Central Venous Line	[ ] R	[ ] L	[ ] IJ	[ ] Fem	[ ] SC	Placed:   [x ] Arterial Line		[x ] R	[ ] L	[ ] Fem	[ ] Rad	[ ] Ax	Placed:   [ ] PICC:					[ ] Mediport  [ ] Urinary Catheter, Date Placed:   [x] Necessity of urinary, arterial, and venous catheters discussed    OTHER MEDICATIONS:  chlorhexidine 2% Cloths 1 Application(s) Topical daily  lidocaine   4% Patch 1 Patch Transdermal daily  povidone iodine 10% Nasal Swab 1 Application(s) Both Nostrils once      CODE STATUS:  DNI        IMAGING:   SICU PROGRESS NOTE:    24 HOUR INTERVAL EVENTS:  INTERVAL:  - Standing hydralazine 20 q4, s/p rescue dose of 10 @15:00 6/8  - D5 1/2NS @ 30cc/hr  - Hydralazine 10 x 1 given at 12am      SUBJECTIVE/ROS:  [x] A ten-point review of systems was otherwise negative except as noted.  [ ] Due to altered mental status/intubation, subjective information were not able to be obtained from the patient. History was obtained, to the extent possible, from review of the chart and collateral sources of information.    NEURO  Exam: awake, alert, oriented  Meds: acetaminophen   IVPB .. 1000 milliGRAM(s) IV Intermittent every 6 hours  HYDROmorphone  Injectable 0.5 milliGRAM(s) IV Push every 4 hours PRN Moderate Pain (4 - 6)  HYDROmorphone  Injectable 1 milliGRAM(s) IV Push every 4 hours PRN Severe Pain (7 - 10)    [x] Adequacy of sedation and pain control has been assessed and adjusted    RESPIRATORY  RR: 13 (06-10-25 @ 00:00) (8 - 19)  SpO2: 94% (06-10-25 @ 00:00) (88% - 100%)  Wt(kg): --  Exam: unlabored, clear to auscultation bilaterally  Mechanical Ventilation:   ABG - ( 08 Jun 2025 00:45 )  pH: 7.45  /  pCO2: 40    /  pO2: 131   / HCO3: 28    / Base Excess: 3.5   /  SaO2: 98.4      Meds: albuterol/ipratropium for Nebulization 3 milliLiter(s) Nebulizer every 6 hours PRN Shortness of Breath and/or Wheezing  sodium chloride 3%  Inhalation 4 milliLiter(s) Inhalation every 12 hours      CARDIOVASCULAR  HR: 60 (06-10-25 @ 00:00) (56 - 71)  BP: 177/56 (06-10-25 @ 00:00) (151/46 - 193/83)  BP(mean): 91 (06-10-25 @ 00:00) (78 - 137)    Exam: regular rate and rhythm  Cardiac Rhythm: sinus  Perfusion     [x]Adequate   [ ]Inadequate  Mentation   [x]Normal       [ ]Reduced  Extremities  [x]Warm         [ ]Cool  Volume Status [ ]Hypervolemic [x]Euvolemic [ ]Hypovolemic  Meds: cloNIDine Patch 0.3 mG/24Hr(s) 1 patch Transdermal every 7 days  hydrALAZINE Injectable 20 milliGRAM(s) IV Push every 4 hours  labetalol Injectable 20 milliGRAM(s) IV Push every 4 hours      GI/NUTRITION  Exam: soft, nontender, nondistended, incision C/D/I  Diet: NPO  Meds: pantoprazole  Injectable 40 milliGRAM(s) IV Push daily      GENITOURINARY  I&O's Detail    06-08 @ 07:01  -  06-09 @ 07:00  --------------------------------------------------------  IN:    dextrose 5% + sodium chloride 0.45%: 630 mL    IV PiggyBack: 810 mL  Total IN: 1440 mL    OUT:    Indwelling Catheter - Urethral (mL): 1000 mL    Intermittent Catheterization - Urethral (mL): 1880 mL    Nasogastric/Oral tube (mL): 1000 mL  Total OUT: 3880 mL    Total NET: -2440 mL      06-09 @ 07:01  -  06-10 @ 00:25  --------------------------------------------------------  IN:    dextrose 5% + lactated ringers: 180 mL    IV PiggyBack: 300 mL  Total IN: 480 mL    OUT:    Intermittent Catheterization - Urethral (mL): 1405 mL    Nasogastric/Oral tube (mL): 350 mL  Total OUT: 1755 mL    Total NET: -1275 mL          06-09    136  |  98  |  39[H]  ----------------------------<  110[H]  3.3[L]   |  24  |  2.52[H]    Ca    7.4[L]      09 Jun 2025 00:35  Phos  3.4     06-09  Mg     2.20     06-09    TPro  5.5[L]  /  Alb  2.7[L]  /  TBili  0.5  /  DBili  x   /  AST  19  /  ALT  64[H]  /  AlkPhos  260[H]  06-09    [x] Frye catheter, indication: N/A  Meds: dextrose 5% + lactated ringers. 1000 milliLiter(s) IV Continuous <Continuous>      HEMATOLOGIC  Meds: aspirin  chewable 81 milliGRAM(s) Oral daily  heparin   Injectable 5000 Unit(s) SubCutaneous every 8 hours    [x] VTE Prophylaxis                        9.2    11.40 )-----------( 202      ( 09 Jun 2025 00:35 )             27.3       Transfusion     [ ] PRBC   [ ] Platelets   [ ] FFP   [ ] Cryoprecipitate    INFECTIOUS DISEASES  WBC Count: 11.40 K/uL (06-09 @ 00:35)    RECENT CULTURES:  Specimen Source: Blood Blood-Peripheral  Date/Time: 06-06 @ 02:30  Culture Results:   No growth at 72 Hours  Gram Stain: --  Organism: --    Meds: ampicillin  IVPB 2 Gram(s) IV Intermittent every 6 hours      ENDOCRINE  CAPILLARY BLOOD GLUCOSE      POCT Blood Glucose.: 129 mg/dL (09 Jun 2025 22:58)  POCT Blood Glucose.: 127 mg/dL (09 Jun 2025 18:19)  POCT Blood Glucose.: 77 mg/dL (09 Jun 2025 17:42)  POCT Blood Glucose.: 85 mg/dL (09 Jun 2025 12:14)  POCT Blood Glucose.: 102 mg/dL (09 Jun 2025 05:15)    Meds: insulin glargine Injectable (LANTUS) 10 Unit(s) SubCutaneous at bedtime  insulin lispro (ADMELOG) corrective regimen sliding scale   SubCutaneous every 6 hours  levothyroxine Injectable 112 MICROGram(s) IV Push at bedtime      ACCESS DEVICES:  [x] Peripheral IV  [ ] Central Venous Line	[ ] R	[ ] L	[ ] IJ	[ ] Fem	[ ] SC	Placed:   [x ] Arterial Line		[x ] R	[ ] L	[ ] Fem	[ ] Rad	[ ] Ax	Placed:   [ ] PICC:					[ ] Mediport  [ ] Urinary Catheter, Date Placed:   [x] Necessity of urinary, arterial, and venous catheters discussed    OTHER MEDICATIONS:  chlorhexidine 2% Cloths 1 Application(s) Topical daily  lidocaine   4% Patch 1 Patch Transdermal daily  povidone iodine 10% Nasal Swab 1 Application(s) Both Nostrils once      CODE STATUS:  DNI        IMAGING:

## 2025-06-10 NOTE — PROGRESS NOTE ADULT - ASSESSMENT
A/P:  85M w/ HTN, HLD, T2DM, prostate CA, CKD, TIA and SND s/p PPM transferred here for lumbar spinal cord compression and bacteremia s/p PPM explant and implant of Micra PPM. Now pending OR with orthopedic for decompression     -EKG w/ SR, no significant STT changes   -ECHO w/ normal EF no significant valvular heart disease   -s/p leadless PPM w/ EP given his SND and pacing dependency   -s/p cholecystectomy   - on IV bp meds hydral 20mg q4, labetolol 20mg q4, clonidine 0.3mg patch

## 2025-06-10 NOTE — PROGRESS NOTE ADULT - ASSESSMENT
85M PMH HTN, DM2, CKD (baseline creatinine 1.5-2), TIAs (on Eliquis at home), sinus node dysfunction (s/p PPM ~1 year ago), prostate cancer s/p prostectomy (~early 2000s), total thyroidectomy (~2013), initially presented after a fall and found to have lumbar stenosis with cauda equina impingement however on 6/4 developed acute abdominal pain and had a CT abdomen and pelvis that had signs of acute cholecystitis. On 6/5 patient underwent a robotic cholecystectomy, POD 1 patient became hypotensive, unresponsive to 2L fluid resuscitation and 3 u pRBC and was started on vasopressors and transferred to SICU.    INTERVAL:  - Standing hydralazine 20 q4, s/p rescue dose of 10 @15:00 6/8  - D5 1/2NS @ 30cc/hr    PLAN:   Neurologic:   - A&Ox3  - Tylenol and prn dilaudid   - Lidocaine patch    Respiratory:   - O2 sat >92%  - On nasal cannula  - Chest PT, Saline mist    Cardiovascular:   - Previous PPM s/p removed due to bacteremia, repeat MARIA LUISA no IE  - HTN: Labetalol 20q4, Hydralazine 20q4, Clonidine Patch 0.3mg  - ASA    Gastrointestinal/Nutrition:   - NPO, NGT decompression  - PPI    Renal/Genitourinary:   - Failed TOV > Wong replaced  - D5 LR @ 30  - Monitor UOP    Hematologic:   - Monitor H+H  - DVT ppx: SQH  - ASA    Infectious Disease:   - Previous multiple + BC E.Faecalis, Multiple susequent neg. thus far  - Per ID rec: Continue zosyn for 4 days post op then switch to Ampicillin 2g q4 6/10  - Monitor WBC    Endocrine:   - 20 units lantus at home (10U while npo)  - 6 premeal (hold if NPO)  - continue home levothyroxine    Tubes/Lines/Drains:   - right radial a line  - PIVs  - wong    DISPO: SICU

## 2025-06-11 LAB
ANION GAP SERPL CALC-SCNC: 13 MMOL/L — SIGNIFICANT CHANGE UP (ref 7–14)
BUN SERPL-MCNC: 24 MG/DL — HIGH (ref 7–23)
CALCIUM SERPL-MCNC: 7.3 MG/DL — LOW (ref 8.4–10.5)
CHLORIDE SERPL-SCNC: 99 MMOL/L — SIGNIFICANT CHANGE UP (ref 98–107)
CO2 SERPL-SCNC: 24 MMOL/L — SIGNIFICANT CHANGE UP (ref 22–31)
CREAT SERPL-MCNC: 1.47 MG/DL — HIGH (ref 0.5–1.3)
CULTURE RESULTS: SIGNIFICANT CHANGE UP
EGFR: 46 ML/MIN/1.73M2 — LOW
EGFR: 46 ML/MIN/1.73M2 — LOW
GLUCOSE BLDC GLUCOMTR-MCNC: 108 MG/DL — HIGH (ref 70–99)
GLUCOSE BLDC GLUCOMTR-MCNC: 130 MG/DL — HIGH (ref 70–99)
GLUCOSE BLDC GLUCOMTR-MCNC: 142 MG/DL — HIGH (ref 70–99)
GLUCOSE BLDC GLUCOMTR-MCNC: 182 MG/DL — HIGH (ref 70–99)
GLUCOSE BLDC GLUCOMTR-MCNC: 70 MG/DL — SIGNIFICANT CHANGE UP (ref 70–99)
GLUCOSE BLDC GLUCOMTR-MCNC: 70 MG/DL — SIGNIFICANT CHANGE UP (ref 70–99)
GLUCOSE SERPL-MCNC: 105 MG/DL — HIGH (ref 70–99)
MAGNESIUM SERPL-MCNC: 2.1 MG/DL — SIGNIFICANT CHANGE UP (ref 1.6–2.6)
PHOSPHATE SERPL-MCNC: 2.6 MG/DL — SIGNIFICANT CHANGE UP (ref 2.5–4.5)
PLATELET # BLD AUTO: 215 K/UL — SIGNIFICANT CHANGE UP (ref 150–400)
POTASSIUM SERPL-MCNC: 3.8 MMOL/L — SIGNIFICANT CHANGE UP (ref 3.5–5.3)
POTASSIUM SERPL-SCNC: 3.8 MMOL/L — SIGNIFICANT CHANGE UP (ref 3.5–5.3)
SODIUM SERPL-SCNC: 136 MMOL/L — SIGNIFICANT CHANGE UP (ref 135–145)
SPECIMEN SOURCE: SIGNIFICANT CHANGE UP

## 2025-06-11 PROCEDURE — G0545: CPT

## 2025-06-11 PROCEDURE — 99232 SBSQ HOSP IP/OBS MODERATE 35: CPT

## 2025-06-11 PROCEDURE — 99233 SBSQ HOSP IP/OBS HIGH 50: CPT

## 2025-06-11 RX ORDER — POLYETHYLENE GLYCOL 3350 17 G/17G
17 POWDER, FOR SOLUTION ORAL
Refills: 0 | Status: DISCONTINUED | OUTPATIENT
Start: 2025-06-11 | End: 2025-06-16

## 2025-06-11 RX ORDER — POTASSIUM PHOSPHATE, MONOBASIC POTASSIUM PHOSPHATE, DIBASIC INJECTION, 236; 224 MG/ML; MG/ML
15 SOLUTION, CONCENTRATE INTRAVENOUS ONCE
Refills: 0 | Status: COMPLETED | OUTPATIENT
Start: 2025-06-11 | End: 2025-06-11

## 2025-06-11 RX ADMIN — Medication 100 MILLIGRAM(S): at 13:06

## 2025-06-11 RX ADMIN — Medication 1 MILLIGRAM(S): at 16:40

## 2025-06-11 RX ADMIN — Medication 1 MILLIGRAM(S): at 11:20

## 2025-06-11 RX ADMIN — LIDOCAINE HYDROCHLORIDE 1 PATCH: 20 JELLY TOPICAL at 13:08

## 2025-06-11 RX ADMIN — Medication 1 MILLIGRAM(S): at 11:35

## 2025-06-11 RX ADMIN — Medication 100 MILLIGRAM(S): at 21:28

## 2025-06-11 RX ADMIN — POLYETHYLENE GLYCOL 3350 17 GRAM(S): 17 POWDER, FOR SOLUTION ORAL at 18:12

## 2025-06-11 RX ADMIN — Medication 100 MILLIGRAM(S): at 05:31

## 2025-06-11 RX ADMIN — Medication 400 MILLIGRAM(S): at 05:32

## 2025-06-11 RX ADMIN — LIDOCAINE HYDROCHLORIDE 1 PATCH: 20 JELLY TOPICAL at 19:13

## 2025-06-11 RX ADMIN — HEPARIN SODIUM 5000 UNIT(S): 1000 INJECTION INTRAVENOUS; SUBCUTANEOUS at 13:06

## 2025-06-11 RX ADMIN — LABETALOL HYDROCHLORIDE 100 MILLIGRAM(S): 200 TABLET, FILM COATED ORAL at 18:12

## 2025-06-11 RX ADMIN — AMPICILLIN SODIUM 200 GRAM(S): 1 INJECTION, POWDER, FOR SOLUTION INTRAMUSCULAR; INTRAVENOUS at 05:32

## 2025-06-11 RX ADMIN — HEPARIN SODIUM 5000 UNIT(S): 1000 INJECTION INTRAVENOUS; SUBCUTANEOUS at 05:31

## 2025-06-11 RX ADMIN — AMPICILLIN SODIUM 200 GRAM(S): 1 INJECTION, POWDER, FOR SOLUTION INTRAMUSCULAR; INTRAVENOUS at 11:19

## 2025-06-11 RX ADMIN — Medication 1000 MILLIGRAM(S): at 06:00

## 2025-06-11 RX ADMIN — POTASSIUM PHOSPHATE, MONOBASIC POTASSIUM PHOSPHATE, DIBASIC INJECTION, 62.5 MILLIMOLE(S): 236; 224 SOLUTION, CONCENTRATE INTRAVENOUS at 01:05

## 2025-06-11 RX ADMIN — Medication 1000 MILLIGRAM(S): at 00:00

## 2025-06-11 RX ADMIN — AMPICILLIN SODIUM 200 GRAM(S): 1 INJECTION, POWDER, FOR SOLUTION INTRAMUSCULAR; INTRAVENOUS at 18:30

## 2025-06-11 RX ADMIN — INSULIN LISPRO 2: 100 INJECTION, SOLUTION INTRAVENOUS; SUBCUTANEOUS at 23:44

## 2025-06-11 RX ADMIN — Medication 1 APPLICATION(S): at 11:22

## 2025-06-11 RX ADMIN — Medication 4 MILLILITER(S): at 22:18

## 2025-06-11 RX ADMIN — Medication 125 MICROGRAM(S): at 05:00

## 2025-06-11 RX ADMIN — Medication 81 MILLIGRAM(S): at 11:19

## 2025-06-11 RX ADMIN — Medication 1 MILLIGRAM(S): at 00:30

## 2025-06-11 RX ADMIN — LABETALOL HYDROCHLORIDE 100 MILLIGRAM(S): 200 TABLET, FILM COATED ORAL at 06:19

## 2025-06-11 RX ADMIN — Medication 1 MILLIGRAM(S): at 00:09

## 2025-06-11 RX ADMIN — Medication 0.2 MILLIGRAM(S): at 13:06

## 2025-06-11 RX ADMIN — HEPARIN SODIUM 5000 UNIT(S): 1000 INJECTION INTRAVENOUS; SUBCUTANEOUS at 21:28

## 2025-06-11 RX ADMIN — Medication 0.2 MILLIGRAM(S): at 05:31

## 2025-06-11 RX ADMIN — AMPICILLIN SODIUM 200 GRAM(S): 1 INJECTION, POWDER, FOR SOLUTION INTRAMUSCULAR; INTRAVENOUS at 23:42

## 2025-06-11 RX ADMIN — Medication 40 MILLIGRAM(S): at 11:18

## 2025-06-11 RX ADMIN — Medication 1 MILLIGRAM(S): at 16:25

## 2025-06-11 RX ADMIN — Medication 400 MILLIGRAM(S): at 18:11

## 2025-06-11 RX ADMIN — Medication 400 MILLIGRAM(S): at 11:17

## 2025-06-11 RX ADMIN — Medication 1000 MILLIGRAM(S): at 18:27

## 2025-06-11 RX ADMIN — Medication 4 MILLILITER(S): at 08:12

## 2025-06-11 RX ADMIN — Medication 0.2 MILLIGRAM(S): at 21:27

## 2025-06-11 NOTE — CHART NOTE - NSCHARTNOTEFT_GEN_A_CORE
CRITICAL CARE NUTRITION FOLLOW UP NOTE    Pt seen for nutrition follow up.    SOURCE: [x] Patient [x] Medical record [x] RN/PCA     Medical Course:  85M PMH HTN, DM2, CKD (baseline creatinine 1.5-2), TIAs (on Eliquis at home), sinus node dysfunction (s/p PPM ~1 year ago), prostate cancer s/p prostectomy (~early 2000s), total thyroidectomy (~2013), initially presented after a fall and found to have lumbar stenosis with cauda equina impingement however on 6/4 developed acute abdominal pain and had a CT abdomen and pelvis that had signs of acute cholecystitis. On 6/5 patient underwent a robotic cholecystectomy, POD 1 patient became hypotensive, unresponsive to 2L fluid resuscitation and 3 u pRBC and was started on vasopressors and transferred to SICU.    Diet Prescription: Diet, Clear Liquid (06-11-25 @ 10:28)    Nutrition Course: Pt has remained NPO for the past 6 days with diet now advanced to Clear Liquids. Pt taking sips of fluids and so far has been tolerating. Weight trend reflective of edema as noted below; Current wt downtrending and could be reflective of overall wt loss due to extended NPO status. Pt now presents at severe risk for malnutrition based on inadequate nutrition intake <50% >5 days with mild/moderate fluid accumulation as noted in nursing flow sheet records. No GI distress at this time per RN. Last BM 6/8 also per nursing flow sheets. Recommend continuing to advance diet, as tolerated to Low Fat with oral supplementation of Ensure Clear 2x daily (360 mehran and 16 gm protein). Encourage and monitor oral intake, especially of nutrition supplement. RDN services to remain available as needed.     Pertinent Medications: MEDICATIONS  (STANDING):  acetaminophen   IVPB .. 1000 milliGRAM(s) IV Intermittent every 6 hours  ampicillin  IVPB 2 Gram(s) IV Intermittent every 6 hours  aspirin  chewable 81 milliGRAM(s) Oral daily  cloNIDine 0.2 milliGRAM(s) Oral three times a day  dextrose 5% + lactated ringers. 1000 milliLiter(s) (30 mL/Hr) IV Continuous <Continuous>  heparin   Injectable 5000 Unit(s) SubCutaneous every 8 hours  hydrALAZINE 100 milliGRAM(s) Oral three times a day  insulin lispro (ADMELOG) corrective regimen sliding scale   SubCutaneous every 6 hours  labetalol 100 milliGRAM(s) Oral two times a day  levothyroxine 125 MICROGram(s) Oral daily  lidocaine   4% Patch 1 Patch Transdermal daily  pantoprazole  Injectable 40 milliGRAM(s) IV Push daily  polyethylene glycol 3350 17 Gram(s) Oral two times a day  povidone iodine 10% Nasal Swab 1 Application(s) Both Nostrils once  sodium chloride 3%  Inhalation 4 milliLiter(s) Inhalation every 12 hours    [x] Relevant notes on medications: Admelog ISS coverage    Pertinent Labs: 06-10 Na136 mmol/L Glu 105 mg/dL[H] K+ 3.8 mmol/L Cr  1.47 mg/dL[H] BUN 24 mg/dL[H] 06-10 Phos 2.6 mg/dL 06-10 Alb 2.5 g/dL[L]     A1C with Estimated Average Glucose Result: 6.9 % (05-07-25 @ 06:03)     CAPILLARY BLOOD GLUCOSE    POCT Blood Glucose.: 130 mg/dL (11 Jun 2025 13:09)  POCT Blood Glucose.: 70 mg/dL (11 Jun 2025 11:57)  POCT Blood Glucose.: 70 mg/dL (11 Jun 2025 11:08)  POCT Blood Glucose.: 108 mg/dL (11 Jun 2025 05:24)  POCT Blood Glucose.: 108 mg/dL (10 Heriberto 2025 23:31)  POCT Blood Glucose.: 86 mg/dL (10 Heriberto 2025 17:17)    ANTHROPOMETRICS:  Height (cm): 180.3   Admit Weight (kg): 92.9   Weight Trend: 102.4kg 6/8, 103.3kg 6/9, 98.8kg (6/11)  Weight Assessment: overall weight increase related to edema, however of concern as pt's weight now downtrending and extended NPO  BMI (kg/m2): 28.6     PHYSICAL ASSESSMENT, per flowsheets:  Edema: 1+ generalized; 3+ L arm edema  Pressure Injury: No skin injuries identified as per nursing flow sheet records     ESTIMATED NEEDS:  based on ideal body weight 172lbs/78.1kg.   2187-2500kcals daily @ 28-32kcals/kg  78 - 94g protein daily @ 1.0-1.2g/kg (due to CKD)    Nutrition Focused Physical Exam: [x] conducted   Muscle wasting [mild] temporal [none] clavicle [none] shoulder [none] dorsal hand    Fat Loss [none] buccal [none] orbital [none] triceps    Previous Nutrition Diagnosis:   Inadequate Energy Intake   Nutrition Diagnosis is [x] ongoing      New Nutrition Diagnosis:     Severe Malnutrition    Etiology: in the context of acute illness  Signs/Symptoms: <50% of estimated nutrition needs met > 5 days this admission with mild/moderate fluid accumulation likely masking a lower true weight    Education:  [ ] Provided pt with verbal / written education on   [ ] Provided on previous assessment by RD  [x] Not applicable secondary to medical acuity  [ ] Pt refused     Interventions:   1). Continue to advance diet, as tolerated to suggestion of Low Fat.  2). Ensure Clear 2x daily (360 mehran and 16 gm protein).     Monitor & Evaluate:  PO intake, tolerance to diet/supplement, nutrition related lab values, weight trends, BMs/GI distress, hydration status, skin integrity.    Ana Cristina Maxwell, MS, RDN, CDN, CNSC on MS TEAMS PREFERRED or Pager #[phone]

## 2025-06-11 NOTE — PROGRESS NOTE ADULT - ASSESSMENT
-EKG w/ SR, no significant STT changes   -ECHO w/ normal EF no significant valvular heart disease     A/P:  85M w/ HTN, HLD, T2DM, prostate CA, CKD, TIA and SND s/p PPM transferred here for lumbar spinal cord compression and bacteremia s/p PPM explant and implant of Micra PPM. Now pending OR with orthopedic for decompression     1. bacteremia  -s/p cholecystectomy  -c/w abx per ID, primary team    2. SND  -s/p leadless PPM w/ EP     3. HTN  transitioned to oral antihypertensives

## 2025-06-11 NOTE — CHART NOTE - NSCHARTNOTEFT_GEN_A_CORE
SICU Transfer Note    Transfer from: SICU  Transfer to: [address] [place] [address]      SICU COURSE:  85yMaleWedge compression fracture of first lumbar vertebra, initial encounter for closed fracture    DNI    FHx: heart disease (Father, Mother)    Handoff    MEWS Score    No pertinent past medical history    HTN (hypertension)    HLD (hyperlipidemia)    DM (diabetes mellitus)    TIA (transient ischemic attack)    Prostate cancer    Acute acalculous cholecystitis    Acute cholecystitis    CT lumbar spine    Lumbar radiculopathy    Lower back pain    Hypertension    Urinary retention    Type 2 diabetes mellitus    Need for prophylactic measure    Hyperlipidemia    Hypothyroid    History of TIAs    Acute respiratory failure with hypoxia    Bacteremia    Edema of left upper extremity    JEROME (acute kidney injury)    Anemia    Prophylactic measure    Acute acalculous cholecystitis    Constipation    Imaging abnormalities    Cholecystectomy, using [employer] system    No significant past surgical history    S/P prostatectomy    Pacemaker    H/O thyroidectomy    CT abdomen pelvis    Transesophageal echocardiography    Enterococcal bacteremia    Urinary retention    Cardiac pacemaker    Phlebitis of superficial vein of upper extremity    [id number]        DRAINS/ CATHETERS:    06-10-25 @ 07:01  -  06-11-25 @ 07:00  --------------------------------------------------------  IN: 1380 mL / OUT: 1595 mL / NET: -215 mL    06-11-25 @ 07:01  -  06-11-25 @ 22:58  --------------------------------------------------------  IN: 370 mL / OUT: 710 mL / NET: -340 mL        ASSESSMENT & PLAN:   85 year old man with multiple chronic medical comorbidities including hypertension, sinus node dysfunction, type two diabetes, and chronic kidney disease (baseline creatinine 1.5-2.0) presently admitted for lower back pain associated with lumbar compression deformities with concern for cauda equina impingement. He was found to be bacteremic (E faecalis) due to acute acalculous cholecystitis, and is now seen following laparoscopic cholecystectomy with Dr. Triny Verde 6/5/2025. POD 1 patient became hypotensive, unresponsive to 2L fluid resuscitation and 3 u pRBC and was started on vasopressors and transferred to SICU.Patient weaned off pressors, course c/b ileus now resolved, NGT removed 6/10. tolerating liquids    Plan:   - Diet: advance to CLD today   - c/w ASA81  - c/w Ampicillin  - increase antihypertensives per cards  - c/w wong  - ortho recs re: spinal surgery appreciated  - appreciate SICU care  - check sugars 6 hourly    B team   [id number]      For Follow-Up:      Vital Signs Last 24 Hrs  T(C): 36.4 (11 Jun 2025 20:00), Max: 36.7 (11 Jun 2025 18:10)  T(F): 97.6 (11 Jun 2025 20:00), Max: 98 (11 Jun 2025 18:10)  HR: 58 (11 Jun 2025 22:32) (58 - 78)  BP: 144/50 (11 Jun 2025 20:00) (136/52 - 186/74)  BP(mean): 108 (11 Jun 2025 16:00) (77 - 108)  RR: 18 (11 Jun 2025 20:00) (11 - 18)  SpO2: 100% (11 Jun 2025 22:32) (92% - 100%)    Parameters below as of 11 Jun 2025 22:32  Patient On (Oxygen Delivery Method): nasal cannula      I&O's Summary    10 Heriberto 2025 07:01  -  11 Jun 2025 07:00  --------------------------------------------------------  IN: 1380 mL / OUT: 1595 mL / NET: -215 mL    11 Jun 2025 07:01  -  11 Jun 2025 22:58  --------------------------------------------------------  IN: 370 mL / OUT: 710 mL / NET: -340 mL          MEDICATIONS  (STANDING):  ampicillin  IVPB 2 Gram(s) IV Intermittent every 6 hours  aspirin  chewable 81 milliGRAM(s) Oral daily  cloNIDine 0.2 milliGRAM(s) Oral three times a day  heparin   Injectable 5000 Unit(s) SubCutaneous every 8 hours  hydrALAZINE 100 milliGRAM(s) Oral three times a day  insulin lispro (ADMELOG) corrective regimen sliding scale   SubCutaneous every 6 hours  labetalol 100 milliGRAM(s) Oral two times a day  levothyroxine 125 MICROGram(s) Oral daily  lidocaine   4% Patch 1 Patch Transdermal daily  pantoprazole  Injectable 40 milliGRAM(s) IV Push daily  polyethylene glycol 3350 17 Gram(s) Oral two times a day  povidone iodine 10% Nasal Swab 1 Application(s) Both Nostrils once  sodium chloride 3%  Inhalation 4 milliLiter(s) Inhalation every 12 hours    MEDICATIONS  (PRN):  albuterol/ipratropium for Nebulization 3 milliLiter(s) Nebulizer every 6 hours PRN Shortness of Breath and/or Wheezing  HYDROmorphone  Injectable 1 milliGRAM(s) IV Push every 4 hours PRN Severe Pain (7 - 10)  HYDROmorphone  Injectable 0.5 milliGRAM(s) IV Push every 4 hours PRN Moderate Pain (4 - 6)        LABS                                            8.7                   Neurophils% (auto):   x      (06-10 @ 23:45):    9.05 )-----------(215          Lymphocytes% (auto):  x                                             26.2                   Eosinphils% (auto):   x        Manual%: Neutrophils x    ; Lymphocytes x    ; Eosinophils x    ; Bands%: x    ; Blasts x                                    136    |  99     |  24                  Calcium: 7.3   / iCa: x      (06-10 @ 23:45)    ----------------------------<  105       Magnesium: 2.10                             3.8     |  24     |  1.47             Phosphorous: 2.6

## 2025-06-11 NOTE — PROGRESS NOTE ADULT - SUBJECTIVE AND OBJECTIVE BOX
Timothy Mojica MD  Interventional Cardiology / Advance Heart Failure and Cardiac Transplant Specialist  [place] Office : [phone] [place] [address] [address] [address]  Tel: [phone]  [place] Office : [address] [facility] [place] [address]  Tel: [phone]       Pt is lying in bed NAD    	  MEDICATIONS:  aspirin  chewable 81 milliGRAM(s) Oral daily  cloNIDine 0.2 milliGRAM(s) Oral three times a day  heparin   Injectable 5000 Unit(s) SubCutaneous every 8 hours  hydrALAZINE 100 milliGRAM(s) Oral three times a day  labetalol 100 milliGRAM(s) Oral two times a day    ampicillin  IVPB 2 Gram(s) IV Intermittent every 6 hours    albuterol/ipratropium for Nebulization 3 milliLiter(s) Nebulizer every 6 hours PRN  sodium chloride 3%  Inhalation 4 milliLiter(s) Inhalation every 12 hours    acetaminophen   IVPB .. 1000 milliGRAM(s) IV Intermittent every 6 hours  HYDROmorphone  Injectable 1 milliGRAM(s) IV Push every 4 hours PRN  HYDROmorphone  Injectable 0.5 milliGRAM(s) IV Push every 4 hours PRN    pantoprazole  Injectable 40 milliGRAM(s) IV Push daily  polyethylene glycol 3350 17 Gram(s) Oral two times a day    insulin lispro (ADMELOG) corrective regimen sliding scale   SubCutaneous every 6 hours  levothyroxine 125 MICROGram(s) Oral daily    lidocaine   4% Patch 1 Patch Transdermal daily  povidone iodine 10% Nasal Swab 1 Application(s) Both Nostrils once      PAST MEDICAL/SURGICAL HISTORY  PAST MEDICAL & SURGICAL HISTORY:  HTN (hypertension)      HLD (hyperlipidemia)      DM (diabetes mellitus)      TIA (transient ischemic attack)      Prostate cancer      S/P prostatectomy      Pacemaker      H/O thyroidectomy          SOCIAL HISTORY: Substance Use (street drugs): ( x ) never used  (  ) other:    FAMILY HISTORY:  FHx: heart disease (Father, Mother)           PHYSICAL EXAM:  T(C): 36.4 (06-11-25 @ 12:00), Max: 36.4 (06-11-25 @ 12:00)  HR: 68 (06-11-25 @ 12:00) (65 - 78)  BP: 150/50 (06-11-25 @ 12:00) (136/52 - 186/74)  RR: 13 (06-11-25 @ 12:00) (11 - 18)  SpO2: 96% (06-11-25 @ 12:00) (50% - 100%)  Wt(kg): --  I&O's Summary    10 Heriberto 2025 07:01  -  11 Jun 2025 07:00  --------------------------------------------------------  IN: 1380 mL / OUT: 1595 mL / NET: -215 mL    11 Jun 2025 07:01  -  11 Jun 2025 17:14  --------------------------------------------------------  IN: 180 mL / OUT: 285 mL / NET: -105 mL        GENERAL: NAD  EYES:  EOMI  ENMT: NGT Moist mucous membranes  Cardiovascular: Normal S1 S2, No JVD, No murmurs, +b/l LUE edema L>R, LE edema  Respiratory: Lungs clear to auscultation	  Gastrointestinal:  Soft, Non-tender, + BS	  Extremities: b/l UE, LE edema                              8.7    9.05  )-----------( 215      ( 10 Heriberto 2025 23:45 )             26.2     06-10    136  |  99  |  24[H]  ----------------------------<  105[H]  3.8   |  24  |  1.47[H]    Ca    7.3[L]      10 Heriberto 2025 23:45  Phos  2.6     06-10  Mg     2.10     06-10    TPro  5.3[L]  /  Alb  2.5[L]  /  TBili  0.5  /  DBili  x   /  AST  21  /  ALT  48[H]  /  AlkPhos  221[H]  06-10    proBNP:   Lipid Profile:   HgA1c:   TSH:     Consultant(s) Notes Reviewed:  [x ] YES  [ ] NO    Care Discussed with Consultants/Other Providers [ x] YES  [ ] NO    Imaging Personally Reviewed independently:  [x] YES  [ ] NO    All labs, radiologic studies, vitals, orders and medications list reviewed. Patient is seen and examined at bedside. Case discussed with medical team.

## 2025-06-11 NOTE — PROGRESS NOTE ADULT - ASSESSMENT
This is a 86 y/o M w/ PMhx HTN, HLD, DM2, hypothyroidism, prostate cancer s/p prostatectomy, CKD, TIA, pacemaker placed in 5/2024 for abnormal arrythmia initially admitted to [facility] on 5/6 for back pain, now transferred to [facility] for orthopedic spine. Labs w/ leukocytosis to 18, BCx w/ E faecalis in multiple sets.     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 S/p PPM removal   #Acute cholecystitis s/p lap geetha 6/4   #Respiratory failure, improved   #JEROME, improving     Overall, 86 y/o M w/ PMhx HTN, HLD, DM2, hypothyroidism, prostate cancer s/p prostatectomy, CKD, TIA, pacemaker placed in 5/2024 for abnormal arrythmia transferred to [facility] from [facility] for MRI spine and ortho evaluation give LBP and lumbar compression w/ urinary retention. Pt noted to have chills and leukocytosis to 18, now with high grade E faecalis bacteremia. Unclear source however UCx not done, U/A with 10 WBCs, CT A/P w/o IV contrast on 5/10 w/o acute infectious source, s/p PPM removal, MARIA LUISA w/o vegetations.   Repeat MRI with OM/discitis, L4/L5, severe spinal canal stenosis L3-L4, L-5, impingement of cauda equina, planned for OR w/ orthopedics, however c/b acute cholecystitis s/p lap geetha 6/4   CT findings of possible infarcts kidney/spleen, pt with previous MARIA LUISA w/o findings of IE. C/b post bleeding, transferred to SICU for pressor support     Recommendations:   1. Ampicillin 2 g q6 for 6 week course, if CrCl > 50, increase to Ampicillin 2 g q4  2. Per ortho no planned surgery inpatient, planned after admission.     OPAT   Ampicillin 2 g q6 (increase to q4 if CrCl >50) until 6/25/25 to finish 6 week course  CBC, CMP weekly while on IV abx    Thank you for consulting us and involving us in the management of this patient's case. In addition to reviewing history, imaging, documents, labs, microbiology, and infection control strategies and potential issues.     ID will continue to follow    Darrin Falk M.D.  Attending Physician  Division of Infectious Diseases  Department of Medicine    Please contact through MS Teams message.  Office: [phone] (after 5 PM or weekend)

## 2025-06-11 NOTE — PROGRESS NOTE ADULT - SUBJECTIVE AND OBJECTIVE BOX
Infectious Diseases Follow Up:    Patient is a 85y old  Male who presents with a chief complaint of back pain (11 Jun 2025 08:31)      Interval History/ROS:  No acute events noted, pt had NGT removed, No BMs yet, but passing gas. Feeling well.     Allergies  gabapentin (Other)        ANTIMICROBIALS:  ampicillin  IVPB 2 every 6 hours      Current Abx:     Previous Abx     OTHER MEDS:  MEDICATIONS  (STANDING):  acetaminophen   IVPB .. 1000 every 6 hours  albuterol/ipratropium for Nebulization 3 every 6 hours PRN  aspirin  chewable 81 daily  cloNIDine 0.2 three times a day  heparin   Injectable 5000 every 8 hours  hydrALAZINE 100 three times a day  HYDROmorphone  Injectable 0.5 every 4 hours PRN  HYDROmorphone  Injectable 1 every 4 hours PRN  insulin glargine Injectable (LANTUS) 10 at bedtime  insulin lispro (ADMELOG) corrective regimen sliding scale  every 6 hours  labetalol 100 two times a day  levothyroxine 125 daily  pantoprazole  Injectable 40 daily  polyethylene glycol 3350 17 two times a day  sodium chloride 3%  Inhalation 4 every 12 hours      Vital Signs Last 24 Hrs  T(C): 36.3 (11 Jun 2025 08:00), Max: 36.3 (11 Jun 2025 04:00)  T(F): 97.3 (11 Jun 2025 08:00), Max: 97.3 (11 Jun 2025 04:00)  HR: 69 (11 Jun 2025 08:15) (60 - 78)  BP: 143/65 (11 Jun 2025 08:00) (136/52 - 186/74)  BP(mean): 89 (11 Jun 2025 08:00) (73 - 104)  RR: 15 (11 Jun 2025 08:00) (11 - 18)  SpO2: 100% (11 Jun 2025 08:15) (50% - 100%)    Parameters below as of 11 Jun 2025 08:00  Patient On (Oxygen Delivery Method): nasal cannula w/ humidification  O2 Flow (L/min): 2      PHYSICAL EXAM:  GENERAL: NAD, well-developed  HEAD:  Atraumatic, Normocephalic  EYES: EOMI, conjunctiva and sclera clear  NOSE: NG w/ bilious output   CHEST/LUNG: On NC, not in respiratory distress  ABDOMEN: Soft, NT/ND  PSYCH: AAOx3                                     8.7    9.05  )-----------( 215      ( 10 Heriberto 2025 23:45 )             26.2       06-10    136  |  99  |  24[H]  ----------------------------<  105[H]  3.8   |  24  |  1.47[H]    Ca    7.3[L]      10 Heriberto 2025 23:45  Phos  2.6     06-10  Mg     2.10     06-10    TPro  5.3[L]  /  Alb  2.5[L]  /  TBili  0.5  /  DBili  x   /  AST  21  /  ALT  48[H]  /  AlkPhos  221[H]  06-10      Urinalysis Basic - ( 10 Heriberto 2025 23:45 )    Color: x / Appearance: x / SG: x / pH: x  Gluc: 105 mg/dL / Ketone: x  / Bili: x / Urobili: x   Blood: x / Protein: x / Nitrite: x   Leuk Esterase: x / RBC: x / WBC x   Sq Epi: x / Non Sq Epi: x / Bacteria: x        MICROBIOLOGY:  v  Blood Blood-Peripheral  06-06-25   No growth at 5 days  --  --      Blood Blood-Peripheral  06-04-25   No growth at 5 days  --  --      Blood Blood-Peripheral  06-04-25   No growth at 5 days  --  --      Blood Blood  05-18-25   No growth at 5 days  --  --      Blood Blood-Peripheral  05-17-25   No growth at 5 days  --  --      Blood Blood-Peripheral  05-17-25   No growth at 5 days  --  --      Blood Blood  05-17-25   No growth at 5 days  --  --      Blood Blood  05-15-25   No growth at 5 days  --  --      Blood Blood  05-15-25   No growth at 5 days  --  --      Blood Blood  05-14-25   Growth in aerobic bottle: Enterococcus faecalis  See previous culture [id number]  Direct identification is available within approximately 3-5  hours either by Blood Panel Multiplexed PCR or Direct  MALDI-TOF. Details: [email or web address]  --  Blood Culture PCR      Blood Blood  05-14-25   Growth in aerobic and anaerobic bottles: Enterococcus faecalis  Growth in anaerobic bottle: blood culture bottle turned positive after  the final report was released.  --  Enterococcus faecalis                RADIOLOGY:

## 2025-06-11 NOTE — PROGRESS NOTE ADULT - ASSESSMENT
85 year old male with 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 After family discussions they wished to proceed with surgery, but this was subsequently canceled due to RRT on 6/6 for acute cholecystitis requiring lap geetha per gen surg. Discussion with patient regarding risks/benefits of spine decompression and stabilization at this point would be too high risk, so ortho spine team opting for no surgery this admission and to plan for surgery after this admission.    Plan:  -no ortho spine surgery at this point, will likely take place after this admission   -continue to monitor physical exam   -WBAT  -pain control prn  -bowel reg prn  -antibiotics per ID - zosyn   -continue PT/OT    Jake Leal PGY1  Orthopedic Surgery  [facility] [id number]  [facility]        [id number]  [facility]  [id number]/[phone]   85 year old male with 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 After family discussions they wished to proceed with surgery, but this was subsequently canceled due to RRT on 6/6 for acute cholecystitis requiring lap geetha per gen surg. Discussion with patient regarding risks/benefits of spine decompression and stabilization at this point would be too high risk, so ortho spine team along with patient and family are in agreement for no surgery this admission and to revisit discussion of surgery at a later date    Plan:  -WBAT  -pain control prn  -bowel reg prn  -antibiotics per ID - zosyn   -continue PT/OT  -Orthopaedics to sign off    Jake Leal PGY1  Orthopedic Surgery  [facility] [place] [id number]  [facility]        [id number]  [facility]  [id number]/[phone]

## 2025-06-11 NOTE — PROGRESS NOTE ADULT - ASSESSMENT
85 year old man with multiple chronic medical comorbidities including hypertension, sinus node dysfunction, type two diabetes, and chronic kidney disease (baseline creatinine 1.5-2.0) presently admitted for lower back pain associated with lumbar compression deformities with concern for cauda equina impingement. He was found to be bacteremic (E faecalis) due to acute acalculous cholecystitis, and is now seen following laparoscopic cholecystectomy with Dr. Triny Verde 6/5/2025. POD 1 patient became hypotensive, unresponsive to 2L fluid resuscitation and 3 u pRBC and was started on vasopressors and transferred to SICU.Patient weaned off pressors, course c/b ileus now resolved, NGT removed 6/10    Plan:   - Diet: advance to CLD today   - c/w [id number]  - c/w Ampicillin  - increase antihypertensives per cards  - c/w wong  - ortho recs re: spinal surgery appreciated  - appreciate SICU care    B team   [id number]

## 2025-06-11 NOTE — PROGRESS NOTE ADULT - ATTENDING COMMENTS
Agree with above. Clear liquid diet.     - advance diet as tolerated   - Appreciate ID input     The Acute Care Surgery (B Team) Attending Group Practice:  Dr. Ladonna Fuchs  [id number] .

## 2025-06-11 NOTE — PROGRESS NOTE ADULT - SUBJECTIVE AND OBJECTIVE BOX
SICU PROGRESS NOTE:    24 HOUR INTERVAL EVENTS:  INTERVAL:  - Clamp trial--> minimal residual, NGT d/c'ed  - BP meds converted to PO  - Remains listed for the floor  - nocturnal CPAP initiated for frequent desats while sleeping > STOP BANG 6  - frequent PVCs > EKG    SUBJECTIVE/ROS:  [ ] A ten-point review of systems was otherwise negative except as noted.    NEURO  Exam: awake, alert, oriented  Meds: acetaminophen   IVPB .. 1000 milliGRAM(s) IV Intermittent every 6 hours  HYDROmorphone  Injectable 0.5 milliGRAM(s) IV Push every 4 hours PRN Moderate Pain (4 - 6)  HYDROmorphone  Injectable 1 milliGRAM(s) IV Push every 4 hours PRN Severe Pain (7 - 10)    [x] Adequacy of sedation and pain control has been assessed and adjusted    RESPIRATORY  RR: 13 (06-11-25 @ 00:30) (11 - 20)  SpO2: 99% (06-11-25 @ 00:30) (50% - 100%)  Wt(kg): --  Exam: unlabored, clear to auscultation bilaterally      Meds: albuterol/ipratropium for Nebulization 3 milliLiter(s) Nebulizer every 6 hours PRN Shortness of Breath and/or Wheezing  sodium chloride 3%  Inhalation 4 milliLiter(s) Inhalation every 12 hours      CARDIOVASCULAR  HR: 71 (06-11-25 @ 00:30) (57 - 78)  BP: 159/55 (06-11-25 @ 00:30) (145/46 - 186/74)  BP(mean): 79 (06-11-25 @ 00:30) (73 - 104)  ABP: --  ABP(mean): --  Wt(kg): --  CVP(cm H2O): --      Exam: regular rate and rhythm  Cardiac Rhythm: sinus  Perfusion     [x]Adequate   [ ]Inadequate  Mentation   [x]Normal       [ ]Reduced  Extremities  [x]Warm         [ ]Cool  Volume Status [ ]Hypervolemic [x]Euvolemic [ ]Hypovolemic  Meds: cloNIDine 0.2 milliGRAM(s) Oral three times a day  hydrALAZINE 100 milliGRAM(s) Oral three times a day  labetalol 100 milliGRAM(s) Oral two times a day      GI/NUTRITION  Exam: soft, nontender, nondistended, incision C/D/I  Diet:  Meds: pantoprazole  Injectable 40 milliGRAM(s) IV Push daily      GENITOURINARY  I&O's Detail    06-09 @ 07:01  -  06-10 @ 07:00  --------------------------------------------------------  IN:    dextrose 5% + lactated ringers: 360 mL    IV PiggyBack: 850 mL  Total IN: 1210 mL    OUT:    Intermittent Catheterization - Urethral (mL): 1705 mL    Nasogastric/Oral tube (mL): 550 mL  Total OUT: 2255 mL    Total NET: -1045 mL      06-10 @ 07:01  -  06-11 @ 01:04  --------------------------------------------------------  IN:    dextrose 5% + lactated ringers: 510 mL    IV PiggyBack: 500 mL  Total IN: 1010 mL    OUT:    Indwelling Catheter - Urethral (mL): 1175 mL    Nasogastric/Oral tube (mL): 150 mL  Total OUT: 1325 mL    Total NET: -315 mL          06-10    136  |  99  |  24[H]  ----------------------------<  105[H]  3.8   |  24  |  1.47[H]    Ca    7.3[L]      10 Heriberto 2025 23:45  Phos  2.6     06-10  Mg     2.10     06-10    TPro  5.3[L]  /  Alb  2.5[L]  /  TBili  0.5  /  DBili  x   /  AST  21  /  ALT  48[H]  /  AlkPhos  221[H]  06-10    [ ] Frye catheter, indication: failed tov  Meds: dextrose 5% + lactated ringers. 1000 milliLiter(s) IV Continuous <Continuous>  potassium phosphate IVPB 15 milliMole(s) IV Intermittent once      HEMATOLOGIC  Meds: aspirin  chewable 81 milliGRAM(s) Oral daily  heparin   Injectable 5000 Unit(s) SubCutaneous every 8 hours    [x] VTE Prophylaxis                        8.7    9.05  )-----------( 215      ( 10 Heriberto 2025 23:45 )             26.2         INFECTIOUS DISEASES  WBC Count: 9.05 K/uL (06-10 @ 23:45)  WBC Count: 9.39 K/uL (06-10 @ 03:45)    RECENT CULTURES:  Specimen Source: Blood Blood-Peripheral  Date/Time: 06-06 @ 02:30  Culture Results:   No growth at 4 days  Gram Stain: --  Organism: --    Meds: ampicillin  IVPB 2 Gram(s) IV Intermittent every 6 hours      ENDOCRINE  CAPILLARY BLOOD GLUCOSE      POCT Blood Glucose.: 108 mg/dL (10 Heriberto 2025 23:31)  POCT Blood Glucose.: 86 mg/dL (10 Heriberto 2025 17:17)  POCT Blood Glucose.: 86 mg/dL (10 Heriberto 2025 12:03)  POCT Blood Glucose.: 156 mg/dL (10 Heriberto 2025 05:17)    Meds: insulin glargine Injectable (LANTUS) 10 Unit(s) SubCutaneous at bedtime  insulin lispro (ADMELOG) corrective regimen sliding scale   SubCutaneous every 6 hours  levothyroxine 125 MICROGram(s) Oral daily      ACCESS DEVICES:  [ x] Peripheral IV  [ ] Central Venous Line	[ ] R	[ ] L	[ ] IJ	[ ] Fem	[ ] SC	Placed:   [x ] Arterial Line		[x ] R	[ ] L	[ ] Fem	[x ] Rad	[ ] Ax	Placed:   [ ] PICC:					[ ] Mediport  [x ] Urinary Catheter, Date Placed:   [x] Necessity of urinary, arterial, and venous catheters discussed    OTHER MEDICATIONS:  chlorhexidine 2% Cloths 1 Application(s) Topical daily  lidocaine   4% Patch 1 Patch Transdermal daily  povidone iodine 10% Nasal Swab 1 Application(s) Both Nostrils once      CODE STATUS:  DNI

## 2025-06-11 NOTE — PROGRESS NOTE ADULT - SUBJECTIVE AND OBJECTIVE BOX
ORTHOPEDIC PROGRESS NOTE    SUBJECTIVE:  Pt seen and examined at bedside this am.  Doing well.  No acute events overnight.  Pt states pain is controlled. Sensation intcat. Continues to have R footdrop. Discussed with patient that large spine surgery would be difficult for him to tolerate at this point as he remains in SICU and difficulty with gen surg operation last week. Discussed risk/benefits of surgery and at this point he would be too high risk for spine surgery, and would recommend     OBJECTIVE:  Vital Signs Last 24 Hrs  T(C): 36.3 (11 Jun 2025 04:00), Max: 36.3 (11 Jun 2025 04:00)  T(F): 97.3 (11 Jun 2025 04:00), Max: 97.3 (11 Jun 2025 04:00)  HR: 68 (11 Jun 2025 04:00) (57 - 78)  BP: 168/61 (11 Jun 2025 04:00) (145/46 - 186/74)  BP(mean): 91 (11 Jun 2025 04:00) (73 - 104)  RR: 14 (11 Jun 2025 04:00) (11 - 17)  SpO2: 99% (11 Jun 2025 04:00) (50% - 100%)    Parameters below as of 11 Jun 2025 04:00  Patient On (Oxygen Delivery Method): face tent  O2 Flow (L/min): 6  O2 Concentration (%): 28    Physical Exam:  General: NAD; resting comfrotably in bed  Resp: non labored  [SIDE]UE:  - No visible lesions or deformities   - Dressing c/d/i   - SILT  - AIN/PIN/Ulnar intact  - Compartments soft  - Warm, well perfused  - Radial pulse intact    [SIDE]LE:  - No visible lesions or deformities   - Dressing c/d/i   - SILT  - TA/EHL/FHL intact  - Compartments soft  - Warm, well perfused  - DP pulse intact    LABS                        8.7    9.05  )-----------( 215      ( 10 Heriberto 2025 23:45 )             26.2       06-10    136  |  99  |  24[H]  ----------------------------<  105[H]  3.8   |  24  |  1.47[H]    Ca    7.3[L]      10 Heriberto 2025 23:45  Phos  2.6     06-10  Mg     2.10     06-10    TPro  5.3[L]  /  Alb  2.5[L]  /  TBili  0.5  /  DBili  x   /  AST  21  /  ALT  48[H]  /  AlkPhos  221[H]  06-10          I&O's Summary    09 Jun 2025 07:01  -  10 Heriberto 2025 07:00  --------------------------------------------------------  IN: 1210 mL / OUT: 2255 mL / NET: -1045 mL    10 Heriberto 2025 07:01  -  11 Jun 2025 06:41  --------------------------------------------------------  IN: 1380 mL / OUT: 1595 mL / NET: -215 mL        acetaminophen   IVPB .. 1000 milliGRAM(s) IV Intermittent every 6 hours  albuterol/ipratropium for Nebulization 3 milliLiter(s) Nebulizer every 6 hours PRN  ampicillin  IVPB 2 Gram(s) IV Intermittent every 6 hours  aspirin  chewable 81 milliGRAM(s) Oral daily  chlorhexidine 2% Cloths 1 Application(s) Topical daily  cloNIDine 0.2 milliGRAM(s) Oral three times a day  dextrose 5% + lactated ringers. 1000 milliLiter(s) IV Continuous <Continuous>  heparin   Injectable 5000 Unit(s) SubCutaneous every 8 hours  hydrALAZINE 100 milliGRAM(s) Oral three times a day  HYDROmorphone  Injectable 1 milliGRAM(s) IV Push every 4 hours PRN  HYDROmorphone  Injectable 0.5 milliGRAM(s) IV Push every 4 hours PRN  insulin glargine Injectable (LANTUS) 10 Unit(s) SubCutaneous at bedtime  insulin lispro (ADMELOG) corrective regimen sliding scale   SubCutaneous every 6 hours  labetalol 100 milliGRAM(s) Oral two times a day  levothyroxine 125 MICROGram(s) Oral daily  lidocaine   4% Patch 1 Patch Transdermal daily  pantoprazole  Injectable 40 milliGRAM(s) IV Push daily  povidone iodine 10% Nasal Swab 1 Application(s) Both Nostrils once  sodium chloride 3%  Inhalation 4 milliLiter(s) Inhalation every 12 hours      Assessment  Pt is a 85y y/o Male presenting following [INJURY] now s/p [PROCEDURE]    Plan:  - Pain control PRN  - DVT ppx: _______  - PT/OT  -      ORTHOPEDIC PROGRESS NOTE    SUBJECTIVE:  Pt seen and examined at bedside this am.  Doing well.  No acute events overnight.  Pt states pain is controlled. Sensation intact. Continues to have R footdrop. Discussed with patient that large spine surgery would be difficult for him to tolerate at this point as he remains in SICU and difficulty with gen surg operation last week. Discussed risk/benefits of surgery and at this point he would likely be too high risk for spine surgery during this admission       OBJECTIVE:  Vital Signs Last 24 Hrs  T(C): 36.3 (11 Jun 2025 04:00), Max: 36.3 (11 Jun 2025 04:00)  T(F): 97.3 (11 Jun 2025 04:00), Max: 97.3 (11 Jun 2025 04:00)  HR: 68 (11 Jun 2025 04:00) (57 - 78)  BP: 168/61 (11 Jun 2025 04:00) (145/46 - 186/74)  BP(mean): 91 (11 Jun 2025 04:00) (73 - 104)  RR: 14 (11 Jun 2025 04:00) (11 - 17)  SpO2: 99% (11 Jun 2025 04:00) (50% - 100%)    Parameters below as of 11 Jun 2025 04:00  Patient On (Oxygen Delivery Method): face tent  O2 Flow (L/min): 6  O2 Concentration (%): 28    Physical Exam:  GEN: NAD, resting quietly, NGT in place  PULM: symmetric chest rise bilaterally, no increased WOB,   SPINE:   Motor exam:          Upper extremity         C5 (Shoulder Abd)    C6 (Elbow flex)   C7 (Elbow ext)   C8 (Finger flex) T1 (finger abd)         R         5/5                 5/5                       5/5                      5/5                         5/5          L          5/5                 5/5                      5/5                      5/5                         5/5                   Lower extremity           L2 (Hip flex)  L3 (knee ext)  L4 (Dorsi flex)  L5 (EHL)  S1 (Plantar flex)                                               R        3/5            4/5             1/5                    1/5          4/5      L        3/5            4/5             5/5                   5/5           5/5      Sensory exam:                        C5      C6      C7      C8       T1          RIGHT          2         2        2         2         2          (0=absent, 1=impaired, 2=normal, NT=not testable)  LEFT             2         2        2         2         2          (0=absent, 1=impaired, 2=normal, NT=not testable)                          L2      L3     L4     L5       S1          RIGHT          2         2        2         2         2          (0=absent, 1=impaired, 2=normal, NT=not testable)  LEFT             2         2        2         2         2          (0=absent, 1=impaired, 2=normal, NT=not testable)         LABS                        8.7    9.05  )-----------( 215      ( 10 Heriberto 2025 23:45 )             26.2       06-10    136  |  99  |  24[H]  ----------------------------<  105[H]  3.8   |  24  |  1.47[H]    Ca    7.3[L]      10 Heriberto 2025 23:45  Phos  2.6     06-10  Mg     2.10     06-10    TPro  5.3[L]  /  Alb  2.5[L]  /  TBili  0.5  /  DBili  x   /  AST  21  /  ALT  48[H]  /  AlkPhos  221[H]  06-10          I&O's Summary    09 Jun 2025 07:01  -  10 Jun 2025 07:00  --------------------------------------------------------  IN: 1210 mL / OUT: 2255 mL / NET: -1045 mL    10 Heriberto 2025 07:01  -  11 Jun 2025 06:41  --------------------------------------------------------  IN: 1380 mL / OUT: 1595 mL / NET: -215 mL        acetaminophen   IVPB .. 1000 milliGRAM(s) IV Intermittent every 6 hours  albuterol/ipratropium for Nebulization 3 milliLiter(s) Nebulizer every 6 hours PRN  ampicillin  IVPB 2 Gram(s) IV Intermittent every 6 hours  aspirin  chewable 81 milliGRAM(s) Oral daily  chlorhexidine 2% Cloths 1 Application(s) Topical daily  cloNIDine 0.2 milliGRAM(s) Oral three times a day  dextrose 5% + lactated ringers. 1000 milliLiter(s) IV Continuous <Continuous>  heparin   Injectable 5000 Unit(s) SubCutaneous every 8 hours  hydrALAZINE 100 milliGRAM(s) Oral three times a day  HYDROmorphone  Injectable 1 milliGRAM(s) IV Push every 4 hours PRN  HYDROmorphone  Injectable 0.5 milliGRAM(s) IV Push every 4 hours PRN  insulin glargine Injectable (LANTUS) 10 Unit(s) SubCutaneous at bedtime  insulin lispro (ADMELOG) corrective regimen sliding scale   SubCutaneous every 6 hours  labetalol 100 milliGRAM(s) Oral two times a day  levothyroxine 125 MICROGram(s) Oral daily  lidocaine   4% Patch 1 Patch Transdermal daily  pantoprazole  Injectable 40 milliGRAM(s) IV Push daily  povidone iodine 10% Nasal Swab 1 Application(s) Both Nostrils once  sodium chloride 3%  Inhalation 4 milliLiter(s) Inhalation every 12 hours

## 2025-06-11 NOTE — PROGRESS NOTE ADULT - ATTENDING COMMENTS
I agree with the detailed interval history, physical, and plan, which I have reviewed and edited where appropriate'; also agree with notes/assessment with my team on service.  I have personally examined the patient.  I was physically present for the key portions of the evaluation and management (E/M) service provided.  I reviewed all the pertinent data.  The patient is a critical care patient with life threatening hemodynamic and metabolic instability in SICU.  The SICU team has a constant risk benefit analyzes discussion and coordinating care with the primary team and all consultants.   The patient is in SICU with the chief complaint and diagnosis mentioned in the note.   The plan will be specified in the note.  85M PMH HTN, DM2, CKD, prostate cancer s/p prostatectomy, s/p robotic cholecystectomy in SICU for HD monitoring and post op care.  AO3  Lung clear  Heart RR  Abd soft  Ext warm    PLAN:   Neurologic:   - Tylenol  -Dilaudid   Respiratory:   - O2 sat >92%  Cardiovascular:   -Labetalol  -Hydralazine  -Clonidine  - ASA  Gastrointestinal/Nutrition:   - PPI  Renal/Genitourinary:   - Monitor UOP  Hematologic:   - SQH  - ASA  Infectious Disease:   - Monitor WBC  Endocrine:   - Lantus  -Levothyroxine    DISPO: DC

## 2025-06-11 NOTE — PROGRESS NOTE ADULT - SUBJECTIVE AND OBJECTIVE BOX
SURGERY DAILY PROGRESS NOTE    Overnight Events:   - Clamp trial--> minimal residual, NGT d/c'ed  - BP meds converted to PO  - Remains listed for the floor  - nocturnal CPAP initiated for frequent desats while sleeping > STOP BANG 6  - frequent PVCs > EKG    SUBJECTIVE: Patient seen and evaluated on AM rounds. Pt is resting comfortably in bed with no complaints. Passing flatus not having BM.  -----------------------------------------------------------------------------------------------------------------------------------------------------------------------------------------------------------  OBJECTIVE:  Vital Signs Last 24 Hrs  T(C): 36.3 (2025 04:00), Max: 36.3 (2025 04:00)  T(F): 97.3 (2025 04:00), Max: 97.3 (2025 04:00)  HR: 69 (2025 08:15) (60 - 78)  BP: 136/52 (2025 06:00) (136/52 - 186/74)  BP(mean): 77 (2025 06:00) (73 - 104)  RR: 18 (2025 06:00) (11 - 18)  SpO2: 100% (2025 08:15) (50% - 100%)    Parameters below as of 2025 06:00  Patient On (Oxygen Delivery Method): nasal cannula  O2 Flow (L/min): 2    I&O's Detail    10 Heriberto 2025 07:01  -  2025 07:00  --------------------------------------------------------  IN:    dextrose 5% + lactated ringers: 630 mL    IV PiggyBack: 750 mL  Total IN: 1380 mL    OUT:    Indwelling Catheter - Urethral (mL): 1445 mL    Nasogastric/Oral tube (mL): 150 mL  Total OUT: 1595 mL    Total NET: -215 mL        Daily     Daily Weight in k.8 (2025 00:30)  MEDICATIONS  (STANDING):  acetaminophen   IVPB .. 1000 milliGRAM(s) IV Intermittent every 6 hours  ampicillin  IVPB 2 Gram(s) IV Intermittent every 6 hours  aspirin  chewable 81 milliGRAM(s) Oral daily  chlorhexidine 2% Cloths 1 Application(s) Topical daily  cloNIDine 0.2 milliGRAM(s) Oral three times a day  dextrose 5% + lactated ringers. 1000 milliLiter(s) (30 mL/Hr) IV Continuous <Continuous>  heparin   Injectable 5000 Unit(s) SubCutaneous every 8 hours  hydrALAZINE 100 milliGRAM(s) Oral three times a day  insulin glargine Injectable (LANTUS) 10 Unit(s) SubCutaneous at bedtime  insulin lispro (ADMELOG) corrective regimen sliding scale   SubCutaneous every 6 hours  labetalol 100 milliGRAM(s) Oral two times a day  levothyroxine 125 MICROGram(s) Oral daily  lidocaine   4% Patch 1 Patch Transdermal daily  pantoprazole  Injectable 40 milliGRAM(s) IV Push daily  polyethylene glycol 3350 17 Gram(s) Oral two times a day  povidone iodine 10% Nasal Swab 1 Application(s) Both Nostrils once  sodium chloride 3%  Inhalation 4 milliLiter(s) Inhalation every 12 hours    MEDICATIONS  (PRN):  albuterol/ipratropium for Nebulization 3 milliLiter(s) Nebulizer every 6 hours PRN Shortness of Breath and/or Wheezing  HYDROmorphone  Injectable 0.5 milliGRAM(s) IV Push every 4 hours PRN Moderate Pain (4 - 6)  HYDROmorphone  Injectable 1 milliGRAM(s) IV Push every 4 hours PRN Severe Pain (7 - 10)      PHYSICAL EXAM:  General: NAD, resting comfortably in bed  HEENT: Normocephalic atraumatic  Respiratory: Nonlabored respirations on NC   Abdomen: soft, nontender, nondistended. Incisions c/d/i   Neuro: awake, alert and answering questions appropriately     LABS:                        8.7    9.05  )-----------( 215      ( 10 Heriberto 2025 23:45 )             26.2     06-10    136  |  99  |  24[H]  ----------------------------<  105[H]  3.8   |  24  |  1.47[H]    Ca    7.3[L]      10 Heriberto 2025 23:45  Phos  2.6     06-10  Mg     2.10     06-10    TPro  5.3[L]  /  Alb  2.5[L]  /  TBili  0.5  /  DBili  x   /  AST  21  /  ALT  48[H]  /  AlkPhos  221[H]  06-10      Urinalysis Basic - ( 10 Heriberto 2025 23:45 )    Color: x / Appearance: x / SG: x / pH: x  Gluc: 105 mg/dL / Ketone: x  / Bili: x / Urobili: x   Blood: x / Protein: x / Nitrite: x   Leuk Esterase: x / RBC: x / WBC x   Sq Epi: x / Non Sq Epi: x / Bacteria: x                RADIOLOGY:

## 2025-06-11 NOTE — PROGRESS NOTE ADULT - ASSESSMENT
85M PMH HTN, DM2, CKD (baseline creatinine 1.5-2), TIAs (on Eliquis at home), sinus node dysfunction (s/p PPM ~1 year ago), prostate cancer s/p prostectomy (~early 2000s), total thyroidectomy (~2013), initially presented after a fall and found to have lumbar stenosis with cauda equina impingement however on 6/4 developed acute abdominal pain and had a CT abdomen and pelvis that had signs of acute cholecystitis. On 6/5 patient underwent a robotic cholecystectomy, POD 1 patient became hypotensive, unresponsive to 2L fluid resuscitation and 3 u pRBC and was started on vasopressors and transferred to SICU.    INTERVAL:  - Clamp trial--> minimal residual, NGT d/c'ed  - BP meds converted to PO  - Remains listed for the floor  - nocturnal CPAP initiated for frequent desats while sleeping > STOP BANG 6  - frequent PVCs > EKG    PLAN:   Neurologic:   - A&Ox3  - Tylenol and prn dilaudid   - Lidocaine patch    Respiratory:   - O2 sat >92%  - On nasal cannula, nocturnal CPAP    Cardiovascular:   - Previous PPM s/p removed due to bacteremia, repeat MARIA LUISA no IE  - HTN: Hydralazine, Labetalol, clonidine PO  - Home telmisartan and chlorthalidone held  - ASA    Gastrointestinal/Nutrition:   - NPO, sips and chips  - PPI    Renal/Genitourinary:   - Failed TOV > Wong replaced  - D5 LR @ 30  - Monitor UOP    Hematologic:   - Monitor H+H  - DVT ppx: SQH  - ASA    Infectious Disease:   - Previous multiple + BC E.Faecalis, Multiple susequent neg. thus far  - Per ID rec: Continue zosyn for 4 days post op then switch to Ampicillin 2g q4 6/10  - Monitor WBC    Endocrine:   - 20 units lantus at home (10U while npo)  - 6 premeal (hold if NPO)  - continue home levothyroxine    Tubes/Lines/Drains:   - right radial a line  - PIVs  - wong    DISPO: SICU

## 2025-06-12 LAB
ALBUMIN SERPL ELPH-MCNC: 2.4 G/DL — LOW (ref 3.3–5)
ALP SERPL-CCNC: 169 U/L — HIGH (ref 40–120)
ALT FLD-CCNC: 30 U/L — SIGNIFICANT CHANGE UP (ref 4–41)
ANION GAP SERPL CALC-SCNC: 9 MMOL/L — SIGNIFICANT CHANGE UP (ref 7–14)
AST SERPL-CCNC: 17 U/L — SIGNIFICANT CHANGE UP (ref 4–40)
BILIRUB DIRECT SERPL-MCNC: <0.2 MG/DL — SIGNIFICANT CHANGE UP (ref 0–0.3)
BILIRUB INDIRECT FLD-MCNC: >0.2 MG/DL — SIGNIFICANT CHANGE UP (ref 0–1)
BILIRUB SERPL-MCNC: 0.4 MG/DL — SIGNIFICANT CHANGE UP (ref 0.2–1.2)
BUN SERPL-MCNC: 20 MG/DL — SIGNIFICANT CHANGE UP (ref 7–23)
CALCIUM SERPL-MCNC: 7.2 MG/DL — LOW (ref 8.4–10.5)
CHLORIDE SERPL-SCNC: 101 MMOL/L — SIGNIFICANT CHANGE UP (ref 98–107)
CO2 SERPL-SCNC: 28 MMOL/L — SIGNIFICANT CHANGE UP (ref 22–31)
CREAT SERPL-MCNC: 1.3 MG/DL — SIGNIFICANT CHANGE UP (ref 0.5–1.3)
EGFR: 53 ML/MIN/1.73M2 — LOW
EGFR: 53 ML/MIN/1.73M2 — LOW
GLUCOSE BLDC GLUCOMTR-MCNC: 153 MG/DL — HIGH (ref 70–99)
GLUCOSE BLDC GLUCOMTR-MCNC: 178 MG/DL — HIGH (ref 70–99)
GLUCOSE BLDC GLUCOMTR-MCNC: 215 MG/DL — HIGH (ref 70–99)
GLUCOSE BLDC GLUCOMTR-MCNC: 245 MG/DL — HIGH (ref 70–99)
GLUCOSE SERPL-MCNC: 149 MG/DL — HIGH (ref 70–99)
HCT VFR BLD CALC: 24.5 % — LOW (ref 39–50)
HGB BLD-MCNC: 8 G/DL — LOW (ref 13–17)
MAGNESIUM SERPL-MCNC: 2.1 MG/DL — SIGNIFICANT CHANGE UP (ref 1.6–2.6)
MCHC RBC-ENTMCNC: 28.5 PG — SIGNIFICANT CHANGE UP (ref 27–34)
MCHC RBC-ENTMCNC: 32.7 G/DL — SIGNIFICANT CHANGE UP (ref 32–36)
MCV RBC AUTO: 87.2 FL — SIGNIFICANT CHANGE UP (ref 80–100)
NRBC # BLD AUTO: 0 K/UL — SIGNIFICANT CHANGE UP (ref 0–0)
NRBC # FLD: 0 K/UL — SIGNIFICANT CHANGE UP (ref 0–0)
NRBC BLD AUTO-RTO: 0 /100 WBCS — SIGNIFICANT CHANGE UP (ref 0–0)
PHOSPHATE SERPL-MCNC: 2.1 MG/DL — LOW (ref 2.5–4.5)
PLATELET # BLD AUTO: 214 K/UL — SIGNIFICANT CHANGE UP (ref 150–400)
POTASSIUM SERPL-MCNC: 3.6 MMOL/L — SIGNIFICANT CHANGE UP (ref 3.5–5.3)
POTASSIUM SERPL-SCNC: 3.6 MMOL/L — SIGNIFICANT CHANGE UP (ref 3.5–5.3)
PROT SERPL-MCNC: 4.8 G/DL — LOW (ref 6–8.3)
RBC # BLD: 2.81 M/UL — LOW (ref 4.2–5.8)
RBC # FLD: 16.2 % — HIGH (ref 10.3–14.5)
SODIUM SERPL-SCNC: 138 MMOL/L — SIGNIFICANT CHANGE UP (ref 135–145)
WBC # BLD: 8.03 K/UL — SIGNIFICANT CHANGE UP (ref 3.8–10.5)
WBC # FLD AUTO: 8.03 K/UL — SIGNIFICANT CHANGE UP (ref 3.8–10.5)

## 2025-06-12 PROCEDURE — G0545: CPT

## 2025-06-12 PROCEDURE — 99232 SBSQ HOSP IP/OBS MODERATE 35: CPT

## 2025-06-12 RX ORDER — SODIUM CHLORIDE 9 G/1000ML
1000 INJECTION, SOLUTION INTRAVENOUS
Refills: 0 | Status: DISCONTINUED | OUTPATIENT
Start: 2025-06-12 | End: 2025-06-20

## 2025-06-12 RX ORDER — INSULIN LISPRO 100 U/ML
INJECTION, SOLUTION INTRAVENOUS; SUBCUTANEOUS AT BEDTIME
Refills: 0 | Status: DISCONTINUED | OUTPATIENT
Start: 2025-06-12 | End: 2025-06-17

## 2025-06-12 RX ORDER — DEXTROSE 50 % IN WATER 50 %
25 SYRINGE (ML) INTRAVENOUS ONCE
Refills: 0 | Status: DISCONTINUED | OUTPATIENT
Start: 2025-06-12 | End: 2025-06-20

## 2025-06-12 RX ORDER — FUROSEMIDE 10 MG/ML
20 INJECTION INTRAMUSCULAR; INTRAVENOUS ONCE
Refills: 0 | Status: COMPLETED | OUTPATIENT
Start: 2025-06-12 | End: 2025-06-12

## 2025-06-12 RX ORDER — INSULIN LISPRO 100 U/ML
INJECTION, SOLUTION INTRAVENOUS; SUBCUTANEOUS
Refills: 0 | Status: DISCONTINUED | OUTPATIENT
Start: 2025-06-12 | End: 2025-06-12

## 2025-06-12 RX ORDER — SOD PHOS DI, MONO/K PHOS MONO 250 MG
2 TABLET ORAL ONCE
Refills: 0 | Status: COMPLETED | OUTPATIENT
Start: 2025-06-12 | End: 2025-06-12

## 2025-06-12 RX ORDER — AMPICILLIN SODIUM 1 G/1
2 INJECTION, POWDER, FOR SOLUTION INTRAMUSCULAR; INTRAVENOUS EVERY 4 HOURS
Refills: 0 | Status: DISCONTINUED | OUTPATIENT
Start: 2025-06-12 | End: 2025-06-20

## 2025-06-12 RX ORDER — DEXTROSE 50 % IN WATER 50 %
12.5 SYRINGE (ML) INTRAVENOUS ONCE
Refills: 0 | Status: DISCONTINUED | OUTPATIENT
Start: 2025-06-12 | End: 2025-06-20

## 2025-06-12 RX ORDER — GLUCAGON 3 MG/1
1 POWDER NASAL ONCE
Refills: 0 | Status: DISCONTINUED | OUTPATIENT
Start: 2025-06-12 | End: 2025-06-20

## 2025-06-12 RX ORDER — DEXTROSE 50 % IN WATER 50 %
15 SYRINGE (ML) INTRAVENOUS ONCE
Refills: 0 | Status: DISCONTINUED | OUTPATIENT
Start: 2025-06-12 | End: 2025-06-20

## 2025-06-12 RX ORDER — INSULIN LISPRO 100 U/ML
INJECTION, SOLUTION INTRAVENOUS; SUBCUTANEOUS
Refills: 0 | Status: DISCONTINUED | OUTPATIENT
Start: 2025-06-12 | End: 2025-06-15

## 2025-06-12 RX ADMIN — Medication 0.5 MILLIGRAM(S): at 19:36

## 2025-06-12 RX ADMIN — HEPARIN SODIUM 5000 UNIT(S): 1000 INJECTION INTRAVENOUS; SUBCUTANEOUS at 15:22

## 2025-06-12 RX ADMIN — Medication 4 MILLILITER(S): at 08:19

## 2025-06-12 RX ADMIN — Medication 0.2 MILLIGRAM(S): at 06:14

## 2025-06-12 RX ADMIN — Medication 100 MILLIGRAM(S): at 15:22

## 2025-06-12 RX ADMIN — Medication 81 MILLIGRAM(S): at 11:55

## 2025-06-12 RX ADMIN — INSULIN LISPRO 2: 100 INJECTION, SOLUTION INTRAVENOUS; SUBCUTANEOUS at 07:07

## 2025-06-12 RX ADMIN — LABETALOL HYDROCHLORIDE 100 MILLIGRAM(S): 200 TABLET, FILM COATED ORAL at 06:14

## 2025-06-12 RX ADMIN — Medication 0.2 MILLIGRAM(S): at 15:22

## 2025-06-12 RX ADMIN — Medication 40 MILLIEQUIVALENT(S): at 07:39

## 2025-06-12 RX ADMIN — Medication 40 MILLIGRAM(S): at 11:55

## 2025-06-12 RX ADMIN — Medication 2 PACKET(S): at 07:39

## 2025-06-12 RX ADMIN — AMPICILLIN SODIUM 200 GRAM(S): 1 INJECTION, POWDER, FOR SOLUTION INTRAMUSCULAR; INTRAVENOUS at 22:10

## 2025-06-12 RX ADMIN — POLYETHYLENE GLYCOL 3350 17 GRAM(S): 17 POWDER, FOR SOLUTION ORAL at 17:24

## 2025-06-12 RX ADMIN — Medication 100 MILLIGRAM(S): at 06:14

## 2025-06-12 RX ADMIN — AMPICILLIN SODIUM 200 GRAM(S): 1 INJECTION, POWDER, FOR SOLUTION INTRAMUSCULAR; INTRAVENOUS at 19:36

## 2025-06-12 RX ADMIN — LIDOCAINE HYDROCHLORIDE 1 PATCH: 20 JELLY TOPICAL at 11:53

## 2025-06-12 RX ADMIN — AMPICILLIN SODIUM 200 GRAM(S): 1 INJECTION, POWDER, FOR SOLUTION INTRAMUSCULAR; INTRAVENOUS at 06:14

## 2025-06-12 RX ADMIN — HEPARIN SODIUM 5000 UNIT(S): 1000 INJECTION INTRAVENOUS; SUBCUTANEOUS at 06:14

## 2025-06-12 RX ADMIN — INSULIN LISPRO 4: 100 INJECTION, SOLUTION INTRAVENOUS; SUBCUTANEOUS at 12:36

## 2025-06-12 RX ADMIN — Medication 4 MILLILITER(S): at 22:18

## 2025-06-12 RX ADMIN — Medication 1 MILLIGRAM(S): at 07:20

## 2025-06-12 RX ADMIN — Medication 0.5 MILLIGRAM(S): at 19:56

## 2025-06-12 RX ADMIN — AMPICILLIN SODIUM 200 GRAM(S): 1 INJECTION, POWDER, FOR SOLUTION INTRAMUSCULAR; INTRAVENOUS at 15:19

## 2025-06-12 RX ADMIN — AMPICILLIN SODIUM 200 GRAM(S): 1 INJECTION, POWDER, FOR SOLUTION INTRAMUSCULAR; INTRAVENOUS at 11:54

## 2025-06-12 RX ADMIN — POLYETHYLENE GLYCOL 3350 17 GRAM(S): 17 POWDER, FOR SOLUTION ORAL at 06:14

## 2025-06-12 RX ADMIN — INSULIN LISPRO 2: 100 INJECTION, SOLUTION INTRAVENOUS; SUBCUTANEOUS at 17:22

## 2025-06-12 RX ADMIN — Medication 125 MICROGRAM(S): at 06:14

## 2025-06-12 RX ADMIN — LIDOCAINE HYDROCHLORIDE 1 PATCH: 20 JELLY TOPICAL at 01:30

## 2025-06-12 RX ADMIN — Medication 0.5 MILLIGRAM(S): at 12:30

## 2025-06-12 RX ADMIN — LIDOCAINE HYDROCHLORIDE 1 PATCH: 20 JELLY TOPICAL at 18:24

## 2025-06-12 RX ADMIN — Medication 0.5 MILLIGRAM(S): at 12:10

## 2025-06-12 RX ADMIN — FUROSEMIDE 20 MILLIGRAM(S): 10 INJECTION INTRAMUSCULAR; INTRAVENOUS at 12:00

## 2025-06-12 RX ADMIN — Medication 1 MILLIGRAM(S): at 07:05

## 2025-06-12 RX ADMIN — HEPARIN SODIUM 5000 UNIT(S): 1000 INJECTION INTRAVENOUS; SUBCUTANEOUS at 22:09

## 2025-06-12 NOTE — ADVANCED PRACTICE NURSE CONSULT - ASSESSMENT
Patient is aware and alert. Midline insertion along with risks, benefits, possible complications and infection prevention explained to patient who verbalized understanding. All questions addressed and patient gave verbal consent to place midline. Left arm cleansed with CHG. Using sterile technique under ultra sound guidance, placed PowerGlide Pro Midline 20G /10cm into Left Basilic vein. Brisk blood return and flushed with 20Mls of normal saline. Minimal blood loss and patient tolerated procedure well. CHG dressing placed. All sharps accounted for. Report given to district RN and ordering provider.  LOT#[id number] ; REF#[id number]

## 2025-06-12 NOTE — PROGRESS NOTE ADULT - ASSESSMENT
This is a 86 y/o M w/ PMhx HTN, HLD, DM2, hypothyroidism, prostate cancer s/p prostatectomy, CKD, TIA, pacemaker placed in 5/2024 for abnormal arrythmia initially admitted to [facility] on 5/6 for back pain, now transferred to [facility] for orthopedic spine. Labs w/ leukocytosis to 18, BCx w/ E faecalis in multiple sets.     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 S/p PPM removal   #Acute cholecystitis s/p lap geetha 6/4   #Respiratory failure, improved   #JEROME, improving     Overall, 86 y/o M w/ PMhx HTN, HLD, DM2, hypothyroidism, prostate cancer s/p prostatectomy, CKD, TIA, pacemaker placed in 5/2024 for abnormal arrythmia transferred to [facility] from [facility] for MRI spine and ortho evaluation give LBP and lumbar compression w/ urinary retention. Pt noted to have chills and leukocytosis to 18, now with high grade E faecalis bacteremia. Unclear source however UCx not done, U/A with 10 WBCs, CT A/P w/o IV contrast on 5/10 w/o acute infectious source, s/p PPM removal, MARIA LUISA w/o vegetations.   Repeat MRI with OM/discitis, L4/L5, severe spinal canal stenosis L3-L4, L-5, impingement of cauda equina, planned for OR w/ orthopedics, however c/b acute cholecystitis s/p lap geetha 6/4   CT findings of possible infarcts kidney/spleen, pt with previous MARIA LUISA w/o findings of IE. C/b post bleeding, transferred to SICU for pressor support     Recommendations:   1. Increase Ampicillin 2 g q4 for 6 week course, monitor for worsening kidney function, if CrCl < 50, would decrease dose   2. Per ortho no planned surgery inpatient, planned after admission.     OPAT   Ampicillin 2 g q4 until 6/25/25 to finish 6 week course  CBC, CMP weekly while on IV abx    Thank you for consulting us and involving us in the management of this patient's case. In addition to reviewing history, imaging, documents, labs, microbiology, and infection control strategies and potential issues.     Inpatient ID consult team will sign off.    Further changes in lab values, imaging studies, or clinical status will not be known to ID inpatient consultants unless specifically communicated by primary team.    Darrin Falk MD  Attending Physician  Division of Infectious Diseases  Department of Medicine    Please contact through MS Teams message.  Office: [phone] (after 5 PM or weekend)   This is a 86 y/o M w/ PMhx HTN, HLD, DM2, hypothyroidism, prostate cancer s/p prostatectomy, CKD, TIA, pacemaker placed in 5/2024 for abnormal arrythmia initially admitted to [facility] on 5/6 for back pain, now transferred to [facility] for orthopedic spine. Labs w/ leukocytosis to 18, BCx w/ E faecalis in multiple sets.     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 S/p PPM removal   #Acute cholecystitis s/p lap geetha 6/4   #Respiratory failure, improved   #JEROME, improving     Overall, 86 y/o M w/ PMhx HTN, HLD, DM2, hypothyroidism, prostate cancer s/p prostatectomy, CKD, TIA, pacemaker placed in 5/2024 for abnormal arrythmia transferred to [facility] from [facility] for MRI spine and ortho evaluation give LBP and lumbar compression w/ urinary retention. Pt noted to have chills and leukocytosis to 18, now with high grade E faecalis bacteremia. Unclear source however UCx not done, U/A with 10 WBCs, CT A/P w/o IV contrast on 5/10 w/o acute infectious source, s/p PPM removal, MARIA LUISA w/o vegetations.   Repeat MRI with OM/discitis, L4/L5, severe spinal canal stenosis L3-L4, L-5, impingement of cauda equina, planned for OR w/ orthopedics, however c/b acute cholecystitis s/p lap geetha 6/4   CT findings of possible infarcts kidney/spleen, pt with previous MARIA LUISA w/o findings of IE. C/b post bleeding, transferred to SICU for pressor support     Recommendations:   1. Increase Ampicillin 2 g q4 for 6 week course, monitor for worsening kidney function, if CrCl < 50, would decrease dose   2. Per ortho no planned surgery inpatient, planned after admission.     OPAT   Ampicillin 12 g over 24 hours continues infusion until 6/25/25 to finish 6 week course  CBC, CMP weekly while on IV abx    Thank you for consulting us and involving us in the management of this patient's case. In addition to reviewing history, imaging, documents, labs, microbiology, and infection control strategies and potential issues.     Inpatient ID consult team will sign off.    Further changes in lab values, imaging studies, or clinical status will not be known to ID inpatient consultants unless specifically communicated by primary team.    Darrin Falk MD  Attending Physician  Division of Infectious Diseases  Department of Medicine    Please contact through MS Teams message.  Office: [phone] (after 5 PM or weekend)

## 2025-06-12 NOTE — PROGRESS NOTE ADULT - SUBJECTIVE AND OBJECTIVE BOX
Timothy Mojica MD  Interventional Cardiology / Advance Heart Failure and Cardiac Transplant Specialist  [place] Office : [phone] [place] [address] [place]. [address]  Tel:   [place] Office : [address] [facility] [place] [address]  Tel: [phone]       Pt is lying in bed NAD  	  MEDICATIONS:  aspirin  chewable 81 milliGRAM(s) Oral daily  cloNIDine 0.2 milliGRAM(s) Oral three times a day  heparin   Injectable 5000 Unit(s) SubCutaneous every 8 hours  hydrALAZINE 100 milliGRAM(s) Oral three times a day  labetalol 100 milliGRAM(s) Oral two times a day    ampicillin  IVPB 2 Gram(s) IV Intermittent every 4 hours    albuterol/ipratropium for Nebulization 3 milliLiter(s) Nebulizer every 6 hours PRN  sodium chloride 3%  Inhalation 4 milliLiter(s) Inhalation every 12 hours    HYDROmorphone  Injectable 0.5 milliGRAM(s) IV Push every 4 hours PRN  HYDROmorphone  Injectable 1 milliGRAM(s) IV Push every 4 hours PRN    pantoprazole  Injectable 40 milliGRAM(s) IV Push daily  polyethylene glycol 3350 17 Gram(s) Oral two times a day    dextrose 50% Injectable 25 Gram(s) IV Push once  dextrose 50% Injectable 12.5 Gram(s) IV Push once  dextrose 50% Injectable 25 Gram(s) IV Push once  dextrose Oral Gel 15 Gram(s) Oral once PRN  glucagon  Injectable 1 milliGRAM(s) IntraMuscular once  insulin lispro (ADMELOG) corrective regimen sliding scale   SubCutaneous three times a day before meals  insulin lispro (ADMELOG) corrective regimen sliding scale   SubCutaneous at bedtime  levothyroxine 125 MICROGram(s) Oral daily    dextrose 5%. 1000 milliLiter(s) IV Continuous <Continuous>  dextrose 5%. 1000 milliLiter(s) IV Continuous <Continuous>  lidocaine   4% Patch 1 Patch Transdermal daily  povidone iodine 10% Nasal Swab 1 Application(s) Both Nostrils once      PAST MEDICAL/SURGICAL HISTORY  PAST MEDICAL & SURGICAL HISTORY:  HTN (hypertension)      HLD (hyperlipidemia)      DM (diabetes mellitus)      TIA (transient ischemic attack)      Prostate cancer      S/P prostatectomy      Pacemaker      H/O thyroidectomy          SOCIAL HISTORY: Substance Use (street drugs): ( x ) never used  (  ) other:    FAMILY HISTORY:  FHx: heart disease (Father, Mother)      PHYSICAL EXAM:  T(C): 36.7 (06-12-25 @ 14:54), Max: 36.7 (06-11-25 @ 18:10)  HR: 64 (06-12-25 @ 14:54) (57 - 92)  BP: 137/58 (06-12-25 @ 14:54) (130/49 - 156/50)  RR: 19 (06-12-25 @ 14:54) (17 - 19)  SpO2: 97% (06-12-25 @ 14:54) (97% - 100%)  Wt(kg): --  I&O's Summary    11 Jun 2025 07:01  -  12 Jun 2025 07:00  --------------------------------------------------------  IN: 445 mL / OUT: 1260 mL / NET: -815 mL    12 Jun 2025 07:01  -  12 Jun 2025 16:23  --------------------------------------------------------  IN: 150 mL / OUT: 300 mL / NET: -150 mL        GENERAL: NAD  EYES:  EOMI  ENMT: NGT Moist mucous membranes  Cardiovascular: Normal S1 S2, No JVD, No murmurs, +b/l LUE edema L>R, LE edema  Respiratory: Lungs clear to auscultation	  Gastrointestinal:  Soft, Non-tender, + BS	  Extremities: b/l UE, LE edema                          8.0    8.03  )-----------( 214      ( 12 Jun 2025 05:40 )             24.5     06-12    138  |  101  |  20  ----------------------------<  149[H]  3.6   |  28  |  1.30    Ca    7.2[L]      12 Jun 2025 05:40  Phos  2.1     06-12  Mg     2.10     06-12    TPro  4.8[L]  /  Alb  2.4[L]  /  TBili  0.4  /  DBili  <0.2  /  AST  17  /  ALT  30  /  AlkPhos  169[H]  06-12    proBNP:   Lipid Profile:   HgA1c:   TSH:     Consultant(s) Notes Reviewed:  [x ] YES  [ ] NO    Care Discussed with Consultants/Other Providers [ x] YES  [ ] NO    Imaging Personally Reviewed independently:  [x] YES  [ ] NO    All labs, radiologic studies, vitals, orders and medications list reviewed. Patient is seen and examined at bedside. Case discussed with medical team.

## 2025-06-12 NOTE — PROGRESS NOTE ADULT - ATTENDING COMMENTS
Agree with above. Clear liquid diet.     - advance diet as tolerated   - Appreciate ID input   - pending access for 6wk course ampicillin for osteomyelitis and e faecalis bacteremia     [facility] Acute Care Surgery (B Team) Attending Group Practice:  Dr. Ladonna Fuchs  [id number] .

## 2025-06-12 NOTE — PROGRESS NOTE ADULT - ASSESSMENT
-EKG w/ SR, no significant STT changes   -ECHO w/ normal EF no significant valvular heart disease     A/P:  85M w/ HTN, HLD, T2DM, prostate CA, CKD, TIA and SND s/p PPM transferred here for lumbar spinal cord compression and bacteremia s/p PPM explant and implant of Micra PPM. Now pending OR with orthopedic for decompression     1. bacteremia  -s/p cholecystectomy  -c/w abx per ID, primary team    2. SND  -s/p leadless PPM w/ EP     3. HTN  -c/w clonidine, hydralazine, labetalol,

## 2025-06-12 NOTE — PROGRESS NOTE ADULT - ASSESSMENT
85 year old man with multiple chronic medical comorbidities including hypertension, sinus node dysfunction, type two diabetes, and chronic kidney disease (baseline creatinine 1.5-2.0) presently admitted for lower back pain associated with lumbar compression deformities with concern for cauda equina impingement. He was found to be bacteremic (E faecalis) due to acute acalculous cholecystitis, and is now seen following laparoscopic cholecystectomy with Dr. Triny Verde 6/5/2025. POD 1 patient became hypotensive, unresponsive to 2L fluid resuscitation and 3 u pRBC and was started on vasopressors and transferred to SICU.Patient weaned off pressors, course c/b ileus now resolved, NGT removed 6/10    Plan:   - Diet: CLD  - c/w ASA81  - c/w Ampicillin  - increase antihypertensives per cards  - c/w wong  - ortho recs re: spinal surgery appreciated  - appreciate SICU care    B team   [id number]

## 2025-06-12 NOTE — PROGRESS NOTE ADULT - SUBJECTIVE AND OBJECTIVE BOX
INTERVAL EVENTS: Patient downgraded from SICU  SUBJECTIVE: Patient seen and examined at bedside with surgical team, patient without complaints. Denies fever, chills, CP, SOB nausea, vomiting, abdominal pain.    OBJECTIVE:    Vital Signs Last 24 Hrs  T(C): 36.7 (12 Jun 2025 02:00), Max: 36.7 (11 Jun 2025 18:10)  T(F): 98.1 (12 Jun 2025 02:00), Max: 98.1 (12 Jun 2025 02:00)  HR: 61 (12 Jun 2025 02:00) (57 - 69)  BP: 130/49 (12 Jun 2025 02:00) (130/49 - 150/50)  BP(mean): 108 (11 Jun 2025 16:00) (81 - 108)  RR: 18 (12 Jun 2025 02:00) (13 - 18)  SpO2: 100% (12 Jun 2025 02:00) (94% - 100%)    Parameters below as of 12 Jun 2025 02:00  Patient On (Oxygen Delivery Method): BiPAP/CPAP    I&O's Detail    11 Jun 2025 07:01  -  12 Jun 2025 07:00  --------------------------------------------------------  IN:    dextrose 5% + lactated ringers: 270 mL    Oral Fluid: 100 mL  Total IN: 370 mL    OUT:    Indwelling Catheter - Urethral (mL): 1060 mL  Total OUT: 1060 mL    Total NET: -690 mL      MEDICATIONS  (STANDING):  ampicillin  IVPB 2 Gram(s) IV Intermittent every 6 hours  aspirin  chewable 81 milliGRAM(s) Oral daily  cloNIDine 0.2 milliGRAM(s) Oral three times a day  heparin   Injectable 5000 Unit(s) SubCutaneous every 8 hours  hydrALAZINE 100 milliGRAM(s) Oral three times a day  insulin lispro (ADMELOG) corrective regimen sliding scale   SubCutaneous every 6 hours  labetalol 100 milliGRAM(s) Oral two times a day  levothyroxine 125 MICROGram(s) Oral daily  lidocaine   4% Patch 1 Patch Transdermal daily  pantoprazole  Injectable 40 milliGRAM(s) IV Push daily  polyethylene glycol 3350 17 Gram(s) Oral two times a day  potassium chloride   Powder 40 milliEquivalent(s) Oral once  potassium phosphate / sodium phosphate Powder (PHOS-NaK) 2 Packet(s) Oral once  povidone iodine 10% Nasal Swab 1 Application(s) Both Nostrils once  sodium chloride 3%  Inhalation 4 milliLiter(s) Inhalation every 12 hours    MEDICATIONS  (PRN):  albuterol/ipratropium for Nebulization 3 milliLiter(s) Nebulizer every 6 hours PRN Shortness of Breath and/or Wheezing  HYDROmorphone  Injectable 1 milliGRAM(s) IV Push every 4 hours PRN Severe Pain (7 - 10)  HYDROmorphone  Injectable 0.5 milliGRAM(s) IV Push every 4 hours PRN Moderate Pain (4 - 6)      PHYSICAL EXAM:  General: NAD, resting comfortably in bed  HEENT: Normocephalic atraumatic  Respiratory: Nonlabored respirations on NC   Abdomen: soft, nontender, nondistended. Incisions c/d/i   Neuro: awake, alert and answering questions appropriately       LABS:                        8.0    8.03  )-----------( 214      ( 12 Jun 2025 05:40 )             24.5     06-12    138  |  101  |  20  ----------------------------<  149[H]  3.6   |  28  |  1.30    Ca    7.2[L]      12 Jun 2025 05:40  Phos  2.1     06-12  Mg     2.10     06-12    TPro  4.8[L]  /  Alb  2.4[L]  /  TBili  0.4  /  DBili  <0.2  /  AST  17  /  ALT  30  /  AlkPhos  169[H]  06-12      LIVER FUNCTIONS - ( 12 Jun 2025 05:40 )  Alb: 2.4 g/dL / Pro: 4.8 g/dL / ALK PHOS: 169 U/L / ALT: 30 U/L / AST: 17 U/L / GGT: x           Urinalysis Basic - ( 12 Jun 2025 05:40 )    Color: x / Appearance: x / SG: x / pH: x  Gluc: 149 mg/dL / Ketone: x  / Bili: x / Urobili: x   Blood: x / Protein: x / Nitrite: x   Leuk Esterase: x / RBC: x / WBC x   Sq Epi: x / Non Sq Epi: x / Bacteria: x

## 2025-06-13 LAB
ALBUMIN SERPL ELPH-MCNC: 2.5 G/DL — LOW (ref 3.3–5)
ALP SERPL-CCNC: 175 U/L — HIGH (ref 40–120)
ALT FLD-CCNC: 28 U/L — SIGNIFICANT CHANGE UP (ref 4–41)
ANION GAP SERPL CALC-SCNC: 10 MMOL/L — SIGNIFICANT CHANGE UP (ref 7–14)
AST SERPL-CCNC: 17 U/L — SIGNIFICANT CHANGE UP (ref 4–40)
BILIRUB DIRECT SERPL-MCNC: <0.2 MG/DL — SIGNIFICANT CHANGE UP (ref 0–0.3)
BILIRUB INDIRECT FLD-MCNC: >0.2 MG/DL — SIGNIFICANT CHANGE UP (ref 0–1)
BILIRUB SERPL-MCNC: 0.4 MG/DL — SIGNIFICANT CHANGE UP (ref 0.2–1.2)
BUN SERPL-MCNC: 18 MG/DL — SIGNIFICANT CHANGE UP (ref 7–23)
CALCIUM SERPL-MCNC: 7.3 MG/DL — LOW (ref 8.4–10.5)
CHLORIDE SERPL-SCNC: 100 MMOL/L — SIGNIFICANT CHANGE UP (ref 98–107)
CO2 SERPL-SCNC: 26 MMOL/L — SIGNIFICANT CHANGE UP (ref 22–31)
CREAT SERPL-MCNC: 1.26 MG/DL — SIGNIFICANT CHANGE UP (ref 0.5–1.3)
EGFR: 55 ML/MIN/1.73M2 — LOW
EGFR: 55 ML/MIN/1.73M2 — LOW
GLUCOSE BLDC GLUCOMTR-MCNC: 179 MG/DL — HIGH (ref 70–99)
GLUCOSE BLDC GLUCOMTR-MCNC: 199 MG/DL — HIGH (ref 70–99)
GLUCOSE BLDC GLUCOMTR-MCNC: 221 MG/DL — HIGH (ref 70–99)
GLUCOSE BLDC GLUCOMTR-MCNC: 237 MG/DL — HIGH (ref 70–99)
GLUCOSE SERPL-MCNC: 181 MG/DL — HIGH (ref 70–99)
HCT VFR BLD CALC: 26.3 % — LOW (ref 39–50)
HGB BLD-MCNC: 8.2 G/DL — LOW (ref 13–17)
MAGNESIUM SERPL-MCNC: 2 MG/DL — SIGNIFICANT CHANGE UP (ref 1.6–2.6)
MCHC RBC-ENTMCNC: 28.3 PG — SIGNIFICANT CHANGE UP (ref 27–34)
MCHC RBC-ENTMCNC: 31.2 G/DL — LOW (ref 32–36)
MCV RBC AUTO: 90.7 FL — SIGNIFICANT CHANGE UP (ref 80–100)
NRBC # BLD AUTO: 0 K/UL — SIGNIFICANT CHANGE UP (ref 0–0)
NRBC # FLD: 0 K/UL — SIGNIFICANT CHANGE UP (ref 0–0)
NRBC BLD AUTO-RTO: 0 /100 WBCS — SIGNIFICANT CHANGE UP (ref 0–0)
PHOSPHATE SERPL-MCNC: 1.8 MG/DL — LOW (ref 2.5–4.5)
PLATELET # BLD AUTO: 214 K/UL — SIGNIFICANT CHANGE UP (ref 150–400)
POTASSIUM SERPL-MCNC: 3.8 MMOL/L — SIGNIFICANT CHANGE UP (ref 3.5–5.3)
POTASSIUM SERPL-SCNC: 3.8 MMOL/L — SIGNIFICANT CHANGE UP (ref 3.5–5.3)
PROT SERPL-MCNC: 5.1 G/DL — LOW (ref 6–8.3)
RBC # BLD: 2.9 M/UL — LOW (ref 4.2–5.8)
RBC # FLD: 16.4 % — HIGH (ref 10.3–14.5)
SODIUM SERPL-SCNC: 136 MMOL/L — SIGNIFICANT CHANGE UP (ref 135–145)
WBC # BLD: 6.43 K/UL — SIGNIFICANT CHANGE UP (ref 3.8–10.5)
WBC # FLD AUTO: 6.43 K/UL — SIGNIFICANT CHANGE UP (ref 3.8–10.5)

## 2025-06-13 RX ORDER — ACETAMINOPHEN 500 MG/5ML
975 LIQUID (ML) ORAL EVERY 6 HOURS
Refills: 0 | Status: DISCONTINUED | OUTPATIENT
Start: 2025-06-13 | End: 2025-06-16

## 2025-06-13 RX ORDER — OXYCODONE HYDROCHLORIDE 30 MG/1
5 TABLET ORAL EVERY 4 HOURS
Refills: 0 | Status: DISCONTINUED | OUTPATIENT
Start: 2025-06-13 | End: 2025-06-15

## 2025-06-13 RX ORDER — SOD PHOS DI, MONO/K PHOS MONO 250 MG
2 TABLET ORAL ONCE
Refills: 0 | Status: COMPLETED | OUTPATIENT
Start: 2025-06-13 | End: 2025-06-13

## 2025-06-13 RX ORDER — OXYCODONE HYDROCHLORIDE 30 MG/1
2.5 TABLET ORAL EVERY 4 HOURS
Refills: 0 | Status: DISCONTINUED | OUTPATIENT
Start: 2025-06-13 | End: 2025-06-15

## 2025-06-13 RX ORDER — TAMSULOSIN HYDROCHLORIDE 0.4 MG/1
0.4 CAPSULE ORAL ONCE
Refills: 0 | Status: COMPLETED | OUTPATIENT
Start: 2025-06-13 | End: 2025-06-13

## 2025-06-13 RX ORDER — TAMSULOSIN HYDROCHLORIDE 0.4 MG/1
0.4 CAPSULE ORAL AT BEDTIME
Refills: 0 | Status: DISCONTINUED | OUTPATIENT
Start: 2025-06-13 | End: 2025-06-16

## 2025-06-13 RX ORDER — FUROSEMIDE 10 MG/ML
20 INJECTION INTRAMUSCULAR; INTRAVENOUS ONCE
Refills: 0 | Status: COMPLETED | OUTPATIENT
Start: 2025-06-13 | End: 2025-06-13

## 2025-06-13 RX ADMIN — AMPICILLIN SODIUM 200 GRAM(S): 1 INJECTION, POWDER, FOR SOLUTION INTRAMUSCULAR; INTRAVENOUS at 15:55

## 2025-06-13 RX ADMIN — HEPARIN SODIUM 5000 UNIT(S): 1000 INJECTION INTRAVENOUS; SUBCUTANEOUS at 22:29

## 2025-06-13 RX ADMIN — Medication 1 MILLIGRAM(S): at 01:45

## 2025-06-13 RX ADMIN — Medication 975 MILLIGRAM(S): at 07:10

## 2025-06-13 RX ADMIN — TAMSULOSIN HYDROCHLORIDE 0.4 MILLIGRAM(S): 0.4 CAPSULE ORAL at 10:13

## 2025-06-13 RX ADMIN — Medication 0.2 MILLIGRAM(S): at 05:23

## 2025-06-13 RX ADMIN — INSULIN LISPRO 2: 100 INJECTION, SOLUTION INTRAVENOUS; SUBCUTANEOUS at 08:56

## 2025-06-13 RX ADMIN — LABETALOL HYDROCHLORIDE 100 MILLIGRAM(S): 200 TABLET, FILM COATED ORAL at 18:15

## 2025-06-13 RX ADMIN — Medication 975 MILLIGRAM(S): at 07:40

## 2025-06-13 RX ADMIN — Medication 975 MILLIGRAM(S): at 11:55

## 2025-06-13 RX ADMIN — LIDOCAINE HYDROCHLORIDE 1 PATCH: 20 JELLY TOPICAL at 00:00

## 2025-06-13 RX ADMIN — Medication 2 PACKET(S): at 10:14

## 2025-06-13 RX ADMIN — LIDOCAINE HYDROCHLORIDE 1 PATCH: 20 JELLY TOPICAL at 19:07

## 2025-06-13 RX ADMIN — Medication 100 MILLIGRAM(S): at 05:23

## 2025-06-13 RX ADMIN — Medication 100 MILLIGRAM(S): at 13:59

## 2025-06-13 RX ADMIN — OXYCODONE HYDROCHLORIDE 5 MILLIGRAM(S): 30 TABLET ORAL at 10:13

## 2025-06-13 RX ADMIN — Medication 81 MILLIGRAM(S): at 11:55

## 2025-06-13 RX ADMIN — HEPARIN SODIUM 5000 UNIT(S): 1000 INJECTION INTRAVENOUS; SUBCUTANEOUS at 13:58

## 2025-06-13 RX ADMIN — TAMSULOSIN HYDROCHLORIDE 0.4 MILLIGRAM(S): 0.4 CAPSULE ORAL at 22:31

## 2025-06-13 RX ADMIN — INSULIN LISPRO 4: 100 INJECTION, SOLUTION INTRAVENOUS; SUBCUTANEOUS at 11:58

## 2025-06-13 RX ADMIN — POLYETHYLENE GLYCOL 3350 17 GRAM(S): 17 POWDER, FOR SOLUTION ORAL at 05:20

## 2025-06-13 RX ADMIN — LABETALOL HYDROCHLORIDE 100 MILLIGRAM(S): 200 TABLET, FILM COATED ORAL at 05:22

## 2025-06-13 RX ADMIN — POLYETHYLENE GLYCOL 3350 17 GRAM(S): 17 POWDER, FOR SOLUTION ORAL at 17:59

## 2025-06-13 RX ADMIN — LIDOCAINE HYDROCHLORIDE 1 PATCH: 20 JELLY TOPICAL at 11:57

## 2025-06-13 RX ADMIN — Medication 100 MILLIGRAM(S): at 22:31

## 2025-06-13 RX ADMIN — HEPARIN SODIUM 5000 UNIT(S): 1000 INJECTION INTRAVENOUS; SUBCUTANEOUS at 05:20

## 2025-06-13 RX ADMIN — AMPICILLIN SODIUM 200 GRAM(S): 1 INJECTION, POWDER, FOR SOLUTION INTRAMUSCULAR; INTRAVENOUS at 03:21

## 2025-06-13 RX ADMIN — AMPICILLIN SODIUM 200 GRAM(S): 1 INJECTION, POWDER, FOR SOLUTION INTRAMUSCULAR; INTRAVENOUS at 11:56

## 2025-06-13 RX ADMIN — OXYCODONE HYDROCHLORIDE 5 MILLIGRAM(S): 30 TABLET ORAL at 11:00

## 2025-06-13 RX ADMIN — OXYCODONE HYDROCHLORIDE 5 MILLIGRAM(S): 30 TABLET ORAL at 18:15

## 2025-06-13 RX ADMIN — Medication 975 MILLIGRAM(S): at 12:25

## 2025-06-13 RX ADMIN — Medication 125 MICROGRAM(S): at 05:20

## 2025-06-13 RX ADMIN — Medication 1 MILLIGRAM(S): at 01:30

## 2025-06-13 RX ADMIN — AMPICILLIN SODIUM 200 GRAM(S): 1 INJECTION, POWDER, FOR SOLUTION INTRAMUSCULAR; INTRAVENOUS at 07:10

## 2025-06-13 RX ADMIN — FUROSEMIDE 20 MILLIGRAM(S): 10 INJECTION INTRAMUSCULAR; INTRAVENOUS at 19:15

## 2025-06-13 RX ADMIN — AMPICILLIN SODIUM 200 GRAM(S): 1 INJECTION, POWDER, FOR SOLUTION INTRAMUSCULAR; INTRAVENOUS at 21:39

## 2025-06-13 RX ADMIN — INSULIN LISPRO 4: 100 INJECTION, SOLUTION INTRAVENOUS; SUBCUTANEOUS at 17:59

## 2025-06-13 RX ADMIN — Medication 975 MILLIGRAM(S): at 23:43

## 2025-06-13 RX ADMIN — OXYCODONE HYDROCHLORIDE 5 MILLIGRAM(S): 30 TABLET ORAL at 18:00

## 2025-06-13 RX ADMIN — Medication 0.2 MILLIGRAM(S): at 13:59

## 2025-06-13 NOTE — PROGRESS NOTE ADULT - SUBJECTIVE AND OBJECTIVE BOX
B SURGERY PROGRESS NOTE ([id number])    SUBJECTIVE:  Patient retaining overnight, straight cath'd with 800 cc output. Notes no urge to void currently. otherwise pain well controlled. Denies nausea, denies vomiting. Passing gas, having BM    OBJECTIVE:  Vital Signs Last 24 Hrs  T(C): 36.8 (13 Jun 2025 06:00), Max: 36.9 (12 Jun 2025 17:38)  T(F): 98.2 (13 Jun 2025 06:00), Max: 98.5 (12 Jun 2025 17:38)  HR: 68 (13 Jun 2025 06:00) (58 - 68)  BP: 148/49 (13 Jun 2025 06:00) (124/40 - 150/50)  BP(mean): --  RR: 18 (13 Jun 2025 06:00) (18 - 19)  SpO2: 95% (13 Jun 2025 06:00) (94% - 100%)    Parameters below as of 13 Jun 2025 06:00  Patient On (Oxygen Delivery Method): room air        Physical Examination:  GEN: NAD, resting quietly  NEURO: AAOx3, CN II-XII grossly intact, no focal deficits  PULM: symmetric chest rise bilaterally, no increased WOB  ABD: soft, nontender, nondistended, incision CDI  EXTR: no lower extremity edema, moving all extremities

## 2025-06-13 NOTE — PROGRESS NOTE ADULT - SUBJECTIVE AND OBJECTIVE BOX
Timothy Mojica MD  Interventional Cardiology / Endovascular Specialist  [place] Office : [address] [place] [address] [place] [address]  Tel:   [place] Office : [address] [facility] [place] [address]  Tel: [phone]      Pt is lying in bed NAD    	  MEDICATIONS:  aspirin  chewable 81 milliGRAM(s) Oral daily  cloNIDine 0.2 milliGRAM(s) Oral three times a day  heparin   Injectable 5000 Unit(s) SubCutaneous every 8 hours  hydrALAZINE 100 milliGRAM(s) Oral three times a day  labetalol 100 milliGRAM(s) Oral two times a day    ampicillin  IVPB 2 Gram(s) IV Intermittent every 4 hours    albuterol/ipratropium for Nebulization 3 milliLiter(s) Nebulizer every 6 hours PRN  sodium chloride 3%  Inhalation 4 milliLiter(s) Inhalation every 12 hours    acetaminophen     Tablet .. 975 milliGRAM(s) Oral every 6 hours  oxyCODONE    IR 2.5 milliGRAM(s) Oral every 4 hours PRN  oxyCODONE    IR 5 milliGRAM(s) Oral every 4 hours PRN    pantoprazole    Tablet 40 milliGRAM(s) Oral before breakfast  polyethylene glycol 3350 17 Gram(s) Oral two times a day    dextrose 50% Injectable 25 Gram(s) IV Push once  dextrose 50% Injectable 12.5 Gram(s) IV Push once  dextrose 50% Injectable 25 Gram(s) IV Push once  dextrose Oral Gel 15 Gram(s) Oral once PRN  glucagon  Injectable 1 milliGRAM(s) IntraMuscular once  insulin lispro (ADMELOG) corrective regimen sliding scale   SubCutaneous at bedtime  insulin lispro (ADMELOG) corrective regimen sliding scale   SubCutaneous three times a day before meals  levothyroxine 125 MICROGram(s) Oral daily    dextrose 5%. 1000 milliLiter(s) IV Continuous <Continuous>  dextrose 5%. 1000 milliLiter(s) IV Continuous <Continuous>  lidocaine   4% Patch 1 Patch Transdermal daily  povidone iodine 10% Nasal Swab 1 Application(s) Both Nostrils once  tamsulosin 0.4 milliGRAM(s) Oral at bedtime      PAST MEDICAL/SURGICAL HISTORY  PAST MEDICAL & SURGICAL HISTORY:  HTN (hypertension)      HLD (hyperlipidemia)      DM (diabetes mellitus)      TIA (transient ischemic attack)      Prostate cancer      S/P prostatectomy      Pacemaker      H/O thyroidectomy          SOCIAL HISTORY: Substance Use (street drugs): ( x ) never used  (  ) other:    FAMILY HISTORY:  FHx: heart disease (Father, Mother)          PHYSICAL EXAM:  T(C): 37.3 (06-13-25 @ 13:50), Max: 37.3 (06-13-25 @ 13:50)  HR: 64 (06-13-25 @ 13:50) (58 - 75)  BP: 148/53 (06-13-25 @ 13:50) (124/40 - 150/50)  RR: 16 (06-13-25 @ 13:50) (16 - 18)  SpO2: 95% (06-13-25 @ 13:50) (94% - 100%)  Wt(kg): --  I&O's Summary    12 Jun 2025 07:01  -  13 Jun 2025 07:00  --------------------------------------------------------  IN: 915 mL / OUT: 1100 mL / NET: -185 mL    13 Jun 2025 07:01  -  13 Jun 2025 15:44  --------------------------------------------------------  IN: 0 mL / OUT: 700 mL / NET: -700 mL          GENERAL: NAD  EYES:  EOMI  ENMT: NGT Moist mucous membranes  Cardiovascular: Normal S1 S2, No JVD, No murmurs, +b/l LUE edema L>R, LE edema  Respiratory: Lungs clear to auscultation	  Gastrointestinal:  Soft, Non-tender, + BS	  Extremities: b/l UE, LE edema                                8.2    6.43  )-----------( 214      ( 13 Jun 2025 05:15 )             26.3     06-13    136  |  100  |  18  ----------------------------<  181[H]  3.8   |  26  |  1.26    Ca    7.3[L]      13 Jun 2025 05:15  Phos  1.8     06-13  Mg     2.00     06-13    TPro  5.1[L]  /  Alb  2.5[L]  /  TBili  0.4  /  DBili  <0.2  /  AST  17  /  ALT  28  /  AlkPhos  175[H]  06-13    proBNP:   Lipid Profile:   HgA1c:   TSH:     Consultant(s) Notes Reviewed:  [x ] YES  [ ] NO    Care Discussed with Consultants/Other Providers [ x] YES  [ ] NO    Imaging Personally Reviewed independently:  [x] YES  [ ] NO    All labs, radiologic studies, vitals, orders and medications list reviewed. Patient is seen and examined at bedside. Case discussed with medical team.

## 2025-06-13 NOTE — PROGRESS NOTE ADULT - ASSESSMENT
-EKG w/ SR, no significant STT changes   -ECHO w/ normal EF no significant valvular heart disease     A/P:  85M w/ HTN, HLD, T2DM, prostate CA, CKD, TIA and SND s/p PPM transferred here for lumbar spinal cord compression and bacteremia s/p PPM explant and implant of Micra PPM.    1. bacteremia  -s/p cholecystectomy  -c/w abx per ID, primary team  6/13 afebrile, continues on abx per ID    2. SND  -s/p leadless PPM w/ EP     3. HTN  -c/w clonidine, hydralazine, labetalol

## 2025-06-13 NOTE — PROGRESS NOTE ADULT - ASSESSMENT
85 year old man with multiple chronic medical comorbidities including hypertension, sinus node dysfunction, type two diabetes, and chronic kidney disease (baseline creatinine 1.5-2.0) presently admitted for lower back pain associated with lumbar compression deformities with concern for cauda equina impingement. He was found to be bacteremic (E faecalis) due to acute acalculous cholecystitis, and is now seen following laparoscopic cholecystectomy with Dr. Triny Verde 6/5/2025. POD 1 patient became hypotensive, unresponsive to 2L fluid resuscitation and 3 u pRBC and was started on vasopressors and transferred to SICU.Patient weaned off pressors, course c/b ileus now resolved, NGT removed 6/10. Midline placed 6/12 for IV abx    Plan:   - f/u TOV, start Flomax  - Diet: CCD  - c/w ASA81  - c/w Ampicillin,  midine placed for outpatient IV abx  - increase antihypertensives per cards  - ortho recs re: spinal surgery appreciated  - SQH for VTE ppx  - dispo planning    B team   [id number]

## 2025-06-14 LAB
ALBUMIN SERPL ELPH-MCNC: 2.5 G/DL — LOW (ref 3.3–5)
ALP SERPL-CCNC: 164 U/L — HIGH (ref 40–120)
ALT FLD-CCNC: 19 U/L — SIGNIFICANT CHANGE UP (ref 4–41)
ANION GAP SERPL CALC-SCNC: 10 MMOL/L — SIGNIFICANT CHANGE UP (ref 7–14)
AST SERPL-CCNC: 15 U/L — SIGNIFICANT CHANGE UP (ref 4–40)
BILIRUB DIRECT SERPL-MCNC: <0.2 MG/DL — SIGNIFICANT CHANGE UP (ref 0–0.3)
BILIRUB INDIRECT FLD-MCNC: >0.3 MG/DL — SIGNIFICANT CHANGE UP (ref 0–1)
BILIRUB SERPL-MCNC: 0.5 MG/DL — SIGNIFICANT CHANGE UP (ref 0.2–1.2)
BUN SERPL-MCNC: 17 MG/DL — SIGNIFICANT CHANGE UP (ref 7–23)
CALCIUM SERPL-MCNC: 7.2 MG/DL — LOW (ref 8.4–10.5)
CHLORIDE SERPL-SCNC: 98 MMOL/L — SIGNIFICANT CHANGE UP (ref 98–107)
CO2 SERPL-SCNC: 26 MMOL/L — SIGNIFICANT CHANGE UP (ref 22–31)
CREAT SERPL-MCNC: 1.3 MG/DL — SIGNIFICANT CHANGE UP (ref 0.5–1.3)
EGFR: 53 ML/MIN/1.73M2 — LOW
EGFR: 53 ML/MIN/1.73M2 — LOW
GLUCOSE BLDC GLUCOMTR-MCNC: 205 MG/DL — HIGH (ref 70–99)
GLUCOSE BLDC GLUCOMTR-MCNC: 221 MG/DL — HIGH (ref 70–99)
GLUCOSE SERPL-MCNC: 192 MG/DL — HIGH (ref 70–99)
HCT VFR BLD CALC: 26.1 % — LOW (ref 39–50)
HGB BLD-MCNC: 8.4 G/DL — LOW (ref 13–17)
MAGNESIUM SERPL-MCNC: 1.9 MG/DL — SIGNIFICANT CHANGE UP (ref 1.6–2.6)
MCHC RBC-ENTMCNC: 28.5 PG — SIGNIFICANT CHANGE UP (ref 27–34)
MCHC RBC-ENTMCNC: 32.2 G/DL — SIGNIFICANT CHANGE UP (ref 32–36)
MCV RBC AUTO: 88.5 FL — SIGNIFICANT CHANGE UP (ref 80–100)
NRBC # BLD AUTO: 0 K/UL — SIGNIFICANT CHANGE UP (ref 0–0)
NRBC # FLD: 0 K/UL — SIGNIFICANT CHANGE UP (ref 0–0)
NRBC BLD AUTO-RTO: 0 /100 WBCS — SIGNIFICANT CHANGE UP (ref 0–0)
PHOSPHATE SERPL-MCNC: 2 MG/DL — LOW (ref 2.5–4.5)
PLATELET # BLD AUTO: 197 K/UL — SIGNIFICANT CHANGE UP (ref 150–400)
POTASSIUM SERPL-MCNC: 3.7 MMOL/L — SIGNIFICANT CHANGE UP (ref 3.5–5.3)
POTASSIUM SERPL-SCNC: 3.7 MMOL/L — SIGNIFICANT CHANGE UP (ref 3.5–5.3)
PROT SERPL-MCNC: 5 G/DL — LOW (ref 6–8.3)
RBC # BLD: 2.95 M/UL — LOW (ref 4.2–5.8)
RBC # FLD: 16.4 % — HIGH (ref 10.3–14.5)
SODIUM SERPL-SCNC: 134 MMOL/L — LOW (ref 135–145)
WBC # BLD: 6.55 K/UL — SIGNIFICANT CHANGE UP (ref 3.8–10.5)
WBC # FLD AUTO: 6.55 K/UL — SIGNIFICANT CHANGE UP (ref 3.8–10.5)

## 2025-06-14 RX ORDER — MAGNESIUM SULFATE 500 MG/ML
2 SYRINGE (ML) INJECTION ONCE
Refills: 0 | Status: COMPLETED | OUTPATIENT
Start: 2025-06-14 | End: 2025-06-14

## 2025-06-14 RX ORDER — SOD PHOS DI, MONO/K PHOS MONO 250 MG
1 TABLET ORAL ONCE
Refills: 0 | Status: COMPLETED | OUTPATIENT
Start: 2025-06-14 | End: 2025-06-14

## 2025-06-14 RX ADMIN — HEPARIN SODIUM 5000 UNIT(S): 1000 INJECTION INTRAVENOUS; SUBCUTANEOUS at 21:13

## 2025-06-14 RX ADMIN — INSULIN LISPRO 4: 100 INJECTION, SOLUTION INTRAVENOUS; SUBCUTANEOUS at 17:24

## 2025-06-14 RX ADMIN — AMPICILLIN SODIUM 200 GRAM(S): 1 INJECTION, POWDER, FOR SOLUTION INTRAMUSCULAR; INTRAVENOUS at 06:10

## 2025-06-14 RX ADMIN — POLYETHYLENE GLYCOL 3350 17 GRAM(S): 17 POWDER, FOR SOLUTION ORAL at 17:26

## 2025-06-14 RX ADMIN — OXYCODONE HYDROCHLORIDE 5 MILLIGRAM(S): 30 TABLET ORAL at 18:30

## 2025-06-14 RX ADMIN — Medication 975 MILLIGRAM(S): at 17:25

## 2025-06-14 RX ADMIN — Medication 975 MILLIGRAM(S): at 12:10

## 2025-06-14 RX ADMIN — HEPARIN SODIUM 5000 UNIT(S): 1000 INJECTION INTRAVENOUS; SUBCUTANEOUS at 13:44

## 2025-06-14 RX ADMIN — INSULIN LISPRO 4: 100 INJECTION, SOLUTION INTRAVENOUS; SUBCUTANEOUS at 08:38

## 2025-06-14 RX ADMIN — Medication 100 MILLIGRAM(S): at 06:15

## 2025-06-14 RX ADMIN — HEPARIN SODIUM 5000 UNIT(S): 1000 INJECTION INTRAVENOUS; SUBCUTANEOUS at 06:14

## 2025-06-14 RX ADMIN — Medication 1 PACKET(S): at 10:34

## 2025-06-14 RX ADMIN — Medication 0.2 MILLIGRAM(S): at 13:43

## 2025-06-14 RX ADMIN — AMPICILLIN SODIUM 200 GRAM(S): 1 INJECTION, POWDER, FOR SOLUTION INTRAMUSCULAR; INTRAVENOUS at 08:39

## 2025-06-14 RX ADMIN — LIDOCAINE HYDROCHLORIDE 1 PATCH: 20 JELLY TOPICAL at 19:45

## 2025-06-14 RX ADMIN — AMPICILLIN SODIUM 200 GRAM(S): 1 INJECTION, POWDER, FOR SOLUTION INTRAMUSCULAR; INTRAVENOUS at 00:35

## 2025-06-14 RX ADMIN — OXYCODONE HYDROCHLORIDE 5 MILLIGRAM(S): 30 TABLET ORAL at 17:46

## 2025-06-14 RX ADMIN — OXYCODONE HYDROCHLORIDE 5 MILLIGRAM(S): 30 TABLET ORAL at 13:19

## 2025-06-14 RX ADMIN — Medication 81 MILLIGRAM(S): at 12:10

## 2025-06-14 RX ADMIN — Medication 975 MILLIGRAM(S): at 06:13

## 2025-06-14 RX ADMIN — Medication 1 PATCH: at 15:17

## 2025-06-14 RX ADMIN — Medication 125 MICROGRAM(S): at 06:16

## 2025-06-14 RX ADMIN — AMPICILLIN SODIUM 200 GRAM(S): 1 INJECTION, POWDER, FOR SOLUTION INTRAMUSCULAR; INTRAVENOUS at 21:13

## 2025-06-14 RX ADMIN — Medication 100 MILLIGRAM(S): at 21:13

## 2025-06-14 RX ADMIN — Medication 40 MILLIEQUIVALENT(S): at 10:34

## 2025-06-14 RX ADMIN — Medication 0.2 MILLIGRAM(S): at 06:14

## 2025-06-14 RX ADMIN — LABETALOL HYDROCHLORIDE 100 MILLIGRAM(S): 200 TABLET, FILM COATED ORAL at 06:15

## 2025-06-14 RX ADMIN — Medication 975 MILLIGRAM(S): at 12:55

## 2025-06-14 RX ADMIN — OXYCODONE HYDROCHLORIDE 5 MILLIGRAM(S): 30 TABLET ORAL at 14:00

## 2025-06-14 RX ADMIN — OXYCODONE HYDROCHLORIDE 5 MILLIGRAM(S): 30 TABLET ORAL at 09:30

## 2025-06-14 RX ADMIN — INSULIN LISPRO 4: 100 INJECTION, SOLUTION INTRAVENOUS; SUBCUTANEOUS at 12:08

## 2025-06-14 RX ADMIN — AMPICILLIN SODIUM 200 GRAM(S): 1 INJECTION, POWDER, FOR SOLUTION INTRAMUSCULAR; INTRAVENOUS at 12:14

## 2025-06-14 RX ADMIN — LIDOCAINE HYDROCHLORIDE 1 PATCH: 20 JELLY TOPICAL at 12:09

## 2025-06-14 RX ADMIN — Medication 25 GRAM(S): at 10:35

## 2025-06-14 RX ADMIN — TAMSULOSIN HYDROCHLORIDE 0.4 MILLIGRAM(S): 0.4 CAPSULE ORAL at 21:13

## 2025-06-14 RX ADMIN — Medication 975 MILLIGRAM(S): at 18:10

## 2025-06-14 RX ADMIN — AMPICILLIN SODIUM 200 GRAM(S): 1 INJECTION, POWDER, FOR SOLUTION INTRAMUSCULAR; INTRAVENOUS at 17:23

## 2025-06-14 RX ADMIN — Medication 975 MILLIGRAM(S): at 00:43

## 2025-06-14 RX ADMIN — LABETALOL HYDROCHLORIDE 100 MILLIGRAM(S): 200 TABLET, FILM COATED ORAL at 17:25

## 2025-06-14 RX ADMIN — Medication 100 MILLIGRAM(S): at 13:43

## 2025-06-14 RX ADMIN — OXYCODONE HYDROCHLORIDE 5 MILLIGRAM(S): 30 TABLET ORAL at 08:48

## 2025-06-14 RX ADMIN — Medication 40 MILLIGRAM(S): at 06:17

## 2025-06-14 NOTE — PROGRESS NOTE ADULT - SUBJECTIVE AND OBJECTIVE BOX
B SURGERY PROGRESS NOTE ([id number])    SUBJECTIVE:  no acute events overnight  got lasix  clonidine held for BP goals          Physical Examination:  GEN: NAD, resting quietly  NEURO: AAOx3, CN II-XII grossly intact, no focal deficits  PULM: symmetric chest rise bilaterally, no increased WOB  ABD: soft, nontender, nondistended, incision CDI  EXTR: no lower extremity edema, moving all extremities    Vital Signs Last 24 Hrs  T(C): 36.6 (14 Jun 2025 13:40), Max: 36.9 (14 Jun 2025 01:44)  T(F): 97.9 (14 Jun 2025 13:40), Max: 98.4 (14 Jun 2025 01:44)  HR: 59 (14 Jun 2025 13:40) (58 - 69)  BP: 164/50 (14 Jun 2025 13:40) (136/46 - 171/50)  BP(mean): --  RR: 18 (14 Jun 2025 13:40) (18 - 18)  SpO2: 99% (14 Jun 2025 13:40) (94% - 99%)    Parameters below as of 14 Jun 2025 13:40  Patient On (Oxygen Delivery Method): room air      Daily     Daily   I&O's Detail    13 Jun 2025 07:01  -  14 Jun 2025 07:00  --------------------------------------------------------  IN:    Oral Fluid: 320 mL  Total IN: 320 mL    OUT:    Indwelling Catheter - Urethral (mL): 1650 mL  Total OUT: 1650 mL    Total NET: -1330 mL      14 Jun 2025 07:01  -  14 Jun 2025 14:18  --------------------------------------------------------  IN:    Oral Fluid: 480 mL  Total IN: 480 mL    OUT:    Indwelling Catheter - Urethral (mL): 300 mL  Total OUT: 300 mL    Total NET: 180 mL                              8.4    6.55  )-----------( 197      ( 14 Jun 2025 05:35 )             26.1     06-14    134[L]  |  98  |  17  ----------------------------<  192[H]  3.7   |  26  |  1.30    Ca    7.2[L]      14 Jun 2025 05:35  Phos  2.0     06-14  Mg     1.90     06-14    TPro  5.0[L]  /  Alb  2.5[L]  /  TBili  0.5  /  DBili  <0.2  /  AST  15  /  ALT  19  /  AlkPhos  164[H]  06-14      Urinalysis Basic - ( 14 Jun 2025 05:35 )    Color: x / Appearance: x / SG: x / pH: x  Gluc: 192 mg/dL / Ketone: x  / Bili: x / Urobili: x   Blood: x / Protein: x / Nitrite: x   Leuk Esterase: x / RBC: x / WBC x   Sq Epi: x / Non Sq Epi: x / Bacteria: x

## 2025-06-14 NOTE — PROGRESS NOTE ADULT - ASSESSMENT
85 year old man with multiple chronic medical comorbidities including hypertension, sinus node dysfunction, type two diabetes, and chronic kidney disease (baseline creatinine 1.5-2.0) presently admitted for lower back pain associated with lumbar compression deformities with concern for cauda equina impingement. He was found to be bacteremic (E faecalis) due to acute acalculous cholecystitis, and is now seen following laparoscopic cholecystectomy with Dr. Triny Verde 6/5/2025. POD 1 patient became hypotensive, unresponsive to 2L fluid resuscitation and 3 u pRBC and was started on vasopressors and transferred to SICU.Patient weaned off pressors, course c/b ileus now resolved, NGT removed 6/10. Midline placed 6/12 for IV abx    Plan:   - f/u TOV, start Flomax  - Diet: CCD  - c/w ASA81  - c/w Ampicillin,  midine placed for outpatient IV abx  - increase antihypertensives per cards  - ortho recs re: spinal surgery appreciated  - SQH for VTE ppx  - dispo planning    B team   [id number]

## 2025-06-15 DIAGNOSIS — E03.9 HYPOTHYROIDISM, UNSPECIFIED: ICD-10-CM

## 2025-06-15 DIAGNOSIS — M43.9 DEFORMING DORSOPATHY, UNSPECIFIED: ICD-10-CM

## 2025-06-15 LAB
ALBUMIN SERPL ELPH-MCNC: 2.5 G/DL — LOW (ref 3.3–5)
ALP SERPL-CCNC: 152 U/L — HIGH (ref 40–120)
ALT FLD-CCNC: 16 U/L — SIGNIFICANT CHANGE UP (ref 4–41)
ANION GAP SERPL CALC-SCNC: 11 MMOL/L — SIGNIFICANT CHANGE UP (ref 7–14)
AST SERPL-CCNC: 12 U/L — SIGNIFICANT CHANGE UP (ref 4–40)
BILIRUB SERPL-MCNC: 0.4 MG/DL — SIGNIFICANT CHANGE UP (ref 0.2–1.2)
BUN SERPL-MCNC: 18 MG/DL — SIGNIFICANT CHANGE UP (ref 7–23)
CALCIUM SERPL-MCNC: 7.2 MG/DL — LOW (ref 8.4–10.5)
CHLORIDE SERPL-SCNC: 98 MMOL/L — SIGNIFICANT CHANGE UP (ref 98–107)
CO2 SERPL-SCNC: 24 MMOL/L — SIGNIFICANT CHANGE UP (ref 22–31)
CREAT SERPL-MCNC: 1.21 MG/DL — SIGNIFICANT CHANGE UP (ref 0.5–1.3)
EGFR: 58 ML/MIN/1.73M2 — LOW
EGFR: 58 ML/MIN/1.73M2 — LOW
GLUCOSE BLDC GLUCOMTR-MCNC: 197 MG/DL — HIGH (ref 70–99)
GLUCOSE BLDC GLUCOMTR-MCNC: 215 MG/DL — HIGH (ref 70–99)
GLUCOSE BLDC GLUCOMTR-MCNC: 215 MG/DL — HIGH (ref 70–99)
GLUCOSE BLDC GLUCOMTR-MCNC: 246 MG/DL — HIGH (ref 70–99)
GLUCOSE BLDC GLUCOMTR-MCNC: 281 MG/DL — HIGH (ref 70–99)
GLUCOSE SERPL-MCNC: 199 MG/DL — HIGH (ref 70–99)
HCT VFR BLD CALC: 26.1 % — LOW (ref 39–50)
HGB BLD-MCNC: 8.5 G/DL — LOW (ref 13–17)
MAGNESIUM SERPL-MCNC: 2.1 MG/DL — SIGNIFICANT CHANGE UP (ref 1.6–2.6)
MCHC RBC-ENTMCNC: 28.4 PG — SIGNIFICANT CHANGE UP (ref 27–34)
MCHC RBC-ENTMCNC: 32.6 G/DL — SIGNIFICANT CHANGE UP (ref 32–36)
MCV RBC AUTO: 87.3 FL — SIGNIFICANT CHANGE UP (ref 80–100)
NRBC # BLD AUTO: 0 K/UL — SIGNIFICANT CHANGE UP (ref 0–0)
NRBC # FLD: 0 K/UL — SIGNIFICANT CHANGE UP (ref 0–0)
NRBC BLD AUTO-RTO: 0 /100 WBCS — SIGNIFICANT CHANGE UP (ref 0–0)
PHOSPHATE SERPL-MCNC: 2 MG/DL — LOW (ref 2.5–4.5)
PLATELET # BLD AUTO: 220 K/UL — SIGNIFICANT CHANGE UP (ref 150–400)
POTASSIUM SERPL-MCNC: 4.3 MMOL/L — SIGNIFICANT CHANGE UP (ref 3.5–5.3)
POTASSIUM SERPL-SCNC: 4.3 MMOL/L — SIGNIFICANT CHANGE UP (ref 3.5–5.3)
PROT SERPL-MCNC: 4.9 G/DL — LOW (ref 6–8.3)
RBC # BLD: 2.99 M/UL — LOW (ref 4.2–5.8)
RBC # FLD: 16.7 % — HIGH (ref 10.3–14.5)
SODIUM SERPL-SCNC: 133 MMOL/L — LOW (ref 135–145)
WBC # BLD: 6.37 K/UL — SIGNIFICANT CHANGE UP (ref 3.8–10.5)
WBC # FLD AUTO: 6.37 K/UL — SIGNIFICANT CHANGE UP (ref 3.8–10.5)

## 2025-06-15 PROCEDURE — 99222 1ST HOSP IP/OBS MODERATE 55: CPT

## 2025-06-15 RX ORDER — BISACODYL 5 MG
10 TABLET, DELAYED RELEASE (ENTERIC COATED) ORAL ONCE
Refills: 0 | Status: COMPLETED | OUTPATIENT
Start: 2025-06-15 | End: 2025-06-15

## 2025-06-15 RX ORDER — OXYCODONE HYDROCHLORIDE 30 MG/1
5 TABLET ORAL EVERY 4 HOURS
Refills: 0 | Status: DISCONTINUED | OUTPATIENT
Start: 2025-06-15 | End: 2025-06-16

## 2025-06-15 RX ORDER — LEVOTHYROXINE SODIUM 300 MCG
150 TABLET ORAL DAILY
Refills: 0 | Status: DISCONTINUED | OUTPATIENT
Start: 2025-06-15 | End: 2025-06-16

## 2025-06-15 RX ORDER — INSULIN LISPRO 100 U/ML
INJECTION, SOLUTION INTRAVENOUS; SUBCUTANEOUS
Refills: 0 | Status: DISCONTINUED | OUTPATIENT
Start: 2025-06-15 | End: 2025-06-17

## 2025-06-15 RX ORDER — SENNA 187 MG
2 TABLET ORAL AT BEDTIME
Refills: 0 | Status: DISCONTINUED | OUTPATIENT
Start: 2025-06-15 | End: 2025-06-16

## 2025-06-15 RX ORDER — HYDROMORPHONE/SOD CHLOR,ISO/PF 2 MG/10 ML
0.2 SYRINGE (ML) INJECTION ONCE
Refills: 0 | Status: DISCONTINUED | OUTPATIENT
Start: 2025-06-15 | End: 2025-06-15

## 2025-06-15 RX ORDER — OXYCODONE HYDROCHLORIDE 30 MG/1
10 TABLET ORAL EVERY 4 HOURS
Refills: 0 | Status: DISCONTINUED | OUTPATIENT
Start: 2025-06-15 | End: 2025-06-16

## 2025-06-15 RX ORDER — INSULIN LISPRO 100 U/ML
3 INJECTION, SOLUTION INTRAVENOUS; SUBCUTANEOUS
Refills: 0 | Status: DISCONTINUED | OUTPATIENT
Start: 2025-06-15 | End: 2025-06-16

## 2025-06-15 RX ORDER — SODIUM PHOSPHATE,DIBASIC DIHYD
30 POWDER (GRAM) MISCELLANEOUS ONCE
Refills: 0 | Status: COMPLETED | OUTPATIENT
Start: 2025-06-15 | End: 2025-06-15

## 2025-06-15 RX ORDER — INSULIN GLARGINE-YFGN 100 [IU]/ML
10 INJECTION, SOLUTION SUBCUTANEOUS AT BEDTIME
Refills: 0 | Status: DISCONTINUED | OUTPATIENT
Start: 2025-06-15 | End: 2025-06-16

## 2025-06-15 RX ADMIN — Medication 975 MILLIGRAM(S): at 05:36

## 2025-06-15 RX ADMIN — OXYCODONE HYDROCHLORIDE 5 MILLIGRAM(S): 30 TABLET ORAL at 06:30

## 2025-06-15 RX ADMIN — Medication 975 MILLIGRAM(S): at 18:20

## 2025-06-15 RX ADMIN — TAMSULOSIN HYDROCHLORIDE 0.4 MILLIGRAM(S): 0.4 CAPSULE ORAL at 22:45

## 2025-06-15 RX ADMIN — Medication 975 MILLIGRAM(S): at 13:00

## 2025-06-15 RX ADMIN — OXYCODONE HYDROCHLORIDE 10 MILLIGRAM(S): 30 TABLET ORAL at 20:00

## 2025-06-15 RX ADMIN — Medication 125 MICROGRAM(S): at 05:35

## 2025-06-15 RX ADMIN — INSULIN LISPRO 3 UNIT(S): 100 INJECTION, SOLUTION INTRAVENOUS; SUBCUTANEOUS at 17:12

## 2025-06-15 RX ADMIN — Medication 10 MILLIGRAM(S): at 16:47

## 2025-06-15 RX ADMIN — OXYCODONE HYDROCHLORIDE 5 MILLIGRAM(S): 30 TABLET ORAL at 15:00

## 2025-06-15 RX ADMIN — INSULIN LISPRO 2: 100 INJECTION, SOLUTION INTRAVENOUS; SUBCUTANEOUS at 17:13

## 2025-06-15 RX ADMIN — LIDOCAINE HYDROCHLORIDE 1 PATCH: 20 JELLY TOPICAL at 12:22

## 2025-06-15 RX ADMIN — AMPICILLIN SODIUM 200 GRAM(S): 1 INJECTION, POWDER, FOR SOLUTION INTRAMUSCULAR; INTRAVENOUS at 10:06

## 2025-06-15 RX ADMIN — AMPICILLIN SODIUM 200 GRAM(S): 1 INJECTION, POWDER, FOR SOLUTION INTRAMUSCULAR; INTRAVENOUS at 18:14

## 2025-06-15 RX ADMIN — Medication 81 MILLIGRAM(S): at 12:23

## 2025-06-15 RX ADMIN — OXYCODONE HYDROCHLORIDE 5 MILLIGRAM(S): 30 TABLET ORAL at 14:21

## 2025-06-15 RX ADMIN — AMPICILLIN SODIUM 200 GRAM(S): 1 INJECTION, POWDER, FOR SOLUTION INTRAMUSCULAR; INTRAVENOUS at 23:54

## 2025-06-15 RX ADMIN — Medication 0.2 MILLIGRAM(S): at 22:45

## 2025-06-15 RX ADMIN — Medication 40 MILLIGRAM(S): at 05:39

## 2025-06-15 RX ADMIN — Medication 2 TABLET(S): at 22:48

## 2025-06-15 RX ADMIN — Medication 975 MILLIGRAM(S): at 00:34

## 2025-06-15 RX ADMIN — Medication 100 MILLIGRAM(S): at 05:34

## 2025-06-15 RX ADMIN — LABETALOL HYDROCHLORIDE 100 MILLIGRAM(S): 200 TABLET, FILM COATED ORAL at 05:40

## 2025-06-15 RX ADMIN — OXYCODONE HYDROCHLORIDE 5 MILLIGRAM(S): 30 TABLET ORAL at 06:03

## 2025-06-15 RX ADMIN — Medication 0.2 MILLIGRAM(S): at 23:54

## 2025-06-15 RX ADMIN — LABETALOL HYDROCHLORIDE 100 MILLIGRAM(S): 200 TABLET, FILM COATED ORAL at 17:40

## 2025-06-15 RX ADMIN — INSULIN LISPRO 2: 100 INJECTION, SOLUTION INTRAVENOUS; SUBCUTANEOUS at 08:42

## 2025-06-15 RX ADMIN — LIDOCAINE HYDROCHLORIDE 1 PATCH: 20 JELLY TOPICAL at 19:49

## 2025-06-15 RX ADMIN — OXYCODONE HYDROCHLORIDE 5 MILLIGRAM(S): 30 TABLET ORAL at 10:53

## 2025-06-15 RX ADMIN — AMPICILLIN SODIUM 200 GRAM(S): 1 INJECTION, POWDER, FOR SOLUTION INTRAMUSCULAR; INTRAVENOUS at 13:30

## 2025-06-15 RX ADMIN — HEPARIN SODIUM 5000 UNIT(S): 1000 INJECTION INTRAVENOUS; SUBCUTANEOUS at 22:46

## 2025-06-15 RX ADMIN — INSULIN LISPRO 6: 100 INJECTION, SOLUTION INTRAVENOUS; SUBCUTANEOUS at 12:23

## 2025-06-15 RX ADMIN — AMPICILLIN SODIUM 200 GRAM(S): 1 INJECTION, POWDER, FOR SOLUTION INTRAMUSCULAR; INTRAVENOUS at 05:41

## 2025-06-15 RX ADMIN — OXYCODONE HYDROCHLORIDE 5 MILLIGRAM(S): 30 TABLET ORAL at 01:51

## 2025-06-15 RX ADMIN — POLYETHYLENE GLYCOL 3350 17 GRAM(S): 17 POWDER, FOR SOLUTION ORAL at 17:40

## 2025-06-15 RX ADMIN — HEPARIN SODIUM 5000 UNIT(S): 1000 INJECTION INTRAVENOUS; SUBCUTANEOUS at 05:39

## 2025-06-15 RX ADMIN — INSULIN GLARGINE-YFGN 10 UNIT(S): 100 INJECTION, SOLUTION SUBCUTANEOUS at 22:45

## 2025-06-15 RX ADMIN — Medication 0.2 MILLIGRAM(S): at 13:30

## 2025-06-15 RX ADMIN — OXYCODONE HYDROCHLORIDE 5 MILLIGRAM(S): 30 TABLET ORAL at 16:47

## 2025-06-15 RX ADMIN — OXYCODONE HYDROCHLORIDE 5 MILLIGRAM(S): 30 TABLET ORAL at 17:30

## 2025-06-15 RX ADMIN — OXYCODONE HYDROCHLORIDE 5 MILLIGRAM(S): 30 TABLET ORAL at 10:07

## 2025-06-15 RX ADMIN — Medication 975 MILLIGRAM(S): at 06:00

## 2025-06-15 RX ADMIN — Medication 975 MILLIGRAM(S): at 01:05

## 2025-06-15 RX ADMIN — OXYCODONE HYDROCHLORIDE 5 MILLIGRAM(S): 30 TABLET ORAL at 02:30

## 2025-06-15 RX ADMIN — Medication 975 MILLIGRAM(S): at 12:22

## 2025-06-15 RX ADMIN — Medication 85 MILLIMOLE(S): at 14:21

## 2025-06-15 RX ADMIN — HEPARIN SODIUM 5000 UNIT(S): 1000 INJECTION INTRAVENOUS; SUBCUTANEOUS at 13:30

## 2025-06-15 RX ADMIN — Medication 100 MILLIGRAM(S): at 13:29

## 2025-06-15 RX ADMIN — Medication 975 MILLIGRAM(S): at 17:40

## 2025-06-15 RX ADMIN — AMPICILLIN SODIUM 200 GRAM(S): 1 INJECTION, POWDER, FOR SOLUTION INTRAMUSCULAR; INTRAVENOUS at 00:36

## 2025-06-15 RX ADMIN — POLYETHYLENE GLYCOL 3350 17 GRAM(S): 17 POWDER, FOR SOLUTION ORAL at 05:38

## 2025-06-15 RX ADMIN — Medication 100 MILLIGRAM(S): at 22:45

## 2025-06-15 NOTE — PROGRESS NOTE ADULT - ASSESSMENT
85 year old man with multiple chronic medical comorbidities including hypertension, sinus node dysfunction, type two diabetes, and chronic kidney disease (baseline creatinine 1.5-2.0) presently admitted for lower back pain associated with lumbar compression deformities with concern for cauda equina impingement. He was found to be bacteremic (E faecalis) due to acute acalculous cholecystitis, and is now seen following laparoscopic cholecystectomy with Dr. Triny Verde 6/5/2025. POD 1 patient became hypotensive, unresponsive to 2L fluid resuscitation and 3 u pRBC and was started on vasopressors and transferred to SICU.Patient weaned off pressors, course c/b ileus now resolved, NGT removed 6/10. Midline placed 6/12 for IV abx    Plan:   - f/u abdominal pain after dulcolax, senna, and miralax  - Diet: CCD  - c/w [id number]  - c/w Ampicillin,  midine placed for outpatient IV abx  - will be d/c'd with wong   - increase antihypertensives per cards  - ortho recs re: spinal surgery appreciated  - [facility] for VTE ppx  - dispo planning - [facility]    B team   [id number]

## 2025-06-15 NOTE — PROGRESS NOTE ADULT - SUBJECTIVE AND OBJECTIVE BOX
B TEAM SURGERY DAILY PROGRESS NOTE      SUBJECTIVE: Pt seen at bedside this morning and afternoon. Reports abdominal pain localized RLQ, last BM yesterday. Passing gas. Constipated at baseline per daughter. Miralax previously ordered; added senna and suppository. Denies chest pain, shortness of breath, nausea, vomiting.    Physical Exam:  GEN: NAD, resting quietly  NEURO: AAOx3, CN II-XII grossly intact, no focal deficits  PULM: symmetric chest rise bilaterally, no increased WOB  ABD: soft, nondistended, incision CDI; RLQ tenderness w/o guarding or rebound tenderness  EXTR: no lower extremity edema, moving all extremities    Vital Signs Last 24 Hrs  T(C): 36.3 (15 Heriberto 2025 18:03), Max: 36.9 (15 Heriberto 2025 13:25)  T(F): 97.4 (15 Heriberto 2025 18:03), Max: 98.4 (15 Heriberto 2025 13:25)  HR: 62 (15 Heriberto 2025 18:03) (51 - 84)  BP: 172/83 (15 Heriberto 2025 18:03) (106/58 - 172/83)  BP(mean): --  RR: 18 (15 Heriberto 2025 18:03) (18 - 19)  SpO2: 95% (15 Heriberto 2025 18:03) (94% - 100%)    Parameters below as of 15 Heriberto 2025 18:03  Patient On (Oxygen Delivery Method): room air        I&O's Summary    14 Jun 2025 07:01  -  15 Heriberto 2025 07:00  --------------------------------------------------------  IN: 680 mL / OUT: 1270 mL / NET: -590 mL    15 Heriberto 2025 07:01  -  15 Heriberto 2025 18:13  --------------------------------------------------------  IN: 480 mL / OUT: 560 mL / NET: -80 mL          LABS:                        8.5    6.37  )-----------( 220      ( 15 Heriberto 2025 06:55 )             26.1     06-15    133[L]  |  98  |  18  ----------------------------<  199[H]  4.3   |  24  |  1.21    Ca    7.2[L]      15 Heriberto 2025 06:55  Phos  2.0     06-15  Mg     2.10     06-15    TPro  4.9[L]  /  Alb  2.5[L]  /  TBili  0.4  /  DBili  x   /  AST  12  /  ALT  16  /  AlkPhos  152[H]  06-15      Urinalysis Basic - ( 15 Heriberto 2025 06:55 )    Color: x / Appearance: x / SG: x / pH: x  Gluc: 199 mg/dL / Ketone: x  / Bili: x / Urobili: x   Blood: x / Protein: x / Nitrite: x   Leuk Esterase: x / RBC: x / WBC x   Sq Epi: x / Non Sq Epi: x / Bacteria: x        RADIOLOGY & ADDITIONAL STUDIES:

## 2025-06-15 NOTE — CONSULT NOTE ADULT - ASSESSMENT
A/P: 85 year old man with multiple chronic medical comorbidities including hypertension, sinus node dysfunction, type two diabetes, and chronic kidney disease (baseline creatinine 1.5-2.0) presently admitted for lower back pain associated with lumbar compression deformities with concern for cauda equina impingement. He was found to be bacteremic (E faecalis) due to acute acalculous cholecystitis, and is now seen following laparoscopic cholecystectomy with Dr. Triny Verde 6/5/2025. POD 1 patient became hypotensive, unresponsive to 2L fluid resuscitation and 3 u pRBC and was started on vasopressors and transferred to SICU.Patient weaned off pressors, course c/b ileus now resolved, Endocrinology was consulted for management of diabetes mellitus.    #Type 2 Diabetes Mellitus  - HbA1c 6.9% ; home regimen:  lantus 24 units qhs, admelog 12 units + tradjenta 5 mg  - Recommend lantus 10 units qhs  - Recommend admelog 3 units TIDQAC  - Recommend low dose admelog correction scale TIDQAC and QHS  - Please check FSG before meals and QHS, or q6h while NPO  - hypoglycemia orderset prn  - Discharge planning:  Lantus 10 units qhs  Hold admelog  resume tradjenta 5 mg daily    #Hypertension  - BP goal <130/80  - Management as per primary team  - outpt umcr    #Hyperlipidemia  - check fasting lipid panel outpt  -not on statin    Kristi Doyle MD  Attending Physician   Department of Endocrinology, Diabetes and Metabolism   [employer] Teams for 06-15-25 @ 13:04.    If before 9AM or after 5PM, or on weekends/holidays, please call the Endocrine answering service for assistance ([phone]).  For nonurgent matters, please email [email or web address] for assistance.     Please note that a different provider may be following this patient each day.          A/P: 85 year old man with multiple chronic medical comorbidities including hypertension, sinus node dysfunction, type two diabetes, and chronic kidney disease (baseline creatinine 1.5-2.0) presently admitted for lower back pain associated with lumbar compression deformities with concern for cauda equina impingement. He was found to be bacteremic (E faecalis) due to acute acalculous cholecystitis, and is now seen following laparoscopic cholecystectomy with Dr. Triny Verde 6/5/2025. POD 1 patient became hypotensive, unresponsive to 2L fluid resuscitation and 3 u pRBC and was started on vasopressors and transferred to SICU.Patient weaned off pressors, course c/b ileus now resolved, Endocrinology was consulted for management of diabetes mellitus.    #Type 2 Diabetes Mellitus  - HbA1c 6.9% ; home regimen:  lantus 24 units qhs, admelog 12 units + tradjenta 5 mg  poor appetite  not receiving lantus  - Recommend lantus 10 units qhs  - Recommend admelog 3 units TIDQAC  - Recommend low dose admelog correction scale TIDQAC and QHS for now  - Please check FSG before meals and QHS, or q6h while NPO  - hypoglycemia orderset prn  - Discharge planning:  tentatively  Lantus 10 units qhs  Hold admelog  resume tradjenta 5 mg daily  f/u with Dr. Cates    #Hypertension  - BP goal <130/80  - Management as per primary team  - outpt umcr    #Hyperlipidemia  - check fasting lipid panel outpt  -not on statin    #hypothyroidism  - changed order to home dose of levothyroxine 150 mcg daily    #Compression deformities (lumbar)  - outpatient DXA    Kristi Doyle MD  Attending Physician   Department of Endocrinology, Diabetes and Metabolism   [facility] Teams for 06-15-25 @ 13:04.    If before 9AM or after 5PM, or on weekends/holidays, please call the Endocrine answering service for assistance ([phone]).  For nonurgent matters, please email [email or web address] for assistance.     Please note that a different provider may be following this patient each day.

## 2025-06-15 NOTE — CONSULT NOTE ADULT - PROBLEM SELECTOR PROBLEM 5
Due to low magnesium, lis Church recommends:  Change magnesium carbonate (current mag supplement) to Magnesium Sulfate 50% solution 5 ml bid.  There will be a delay in making this change due to insurance requirement to use [facility] pharmacy.    Bonnie, please send script to [facility] pharmacy and let her know that when she receives to new Mag Sulfate solution she should discontinue to Mag Carbonate.  This may take a while to get so we will continue to treat with IV mag supplements as necessary.   Compression deformity of vertebra

## 2025-06-15 NOTE — CONSULT NOTE ADULT - SUBJECTIVE AND OBJECTIVE BOX
HPI:  Pt is an 85M with HTN, HLD, DM2, prostate cancer s/p prostatectomy, CKD, TIA with a pacemaker who presents transferred from [place] for further evaluation of low back pain. The pain began when he was going to the bathroom and worsened when he was in bed and rolled over. The pain is worse when he is walking or moving. He has never had back pain like this before. Patient normally ambulates with a walker or with a cane. Of note, patient endorses a fall in February where he fell into a pile of snow. Patient denies any injury after that event and did not have any imaging at that time. He usually has one BM every 3-4 days and this has not changed. Wakes up 2-3x/night to urinate. No episodes of bladder or bowel incontinence. He says he has diabetic neuropathy and has seen pain management. Says he has tried gabapentin but it made his legs very weak and he almost fell.    At [place], CT showed multiple lumbar compression deformities. MRI was unable to be performed 2/2 ppm compatibility. He was transferred to [facility] for MRI and orthopedic eval.  (11 May 2025 03:10)    85 year old man with multiple chronic medical comorbidities including hypertension, sinus node dysfunction, type two diabetes, and chronic kidney disease (baseline creatinine 1.5-2.0) presently admitted for lower back pain associated with lumbar compression deformities with concern for cauda equina impingement. He was found to be bacteremic (E faecalis) due to acute acalculous cholecystitis, and is now seen following laparoscopic cholecystectomy with Dr. Triny Verde 6/5/2025. POD 1 patient became hypotensive, unresponsive to 2L fluid resuscitation and 3 u pRBC and was started on vasopressors and transferred to SICU.Patient weaned off pressors, course c/b ileus now resolved, NGT removed 6/10. Midline placed 6/12 for IV abx.      Reason for consult: dm2  HPI:  85 year old man with multiple chronic medical comorbidities including hypertension, sinus node dysfunction, type two diabetes, and chronic kidney disease (baseline creatinine 1.5-2.0) presently admitted for lower back pain associated with lumbar compression deformities with concern for cauda equina impingement. He was found to be bacteremic (E faecalis) due to acute acalculous cholecystitis, and is now seen following laparoscopic cholecystectomy with Dr. Triny Verde 6/5/2025. POD 1 patient became hypotensive, unresponsive to 2L fluid resuscitation and 3 u pRBC and was started on vasopressors and transferred to SICU.Patient weaned off pressors, course c/b ileus now resolved, Endocrinology was consulted for management of diabetes mellitus.    Patient was diagnosed with *** in ***. Has been on insulin for *** years. Has been hospitalized for hyperglycemia/DKA before.   Diabetes complications include:  Reports family history of DM in ***.  Planned for [facility] 6/16.  Denies blurry vision, polyuria, polydipsia, and paresthesias.    Endocrinologist:    Last HbA1c: 6.9%      Home DM regimen: lantus 24 units qhs, admelog 12 units +tradjenta 5 mg        PAST MEDICAL & SURGICAL HISTORY:  HTN (hypertension)      HLD (hyperlipidemia)      DM (diabetes mellitus)      TIA (transient ischemic attack)      Prostate cancer      S/P prostatectomy      Pacemaker      H/O thyroidectomy          FAMILY HISTORY:  FHx: heart disease (Father, Mother)        Social History:  no tobacco, etoh or drug use    MEDICATIONS  (STANDING):  acetaminophen     Tablet .. 975 milliGRAM(s) Oral every 6 hours  ampicillin  IVPB 2 Gram(s) IV Intermittent every 4 hours  aspirin  chewable 81 milliGRAM(s) Oral daily  cloNIDine 0.2 milliGRAM(s) Oral three times a day  dextrose 5%. 1000 milliLiter(s) (50 mL/Hr) IV Continuous <Continuous>  dextrose 5%. 1000 milliLiter(s) (100 mL/Hr) IV Continuous <Continuous>  dextrose 50% Injectable 25 Gram(s) IV Push once  dextrose 50% Injectable 12.5 Gram(s) IV Push once  dextrose 50% Injectable 25 Gram(s) IV Push once  glucagon  Injectable 1 milliGRAM(s) IntraMuscular once  heparin   Injectable 5000 Unit(s) SubCutaneous every 8 hours  hydrALAZINE 100 milliGRAM(s) Oral three times a day  insulin glargine Injectable (LANTUS) 10 Unit(s) SubCutaneous at bedtime  insulin lispro (ADMELOG) corrective regimen sliding scale   SubCutaneous three times a day before meals  insulin lispro (ADMELOG) corrective regimen sliding scale   SubCutaneous at bedtime  insulin lispro Injectable (ADMELOG) 3 Unit(s) SubCutaneous three times a day before meals  labetalol 100 milliGRAM(s) Oral two times a day  levothyroxine 125 MICROGram(s) Oral daily  lidocaine   4% Patch 1 Patch Transdermal daily  pantoprazole    Tablet 40 milliGRAM(s) Oral before breakfast  polyethylene glycol 3350 17 Gram(s) Oral two times a day  sodium chloride 3%  Inhalation 4 milliLiter(s) Inhalation every 12 hours  tamsulosin 0.4 milliGRAM(s) Oral at bedtime    MEDICATIONS  (PRN):  albuterol/ipratropium for Nebulization 3 milliLiter(s) Nebulizer every 6 hours PRN Shortness of Breath and/or Wheezing  dextrose Oral Gel 15 Gram(s) Oral once PRN Blood Glucose LESS THAN 70 milliGRAM(s)/deciliter  oxyCODONE    IR 2.5 milliGRAM(s) Oral every 4 hours PRN Moderate Pain (4 - 6)  oxyCODONE    IR 5 milliGRAM(s) Oral every 4 hours PRN Severe Pain (7 - 10)      Allergies    gabapentin (Other)    Intolerances        Review of Systems:  Constitutional: No fever, good appetite/po intake  Eyes: No blurry vision, diplopia  Neuro: No tremors  HEENT: No pain  Cardiovascular: No chest pain, palpitations  Respiratory: No SOB, no cough  GI: No nausea, vomiting,   : No dysuria, hematuria  Skin: no rash  Psych: no depression  Endocrine: no polyuria, polydipsia  Hem/lymph: no swelling  Osteoporosis: no fractures    ALL OTHER SYSTEMS REVIEWED AND NEGATIVE    PHYSICAL EXAM:  VITALS: T(C): 36.8 (06-15-25 @ 09:00)  T(F): 98.2 (06-15-25 @ 09:00), Max: 98.2 (06-15-25 @ 09:00)  HR: 69 (06-15-25 @ 09:00) (51 - 69)  BP: 123/77 (06-15-25 @ 09:00) (106/58 - 164/50)  RR:  (18 - 19)  SpO2:  (94% - 100%)  Wt(kg): --  GENERAL: NAD, well-groomed, well-developed  EYES: No proptosis, extraocular movements intact,  no lid lag, anicteric  HEENT:  Atraumatic, Normocephalic, moist mucous membranes  THYROID: Normal size, no palpable nodules, no thyromegaly  RESPIRATORY: Clear to auscultation bilaterally; No rales, rhonchi, wheezing, or rubs  CARDIOVASCULAR: Regular rate and rhythm; No murmurs; no peripheral edema  GI: Soft, nontender, non distended, normal bowel sounds  SKIN: Dry, intact, No rashes or lesions  EXTREMITIES: No foot ulcers, distal pedal pulses intact bilaterally  NEURO: sensation intact, no tremors  PSYCH: reactive affect, euthymic mood  CUSHING'S SIGNS: no striae or visible bruising                              8.5    6.37  )-----------( 220      ( 15 Heriberto 2025 06:55 )             26.1       06-15    133[L]  |  98  |  18  ----------------------------<  199[H]  4.3   |  24  |  1.21    eGFR: 58[L]    Ca    7.2[L]      06-15  Mg     2.10     06-15  Phos  2.0     06-15    TPro  4.9[L]  /  Alb  2.5[L]  /  TBili  0.4  /  DBili  x   /  AST  12  /  ALT  16  /  AlkPhos  152[H]  06-15      Thyroid Function Tests:          Radiology:        HPI:  Pt is an 85M with HTN, HLD, DM2, prostate cancer s/p prostatectomy, CKD, TIA with a pacemaker who presents transferred from [place] for further evaluation of low back pain. The pain began when he was going to the bathroom and worsened when he was in bed and rolled over. The pain is worse when he is walking or moving. He has never had back pain like this before. Patient normally ambulates with a walker or with a cane. Of note, patient endorses a fall in February where he fell into a pile of snow. Patient denies any injury after that event and did not have any imaging at that time. He usually has one BM every 3-4 days and this has not changed. Wakes up 2-3x/night to urinate. No episodes of bladder or bowel incontinence. He says he has diabetic neuropathy and has seen pain management. Says he has tried gabapentin but it made his legs very weak and he almost fell.    At [place], CT showed multiple lumbar compression deformities. MRI was unable to be performed 2/2 ppm compatibility. He was transferred to [facility] for MRI and orthopedic eval.  (11 May 2025 03:10)    85 year old man with multiple chronic medical comorbidities including hypertension, sinus node dysfunction, type two diabetes, and chronic kidney disease (baseline creatinine 1.5-2.0) presently admitted for lower back pain associated with lumbar compression deformities with concern for cauda equina impingement. He was found to be bacteremic (E faecalis) due to acute acalculous cholecystitis, and is now seen following laparoscopic cholecystectomy with Dr. Triny Verde 6/5/2025. POD 1 patient became hypotensive, unresponsive to 2L fluid resuscitation and 3 u pRBC and was started on vasopressors and transferred to SICU.Patient weaned off pressors, course c/b ileus now resolved, NGT removed 6/10. Midline placed 6/12 for IV abx.      Reason for consult: dm2  HPI:  85 year old man with multiple chronic medical comorbidities including hypertension, sinus node dysfunction, type two diabetes, and chronic kidney disease (baseline creatinine 1.5-2.0) presently admitted for lower back pain associated with lumbar compression deformities with concern for cauda equina impingement. He was found to be bacteremic (E faecalis) due to acute acalculous cholecystitis, and is now seen following laparoscopic cholecystectomy with Dr. Triny Verde 6/5/2025. POD 1 patient became hypotensive, unresponsive to 2L fluid resuscitation and 3 u pRBC and was started on vasopressors and transferred to SICU.Patient weaned off pressors, course c/b ileus now resolved, Endocrinology was consulted for management of diabetes mellitus.    Patient was diagnosed with DM2 in 1982.  Diabetes complications include: CKD  Reports family history of DM in many family members.  Planned for [facility] 6/16.  Denies blurry vision, polyuria, polydipsia, and paresthesias.  Low appetite.    Endocrinologist: Dr. John Cates    Last HbA1c: 6.9%      Home DM regimen: lantus 24 units qhs, admelog 12 units +tradjenta 5 mg        PAST MEDICAL & SURGICAL HISTORY:  HTN (hypertension)      HLD (hyperlipidemia)      DM (diabetes mellitus)      TIA (transient ischemic attack)      Prostate cancer      S/P prostatectomy      Pacemaker      H/O thyroidectomy          FAMILY HISTORY:  FHx: heart disease (Father, Mother)        Social History:  no tobacco, etoh or drug use    MEDICATIONS  (STANDING):  acetaminophen     Tablet .. 975 milliGRAM(s) Oral every 6 hours  ampicillin  IVPB 2 Gram(s) IV Intermittent every 4 hours  aspirin  chewable 81 milliGRAM(s) Oral daily  cloNIDine 0.2 milliGRAM(s) Oral three times a day  dextrose 5%. 1000 milliLiter(s) (50 mL/Hr) IV Continuous <Continuous>  dextrose 5%. 1000 milliLiter(s) (100 mL/Hr) IV Continuous <Continuous>  dextrose 50% Injectable 25 Gram(s) IV Push once  dextrose 50% Injectable 12.5 Gram(s) IV Push once  dextrose 50% Injectable 25 Gram(s) IV Push once  glucagon  Injectable 1 milliGRAM(s) IntraMuscular once  heparin   Injectable 5000 Unit(s) SubCutaneous every 8 hours  hydrALAZINE 100 milliGRAM(s) Oral three times a day  insulin glargine Injectable (LANTUS) 10 Unit(s) SubCutaneous at bedtime  insulin lispro (ADMELOG) corrective regimen sliding scale   SubCutaneous three times a day before meals  insulin lispro (ADMELOG) corrective regimen sliding scale   SubCutaneous at bedtime  insulin lispro Injectable (ADMELOG) 3 Unit(s) SubCutaneous three times a day before meals  labetalol 100 milliGRAM(s) Oral two times a day  levothyroxine 125 MICROGram(s) Oral daily  lidocaine   4% Patch 1 Patch Transdermal daily  pantoprazole    Tablet 40 milliGRAM(s) Oral before breakfast  polyethylene glycol 3350 17 Gram(s) Oral two times a day  sodium chloride 3%  Inhalation 4 milliLiter(s) Inhalation every 12 hours  tamsulosin 0.4 milliGRAM(s) Oral at bedtime    MEDICATIONS  (PRN):  albuterol/ipratropium for Nebulization 3 milliLiter(s) Nebulizer every 6 hours PRN Shortness of Breath and/or Wheezing  dextrose Oral Gel 15 Gram(s) Oral once PRN Blood Glucose LESS THAN 70 milliGRAM(s)/deciliter  oxyCODONE    IR 2.5 milliGRAM(s) Oral every 4 hours PRN Moderate Pain (4 - 6)  oxyCODONE    IR 5 milliGRAM(s) Oral every 4 hours PRN Severe Pain (7 - 10)      Allergies    gabapentin (Other)    Intolerances        Review of Systems:  Constitutional: No fever, good appetite/po intake  Eyes: No blurry vision, diplopia  Neuro: No tremors  HEENT: No pain  Cardiovascular: No chest pain, palpitations  Respiratory: No SOB, no cough  GI: No nausea, vomiting,   : No dysuria, hematuria  Skin: no rash  Psych: no depression  Endocrine: no polyuria, polydipsia  Hem/lymph: no swelling  Osteoporosis: no fractures    ALL OTHER SYSTEMS REVIEWED AND NEGATIVE    PHYSICAL EXAM:  VITALS: T(C): 36.8 (06-15-25 @ 09:00)  T(F): 98.2 (06-15-25 @ 09:00), Max: 98.2 (06-15-25 @ 09:00)  HR: 69 (06-15-25 @ 09:00) (51 - 69)  BP: 123/77 (06-15-25 @ 09:00) (106/58 - 164/50)  RR:  (18 - 19)  SpO2:  (94% - 100%)  Wt(kg): --  GENERAL: NAD, well-groomed, well-developed  EYES: No proptosis, extraocular movements intact,  no lid lag, anicteric  HEENT:  Atraumatic, Normocephalic, moist mucous membranes  THYROID: Normal size, no thyromegaly  RESPIRATORY: Clear to auscultation bilaterally; No rales, rhonchi, wheezing, or rubs  CARDIOVASCULAR: Regular rate and rhythm; No murmurs; no peripheral edema  GI: Soft, nontender, non distended, normal bowel sounds  SKIN: Dry, intact, No rashes or lesions  NEURO: sensation intact, no tremors  PSYCH: reactive affect, euthymic mood  CUSHING'S SIGNS: no striae or visible bruising                              8.5    6.37  )-----------( 220      ( 15 Heriberto 2025 06:55 )             26.1       06-15    133[L]  |  98  |  18  ----------------------------<  199[H]  4.3   |  24  |  1.21    eGFR: 58[L]    Ca    7.2[L]      06-15  Mg     2.10     06-15  Phos  2.0     06-15    TPro  4.9[L]  /  Alb  2.5[L]  /  TBili  0.4  /  DBili  x   /  AST  12  /  ALT  16  /  AlkPhos  152[H]  06-15      Thyroid Function Tests:          Radiology:

## 2025-06-16 LAB
ANION GAP SERPL CALC-SCNC: 12 MMOL/L — SIGNIFICANT CHANGE UP (ref 7–14)
BUN SERPL-MCNC: 18 MG/DL — SIGNIFICANT CHANGE UP (ref 7–23)
CALCIUM SERPL-MCNC: 7.2 MG/DL — LOW (ref 8.4–10.5)
CHLORIDE SERPL-SCNC: 95 MMOL/L — LOW (ref 98–107)
CO2 SERPL-SCNC: 23 MMOL/L — SIGNIFICANT CHANGE UP (ref 22–31)
CREAT SERPL-MCNC: 1.06 MG/DL — SIGNIFICANT CHANGE UP (ref 0.5–1.3)
EGFR: 68 ML/MIN/1.73M2 — SIGNIFICANT CHANGE UP
EGFR: 68 ML/MIN/1.73M2 — SIGNIFICANT CHANGE UP
GLUCOSE BLDC GLUCOMTR-MCNC: 201 MG/DL — HIGH (ref 70–99)
GLUCOSE BLDC GLUCOMTR-MCNC: 214 MG/DL — HIGH (ref 70–99)
GLUCOSE BLDC GLUCOMTR-MCNC: 227 MG/DL — HIGH (ref 70–99)
GLUCOSE BLDC GLUCOMTR-MCNC: 249 MG/DL — HIGH (ref 70–99)
GLUCOSE SERPL-MCNC: 226 MG/DL — HIGH (ref 70–99)
HCT VFR BLD CALC: 28.6 % — LOW (ref 39–50)
HCT VFR BLD CALC: 29.5 % — LOW (ref 39–50)
HGB BLD-MCNC: 9.6 G/DL — LOW (ref 13–17)
HGB BLD-MCNC: 9.7 G/DL — LOW (ref 13–17)
MAGNESIUM SERPL-MCNC: 2.1 MG/DL — SIGNIFICANT CHANGE UP (ref 1.6–2.6)
MCHC RBC-ENTMCNC: 28.5 PG — SIGNIFICANT CHANGE UP (ref 27–34)
MCHC RBC-ENTMCNC: 28.8 PG — SIGNIFICANT CHANGE UP (ref 27–34)
MCHC RBC-ENTMCNC: 32.9 G/DL — SIGNIFICANT CHANGE UP (ref 32–36)
MCHC RBC-ENTMCNC: 33.6 G/DL — SIGNIFICANT CHANGE UP (ref 32–36)
MCV RBC AUTO: 85.9 FL — SIGNIFICANT CHANGE UP (ref 80–100)
MCV RBC AUTO: 86.8 FL — SIGNIFICANT CHANGE UP (ref 80–100)
NRBC # BLD AUTO: 0 K/UL — SIGNIFICANT CHANGE UP (ref 0–0)
NRBC # BLD AUTO: 0 K/UL — SIGNIFICANT CHANGE UP (ref 0–0)
NRBC # FLD: 0 K/UL — SIGNIFICANT CHANGE UP (ref 0–0)
NRBC # FLD: 0 K/UL — SIGNIFICANT CHANGE UP (ref 0–0)
NRBC BLD AUTO-RTO: 0 /100 WBCS — SIGNIFICANT CHANGE UP (ref 0–0)
NRBC BLD AUTO-RTO: 0 /100 WBCS — SIGNIFICANT CHANGE UP (ref 0–0)
PHOSPHATE SERPL-MCNC: 2.7 MG/DL — SIGNIFICANT CHANGE UP (ref 2.5–4.5)
PLATELET # BLD AUTO: 280 K/UL — SIGNIFICANT CHANGE UP (ref 150–400)
PLATELET # BLD AUTO: 308 K/UL — SIGNIFICANT CHANGE UP (ref 150–400)
POTASSIUM SERPL-MCNC: 4.1 MMOL/L — SIGNIFICANT CHANGE UP (ref 3.5–5.3)
POTASSIUM SERPL-SCNC: 4.1 MMOL/L — SIGNIFICANT CHANGE UP (ref 3.5–5.3)
RBC # BLD: 3.33 M/UL — LOW (ref 4.2–5.8)
RBC # BLD: 3.4 M/UL — LOW (ref 4.2–5.8)
RBC # FLD: 16.7 % — HIGH (ref 10.3–14.5)
RBC # FLD: 16.7 % — HIGH (ref 10.3–14.5)
SODIUM SERPL-SCNC: 130 MMOL/L — LOW (ref 135–145)
WBC # BLD: 14.1 K/UL — HIGH (ref 3.8–10.5)
WBC # BLD: 14.75 K/UL — HIGH (ref 3.8–10.5)
WBC # FLD AUTO: 14.1 K/UL — HIGH (ref 3.8–10.5)
WBC # FLD AUTO: 14.75 K/UL — HIGH (ref 3.8–10.5)

## 2025-06-16 PROCEDURE — 71045 X-RAY EXAM CHEST 1 VIEW: CPT | Mod: 26

## 2025-06-16 PROCEDURE — 74018 RADEX ABDOMEN 1 VIEW: CPT | Mod: 26

## 2025-06-16 PROCEDURE — 74177 CT ABD & PELVIS W/CONTRAST: CPT | Mod: 26

## 2025-06-16 PROCEDURE — 71260 CT THORAX DX C+: CPT | Mod: 26

## 2025-06-16 RX ORDER — POTASSIUM CHLORIDE, DEXTROSE MONOHYDRATE AND SODIUM CHLORIDE 150; 5; 900 MG/100ML; G/100ML; MG/100ML
1000 INJECTION, SOLUTION INTRAVENOUS
Refills: 0 | Status: DISCONTINUED | OUTPATIENT
Start: 2025-06-16 | End: 2025-06-18

## 2025-06-16 RX ORDER — INSULIN GLARGINE-YFGN 100 [IU]/ML
12 INJECTION, SOLUTION SUBCUTANEOUS AT BEDTIME
Refills: 0 | Status: DISCONTINUED | OUTPATIENT
Start: 2025-06-16 | End: 2025-06-17

## 2025-06-16 RX ORDER — HYDROMORPHONE/SOD CHLOR,ISO/PF 2 MG/10 ML
0.5 SYRINGE (ML) INJECTION EVERY 4 HOURS
Refills: 0 | Status: DISCONTINUED | OUTPATIENT
Start: 2025-06-16 | End: 2025-06-20

## 2025-06-16 RX ORDER — SOD PHOS DI, MONO/K PHOS MONO 250 MG
1 TABLET ORAL ONCE
Refills: 0 | Status: COMPLETED | OUTPATIENT
Start: 2025-06-16 | End: 2025-06-16

## 2025-06-16 RX ORDER — ACETAMINOPHEN 500 MG/5ML
1000 LIQUID (ML) ORAL EVERY 6 HOURS
Refills: 0 | Status: COMPLETED | OUTPATIENT
Start: 2025-06-16 | End: 2025-06-17

## 2025-06-16 RX ORDER — METOPROLOL SUCCINATE 50 MG/1
5 TABLET, EXTENDED RELEASE ORAL EVERY 6 HOURS
Refills: 0 | Status: DISCONTINUED | OUTPATIENT
Start: 2025-06-16 | End: 2025-06-21

## 2025-06-16 RX ORDER — INSULIN LISPRO 100 U/ML
5 INJECTION, SOLUTION INTRAVENOUS; SUBCUTANEOUS
Refills: 0 | Status: DISCONTINUED | OUTPATIENT
Start: 2025-06-16 | End: 2025-06-17

## 2025-06-16 RX ORDER — LEVOTHYROXINE SODIUM 300 MCG
112 TABLET ORAL AT BEDTIME
Refills: 0 | Status: DISCONTINUED | OUTPATIENT
Start: 2025-06-17 | End: 2025-07-02

## 2025-06-16 RX ORDER — HYDROMORPHONE/SOD CHLOR,ISO/PF 2 MG/10 ML
0.2 SYRINGE (ML) INJECTION EVERY 4 HOURS
Refills: 0 | Status: DISCONTINUED | OUTPATIENT
Start: 2025-06-16 | End: 2025-06-21

## 2025-06-16 RX ORDER — HYDROMORPHONE/SOD CHLOR,ISO/PF 2 MG/10 ML
0.2 SYRINGE (ML) INJECTION ONCE
Refills: 0 | Status: DISCONTINUED | OUTPATIENT
Start: 2025-06-16 | End: 2025-06-16

## 2025-06-16 RX ORDER — ONDANSETRON HCL/PF 4 MG/2 ML
4 VIAL (ML) INJECTION ONCE
Refills: 0 | Status: COMPLETED | OUTPATIENT
Start: 2025-06-16 | End: 2025-06-16

## 2025-06-16 RX ADMIN — Medication 975 MILLIGRAM(S): at 12:14

## 2025-06-16 RX ADMIN — AMPICILLIN SODIUM 200 GRAM(S): 1 INJECTION, POWDER, FOR SOLUTION INTRAMUSCULAR; INTRAVENOUS at 05:07

## 2025-06-16 RX ADMIN — Medication 0.2 MILLIGRAM(S): at 05:08

## 2025-06-16 RX ADMIN — Medication 100 MILLIGRAM(S): at 13:15

## 2025-06-16 RX ADMIN — HEPARIN SODIUM 5000 UNIT(S): 1000 INJECTION INTRAVENOUS; SUBCUTANEOUS at 13:17

## 2025-06-16 RX ADMIN — Medication 975 MILLIGRAM(S): at 12:44

## 2025-06-16 RX ADMIN — Medication 81 MILLIGRAM(S): at 12:14

## 2025-06-16 RX ADMIN — INSULIN LISPRO 5 UNIT(S): 100 INJECTION, SOLUTION INTRAVENOUS; SUBCUTANEOUS at 17:51

## 2025-06-16 RX ADMIN — Medication 40 MILLIGRAM(S): at 05:08

## 2025-06-16 RX ADMIN — Medication 100 MILLIGRAM(S): at 05:08

## 2025-06-16 RX ADMIN — HEPARIN SODIUM 5000 UNIT(S): 1000 INJECTION INTRAVENOUS; SUBCUTANEOUS at 05:08

## 2025-06-16 RX ADMIN — LABETALOL HYDROCHLORIDE 100 MILLIGRAM(S): 200 TABLET, FILM COATED ORAL at 05:09

## 2025-06-16 RX ADMIN — Medication 400 MILLIGRAM(S): at 17:58

## 2025-06-16 RX ADMIN — INSULIN LISPRO 2: 100 INJECTION, SOLUTION INTRAVENOUS; SUBCUTANEOUS at 08:53

## 2025-06-16 RX ADMIN — AMPICILLIN SODIUM 200 GRAM(S): 1 INJECTION, POWDER, FOR SOLUTION INTRAMUSCULAR; INTRAVENOUS at 20:20

## 2025-06-16 RX ADMIN — Medication 0.2 MILLIGRAM(S): at 17:50

## 2025-06-16 RX ADMIN — OXYCODONE HYDROCHLORIDE 10 MILLIGRAM(S): 30 TABLET ORAL at 06:45

## 2025-06-16 RX ADMIN — OXYCODONE HYDROCHLORIDE 5 MILLIGRAM(S): 30 TABLET ORAL at 12:11

## 2025-06-16 RX ADMIN — AMPICILLIN SODIUM 200 GRAM(S): 1 INJECTION, POWDER, FOR SOLUTION INTRAMUSCULAR; INTRAVENOUS at 10:44

## 2025-06-16 RX ADMIN — Medication 975 MILLIGRAM(S): at 00:20

## 2025-06-16 RX ADMIN — INSULIN GLARGINE-YFGN 12 UNIT(S): 100 INJECTION, SOLUTION SUBCUTANEOUS at 22:17

## 2025-06-16 RX ADMIN — OXYCODONE HYDROCHLORIDE 5 MILLIGRAM(S): 30 TABLET ORAL at 12:44

## 2025-06-16 RX ADMIN — AMPICILLIN SODIUM 200 GRAM(S): 1 INJECTION, POWDER, FOR SOLUTION INTRAMUSCULAR; INTRAVENOUS at 16:06

## 2025-06-16 RX ADMIN — LIDOCAINE HYDROCHLORIDE 1 PATCH: 20 JELLY TOPICAL at 01:39

## 2025-06-16 RX ADMIN — OXYCODONE HYDROCHLORIDE 10 MILLIGRAM(S): 30 TABLET ORAL at 07:15

## 2025-06-16 RX ADMIN — Medication 975 MILLIGRAM(S): at 05:07

## 2025-06-16 RX ADMIN — Medication 4 MILLIGRAM(S): at 10:41

## 2025-06-16 RX ADMIN — OXYCODONE HYDROCHLORIDE 10 MILLIGRAM(S): 30 TABLET ORAL at 02:25

## 2025-06-16 RX ADMIN — Medication 150 MICROGRAM(S): at 05:09

## 2025-06-16 RX ADMIN — Medication 1 PACKET(S): at 10:44

## 2025-06-16 RX ADMIN — Medication 0.2 MILLIGRAM(S): at 13:15

## 2025-06-16 RX ADMIN — INSULIN LISPRO 3 UNIT(S): 100 INJECTION, SOLUTION INTRAVENOUS; SUBCUTANEOUS at 13:06

## 2025-06-16 RX ADMIN — Medication 0.5 MILLIGRAM(S): at 22:44

## 2025-06-16 RX ADMIN — HEPARIN SODIUM 5000 UNIT(S): 1000 INJECTION INTRAVENOUS; SUBCUTANEOUS at 22:14

## 2025-06-16 RX ADMIN — Medication 0.2 MILLIGRAM(S): at 17:20

## 2025-06-16 RX ADMIN — METOPROLOL SUCCINATE 5 MILLIGRAM(S): 50 TABLET, EXTENDED RELEASE ORAL at 17:58

## 2025-06-16 RX ADMIN — INSULIN LISPRO 3 UNIT(S): 100 INJECTION, SOLUTION INTRAVENOUS; SUBCUTANEOUS at 08:52

## 2025-06-16 RX ADMIN — Medication 0.5 MILLIGRAM(S): at 22:14

## 2025-06-16 RX ADMIN — INSULIN LISPRO 2: 100 INJECTION, SOLUTION INTRAVENOUS; SUBCUTANEOUS at 17:50

## 2025-06-16 RX ADMIN — OXYCODONE HYDROCHLORIDE 10 MILLIGRAM(S): 30 TABLET ORAL at 01:55

## 2025-06-16 RX ADMIN — LIDOCAINE HYDROCHLORIDE 1 PATCH: 20 JELLY TOPICAL at 12:14

## 2025-06-16 RX ADMIN — INSULIN LISPRO 2: 100 INJECTION, SOLUTION INTRAVENOUS; SUBCUTANEOUS at 13:07

## 2025-06-16 RX ADMIN — AMPICILLIN SODIUM 200 GRAM(S): 1 INJECTION, POWDER, FOR SOLUTION INTRAMUSCULAR; INTRAVENOUS at 01:55

## 2025-06-16 RX ADMIN — Medication 0.2 MILLIGRAM(S): at 00:24

## 2025-06-16 NOTE — CHART NOTE - NSCHARTNOTEFT_GEN_A_CORE
NUTRITION FOLLOW UP NOTE    Pt seen for Severe Malnutrition follow-up.     SOURCE: [] Patient [X] Medical record [] RN/PCA [X] Family/Caregiver []Patient unavailable []Patient inappropriate (disoriented, nonverbal, intubated/sedated) [] Other:    Medical Course: Per chart, Pt is 85 year old man with multiple chronic medical comorbidities including hypertension, sinus node dysfunction, type two diabetes, and chronic kidney disease (baseline creatinine 1.5-2.0) presently admitted for lower back pain associated with lumbar compression deformities with concern for cauda equina impingement. He was found to be bacteremic (E faecalis) due to acute acalculous cholecystitis, and is now seen following laparoscopic cholecystectomy with Dr. Triny Verde 6/5/2025. POD 1 patient became hypotensive, unresponsive to 2L fluid resuscitation and 3 u pRBC and was started on vasopressors and transferred to SICU. Patient weaned off pressors, course c/b ileus now resolved, NGT removed 6/10. Midline placed 6/12 for IV abx      Diet Prescription: Diet, Regular:   Consistent Carbohydrate {Evening Snack} (CSTCHOSN)  Kevin(7 Gm Arginine/7 Gm Glut/1.2 Gm HMB     Qty per Day:  2  Liquid Protein Supplement     Qty per Day:  2  Supplement Feeding Modality:  Oral  Ensure Clear Cans or Servings Per Day:  1       Frequency:  Daily (06-12-25 @ 17:41)      Nutrition Course: Pt seen at bedside with daughter present. Pt's daughter provide collateral. Reported patient with improving PO intake. Pt triggered for acute severe malnutrition based on inadequate nutrition intake and mild/moderate fluid accumulation on 6/11. Pt ordered for Liquid Protein Supplement 2x daily, Kevin 2x daily, and Ensure Clear 1x daily. Recommend to d/c Liquid Protein Supplement and Kevin secondary to poor PO intake and Ensure clear not compliant with current diet order. No GI distress reported i.e. nausea, vomiting, diarrhea. Reported last BM today 6/16 per Pt's daughter. Labs noted for hyponatremia and hyperglycemia. RDN answered questions/concerns related to diet. RDN remains available and will follow-up per protocol.       Pertinent Medications: MEDICATIONS  (STANDING):  acetaminophen     Tablet .. 975 milliGRAM(s) Oral every 6 hours  ampicillin  IVPB 2 Gram(s) IV Intermittent every 4 hours  aspirin  chewable 81 milliGRAM(s) Oral daily  cloNIDine 0.2 milliGRAM(s) Oral three times a day  dextrose 5%. 1000 milliLiter(s) (100 mL/Hr) IV Continuous <Continuous>  dextrose 5%. 1000 milliLiter(s) (50 mL/Hr) IV Continuous <Continuous>  dextrose 50% Injectable 25 Gram(s) IV Push once  dextrose 50% Injectable 12.5 Gram(s) IV Push once  dextrose 50% Injectable 25 Gram(s) IV Push once  glucagon  Injectable 1 milliGRAM(s) IntraMuscular once  heparin   Injectable 5000 Unit(s) SubCutaneous every 8 hours  hydrALAZINE 100 milliGRAM(s) Oral three times a day  insulin glargine Injectable (LANTUS) 12 Unit(s) SubCutaneous at bedtime  insulin lispro (ADMELOG) corrective regimen sliding scale   SubCutaneous at bedtime  insulin lispro (ADMELOG) corrective regimen sliding scale   SubCutaneous three times a day before meals  insulin lispro Injectable (ADMELOG) 5 Unit(s) SubCutaneous three times a day before meals  labetalol 100 milliGRAM(s) Oral two times a day  levothyroxine 150 MICROGram(s) Oral daily  lidocaine   4% Patch 1 Patch Transdermal daily  pantoprazole    Tablet 40 milliGRAM(s) Oral before breakfast  polyethylene glycol 3350 17 Gram(s) Oral two times a day  senna 2 Tablet(s) Oral at bedtime  sodium chloride 3%  Inhalation 4 milliLiter(s) Inhalation every 12 hours  tamsulosin 0.4 milliGRAM(s) Oral at bedtime    MEDICATIONS  (PRN):  albuterol/ipratropium for Nebulization 3 milliLiter(s) Nebulizer every 6 hours PRN Shortness of Breath and/or Wheezing  dextrose Oral Gel 15 Gram(s) Oral once PRN Blood Glucose LESS THAN 70 milliGRAM(s)/deciliter  oxyCODONE    IR 5 milliGRAM(s) Oral every 4 hours PRN Moderate Pain (4 - 6)  oxyCODONE    IR 10 milliGRAM(s) Oral every 4 hours PRN Severe Pain (7 - 10)    [] Relevant notes on medications:     Pertinent Labs: 06-16 Na130 mmol/L[L] Glu 226 mg/dL[H] K+ 4.1 mmol/L Cr  1.06 mg/dL BUN 18 mg/dL 06-16 Phos 2.7 mg/dL 06-15 Alb 2.5 g/dL[L]     A1C with Estimated Average Glucose Result: 6.9 % (05-07-25 @ 06:03)      CAPILLARY BLOOD GLUCOSE  POCT Blood Glucose.: 214 mg/dL (16 Jun 2025 12:17)  POCT Blood Glucose.: 227 mg/dL (16 Jun 2025 08:31)  POCT Blood Glucose.: 215 mg/dL (15 Heriberto 2025 22:44)  POCT Blood Glucose.: 215 mg/dL (15 Heriberto 2025 21:36)  POCT Blood Glucose.: 246 mg/dL (15 Heriberto 2025 17:02)      Weight:  92.9kg (5/19), 102.4kg (6/8), 103.3kg (6/9), 99.8kg (6/10), 98.8kg (6/11).   Weight Assessment: No new weight. Unable to obtain bedscale weight due to head of bed elevated.   Height: 71in / 180.3cm  IBW: 172lbs / 78.1kg +/-10%  BMI:28.6 kg/m^2    Physical Assessment, per flowsheets:  Edema: 1+ generalized; 3+ bilateral arm edema; 4+ scrotum  Pressure Injury: No PI/DTI noted.       Estimated Needs:   [X] No change since previous assessment, based on IBW 72lbs/78.1kg.   2187-2500kcals daily @ 28-32kcals/kg  78 - 94g protein daily @ 1.0-1.2g/kg (due to CKD)      Previous Nutrition Diagnosis:   [X] Inadequate Energy Intake   [X]  Severe Malnutrition   Nutrition Diagnosis is [X] ongoing  [ ] resolved [ ] not applicable   New Nutrition Diagnosis: [X] not applicable     Education:  [ ] Provided pt with verbal / written education on   [ ] Provided on previous assessment by RD  [X] Not applicable   [ ] Pt refused     Interventions:   1) - Continue current diet order, which remains appropriate at this time.   2) - Recommend Glucerna 1x daily (provides 220 kcal, 10 gm protein per 8oz serving)   3) - Encourage and assist Pt with meals, Monitor PO diet tolerance.   4) - RDN services remain available as needed.       Monitor & Evaluate:  PO intake, tolerance to diet/supplement, nutrition related lab values, weight trends, BMs/GI distress, hydration status, skin integrity.    Sawyer Chavez RD, CDN. Available on [facility] teams,  Pager #[phone]

## 2025-06-16 NOTE — PROGRESS NOTE ADULT - SUBJECTIVE AND OBJECTIVE BOX
B Team Surgery Daily Progress Note  =====================================================    SUBJECTIVE: Patient seen and examined at bedside on AM rounds.    ALLERGIES:  gabapentin (Other)      --------------------------------------------------------------------------------------    MEDICATIONS:    Neurologic Medications  acetaminophen     Tablet .. 975 milliGRAM(s) Oral every 6 hours  oxyCODONE    IR 5 milliGRAM(s) Oral every 4 hours PRN Moderate Pain (4 - 6)  oxyCODONE    IR 10 milliGRAM(s) Oral every 4 hours PRN Severe Pain (7 - 10)    Respiratory Medications  albuterol/ipratropium for Nebulization 3 milliLiter(s) Nebulizer every 6 hours PRN Shortness of Breath and/or Wheezing  sodium chloride 3%  Inhalation 4 milliLiter(s) Inhalation every 12 hours    Cardiovascular Medications  cloNIDine 0.2 milliGRAM(s) Oral three times a day  hydrALAZINE 100 milliGRAM(s) Oral three times a day  labetalol 100 milliGRAM(s) Oral two times a day    Gastrointestinal Medications  dextrose 5%. 1000 milliLiter(s) IV Continuous <Continuous>  dextrose 5%. 1000 milliLiter(s) IV Continuous <Continuous>  pantoprazole    Tablet 40 milliGRAM(s) Oral before breakfast  polyethylene glycol 3350 17 Gram(s) Oral two times a day  senna 2 Tablet(s) Oral at bedtime    Genitourinary Medications  tamsulosin 0.4 milliGRAM(s) Oral at bedtime    Hematologic/Oncologic Medications  aspirin  chewable 81 milliGRAM(s) Oral daily  heparin   Injectable 5000 Unit(s) SubCutaneous every 8 hours    Antimicrobial/Immunologic Medications  ampicillin  IVPB 2 Gram(s) IV Intermittent every 4 hours    Endocrine/Metabolic Medications  dextrose 50% Injectable 25 Gram(s) IV Push once  dextrose 50% Injectable 12.5 Gram(s) IV Push once  dextrose 50% Injectable 25 Gram(s) IV Push once  dextrose Oral Gel 15 Gram(s) Oral once PRN Blood Glucose LESS THAN 70 milliGRAM(s)/deciliter  glucagon  Injectable 1 milliGRAM(s) IntraMuscular once  insulin glargine Injectable (LANTUS) 10 Unit(s) SubCutaneous at bedtime  insulin lispro (ADMELOG) corrective regimen sliding scale   SubCutaneous at bedtime  insulin lispro (ADMELOG) corrective regimen sliding scale   SubCutaneous three times a day before meals  insulin lispro Injectable (ADMELOG) 3 Unit(s) SubCutaneous three times a day before meals  levothyroxine 150 MICROGram(s) Oral daily    Topical/Other Medications  lidocaine   4% Patch 1 Patch Transdermal daily    --------------------------------------------------------------------------------------    VITAL SIGNS:  T(C): 36.5 (06-16-25 @ 06:00), Max: 36.9 (06-15-25 @ 13:25)  HR: 69 (06-16-25 @ 07:59) (55 - 84)  BP: 142/66 (06-16-25 @ 06:00) (138/71 - 172/83)  RR: 18 (06-16-25 @ 06:00) (18 - 19)  SpO2: 94% (06-16-25 @ 07:59) (94% - 99%)  --------------------------------------------------------------------------------------    INS AND OUTS:    06-15-25 @ 07:01  -  06-16-25 @ 07:00  --------------------------------------------------------  IN: 980 mL / OUT: 1360 mL / NET: -380 mL      --------------------------------------------------------------------------------------      EXAM  GEN: NAD, resting quietly  NEURO: AAOx3, CN II-XII grossly intact, no focal deficits  PULM: symmetric chest rise bilaterally, no increased WOB  ABD: soft, nondistended, incision CDI; RLQ tenderness w/o guarding or rebound tenderness    --------------------------------------------------------------------------------------    LABS  CBC (06-15 @ 06:55)                              8.5[L]                         6.37    )----------------(  220        --    % Neutrophils, --    % Lymphocytes, ANC: --                                  26.1[L]                BMP (06-15 @ 06:55)             133[L]  |  98      |  18    		Ca++ --      Ca 7.2[L]             ---------------------------------( 199[H]		Mg 2.10               4.3     |  24      |  1.21  			Ph 2.0[L]    LFTs (06-15 @ 06:55)      TPro 4.9[L] / Alb 2.5[L] / TBili 0.4 / DBili -- / AST 12 / ALT 16 / AlkPhos 152[H]        Assessment:  85 year old man with multiple chronic medical comorbidities including hypertension, sinus node dysfunction, type two diabetes, and chronic kidney disease (baseline creatinine 1.5-2.0) presently admitted for lower back pain associated with lumbar compression deformities with concern for cauda equina impingement. He was found to be bacteremic (E faecalis) due to acute acalculous cholecystitis, and is now seen following laparoscopic cholecystectomy with Dr. Triny Verde 6/5/2025. POD 1 patient became hypotensive, unresponsive to 2L fluid resuscitation and 3 u pRBC and was started on vasopressors and transferred to SICU. Patient weaned off pressors, course c/b ileus now resolved, NGT removed 6/10. Midline placed 6/12 for IV abx    Plan:   - bowel regimen: dulcolax, senna, and miralax  - pain control  - Diet: CCD  - c/w ASA81  - c/w Ampicillin, midine placed for outpatient IV abx  - will be d/c'd with wong (hx cauda equina)   - increase antihypertensives per cards  - fu Endo dc recs  - ortho recs re: spinal surgery appreciated  - [facility] for VTE ppx  - dispo planning - [facility] team   [id number]

## 2025-06-16 NOTE — CHART NOTE - NSCHARTNOTEFT_GEN_A_CORE
Endocrine follow up, [phone]  See consult for complete plan of care including discharge planning     DM2  A1C with Estimated Average Glucose Result: 6.9 % (05-07-25 @ 06:03)  A1C with Estimated Average Glucose Result: 7.4 % (11-10-24 @ 06:55)  eGFR: 68 mL/min/1.73m2 (06-16-25 @ 09:19)      Chart reviewed: Glucose slightly above target of 100-180 mg/dL   Will increase Lantus to 12 units   Will increase pre-meal Admelog to 5 units TID before meals, HOLD if not eating   Continue with Admelog LOW correctional scales before meals and bedtime     Endocrine: [phone] for urgent questions or [email or web address] for NON-urgent questions    CAPILLARY BLOOD GLUCOSE      POCT Blood Glucose.: 214 mg/dL (16 Jun 2025 12:17)  POCT Blood Glucose.: 227 mg/dL (16 Jun 2025 08:31)  POCT Blood Glucose.: 215 mg/dL (15 Heriberto 2025 22:44)  POCT Blood Glucose.: 215 mg/dL (15 Heriberto 2025 21:36)  POCT Blood Glucose.: 246 mg/dL (15 Heriberto 2025 17:02)      06-16    130[L]  |  95[L]  |  18  ----------------------------<  226[H]  4.1   |  23  |  1.06    Ca    7.2[L]      16 Jun 2025 09:19  Phos  2.7     06-16  Mg     2.10     06-16    TPro  4.9[L]  /  Alb  2.5[L]  /  TBili  0.4  /  DBili  x   /  AST  12  /  ALT  16  /  AlkPhos  152[H]  06-15      MEDICATIONS  (STANDING):  acetaminophen     Tablet .. 975 milliGRAM(s) Oral every 6 hours  ampicillin  IVPB 2 Gram(s) IV Intermittent every 4 hours  aspirin  chewable 81 milliGRAM(s) Oral daily  cloNIDine 0.2 milliGRAM(s) Oral three times a day  dextrose 5%. 1000 milliLiter(s) (100 mL/Hr) IV Continuous <Continuous>  dextrose 5%. 1000 milliLiter(s) (50 mL/Hr) IV Continuous <Continuous>  dextrose 50% Injectable 25 Gram(s) IV Push once  dextrose 50% Injectable 12.5 Gram(s) IV Push once  dextrose 50% Injectable 25 Gram(s) IV Push once  glucagon  Injectable 1 milliGRAM(s) IntraMuscular once  heparin   Injectable 5000 Unit(s) SubCutaneous every 8 hours  hydrALAZINE 100 milliGRAM(s) Oral three times a day  insulin glargine Injectable (LANTUS) 10 Unit(s) SubCutaneous at bedtime  insulin lispro (ADMELOG) corrective regimen sliding scale   SubCutaneous at bedtime  insulin lispro (ADMELOG) corrective regimen sliding scale   SubCutaneous three times a day before meals  insulin lispro Injectable (ADMELOG) 3 Unit(s) SubCutaneous three times a day before meals  labetalol 100 milliGRAM(s) Oral two times a day  levothyroxine 150 MICROGram(s) Oral daily  lidocaine   4% Patch 1 Patch Transdermal daily  pantoprazole    Tablet 40 milliGRAM(s) Oral before breakfast  polyethylene glycol 3350 17 Gram(s) Oral two times a day  senna 2 Tablet(s) Oral at bedtime  sodium chloride 3%  Inhalation 4 milliLiter(s) Inhalation every 12 hours  tamsulosin 0.4 milliGRAM(s) Oral at bedtime      Diet, Regular:   Consistent Carbohydrate {Evening Snack} (CSTCHOSN)  Kevin(7 Gm Arginine/7 Gm Glut/1.2 Gm HMB     Qty per Day:  2  Liquid Protein Supplement     Qty per Day:  2  Supplement Feeding Modality:  Oral  Ensure Clear Cans or Servings Per Day:  1       Frequency:  Daily (06-12-25 @ 17:41)      A1C with Estimated Average Glucose Result: 6.9 % (05-07-25 @ 06:03)  A1C with Estimated Average Glucose Result: 7.4 % (11-10-24 @ 06:55)

## 2025-06-16 NOTE — PROGRESS NOTE ADULT - ASSESSMENT
-EKG w/ SR, no significant STT changes   -ECHO w/ normal EF no significant valvular heart disease     A/P:  85M w/ HTN, HLD, T2DM, prostate CA, CKD, TIA and SND s/p PPM transferred here for lumbar spinal cord compression and bacteremia s/p PPM explant and implant of Micra PPM.    1. bacteremia  -s/p cholecystectomy  -c/w abx per ID, primary team  6/13 afebrile, continues on abx per ID    2. SND  -s/p leadless PPM w/ EP  6/16 recommend IV lasix for generalized edema     3. HTN  -c/w clonidine, hydralazine, labetalol     -EKG w/ SR, no significant STT changes   -ECHO w/ normal EF no significant valvular heart disease     A/P:  85M w/ HTN, HLD, T2DM, prostate CA, CKD, TIA and SND s/p PPM transferred here for lumbar spinal cord compression and bacteremia s/p PPM explant and implant of Micra PPM.    1. bacteremia  -s/p cholecystectomy  -c/w abx per ID, primary team  6/13 afebrile, continues on abx per ID    2. SND  -s/p leadless PPM w/ EP  6/16 recommend IV lasix 40mg daily for generalized edema     3. HTN  -c/w clonidine, hydralazine, labetalol

## 2025-06-16 NOTE — PROGRESS NOTE ADULT - SUBJECTIVE AND OBJECTIVE BOX
Timothy Mojica MD  Interventional Cardiology / Advance Heart Failure and Cardiac Transplant Specialist  [place] Office : [phone] [place] [address] [place] [address]  Tel:   [place] Office : [phone] [facility] [place] [address]  Tel: [phone]       Pt is lying in bed c/o nausea    MEDICATIONS:  aspirin  chewable 81 milliGRAM(s) Oral daily  cloNIDine 0.2 milliGRAM(s) Oral three times a day  heparin   Injectable 5000 Unit(s) SubCutaneous every 8 hours  hydrALAZINE 100 milliGRAM(s) Oral three times a day  labetalol 100 milliGRAM(s) Oral two times a day    ampicillin  IVPB 2 Gram(s) IV Intermittent every 4 hours    albuterol/ipratropium for Nebulization 3 milliLiter(s) Nebulizer every 6 hours PRN  sodium chloride 3%  Inhalation 4 milliLiter(s) Inhalation every 12 hours    acetaminophen     Tablet .. 975 milliGRAM(s) Oral every 6 hours  oxyCODONE    IR 5 milliGRAM(s) Oral every 4 hours PRN  oxyCODONE    IR 10 milliGRAM(s) Oral every 4 hours PRN    pantoprazole    Tablet 40 milliGRAM(s) Oral before breakfast  polyethylene glycol 3350 17 Gram(s) Oral two times a day  senna 2 Tablet(s) Oral at bedtime    dextrose 50% Injectable 25 Gram(s) IV Push once  dextrose 50% Injectable 12.5 Gram(s) IV Push once  dextrose 50% Injectable 25 Gram(s) IV Push once  dextrose Oral Gel 15 Gram(s) Oral once PRN  glucagon  Injectable 1 milliGRAM(s) IntraMuscular once  insulin glargine Injectable (LANTUS) 12 Unit(s) SubCutaneous at bedtime  insulin lispro (ADMELOG) corrective regimen sliding scale   SubCutaneous at bedtime  insulin lispro (ADMELOG) corrective regimen sliding scale   SubCutaneous three times a day before meals  insulin lispro Injectable (ADMELOG) 5 Unit(s) SubCutaneous three times a day before meals  levothyroxine 150 MICROGram(s) Oral daily    dextrose 5%. 1000 milliLiter(s) IV Continuous <Continuous>  dextrose 5%. 1000 milliLiter(s) IV Continuous <Continuous>  lidocaine   4% Patch 1 Patch Transdermal daily  tamsulosin 0.4 milliGRAM(s) Oral at bedtime      PAST MEDICAL/SURGICAL HISTORY  PAST MEDICAL & SURGICAL HISTORY:  HTN (hypertension)      HLD (hyperlipidemia)      DM (diabetes mellitus)      TIA (transient ischemic attack)      Prostate cancer      S/P prostatectomy      Pacemaker      H/O thyroidectomy          SOCIAL HISTORY: Substance Use (street drugs): ( x ) never used  (  ) other:    FAMILY HISTORY:  FHx: heart disease (Father, Mother)        PHYSICAL EXAM:  T(C): 36.6 (06-16-25 @ 13:12), Max: 36.7 (06-16-25 @ 09:57)  HR: 69 (06-16-25 @ 13:12) (55 - 70)  BP: 181/70 (06-16-25 @ 13:12) (138/71 - 182/61)  RR: 17 (06-16-25 @ 13:12) (17 - 19)  SpO2: 99% (06-16-25 @ 13:12) (94% - 99%)  Wt(kg): --  I&O's Summary    15 Heriberto 2025 07:01  -  16 Jun 2025 07:00  --------------------------------------------------------  IN: 980 mL / OUT: 1360 mL / NET: -380 mL    16 Jun 2025 07:01  -  16 Jun 2025 16:51  --------------------------------------------------------  IN: 0 mL / OUT: 500 mL / NET: -500 mL          GENERAL: NAD  EYES:  EOMI  ENMT: NGT Moist mucous membranes  Cardiovascular: Normal S1 S2, No JVD, No murmurs, +b/l LUE edema L>R, LE edema  Respiratory: Lungs clear to auscultation	  Gastrointestinal:  Soft, Non-tender, + BS	  Extremities: b/l UE, LE edema                                9.7    14.10 )-----------( 280      ( 16 Jun 2025 09:19 )             29.5     06-16    130[L]  |  95[L]  |  18  ----------------------------<  226[H]  4.1   |  23  |  1.06    Ca    7.2[L]      16 Jun 2025 09:19  Phos  2.7     06-16  Mg     2.10     06-16    TPro  4.9[L]  /  Alb  2.5[L]  /  TBili  0.4  /  DBili  x   /  AST  12  /  ALT  16  /  AlkPhos  152[H]  06-15    proBNP:   Lipid Profile:   HgA1c:   TSH:     Consultant(s) Notes Reviewed:  [x ] YES  [ ] NO    Care Discussed with Consultants/Other Providers [ x] YES  [ ] NO    Imaging Personally Reviewed independently:  [x] YES  [ ] NO    All labs, radiologic studies, vitals, orders and medications list reviewed. Patient is seen and examined at bedside. Case discussed with medical team.

## 2025-06-17 LAB
ANION GAP SERPL CALC-SCNC: 12 MMOL/L — SIGNIFICANT CHANGE UP (ref 7–14)
APTT BLD: 25.5 SEC — LOW (ref 26.1–36.8)
BUN SERPL-MCNC: 17 MG/DL — SIGNIFICANT CHANGE UP (ref 7–23)
CALCIUM SERPL-MCNC: 7.5 MG/DL — LOW (ref 8.4–10.5)
CHLORIDE SERPL-SCNC: 96 MMOL/L — LOW (ref 98–107)
CO2 SERPL-SCNC: 23 MMOL/L — SIGNIFICANT CHANGE UP (ref 22–31)
CREAT SERPL-MCNC: 1.11 MG/DL — SIGNIFICANT CHANGE UP (ref 0.5–1.3)
EGFR: 64 ML/MIN/1.73M2 — SIGNIFICANT CHANGE UP
EGFR: 64 ML/MIN/1.73M2 — SIGNIFICANT CHANGE UP
GLUCOSE BLDC GLUCOMTR-MCNC: 176 MG/DL — HIGH (ref 70–99)
GLUCOSE BLDC GLUCOMTR-MCNC: 177 MG/DL — HIGH (ref 70–99)
GLUCOSE BLDC GLUCOMTR-MCNC: 188 MG/DL — HIGH (ref 70–99)
GLUCOSE BLDC GLUCOMTR-MCNC: 195 MG/DL — HIGH (ref 70–99)
GLUCOSE BLDC GLUCOMTR-MCNC: 252 MG/DL — HIGH (ref 70–99)
GLUCOSE SERPL-MCNC: 185 MG/DL — HIGH (ref 70–99)
HCT VFR BLD CALC: 27.6 % — LOW (ref 39–50)
HGB BLD-MCNC: 9.2 G/DL — LOW (ref 13–17)
INR BLD: <0.9 RATIO — SIGNIFICANT CHANGE UP (ref 0.85–1.16)
MAGNESIUM SERPL-MCNC: 2 MG/DL — SIGNIFICANT CHANGE UP (ref 1.6–2.6)
MCHC RBC-ENTMCNC: 28.8 PG — SIGNIFICANT CHANGE UP (ref 27–34)
MCHC RBC-ENTMCNC: 33.3 G/DL — SIGNIFICANT CHANGE UP (ref 32–36)
MCV RBC AUTO: 86.3 FL — SIGNIFICANT CHANGE UP (ref 80–100)
NRBC # BLD AUTO: 0 K/UL — SIGNIFICANT CHANGE UP (ref 0–0)
NRBC # FLD: 0 K/UL — SIGNIFICANT CHANGE UP (ref 0–0)
NRBC BLD AUTO-RTO: 0 /100 WBCS — SIGNIFICANT CHANGE UP (ref 0–0)
PHOSPHATE SERPL-MCNC: 2.5 MG/DL — SIGNIFICANT CHANGE UP (ref 2.5–4.5)
PLATELET # BLD AUTO: 303 K/UL — SIGNIFICANT CHANGE UP (ref 150–400)
POTASSIUM SERPL-MCNC: 3.7 MMOL/L — SIGNIFICANT CHANGE UP (ref 3.5–5.3)
POTASSIUM SERPL-SCNC: 3.7 MMOL/L — SIGNIFICANT CHANGE UP (ref 3.5–5.3)
PROTHROM AB SERPL-ACNC: 10.3 SEC — SIGNIFICANT CHANGE UP (ref 9.9–13.4)
RBC # BLD: 3.2 M/UL — LOW (ref 4.2–5.8)
RBC # FLD: 16.8 % — HIGH (ref 10.3–14.5)
SODIUM SERPL-SCNC: 131 MMOL/L — LOW (ref 135–145)
WBC # BLD: 13.04 K/UL — HIGH (ref 3.8–10.5)
WBC # FLD AUTO: 13.04 K/UL — HIGH (ref 3.8–10.5)

## 2025-06-17 PROCEDURE — 99231 SBSQ HOSP IP/OBS SF/LOW 25: CPT | Mod: 24

## 2025-06-17 PROCEDURE — 99232 SBSQ HOSP IP/OBS MODERATE 35: CPT

## 2025-06-17 PROCEDURE — 49406 IMAGE CATH FLUID PERI/RETRO: CPT

## 2025-06-17 RX ORDER — ASPIRIN 325 MG
81 TABLET ORAL DAILY
Refills: 0 | Status: DISCONTINUED | OUTPATIENT
Start: 2025-06-17 | End: 2025-06-20

## 2025-06-17 RX ORDER — HYDROMORPHONE/SOD CHLOR,ISO/PF 2 MG/10 ML
0.2 SYRINGE (ML) INJECTION ONCE
Refills: 0 | Status: DISCONTINUED | OUTPATIENT
Start: 2025-06-17 | End: 2025-06-17

## 2025-06-17 RX ORDER — HYDROMORPHONE/SOD CHLOR,ISO/PF 2 MG/10 ML
0.5 SYRINGE (ML) INJECTION ONCE
Refills: 0 | Status: DISCONTINUED | OUTPATIENT
Start: 2025-06-17 | End: 2025-06-17

## 2025-06-17 RX ORDER — FUROSEMIDE 10 MG/ML
20 INJECTION INTRAMUSCULAR; INTRAVENOUS
Refills: 0 | Status: DISCONTINUED | OUTPATIENT
Start: 2025-06-17 | End: 2025-06-20

## 2025-06-17 RX ORDER — HEPARIN SODIUM 1000 [USP'U]/ML
5000 INJECTION INTRAVENOUS; SUBCUTANEOUS EVERY 8 HOURS
Refills: 0 | Status: DISCONTINUED | OUTPATIENT
Start: 2025-06-17 | End: 2025-06-30

## 2025-06-17 RX ORDER — ENOXAPARIN SODIUM 100 MG/ML
40 INJECTION SUBCUTANEOUS EVERY 24 HOURS
Refills: 0 | Status: DISCONTINUED | OUTPATIENT
Start: 2025-06-17 | End: 2025-06-17

## 2025-06-17 RX ORDER — INSULIN LISPRO 100 U/ML
INJECTION, SOLUTION INTRAVENOUS; SUBCUTANEOUS EVERY 6 HOURS
Refills: 0 | Status: DISCONTINUED | OUTPATIENT
Start: 2025-06-17 | End: 2025-06-19

## 2025-06-17 RX ORDER — INSULIN GLARGINE-YFGN 100 [IU]/ML
6 INJECTION, SOLUTION SUBCUTANEOUS AT BEDTIME
Refills: 0 | Status: DISCONTINUED | OUTPATIENT
Start: 2025-06-17 | End: 2025-06-18

## 2025-06-17 RX ADMIN — AMPICILLIN SODIUM 200 GRAM(S): 1 INJECTION, POWDER, FOR SOLUTION INTRAMUSCULAR; INTRAVENOUS at 19:58

## 2025-06-17 RX ADMIN — Medication 40 MILLIGRAM(S): at 12:27

## 2025-06-17 RX ADMIN — AMPICILLIN SODIUM 200 GRAM(S): 1 INJECTION, POWDER, FOR SOLUTION INTRAMUSCULAR; INTRAVENOUS at 00:02

## 2025-06-17 RX ADMIN — Medication 100 MILLIEQUIVALENT(S): at 11:33

## 2025-06-17 RX ADMIN — Medication 0.2 MILLIGRAM(S): at 20:05

## 2025-06-17 RX ADMIN — HEPARIN SODIUM 5000 UNIT(S): 1000 INJECTION INTRAVENOUS; SUBCUTANEOUS at 06:26

## 2025-06-17 RX ADMIN — AMPICILLIN SODIUM 200 GRAM(S): 1 INJECTION, POWDER, FOR SOLUTION INTRAMUSCULAR; INTRAVENOUS at 12:03

## 2025-06-17 RX ADMIN — METOPROLOL SUCCINATE 5 MILLIGRAM(S): 50 TABLET, EXTENDED RELEASE ORAL at 00:02

## 2025-06-17 RX ADMIN — Medication 0.5 MILLIGRAM(S): at 16:31

## 2025-06-17 RX ADMIN — METOPROLOL SUCCINATE 5 MILLIGRAM(S): 50 TABLET, EXTENDED RELEASE ORAL at 06:26

## 2025-06-17 RX ADMIN — AMPICILLIN SODIUM 200 GRAM(S): 1 INJECTION, POWDER, FOR SOLUTION INTRAMUSCULAR; INTRAVENOUS at 07:54

## 2025-06-17 RX ADMIN — Medication 100 MILLIEQUIVALENT(S): at 10:11

## 2025-06-17 RX ADMIN — Medication 0.5 MILLIGRAM(S): at 07:46

## 2025-06-17 RX ADMIN — Medication 400 MILLIGRAM(S): at 12:22

## 2025-06-17 RX ADMIN — Medication 400 MILLIGRAM(S): at 00:02

## 2025-06-17 RX ADMIN — Medication 100 MILLIEQUIVALENT(S): at 10:40

## 2025-06-17 RX ADMIN — AMPICILLIN SODIUM 200 GRAM(S): 1 INJECTION, POWDER, FOR SOLUTION INTRAMUSCULAR; INTRAVENOUS at 04:23

## 2025-06-17 RX ADMIN — Medication 112 MICROGRAM(S): at 21:29

## 2025-06-17 RX ADMIN — Medication 0.2 MILLIGRAM(S): at 21:29

## 2025-06-17 RX ADMIN — Medication 0.2 MILLIGRAM(S): at 21:24

## 2025-06-17 RX ADMIN — Medication 400 MILLIGRAM(S): at 06:23

## 2025-06-17 RX ADMIN — INSULIN LISPRO 1: 100 INJECTION, SOLUTION INTRAVENOUS; SUBCUTANEOUS at 12:26

## 2025-06-17 RX ADMIN — AMPICILLIN SODIUM 200 GRAM(S): 1 INJECTION, POWDER, FOR SOLUTION INTRAMUSCULAR; INTRAVENOUS at 16:33

## 2025-06-17 RX ADMIN — Medication 0.5 MILLIGRAM(S): at 08:16

## 2025-06-17 NOTE — PRE PROCEDURE NOTE - PRE PROCEDURE EVALUATION
Interventional Radiology Pre-Procedure Note    Diagnosis/Indication: Patient is a 85y old  Male who presents with a chief complaint of back pain (17 Jun 2025 13:17)    PAST MEDICAL & SURGICAL HISTORY:  HTN (hypertension)  HLD (hyperlipidemia)  DM (diabetes mellitus)  TIA (transient ischemic attack)  Prostate cancer  S/P prostatectomy  Pacemaker  H/O thyroidectomy       Allergies: gabapentin (Other)      LABS:  CBC Full  -  ( 17 Jun 2025 05:44 )  WBC Count : 13.04 K/uL  RBC Count : 3.20 M/uL  Hemoglobin : 9.2 g/dL  Hematocrit : 27.6 %  Platelet Count - Automated : 303 K/uL  Mean Cell Volume : 86.3 fL  Mean Cell Hemoglobin : 28.8 pg  Mean Cell Hemoglobin Concentration : 33.3 g/dL    06-17    131[L]  |  96[L]  |  17  ----------------------------<  185[H]  3.7   |  23  |  1.11    Ca    7.5[L]      17 Jun 2025 05:44  Phos  2.5     06-17  Mg     2.00     06-17    PT/INR - ( 17 Jun 2025 15:55 )   PT: 10.3 sec;   INR: <0.90 ratio       PTT - ( 17 Jun 2025 15:55 )  PTT:25.5 sec    Procedure/ risks/ benefits/ alternatives were discussed with the patient and patient's daughter, including but not limited to risks of infection, bleeding and possible injury to nearby structures, which may require blood transfusion and/or additional procedures. The patient and his daughter verbalize understanding, and witnessed informed consent was obtained.  Interventional Radiology Pre-Procedure Note    Diagnosis/Indication: Patient is a 85y old Male with multiple comorbidities, s/p recent cholecystectomy c/b GB fossa collection. Drainage of the collection was requested.     PAST MEDICAL & SURGICAL HISTORY:  HTN (hypertension)  HLD (hyperlipidemia)  DM (diabetes mellitus)  TIA (transient ischemic attack)  Prostate cancer  S/P prostatectomy  Pacemaker  H/O thyroidectomy       Allergies: gabapentin (Other)      LABS:  CBC Full  -  ( 17 Jun 2025 05:44 )  WBC Count : 13.04 K/uL  RBC Count : 3.20 M/uL  Hemoglobin : 9.2 g/dL  Hematocrit : 27.6 %  Platelet Count - Automated : 303 K/uL  Mean Cell Volume : 86.3 fL  Mean Cell Hemoglobin : 28.8 pg  Mean Cell Hemoglobin Concentration : 33.3 g/dL    06-17    131[L]  |  96[L]  |  17  ----------------------------<  185[H]  3.7   |  23  |  1.11    Ca    7.5[L]      17 Jun 2025 05:44  Phos  2.5     06-17  Mg     2.00     06-17    PT/INR - ( 17 Jun 2025 15:55 )   PT: 10.3 sec;   INR: <0.90 ratio       PTT - ( 17 Jun 2025 15:55 )  PTT:25.5 sec    Procedure/ risks/ benefits/ alternatives were discussed with the patient and patient's daughter, including but not limited to risks of infection, bleeding and possible injury to nearby structures, which may require blood transfusion and/or additional procedures. The patient and his daughter verbalize understanding, and witnessed informed consent was obtained.

## 2025-06-17 NOTE — PROGRESS NOTE ADULT - ASSESSMENT
85 year old man with multiple chronic medical comorbidities including hypertension, sinus node dysfunction, type two diabetes, and chronic kidney disease (baseline creatinine 1.5-2.0) presently admitted for lower back pain associated with lumbar compression deformities with concern for cauda equina impingement. He was found to be bacteremic (E faecalis) due to acute acalculous cholecystitis, and is now seen following laparoscopic cholecystectomy with Dr. Triny Verde 6/5/2025. POD 1 patient became hypotensive, unresponsive to 2L fluid resuscitation and 3 u pRBC and was started on vasopressors and transferred to SICU.Patient weaned off pressors, course c/b ileus now resolved, Endocrinology was consulted for management of diabetes mellitus.    Type 2 Diabetes Mellitus  - HbA1c 6.9%   - home regimen:  lantus 24 units qhs, admelog 12 units + tradjenta 5 mg    Inpatient plan   - FS goal 100-180 mg/dl   - FS above goal   - agree with decreased Lantus 6 units at bedtime   - stop Admelog   - continue low Admelog correctional scale q6 while  - FS q6  - hypoglycemia protocol PRN       Discharge plan  - tentatively, Lantus + Tradjenta. Hold Admelog.   - f/u with Dr. Cates        Hypertension  - BP goal <130/80  - Management as per primary team  - Continue metoprolol  - outpt umcr      Hyperlipidemia  - LDL goal <70   - Check lipid panel if not done recently   - management per primary team       hypothyroidism  - TSH 2.265 WNL  - Patient on levothyroxine 150 mcg daily at home  - Patient now NPO  - Levothyroxine switched to 112 mcg IV daily      Compression deformities (lumbar)  - outpatient DXA        BRET Rosenbaum-BC  Nurse Practitioner  Division of Endocrinology & Diabetes  Available via [employer] Teams

## 2025-06-17 NOTE — PROCEDURE NOTE - PLAN
Catheter to gravity drainage. - F/u culture results.  - Continue drainage, monitor output  - Flush drain 5cc NS qd  - Global care per primary team.

## 2025-06-17 NOTE — PROCEDURE NOTE - PROCEDURE FINDINGS AND DETAILS
10Fr drain placed into the gallbladder fossa with return of 40cc dark brown fluid. 10Fr drain placed into the gallbladder fossa collection with return of 40cc of cloudy dark brown fluid. Catheter attached to gravity drainage.

## 2025-06-17 NOTE — PROGRESS NOTE ADULT - SUBJECTIVE AND OBJECTIVE BOX
Morning Surgical Progress Note  Patient is a 85y old  Male who presents with a chief complaint of back pain (17 Jun 2025 08:53)    SUBJECTIVE: Patient seen and examined at bedside with surgical team, patient without complaints. Denies pain, denies gas and bm.    Vital Signs Last 24 Hrs  T(C): 36.4 (17 Jun 2025 06:00), Max: 36.8 (16 Jun 2025 21:20)  T(F): 97.5 (17 Jun 2025 06:00), Max: 98.3 (16 Jun 2025 21:20)  HR: 76 (17 Jun 2025 06:00) (63 - 76)  BP: 155/54 (17 Jun 2025 06:00) (122/61 - 185/65)  BP(mean): --  RR: 18 (17 Jun 2025 06:00) (17 - 18)  SpO2: 94% (17 Jun 2025 06:00) (94% - 99%)    Parameters below as of 17 Jun 2025 06:00  Patient On (Oxygen Delivery Method): room air    I&O's Detail    16 Jun 2025 07:01  -  17 Jun 2025 07:00  --------------------------------------------------------  IN:    dextrose 5% + sodium chloride 0.45% w/ Additives: 900 mL  Total IN: 900 mL    OUT:    Indwelling Catheter - Urethral (mL): 1550 mL    Nasogastric/Oral tube (mL): 1450 mL    Oral Fluid: 0 mL  Total OUT: 3000 mL    Total NET: -2100 mL        Medications  MEDICATIONS  (STANDING):  acetaminophen   IVPB .. 1000 milliGRAM(s) IV Intermittent every 6 hours  ampicillin  IVPB 2 Gram(s) IV Intermittent every 4 hours  dextrose 5% + sodium chloride 0.45% with potassium chloride 20 mEq/L 1000 milliLiter(s) (100 mL/Hr) IV Continuous <Continuous>  dextrose 5%. 1000 milliLiter(s) (100 mL/Hr) IV Continuous <Continuous>  dextrose 5%. 1000 milliLiter(s) (50 mL/Hr) IV Continuous <Continuous>  dextrose 50% Injectable 25 Gram(s) IV Push once  dextrose 50% Injectable 12.5 Gram(s) IV Push once  dextrose 50% Injectable 25 Gram(s) IV Push once  furosemide   Injectable 20 milliGRAM(s) IV Push two times a day  glucagon  Injectable 1 milliGRAM(s) IntraMuscular once  insulin glargine Injectable (LANTUS) 6 Unit(s) SubCutaneous at bedtime  insulin lispro (ADMELOG) corrective regimen sliding scale   SubCutaneous at bedtime  insulin lispro (ADMELOG) corrective regimen sliding scale   SubCutaneous three times a day before meals  insulin lispro Injectable (ADMELOG) 5 Unit(s) SubCutaneous three times a day before meals  levothyroxine Injectable 112 MICROGram(s) IV Push at bedtime  lidocaine   4% Patch 1 Patch Transdermal daily  metoprolol tartrate Injectable 5 milliGRAM(s) IV Push every 6 hours  pantoprazole  Injectable 40 milliGRAM(s) IV Push daily  potassium chloride  10 mEq/100 mL IVPB 10 milliEquivalent(s) IV Intermittent every 1 hour  sodium chloride 3%  Inhalation 4 milliLiter(s) Inhalation every 12 hours    MEDICATIONS  (PRN):  albuterol/ipratropium for Nebulization 3 milliLiter(s) Nebulizer every 6 hours PRN Shortness of Breath and/or Wheezing  dextrose Oral Gel 15 Gram(s) Oral once PRN Blood Glucose LESS THAN 70 milliGRAM(s)/deciliter  HYDROmorphone  Injectable 0.2 milliGRAM(s) IV Push every 4 hours PRN Moderate Pain (4 - 6)  HYDROmorphone  Injectable 0.5 milliGRAM(s) IV Push every 4 hours PRN Severe Pain (7 - 10)    Physical Exam  Constitutional: A&Ox3, NAD  Gastrointestinal: Soft tender ruq to deep palpation    LABS:                        9.2    13.04 )-----------( 303      ( 17 Jun 2025 05:44 )             27.6     06-17    131[L]  |  96[L]  |  17  ----------------------------<  185[H]  3.7   |  23  |  1.11    Ca    7.5[L]      17 Jun 2025 05:44  Phos  2.5     06-17  Mg     2.00     06-17          Urinalysis Basic - ( 17 Jun 2025 05:44 )    Color: x / Appearance: x / SG: x / pH: x  Gluc: 185 mg/dL / Ketone: x  / Bili: x / Urobili: x   Blood: x / Protein: x / Nitrite: x   Leuk Esterase: x / RBC: x / WBC x   Sq Epi: x / Non Sq Epi: x / Bacteria: x

## 2025-06-17 NOTE — CHART NOTE - NSCHARTNOTEFT_GEN_A_CORE
General Surgery Post op Check    Pt seen and examined without complaints. Pain is controlled. Denies SOB/CP/N/V.     Vital Signs Last 24 Hrs  T(C): 36.6 (17 Jun 2025 22:54), Max: 36.9 (17 Jun 2025 10:00)  T(F): 97.8 (17 Jun 2025 22:54), Max: 98.5 (17 Jun 2025 10:00)  HR: 90 (17 Jun 2025 22:54) (72 - 90)  BP: 169/75 (17 Jun 2025 22:54) (118/38 - 185/65)  BP(mean): --  RR: 18 (17 Jun 2025 22:54) (17 - 20)  SpO2: 99% (17 Jun 2025 22:54) (94% - 99%)    Parameters below as of 17 Jun 2025 22:54  Patient On (Oxygen Delivery Method): room air        I&O's Summary    16 Jun 2025 07:01  -  17 Jun 2025 07:00  --------------------------------------------------------  IN: 900 mL / OUT: 3000 mL / NET: -2100 mL    17 Jun 2025 07:01  -  17 Jun 2025 23:50  --------------------------------------------------------  IN: 0 mL / OUT: 1150 mL / NET: -1150 mL        Physical Exam  Gen: NAD, A&Ox3  Pulm: No respiratory distress, no subcostal retractions  CV: RRR, no JVD  Abd: Soft, NT, ND, NGT in place   Drains: IR drain w brown murky fluid   Extremities:  FROM, warm and well perfused, equal bilateral muscle strength      A/P: 85y Male s/p drainage of gallbladder fossa collection with IR.     Plan   -DVT prophylaxis w/ SCD/lovenox  -ASA 81  -Encouraged OOB  -Strict I&O's  -Monitor drain output  -Analgesia and antiemetics as needed  -NPO/IVF   -ampicillin

## 2025-06-17 NOTE — PROGRESS NOTE ADULT - SUBJECTIVE AND OBJECTIVE BOX
Timothy Mojica MD  Interventional Cardiology / Endovascular Specialist  [place] Office : [address] [place] [address] [place]. [address]  Tel:   [place] Office : [address] [facility] [place] [address]  Tel: [phone]    Pt is lying in bed NAD    	  MEDICATIONS:  furosemide   Injectable 20 milliGRAM(s) IV Push two times a day  metoprolol tartrate Injectable 5 milliGRAM(s) IV Push every 6 hours    ampicillin  IVPB 2 Gram(s) IV Intermittent every 4 hours    albuterol/ipratropium for Nebulization 3 milliLiter(s) Nebulizer every 6 hours PRN  sodium chloride 3%  Inhalation 4 milliLiter(s) Inhalation every 12 hours    HYDROmorphone  Injectable 0.2 milliGRAM(s) IV Push every 4 hours PRN  HYDROmorphone  Injectable 0.5 milliGRAM(s) IV Push every 4 hours PRN    pantoprazole  Injectable 40 milliGRAM(s) IV Push daily    dextrose 50% Injectable 25 Gram(s) IV Push once  dextrose 50% Injectable 12.5 Gram(s) IV Push once  dextrose 50% Injectable 25 Gram(s) IV Push once  dextrose Oral Gel 15 Gram(s) Oral once PRN  glucagon  Injectable 1 milliGRAM(s) IntraMuscular once  insulin glargine Injectable (LANTUS) 6 Unit(s) SubCutaneous at bedtime  insulin lispro (ADMELOG) corrective regimen sliding scale   SubCutaneous every 6 hours  levothyroxine Injectable 112 MICROGram(s) IV Push at bedtime    dextrose 5% + sodium chloride 0.45% with potassium chloride 20 mEq/L 1000 milliLiter(s) IV Continuous <Continuous>  dextrose 5%. 1000 milliLiter(s) IV Continuous <Continuous>  dextrose 5%. 1000 milliLiter(s) IV Continuous <Continuous>  lidocaine   4% Patch 1 Patch Transdermal daily  potassium chloride  10 mEq/100 mL IVPB 10 milliEquivalent(s) IV Intermittent every 1 hour      PAST MEDICAL/SURGICAL HISTORY  PAST MEDICAL & SURGICAL HISTORY:  HTN (hypertension)      HLD (hyperlipidemia)      DM (diabetes mellitus)      TIA (transient ischemic attack)      Prostate cancer      S/P prostatectomy      Pacemaker      H/O thyroidectomy          SOCIAL HISTORY: Substance Use (street drugs): ( x ) never used  (  ) other:    FAMILY HISTORY:  FHx: heart disease (Father, Mother)        PHYSICAL EXAM:  T(C): 36.9 (06-17-25 @ 10:00), Max: 36.9 (06-17-25 @ 10:00)  HR: 72 (06-17-25 @ 10:00) (64 - 76)  BP: 118/38 (06-17-25 @ 10:00) (118/38 - 185/65)  RR: 18 (06-17-25 @ 10:00) (17 - 18)  SpO2: 96% (06-17-25 @ 10:00) (94% - 99%)  Wt(kg): --  I&O's Summary    16 Jun 2025 07:01  -  17 Jun 2025 07:00  --------------------------------------------------------  IN: 900 mL / OUT: 3000 mL / NET: -2100 mL          GENERAL: NAD  EYES:  EOMI  ENMT: NGT Moist mucous membranes  Cardiovascular: Normal S1 S2, No JVD, No murmurs, +b/l LUE edema L>R, LE edema  Respiratory: Lungs clear to auscultation	  Gastrointestinal:  Soft, Non-tender, + BS	  Extremities: b/l UE, LE edema                                  9.2    13.04 )-----------( 303      ( 17 Jun 2025 05:44 )             27.6     06-17    131[L]  |  96[L]  |  17  ----------------------------<  185[H]  3.7   |  23  |  1.11    Ca    7.5[L]      17 Jun 2025 05:44  Phos  2.5     06-17  Mg     2.00     06-17      proBNP:   Lipid Profile:   HgA1c:   TSH:     Consultant(s) Notes Reviewed:  [x ] YES  [ ] NO    Care Discussed with Consultants/Other Providers [ x] YES  [ ] NO    Imaging Personally Reviewed independently:  [x] YES  [ ] NO    All labs, radiologic studies, vitals, orders and medications list reviewed. Patient is seen and examined at bedside. Case discussed with medical team.

## 2025-06-17 NOTE — PROGRESS NOTE ADULT - SUBJECTIVE AND OBJECTIVE BOX
Chief Complaint: DM type 2     Interval Events: Daughter at the bedside.  Patient NPO.  NGT to suction, draining. FS above goal.     MEDICATIONS  (STANDING):  ampicillin  IVPB 2 Gram(s) IV Intermittent every 4 hours  dextrose 5% + sodium chloride 0.45% with potassium chloride 20 mEq/L 1000 milliLiter(s) (100 mL/Hr) IV Continuous <Continuous>  dextrose 5%. 1000 milliLiter(s) (100 mL/Hr) IV Continuous <Continuous>  dextrose 5%. 1000 milliLiter(s) (50 mL/Hr) IV Continuous <Continuous>  dextrose 50% Injectable 25 Gram(s) IV Push once  dextrose 50% Injectable 12.5 Gram(s) IV Push once  dextrose 50% Injectable 25 Gram(s) IV Push once  furosemide   Injectable 20 milliGRAM(s) IV Push two times a day  glucagon  Injectable 1 milliGRAM(s) IntraMuscular once  insulin glargine Injectable (LANTUS) 6 Unit(s) SubCutaneous at bedtime  insulin lispro (ADMELOG) corrective regimen sliding scale   SubCutaneous every 6 hours  levothyroxine Injectable 112 MICROGram(s) IV Push at bedtime  lidocaine   4% Patch 1 Patch Transdermal daily  metoprolol tartrate Injectable 5 milliGRAM(s) IV Push every 6 hours  pantoprazole  Injectable 40 milliGRAM(s) IV Push daily  potassium chloride  10 mEq/100 mL IVPB 10 milliEquivalent(s) IV Intermittent every 1 hour  sodium chloride 3%  Inhalation 4 milliLiter(s) Inhalation every 12 hours    MEDICATIONS  (PRN):  albuterol/ipratropium for Nebulization 3 milliLiter(s) Nebulizer every 6 hours PRN Shortness of Breath and/or Wheezing  dextrose Oral Gel 15 Gram(s) Oral once PRN Blood Glucose LESS THAN 70 milliGRAM(s)/deciliter  HYDROmorphone  Injectable 0.2 milliGRAM(s) IV Push every 4 hours PRN Moderate Pain (4 - 6)  HYDROmorphone  Injectable 0.5 milliGRAM(s) IV Push every 4 hours PRN Severe Pain (7 - 10)      Allergies    gabapentin (Other)    Intolerances    Review of Systems:  Constitutional: No fever/chills   Eyes: No blurry vision  Cardiovascular: No chest pain, no palpitations  Respiratory: No SOB, no cough  GI: No nausea, vomiting or abdominal pain  : No dysuria  Endocrine: no polyuria, polydipsia    VITALS: T(C): 36.9 (06-17-25 @ 13:05)  T(F): 98.4 (06-17-25 @ 13:05), Max: 98.5 (06-17-25 @ 10:00)  HR: 76 (06-17-25 @ 13:05) (64 - 76)  BP: 154/55 (06-17-25 @ 13:05) (118/38 - 185/65)  RR:  (17 - 18)  SpO2:  (94% - 97%)  Wt(kg): --      Physical Exam:   GENERAL: NAD, well-groomed, well-developed  EYES: No proptosis, no lid lag, anicteric  RESPIRATORY: non labored breathing   GI: soft, +NGT   NEURO: A&Ox3    CAPILLARY BLOOD GLUCOSE  POCT Blood Glucose.: 188 mg/dL (17 Jun 2025 12:03)  POCT Blood Glucose.: 195 mg/dL (17 Jun 2025 06:49)  POCT Blood Glucose.: 201 mg/dL (16 Jun 2025 22:13)  POCT Blood Glucose.: 249 mg/dL (16 Jun 2025 17:15)      06-17    131[L]  |  96[L]  |  17  ----------------------------<  185[H]  3.7   |  23  |  1.11    eGFR: 64    Ca    7.5[L]      06-17  Mg     2.00     06-17  Phos  2.5     06-17    TPro  4.9[L]  /  Alb  2.5[L]  /  TBili  0.4  /  DBili  x   /  AST  12  /  ALT  16  /  AlkPhos  152[H]  06-15      A1C with Estimated Average Glucose Result: 6.9 % (05-07-25 @ 06:03)  A1C with Estimated Average Glucose Result: 7.4 % (11-10-24 @ 06:55)

## 2025-06-17 NOTE — CHART NOTE - NSCHARTNOTEFT_GEN_A_CORE
Pre Interventional Radiology Procedure Note  Patient Age: 85y  Patient Gender:Male  Procedure (including site / side if known): drainage of collection  Diagnosis / Indication: s/p robo geetha now with collection in gallbladder fossa  Interventional Radiology Attending Physician: Dr Godoy  Ordering Attending Physician: Dr SHAWN Verde  Pertinent Medical History: s/p robo geetha now with Ileus   PAST MEDICAL & SURGICAL HISTORY:  HTN (hypertension)      HLD (hyperlipidemia)      DM (diabetes mellitus)      TIA (transient ischemic attack)      Prostate cancer      S/P prostatectomy      Pacemaker      H/O thyroidectomy          Pertinent Labs:                        9.2    13.04 )-----------( 303      ( 17 Jun 2025 05:44 )             27.6     06-17    131[L]  |  96[L]  |  17  ----------------------------<  185[H]  3.7   |  23  |  1.11    Ca    7.5[L]      17 Jun 2025 05:44  Phos  2.5     06-17  Mg     2.00     06-17        Patient and Family aware: yes

## 2025-06-17 NOTE — PROGRESS NOTE ADULT - ASSESSMENT
85 year old man with multiple chronic medical comorbidities including hypertension, sinus node dysfunction, type two diabetes, and chronic kidney disease (baseline creatinine 1.5-2.0) presently admitted for lower back pain associated with lumbar compression deformities with concern for cauda equina impingement. He was found to be bacteremic (E faecalis) due to acute acalculous cholecystitis, and is now seen following laparoscopic cholecystectomy with Dr. Triny Verde 6/5/2025. POD 1 patient became hypotensive, unresponsive to 2L fluid resuscitation and 3 u pRBC and was started on vasopressors and transferred to SICU. Patient weaned off pressors, course c/b ileus now resolved, NGT removed 6/10. Midline placed 6/12 for IV abx, patient now with ileus again ct scanned yesterday found to have collection in gb fossa going to IR today for drainage.    Plan:   - IR to drain today pending cardiology optimization  - Holding sqh and asa as per IR resident   - bowel regimen: dulcolax, senna, and miralax  - pain control  - Diet: NPO with NGT  - c/w ASA81  - c/w Ampicillin, midine placed for outpatient IV abx  - will be d/c'd with wong (hx cauda equina)   - increase antihypertensives per cards  - fu Endo dc recs  - ortho recs re: spinal surgery appreciated  - dispo planning - [facility] team   [id number]

## 2025-06-17 NOTE — PROGRESS NOTE ADULT - ASSESSMENT
-EKG w/ SR, no significant STT changes   -ECHO w/ normal EF no significant valvular heart disease     A/P:  85M w/ HTN, HLD, T2DM, prostate CA, CKD, TIA and SND s/p PPM transferred here for lumbar spinal cord compression and bacteremia s/p PPM explant and implant of Micra PPM.    1. bacteremia  -s/p cholecystectomy  -c/w abx per ID, primary team  6/13 afebrile, continues on abx per ID    2. SND  -s/p leadless PPM w/ EP  6/16 recommend IV lasix 40mg daily for generalized edema  6/17 mild anarsaca, c/w diuresing lasix 20mg IV BID     3. HTN  -c/w clonidine, hydralazine, labetalol    4.  Pre-procedure clearance  6/17 pt optimized from cardiac srtandpoint and may proceed with cholecystostomy tube with moderate risk

## 2025-06-17 NOTE — PACU DISCHARGE NOTE - COMMENTS
no anesthesia complications
No anesthesia complications
There were no apparent anesthetic complications.
NO ANESTHETIC COMPLICATIONS

## 2025-06-17 NOTE — CONSULT NOTE ADULT - ASSESSMENT
Interventional Radiology    Evaluate for Procedure:     HPI: 85 year old man with multiple chronic medical comorbidities including hypertension, sinus node dysfunction, type two diabetes, and chronic kidney disease (baseline creatinine 1.5-2.0) presently admitted for lower back pain associated with lumbar compression deformities with concern for cauda equina impingement. He was found to be bacteremic (E faecalis) due to acute acalculous cholecystitis, and is now seen following laparoscopic cholecystectomy with Dr. Triny Verde 6/5/2025. POD 1 patient became hypotensive, unresponsive to 2L fluid resuscitation and 3 u pRBC and was started on vasopressors and transferred to SICU. Patient weaned off pressors, course c/b ileus now resolved. CT AP demonstrated a 9cm collection in the GB fossa. IR consulted for drainage.       Allergies: gabapentin (Other)    Medications (Abx/Cardiac/Anticoagulation/Blood Products)    ampicillin  IVPB: 200 mL/Hr IV Intermittent (06-17 @ 07:54)  aspirin  chewable: 81 milliGRAM(s) Oral (06-16 @ 12:14)  cloNIDine: 0.2 milliGRAM(s) Oral (06-16 @ 13:15)  heparin   Injectable: 5000 Unit(s) SubCutaneous (06-17 @ 06:26)  hydrALAZINE: 100 milliGRAM(s) Oral (06-16 @ 13:15)  labetalol: 100 milliGRAM(s) Oral (06-16 @ 05:09)  metoprolol tartrate Injectable: 5 milliGRAM(s) IV Push (06-17 @ 06:26)    Data:    T(C): 36.4  HR: 76  BP: 155/54  RR: 18  SpO2: 94%    -WBC 13.04 / HgB 9.2 / Hct 27.6 / Plt 303  -Na 131 / Cl 96 / BUN 17 / Glucose 185  -K 3.7 / CO2 23 / Cr 1.11  -ALT -- / Alk Phos -- / T.Bili --  -INR <0.90 / PTT 27.0      Radiology: Fluid collection containing small foci of air in the gallbladder   fossa measuring 9.3 x 5.5 x 5.2 cm. There are internal hyperdensities   medially in this collection (2-147), which may represent   surgical/hemostatic material.      Assessment/Plan:     85 year old man with multiple chronic medical comorbidities including hypertension, sinus node dysfunction, type two diabetes, and chronic kidney disease (baseline creatinine 1.5-2.0) presently admitted for lower back pain associated with lumbar compression deformities with concern for cauda equina impingement. He was found to be bacteremic (E faecalis) due to acute acalculous cholecystitis, and is now seen following laparoscopic cholecystectomy with Dr. Triny Verde 6/5/2025.CT AP demonstrated a 9cm collection in the GB fossa. IR consulted for drainage.     -- IR will plan to perform BG fossa collection drainage on 6/17.  -- NPO STAT  -- Please obtain cardiology clearance for anesthesia.  -- hold a.m. anticoagulation on  -- please complete IR pre-procedure note  -- please place IR procedure request order under Dr. Godoy    --  Brennen Veras MD, PGY-2 Resident  Vascular and Interventional Radiology   Available on [employer] Teams    - Non-emergent consults: Place IR consult order in [place]  - Emergent issues (pager): [facility] [phone]; [facility] [phone]; [phone]  - Scheduling questions: [facility] [phone]; [facility] [phone]  - Clinic/outpatient booking: [facility] [phone]; [facility] [phone] Interventional Radiology    Evaluate for Procedure:     HPI: 85 year old man with multiple chronic medical comorbidities including hypertension, sinus node dysfunction, type two diabetes, and chronic kidney disease (baseline creatinine 1.5-2.0) presently admitted for lower back pain associated with lumbar compression deformities with concern for cauda equina impingement. He was found to be bacteremic (E faecalis) due to acute acalculous cholecystitis, and is now seen following laparoscopic cholecystectomy with Dr. Triny Verde 6/5/2025. POD 1 patient became hypotensive, unresponsive to 2L fluid resuscitation and 3 u pRBC and was started on vasopressors and transferred to SICU. Patient weaned off pressors, course c/b ileus now resolved. CT AP demonstrated a 9cm collection in the GB fossa. IR consulted for drainage.       Allergies: gabapentin (Other)    Medications (Abx/Cardiac/Anticoagulation/Blood Products)    ampicillin  IVPB: 200 mL/Hr IV Intermittent (06-17 @ 07:54)  aspirin  chewable: 81 milliGRAM(s) Oral (06-16 @ 12:14)  cloNIDine: 0.2 milliGRAM(s) Oral (06-16 @ 13:15)  heparin   Injectable: 5000 Unit(s) SubCutaneous (06-17 @ 06:26)  hydrALAZINE: 100 milliGRAM(s) Oral (06-16 @ 13:15)  labetalol: 100 milliGRAM(s) Oral (06-16 @ 05:09)  metoprolol tartrate Injectable: 5 milliGRAM(s) IV Push (06-17 @ 06:26)    Data:    T(C): 36.4  HR: 76  BP: 155/54  RR: 18  SpO2: 94%    -WBC 13.04 / HgB 9.2 / Hct 27.6 / Plt 303  -Na 131 / Cl 96 / BUN 17 / Glucose 185  -K 3.7 / CO2 23 / Cr 1.11  -ALT -- / Alk Phos -- / T.Bili --  -INR <0.90 / PTT 27.0      Radiology: Fluid collection containing small foci of air in the gallbladder   fossa measuring 9.3 x 5.5 x 5.2 cm. There are internal hyperdensities   medially in this collection (2-147), which may represent   surgical/hemostatic material.      Assessment/Plan:     85 year old man with multiple chronic medical comorbidities including hypertension, sinus node dysfunction, type two diabetes, and chronic kidney disease (baseline creatinine 1.5-2.0) presently admitted for lower back pain associated with lumbar compression deformities with concern for cauda equina impingement. He was found to be bacteremic (E faecalis) due to acute acalculous cholecystitis, and is now seen following laparoscopic cholecystectomy with Dr. Triny Verde 6/5/2025.CT AP demonstrated a 9cm collection in the GB fossa. IR consulted for drainage.     -- IR will plan to perform GB fossa collection drainage on 6/17.  -- NPO STAT  -- Please obtain cardiology clearance for anesthesia.  -- hold a.m. anticoagulation on  -- please complete IR pre-procedure note  -- please place IR procedure request order under Dr. Godoy    --  Brennen Veras MD, PGY-2 Resident  Vascular and Interventional Radiology   Available on [employer] Teams    - Non-emergent consults: Place IR consult order in [place]  - Emergent issues (pager): [facility] [phone]; [facility] [phone]; [phone]  - Scheduling questions: [facility] [phone]; [facility] [phone]  - Clinic/outpatient booking: [facility] [phone]; [facility] [phone]

## 2025-06-18 LAB
ANION GAP SERPL CALC-SCNC: 11 MMOL/L — SIGNIFICANT CHANGE UP (ref 7–14)
BUN SERPL-MCNC: 16 MG/DL — SIGNIFICANT CHANGE UP (ref 7–23)
CALCIUM SERPL-MCNC: 7.6 MG/DL — LOW (ref 8.4–10.5)
CHLORIDE SERPL-SCNC: 95 MMOL/L — LOW (ref 98–107)
CO2 SERPL-SCNC: 23 MMOL/L — SIGNIFICANT CHANGE UP (ref 22–31)
CREAT SERPL-MCNC: 1.15 MG/DL — SIGNIFICANT CHANGE UP (ref 0.5–1.3)
EGFR: 62 ML/MIN/1.73M2 — SIGNIFICANT CHANGE UP
EGFR: 62 ML/MIN/1.73M2 — SIGNIFICANT CHANGE UP
GLUCOSE BLDC GLUCOMTR-MCNC: 165 MG/DL — HIGH (ref 70–99)
GLUCOSE BLDC GLUCOMTR-MCNC: 186 MG/DL — HIGH (ref 70–99)
GLUCOSE BLDC GLUCOMTR-MCNC: 201 MG/DL — HIGH (ref 70–99)
GLUCOSE BLDC GLUCOMTR-MCNC: 244 MG/DL — HIGH (ref 70–99)
GLUCOSE BLDC GLUCOMTR-MCNC: 265 MG/DL — HIGH (ref 70–99)
GLUCOSE SERPL-MCNC: 249 MG/DL — HIGH (ref 70–99)
GRAM STN FLD: SIGNIFICANT CHANGE UP
HCT VFR BLD CALC: 25.8 % — LOW (ref 39–50)
HGB BLD-MCNC: 8.4 G/DL — LOW (ref 13–17)
MAGNESIUM SERPL-MCNC: 2.1 MG/DL — SIGNIFICANT CHANGE UP (ref 1.6–2.6)
MCHC RBC-ENTMCNC: 29.4 PG — SIGNIFICANT CHANGE UP (ref 27–34)
MCHC RBC-ENTMCNC: 32.6 G/DL — SIGNIFICANT CHANGE UP (ref 32–36)
MCV RBC AUTO: 90.2 FL — SIGNIFICANT CHANGE UP (ref 80–100)
NRBC # BLD AUTO: 0 K/UL — SIGNIFICANT CHANGE UP (ref 0–0)
NRBC # FLD: 0 K/UL — SIGNIFICANT CHANGE UP (ref 0–0)
NRBC BLD AUTO-RTO: 0 /100 WBCS — SIGNIFICANT CHANGE UP (ref 0–0)
PHOSPHATE SERPL-MCNC: 2.8 MG/DL — SIGNIFICANT CHANGE UP (ref 2.5–4.5)
PLATELET # BLD AUTO: 306 K/UL — SIGNIFICANT CHANGE UP (ref 150–400)
PMV BLD: 11.7 FL — SIGNIFICANT CHANGE UP (ref 7–13)
POTASSIUM SERPL-MCNC: 4.4 MMOL/L — SIGNIFICANT CHANGE UP (ref 3.5–5.3)
POTASSIUM SERPL-SCNC: 4.4 MMOL/L — SIGNIFICANT CHANGE UP (ref 3.5–5.3)
RBC # BLD: 2.86 M/UL — LOW (ref 4.2–5.8)
RBC # FLD: 17 % — HIGH (ref 10.3–14.5)
SODIUM SERPL-SCNC: 129 MMOL/L — LOW (ref 135–145)
SPECIMEN SOURCE: SIGNIFICANT CHANGE UP
WBC # BLD: 17.93 K/UL — HIGH (ref 3.8–10.5)
WBC # FLD AUTO: 17.93 K/UL — HIGH (ref 3.8–10.5)

## 2025-06-18 PROCEDURE — 99232 SBSQ HOSP IP/OBS MODERATE 35: CPT

## 2025-06-18 RX ORDER — INSULIN GLARGINE-YFGN 100 [IU]/ML
9 INJECTION, SOLUTION SUBCUTANEOUS AT BEDTIME
Refills: 0 | Status: DISCONTINUED | OUTPATIENT
Start: 2025-06-18 | End: 2025-06-19

## 2025-06-18 RX ORDER — ACETAMINOPHEN 500 MG/5ML
1000 LIQUID (ML) ORAL EVERY 6 HOURS
Refills: 0 | Status: COMPLETED | OUTPATIENT
Start: 2025-06-18 | End: 2025-06-18

## 2025-06-18 RX ORDER — SODIUM PHOSPHATE,DIBASIC DIHYD
15 POWDER (GRAM) MISCELLANEOUS ONCE
Refills: 0 | Status: COMPLETED | OUTPATIENT
Start: 2025-06-18 | End: 2025-06-18

## 2025-06-18 RX ADMIN — INSULIN LISPRO 3: 100 INJECTION, SOLUTION INTRAVENOUS; SUBCUTANEOUS at 12:10

## 2025-06-18 RX ADMIN — AMPICILLIN SODIUM 200 GRAM(S): 1 INJECTION, POWDER, FOR SOLUTION INTRAMUSCULAR; INTRAVENOUS at 12:30

## 2025-06-18 RX ADMIN — AMPICILLIN SODIUM 200 GRAM(S): 1 INJECTION, POWDER, FOR SOLUTION INTRAMUSCULAR; INTRAVENOUS at 07:12

## 2025-06-18 RX ADMIN — Medication 400 MILLIGRAM(S): at 18:48

## 2025-06-18 RX ADMIN — INSULIN GLARGINE-YFGN 9 UNIT(S): 100 INJECTION, SOLUTION SUBCUTANEOUS at 23:03

## 2025-06-18 RX ADMIN — INSULIN LISPRO 1: 100 INJECTION, SOLUTION INTRAVENOUS; SUBCUTANEOUS at 23:27

## 2025-06-18 RX ADMIN — METOPROLOL SUCCINATE 5 MILLIGRAM(S): 50 TABLET, EXTENDED RELEASE ORAL at 12:09

## 2025-06-18 RX ADMIN — INSULIN LISPRO 3: 100 INJECTION, SOLUTION INTRAVENOUS; SUBCUTANEOUS at 00:19

## 2025-06-18 RX ADMIN — AMPICILLIN SODIUM 200 GRAM(S): 1 INJECTION, POWDER, FOR SOLUTION INTRAMUSCULAR; INTRAVENOUS at 19:01

## 2025-06-18 RX ADMIN — Medication 4 MILLILITER(S): at 09:56

## 2025-06-18 RX ADMIN — METOPROLOL SUCCINATE 5 MILLIGRAM(S): 50 TABLET, EXTENDED RELEASE ORAL at 05:04

## 2025-06-18 RX ADMIN — Medication 40 MILLIGRAM(S): at 12:09

## 2025-06-18 RX ADMIN — AMPICILLIN SODIUM 200 GRAM(S): 1 INJECTION, POWDER, FOR SOLUTION INTRAMUSCULAR; INTRAVENOUS at 03:32

## 2025-06-18 RX ADMIN — Medication 63.75 MILLIMOLE(S): at 12:00

## 2025-06-18 RX ADMIN — Medication 400 MILLIGRAM(S): at 12:01

## 2025-06-18 RX ADMIN — INSULIN GLARGINE-YFGN 6 UNIT(S): 100 INJECTION, SOLUTION SUBCUTANEOUS at 00:19

## 2025-06-18 RX ADMIN — HEPARIN SODIUM 5000 UNIT(S): 1000 INJECTION INTRAVENOUS; SUBCUTANEOUS at 00:18

## 2025-06-18 RX ADMIN — Medication 0.5 MILLIGRAM(S): at 09:00

## 2025-06-18 RX ADMIN — HEPARIN SODIUM 5000 UNIT(S): 1000 INJECTION INTRAVENOUS; SUBCUTANEOUS at 05:04

## 2025-06-18 RX ADMIN — INSULIN LISPRO 1: 100 INJECTION, SOLUTION INTRAVENOUS; SUBCUTANEOUS at 19:01

## 2025-06-18 RX ADMIN — AMPICILLIN SODIUM 200 GRAM(S): 1 INJECTION, POWDER, FOR SOLUTION INTRAMUSCULAR; INTRAVENOUS at 00:18

## 2025-06-18 RX ADMIN — Medication 4 MILLILITER(S): at 19:55

## 2025-06-18 RX ADMIN — METOPROLOL SUCCINATE 5 MILLIGRAM(S): 50 TABLET, EXTENDED RELEASE ORAL at 00:18

## 2025-06-18 RX ADMIN — Medication 1000 MILLIGRAM(S): at 19:18

## 2025-06-18 RX ADMIN — Medication 0.5 MILLIGRAM(S): at 02:49

## 2025-06-18 RX ADMIN — Medication 112 MICROGRAM(S): at 22:16

## 2025-06-18 RX ADMIN — Medication 400 MILLIGRAM(S): at 06:34

## 2025-06-18 RX ADMIN — Medication 400 MILLIGRAM(S): at 23:27

## 2025-06-18 RX ADMIN — Medication 0.5 MILLIGRAM(S): at 15:17

## 2025-06-18 RX ADMIN — FUROSEMIDE 20 MILLIGRAM(S): 10 INJECTION INTRAMUSCULAR; INTRAVENOUS at 05:04

## 2025-06-18 RX ADMIN — Medication 0.5 MILLIGRAM(S): at 14:47

## 2025-06-18 RX ADMIN — HEPARIN SODIUM 5000 UNIT(S): 1000 INJECTION INTRAVENOUS; SUBCUTANEOUS at 22:13

## 2025-06-18 RX ADMIN — AMPICILLIN SODIUM 200 GRAM(S): 1 INJECTION, POWDER, FOR SOLUTION INTRAMUSCULAR; INTRAVENOUS at 16:05

## 2025-06-18 RX ADMIN — INSULIN LISPRO 2: 100 INJECTION, SOLUTION INTRAVENOUS; SUBCUTANEOUS at 05:54

## 2025-06-18 RX ADMIN — HEPARIN SODIUM 5000 UNIT(S): 1000 INJECTION INTRAVENOUS; SUBCUTANEOUS at 14:45

## 2025-06-18 RX ADMIN — Medication 1000 MILLIGRAM(S): at 12:31

## 2025-06-18 RX ADMIN — Medication 0.5 MILLIGRAM(S): at 03:19

## 2025-06-18 RX ADMIN — FUROSEMIDE 20 MILLIGRAM(S): 10 INJECTION INTRAMUSCULAR; INTRAVENOUS at 14:46

## 2025-06-18 NOTE — PROGRESS NOTE ADULT - SUBJECTIVE AND OBJECTIVE BOX
Chief Complaint: DM type 2     Interval Events: Daughter at the bedside.  Patient NPO.  NGT to suction, draining. FS above goal.    MEDICATIONS  (STANDING):  acetaminophen   IVPB .. 1000 milliGRAM(s) IV Intermittent every 6 hours  ampicillin  IVPB 2 Gram(s) IV Intermittent every 4 hours  aspirin  chewable 81 milliGRAM(s) Oral daily  dextrose 5%. 1000 milliLiter(s) (100 mL/Hr) IV Continuous <Continuous>  dextrose 5%. 1000 milliLiter(s) (50 mL/Hr) IV Continuous <Continuous>  dextrose 50% Injectable 25 Gram(s) IV Push once  dextrose 50% Injectable 12.5 Gram(s) IV Push once  dextrose 50% Injectable 25 Gram(s) IV Push once  furosemide   Injectable 20 milliGRAM(s) IV Push two times a day  glucagon  Injectable 1 milliGRAM(s) IntraMuscular once  heparin   Injectable 5000 Unit(s) SubCutaneous every 8 hours  insulin glargine Injectable (LANTUS) 9 Unit(s) SubCutaneous at bedtime  insulin lispro (ADMELOG) corrective regimen sliding scale   SubCutaneous every 6 hours  levothyroxine Injectable 112 MICROGram(s) IV Push at bedtime  lidocaine   4% Patch 1 Patch Transdermal daily  metoprolol tartrate Injectable 5 milliGRAM(s) IV Push every 6 hours  pantoprazole  Injectable 40 milliGRAM(s) IV Push daily  sodium chloride 3%  Inhalation 4 milliLiter(s) Inhalation every 12 hours    MEDICATIONS  (PRN):  albuterol/ipratropium for Nebulization 3 milliLiter(s) Nebulizer every 6 hours PRN Shortness of Breath and/or Wheezing  dextrose Oral Gel 15 Gram(s) Oral once PRN Blood Glucose LESS THAN 70 milliGRAM(s)/deciliter  HYDROmorphone  Injectable 0.2 milliGRAM(s) IV Push every 4 hours PRN Moderate Pain (4 - 6)  HYDROmorphone  Injectable 0.5 milliGRAM(s) IV Push every 4 hours PRN Severe Pain (7 - 10)      Allergies    gabapentin (Other)    Intolerances      Review of Systems:  Cardiovascular: No chest pain, palpitations  Respiratory: No SOB, no cough  GI: No nausea, vomiting, abdominal pain  : No dysuria  Endocrine: no polyuria, polydipsia      PHYSICAL EXAM:  VITALS: T(C): 37 (06-18-25 @ 10:00)  T(F): 98.6 (06-18-25 @ 10:00), Max: 98.6 (06-18-25 @ 10:00)  HR: 81 (06-18-25 @ 10:00) (74 - 90)  BP: 159/61 (06-18-25 @ 10:00) (137/98 - 169/75)  RR:  (17 - 20)  SpO2:  (96% - 100%)  Wt(kg): --  GENERAL: NAD, well-groomed, well-developed  EYES: No proptosis, no lid lag, anicteric  RESPIRATORY: non labored breathing   GI: soft, +NGT   NEURO: A&Ox3      CAPILLARY BLOOD GLUCOSE      POCT Blood Glucose.: 265 mg/dL (18 Jun 2025 12:09)  POCT Blood Glucose.: 244 mg/dL (18 Jun 2025 05:20)  POCT Blood Glucose.: 252 mg/dL (17 Jun 2025 23:46)  POCT Blood Glucose.: 176 mg/dL (17 Jun 2025 20:06)  POCT Blood Glucose.: 177 mg/dL (17 Jun 2025 16:31)      06-18    129[L]  |  95[L]  |  16  ----------------------------<  249[H]  4.4   |  23  |  1.15    eGFR: 62    Ca    7.6[L]      06-18  Mg     2.10     06-18  Phos  2.8     06-18            Thyroid Function Tests:        A1C with Estimated Average Glucose Result: 6.9 % (05-07-25 @ 06:03)  A1C with Estimated Average Glucose Result: 7.4 % (11-10-24 @ 06:55)          Diet, NPO (06-16-25 @ 21:05)

## 2025-06-18 NOTE — PROGRESS NOTE ADULT - SUBJECTIVE AND OBJECTIVE BOX
Surgery Progress Note     Overnight events: IR guided drain insertion done yesterday    Interval/Subjective:  Patient seen and examined.   __________________________________________________________________  Vitals:  T(C): 36.9 (06:19), Max: 36.9 (10:00)  HR: 83 (06:19) (72 - 90)  BP: 137/98 (06:19) (118/38 - 169/75)  RR: 18 (06:19) (17 - 20)  SpO2: 100% (06:19) (96% - 100%)    I & O's:  IN:    dextrose 5% + sodium chloride 0.45% w/ Additives: 900 mL    IV PiggyBack: 200 mL  Total IN: 1100 mL    OUT:    Indwelling Catheter - Urethral (mL): 1050 mL    Nasogastric/Oral tube (mL): 1350 mL    Oral Fluid: 0 mL  Total OUT: 2400 mL        Physical Exam:  General: NAD, resting comfortably in bed; NGT present  Abdomen: soft, nontender, nondistended; drain in the right flank draining dark brownish fluid      __________________________________________________________________

## 2025-06-18 NOTE — PROGRESS NOTE ADULT - ASSESSMENT
Patient is a 85y old Male with multiple comorbidities, s/p recent cholecystectomy c/b GB fossa collection. Patient is now s/p Gallbladder fossa collection drainage on 6/17 in Interventional Radiology.    Plan:  - Continue drainage, monitor output  - Flush drain 5cc NS qd  - Can be discharged home with drain if outputs remain high  - Will need noncontrast CT + IR tube check once outputs < 10cc/48hr, can be arranged as outpatient follow-up. Please call IR PA at [phone] prior to discharge to schedule outpatient CT/Tube check appointment.     [id number]

## 2025-06-18 NOTE — PROGRESS NOTE ADULT - SUBJECTIVE AND OBJECTIVE BOX
Timothy Mojica MD  Interventional Cardiology / Endovascular Specialist  [place] Office : [address] [place] [address] [place] [address]  Tel:   [place] Office : [address] [facility] [place] [address]  Tel: [phone]      Pt is lying in bed NAD      MEDICATIONS:  aspirin  chewable 81 milliGRAM(s) Oral daily  furosemide   Injectable 20 milliGRAM(s) IV Push two times a day  heparin   Injectable 5000 Unit(s) SubCutaneous every 8 hours  metoprolol tartrate Injectable 5 milliGRAM(s) IV Push every 6 hours    ampicillin  IVPB 2 Gram(s) IV Intermittent every 4 hours    albuterol/ipratropium for Nebulization 3 milliLiter(s) Nebulizer every 6 hours PRN  sodium chloride 3%  Inhalation 4 milliLiter(s) Inhalation every 12 hours    acetaminophen   IVPB .. 1000 milliGRAM(s) IV Intermittent every 6 hours  HYDROmorphone  Injectable 0.2 milliGRAM(s) IV Push every 4 hours PRN  HYDROmorphone  Injectable 0.5 milliGRAM(s) IV Push every 4 hours PRN    pantoprazole  Injectable 40 milliGRAM(s) IV Push daily    dextrose 50% Injectable 25 Gram(s) IV Push once  dextrose 50% Injectable 12.5 Gram(s) IV Push once  dextrose 50% Injectable 25 Gram(s) IV Push once  dextrose Oral Gel 15 Gram(s) Oral once PRN  glucagon  Injectable 1 milliGRAM(s) IntraMuscular once  insulin glargine Injectable (LANTUS) 9 Unit(s) SubCutaneous at bedtime  insulin lispro (ADMELOG) corrective regimen sliding scale   SubCutaneous every 6 hours  levothyroxine Injectable 112 MICROGram(s) IV Push at bedtime    dextrose 5%. 1000 milliLiter(s) IV Continuous <Continuous>  dextrose 5%. 1000 milliLiter(s) IV Continuous <Continuous>  lidocaine   4% Patch 1 Patch Transdermal daily      PAST MEDICAL/SURGICAL HISTORY  PAST MEDICAL & SURGICAL HISTORY:  HTN (hypertension)      HLD (hyperlipidemia)      DM (diabetes mellitus)      TIA (transient ischemic attack)      Prostate cancer      S/P prostatectomy      Pacemaker      H/O thyroidectomy          SOCIAL HISTORY: Substance Use (street drugs): ( x ) never used  (  ) other:    FAMILY HISTORY:  FHx: heart disease (Father, Mother)        PHYSICAL EXAM:  T(C): 36.9 (06-18-25 @ 14:16), Max: 37 (06-18-25 @ 10:00)  HR: 84 (06-18-25 @ 14:16) (74 - 90)  BP: 153/59 (06-18-25 @ 14:16) (137/98 - 169/75)  RR: 19 (06-18-25 @ 14:16) (17 - 20)  SpO2: 98% (06-18-25 @ 14:16) (96% - 100%)  Wt(kg): --  I&O's Summary    17 Jun 2025 07:01  -  18 Jun 2025 07:00  --------------------------------------------------------  IN: 1100 mL / OUT: 2400 mL / NET: -1300 mL    18 Jun 2025 07:01  -  18 Jun 2025 16:50  --------------------------------------------------------  IN: 0 mL / OUT: 1137.5 mL / NET: -1137.5 mL        GENERAL: NAD  EYES:  EOMI  ENMT: NGT Moist mucous membranes  Cardiovascular: Normal S1 S2, No JVD, No murmurs, +b/l LUE edema L>R, LE edema  Respiratory: Lungs clear to auscultation	  Gastrointestinal:  Soft, Non-tender, + BS	  Extremities: b/l UE, LE edema                              8.4    17.93 )-----------( 306      ( 18 Jun 2025 05:30 )             25.8     06-18    129[L]  |  95[L]  |  16  ----------------------------<  249[H]  4.4   |  23  |  1.15    Ca    7.6[L]      18 Jun 2025 05:30  Phos  2.8     06-18  Mg     2.10     06-18      proBNP:   Lipid Profile:   HgA1c:   TSH:     Consultant(s) Notes Reviewed:  [x ] YES  [ ] NO    Care Discussed with Consultants/Other Providers [ x] YES  [ ] NO    Imaging Personally Reviewed independently:  [x] YES  [ ] NO    All labs, radiologic studies, vitals, orders and medications list reviewed. Patient is seen and examined at bedside. Case discussed with medical team.

## 2025-06-18 NOTE — PROGRESS NOTE ADULT - ASSESSMENT
85 year old man with multiple chronic medical comorbidities including hypertension, sinus node dysfunction, type two diabetes, and chronic kidney disease (baseline creatinine 1.5-2.0) presently admitted for lower back pain associated with lumbar compression deformities with concern for cauda equina impingement. He was found to be bacteremic (E faecalis) due to acute acalculous cholecystitis, and is now seen following laparoscopic cholecystectomy with Dr. Triny Verde 6/5/2025. POD 1 patient became hypotensive, unresponsive to 2L fluid resuscitation and 3 u pRBC and was started on vasopressors and transferred to SICU.Patient weaned off pressors, course c/b ileus now resolved, Endocrinology was consulted for management of diabetes mellitus.    Type 2 Diabetes Mellitus  - HbA1c 6.9%   - home regimen:  lantus 24 units qhs, admelog 12 units + tradjenta 5 mg    Inpatient plan   - FS goal 100-180 mg/dl   - FS above goal   - Will increase Lantus to 9 units at bedtime since FS has been above goal yesterday today (pt received 6 units last night and the day before that received 12 units).  - Continue off Admelog   - continue low Admelog correctional scale q6 while  - FS q6  - hypoglycemia protocol PRN     Discharge plan  - tentatively, Lantus + Tradjenta. Hold Admelog.   - f/u with Dr. Cates    Hypertension  - BP goal <130/80  - Management as per primary team  - Continue metoprolol  - outpt [facility]    Hyperlipidemia  - LDL goal <70   - Check lipid panel if not done recently   - management per primary team     hypothyroidism  - TSH 2.265 WNL  - Patient on levothyroxine 150 mcg daily at home  - Patient now NPO  - Levothyroxine switched to 112 mcg IV daily    Compression deformities (lumbar)  - outpatient DXA    BRET Palacios-BC  Nurse Practitioner  Division of Endocrinology & Diabetes  Available on Microsoft Teams

## 2025-06-18 NOTE — PROGRESS NOTE ADULT - ASSESSMENT
-EKG w/ SR, no significant STT changes   -ECHO w/ normal EF no significant valvular heart disease     A/P:  85M w/ HTN, HLD, T2DM, prostate CA, CKD, TIA and SND s/p PPM transferred here for lumbar spinal cord compression and bacteremia s/p PPM explant and implant of Micra PPM.    1. bacteremia  -s/p cholecystectomy  -c/w abx per ID, primary team  6/13 afebrile, continues on abx per ID    2. SND  -s/p leadless PPM w/ EP  6/16 recommend IV lasix 40mg daily for generalized edema  6/17 mild anarsaca, c/w diuresing lasix 20mg IV BID     3. HTN  -c/w clonidine, hydralazine, labetalol    4.  Pre-procedure clearance  6/17 pt optimized from cardiac srtandpoint and may proceed with cholecystostomy tube with moderate risk  6/18 s/p IR drain placement, remain hemodynamically stable

## 2025-06-18 NOTE — PROGRESS NOTE ADULT - ASSESSMENT
85 year old man with multiple chronic medical comorbidities including hypertension, sinus node dysfunction, type two diabetes, and chronic kidney disease (baseline creatinine 1.5-2.0) presently admitted for lower back pain associated with lumbar compression deformities with concern for cauda equina impingement. He was found to be bacteremic (E faecalis) due to acute acalculous cholecystitis, and is now seen following laparoscopic cholecystectomy with Dr. Triny Verde 6/5/2025. POD 1 patient became hypotensive, unresponsive to 2L fluid resuscitation and 3 u pRBC and was started on vasopressors and transferred to SICU. Patient weaned off pressors, course c/b ileus now resolved, NGT removed 6/10. Midline placed 6/12 for IV abx, patient now with ileus again ct scanned yesterday found to have collection in gb fossa, drained by IR on 6/17.    Plan:   - restarted sqh and asa  - bowel regimen: dulcolax, senna, and miralax  - pain control  - Diet: NPO with NGT  - c/w ASA81  - c/w Ampicillin, midine placed for outpatient IV abx  - will be d/c'd with wong (hx cauda equina)   - increase antihypertensives per cards  - fu Endo dc recs  - ortho recs re: spinal surgery appreciated  - dispo planning - P[facility]CE [facility] team   [id number] 85 year old man with multiple chronic medical comorbidities including hypertension, sinus node dysfunction, type two diabetes, and chronic kidney disease (baseline creatinine 1.5-2.0) presently admitted for lower back pain associated with lumbar compression deformities with concern for cauda equina impingement. He was found to be bacteremic (E faecalis) due to acute acalculous cholecystitis, and is now seen following laparoscopic cholecystectomy with Dr. Triny Verde 6/5/2025. POD 1 patient became hypotensive, unresponsive to 2L fluid resuscitation and 3 u pRBC and was started on vasopressors and transferred to SICU. Patient weaned off pressors, course c/b ileus now resolved, NGT removed 6/10. Midline placed 6/12 for IV abx, patient now with ileus again ct scanned 6/16 found to have collection in gb fossa, drained by IR on 6/17.    Plan:   - restarted sqh   - pain control  - Diet: NPO with NGT  - c/w ASA81 once PO diet resumed  - c/w Ampicillin, midine placed for outpatient IV abx  - will be d/c'd with wong (hx cauda equina)   - increase antihypertensives per cards  - fu Endo dc recs  - ortho recs re: spinal surgery appreciated  - dispo planning - PONCE    B team   [id number]

## 2025-06-18 NOTE — PROGRESS NOTE ADULT - SUBJECTIVE AND OBJECTIVE BOX
85y Male s/p Gallbladder fossa collection drainage on 6/17 in Interventional Radiology.     Patient seen and examined at bedside, resting comfortably. Denies fevers, chills, abdominal pain, nausea, vomiting, diarrhea. Drainage bag with cloudy dark brown fluid.     T(F): 98.6 (06-18-25 @ 10:00), Max: 98.6 (06-18-25 @ 10:00)  HR: 81 (06-18-25 @ 10:00) (74 - 90)  BP: 159/61 (06-18-25 @ 10:00) (137/98 - 169/75)  RR: 18 (06-18-25 @ 10:00) (17 - 20)  SpO2: 98% (06-18-25 @ 10:00) (96% - 100%)  Wt(kg): --    LABS:                        8.4    17.93 )-----------( 306      ( 18 Jun 2025 05:30 )             25.8     06-18    129[L]  |  95[L]  |  16  ----------------------------<  249[H]  4.4   |  23  |  1.15    Ca    7.6[L]      18 Jun 2025 05:30  Phos  2.8     06-18  Mg     2.10     06-18      PT/INR - ( 17 Jun 2025 15:55 )   PT: 10.3 sec;   INR: <0.90 ratio         PTT - ( 17 Jun 2025 15:55 )  PTT:25.5 sec  I&O's Detail    17 Jun 2025 07:01  -  18 Jun 2025 07:00  --------------------------------------------------------  IN:    dextrose 5% + sodium chloride 0.45% w/ Additives: 900 mL    IV PiggyBack: 200 mL  Total IN: 1100 mL    OUT:    Indwelling Catheter - Urethral (mL): 1050 mL    Nasogastric/Oral tube (mL): 1350 mL    Oral Fluid: 0 mL  Total OUT: 2400 mL    Total NET: -1300 mL      18 Jun 2025 07:01  -  18 Jun 2025 11:42  --------------------------------------------------------  IN:  Total IN: 0 mL    OUT:    Drain (mL): 30 mL    Indwelling Catheter - Urethral (mL): 300 mL    Nasogastric/Oral tube (mL): 300 mL  Total OUT: 630 mL    Total NET: -630 mL        PHYSICAL EXAM:  General: Nontoxic, resting comfortably in NAD  Gallbladder fossa drain: dressing c/d/i, flushed with 5ccs normal saline. Bag with dark brown fluid.     85y Male s/p Gallbladder fossa collection drainage on 6/17 in Interventional Radiology.     Patient seen and examined at bedside, resting comfortably. Denies fevers, chills, abdominal pain, nausea, vomiting, diarrhea. Drainage bag with cloudy dark brown fluid.     T(F): 98.6 (06-18-25 @ 10:00), Max: 98.6 (06-18-25 @ 10:00)  HR: 81 (06-18-25 @ 10:00) (74 - 90)  BP: 159/61 (06-18-25 @ 10:00) (137/98 - 169/75)  RR: 18 (06-18-25 @ 10:00) (17 - 20)  SpO2: 98% (06-18-25 @ 10:00) (96% - 100%)  Wt(kg): --    LABS:                        8.4    17.93 )-----------( 306      ( 18 Jun 2025 05:30 )             25.8     06-18    129[L]  |  95[L]  |  16  ----------------------------<  249[H]  4.4   |  23  |  1.15    Ca    7.6[L]      18 Jun 2025 05:30  Phos  2.8     06-18  Mg     2.10     06-18      PT/INR - ( 17 Jun 2025 15:55 )   PT: 10.3 sec;   INR: <0.90 ratio         PTT - ( 17 Jun 2025 15:55 )  PTT:25.5 sec  I&O's Detail    17 Jun 2025 07:01  -  18 Jun 2025 07:00  --------------------------------------------------------  IN:    dextrose 5% + sodium chloride 0.45% w/ Additives: 900 mL    IV PiggyBack: 200 mL  Total IN: 1100 mL    OUT:    Indwelling Catheter - Urethral (mL): 1050 mL    Nasogastric/Oral tube (mL): 1350 mL    Oral Fluid: 0 mL  Total OUT: 2400 mL    Total NET: -1300 mL      18 Jun 2025 07:01  -  18 Jun 2025 11:42  --------------------------------------------------------  IN:  Total IN: 0 mL    OUT:    Drain (mL): 30 mL    Indwelling Catheter - Urethral (mL): 300 mL    Nasogastric/Oral tube (mL): 300 mL  Total OUT: 630 mL    Total NET: -630 mL    PHYSICAL EXAM:  General: Nontoxic, resting comfortably in NAD  Gallbladder fossa drain: dressing c/d/i, flushed with 5ccs normal saline. Bag with dark brown fluid.

## 2025-06-19 LAB
ANION GAP SERPL CALC-SCNC: 13 MMOL/L — SIGNIFICANT CHANGE UP (ref 7–14)
BUN SERPL-MCNC: 15 MG/DL — SIGNIFICANT CHANGE UP (ref 7–23)
CALCIUM SERPL-MCNC: 7.8 MG/DL — LOW (ref 8.4–10.5)
CHLORIDE SERPL-SCNC: 97 MMOL/L — LOW (ref 98–107)
CO2 SERPL-SCNC: 23 MMOL/L — SIGNIFICANT CHANGE UP (ref 22–31)
CREAT SERPL-MCNC: 1.21 MG/DL — SIGNIFICANT CHANGE UP (ref 0.5–1.3)
EGFR: 58 ML/MIN/1.73M2 — LOW
EGFR: 58 ML/MIN/1.73M2 — LOW
GLUCOSE BLDC GLUCOMTR-MCNC: 100 MG/DL — HIGH (ref 70–99)
GLUCOSE BLDC GLUCOMTR-MCNC: 102 MG/DL — HIGH (ref 70–99)
GLUCOSE BLDC GLUCOMTR-MCNC: 103 MG/DL — HIGH (ref 70–99)
GLUCOSE BLDC GLUCOMTR-MCNC: 95 MG/DL — SIGNIFICANT CHANGE UP (ref 70–99)
GLUCOSE BLDC GLUCOMTR-MCNC: 96 MG/DL — SIGNIFICANT CHANGE UP (ref 70–99)
GLUCOSE SERPL-MCNC: 90 MG/DL — SIGNIFICANT CHANGE UP (ref 70–99)
HCT VFR BLD CALC: 24.2 % — LOW (ref 39–50)
HGB BLD-MCNC: 8.1 G/DL — LOW (ref 13–17)
MAGNESIUM SERPL-MCNC: 1.9 MG/DL — SIGNIFICANT CHANGE UP (ref 1.6–2.6)
MCHC RBC-ENTMCNC: 29.3 PG — SIGNIFICANT CHANGE UP (ref 27–34)
MCHC RBC-ENTMCNC: 33.5 G/DL — SIGNIFICANT CHANGE UP (ref 32–36)
MCV RBC AUTO: 87.7 FL — SIGNIFICANT CHANGE UP (ref 80–100)
NRBC # BLD AUTO: 0 K/UL — SIGNIFICANT CHANGE UP (ref 0–0)
NRBC # FLD: 0 K/UL — SIGNIFICANT CHANGE UP (ref 0–0)
NRBC BLD AUTO-RTO: 0 /100 WBCS — SIGNIFICANT CHANGE UP (ref 0–0)
PHOSPHATE SERPL-MCNC: 2.8 MG/DL — SIGNIFICANT CHANGE UP (ref 2.5–4.5)
PLATELET # BLD AUTO: 178 K/UL — SIGNIFICANT CHANGE UP (ref 150–400)
PMV BLD: 11.7 FL — SIGNIFICANT CHANGE UP (ref 7–13)
POTASSIUM SERPL-MCNC: 3.7 MMOL/L — SIGNIFICANT CHANGE UP (ref 3.5–5.3)
POTASSIUM SERPL-SCNC: 3.7 MMOL/L — SIGNIFICANT CHANGE UP (ref 3.5–5.3)
RBC # BLD: 2.76 M/UL — LOW (ref 4.2–5.8)
RBC # FLD: 17.5 % — HIGH (ref 10.3–14.5)
SODIUM SERPL-SCNC: 133 MMOL/L — LOW (ref 135–145)
WBC # BLD: 9.85 K/UL — SIGNIFICANT CHANGE UP (ref 3.8–10.5)
WBC # FLD AUTO: 9.85 K/UL — SIGNIFICANT CHANGE UP (ref 3.8–10.5)

## 2025-06-19 PROCEDURE — 99232 SBSQ HOSP IP/OBS MODERATE 35: CPT

## 2025-06-19 RX ORDER — SODIUM PHOSPHATE,DIBASIC DIHYD
15 POWDER (GRAM) MISCELLANEOUS ONCE
Refills: 0 | Status: DISCONTINUED | OUTPATIENT
Start: 2025-06-19 | End: 2025-06-21

## 2025-06-19 RX ORDER — INSULIN GLARGINE-YFGN 100 [IU]/ML
7 INJECTION, SOLUTION SUBCUTANEOUS AT BEDTIME
Refills: 0 | Status: DISCONTINUED | OUTPATIENT
Start: 2025-06-19 | End: 2025-06-20

## 2025-06-19 RX ORDER — INSULIN LISPRO 100 U/ML
INJECTION, SOLUTION INTRAVENOUS; SUBCUTANEOUS AT BEDTIME
Refills: 0 | Status: DISCONTINUED | OUTPATIENT
Start: 2025-06-19 | End: 2025-06-23

## 2025-06-19 RX ORDER — ACETAMINOPHEN 500 MG/5ML
1000 LIQUID (ML) ORAL EVERY 6 HOURS
Refills: 0 | Status: COMPLETED | OUTPATIENT
Start: 2025-06-19 | End: 2025-06-19

## 2025-06-19 RX ORDER — INSULIN LISPRO 100 U/ML
INJECTION, SOLUTION INTRAVENOUS; SUBCUTANEOUS
Refills: 0 | Status: DISCONTINUED | OUTPATIENT
Start: 2025-06-19 | End: 2025-06-20

## 2025-06-19 RX ADMIN — Medication 400 MILLIGRAM(S): at 23:39

## 2025-06-19 RX ADMIN — METOPROLOL SUCCINATE 5 MILLIGRAM(S): 50 TABLET, EXTENDED RELEASE ORAL at 23:45

## 2025-06-19 RX ADMIN — Medication 1000 MILLIGRAM(S): at 19:36

## 2025-06-19 RX ADMIN — LIDOCAINE HYDROCHLORIDE 1 PATCH: 20 JELLY TOPICAL at 15:01

## 2025-06-19 RX ADMIN — HEPARIN SODIUM 5000 UNIT(S): 1000 INJECTION INTRAVENOUS; SUBCUTANEOUS at 15:02

## 2025-06-19 RX ADMIN — METOPROLOL SUCCINATE 5 MILLIGRAM(S): 50 TABLET, EXTENDED RELEASE ORAL at 11:47

## 2025-06-19 RX ADMIN — Medication 400 MILLIGRAM(S): at 06:14

## 2025-06-19 RX ADMIN — Medication 40 MILLIGRAM(S): at 11:48

## 2025-06-19 RX ADMIN — INSULIN GLARGINE-YFGN 7 UNIT(S): 100 INJECTION, SOLUTION SUBCUTANEOUS at 22:15

## 2025-06-19 RX ADMIN — Medication 400 MILLIGRAM(S): at 18:50

## 2025-06-19 RX ADMIN — FUROSEMIDE 20 MILLIGRAM(S): 10 INJECTION INTRAMUSCULAR; INTRAVENOUS at 05:47

## 2025-06-19 RX ADMIN — AMPICILLIN SODIUM 200 GRAM(S): 1 INJECTION, POWDER, FOR SOLUTION INTRAMUSCULAR; INTRAVENOUS at 03:28

## 2025-06-19 RX ADMIN — Medication 1000 MILLIGRAM(S): at 06:29

## 2025-06-19 RX ADMIN — LIDOCAINE HYDROCHLORIDE 1 PATCH: 20 JELLY TOPICAL at 19:36

## 2025-06-19 RX ADMIN — Medication 81 MILLIGRAM(S): at 11:48

## 2025-06-19 RX ADMIN — METOPROLOL SUCCINATE 5 MILLIGRAM(S): 50 TABLET, EXTENDED RELEASE ORAL at 18:49

## 2025-06-19 RX ADMIN — Medication 112 MICROGRAM(S): at 22:19

## 2025-06-19 RX ADMIN — Medication 4 MILLILITER(S): at 09:13

## 2025-06-19 RX ADMIN — HEPARIN SODIUM 5000 UNIT(S): 1000 INJECTION INTRAVENOUS; SUBCUTANEOUS at 22:15

## 2025-06-19 RX ADMIN — HEPARIN SODIUM 5000 UNIT(S): 1000 INJECTION INTRAVENOUS; SUBCUTANEOUS at 05:48

## 2025-06-19 RX ADMIN — FUROSEMIDE 20 MILLIGRAM(S): 10 INJECTION INTRAMUSCULAR; INTRAVENOUS at 15:02

## 2025-06-19 RX ADMIN — AMPICILLIN SODIUM 200 GRAM(S): 1 INJECTION, POWDER, FOR SOLUTION INTRAMUSCULAR; INTRAVENOUS at 15:31

## 2025-06-19 RX ADMIN — Medication 0.5 MILLIGRAM(S): at 00:34

## 2025-06-19 RX ADMIN — AMPICILLIN SODIUM 200 GRAM(S): 1 INJECTION, POWDER, FOR SOLUTION INTRAMUSCULAR; INTRAVENOUS at 22:55

## 2025-06-19 RX ADMIN — Medication 0.5 MILLIGRAM(S): at 15:02

## 2025-06-19 RX ADMIN — Medication 0.5 MILLIGRAM(S): at 08:22

## 2025-06-19 RX ADMIN — Medication 400 MILLIGRAM(S): at 11:27

## 2025-06-19 RX ADMIN — Medication 1000 MILLIGRAM(S): at 11:58

## 2025-06-19 RX ADMIN — AMPICILLIN SODIUM 200 GRAM(S): 1 INJECTION, POWDER, FOR SOLUTION INTRAMUSCULAR; INTRAVENOUS at 13:25

## 2025-06-19 RX ADMIN — AMPICILLIN SODIUM 200 GRAM(S): 1 INJECTION, POWDER, FOR SOLUTION INTRAMUSCULAR; INTRAVENOUS at 07:17

## 2025-06-19 RX ADMIN — Medication 0.5 MILLIGRAM(S): at 15:20

## 2025-06-19 RX ADMIN — Medication 20 MILLIEQUIVALENT(S): at 13:13

## 2025-06-19 RX ADMIN — AMPICILLIN SODIUM 200 GRAM(S): 1 INJECTION, POWDER, FOR SOLUTION INTRAMUSCULAR; INTRAVENOUS at 00:13

## 2025-06-19 RX ADMIN — Medication 0.5 MILLIGRAM(S): at 00:49

## 2025-06-19 NOTE — PROGRESS NOTE ADULT - SUBJECTIVE AND OBJECTIVE BOX
Timothy Mojica MD  Interventional Cardiology / Endovascular Specialist  [place] Office : [address] [place] [address] [place] [address]  Tel:   [place] Office : [address] [facility] [place] [address]  Tel: [phone]      Pt is lying in bed NAD    	  MEDICATIONS:  aspirin  chewable 81 milliGRAM(s) Oral daily  furosemide   Injectable 20 milliGRAM(s) IV Push two times a day  heparin   Injectable 5000 Unit(s) SubCutaneous every 8 hours  metoprolol tartrate Injectable 5 milliGRAM(s) IV Push every 6 hours    ampicillin  IVPB 2 Gram(s) IV Intermittent every 4 hours    albuterol/ipratropium for Nebulization 3 milliLiter(s) Nebulizer every 6 hours PRN  sodium chloride 3%  Inhalation 4 milliLiter(s) Inhalation every 12 hours    acetaminophen   IVPB .. 1000 milliGRAM(s) IV Intermittent every 6 hours  HYDROmorphone  Injectable 0.2 milliGRAM(s) IV Push every 4 hours PRN  HYDROmorphone  Injectable 0.5 milliGRAM(s) IV Push every 4 hours PRN    pantoprazole  Injectable 40 milliGRAM(s) IV Push daily    dextrose 50% Injectable 25 Gram(s) IV Push once  dextrose 50% Injectable 12.5 Gram(s) IV Push once  dextrose 50% Injectable 25 Gram(s) IV Push once  dextrose Oral Gel 15 Gram(s) Oral once PRN  glucagon  Injectable 1 milliGRAM(s) IntraMuscular once  insulin glargine Injectable (LANTUS) 7 Unit(s) SubCutaneous at bedtime  insulin lispro (ADMELOG) corrective regimen sliding scale   SubCutaneous three times a day before meals  insulin lispro (ADMELOG) corrective regimen sliding scale   SubCutaneous at bedtime  levothyroxine Injectable 112 MICROGram(s) IV Push at bedtime    dextrose 5%. 1000 milliLiter(s) IV Continuous <Continuous>  dextrose 5%. 1000 milliLiter(s) IV Continuous <Continuous>  lidocaine   4% Patch 1 Patch Transdermal daily  sodium phosphate 15 milliMole(s)/250 mL IVPB 15 milliMole(s) IV Intermittent once      PAST MEDICAL/SURGICAL HISTORY  PAST MEDICAL & SURGICAL HISTORY:  HTN (hypertension)      HLD (hyperlipidemia)      DM (diabetes mellitus)      TIA (transient ischemic attack)      Prostate cancer      S/P prostatectomy      Pacemaker      H/O thyroidectomy          SOCIAL HISTORY: Substance Use (street drugs): ( x ) never used  (  ) other:    FAMILY HISTORY:  FHx: heart disease (Father, Mother)          PHYSICAL EXAM:  T(C): 37 (06-19-25 @ 10:25), Max: 37.1 (06-19-25 @ 06:00)  HR: 91 (06-19-25 @ 10:25) (76 - 92)  BP: 129/85 (06-19-25 @ 10:25) (111/50 - 152/56)  RR: 18 (06-19-25 @ 10:25) (18 - 18)  SpO2: 96% (06-19-25 @ 10:25) (96% - 100%)  Wt(kg): --  I&O's Summary    18 Jun 2025 07:01  -  19 Jun 2025 07:00  --------------------------------------------------------  IN: 100 mL / OUT: 3003 mL / NET: -2903 mL    19 Jun 2025 07:01  -  19 Jun 2025 15:18  --------------------------------------------------------  IN: 0 mL / OUT: 300 mL / NET: -300 mL        GENERAL: NAD  EYES:  EOMI  ENMT: NGT Moist mucous membranes  Cardiovascular: Normal S1 S2, No JVD, No murmurs, +b/l LUE edema L>R, LE edema  Respiratory: Lungs clear to auscultation	  Gastrointestinal:  Soft, Non-tender, + BS	  Extremities: b/l UE, LE edema                              8.1    9.85  )-----------( 178      ( 19 Jun 2025 06:30 )             24.2     06-19    133[L]  |  97[L]  |  15  ----------------------------<  90  3.7   |  23  |  1.21    Ca    7.8[L]      19 Jun 2025 06:30  Phos  2.8     06-19  Mg     1.90     06-19      proBNP:   Lipid Profile:   HgA1c:   TSH:     Consultant(s) Notes Reviewed:  [x ] YES  [ ] NO    Care Discussed with Consultants/Other Providers [ x] YES  [ ] NO    Imaging Personally Reviewed independently:  [x] YES  [ ] NO    All labs, radiologic studies, vitals, orders and medications list reviewed. Patient is seen and examined at bedside. Case discussed with medical team.

## 2025-06-19 NOTE — PROGRESS NOTE ADULT - ASSESSMENT
-EKG w/ SR, no significant STT changes   -ECHO w/ normal EF no significant valvular heart disease     A/P:  85M w/ HTN, HLD, T2DM, prostate CA, CKD, TIA and SND s/p PPM transferred here for lumbar spinal cord compression and bacteremia s/p PPM explant and implant of Micra PPM.    1. bacteremia  -s/p cholecystectomy  -c/w abx per ID, primary team  6/13 afebrile, continues on abx per ID  6/19 c/w abx per ID    2. SND  -s/p leadless PPM w/ EP  6/16 recommend IV lasix 40mg daily for generalized edema  6/17 mild anarsaca, c/w diuresing lasix 20mg IV BID      3. HTN  -c/w clonidine, hydralazine, labetalol    4.  Pre-procedure clearance  6/17 pt optimized from cardiac srtandpoint and may proceed with cholecystostomy tube with moderate risk  6/18 s/p IR drain placement, remain hemodynamically stable

## 2025-06-19 NOTE — PROGRESS NOTE ADULT - ASSESSMENT
85 year old man with multiple chronic medical comorbidities including hypertension, sinus node dysfunction, type two diabetes, and chronic kidney disease (baseline creatinine 1.5-2.0) presently admitted for lower back pain associated with lumbar compression deformities with concern for cauda equina impingement. He was found to be bacteremic (E faecalis) due to acute acalculous cholecystitis, and is now seen following laparoscopic cholecystectomy with Dr. Triny Verde 6/5/2025. POD 1 patient became hypotensive, unresponsive to 2L fluid resuscitation and 3 u pRBC and was started on vasopressors and transferred to SICU.Patient weaned off pressors, course c/b ileus now resolved, Endocrinology was consulted for management of diabetes mellitus.    Type 2 Diabetes Mellitus  - HbA1c 6.9%   - home regimen:  lantus 24 units qhs, admelog 12 units + tradjenta 5 mg    Inpatient plan   - FS goal 100-180 mg/dl   - Fasting glucose tightly controlled   - started on CLD today   - decrease Lantus to 7 units at bedtime   - change to low Admelog correctional scale TID AC  ---> q6 if NPO  - change to separate low Admelog correctional scale at HS   - Monitor off Premeal insulin for now  - If fingerstick >180 x 2, can start Admelog 2 units TID AC. Hold if not eating/NPO.   - FS TID AC & HS ---> q6 if NPO   - hypoglycemia protocol PRN   - consistent carbohydrate diet      Discharge plan  - per family, plan to discharge to rehab   - tentatively, Lantus + Tradjenta. Hold Admelog.   - f/u with Dr. Cates      Hypertension  - BP goal <130/80  - Continue metoprolol  - Management as per primary team      Hyperlipidemia  - LDL goal <70   - Check lipid panel if not done recently   - management per primary team       hypothyroidism  - TSH 2.265 WNL  - Patient on levothyroxine 150 mcg daily at home  - continue Levothyroxine 112 mcg IV daily      Compression deformities (lumbar)  - outpatient DXA        BRET Rosenbaum-BC  Nurse Practitioner  Division of Endocrinology & Diabetes  Available via [employer] Teams

## 2025-06-19 NOTE — PROGRESS NOTE ADULT - SUBJECTIVE AND OBJECTIVE BOX
B TEAM Surgery Progress Note  Patient is a 85y old  Male who presents with a chief complaint of back pain (18 Jun 2025 16:49)    SUBJECTIVE: Patient seen and examined at bedside with surgical team, patient without complaints. Denies fever, chills, CP, SOB nausea, vomiting, abdominal pain. + flatus / + BM. NGT removed this AM    OBJECTIVE:    Vital Signs Last 24 Hrs  T(C): 37 (19 Jun 2025 10:25), Max: 37.1 (19 Jun 2025 06:00)  T(F): 98.6 (19 Jun 2025 10:25), Max: 98.8 (19 Jun 2025 06:00)  HR: 91 (19 Jun 2025 10:25) (76 - 92)  BP: 129/85 (19 Jun 2025 10:25) (111/50 - 153/59)  BP(mean): --  RR: 18 (19 Jun 2025 10:25) (18 - 19)  SpO2: 96% (19 Jun 2025 10:25) (96% - 100%)    Parameters below as of 19 Jun 2025 10:25  Patient On (Oxygen Delivery Method): nasal cannula    I&O's Detail    18 Jun 2025 07:01  -  19 Jun 2025 07:00  --------------------------------------------------------  IN:    IV PiggyBack: 100 mL  Total IN: 100 mL    OUT:    Drain (mL): 53 mL    Indwelling Catheter - Urethral (mL): 2150 mL    Nasogastric/Oral tube (mL): 800 mL    Oral Fluid: 0 mL  Total OUT: 3003 mL    Total NET: -2903 mL      19 Jun 2025 07:01  -  19 Jun 2025 11:00  --------------------------------------------------------  IN:  Total IN: 0 mL    OUT:    Indwelling Catheter - Urethral (mL): 300 mL  Total OUT: 300 mL    Total NET: -300 mL      MEDICATIONS  (STANDING):  acetaminophen   IVPB .. 1000 milliGRAM(s) IV Intermittent every 6 hours  ampicillin  IVPB 2 Gram(s) IV Intermittent every 4 hours  aspirin  chewable 81 milliGRAM(s) Oral daily  dextrose 5%. 1000 milliLiter(s) (100 mL/Hr) IV Continuous <Continuous>  dextrose 5%. 1000 milliLiter(s) (50 mL/Hr) IV Continuous <Continuous>  dextrose 50% Injectable 25 Gram(s) IV Push once  dextrose 50% Injectable 12.5 Gram(s) IV Push once  dextrose 50% Injectable 25 Gram(s) IV Push once  furosemide   Injectable 20 milliGRAM(s) IV Push two times a day  glucagon  Injectable 1 milliGRAM(s) IntraMuscular once  heparin   Injectable 5000 Unit(s) SubCutaneous every 8 hours  insulin glargine Injectable (LANTUS) 7 Unit(s) SubCutaneous at bedtime  insulin lispro (ADMELOG) corrective regimen sliding scale   SubCutaneous three times a day before meals  insulin lispro (ADMELOG) corrective regimen sliding scale   SubCutaneous at bedtime  levothyroxine Injectable 112 MICROGram(s) IV Push at bedtime  lidocaine   4% Patch 1 Patch Transdermal daily  metoprolol tartrate Injectable 5 milliGRAM(s) IV Push every 6 hours  pantoprazole  Injectable 40 milliGRAM(s) IV Push daily  potassium chloride  10 mEq/100 mL IVPB 10 milliEquivalent(s) IV Intermittent every 1 hour  sodium chloride 3%  Inhalation 4 milliLiter(s) Inhalation every 12 hours  sodium phosphate 15 milliMole(s)/250 mL IVPB 15 milliMole(s) IV Intermittent once    MEDICATIONS  (PRN):  albuterol/ipratropium for Nebulization 3 milliLiter(s) Nebulizer every 6 hours PRN Shortness of Breath and/or Wheezing  dextrose Oral Gel 15 Gram(s) Oral once PRN Blood Glucose LESS THAN 70 milliGRAM(s)/deciliter  HYDROmorphone  Injectable 0.2 milliGRAM(s) IV Push every 4 hours PRN Moderate Pain (4 - 6)  HYDROmorphone  Injectable 0.5 milliGRAM(s) IV Push every 4 hours PRN Severe Pain (7 - 10)      PHYSICAL EXAM:  Constitutional: A&Ox3, NAD  Respiratory: Unlabored breathing  Abdomen: soft, nontender, nondistended; drain in the right flank draining dark brownish fluid  Extremities: WWP, [place] spontaneously    LABS:                        8.1    9.85  )-----------( 178      ( 19 Jun 2025 06:30 )             24.2     06-19    133[L]  |  97[L]  |  15  ----------------------------<  90  3.7   |  23  |  1.21    Ca    7.8[L]      19 Jun 2025 06:30  Phos  2.8     06-19  Mg     1.90     06-19      PT/INR - ( 17 Jun 2025 15:55 )   PT: 10.3 sec;   INR: <0.90 ratio         PTT - ( 17 Jun 2025 15:55 )  PTT:25.5 sec    Urinalysis Basic - ( 19 Jun 2025 06:30 )    Color: x / Appearance: x / SG: x / pH: x  Gluc: 90 mg/dL / Ketone: x  / Bili: x / Urobili: x   Blood: x / Protein: x / Nitrite: x   Leuk Esterase: x / RBC: x / WBC x   Sq Epi: x / Non Sq Epi: x / Bacteria: x

## 2025-06-19 NOTE — PROGRESS NOTE ADULT - ASSESSMENT
85 year old man with multiple chronic medical comorbidities including hypertension, sinus node dysfunction, type two diabetes, and chronic kidney disease (baseline creatinine 1.5-2.0) presently admitted for lower back pain associated with lumbar compression deformities with concern for cauda equina impingement. He was found to be bacteremic (E faecalis) due to acute acalculous cholecystitis, and is now seen following laparoscopic cholecystectomy 6/5/2025. POD 1 patient became hypotensive, unresponsive to 2L fluid resuscitation and 3 u pRBC and was started on vasopressors and transferred to SICU. Patient weaned off pressors, course c/b ileus now resolved, NGT removed 6/10. Midline placed 6/12 for IV abx, patient now with ileus again ct scanned 6/16 found to have collection in gb fossa, drained by IR on 6/17.    Plan:   - NGT dc'd this AM   - DVT ppx: sqh  - [id number]  - pain control  - Diet: CLD, ADAT  - c/w Ampicillin, midline placed for outpatient IV abx  - will be d/c'd with wong (hx cauda equina)   - increase antihypertensives per cards  - fu Endo dc recs  - ortho recs re: spinal surgery appreciated  - dispo planning - PONCE    B team   [id number]

## 2025-06-19 NOTE — PROGRESS NOTE ADULT - SUBJECTIVE AND OBJECTIVE BOX
Chief Complaint: DM type 2     Interval Events: Son and daughter at the bedside.  Fasting glucose tightly controlled.  Patient started on clear liquid diet today, tolerating well. No N/V or abdominal pain.     MEDICATIONS  (STANDING):  acetaminophen   IVPB .. 1000 milliGRAM(s) IV Intermittent every 6 hours  ampicillin  IVPB 2 Gram(s) IV Intermittent every 4 hours  aspirin  chewable 81 milliGRAM(s) Oral daily  dextrose 5%. 1000 milliLiter(s) (100 mL/Hr) IV Continuous <Continuous>  dextrose 5%. 1000 milliLiter(s) (50 mL/Hr) IV Continuous <Continuous>  dextrose 50% Injectable 25 Gram(s) IV Push once  dextrose 50% Injectable 12.5 Gram(s) IV Push once  dextrose 50% Injectable 25 Gram(s) IV Push once  furosemide   Injectable 20 milliGRAM(s) IV Push two times a day  glucagon  Injectable 1 milliGRAM(s) IntraMuscular once  heparin   Injectable 5000 Unit(s) SubCutaneous every 8 hours  insulin glargine Injectable (LANTUS) 7 Unit(s) SubCutaneous at bedtime  insulin lispro (ADMELOG) corrective regimen sliding scale   SubCutaneous three times a day before meals  insulin lispro (ADMELOG) corrective regimen sliding scale   SubCutaneous at bedtime  levothyroxine Injectable 112 MICROGram(s) IV Push at bedtime  lidocaine   4% Patch 1 Patch Transdermal daily  metoprolol tartrate Injectable 5 milliGRAM(s) IV Push every 6 hours  pantoprazole  Injectable 40 milliGRAM(s) IV Push daily  potassium chloride  10 mEq/100 mL IVPB 10 milliEquivalent(s) IV Intermittent every 1 hour  sodium chloride 3%  Inhalation 4 milliLiter(s) Inhalation every 12 hours  sodium phosphate 15 milliMole(s)/250 mL IVPB 15 milliMole(s) IV Intermittent once    MEDICATIONS  (PRN):  albuterol/ipratropium for Nebulization 3 milliLiter(s) Nebulizer every 6 hours PRN Shortness of Breath and/or Wheezing  dextrose Oral Gel 15 Gram(s) Oral once PRN Blood Glucose LESS THAN 70 milliGRAM(s)/deciliter  HYDROmorphone  Injectable 0.2 milliGRAM(s) IV Push every 4 hours PRN Moderate Pain (4 - 6)  HYDROmorphone  Injectable 0.5 milliGRAM(s) IV Push every 4 hours PRN Severe Pain (7 - 10)      Allergies    gabapentin (Other)    Intolerances      Review of Systems:  Constitutional: No fever/chills   Eyes: No blurry vision  HEENT: hard of hearing   Cardiovascular: No chest pain, no palpitations  Respiratory: No SOB, no cough  GI: No nausea, vomiting or abdominal pain  : No dysuria  Endocrine: no polyuria, polydipsia      VITALS: T(C): 37 (06-19-25 @ 10:25)  T(F): 98.6 (06-19-25 @ 10:25), Max: 98.8 (06-19-25 @ 06:00)  HR: 91 (06-19-25 @ 10:25) (76 - 92)  BP: 129/85 (06-19-25 @ 10:25) (111/50 - 153/59)  RR:  (18 - 19)  SpO2:  (96% - 100%)  Wt(kg): --      Physical Exam:   GENERAL: NAD, well-groomed, well-developed  EYES: No proptosis, no lid lag, anicteric  HEENT:  Atraumatic, Normocephalic, moist mucous membranes  RESPIRATORY:  non labored breathing   GI: Soft, nontender, non distended  NEURO: A&Ox3    CAPILLARY BLOOD GLUCOSE  POCT Blood Glucose.: 100 mg/dL (19 Jun 2025 11:48)  POCT Blood Glucose.: 96 mg/dL (19 Jun 2025 08:15)  POCT Blood Glucose.: 95 mg/dL (19 Jun 2025 06:00)  POCT Blood Glucose.: 165 mg/dL (18 Jun 2025 23:02)  POCT Blood Glucose.: 186 mg/dL (18 Jun 2025 18:57)  POCT Blood Glucose.: 201 mg/dL (18 Jun 2025 17:47)      06-19    133[L]  |  97[L]  |  15  ----------------------------<  90  3.7   |  23  |  1.21    eGFR: 58[L]    Ca    7.8[L]      06-19  Mg     1.90     06-19  Phos  2.8     06-19        A1C with Estimated Average Glucose Result: 6.9 % (05-07-25 @ 06:03)  A1C with Estimated Average Glucose Result: 7.4 % (11-10-24 @ 06:55)      Thyroid Function Tests:

## 2025-06-20 LAB
ALBUMIN SERPL ELPH-MCNC: 2.9 G/DL — LOW (ref 3.3–5)
ALP SERPL-CCNC: 148 U/L — HIGH (ref 40–120)
ALT FLD-CCNC: 16 U/L — SIGNIFICANT CHANGE UP (ref 4–41)
ANION GAP SERPL CALC-SCNC: 13 MMOL/L — SIGNIFICANT CHANGE UP (ref 7–14)
ANION GAP SERPL CALC-SCNC: 15 MMOL/L — HIGH (ref 7–14)
ANISOCYTOSIS BLD QL: ABNORMAL
AST SERPL-CCNC: 17 U/L — SIGNIFICANT CHANGE UP (ref 4–40)
BASOPHILS # BLD AUTO: 0.03 K/UL — SIGNIFICANT CHANGE UP (ref 0–0.2)
BASOPHILS # BLD MANUAL: 0.03 K/UL — SIGNIFICANT CHANGE UP (ref 0–0.2)
BASOPHILS NFR BLD AUTO: 1 % — SIGNIFICANT CHANGE UP (ref 0–2)
BASOPHILS NFR BLD MANUAL: 0.9 % — SIGNIFICANT CHANGE UP (ref 0–2)
BILIRUB SERPL-MCNC: 0.4 MG/DL — SIGNIFICANT CHANGE UP (ref 0.2–1.2)
BITE CELLS BLD QL SMEAR: SLIGHT — SIGNIFICANT CHANGE UP
BLOOD GAS ARTERIAL - LYTES,HGB,ICA,LACT RESULT: SIGNIFICANT CHANGE UP
BLOOD GAS ARTERIAL COMPREHENSIVE RESULT: SIGNIFICANT CHANGE UP
BLOOD GAS ARTERIAL COMPREHENSIVE RESULT: SIGNIFICANT CHANGE UP
BLOOD GAS VENOUS COMPREHENSIVE RESULT: SIGNIFICANT CHANGE UP
BUN SERPL-MCNC: 16 MG/DL — SIGNIFICANT CHANGE UP (ref 7–23)
BUN SERPL-MCNC: 17 MG/DL — SIGNIFICANT CHANGE UP (ref 7–23)
CALCIUM SERPL-MCNC: 7.8 MG/DL — LOW (ref 8.4–10.5)
CALCIUM SERPL-MCNC: 7.8 MG/DL — LOW (ref 8.4–10.5)
CHLORIDE SERPL-SCNC: 96 MMOL/L — LOW (ref 98–107)
CHLORIDE SERPL-SCNC: 96 MMOL/L — LOW (ref 98–107)
CK MB BLD-MCNC: 5.1 % — HIGH (ref 0–2.5)
CK MB BLD-MCNC: 7 % — HIGH (ref 0–2.5)
CK MB CFR SERPL CALC: 1.6 NG/ML — SIGNIFICANT CHANGE UP
CK MB CFR SERPL CALC: 2.2 NG/ML — SIGNIFICANT CHANGE UP
CK SERPL-CCNC: 23 U/L — LOW (ref 30–200)
CK SERPL-CCNC: 43 U/L — SIGNIFICANT CHANGE UP (ref 30–200)
CO2 SERPL-SCNC: 23 MMOL/L — SIGNIFICANT CHANGE UP (ref 22–31)
CO2 SERPL-SCNC: 26 MMOL/L — SIGNIFICANT CHANGE UP (ref 22–31)
CREAT SERPL-MCNC: 1.31 MG/DL — HIGH (ref 0.5–1.3)
CREAT SERPL-MCNC: 1.37 MG/DL — HIGH (ref 0.5–1.3)
EGFR: 50 ML/MIN/1.73M2 — LOW
EGFR: 50 ML/MIN/1.73M2 — LOW
EGFR: 53 ML/MIN/1.73M2 — LOW
EGFR: 53 ML/MIN/1.73M2 — LOW
EOSINOPHIL # BLD AUTO: 0.21 K/UL — SIGNIFICANT CHANGE UP (ref 0–0.5)
EOSINOPHIL # BLD MANUAL: 0.1 K/UL — SIGNIFICANT CHANGE UP (ref 0–0.5)
EOSINOPHIL NFR BLD AUTO: 6.8 % — HIGH (ref 0–6)
EOSINOPHIL NFR BLD MANUAL: 3.4 % — SIGNIFICANT CHANGE UP (ref 0–6)
FLUAV AG NPH QL: SIGNIFICANT CHANGE UP
FLUBV AG NPH QL: SIGNIFICANT CHANGE UP
GIANT PLATELETS BLD QL SMEAR: PRESENT
GLUCOSE BLDC GLUCOMTR-MCNC: 144 MG/DL — HIGH (ref 70–99)
GLUCOSE BLDC GLUCOMTR-MCNC: 78 MG/DL — SIGNIFICANT CHANGE UP (ref 70–99)
GLUCOSE BLDC GLUCOMTR-MCNC: 90 MG/DL — SIGNIFICANT CHANGE UP (ref 70–99)
GLUCOSE BLDC GLUCOMTR-MCNC: 91 MG/DL — SIGNIFICANT CHANGE UP (ref 70–99)
GLUCOSE BLDC GLUCOMTR-MCNC: 96 MG/DL — SIGNIFICANT CHANGE UP (ref 70–99)
GLUCOSE BLDC GLUCOMTR-MCNC: 99 MG/DL — SIGNIFICANT CHANGE UP (ref 70–99)
GLUCOSE SERPL-MCNC: 81 MG/DL — SIGNIFICANT CHANGE UP (ref 70–99)
GLUCOSE SERPL-MCNC: 94 MG/DL — SIGNIFICANT CHANGE UP (ref 70–99)
HCT VFR BLD CALC: 21.1 % — LOW (ref 39–50)
HCT VFR BLD CALC: 23.3 % — LOW (ref 39–50)
HGB BLD-MCNC: 6.8 G/DL — CRITICAL LOW (ref 13–17)
HGB BLD-MCNC: 7.7 G/DL — LOW (ref 13–17)
IMM GRANULOCYTES # BLD AUTO: 0.02 K/UL — SIGNIFICANT CHANGE UP (ref 0–0.07)
IMM GRANULOCYTES NFR BLD AUTO: 0.7 % — SIGNIFICANT CHANGE UP (ref 0–0.9)
LYMPHOCYTES # BLD AUTO: 0.14 K/UL — LOW (ref 1–3.3)
LYMPHOCYTES # BLD MANUAL: 0.08 K/UL — LOW (ref 1–3.3)
LYMPHOCYTES NFR BLD AUTO: 4.6 % — LOW (ref 13–44)
LYMPHOCYTES NFR BLD MANUAL: 2.6 % — LOW (ref 13–44)
MAGNESIUM SERPL-MCNC: 1.8 MG/DL — SIGNIFICANT CHANGE UP (ref 1.6–2.6)
MAGNESIUM SERPL-MCNC: 1.8 MG/DL — SIGNIFICANT CHANGE UP (ref 1.6–2.6)
MANUAL NEUTROPHIL BANDS #: 1.3 K/UL — HIGH (ref 0–0.84)
MCHC RBC-ENTMCNC: 29.1 PG — SIGNIFICANT CHANGE UP (ref 27–34)
MCHC RBC-ENTMCNC: 29.6 PG — SIGNIFICANT CHANGE UP (ref 27–34)
MCHC RBC-ENTMCNC: 32.2 G/DL — SIGNIFICANT CHANGE UP (ref 32–36)
MCHC RBC-ENTMCNC: 33 G/DL — SIGNIFICANT CHANGE UP (ref 32–36)
MCV RBC AUTO: 89.6 FL — SIGNIFICANT CHANGE UP (ref 80–100)
MCV RBC AUTO: 90.2 FL — SIGNIFICANT CHANGE UP (ref 80–100)
MICROCYTES BLD QL: ABNORMAL
MONOCYTES # BLD AUTO: 0.2 K/UL — SIGNIFICANT CHANGE UP (ref 0–0.9)
MONOCYTES # BLD MANUAL: 0.08 K/UL — SIGNIFICANT CHANGE UP (ref 0–0.9)
MONOCYTES NFR BLD AUTO: 6.5 % — SIGNIFICANT CHANGE UP (ref 2–14)
MONOCYTES NFR BLD MANUAL: 2.6 % — SIGNIFICANT CHANGE UP (ref 2–14)
NEUTROPHILS # BLD AUTO: 2.47 K/UL — SIGNIFICANT CHANGE UP (ref 1.8–7.4)
NEUTROPHILS # BLD MANUAL: 1.48 K/UL — LOW (ref 1.8–7.4)
NEUTROPHILS NFR BLD AUTO: 80.4 % — HIGH (ref 43–77)
NEUTROPHILS NFR BLD MANUAL: 48.3 % — SIGNIFICANT CHANGE UP (ref 43–77)
NEUTS BAND # BLD: 42.2 % — CRITICAL HIGH (ref 0–8)
NEUTS BAND NFR BLD: 42.2 % — CRITICAL HIGH (ref 0–8)
NRBC # BLD AUTO: 0 K/UL — SIGNIFICANT CHANGE UP (ref 0–0)
NRBC # BLD AUTO: 0 K/UL — SIGNIFICANT CHANGE UP (ref 0–0)
NRBC # FLD: 0 K/UL — SIGNIFICANT CHANGE UP (ref 0–0)
NRBC # FLD: 0 K/UL — SIGNIFICANT CHANGE UP (ref 0–0)
NRBC BLD AUTO-RTO: 0 /100 WBCS — SIGNIFICANT CHANGE UP (ref 0–0)
NRBC BLD AUTO-RTO: 0 /100 WBCS — SIGNIFICANT CHANGE UP (ref 0–0)
PHOSPHATE SERPL-MCNC: 2.8 MG/DL — SIGNIFICANT CHANGE UP (ref 2.5–4.5)
PHOSPHATE SERPL-MCNC: 2.8 MG/DL — SIGNIFICANT CHANGE UP (ref 2.5–4.5)
PLAT MORPH BLD: NORMAL — SIGNIFICANT CHANGE UP
PLATELET # BLD AUTO: 140 K/UL — LOW (ref 150–400)
PLATELET # BLD AUTO: 145 K/UL — LOW (ref 150–400)
PLATELET COUNT - ESTIMATE: NORMAL — SIGNIFICANT CHANGE UP
PMV BLD: 11.4 FL — SIGNIFICANT CHANGE UP (ref 7–13)
PMV BLD: 11.8 FL — SIGNIFICANT CHANGE UP (ref 7–13)
POTASSIUM SERPL-MCNC: 3.3 MMOL/L — LOW (ref 3.5–5.3)
POTASSIUM SERPL-MCNC: 3.7 MMOL/L — SIGNIFICANT CHANGE UP (ref 3.5–5.3)
POTASSIUM SERPL-SCNC: 3.3 MMOL/L — LOW (ref 3.5–5.3)
POTASSIUM SERPL-SCNC: 3.7 MMOL/L — SIGNIFICANT CHANGE UP (ref 3.5–5.3)
PROT SERPL-MCNC: 5.6 G/DL — LOW (ref 6–8.3)
RBC # BLD: 2.34 M/UL — LOW (ref 4.2–5.8)
RBC # BLD: 2.6 M/UL — LOW (ref 4.2–5.8)
RBC # FLD: 17.2 % — HIGH (ref 10.3–14.5)
RBC # FLD: 17.2 % — HIGH (ref 10.3–14.5)
RBC BLD AUTO: ABNORMAL
RSV RNA NPH QL NAA+NON-PROBE: SIGNIFICANT CHANGE UP
SARS-COV-2 RNA SPEC QL NAA+PROBE: SIGNIFICANT CHANGE UP
SMUDGE CELLS # BLD: PRESENT
SODIUM SERPL-SCNC: 134 MMOL/L — LOW (ref 135–145)
SODIUM SERPL-SCNC: 135 MMOL/L — SIGNIFICANT CHANGE UP (ref 135–145)
SOURCE RESPIRATORY: SIGNIFICANT CHANGE UP
TROPONIN T, HIGH SENSITIVITY RESULT: 36 NG/L — SIGNIFICANT CHANGE UP
TROPONIN T, HIGH SENSITIVITY RESULT: 38 NG/L — SIGNIFICANT CHANGE UP
WBC # BLD: 3.07 K/UL — LOW (ref 3.8–10.5)
WBC # BLD: 3.68 K/UL — LOW (ref 3.8–10.5)
WBC # FLD AUTO: 3.07 K/UL — LOW (ref 3.8–10.5)
WBC # FLD AUTO: 3.68 K/UL — LOW (ref 3.8–10.5)

## 2025-06-20 PROCEDURE — 99232 SBSQ HOSP IP/OBS MODERATE 35: CPT

## 2025-06-20 PROCEDURE — 71045 X-RAY EXAM CHEST 1 VIEW: CPT | Mod: 26

## 2025-06-20 PROCEDURE — 93010 ELECTROCARDIOGRAM REPORT: CPT

## 2025-06-20 PROCEDURE — 70498 CT ANGIOGRAPHY NECK: CPT | Mod: 26

## 2025-06-20 PROCEDURE — 99291 CRITICAL CARE FIRST HOUR: CPT

## 2025-06-20 PROCEDURE — 71275 CT ANGIOGRAPHY CHEST: CPT | Mod: 26

## 2025-06-20 PROCEDURE — 70496 CT ANGIOGRAPHY HEAD: CPT | Mod: 26

## 2025-06-20 RX ORDER — HALOPERIDOL 10 MG/1
2.5 TABLET ORAL ONCE
Refills: 0 | Status: COMPLETED | OUTPATIENT
Start: 2025-06-20 | End: 2025-06-20

## 2025-06-20 RX ORDER — PIPERACILLIN-TAZO-DEXTROSE,ISO 3.375G/5
3.38 IV SOLUTION, PIGGYBACK PREMIX FROZEN(ML) INTRAVENOUS ONCE
Refills: 0 | Status: COMPLETED | OUTPATIENT
Start: 2025-06-20 | End: 2025-06-20

## 2025-06-20 RX ORDER — ONDANSETRON HCL/PF 4 MG/2 ML
4 VIAL (ML) INJECTION ONCE
Refills: 0 | Status: COMPLETED | OUTPATIENT
Start: 2025-06-20 | End: 2025-06-20

## 2025-06-20 RX ORDER — HALOPERIDOL 10 MG/1
2.5 TABLET ORAL ONCE
Refills: 0 | Status: DISCONTINUED | OUTPATIENT
Start: 2025-06-20 | End: 2025-06-20

## 2025-06-20 RX ORDER — HYDROMORPHONE/SOD CHLOR,ISO/PF 2 MG/10 ML
0.5 SYRINGE (ML) INJECTION ONCE
Refills: 0 | Status: DISCONTINUED | OUTPATIENT
Start: 2025-06-20 | End: 2025-06-20

## 2025-06-20 RX ORDER — ACETAZOLAMIDE 250 MG/1
250 TABLET ORAL EVERY 6 HOURS
Refills: 0 | Status: COMPLETED | OUTPATIENT
Start: 2025-06-20 | End: 2025-06-20

## 2025-06-20 RX ORDER — QUETIAPINE FUMARATE 25 MG/1
25 TABLET ORAL AT BEDTIME
Refills: 0 | Status: DISCONTINUED | OUTPATIENT
Start: 2025-06-20 | End: 2025-06-21

## 2025-06-20 RX ORDER — FUROSEMIDE 10 MG/ML
40 INJECTION INTRAMUSCULAR; INTRAVENOUS ONCE
Refills: 0 | Status: COMPLETED | OUTPATIENT
Start: 2025-06-20 | End: 2025-06-20

## 2025-06-20 RX ORDER — INSULIN LISPRO 100 U/ML
INJECTION, SOLUTION INTRAVENOUS; SUBCUTANEOUS EVERY 6 HOURS
Refills: 0 | Status: DISCONTINUED | OUTPATIENT
Start: 2025-06-20 | End: 2025-07-02

## 2025-06-20 RX ORDER — ASPIRIN 325 MG
300 TABLET ORAL ONCE
Refills: 0 | Status: COMPLETED | OUTPATIENT
Start: 2025-06-20 | End: 2025-06-20

## 2025-06-20 RX ORDER — HYDROMORPHONE/SOD CHLOR,ISO/PF 2 MG/10 ML
0.2 SYRINGE (ML) INJECTION ONCE
Refills: 0 | Status: DISCONTINUED | OUTPATIENT
Start: 2025-06-20 | End: 2025-06-20

## 2025-06-20 RX ORDER — ACETYLCYSTEINE 200 MG/ML
4 INHALANT RESPIRATORY (INHALATION)
Refills: 0 | Status: DISCONTINUED | OUTPATIENT
Start: 2025-06-20 | End: 2025-06-21

## 2025-06-20 RX ORDER — DEXTROSE 50 % IN WATER 50 %
25 SYRINGE (ML) INTRAVENOUS ONCE
Refills: 0 | Status: COMPLETED | OUTPATIENT
Start: 2025-06-20 | End: 2025-06-20

## 2025-06-20 RX ORDER — ACETAZOLAMIDE 250 MG/1
250 TABLET ORAL ONCE
Refills: 0 | Status: DISCONTINUED | OUTPATIENT
Start: 2025-06-20 | End: 2025-06-20

## 2025-06-20 RX ORDER — ACETAMINOPHEN 500 MG/5ML
1000 LIQUID (ML) ORAL EVERY 6 HOURS
Refills: 0 | Status: COMPLETED | OUTPATIENT
Start: 2025-06-20 | End: 2025-06-21

## 2025-06-20 RX ORDER — PIPERACILLIN-TAZO-DEXTROSE,ISO 3.375G/5
3.38 IV SOLUTION, PIGGYBACK PREMIX FROZEN(ML) INTRAVENOUS EVERY 8 HOURS
Refills: 0 | Status: DISCONTINUED | OUTPATIENT
Start: 2025-06-20 | End: 2025-06-23

## 2025-06-20 RX ORDER — SODIUM CHLORIDE 9 G/1000ML
1000 INJECTION, SOLUTION INTRAVENOUS
Refills: 0 | Status: DISCONTINUED | OUTPATIENT
Start: 2025-06-20 | End: 2025-06-21

## 2025-06-20 RX ADMIN — METOPROLOL SUCCINATE 5 MILLIGRAM(S): 50 TABLET, EXTENDED RELEASE ORAL at 05:56

## 2025-06-20 RX ADMIN — METOPROLOL SUCCINATE 5 MILLIGRAM(S): 50 TABLET, EXTENDED RELEASE ORAL at 11:25

## 2025-06-20 RX ADMIN — Medication 0.2 MILLIGRAM(S): at 21:43

## 2025-06-20 RX ADMIN — HEPARIN SODIUM 5000 UNIT(S): 1000 INJECTION INTRAVENOUS; SUBCUTANEOUS at 22:23

## 2025-06-20 RX ADMIN — HALOPERIDOL 2.5 MILLIGRAM(S): 10 TABLET ORAL at 06:47

## 2025-06-20 RX ADMIN — Medication 0.2 MILLIGRAM(S): at 16:46

## 2025-06-20 RX ADMIN — Medication 0.2 MILLIGRAM(S): at 16:39

## 2025-06-20 RX ADMIN — QUETIAPINE FUMARATE 25 MILLIGRAM(S): 25 TABLET ORAL at 22:23

## 2025-06-20 RX ADMIN — HEPARIN SODIUM 5000 UNIT(S): 1000 INJECTION INTRAVENOUS; SUBCUTANEOUS at 05:54

## 2025-06-20 RX ADMIN — Medication 40 MILLIGRAM(S): at 11:25

## 2025-06-20 RX ADMIN — Medication 25 GRAM(S): at 06:18

## 2025-06-20 RX ADMIN — SODIUM CHLORIDE 30 MILLILITER(S): 9 INJECTION, SOLUTION INTRAVENOUS at 14:35

## 2025-06-20 RX ADMIN — Medication 4 MILLILITER(S): at 07:39

## 2025-06-20 RX ADMIN — Medication 25 GRAM(S): at 23:42

## 2025-06-20 RX ADMIN — METOPROLOL SUCCINATE 5 MILLIGRAM(S): 50 TABLET, EXTENDED RELEASE ORAL at 17:04

## 2025-06-20 RX ADMIN — ACETYLCYSTEINE 4 MILLILITER(S): 200 INHALANT RESPIRATORY (INHALATION) at 21:03

## 2025-06-20 RX ADMIN — Medication 112 MICROGRAM(S): at 23:42

## 2025-06-20 RX ADMIN — Medication 300 MILLIGRAM(S): at 11:25

## 2025-06-20 RX ADMIN — LIDOCAINE HYDROCHLORIDE 1 PATCH: 20 JELLY TOPICAL at 11:26

## 2025-06-20 RX ADMIN — Medication 400 MILLIGRAM(S): at 17:04

## 2025-06-20 RX ADMIN — IPRATROPIUM BROMIDE AND ALBUTEROL SULFATE 3 MILLILITER(S): .5; 2.5 SOLUTION RESPIRATORY (INHALATION) at 07:39

## 2025-06-20 RX ADMIN — Medication 4 MILLILITER(S): at 21:03

## 2025-06-20 RX ADMIN — Medication 400 MILLIGRAM(S): at 23:59

## 2025-06-20 RX ADMIN — Medication 200 GRAM(S): at 08:51

## 2025-06-20 RX ADMIN — LIDOCAINE HYDROCHLORIDE 1 PATCH: 20 JELLY TOPICAL at 23:50

## 2025-06-20 RX ADMIN — ACETYLCYSTEINE 4 MILLILITER(S): 200 INHALANT RESPIRATORY (INHALATION) at 07:40

## 2025-06-20 RX ADMIN — ACETAZOLAMIDE 250 MILLIGRAM(S): 250 TABLET ORAL at 14:35

## 2025-06-20 RX ADMIN — FUROSEMIDE 40 MILLIGRAM(S): 10 INJECTION INTRAMUSCULAR; INTRAVENOUS at 05:34

## 2025-06-20 RX ADMIN — Medication 0.5 MILLIGRAM(S): at 05:45

## 2025-06-20 RX ADMIN — ACETAZOLAMIDE 250 MILLIGRAM(S): 250 TABLET ORAL at 08:52

## 2025-06-20 RX ADMIN — ACETAZOLAMIDE 250 MILLIGRAM(S): 250 TABLET ORAL at 22:14

## 2025-06-20 RX ADMIN — Medication 0.5 MILLIGRAM(S): at 05:35

## 2025-06-20 RX ADMIN — Medication 0.2 MILLIGRAM(S): at 17:21

## 2025-06-20 RX ADMIN — HEPARIN SODIUM 5000 UNIT(S): 1000 INJECTION INTRAVENOUS; SUBCUTANEOUS at 14:35

## 2025-06-20 RX ADMIN — Medication 0.2 MILLIGRAM(S): at 11:25

## 2025-06-20 RX ADMIN — Medication 25 GRAM(S): at 11:25

## 2025-06-20 RX ADMIN — Medication 100 MILLIEQUIVALENT(S): at 23:58

## 2025-06-20 RX ADMIN — Medication 1000 MILLIGRAM(S): at 00:00

## 2025-06-20 RX ADMIN — HALOPERIDOL 2.5 MILLIGRAM(S): 10 TABLET ORAL at 15:44

## 2025-06-20 RX ADMIN — HALOPERIDOL 2.5 MILLIGRAM(S): 10 TABLET ORAL at 11:51

## 2025-06-20 RX ADMIN — Medication 100 MILLIEQUIVALENT(S): at 23:39

## 2025-06-20 RX ADMIN — Medication 0.5 MILLIGRAM(S): at 04:27

## 2025-06-20 RX ADMIN — LIDOCAINE HYDROCHLORIDE 1 PATCH: 20 JELLY TOPICAL at 19:37

## 2025-06-20 RX ADMIN — Medication 0.2 MILLIGRAM(S): at 11:57

## 2025-06-20 RX ADMIN — LIDOCAINE HYDROCHLORIDE 1 PATCH: 20 JELLY TOPICAL at 02:25

## 2025-06-20 RX ADMIN — Medication 0.5 MILLIGRAM(S): at 03:46

## 2025-06-20 RX ADMIN — Medication 4 MILLIGRAM(S): at 17:19

## 2025-06-20 RX ADMIN — Medication 0.2 MILLIGRAM(S): at 17:19

## 2025-06-20 RX ADMIN — METOPROLOL SUCCINATE 5 MILLIGRAM(S): 50 TABLET, EXTENDED RELEASE ORAL at 23:57

## 2025-06-20 RX ADMIN — Medication 0.2 MILLIGRAM(S): at 21:13

## 2025-06-20 NOTE — PROGRESS NOTE ADULT - ASSESSMENT
ASSESSMENT:  85M PMH HTN, DM2, CKD (baseline creatinine 1.5-2), TIAs (on Eliquis at home), sinus node dysfunction (s/p PPM ~1 year ago), prostate cancer s/p prostectomy (~early 2000s), total thyroidectomy (~2013) presented after a fall and found to have lumbar stenosis with cauda equina impingement however on 6/4 developed acute abdominal pain and had a CT abdomen and pelvis that had signs of acute cholecystitis. On 6/5 patient underwent a robotic cholecystectomy. S/p IR gallbladder fossa collection drainage on 6/17. RRT called overnight for hypotension and hypoxia. SICU consulted for new oxygen requirements; r/o PE    NEUROLOGIC   - Pain control: tylenol  - Consider seroquel vs haldol    RESPIRATORY   - Monitor SpO2 goal >92%  - BiPAP 15/10  - DNR DNI; trial noninvasive mechanical ventilation  - Pulmonary toileting-- chest PT, mucomyst  - CTA PE 0 negative      CARDIOVASCULAR   - Monitor hemodynamics   - Lopressor 5 mg q6  - s/p PPM removal, MARIA LUISA w/o vegetations    GASTROINTESTINAL   - Diet: NPO    /RENAL   - Diamox 250x3 doses  - Strict I/Os  - Monitor BMP qd  - Monitor electrolytes, replete PRN    HEMATOLOGIC  - Monitor H/H   - DVT ppx: Lovenox/SQH & SCD's  - Rectal Aspirin    INFECTIOUS DISEASE  - Monitor fever / WBC  - osteo/discitis; no orthopedic intervention  - Zosyn     ENDOCRINE  - Monitor gluc  - Levothyroxine    LINES  - RUE midline    DISPO: SICU

## 2025-06-20 NOTE — PROGRESS NOTE ADULT - ASSESSMENT
85 year old man with multiple chronic medical comorbidities including hypertension, sinus node dysfunction, type two diabetes, and chronic kidney disease (baseline creatinine 1.5-2.0) presently admitted for lower back pain associated with lumbar compression deformities with concern for cauda equina impingement. He was found to be bacteremic (E faecalis) due to acute acalculous cholecystitis, and is now seen following laparoscopic cholecystectomy with Dr. Triny Verde 6/5/2025. POD 1 patient became hypotensive, unresponsive to 2L fluid resuscitation and 3 u pRBC and was started on vasopressors and transferred to SICU.Patient weaned off pressors, course c/b ileus now resolved, Endocrinology was consulted for management of diabetes mellitus.    Type 2 Diabetes Mellitus  - HbA1c 6.9%   - home regimen:  lantus 24 units qhs, admelog 12 units + tradjenta 5 mg    Inpatient plan   - FS goal 100-180 mg/dl   - pt transferred to SICU this morning and NPO on bipap. Pt FS this am was below goal 78, dextrose 25g IVP, repeat , FS this afternoon 90, recommend to start IVF with dextrose- can do low rate as SICU expressed they are diuresing the pt.   - Agree with SICU for now to hold Lantus as pt FS today below goal and pt not eating since pt on bipap- when diet reordered and if pt eating can reorder low dose lantus 5 units daily.   - continue low Admelog correctional scale TID AC  ---> q6 if NPO  - continue separate low Admelog correctional scale at HS   - Continue off Premeal insulin for now  - If fingerstick >180 x 2, can start Admelog 2 units TID AC. Hold if not eating/NPO.   - FS TID AC & HS ---> q6 if NPO   - hypoglycemia protocol PRN   - consistent carbohydrate diet      Discharge plan  - per family, plan to discharge to rehab   - tentatively, Lantus + Tradjenta. Hold Admelog.   - f/u with Dr. Cates      Hypertension  - BP goal <130/80  - Continue metoprolol  - Management as per primary team      Hyperlipidemia  - LDL goal <70   - Check lipid panel if not done recently   - management per primary team       hypothyroidism  - TSH 2.265 WNL  - Patient on levothyroxine 150 mcg daily at home  - continue Levothyroxine 112 mcg IV daily      Compression deformities (lumbar)  - outpatient DXA      d/w SICU provider spectra [id number]    BRET Palacios-BC  Nurse Practitioner  Division of Endocrinology & Diabetes  Available on [employer] Teams

## 2025-06-20 NOTE — CONSULT NOTE ADULT - ASSESSMENT
Impression:  #Staring episode with unresponsiveness and rest tremor of RUE > LUE. Asymmetric resting tremor, cogwheel rigidity RUE & RLE > LUE & LLE, reported slow with cane for years. Parkinsonian features, staring spell could represent freezing episode. Less likely seizure though i/s/o multiple potential infectious and metabolic sources would consider a triggered seizure.    #Postural tremor c/w asterexis/ negative myoclonus. Cr trend upwards, reduced GFR, Would consider metabolic source.    Recommendations:    [] CBC, CMP, Mg, P, CpK, UA, Utox, ammonia, troponin, VBG/lactate, basic infectious workup  [] MRI brain w/ and w/o contrast   [] video 24hr EEG  [] neuro checks, NPO until swallow evaluation, seizure, fall & aspiration precautions  [] DVT ppx as per primary team   [] Given concern for seizure, advise patient to not drive, operate heavy machinery, avoid heights, pools, bathtubs, locked doors until cleared by further follow up outpatient by neurology.   [] Please note: if patient has a convulsion, please document length of episode, specifically what patient was doing paying attention to eye opening vs closure, gaze deviation, shaking of extremities, tongue bite, urinary incontinence, any derangement of vital signs.    To be discussed with neurology attending and seen on morning rounds. Recommendations finalized once signed by attending.    Impression:  #Staring episode with unresponsiveness and rest tremor of RUE > LUE. Asymmetric resting tremor, cogwheel rigidity RUE & RLE > LUE & LLE, reported slow with cane for years. Parkinsonian features, staring spell could represent freezing episode though patient does not recall the episode. Less likely seizure though i/s/o multiple potential infectious and metabolic sources would consider a triggered seizure.    #Postural tremor. History of CKD i/s/o acute illness with episodes of hypotension, Cr trend upwards, reduced GFR . 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    Recommendations:    [] CBC, CMP, Mg, P, CpK, UA, Utox, ammonia, troponin, VBG/lactate, basic infectious workup  [] MRI brain w/ and w/o contrast   [] video 24hr EEG  [] neuro checks, NPO until swallow evaluation, seizure, fall & aspiration precautions  [] DVT ppx as per primary team   [] Given concern for seizure, advise patient to not drive, operate heavy machinery, avoid heights, pools, bathtubs, locked doors until cleared by further follow up outpatient by neurology.   [] Please note: if patient has a convulsion, please document length of episode, specifically what patient was doing paying attention to eye opening vs closure, gaze deviation, shaking of extremities, tongue bite, urinary incontinence, any derangement of vital signs.    To be discussed with neurology attending and seen on morning rounds. Recommendations finalized once signed by attending.  85y M w/ PMHx including reportedly asymptomatic cerebral infarct attributed to sinus node dysfunction s/p PPM on Eliquis 2.5 mg BID, hypertension, type two diabetes, and chronic kidney disease (baseline creatinine 1.5-2.0) admitted for lower back pain with multiple inpatient complications including cauda equina impingement, bacteremic (E faecalis) s/p leadless PPM replacement/ placement 5/16/25 due to concern for infected leads thought to be 2/2 acute acalculous cholecystitis s/p laparoscopic cholecystectomy 6/5/2025, anemia s/p 3 u pRBC, c/b ileus RRT for hypoxia in sicu delirious suspected 2/2 infection vs. hospital delirium vs. hypercarbia. Neurology consulted due to a 20 second staring episode, patient was interactive with the nurse and other staff and suddenly had an episode where he stopped responding and stared forward for 20 seconds. No shaking or jerking movements seen. No significant change in mental status occurring after the episode. No urination, defecation or tongue bite. He does not remember the episode. He has been altered in the hospital, occasionally becoming agitated and disoriented, A&Ox2 vs baseline of A&Ox3 this has been attributed to infectious/ metabolic causes. Patient has never had staring episodes like this at home. He reportedly walks slow with a cane and has been having tremors, reportedly postural of both extremities R > L for the past year or so.    Impression:  #Staring episode with unresponsiveness and rest tremor of RUE > LUE. Asymmetric resting tremor, cogwheel rigidity RUE & RLE > LUE & LLE, reported slow with cane for years. Parkinsonian features, staring spell could represent freezing episode though patient does not recall the episode. Less likely seizure though i/s/o multiple potential infectious and metabolic sources would consider a triggered seizure.    #Postural tremor. History of CKD i/s/o acute illness with episodes of hypotension, Cr trend upwards, reduced GFR . 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    Recommendations:    [] Recommend primary team to clarify patient's indication for home medication Eliquis 2.5 mg BID and start when deemed appropriate by primary team  [] CBC, CMP, Mg, P, CpK, UA, Utox, ammonia, troponin, VBG/lactate, basic infectious workup  [] MRI brain w/ and w/o contrast   [] video 24hr EEG  [] neuro checks, NPO until swallow evaluation, seizure, fall & aspiration precautions  [] DVT ppx as per primary team   [] Given concern for seizure, advise patient to not drive, operate heavy machinery, avoid heights, pools, bathtubs, locked doors until cleared by further follow up outpatient by neurology.   [] Please note: if patient has a convulsion, please document length of episode, specifically what patient was doing paying attention to eye opening vs closure, gaze deviation, shaking of extremities, tongue bite, urinary incontinence, any derangement of vital signs.    To be discussed with neurology attending and seen on morning rounds. Recommendations finalized once signed by attending.

## 2025-06-20 NOTE — PROGRESS NOTE ADULT - SUBJECTIVE AND OBJECTIVE BOX
Morning Surgical Progress Note  Patient is a 85y old  Male who presents with a chief complaint of back pain (20 Jun 2025 04:55)    SUBJECTIVE: Patient seen and examined at bedside with surgical team, patient without complaints. Daughter at bedside stating patient is confused    Vital Signs Last 24 Hrs  T(C): 37.3 (20 Jun 2025 08:00), Max: 37.7 (20 Jun 2025 05:15)  T(F): 99.2 (20 Jun 2025 08:00), Max: 99.8 (20 Jun 2025 05:15)  HR: 80 (20 Jun 2025 08:00) (74 - 97)  BP: 116/71 (20 Jun 2025 08:00) (116/71 - 185/83)  BP(mean): 81 (20 Jun 2025 08:00) (79 - 95)  RR: 18 (20 Jun 2025 08:00) (18 - 25)  SpO2: 100% (20 Jun 2025 08:00) (96% - 100%)    Parameters below as of 20 Jun 2025 08:00  Patient On (Oxygen Delivery Method): nasal cannula, high flow    O2 Concentration (%): 90I&O's Detail    19 Jun 2025 07:01  -  20 Jun 2025 07:00  --------------------------------------------------------  IN:    IV PiggyBack: 300 mL    Oral Fluid: 300 mL  Total IN: 600 mL    OUT:    Drain (mL): 0 mL    Indwelling Catheter - Urethral (mL): 1950 mL    Intermittent Catheterization - Urethral (mL): 300 mL  Total OUT: 2250 mL    Total NET: -1650 mL      20 Jun 2025 07:01  -  20 Jun 2025 09:08  --------------------------------------------------------  IN:  Total IN: 0 mL    OUT:    Indwelling Catheter - Urethral (mL): 1000 mL  Total OUT: 1000 mL    Total NET: -1000 mL        Medications  MEDICATIONS  (STANDING):  acetaZOLAMIDE Injectable 250 milliGRAM(s) IV Push every 6 hours  acetylcysteine 10%  Inhalation 4 milliLiter(s) Inhalation two times a day  aspirin  chewable 81 milliGRAM(s) Oral daily  heparin   Injectable 5000 Unit(s) SubCutaneous every 8 hours  insulin lispro (ADMELOG) corrective regimen sliding scale   SubCutaneous at bedtime  insulin lispro (ADMELOG) corrective regimen sliding scale   SubCutaneous every 6 hours  levothyroxine Injectable 112 MICROGram(s) IV Push at bedtime  lidocaine   4% Patch 1 Patch Transdermal daily  metoprolol tartrate Injectable 5 milliGRAM(s) IV Push every 6 hours  pantoprazole  Injectable 40 milliGRAM(s) IV Push daily  piperacillin/tazobactam IVPB.- 3.375 Gram(s) IV Intermittent once  piperacillin/tazobactam IVPB.. 3.375 Gram(s) IV Intermittent every 8 hours  sodium chloride 3%  Inhalation 4 milliLiter(s) Inhalation every 12 hours  sodium phosphate 15 milliMole(s)/250 mL IVPB 15 milliMole(s) IV Intermittent once    MEDICATIONS  (PRN):  HYDROmorphone  Injectable 0.2 milliGRAM(s) IV Push every 4 hours PRN Moderate Pain (4 - 6)    Physical Exam  Constitutional: Awake and alert, confused, on hi ai  Gastrointestinal: Soft nontender  Extremities: edematous hands    LABS:                        7.7    3.07  )-----------( 145      ( 20 Jun 2025 04:30 )             23.3     06-20    135  |  96[L]  |  17  ----------------------------<  81  3.7   |  26  |  1.31[H]    Ca    7.8[L]      20 Jun 2025 04:30  Phos  2.8     06-20  Mg     1.80     06-20    TPro  5.6[L]  /  Alb  2.9[L]  /  TBili  0.4  /  DBili  x   /  AST  17  /  ALT  16  /  AlkPhos  148[H]  06-20      LIVER FUNCTIONS - ( 20 Jun 2025 04:30 )  Alb: 2.9 g/dL / Pro: 5.6 g/dL / ALK PHOS: 148 U/L / ALT: 16 U/L / AST: 17 U/L / GGT: x           Urinalysis Basic - ( 20 Jun 2025 04:30 )    Color: x / Appearance: x / SG: x / pH: x  Gluc: 81 mg/dL / Ketone: x  / Bili: x / Urobili: x   Blood: x / Protein: x / Nitrite: x   Leuk Esterase: x / RBC: x / WBC x   Sq Epi: x / Non Sq Epi: x / Bacteria: x

## 2025-06-20 NOTE — PROGRESS NOTE ADULT - ATTENDING COMMENTS
I agree with the detailed interval history, physical, and plan, which I have reviewed and edited where appropriate'; also agree with notes/assessment with my team on service.  I have personally examined the patient.  I was physically present for the key portions of the evaluation and management (E/M) service provided.  I reviewed all the pertinent data.  The patient is a critical care patient with life threatening hemodynamic and metabolic instability in SICU.  The SICU team has a constant risk benefit analyzes discussion and coordinating care with the primary team and all consultants.   The patient is in SICU with the chief complaint and diagnosis mentioned in the note.   The plan will be specified in the note.  85M PMH HTN, DM2, CKD, TIAs, s/p robotic cholecystectomy; s/p IR gallbladder fossa collection drainage. RRT called overnight for hypotension and hypoxia. SICU consulted for new oxygen requirements.  Awake and alert  Lung coarse BS  Heart RR  Abd soft    NEUROLOGIC   - tylenol  RESPIRATORY   - Monitor SpO2 goal >92%  - BiPAP 15/10  - DNR DNI  CARDIOVASCULAR   - Lopressor  GASTROINTESTINAL   - Diet: NPO  /RENAL   - Diamox   HEMATOLOGIC  - Monitor H/H   - SQH  -SCD's  -Aspirin  INFECTIOUS DISEASE  - Zosyn   ENDOCRINE  - Monitor glucose  - Levothyroxine    DISPO: SICU

## 2025-06-20 NOTE — CONSULT NOTE ADULT - SUBJECTIVE AND OBJECTIVE BOX
SICU Consult Note    HPI:  85M PMH HTN, DM2, CKD (baseline creatinine 1.5-2), TIAs (on Eliquis at home), sinus node dysfunction (s/p PPM ~1 year ago), prostate cancer s/p prostectomy (~early 2000s), total thyroidectomy (~2013) presented after a fall and found to have lumbar stenosis with cauda equina impingement however on 6/4 developed acute abdominal pain and had a CT abdomen and pelvis that had signs of acute cholecystitis. On 6/5 patient underwent a robotic cholecystectomy. S/p IR gallbladder fossa collection drainage on 6/17. RRT called overnight for hypotension and hypoxia. SICU consulted for new oxygen requirements; r/o PE    PMH:    PAST MEDICAL & SURGICAL HISTORY:  HTN (hypertension)      HLD (hyperlipidemia)      DM (diabetes mellitus)      TIA (transient ischemic attack)      Prostate cancer      S/P prostatectomy      Pacemaker      H/O thyroidectomy          ALLERGIES:  gabapentin (Other)      --------------------------------------------------------------------------------------    MEDICATIONS:    Neurologic Medications  fentaNYL    Injectable 25 MICROGram(s) IV Push every 5 minutes PRN Moderate Pain (4 - 6)  fentaNYL    Injectable 50 MICROGram(s) IV Push every 15 minutes PRN Severe Pain (7 - 10)  ondansetron Injectable 4 milliGRAM(s) IV Push once PRN Nausea and/or Vomiting    Respiratory Medications    Cardiovascular Medications    Gastrointestinal Medications  lactated ringers. 1000 milliLiter(s) IV Continuous <Continuous>    Genitourinary Medications    Hematologic/Oncologic Medications    Antimicrobial/Immunologic Medications    Endocrine/Metabolic Medications  insulin lispro (ADMELOG) corrective regimen sliding scale   SubCutaneous every 6 hours    Topical/Other Medications  chlorhexidine 4% Liquid 1 Application(s) Topical daily    --------------------------------------------------------------------------------------    VITAL SIGNS:  T(C): 36.7 (06-20-25 @ 02:00), Max: 37.1 (06-19-25 @ 06:00)  HR: 80 (06-20-25 @ 02:00) (74 - 91)  BP: 139/77 (06-20-25 @ 02:00) (111/50 - 185/83)  RR: 18 (06-20-25 @ 02:00) (18 - 18)  SpO2: 97% (06-20-25 @ 02:00) (96% - 100%)  --------------------------------------------------------------------------------------    INS AND OUTS:    06-18-25 @ 07:01  -  06-19-25 @ 07:00  --------------------------------------------------------  IN: 100 mL / OUT: 3003 mL / NET: -2903 mL    06-19-25 @ 07:01  -  06-20-25 @ 04:56  --------------------------------------------------------  IN: 600 mL / OUT: 1900 mL / NET: -1300 mL      --------------------------------------------------------------------------------------    EXAM    NEURO: NAD  SKIN: anasarca  HEENT: NC/AT  RESPIRATORY: nonlabored respirations, normal CW expansion  CARDIO: RRR, S1S2  ABDOMEN: soft, nontender, nondistended, IR drain, surgical incisions c/d/i  EXTREMITIES: normal strength, no deformities    --------------------------------------------------------------------------------------    LABS                        8.1    9.85  )-----------( 178      ( 19 Jun 2025 06:30 )             24.2   06-19    133[L]  |  97[L]  |  15  ----------------------------<  90  3.7   |  23  |  1.21    Ca    7.8[L]      19 Jun 2025 06:30  Phos  2.8     06-19  Mg     1.90     06-19      --------------------------------------------------------------------------------------

## 2025-06-20 NOTE — CONSULT NOTE ADULT - SUBJECTIVE AND OBJECTIVE BOX
Neurology - Consult Note    -  Spectra: [phone] ([facility]), [phone] ([facility])  -    HPI: [place], 85y (1940) M/F w/ PMHx significant for ***    [facility] f/u opt:  Opt cardio Dr. Pro  Opt Neuro Dr. Murdock    On Eliquis 2.5 BID for Sinus node dysfunction.      Review of Systems:  INCOMPLETE   All other review of systems is negative unless indicated above.    Allergies:  gabapentin (Other)      PMHx/PSHx/Family Hx: As above, otherwise see below   No pertinent past medical history    HTN (hypertension)    HLD (hyperlipidemia)    DM (diabetes mellitus)    TIA (transient ischemic attack)    Prostate cancer        Social Hx:  No current use of tobacco, alcohol, or illicit drugs  Lives with ***    Medications:  MEDICATIONS  (STANDING):  acetaminophen   IVPB .. 1000 milliGRAM(s) IV Intermittent every 6 hours  acetylcysteine 10%  Inhalation 4 milliLiter(s) Inhalation two times a day  dextrose 5%. 1000 milliLiter(s) (30 mL/Hr) IV Continuous <Continuous>  heparin   Injectable 5000 Unit(s) SubCutaneous every 8 hours  insulin lispro (ADMELOG) corrective regimen sliding scale   SubCutaneous at bedtime  insulin lispro (ADMELOG) corrective regimen sliding scale   SubCutaneous every 6 hours  levothyroxine Injectable 112 MICROGram(s) IV Push at bedtime  lidocaine   4% Patch 1 Patch Transdermal daily  metoprolol tartrate Injectable 5 milliGRAM(s) IV Push every 6 hours  pantoprazole  Injectable 40 milliGRAM(s) IV Push daily  piperacillin/tazobactam IVPB.. 3.375 Gram(s) IV Intermittent every 8 hours  potassium chloride  10 mEq/100 mL IVPB 10 milliEquivalent(s) IV Intermittent every 1 hour  QUEtiapine 25 milliGRAM(s) Oral at bedtime  sodium chloride 3%  Inhalation 4 milliLiter(s) Inhalation every 12 hours  sodium phosphate 15 milliMole(s)/250 mL IVPB 15 milliMole(s) IV Intermittent once    MEDICATIONS  (PRN):  HYDROmorphone  Injectable 0.2 milliGRAM(s) IV Push every 4 hours PRN Moderate Pain (4 - 6)      Vitals:  T(C): 37.3 (06-20-25 @ 20:00), Max: 37.7 (06-20-25 @ 05:15)  HR: 88 (06-20-25 @ 22:38) (60 - 97)  BP: 134/53 (06-20-25 @ 20:00) (111/92 - 171/83)  RR: 14 (06-20-25 @ 20:00) (14 - 25)  SpO2: 100% (06-20-25 @ 22:38) (97% - 100%)    Physical Examination: INCOMPLETE  General - NAD  Cardiovascular - Peripheral pulses palpable, no edema  Eyes - Fundoscopy with flat, sharp optic discs and no hemorrhage or exudates; Fundoscopy not well visualized; Fundoscopy not performed due to safety precautions in the setting of infection risk    Neurologic Exam:  Mental status - Awake, Alert, Oriented to person, place, and time. Speech fluent, repetition and naming intact. Follows simple and complex commands. Attention/concentration, recent and remote memory (including registration and recall), and fund of knowledge intact    Cranial nerves - PERRLA, VFF, EOMI, face sensation (V1-V3) intact b/l, facial strength intact without asymmetry b/l, hearing intact b/l, palate with symmetric elevation, trapezius OR sternocleidomastoid 5/5 strength b/l, tongue midline on protrusion with full lateral movement    Motor - Normal bulk and tone throughout. No pronator drift.  Strength testing            R        Deltoid:  5    Biceps:  5    Triceps:  5    Wrist Extension:  5    Wrist Flexion:  5    Interossei:  5    :  5    Hip Flexion:  5    Hip Extension:  5    Knee Flexion:  5    Knee Extension:  5    Dorsiflexion:  5    Plantar Flexion:  5        L        Deltoid:  5    Biceps:  5    Triceps:  5    Wrist Extension:  5    Wrist Flexion:  5    Interossei:  5    :  5    Hip Flexion:  5    Hip Extension:  5    Knee Flexion:  5    Knee Extension:  5    Dorsiflexion:  5    Plantar Flexion:  5      Sensation - Light touch/temperature OR pain/vibration intact throughout    DTR's -               R  Biceps:  2+    Triceps:  2+    Brachioradialis:  2+    Patellar:  2+    Ankle:  2+    Toes/plantar response:  Down    L  Biceps:  2+    Triceps:  2+    Brachioradialis:  2+    Patellar:  2+    Ankle:  2+    Toes/plantar response:  Down    Coordination - Finger to Nose intact b/l. No tremors appreciated    Gait and station - Normal casual gait. Romberg (-)    Labs:                        6.8    3.68  )-----------( 140      ( 20 Jun 2025 20:47 )             21.1     06-20    134[L]  |  96[L]  |  16  ----------------------------<  94  3.3[L]   |  23  |  1.37[H]    Ca    7.8[L]      20 Jun 2025 20:47  Phos  2.8     06-20  Mg     1.80     06-20    TPro  5.6[L]  /  Alb  2.9[L]  /  TBili  0.4  /  DBili  x   /  AST  17  /  ALT  16  /  AlkPhos  148[H]  06-20    CAPILLARY BLOOD GLUCOSE      POCT Blood Glucose.: 99 mg/dL (20 Jun 2025 23:10)    LIVER FUNCTIONS - ( 20 Jun 2025 04:30 )  Alb: 2.9 g/dL / Pro: 5.6 g/dL / ALK PHOS: 148 U/L / ALT: 16 U/L / AST: 17 U/L / GGT: x               CSF:                  Radiology:     Neurology - Consult Note    -  Spectra: [phone] ([facility]), [phone] (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 hospital delirium vs. hypercarbia.     Neurology is consulted due to a 20 second staring episode witnessed by patient's nursing staff. Patient was interactive with the nurse and other staff and suddenly had an episode where he stopped responding and stared forward for 20 seconds. No shaking or jerking movements seen. No significant change in mental status occurring after the episode. No urination, defecation or tongue bite. Patient was questioned about this and he does not remember the episode. He has been altered in the hospital, occasionally becoming agitated and disoriented, A&Ox2 vs baseline of A&Ox3 this has been attributed to infectious/ metabolic causes. Patient has never had staring episodes like this at home. 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 He was reportedly started on Eliquis 2.5 mg BID and ASA 81 mg daily. Patient's daughter also attributes this to the patient's PPM. Patient has a carotid duplex on file from 11/10/24 which reports no significant stenosis. Patient underwent echocardiogram 6/2/25 which was a limited study reporting an estimated LVEF of 63%. He has an Patient's neurologist is Dr. Murdock and his cardiologist is Dr. Pro, both of whom work for [facility].    Patient reports mild difficulty breathing, disorientation and fatigue. Patietn denies headache, nausea, vomiting, slurred speech, vision changes, focal weakness or focal numbness.      Review of Systems:    All other review of systems is negative unless indicated above.    Allergies:  gabapentin (Other)      PMHx/PSHx/Family Hx: As above, otherwise see below   No pertinent past medical history    HTN (hypertension)    HLD (hyperlipidemia)    DM (diabetes mellitus)    TIA (transient ischemic attack)    Prostate cancer        Social Hx:  No current use of tobacco, alcohol, or illicit drugs    Medications:  MEDICATIONS  (STANDING):  acetaminophen   IVPB .. 1000 milliGRAM(s) IV Intermittent every 6 hours  acetylcysteine 10%  Inhalation 4 milliLiter(s) Inhalation two times a day  dextrose 5%. 1000 milliLiter(s) (30 mL/Hr) IV Continuous <Continuous>  heparin   Injectable 5000 Unit(s) SubCutaneous every 8 hours  insulin lispro (ADMELOG) corrective regimen sliding scale   SubCutaneous at bedtime  insulin lispro (ADMELOG) corrective regimen sliding scale   SubCutaneous every 6 hours  levothyroxine Injectable 112 MICROGram(s) IV Push at bedtime  lidocaine   4% Patch 1 Patch Transdermal daily  metoprolol tartrate Injectable 5 milliGRAM(s) IV Push every 6 hours  pantoprazole  Injectable 40 milliGRAM(s) IV Push daily  piperacillin/tazobactam IVPB.. 3.375 Gram(s) IV Intermittent every 8 hours  potassium chloride  10 mEq/100 mL IVPB 10 milliEquivalent(s) IV Intermittent every 1 hour  QUEtiapine 25 milliGRAM(s) Oral at bedtime  sodium chloride 3%  Inhalation 4 milliLiter(s) Inhalation every 12 hours  sodium phosphate 15 milliMole(s)/250 mL IVPB 15 milliMole(s) IV Intermittent once    MEDICATIONS  (PRN):  HYDROmorphone  Injectable 0.2 milliGRAM(s) IV Push every 4 hours PRN Moderate Pain (4 - 6)      Vitals:  T(C): 37.3 (06-20-25 @ 20:00), Max: 37.7 (06-20-25 @ 05:15)  HR: 88 (06-20-25 @ 22:38) (60 - 97)  BP: 134/53 (06-20-25 @ 20:00) (111/92 - 171/83)  RR: 14 (06-20-25 @ 20:00) (14 - 25)  SpO2: 100% (06-20-25 @ 22:38) (97% - 100%)    Physical Examination:  General - NAD  Cardiovascular - Edema in all extremities    Neurologic Exam:  Mental status - Awake, Alert, Oriented to person, and time - Not place, patient intermittently asks where he is. Speech fluent, repetition and naming intact. Follows simple but not complex commands - may be due to limited hearing with loud CPAP mask on    Cranial nerves - PERRLA, VFF, EOMI, face sensation (V1-V3) intact b/l, facial strength intact without asymmetry b/l, hearing intact b/l, palate with symmetric elevation, trapezius 5/5 strength b/l, tongue midline on protrusion with full lateral movement    Motor - Normal bulk and tone throughout. No pronator drift.  Strength testing            R        Deltoid:  5    Biceps:  5    Triceps:  5    :  5    Hip Flexion:  4+ - Limited 2/2 edema    Hip Extension:  4+ - Limited 2/2 edema    Knee Flexion:  4+ - Limited 2/2 edema    Knee Extension:  4+ - Limited 2/2 edema    Dorsiflexion:  5    Plantar Flexion:  5        L        Deltoid:  5    Biceps:  5    Triceps:  5    :  5    Hip Flexion:  4+ - Limited 2/2 edema    Hip Extension:  4+ - Limited 2/2 edema    Knee Flexion:  4+ - Limited 2/2 edema    Knee Extension:  4+ - Limited 2/2 edema    Dorsiflexion:  5    Plantar Flexion:  5         Sensation - Light touch/temperature intact throughout    DTR's -               R  Difficult to test due to edema. 1+ diffusely    L  Difficult to test due to edema. 1+ diffusely    Coordination - Finger to Nose intact b/l. No tremors appreciated    Gait and station - Gait not tested due to patient safety concerns    Labs:                        6.8    3.68  )-----------( 140      ( 20 Jun 2025 20:47 )             21.1     06-20    134[L]  |  96[L]  |  16  ----------------------------<  94  3.3[L]   |  23  |  1.37[H]    Ca    7.8[L]      20 Jun 2025 20:47  Phos  2.8     06-20  Mg     1.80     06-20    TPro  5.6[L]  /  Alb  2.9[L]  /  TBili  0.4  /  DBili  x   /  AST  17  /  ALT  16  /  AlkPhos  148[H]  06-20    CAPILLARY BLOOD GLUCOSE      POCT Blood Glucose.: 99 mg/dL (20 Jun 2025 23:10)    LIVER FUNCTIONS - ( 20 Jun 2025 04:30 )  Alb: 2.9 g/dL / Pro: 5.6 g/dL / ALK PHOS: 148 U/L / ALT: 16 U/L / AST: 17 U/L / GGT: x               CSF:                  Radiology:     Neurology - Consult Note    -  Spectra: [phone] ([facility]), [phone] ([facility])  -    HPI: [place], 85y (1940) M w/ PMHx including reportedly asymptomatic cerebral infarct attributed to sinus node dysfunction s/p PPM on Eliquis 2.5 mg BID, hypertension, type two diabetes, and chronic kidney disease (baseline creatinine 1.5-2.0) admitted for lower back pain with multiple inpatient complications including cauda equina impingement, bacteremic (E faecalis) s/p leadless PPM replacement/ placement 5/16/25 due to concern for infected leads thought to be 2/2 acute acalculous cholecystitis s/p laparoscopic cholecystectomy 6/5/2025, anemia s/p 3 u pRBC, c/b ileus RRT for hypoxia in sicu delirious suspected 2/2 infection vs. hospital delirium vs. hypercarbia.     Neurology is consulted due to a 20 second staring episode witnessed by patient's nursing staff. Patient was interactive with the nurse and other staff and suddenly had an episode where he stopped responding and stared forward for 20 seconds. No shaking or jerking movements seen. No significant change in mental status occurring after the episode. No urination, defecation or tongue bite. Patient was questioned about this and he does not remember the episode. He has been altered in the hospital, occasionally becoming agitated and disoriented, A&Ox2 vs baseline of A&Ox3 this has been attributed to infectious/ metabolic causes. Patient has never had staring episodes like this at home. 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 He was reportedly started on Eliquis 2.5 mg BID and ASA 81 mg daily. Patient's daughter also attributes this to the patient's PPM. Patient has a carotid duplex on file from 11/10/24 which reports no significant stenosis. Patient underwent echocardiogram 6/2/25 which was a limited study reporting an estimated LVEF of 63%. He has an Patient's neurologist is Dr. Murdock and his cardiologist is Dr. Pro, both of whom work for [facility].    Patient reports mild difficulty breathing, disorientation and fatigue. Patietn denies headache, nausea, vomiting, slurred speech, vision changes, focal weakness or focal numbness.    Review of Systems:    All other review of systems is negative unless indicated above.    Allergies:  gabapentin (Other)      PMHx/PSHx/Family Hx: As above, otherwise see below   No pertinent past medical history    HTN (hypertension)    HLD (hyperlipidemia)    DM (diabetes mellitus)    TIA (transient ischemic attack)    Prostate cancer        Social Hx:  No current use of tobacco, alcohol, or illicit drugs    Medications:  MEDICATIONS  (STANDING):  acetaminophen   IVPB .. 1000 milliGRAM(s) IV Intermittent every 6 hours  acetylcysteine 10%  Inhalation 4 milliLiter(s) Inhalation two times a day  dextrose 5%. 1000 milliLiter(s) (30 mL/Hr) IV Continuous <Continuous>  heparin   Injectable 5000 Unit(s) SubCutaneous every 8 hours  insulin lispro (ADMELOG) corrective regimen sliding scale   SubCutaneous at bedtime  insulin lispro (ADMELOG) corrective regimen sliding scale   SubCutaneous every 6 hours  levothyroxine Injectable 112 MICROGram(s) IV Push at bedtime  lidocaine   4% Patch 1 Patch Transdermal daily  metoprolol tartrate Injectable 5 milliGRAM(s) IV Push every 6 hours  pantoprazole  Injectable 40 milliGRAM(s) IV Push daily  piperacillin/tazobactam IVPB.. 3.375 Gram(s) IV Intermittent every 8 hours  potassium chloride  10 mEq/100 mL IVPB 10 milliEquivalent(s) IV Intermittent every 1 hour  QUEtiapine 25 milliGRAM(s) Oral at bedtime  sodium chloride 3%  Inhalation 4 milliLiter(s) Inhalation every 12 hours  sodium phosphate 15 milliMole(s)/250 mL IVPB 15 milliMole(s) IV Intermittent once    MEDICATIONS  (PRN):  HYDROmorphone  Injectable 0.2 milliGRAM(s) IV Push every 4 hours PRN Moderate Pain (4 - 6)      Vitals:  T(C): 37.3 (06-20-25 @ 20:00), Max: 37.7 (06-20-25 @ 05:15)  HR: 88 (06-20-25 @ 22:38) (60 - 97)  BP: 134/53 (06-20-25 @ 20:00) (111/92 - 171/83)  RR: 14 (06-20-25 @ 20:00) (14 - 25)  SpO2: 100% (06-20-25 @ 22:38) (97% - 100%)    Physical Examination:  General - NAD  Cardiovascular - Edema in all extremities    Neurologic Exam:  Mental status - Awake, Alert, Oriented to person, and time - Not place, patient intermittently asks where he is. Speech fluent, repetition and naming intact. Follows simple but not complex commands - may be due to limited hearing with loud CPAP mask on    Cranial nerves - PERRLA, VFF, EOMI, face sensation (V1-V3) intact b/l, facial strength intact without asymmetry b/l, hearing intact b/l, palate with symmetric elevation, trapezius 5/5 strength b/l, tongue midline on protrusion with full lateral movement    Motor - Asymmetric resting tremor R>L. Cogwheel rigidity RUE wrist & RLE ankle > LUE & LLE  Strength testing            R        Deltoid:  5    Biceps:  5    Triceps:  5    :  5    Hip Flexion:  4+ - Limited 2/2 edema    Hip Extension:  4+ - Limited 2/2 edema    Knee Flexion:  4+ - Limited 2/2 edema    Knee Extension:  4+ - Limited 2/2 edema    Dorsiflexion:  5    Plantar Flexion:  5        L        Deltoid:  5    Biceps:  5    Triceps:  5    :  5    Hip Flexion:  4+ - Limited 2/2 edema    Hip Extension:  4+ - Limited 2/2 edema    Knee Flexion:  4+ - Limited 2/2 edema    Knee Extension:  4+ - Limited 2/2 edema    Dorsiflexion:  5    Plantar Flexion:  5         Sensation - Light touch/temperature intact throughout    DTR's -               R  Difficult to test due to edema. 1+ diffusely    L  Difficult to test due to edema. 1+ diffusely    Coordination - Finger to Nose intact b/l. No tremors appreciated    Gait and station - Gait not tested due to patient safety concerns    Labs:                        6.8    3.68  )-----------( 140      ( 20 Jun 2025 20:47 )             21.1     06-20    134[L]  |  96[L]  |  16  ----------------------------<  94  3.3[L]   |  23  |  1.37[H]    Ca    7.8[L]      20 Jun 2025 20:47  Phos  2.8     06-20  Mg     1.80     06-20    TPro  5.6[L]  /  Alb  2.9[L]  /  TBili  0.4  /  DBili  x   /  AST  17  /  ALT  16  /  AlkPhos  148[H]  06-20    CAPILLARY BLOOD GLUCOSE      POCT Blood Glucose.: 99 mg/dL (20 Jun 2025 23:10)    LIVER FUNCTIONS - ( 20 Jun 2025 04:30 )  Alb: 2.9 g/dL / Pro: 5.6 g/dL / ALK PHOS: 148 U/L / ALT: 16 U/L / AST: 17 U/L / GGT: x               CSF:                  Radiology:

## 2025-06-20 NOTE — PROGRESS NOTE ADULT - SUBJECTIVE AND OBJECTIVE BOX
Timothy Mojica MD  Interventional Cardiology / Endovascular Specialist  [place] Office : [phone] [place] [address] [address]  Tel:   [place] Office : [phone] [place] [address]  Tel: [phone]    RRT overnight for hypoxia, admitted to SICU  	  MEDICATIONS:  acetaZOLAMIDE Injectable 250 milliGRAM(s) IV Push every 6 hours  heparin   Injectable 5000 Unit(s) SubCutaneous every 8 hours  metoprolol tartrate Injectable 5 milliGRAM(s) IV Push every 6 hours    piperacillin/tazobactam IVPB.. 3.375 Gram(s) IV Intermittent every 8 hours    acetylcysteine 10%  Inhalation 4 milliLiter(s) Inhalation two times a day  sodium chloride 3%  Inhalation 4 milliLiter(s) Inhalation every 12 hours    HYDROmorphone  Injectable 0.2 milliGRAM(s) IV Push every 4 hours PRN    pantoprazole  Injectable 40 milliGRAM(s) IV Push daily    insulin lispro (ADMELOG) corrective regimen sliding scale   SubCutaneous at bedtime  insulin lispro (ADMELOG) corrective regimen sliding scale   SubCutaneous every 6 hours  levothyroxine Injectable 112 MICROGram(s) IV Push at bedtime    dextrose 5%. 1000 milliLiter(s) IV Continuous <Continuous>  lidocaine   4% Patch 1 Patch Transdermal daily  sodium phosphate 15 milliMole(s)/250 mL IVPB 15 milliMole(s) IV Intermittent once      PAST MEDICAL/SURGICAL HISTORY  PAST MEDICAL & SURGICAL HISTORY:  HTN (hypertension)      HLD (hyperlipidemia)      DM (diabetes mellitus)      TIA (transient ischemic attack)      Prostate cancer      S/P prostatectomy      Pacemaker      H/O thyroidectomy          SOCIAL HISTORY: Substance Use (street drugs): ( x ) never used  (  ) other:    FAMILY HISTORY:  FHx: heart disease (Father, Mother)        PHYSICAL EXAM:  T(C): 37.6 (06-20-25 @ 12:00), Max: 37.7 (06-20-25 @ 05:15)  HR: 77 (06-20-25 @ 15:04) (71 - 97)  BP: 111/92 (06-20-25 @ 15:00) (111/92 - 171/83)  RR: 15 (06-20-25 @ 15:00) (14 - 25)  SpO2: 100% (06-20-25 @ 15:04) (96% - 100%)  Wt(kg): --  I&O's Summary    19 Jun 2025 07:01  -  20 Jun 2025 07:00  --------------------------------------------------------  IN: 600 mL / OUT: 2250 mL / NET: -1650 mL    20 Jun 2025 07:01  -  20 Jun 2025 16:04  --------------------------------------------------------  IN: 230 mL / OUT: 1900 mL / NET: -1670 mL      GENERAL: NAD  EYES:  EOMI  ENMT: NGT Moist mucous membranes  Cardiovascular: Normal S1 S2, No JVD, No murmurs, +b/l LUE edema L>R, LE edema  Respiratory: bipap	  Gastrointestinal:  Soft, Non-tender, + BS	  Extremities: b/l UE, LE edema                                  7.7    3.07  )-----------( 145      ( 20 Jun 2025 04:30 )             23.3     06-20    135  |  96[L]  |  17  ----------------------------<  81  3.7   |  26  |  1.31[H]    Ca    7.8[L]      20 Jun 2025 04:30  Phos  2.8     06-20  Mg     1.80     06-20    TPro  5.6[L]  /  Alb  2.9[L]  /  TBili  0.4  /  DBili  x   /  AST  17  /  ALT  16  /  AlkPhos  148[H]  06-20    proBNP:   Lipid Profile:   HgA1c:   TSH:     Consultant(s) Notes Reviewed:  [x ] YES  [ ] NO    Care Discussed with Consultants/Other Providers [ x] YES  [ ] NO    Imaging Personally Reviewed independently:  [x] YES  [ ] NO    All labs, radiologic studies, vitals, orders and medications list reviewed. Patient is seen and examined at bedside. Case discussed with medical team.

## 2025-06-20 NOTE — PROGRESS NOTE ADULT - SUBJECTIVE AND OBJECTIVE BOX
SICU PROGRESS NOTE:    24 HOUR INTERVAL EVENTS:  - Diamox 250x3 doses  - BiPAP 15/10  - Zosyn  - Rectal ASA while NPO    SUBJECTIVE/ROS:  [ ] A ten-point review of systems was otherwise negative except as noted.  [ ] Due to altered mental status/intubation, subjective information were not able to be obtained from the patient. History was obtained, to the extent possible, from review of the chart and collateral sources of information.    NEURO  RASS:     GCS:     CAM ICU:  Exam: awake, alert, oriented  Meds: HYDROmorphone  Injectable 0.2 milliGRAM(s) IV Push every 4 hours PRN Moderate Pain (4 - 6)    [x] Adequacy of sedation and pain control has been assessed and adjusted    RESPIRATORY  RR: 20 (06-20-25 @ 13:00) (14 - 25)  SpO2: 100% (06-20-25 @ 13:00) (96% - 100%)  Wt(kg): --  Exam: unlabored, clear to auscultation bilaterally  Mechanical Ventilation:   ABG - ( 20 Jun 2025 11:10 )  pH: 7.44  /  pCO2: 42    /  pO2: 189   / HCO3: 28    / Base Excess: 4.0   /  SaO2: 99.4    Lactate: x                [N/A] Extubation Readiness Assessed  Meds: acetylcysteine 10%  Inhalation 4 milliLiter(s) Inhalation two times a day  sodium chloride 3%  Inhalation 4 milliLiter(s) Inhalation every 12 hours      CARDIOVASCULAR  HR: 74 (06-20-25 @ 13:00) (71 - 97)  BP: 155/65 (06-20-25 @ 13:00) (116/71 - 171/83)  BP(mean): 84 (06-20-25 @ 13:00) (79 - 107)  ABP: --  ABP(mean): --  Wt(kg): --  CVP(cm H2O): --  VBG - ( 20 Jun 2025 04:30 )  pH: 7.32  /  pCO2: 55    /  pO2: 36    / HCO3: 28    / Base Excess: 1.7   /  SaO2: 55.0   Lactate: 1.3                Exam: regular rate and rhythm  Cardiac Rhythm: sinus  Perfusion     [x]Adequate   [ ]Inadequate  Mentation   [x]Normal       [ ]Reduced  Extremities  [x]Warm         [ ]Cool  Volume Status [ ]Hypervolemic [x]Euvolemic [ ]Hypovolemic  Meds: acetaZOLAMIDE Injectable 250 milliGRAM(s) IV Push every 6 hours  metoprolol tartrate Injectable 5 milliGRAM(s) IV Push every 6 hours      GI/NUTRITION  Exam: soft, nontender, nondistended, incision C/D/I  Diet:  Meds: pantoprazole  Injectable 40 milliGRAM(s) IV Push daily      GENITOURINARY  I&O's Detail    06-19 @ 07:01 - 06-20 @ 07:00  --------------------------------------------------------  IN:    IV PiggyBack: 300 mL    Oral Fluid: 300 mL  Total IN: 600 mL    OUT:    Drain (mL): 0 mL    Indwelling Catheter - Urethral (mL): 1950 mL    Intermittent Catheterization - Urethral (mL): 300 mL  Total OUT: 2250 mL    Total NET: -1650 mL      06-20 @ 07:01 - 06-20 @ 12:52  --------------------------------------------------------  IN:    IV PiggyBack: 200 mL  Total IN: 200 mL    OUT:    Indwelling Catheter - Urethral (mL): 1700 mL  Total OUT: 1700 mL    Total NET: -1500 mL          06-20    135  |  96[L]  |  17  ----------------------------<  81  3.7   |  26  |  1.31[H]    Ca    7.8[L]      20 Jun 2025 04:30  Phos  2.8     06-20  Mg     1.80     06-20    TPro  5.6[L]  /  Alb  2.9[L]  /  TBili  0.4  /  DBili  x   /  AST  17  /  ALT  16  /  AlkPhos  148[H]  06-20    [ ] Frye catheter, indication: N/A  Meds: sodium phosphate 15 milliMole(s)/250 mL IVPB 15 milliMole(s) IV Intermittent once      HEMATOLOGIC  Meds: heparin   Injectable 5000 Unit(s) SubCutaneous every 8 hours    [x] VTE Prophylaxis                        7.7    3.07  )-----------( 145      ( 20 Jun 2025 04:30 )             23.3       Transfusion     [ ] PRBC   [ ] Platelets   [ ] FFP   [ ] Cryoprecipitate    INFECTIOUS DISEASES  WBC Count: 3.07 K/uL (06-20 @ 04:30)    RECENT CULTURES:  Specimen Source: Body Fluid abdominal collection  Date/Time: 06-17 @ 18:45  Culture Results:   No growth to date  Gram Stain:   polymorphonuclear leukocytes seen  No organisms seen  by cytocentrifuge  Organism: --    Meds: piperacillin/tazobactam IVPB.. 3.375 Gram(s) IV Intermittent every 8 hours      ENDOCRINE  CAPILLARY BLOOD GLUCOSE      POCT Blood Glucose.: 90 mg/dL (20 Jun 2025 11:52)  POCT Blood Glucose.: 144 mg/dL (20 Jun 2025 06:35)  POCT Blood Glucose.: 78 mg/dL (20 Jun 2025 06:07)  POCT Blood Glucose.: 96 mg/dL (20 Jun 2025 03:40)  POCT Blood Glucose.: 102 mg/dL (19 Jun 2025 21:45)  POCT Blood Glucose.: 103 mg/dL (19 Jun 2025 16:53)    Meds: insulin lispro (ADMELOG) corrective regimen sliding scale   SubCutaneous at bedtime  insulin lispro (ADMELOG) corrective regimen sliding scale   SubCutaneous every 6 hours  levothyroxine Injectable 112 MICROGram(s) IV Push at bedtime      ACCESS DEVICES:  [ ] Peripheral IV  [ ] Central Venous Line	[ ] R	[ ] L	[ ] IJ	[ ] Fem	[ ] SC	Placed:   [ ] Arterial Line		[ ] R	[ ] L	[ ] Fem	[ ] Rad	[ ] Ax	Placed:   [ ] PICC:					[ ] Mediport  [ ] Urinary Catheter, Date Placed:   [x] Necessity of urinary, arterial, and venous catheters discussed    OTHER MEDICATIONS:  lidocaine   4% Patch 1 Patch Transdermal daily    CODE STATUS:  DNI    DNI: Trial NIV

## 2025-06-20 NOTE — CONSULT NOTE ADULT - ASSESSMENT
ASSESSMENT:  85M PMH HTN, DM2, CKD (baseline creatinine 1.5-2), TIAs (on Eliquis at home), sinus node dysfunction (s/p PPM ~1 year ago), prostate cancer s/p prostectomy (~early 2000s), total thyroidectomy (~2013) presented after a fall and found to have lumbar stenosis with cauda equina impingement however on 6/4 developed acute abdominal pain and had a CT abdomen and pelvis that had signs of acute cholecystitis. On 6/5 patient underwent a robotic cholecystectomy. S/p IR gallbladder fossa collection drainage on 6/17. RRT called overnight for hypotension and hypoxia. SICU consulted for new oxygen requirements; r/o PE    NEUROLOGIC   - Pain control: tylenol  - Consider seroquel vs haldol    RESPIRATORY   - Monitor SpO2 goal >92%, wean nonrebreather as O2 tolerates  - DNR DNI; refuse noninvasive mechanical ventilation  - Pulmonary toileting-- chest PT, mucomyst  - CTA PE protocol results pending    CARDIOVASCULAR   - Monitor hemodynamics   - Lopressor 5 mg q6  - s/p PPM removal, MARIA LUISA w/o vegetations    GASTROINTESTINAL   - Diet: NPO    /RENAL   - Lasix 20 mg BID per cards recommendations for anasarca  - Strict I/Os  - Monitor BMP qd  - Monitor electrolytes, replete PRN    HEMATOLOGIC  - Monitor H/H   - DVT ppx: Lovenox/SQH & SCD's  - Aspirin    INFECTIOUS DISEASE  - Monitor fever / WBC  - Ampicillin 2 grams q4 until last day 6/25 per ID recs for osteo/discitis; no orthopedic intervention    ENDOCRINE  - Monitor gluc  - Levothyroxine    LINES  - RUE midline    DISPO: SICU  --------------------------------------------------------------------------------------    Critical Care Diagnoses:

## 2025-06-20 NOTE — PROGRESS NOTE ADULT - ASSESSMENT
85 year old man with multiple chronic medical comorbidities including hypertension, sinus node dysfunction, type two diabetes, and chronic kidney disease (baseline creatinine 1.5-2.0) presently admitted for lower back pain associated with lumbar compression deformities with concern for cauda equina impingement. He was found to be bacteremic (E faecalis) due to acute acalculous cholecystitis, and is now seen following laparoscopic cholecystectomy 6/5/2025. POD 1 patient became hypotensive, unresponsive to 2L fluid resuscitation and 3 u pRBC and was started on vasopressors and transferred to SICU. Patient weaned off pressors, course c/b ileus now resolved, NGT removed 6/10. Midline placed 6/12 for IV abx, patient now with ileus again ct scanned 6/16 found to have collection in gb fossa, drained by IR on 6/17 patient was a rapid for hypotension and hypoxia now in sicu delirious on hi ai, CTPA pending final read    Plan:   - sicu care appreciated, wean hi ai as tolerated  - Drainage of left chest  - DVT ppx: sqh  - ASA81  - pain control  - Diet: ADAT  - c/w Ampicillin to be boarden, d/w icu team   - will be d/c'd with wong (hx cauda equina)   - increase antihypertensives per cards  - ortho recs re: spinal surgery appreciated  - dispo planning - [facility]    B team   [id number]

## 2025-06-20 NOTE — PROGRESS NOTE ADULT - ASSESSMENT
-EKG w/ SR, no significant STT changes   -ECHO w/ normal EF no significant valvular heart disease     A/P:  85M w/ HTN, HLD, T2DM, prostate CA, CKD, TIA and SND s/p PPM transferred here for lumbar spinal cord compression and bacteremia s/p PPM explant and implant of Micra PPM.    1. bacteremia  -s/p cholecystectomy  -c/w abx per ID, primary team  6/13 afebrile, continues on abx per ID  6/19 c/w abx per ID    2. SND  -s/p leadless PPM w/ EP  6/16 recommend IV lasix 40mg daily for generalized edema  6/17 mild anarsaca, c/w diuresing lasix 20mg IV BID    3. HTN  -c/w clonidine, hydralazine, labetalol    4.  Pre-procedure clearance  6/17 pt optimized from cardiac srtandpoint and may proceed with cholecystostomy tube with moderate risk  6/18 s/p IR drain placement, remain hemodynamically stable    5. hypoxia  6/20 - admitted to SICU, CTA no PE; bipap, pulm toilet, diamox

## 2025-06-20 NOTE — PROGRESS NOTE ADULT - NS ATTEND AMEND GEN_ALL_CORE FT
supportive care  anticipate d/c to [facility] with wong and antibiotics  ortho has deferred back surgery for now
Plan for lead extraction today and implantation of an Avier system.
I reviewed the overnight course of events on the unit, re-confirming the patient history. I discussed the care with the patient and their family. The plan of care was discussed with the ACP team and modifications were made to the notation where appropriate. Differential diagnosis and plan of care discussed with patient after the evaluation. Advanced care planning was discussed with patient and family.  Advanced care planning forms were reviewed and discussed.  Risks, benefits and alternatives of cardiac procedures were discussed in detail and all questions were answered. 35 minutes spent on total encounter of which more than fifty percent of the encounter was spent counseling and/or coordinating care by the attending physician.
I reviewed the overnight course of events on the unit, re-confirming the patient history. I discussed the care with the patient and their family. The plan of care was discussed with the ACP team and modifications were made to the notation where appropriate. Differential diagnosis and plan of care discussed with patient after the evaluation. Advanced care planning was discussed with patient and family.  Advanced care planning forms were reviewed and discussed.  Risks, benefits and alternatives of cardiac procedures were discussed in detail and all questions were answered. 35 minutes spent on total encounter of which more than fifty percent of the encounter was spent counseling and/or coordinating care by the attending physician.
Plan for extraction with Avier placement tomorrow. NPO after midnight. Hold Eliquis.
GB fossa fluid collection, possibly abscess versus infected hematoma, for perc drainage by IR today  ileus due to above  malnutrition risk
I reviewed the overnight course of events on the unit, re-confirming the patient history. I discussed the care with the patient and their family. The plan of care was discussed with the ACP team and modifications were made to the notation where appropriate. Differential diagnosis and plan of care discussed with patient after the evaluation. Advanced care planning was discussed with patient and family.  Advanced care planning forms were reviewed and discussed.  Risks, benefits and alternatives of cardiac procedures were discussed in detail and all questions were answered. 35 minutes spent on total encounter of which more than fifty percent of the encounter was spent counseling and/or coordinating care by the attending physician.
cauda equina syndrome, keep wong  s/p lap ccy for acute cholecystitis s/p perc drainage GB fossa (culture NGTD)  ileus resolved, advance diet  antibiotics per ID  d/c to [facility] when bed available

## 2025-06-20 NOTE — PROGRESS NOTE ADULT - SUBJECTIVE AND OBJECTIVE BOX
Chief Complaint: DM type 2     Interval Events: Son and daughter at the bedside. pt transferred to SICU this morning and NPO on bipap. Pt FS this am was below goal 78, dextrose 25g IVP, repeat , FS this afternoon 90, recommend to start IVF with dextrose- can do low rate as SICU expressed they are diuresing the pt.     MEDICATIONS  (STANDING):  acetaZOLAMIDE Injectable 250 milliGRAM(s) IV Push every 6 hours  acetylcysteine 10%  Inhalation 4 milliLiter(s) Inhalation two times a day  heparin   Injectable 5000 Unit(s) SubCutaneous every 8 hours  insulin lispro (ADMELOG) corrective regimen sliding scale   SubCutaneous at bedtime  insulin lispro (ADMELOG) corrective regimen sliding scale   SubCutaneous every 6 hours  levothyroxine Injectable 112 MICROGram(s) IV Push at bedtime  lidocaine   4% Patch 1 Patch Transdermal daily  metoprolol tartrate Injectable 5 milliGRAM(s) IV Push every 6 hours  pantoprazole  Injectable 40 milliGRAM(s) IV Push daily  piperacillin/tazobactam IVPB.. 3.375 Gram(s) IV Intermittent every 8 hours  sodium chloride 3%  Inhalation 4 milliLiter(s) Inhalation every 12 hours  sodium phosphate 15 milliMole(s)/250 mL IVPB 15 milliMole(s) IV Intermittent once    MEDICATIONS  (PRN):  HYDROmorphone  Injectable 0.2 milliGRAM(s) IV Push every 4 hours PRN Moderate Pain (4 - 6)      Allergies    gabapentin (Other)    Intolerances      Review of Systems:    UNABLE TO OBTAIN    PHYSICAL EXAM:  VITALS: T(C): 37.6 (06-20-25 @ 12:00)  T(F): 99.6 (06-20-25 @ 12:00), Max: 99.8 (06-20-25 @ 05:15)  HR: 74 (06-20-25 @ 13:00) (71 - 97)  BP: 155/65 (06-20-25 @ 13:00) (116/71 - 171/83)  RR:  (14 - 25)  SpO2:  (96% - 100%)  Wt(kg): --  GENERAL: NAD  EYES: No proptosis  HEENT:  Atraumatic, Normocephalic  RESPIRATORY: on bipap  CARDIOVASCULAR: Regular rate and rhythm  GI: Soft, nontender, non distended  PSYCH: Awake, confused      CAPILLARY BLOOD GLUCOSE      POCT Blood Glucose.: 90 mg/dL (20 Jun 2025 11:52)  POCT Blood Glucose.: 144 mg/dL (20 Jun 2025 06:35)  POCT Blood Glucose.: 78 mg/dL (20 Jun 2025 06:07)  POCT Blood Glucose.: 96 mg/dL (20 Jun 2025 03:40)  POCT Blood Glucose.: 102 mg/dL (19 Jun 2025 21:45)  POCT Blood Glucose.: 103 mg/dL (19 Jun 2025 16:53)      06-20    135  |  96[L]  |  17  ----------------------------<  81  3.7   |  26  |  1.31[H]    eGFR: 53[L]    Ca    7.8[L]      06-20  Mg     1.80     06-20  Phos  2.8     06-20    TPro  5.6[L]  /  Alb  2.9[L]  /  TBili  0.4  /  DBili  x   /  AST  17  /  ALT  16  /  AlkPhos  148[H]  06-20          Thyroid Function Tests:        A1C with Estimated Average Glucose Result: 6.9 % (05-07-25 @ 06:03)  A1C with Estimated Average Glucose Result: 7.4 % (11-10-24 @ 06:55)          Diet, NPO:   Except Medications (06-20-25 @ 05:04)

## 2025-06-21 LAB
ALBUMIN SERPL ELPH-MCNC: 2.6 G/DL — LOW (ref 3.3–5)
ALP SERPL-CCNC: 131 U/L — HIGH (ref 40–120)
ALT FLD-CCNC: 12 U/L — SIGNIFICANT CHANGE UP (ref 4–41)
ANION GAP SERPL CALC-SCNC: 16 MMOL/L — HIGH (ref 7–14)
AST SERPL-CCNC: 14 U/L — SIGNIFICANT CHANGE UP (ref 4–40)
BILIRUB SERPL-MCNC: 0.7 MG/DL — SIGNIFICANT CHANGE UP (ref 0.2–1.2)
BLD GP AB SCN SERPL QL: NEGATIVE — SIGNIFICANT CHANGE UP
BLOOD GAS ARTERIAL - LYTES,HGB,ICA,LACT RESULT: SIGNIFICANT CHANGE UP
BUN SERPL-MCNC: 15 MG/DL — SIGNIFICANT CHANGE UP (ref 7–23)
CALCIUM SERPL-MCNC: 7.7 MG/DL — LOW (ref 8.4–10.5)
CHLORIDE SERPL-SCNC: 96 MMOL/L — LOW (ref 98–107)
CO2 SERPL-SCNC: 21 MMOL/L — LOW (ref 22–31)
CREAT SERPL-MCNC: 1.37 MG/DL — HIGH (ref 0.5–1.3)
EGFR: 50 ML/MIN/1.73M2 — LOW
EGFR: 50 ML/MIN/1.73M2 — LOW
GLUCOSE BLDC GLUCOMTR-MCNC: 127 MG/DL — HIGH (ref 70–99)
GLUCOSE BLDC GLUCOMTR-MCNC: 135 MG/DL — HIGH (ref 70–99)
GLUCOSE BLDC GLUCOMTR-MCNC: 138 MG/DL — HIGH (ref 70–99)
GLUCOSE BLDC GLUCOMTR-MCNC: 139 MG/DL — HIGH (ref 70–99)
GLUCOSE SERPL-MCNC: 122 MG/DL — HIGH (ref 70–99)
HCT VFR BLD CALC: 24.1 % — LOW (ref 39–50)
HGB BLD-MCNC: 7.9 G/DL — LOW (ref 13–17)
MAGNESIUM SERPL-MCNC: 1.8 MG/DL — SIGNIFICANT CHANGE UP (ref 1.6–2.6)
MCHC RBC-ENTMCNC: 29 PG — SIGNIFICANT CHANGE UP (ref 27–34)
MCHC RBC-ENTMCNC: 32.8 G/DL — SIGNIFICANT CHANGE UP (ref 32–36)
MCV RBC AUTO: 88.6 FL — SIGNIFICANT CHANGE UP (ref 80–100)
NRBC # BLD AUTO: 0 K/UL — SIGNIFICANT CHANGE UP (ref 0–0)
NRBC # FLD: 0 K/UL — SIGNIFICANT CHANGE UP (ref 0–0)
NRBC BLD AUTO-RTO: 0 /100 WBCS — SIGNIFICANT CHANGE UP (ref 0–0)
PHOSPHATE SERPL-MCNC: 3 MG/DL — SIGNIFICANT CHANGE UP (ref 2.5–4.5)
PLATELET # BLD AUTO: 141 K/UL — LOW (ref 150–400)
PMV BLD: 11.6 FL — SIGNIFICANT CHANGE UP (ref 7–13)
POTASSIUM SERPL-MCNC: 3.4 MMOL/L — LOW (ref 3.5–5.3)
POTASSIUM SERPL-SCNC: 3.4 MMOL/L — LOW (ref 3.5–5.3)
PROT SERPL-MCNC: 5.3 G/DL — LOW (ref 6–8.3)
RBC # BLD: 2.72 M/UL — LOW (ref 4.2–5.8)
RBC # FLD: 17.1 % — HIGH (ref 10.3–14.5)
RH IG SCN BLD-IMP: POSITIVE — SIGNIFICANT CHANGE UP
SODIUM SERPL-SCNC: 133 MMOL/L — LOW (ref 135–145)
WBC # BLD: 3.53 K/UL — LOW (ref 3.8–10.5)
WBC # FLD AUTO: 3.53 K/UL — LOW (ref 3.8–10.5)

## 2025-06-21 PROCEDURE — 99291 CRITICAL CARE FIRST HOUR: CPT

## 2025-06-21 PROCEDURE — 71045 X-RAY EXAM CHEST 1 VIEW: CPT | Mod: 26

## 2025-06-21 RX ORDER — FUROSEMIDE 10 MG/ML
20 INJECTION INTRAMUSCULAR; INTRAVENOUS EVERY 12 HOURS
Refills: 0 | Status: DISCONTINUED | OUTPATIENT
Start: 2025-06-21 | End: 2025-06-24

## 2025-06-21 RX ORDER — QUETIAPINE FUMARATE 25 MG/1
25 TABLET ORAL AT BEDTIME
Refills: 0 | Status: DISCONTINUED | OUTPATIENT
Start: 2025-06-21 | End: 2025-06-21

## 2025-06-21 RX ORDER — METOPROLOL SUCCINATE 50 MG/1
25 TABLET, EXTENDED RELEASE ORAL
Refills: 0 | Status: DISCONTINUED | OUTPATIENT
Start: 2025-06-21 | End: 2025-06-21

## 2025-06-21 RX ORDER — ACETAMINOPHEN 500 MG/5ML
1000 LIQUID (ML) ORAL EVERY 6 HOURS
Refills: 0 | Status: COMPLETED | OUTPATIENT
Start: 2025-06-21 | End: 2025-06-22

## 2025-06-21 RX ORDER — HYDROMORPHONE/SOD CHLOR,ISO/PF 2 MG/10 ML
0.2 SYRINGE (ML) INJECTION EVERY 4 HOURS
Refills: 0 | Status: DISCONTINUED | OUTPATIENT
Start: 2025-06-21 | End: 2025-06-21

## 2025-06-21 RX ORDER — METOPROLOL SUCCINATE 50 MG/1
5 TABLET, EXTENDED RELEASE ORAL EVERY 6 HOURS
Refills: 0 | Status: DISCONTINUED | OUTPATIENT
Start: 2025-06-21 | End: 2025-07-02

## 2025-06-21 RX ORDER — HALOPERIDOL 10 MG/1
2.5 TABLET ORAL ONCE
Refills: 0 | Status: COMPLETED | OUTPATIENT
Start: 2025-06-21 | End: 2025-06-21

## 2025-06-21 RX ORDER — MELATONIN 5 MG
3 TABLET ORAL AT BEDTIME
Refills: 0 | Status: DISCONTINUED | OUTPATIENT
Start: 2025-06-21 | End: 2025-06-21

## 2025-06-21 RX ORDER — DORNASE ALFA 1 MG/ML
2.5 SOLUTION RESPIRATORY (INHALATION) DAILY
Refills: 0 | Status: DISCONTINUED | OUTPATIENT
Start: 2025-06-21 | End: 2025-07-02

## 2025-06-21 RX ORDER — HYDROMORPHONE/SOD CHLOR,ISO/PF 2 MG/10 ML
0.2 SYRINGE (ML) INJECTION EVERY 4 HOURS
Refills: 0 | Status: DISCONTINUED | OUTPATIENT
Start: 2025-06-21 | End: 2025-06-28

## 2025-06-21 RX ORDER — MAGNESIUM SULFATE 500 MG/ML
2 SYRINGE (ML) INJECTION ONCE
Refills: 0 | Status: COMPLETED | OUTPATIENT
Start: 2025-06-21 | End: 2025-06-21

## 2025-06-21 RX ORDER — FUROSEMIDE 10 MG/ML
20 INJECTION INTRAMUSCULAR; INTRAVENOUS
Refills: 0 | Status: DISCONTINUED | OUTPATIENT
Start: 2025-06-21 | End: 2025-06-21

## 2025-06-21 RX ORDER — OXYCODONE HYDROCHLORIDE 30 MG/1
5 TABLET ORAL EVERY 4 HOURS
Refills: 0 | Status: DISCONTINUED | OUTPATIENT
Start: 2025-06-21 | End: 2025-06-21

## 2025-06-21 RX ORDER — SODIUM CHLORIDE 9 G/1000ML
1000 INJECTION, SOLUTION INTRAVENOUS
Refills: 0 | Status: DISCONTINUED | OUTPATIENT
Start: 2025-06-21 | End: 2025-06-22

## 2025-06-21 RX ORDER — HYDROMORPHONE/SOD CHLOR,ISO/PF 2 MG/10 ML
0.5 SYRINGE (ML) INJECTION EVERY 4 HOURS
Refills: 0 | Status: DISCONTINUED | OUTPATIENT
Start: 2025-06-21 | End: 2025-06-24

## 2025-06-21 RX ORDER — OXYCODONE HYDROCHLORIDE 30 MG/1
2.5 TABLET ORAL EVERY 4 HOURS
Refills: 0 | Status: DISCONTINUED | OUTPATIENT
Start: 2025-06-21 | End: 2025-06-21

## 2025-06-21 RX ORDER — HYDROMORPHONE/SOD CHLOR,ISO/PF 2 MG/10 ML
0.5 SYRINGE (ML) INJECTION EVERY 4 HOURS
Refills: 0 | Status: DISCONTINUED | OUTPATIENT
Start: 2025-06-21 | End: 2025-06-21

## 2025-06-21 RX ADMIN — Medication 1000 MILLIGRAM(S): at 00:00

## 2025-06-21 RX ADMIN — SODIUM CHLORIDE 30 MILLILITER(S): 9 INJECTION, SOLUTION INTRAVENOUS at 07:50

## 2025-06-21 RX ADMIN — HEPARIN SODIUM 5000 UNIT(S): 1000 INJECTION INTRAVENOUS; SUBCUTANEOUS at 21:45

## 2025-06-21 RX ADMIN — ACETYLCYSTEINE 4 MILLILITER(S): 200 INHALANT RESPIRATORY (INHALATION) at 10:02

## 2025-06-21 RX ADMIN — Medication 0.2 MILLIGRAM(S): at 10:27

## 2025-06-21 RX ADMIN — Medication 0.5 MILLIGRAM(S): at 13:56

## 2025-06-21 RX ADMIN — Medication 400 MILLIGRAM(S): at 05:37

## 2025-06-21 RX ADMIN — Medication 100 MILLIEQUIVALENT(S): at 01:13

## 2025-06-21 RX ADMIN — Medication 400 MILLIGRAM(S): at 11:38

## 2025-06-21 RX ADMIN — Medication 100 MILLIEQUIVALENT(S): at 09:27

## 2025-06-21 RX ADMIN — Medication 0.2 MILLIGRAM(S): at 06:09

## 2025-06-21 RX ADMIN — Medication 4 MILLILITER(S): at 10:02

## 2025-06-21 RX ADMIN — HALOPERIDOL 2.5 MILLIGRAM(S): 10 TABLET ORAL at 20:14

## 2025-06-21 RX ADMIN — Medication 1000 MILLIGRAM(S): at 12:39

## 2025-06-21 RX ADMIN — LIDOCAINE HYDROCHLORIDE 1 PATCH: 20 JELLY TOPICAL at 11:38

## 2025-06-21 RX ADMIN — SODIUM CHLORIDE 30 MILLILITER(S): 9 INJECTION, SOLUTION INTRAVENOUS at 16:56

## 2025-06-21 RX ADMIN — Medication 25 GRAM(S): at 05:36

## 2025-06-21 RX ADMIN — Medication 40 MILLIGRAM(S): at 11:35

## 2025-06-21 RX ADMIN — Medication 0.5 MILLIGRAM(S): at 21:45

## 2025-06-21 RX ADMIN — METOPROLOL SUCCINATE 5 MILLIGRAM(S): 50 TABLET, EXTENDED RELEASE ORAL at 17:26

## 2025-06-21 RX ADMIN — Medication 0.2 MILLIGRAM(S): at 01:13

## 2025-06-21 RX ADMIN — Medication 100 MILLIEQUIVALENT(S): at 11:32

## 2025-06-21 RX ADMIN — Medication 100 MILLIEQUIVALENT(S): at 08:19

## 2025-06-21 RX ADMIN — METOPROLOL SUCCINATE 5 MILLIGRAM(S): 50 TABLET, EXTENDED RELEASE ORAL at 05:35

## 2025-06-21 RX ADMIN — Medication 0.2 MILLIGRAM(S): at 05:39

## 2025-06-21 RX ADMIN — Medication 25 GRAM(S): at 13:56

## 2025-06-21 RX ADMIN — Medication 1000 MILLIGRAM(S): at 06:22

## 2025-06-21 RX ADMIN — Medication 0.2 MILLIGRAM(S): at 09:27

## 2025-06-21 RX ADMIN — HEPARIN SODIUM 5000 UNIT(S): 1000 INJECTION INTRAVENOUS; SUBCUTANEOUS at 13:56

## 2025-06-21 RX ADMIN — LIDOCAINE HYDROCHLORIDE 1 PATCH: 20 JELLY TOPICAL at 20:17

## 2025-06-21 RX ADMIN — Medication 25 GRAM(S): at 10:50

## 2025-06-21 RX ADMIN — LIDOCAINE HYDROCHLORIDE 1 PATCH: 20 JELLY TOPICAL at 23:52

## 2025-06-21 RX ADMIN — Medication 4 MILLILITER(S): at 22:10

## 2025-06-21 RX ADMIN — Medication 1000 MILLIGRAM(S): at 18:02

## 2025-06-21 RX ADMIN — HEPARIN SODIUM 5000 UNIT(S): 1000 INJECTION INTRAVENOUS; SUBCUTANEOUS at 05:36

## 2025-06-21 RX ADMIN — Medication 400 MILLIGRAM(S): at 17:25

## 2025-06-21 RX ADMIN — Medication 0.2 MILLIGRAM(S): at 01:43

## 2025-06-21 RX ADMIN — Medication 112 MICROGRAM(S): at 22:05

## 2025-06-21 RX ADMIN — Medication 0.5 MILLIGRAM(S): at 22:15

## 2025-06-21 RX ADMIN — Medication 25 GRAM(S): at 21:45

## 2025-06-21 RX ADMIN — Medication 0.5 MILLIGRAM(S): at 14:56

## 2025-06-21 RX ADMIN — Medication 3 MILLIGRAM(S): at 21:46

## 2025-06-21 NOTE — PROGRESS NOTE ADULT - SUBJECTIVE AND OBJECTIVE BOX
Surgery Progress Note     Overnight events:     Interval/Subjective:  Patient seen and examined.   __________________________________________________________________  Vitals:  T(C): 37.1 (08:00), Max: 37.6 (16:00)  HR: 81 (13:00) (60 - 97)  BP: 163/47 (13:00) (97/67 - 169/61)  RR: 18 (13:00) (14 - 25)  SpO2: 96% (13:00) (89% - 100%)    I & O's:  IN:    dextrose 5%: 480 mL    IV PiggyBack: 900 mL  Total IN: 1380 mL    OUT:    Drain (mL): 14 mL    Indwelling Catheter - Urethral (mL): 3585 mL  Total OUT: 3599 mL        Physical Exam:  General: NAD, resting comfortably in bed  Abdomen: soft, nontender, nondistended; serosang drain in right flank      __________________________________________________________________

## 2025-06-21 NOTE — PROGRESS NOTE ADULT - SUBJECTIVE AND OBJECTIVE BOX
SICU Daily Progress Note  =====================================================  Interval/Overnight Events:       -Episode of "staring", followed by R hand tremor, CTA head/neck w/ IV contrast shows no intracranial pathology or large vessel occlusions  -Seroquel at bedtime     ALLERGIES:  gabapentin (Other)      --------------------------------------------------------------------------------------    MEDICATIONS:    Neurologic Medications  acetaminophen   IVPB .. 1000 milliGRAM(s) IV Intermittent every 6 hours  HYDROmorphone  Injectable 0.2 milliGRAM(s) IV Push every 4 hours PRN Moderate Pain (4 - 6)  QUEtiapine 25 milliGRAM(s) Oral at bedtime    Respiratory Medications  acetylcysteine 10%  Inhalation 4 milliLiter(s) Inhalation two times a day  sodium chloride 3%  Inhalation 4 milliLiter(s) Inhalation every 12 hours    Cardiovascular Medications  metoprolol tartrate Injectable 5 milliGRAM(s) IV Push every 6 hours    Gastrointestinal Medications  dextrose 5%. 1000 milliLiter(s) IV Continuous <Continuous>  pantoprazole  Injectable 40 milliGRAM(s) IV Push daily  sodium phosphate 15 milliMole(s)/250 mL IVPB 15 milliMole(s) IV Intermittent once    Genitourinary Medications    Hematologic/Oncologic Medications  heparin   Injectable 5000 Unit(s) SubCutaneous every 8 hours    Antimicrobial/Immunologic Medications  piperacillin/tazobactam IVPB.. 3.375 Gram(s) IV Intermittent every 8 hours    Endocrine/Metabolic Medications  insulin lispro (ADMELOG) corrective regimen sliding scale   SubCutaneous at bedtime  insulin lispro (ADMELOG) corrective regimen sliding scale   SubCutaneous every 6 hours  levothyroxine Injectable 112 MICROGram(s) IV Push at bedtime    Topical/Other Medications  lidocaine   4% Patch 1 Patch Transdermal daily    --------------------------------------------------------------------------------------    VITAL SIGNS:  T(C): 37.4 (06-21-25 @ 00:00), Max: 37.7 (06-20-25 @ 05:15)  HR: 74 (06-21-25 @ 02:00) (60 - 97)  BP: 165/55 (06-21-25 @ 02:00) (111/92 - 171/83)  RR: 16 (06-21-25 @ 02:00) (14 - 25)  SpO2: 100% (06-21-25 @ 02:00) (98% - 100%)  --------------------------------------------------------------------------------------    INS AND OUTS:    06-19-25 @ 07:01  -  06-20-25 @ 07:00  --------------------------------------------------------  IN: 600 mL / OUT: 2250 mL / NET: -1650 mL    06-20-25 @ 07:01  -  06-21-25 @ 03:03  --------------------------------------------------------  IN: 970 mL / OUT: 2863 mL / NET: -1893 mL      --------------------------------------------------------------------------------------    EXAM    NEURO: Agitated, A/Ox3  HEENT: NC/AT  RESPIRATORY: BiPAP 10/5   CARDIO: RRR, S1S2  ABDOMEN: soft, nontender, nondistended. Gall bladder fossa drainage bag with minimal SS output   EXTREMITIES: normal strength, no deformities    --------------------------------------------------------------------------------------    LABS    PENDING              -------------------------------------------------------------------------------------- SICU Daily Progress Note  =====================================================  Interval/Overnight Events:       - Haldol 2.5 x1 for severe agitation, redosed in PM  - lantus held i/s/o low FS  - HFNC started > transition to BIPAP 15/10 > wean to 10/5  - d/c daily lasix, start diamox x 3 doses  - Nurse reported episode of "staring", CTA head+neck ordered found no acute pathology, repeat ABG ordered  - Hgb 6.8, 1U pRBCs  - RUE midline shot, replaced  - vEEG ordered    ALLERGIES:  gabapentin (Other)      --------------------------------------------------------------------------------------    MEDICATIONS:    Neurologic Medications  acetaminophen   IVPB .. 1000 milliGRAM(s) IV Intermittent every 6 hours  HYDROmorphone  Injectable 0.2 milliGRAM(s) IV Push every 4 hours PRN Moderate Pain (4 - 6)  QUEtiapine 25 milliGRAM(s) Oral at bedtime    Respiratory Medications  acetylcysteine 10%  Inhalation 4 milliLiter(s) Inhalation two times a day  sodium chloride 3%  Inhalation 4 milliLiter(s) Inhalation every 12 hours    Cardiovascular Medications  metoprolol tartrate Injectable 5 milliGRAM(s) IV Push every 6 hours    Gastrointestinal Medications  dextrose 5%. 1000 milliLiter(s) IV Continuous <Continuous>  pantoprazole  Injectable 40 milliGRAM(s) IV Push daily  sodium phosphate 15 milliMole(s)/250 mL IVPB 15 milliMole(s) IV Intermittent once    Genitourinary Medications    Hematologic/Oncologic Medications  heparin   Injectable 5000 Unit(s) SubCutaneous every 8 hours    Antimicrobial/Immunologic Medications  piperacillin/tazobactam IVPB.. 3.375 Gram(s) IV Intermittent every 8 hours    Endocrine/Metabolic Medications  insulin lispro (ADMELOG) corrective regimen sliding scale   SubCutaneous at bedtime  insulin lispro (ADMELOG) corrective regimen sliding scale   SubCutaneous every 6 hours  levothyroxine Injectable 112 MICROGram(s) IV Push at bedtime    Topical/Other Medications  lidocaine   4% Patch 1 Patch Transdermal daily    --------------------------------------------------------------------------------------    VITAL SIGNS:  T(C): 37.4 (06-21-25 @ 00:00), Max: 37.7 (06-20-25 @ 05:15)  HR: 74 (06-21-25 @ 02:00) (60 - 97)  BP: 165/55 (06-21-25 @ 02:00) (111/92 - 171/83)  RR: 16 (06-21-25 @ 02:00) (14 - 25)  SpO2: 100% (06-21-25 @ 02:00) (98% - 100%)  --------------------------------------------------------------------------------------    INS AND OUTS:    06-19-25 @ 07:01  -  06-20-25 @ 07:00  --------------------------------------------------------  IN: 600 mL / OUT: 2250 mL / NET: -1650 mL    06-20-25 @ 07:01  -  06-21-25 @ 03:03  --------------------------------------------------------  IN: 970 mL / OUT: 2863 mL / NET: -1893 mL      --------------------------------------------------------------------------------------    EXAM    NEURO: Agitated, A/Ox3  HEENT: NC/AT  RESPIRATORY: BiPAP 10/5   CARDIO: RRR, S1S2  ABDOMEN: soft, nontender, nondistended. Gall bladder fossa drainage bag with minimal SS output   EXTREMITIES: normal strength, no deformities    --------------------------------------------------------------------------------------    LABS    PENDING              --------------------------------------------------------------------------------------

## 2025-06-21 NOTE — PROGRESS NOTE ADULT - ASSESSMENT
85 year old man with multiple chronic medical comorbidities including hypertension, sinus node dysfunction, type two diabetes, and chronic kidney disease (baseline creatinine 1.5-2.0) presently admitted for lower back pain associated with lumbar compression deformities with concern for cauda equina impingement. He was found to be bacteremic (E faecalis) due to acute acalculous cholecystitis, and is now seen following laparoscopic cholecystectomy 6/5/2025. POD 1 patient became hypotensive, unresponsive to 2L fluid resuscitation and 3 u pRBC and was started on vasopressors and transferred to SICU. Patient weaned off pressors, course c/b ileus now resolved, NGT removed 6/10. Midline placed 6/12 for IV abx, patient now with ileus again ct scanned 6/16 found to have collection in gb fossa, drained by IR on 6/17 patient was a rapid for hypotension and hypoxia now in sicu delirious on nasal cannula, CTPA - no bleed. no collection    Plan:   - sicu care appreciated,   - Drainage of left chest  - DVT ppx: sqh  - ASA81  - pain control  - Diet: ADAT  - c/w Ampicillin to be boarden, d/w icu team   - will be d/c'd with wong (hx cauda equina)   - increase antihypertensives per cards  - ortho recs re: spinal surgery appreciated  - dispo planning - PONCE    B team   [id number]

## 2025-06-21 NOTE — PROGRESS NOTE ADULT - ATTENDING COMMENTS
I agree with the history, physical, and plan, which I have reviewed and edited where appropriate.  I agree with notes/assessment of health care providers on my service.  I have personally examined the patient.  I was physically present for the key portions of the evaluation and management (E/M) service provided.  I reviewed data and laboratory tests/x-rays and all pertinent electronic images.  The patient is a critical care patient with life threatening hemodynamic and metabolic instability in SICU.  Risk benefit analyses discussed.    The patient is in SICU with diagnosis mentioned in the note.    The plan is specified below.    ASSESSMENT:  85M PMH HTN, DM2, CKD (baseline creatinine 1.5-2), TIAs (on Eliquis at home), sinus node dysfunction (s/p PPM ~1 year ago), prostate cancer s/p prostectomy (~early 2000s), total thyroidectomy (~2013) presented after a fall and found to have lumbar stenosis with cauda equina impingement however on 6/4 developed acute abdominal pain and had a CT abdomen and pelvis that had signs of acute cholecystitis. On 6/5 patient underwent a robotic cholecystectomy. S/p IR gallbladder fossa collection drainage on 6/17. RRT called overnight for hypotension and hypoxia. SICU consulted for new oxygen requirements; r/o PE    NEUROLOGIC: delirium, h/o TIAs   - Pain control: tylenol, dilaudid, lido patch    - Seroquel 25 at bedtime + melatonin   - CTA head/neck negative   - vEEG pending   - MRI pending     RESPIRATORY: hypoxic respiratory insufficiency   - Monitor SpO2 goal >92%  - 4L NC    - pulmozyme, saline nebs     CARDIOVASCULAR   - Monitor hemodynamics   - Lopressor 5 mg q6    GASTROINTESTINAL: dysphagia   - Diet: NPO  - speech and swallow eval  - place [facility] for feeds   - protonix     /RENAL   - D5W @ 30   - resume home lasix   - wong (failed multiple trial of voids)    HEMATOLOGIC  - DVT ppx: Lovenox/SQH & SCD's  - Rectal Aspirin    INFECTIOUS DISEASE  - osteo/discitis; no orthopedic intervention  - Zosyn empiric?    ENDOCRINE  - DAYNE   - Levothyroxine

## 2025-06-21 NOTE — PROGRESS NOTE ADULT - ASSESSMENT
ASSESSMENT:  85M PMH HTN, DM2, CKD (baseline creatinine 1.5-2), TIAs (on Eliquis at home), sinus node dysfunction (s/p PPM ~1 year ago), prostate cancer s/p prostectomy (~early 2000s), total thyroidectomy (~2013) presented after a fall and found to have lumbar stenosis with cauda equina impingement however on 6/4 developed acute abdominal pain and had a CT abdomen and pelvis that had signs of acute cholecystitis. On 6/5 patient underwent a robotic cholecystectomy. S/p IR gallbladder fossa collection drainage on 6/17. RRT called overnight for hypotension and hypoxia. SICU consulted for new oxygen requirements; r/o PE    NEUROLOGIC   - Pain control: tylenol  - Seroquel 25 at bedtime   - CTA head/neck negative     RESPIRATORY   - Monitor SpO2 goal >92%  - BiPAP 10/5  - DNR DNI; trial noninvasive mechanical ventilation  - Pulmonary toileting-- chest PT, mucomyst  - CTA PE negative      CARDIOVASCULAR   - Monitor hemodynamics   - Lopressor 5 mg q6  - s/p PPM removal, MARIA LUISA w/o vegetations    GASTROINTESTINAL   - Diet: NPO    /RENAL   - Diamox 250x3 doses  - Strict I/Os  - Monitor BMP qd  - Monitor electrolytes, replete PRN    HEMATOLOGIC  - Monitor H/H   - DVT ppx: Lovenox/SQH & SCD's  - Rectal Aspirin    INFECTIOUS DISEASE  - Monitor fever / WBC  - osteo/discitis; no orthopedic intervention  - Zosyn     ENDOCRINE  - Monitor gluc  - Levothyroxine    LINES  - RUE midline    DISPO: SICU

## 2025-06-22 LAB
ANION GAP SERPL CALC-SCNC: 17 MMOL/L — HIGH (ref 7–14)
BLOOD GAS ARTERIAL COMPREHENSIVE RESULT: SIGNIFICANT CHANGE UP
BUN SERPL-MCNC: 15 MG/DL — SIGNIFICANT CHANGE UP (ref 7–23)
CALCIUM SERPL-MCNC: 7.5 MG/DL — LOW (ref 8.4–10.5)
CHLORIDE SERPL-SCNC: 96 MMOL/L — LOW (ref 98–107)
CO2 SERPL-SCNC: 21 MMOL/L — LOW (ref 22–31)
CREAT SERPL-MCNC: 1.32 MG/DL — HIGH (ref 0.5–1.3)
EGFR: 52 ML/MIN/1.73M2 — LOW
EGFR: 52 ML/MIN/1.73M2 — LOW
GLUCOSE BLDC GLUCOMTR-MCNC: 150 MG/DL — HIGH (ref 70–99)
GLUCOSE BLDC GLUCOMTR-MCNC: 157 MG/DL — HIGH (ref 70–99)
GLUCOSE BLDC GLUCOMTR-MCNC: 157 MG/DL — HIGH (ref 70–99)
GLUCOSE BLDC GLUCOMTR-MCNC: 159 MG/DL — HIGH (ref 70–99)
GLUCOSE SERPL-MCNC: 143 MG/DL — HIGH (ref 70–99)
HCT VFR BLD CALC: 22.7 % — LOW (ref 39–50)
HGB BLD-MCNC: 7.5 G/DL — LOW (ref 13–17)
MAGNESIUM SERPL-MCNC: 2 MG/DL — SIGNIFICANT CHANGE UP (ref 1.6–2.6)
MCHC RBC-ENTMCNC: 29 PG — SIGNIFICANT CHANGE UP (ref 27–34)
MCHC RBC-ENTMCNC: 33 G/DL — SIGNIFICANT CHANGE UP (ref 32–36)
MCV RBC AUTO: 87.6 FL — SIGNIFICANT CHANGE UP (ref 80–100)
NRBC # BLD AUTO: 0 K/UL — SIGNIFICANT CHANGE UP (ref 0–0)
NRBC # FLD: 0 K/UL — SIGNIFICANT CHANGE UP (ref 0–0)
NRBC BLD AUTO-RTO: 0 /100 WBCS — SIGNIFICANT CHANGE UP (ref 0–0)
PHOSPHATE SERPL-MCNC: 2.9 MG/DL — SIGNIFICANT CHANGE UP (ref 2.5–4.5)
PLATELET # BLD AUTO: 135 K/UL — LOW (ref 150–400)
PMV BLD: 11.3 FL — SIGNIFICANT CHANGE UP (ref 7–13)
POTASSIUM SERPL-MCNC: 3.1 MMOL/L — LOW (ref 3.5–5.3)
POTASSIUM SERPL-SCNC: 3.1 MMOL/L — LOW (ref 3.5–5.3)
RBC # BLD: 2.59 M/UL — LOW (ref 4.2–5.8)
RBC # FLD: 17.7 % — HIGH (ref 10.3–14.5)
SODIUM SERPL-SCNC: 134 MMOL/L — LOW (ref 135–145)
WBC # BLD: 6.25 K/UL — SIGNIFICANT CHANGE UP (ref 3.8–10.5)
WBC # FLD AUTO: 6.25 K/UL — SIGNIFICANT CHANGE UP (ref 3.8–10.5)

## 2025-06-22 PROCEDURE — 71045 X-RAY EXAM CHEST 1 VIEW: CPT | Mod: 26

## 2025-06-22 PROCEDURE — 95720 EEG PHY/QHP EA INCR W/VEEG: CPT

## 2025-06-22 RX ORDER — ASPIRIN 325 MG
300 TABLET ORAL DAILY
Refills: 0 | Status: DISCONTINUED | OUTPATIENT
Start: 2025-06-22 | End: 2025-07-01

## 2025-06-22 RX ORDER — OLANZAPINE 10 MG/1
5 TABLET ORAL ONCE
Refills: 0 | Status: DISCONTINUED | OUTPATIENT
Start: 2025-06-22 | End: 2025-06-22

## 2025-06-22 RX ORDER — FOLIC ACID 1 MG/1
1 TABLET ORAL DAILY
Refills: 0 | Status: DISCONTINUED | OUTPATIENT
Start: 2025-06-22 | End: 2025-07-01

## 2025-06-22 RX ORDER — IPRATROPIUM BROMIDE AND ALBUTEROL SULFATE .5; 2.5 MG/3ML; MG/3ML
3 SOLUTION RESPIRATORY (INHALATION) EVERY 6 HOURS
Refills: 0 | Status: DISCONTINUED | OUTPATIENT
Start: 2025-06-22 | End: 2025-07-02

## 2025-06-22 RX ORDER — ALBUTEROL SULFATE 2.5 MG/3ML
2 VIAL, NEBULIZER (ML) INHALATION EVERY 6 HOURS
Refills: 0 | Status: DISCONTINUED | OUTPATIENT
Start: 2025-06-22 | End: 2025-07-02

## 2025-06-22 RX ORDER — POTASSIUM CHLORIDE, DEXTROSE MONOHYDRATE AND SODIUM CHLORIDE 150; 5; 900 MG/100ML; G/100ML; MG/100ML
1000 INJECTION, SOLUTION INTRAVENOUS
Refills: 0 | Status: DISCONTINUED | OUTPATIENT
Start: 2025-06-22 | End: 2025-07-01

## 2025-06-22 RX ADMIN — Medication 100 MILLIEQUIVALENT(S): at 03:30

## 2025-06-22 RX ADMIN — HEPARIN SODIUM 5000 UNIT(S): 1000 INJECTION INTRAVENOUS; SUBCUTANEOUS at 05:02

## 2025-06-22 RX ADMIN — Medication 112 MICROGRAM(S): at 21:30

## 2025-06-22 RX ADMIN — FUROSEMIDE 20 MILLIGRAM(S): 10 INJECTION INTRAMUSCULAR; INTRAVENOUS at 05:01

## 2025-06-22 RX ADMIN — Medication 100 MILLIGRAM(S): at 17:42

## 2025-06-22 RX ADMIN — Medication 4 MILLILITER(S): at 21:59

## 2025-06-22 RX ADMIN — METOPROLOL SUCCINATE 5 MILLIGRAM(S): 50 TABLET, EXTENDED RELEASE ORAL at 17:41

## 2025-06-22 RX ADMIN — FUROSEMIDE 20 MILLIGRAM(S): 10 INJECTION INTRAMUSCULAR; INTRAVENOUS at 17:42

## 2025-06-22 RX ADMIN — Medication 400 MILLIGRAM(S): at 11:24

## 2025-06-22 RX ADMIN — Medication 25 GRAM(S): at 21:30

## 2025-06-22 RX ADMIN — FOLIC ACID 1 MILLIGRAM(S): 1 TABLET ORAL at 17:42

## 2025-06-22 RX ADMIN — Medication 0.5 MILLIGRAM(S): at 05:09

## 2025-06-22 RX ADMIN — Medication 100 MILLIEQUIVALENT(S): at 02:28

## 2025-06-22 RX ADMIN — Medication 0.5 MILLIGRAM(S): at 14:14

## 2025-06-22 RX ADMIN — Medication 40 MILLIGRAM(S): at 11:24

## 2025-06-22 RX ADMIN — Medication 0.2 MILLIGRAM(S): at 12:21

## 2025-06-22 RX ADMIN — Medication 400 MILLIGRAM(S): at 00:06

## 2025-06-22 RX ADMIN — HEPARIN SODIUM 5000 UNIT(S): 1000 INJECTION INTRAVENOUS; SUBCUTANEOUS at 12:57

## 2025-06-22 RX ADMIN — Medication 1000 MILLIGRAM(S): at 05:31

## 2025-06-22 RX ADMIN — Medication 0.2 MILLIGRAM(S): at 11:21

## 2025-06-22 RX ADMIN — HEPARIN SODIUM 5000 UNIT(S): 1000 INJECTION INTRAVENOUS; SUBCUTANEOUS at 21:30

## 2025-06-22 RX ADMIN — Medication 1000 MILLIGRAM(S): at 00:36

## 2025-06-22 RX ADMIN — DORNASE ALFA 2.5 MILLIGRAM(S): 1 SOLUTION RESPIRATORY (INHALATION) at 09:47

## 2025-06-22 RX ADMIN — Medication 0.5 MILLIGRAM(S): at 13:14

## 2025-06-22 RX ADMIN — METOPROLOL SUCCINATE 5 MILLIGRAM(S): 50 TABLET, EXTENDED RELEASE ORAL at 05:01

## 2025-06-22 RX ADMIN — INSULIN LISPRO 1: 100 INJECTION, SOLUTION INTRAVENOUS; SUBCUTANEOUS at 11:52

## 2025-06-22 RX ADMIN — SODIUM CHLORIDE 30 MILLILITER(S): 9 INJECTION, SOLUTION INTRAVENOUS at 07:24

## 2025-06-22 RX ADMIN — LIDOCAINE HYDROCHLORIDE 1 PATCH: 20 JELLY TOPICAL at 11:24

## 2025-06-22 RX ADMIN — Medication 300 MILLIGRAM(S): at 11:24

## 2025-06-22 RX ADMIN — Medication 25 GRAM(S): at 12:55

## 2025-06-22 RX ADMIN — SODIUM CHLORIDE 30 MILLILITER(S): 9 INJECTION, SOLUTION INTRAVENOUS at 20:00

## 2025-06-22 RX ADMIN — IPRATROPIUM BROMIDE AND ALBUTEROL SULFATE 3 MILLILITER(S): .5; 2.5 SOLUTION RESPIRATORY (INHALATION) at 21:59

## 2025-06-22 RX ADMIN — Medication 400 MILLIGRAM(S): at 05:01

## 2025-06-22 RX ADMIN — INSULIN LISPRO 1: 100 INJECTION, SOLUTION INTRAVENOUS; SUBCUTANEOUS at 05:15

## 2025-06-22 RX ADMIN — Medication 4 MILLILITER(S): at 09:46

## 2025-06-22 RX ADMIN — METOPROLOL SUCCINATE 5 MILLIGRAM(S): 50 TABLET, EXTENDED RELEASE ORAL at 00:06

## 2025-06-22 RX ADMIN — IPRATROPIUM BROMIDE AND ALBUTEROL SULFATE 3 MILLILITER(S): .5; 2.5 SOLUTION RESPIRATORY (INHALATION) at 15:30

## 2025-06-22 RX ADMIN — Medication 100 MILLIEQUIVALENT(S): at 05:02

## 2025-06-22 RX ADMIN — Medication 0.5 MILLIGRAM(S): at 05:39

## 2025-06-22 RX ADMIN — METOPROLOL SUCCINATE 5 MILLIGRAM(S): 50 TABLET, EXTENDED RELEASE ORAL at 11:23

## 2025-06-22 RX ADMIN — Medication 25 GRAM(S): at 05:02

## 2025-06-22 RX ADMIN — Medication 1000 MILLIGRAM(S): at 12:57

## 2025-06-22 RX ADMIN — LIDOCAINE HYDROCHLORIDE 1 PATCH: 20 JELLY TOPICAL at 20:00

## 2025-06-22 RX ADMIN — IPRATROPIUM BROMIDE AND ALBUTEROL SULFATE 3 MILLILITER(S): .5; 2.5 SOLUTION RESPIRATORY (INHALATION) at 09:47

## 2025-06-22 NOTE — PROGRESS NOTE ADULT - SUBJECTIVE AND OBJECTIVE BOX
B TEAM SURGERY DAILY PROGRESS NOTE    INTERVAL EVENTS:  -Failed trial of oral meds  -Lasix resumed  -Back on BiPAP for desaturations  -Pulmozyme added    SUBJECTIVE: Pt seen at bedside this morning.     Physical Exam:  General Appearance: Appears well, NAD  Neck: Supple  Chest: Nonlabored respirations, normal CW expansion  CV: Pulse regular presently  Abdomen: Soft, nontender; serosang drain in right flank  Extremities: Grossly symmetric, SCD's in place     Vital Signs Last 24 Hrs  T(C): 36.4 (22 Jun 2025 12:00), Max: 37.4 (22 Jun 2025 00:00)  T(F): 97.6 (22 Jun 2025 12:00), Max: 99.4 (22 Jun 2025 00:00)  HR: 72 (22 Jun 2025 19:00) (57 - 92)  BP: 148/57 (22 Jun 2025 19:00) (111/69 - 199/78)  BP(mean): 74 (22 Jun 2025 19:00) (65 - 131)  RR: 20 (22 Jun 2025 19:00) (14 - 24)  SpO2: 100% (22 Jun 2025 19:00) (96% - 100%)    Parameters below as of 22 Jun 2025 18:00  Patient On (Oxygen Delivery Method): nasal cannula  O2 Flow (L/min): 3      I&O's Summary    21 Jun 2025 07:01  -  22 Jun 2025 07:00  --------------------------------------------------------  IN: 1010 mL / OUT: 1962 mL / NET: -952 mL    22 Jun 2025 07:01  -  22 Jun 2025 19:33  --------------------------------------------------------  IN: 360 mL / OUT: 1225 mL / NET: -865 mL          LABS:                        7.5    6.25  )-----------( 135      ( 22 Jun 2025 01:15 )             22.7     06-22    134[L]  |  96[L]  |  15  ----------------------------<  143[H]  3.1[L]   |  21[L]  |  1.32[H]    Ca    7.5[L]      22 Jun 2025 01:15  Phos  2.9     06-22  Mg     2.00     06-22    TPro  5.3[L]  /  Alb  2.6[L]  /  TBili  0.7  /  DBili  x   /  AST  14  /  ALT  12  /  AlkPhos  131[H]  06-21      Urinalysis Basic - ( 22 Jun 2025 01:15 )    Color: x / Appearance: x / SG: x / pH: x  Gluc: 143 mg/dL / Ketone: x  / Bili: x / Urobili: x   Blood: x / Protein: x / Nitrite: x   Leuk Esterase: x / RBC: x / WBC x   Sq Epi: x / Non Sq Epi: x / Bacteria: x        RADIOLOGY & ADDITIONAL STUDIES:

## 2025-06-22 NOTE — PROGRESS NOTE ADULT - SUBJECTIVE AND OBJECTIVE BOX
SICU Daily Progress Note  =====================================================  Interval/Overnight Events:       -Failed trial of oral meds  -Lasix resumed  -Back on BiPAP for desaturations  -Pulmozyme added    ALLERGIES:  gabapentin (Other)      --------------------------------------------------------------------------------------    MEDICATIONS:    Neurologic Medications  acetaminophen   IVPB .. 1000 milliGRAM(s) IV Intermittent every 6 hours  HYDROmorphone  Injectable 0.2 milliGRAM(s) IV Push every 4 hours PRN Moderate Pain (4 - 6)  HYDROmorphone  Injectable 0.5 milliGRAM(s) IV Push every 4 hours PRN Severe Pain (7 - 10)    Respiratory Medications  dornase mayda Solution 2.5 milliGRAM(s) Inhalation daily  sodium chloride 3%  Inhalation 4 milliLiter(s) Inhalation every 12 hours    Cardiovascular Medications  furosemide   Injectable 20 milliGRAM(s) IV Push every 12 hours  metoprolol tartrate Injectable 5 milliGRAM(s) IV Push every 6 hours    Gastrointestinal Medications  dextrose 5%. 1000 milliLiter(s) IV Continuous <Continuous>  pantoprazole  Injectable 40 milliGRAM(s) IV Push daily    Genitourinary Medications    Hematologic/Oncologic Medications  heparin   Injectable 5000 Unit(s) SubCutaneous every 8 hours    Antimicrobial/Immunologic Medications  piperacillin/tazobactam IVPB.. 3.375 Gram(s) IV Intermittent every 8 hours    Endocrine/Metabolic Medications  insulin lispro (ADMELOG) corrective regimen sliding scale   SubCutaneous at bedtime  insulin lispro (ADMELOG) corrective regimen sliding scale   SubCutaneous every 6 hours  levothyroxine Injectable 112 MICROGram(s) IV Push at bedtime    Topical/Other Medications  lidocaine   4% Patch 1 Patch Transdermal daily    --------------------------------------------------------------------------------------    VITAL SIGNS:  T(C): 37.1 (06-21-25 @ 20:00), Max: 37.2 (06-21-25 @ 04:00)  HR: 73 (06-21-25 @ 22:40) (65 - 97)  BP: 199/78 (06-21-25 @ 22:00) (97/67 - 199/78)  RR: 24 (06-21-25 @ 22:00) (14 - 25)  SpO2: 100% (06-21-25 @ 22:40) (89% - 100%)  --------------------------------------------------------------------------------------    INS AND OUTS:    06-20-25 @ 07:01  -  06-21-25 @ 07:00  --------------------------------------------------------  IN: 1380 mL / OUT: 3599 mL / NET: -2219 mL    06-21-25 @ 07:01  -  06-22-25 @ 00:15  --------------------------------------------------------  IN: 370 mL / OUT: 1162 mL / NET: -792 mL      --------------------------------------------------------------------------------------    EXAM    NEURO: Agitated, A/Ox3  HEENT: NC/AT  RESPIRATORY: BiPAP 10/5   CARDIO: RRR, S1S2  ABDOMEN: soft, nontender, nondistended. Gall bladder fossa drainage bag with minimal SS output   EXTREMITIES: normal strength, no deformities      --------------------------------------------------------------------------------------    LABS    PENDING  --------------------------------------------------------------------------------------

## 2025-06-22 NOTE — PROGRESS NOTE ADULT - ATTENDING COMMENTS
I agree with the history, physical, and plan, which I have reviewed and edited where appropriate.  I agree with notes/assessment of health care providers on my service.  I have personally examined the patient.  I was physically present for the key portions of the evaluation and management (E/M) service provided.  I reviewed data and laboratory tests/x-rays and all pertinent electronic images.  The patient is a critical care patient with life threatening hemodynamic and metabolic instability in SICU.  Risk benefit analyses discussed.    The patient is in SICU with diagnosis mentioned in the note.    The plan is specified below.    ASSESSMENT:  85M PMH HTN, DM2, CKD (baseline creatinine 1.5-2), TIAs (on Eliquis at home), sinus node dysfunction (s/p PPM ~1 year ago), prostate cancer s/p prostectomy (~early 2000s), total thyroidectomy (~2013) presented after a fall and found to have lumbar stenosis with cauda equina impingement however on 6/4 developed acute abdominal pain and had a CT abdomen and pelvis that had signs of acute cholecystitis. On 6/5 patient underwent a robotic cholecystectomy. S/p IR gallbladder fossa collection drainage on 6/17. RRT called overnight for hypotension and hypoxia. SICU consulted for new oxygen requirements; r/o PE    NEUROLOGIC: delirium, h/o TIAs   - Pain control: tylenol, dilaudid, lido patch    - vEEG pending     RESPIRATORY: hypoxic respiratory insufficiency   - Monitor SpO2 goal >92%  - 3L NC, BiPAP at night     - pulmozyme, saline nebs     CARDIOVASCULAR   - Monitor hemodynamics   - Lopressor 5 mg q6    GASTROINTESTINAL: dysphagia, at risk for malnutrition   - Diet: NPO  - speech and swallow eval  - protonix   - thiamine and folate while npo     /RENAL   - D5 1/2NS 20K @ 30 for glucose source while NPO   - resume home lasix 20 IV BID   - wong (failed multiple trial of voids)    HEMATOLOGIC  - DVT ppx: SQH  - Rectal Aspirin    INFECTIOUS DISEASE  - osteo/discitis; no orthopedic intervention  - Zosyn empiric PNA x 5 d     ENDOCRINE  - DAYNE   - Levothyroxine .

## 2025-06-22 NOTE — PROGRESS NOTE ADULT - ASSESSMENT
ASSESSMENT:  85M PMH HTN, DM2, CKD (baseline creatinine 1.5-2), TIAs (on Eliquis at home), sinus node dysfunction (s/p PPM ~1 year ago), prostate cancer s/p prostectomy (~early 2000s), total thyroidectomy (~2013) presented after a fall and found to have lumbar stenosis with cauda equina impingement however on 6/4 developed acute abdominal pain and had a CT abdomen and pelvis that had signs of acute cholecystitis. On 6/5 patient underwent a robotic cholecystectomy. S/p IR gallbladder fossa collection drainage on 6/17. RRT called overnight for hypotension and hypoxia. SICU consulted for new oxygen requirements; r/o PE    NEUROLOGIC   - Pain control: tylenol  - Seroquel 25 at bedtime   - CTA head/neck negative     RESPIRATORY   - Monitor SpO2 goal >92%  - BiPAP 10/5  - DNR DNI; trial noninvasive mechanical ventilation  - Pulmonary toileting-- chest PT, mucomyst, pulmozyme  - CTA PE negative      CARDIOVASCULAR   - Monitor hemodynamics   - Lopressor 5 mg q6  - s/p PPM removal, MARIA LUISA w/o vegetations    GASTROINTESTINAL   - Diet: NPO    /RENAL   - Lasix 20mg BID   - Strict I/Os  - Monitor BMP qd  - Monitor electrolytes, replete PRN    HEMATOLOGIC  - Monitor H/H   - DVT ppx: SQH & SCD's  - Rectal Aspirin    INFECTIOUS DISEASE  - Monitor fever / WBC  - osteo/discitis; no orthopedic intervention  - Zosyn     ENDOCRINE  - Monitor gluc  - Levothyroxine    LINES  - LUE midline     DISPO: SICU

## 2025-06-22 NOTE — PROGRESS NOTE ADULT - ASSESSMENT
85 year old man with multiple chronic medical comorbidities including hypertension, sinus node dysfunction, type two diabetes, and chronic kidney disease (baseline creatinine 1.5-2.0) presently admitted for lower back pain associated with lumbar compression deformities with concern for cauda equina impingement. He was found to be bacteremic (E faecalis) due to acute acalculous cholecystitis, and is now seen following laparoscopic cholecystectomy 6/5/2025. POD 1 patient became hypotensive, unresponsive to 2L fluid resuscitation and 3 u pRBC and was started on vasopressors and transferred to SICU. Patient weaned off pressors, course c/b ileus now resolved, NGT removed 6/10. Midline placed 6/12 for IV abx, patient now with ileus again ct scanned 6/16 found to have collection in gb fossa, drained by IR on 6/17 patient was a rapid for hypotension and hypoxia now in sicu delirious on nasal cannula, CTPA - no bleed. no collection    Plan:   - sicu care appreciated,   - Drainage of left chest  - DVT ppx: sqh  - ASA81  - pain control  - Diet: ADAT  - c/w Ampicillin to be boarden, d/w icu team   - will be d/c'd with wong (hx cauda equina)   - increase antihypertensives per cards  - ortho recs re: spinal surgery appreciated  - dispo planning - PONCE    B team   [id number]

## 2025-06-23 LAB
ANION GAP SERPL CALC-SCNC: 13 MMOL/L — SIGNIFICANT CHANGE UP (ref 7–14)
BLOOD GAS ARTERIAL - LYTES,HGB,ICA,LACT RESULT: SIGNIFICANT CHANGE UP
BUN SERPL-MCNC: 15 MG/DL — SIGNIFICANT CHANGE UP (ref 7–23)
CALCIUM SERPL-MCNC: 7.7 MG/DL — LOW (ref 8.4–10.5)
CHLORIDE SERPL-SCNC: 98 MMOL/L — SIGNIFICANT CHANGE UP (ref 98–107)
CO2 SERPL-SCNC: 24 MMOL/L — SIGNIFICANT CHANGE UP (ref 22–31)
CREAT SERPL-MCNC: 1.41 MG/DL — HIGH (ref 0.5–1.3)
CULTURE RESULTS: SIGNIFICANT CHANGE UP
EGFR: 48 ML/MIN/1.73M2 — LOW
EGFR: 48 ML/MIN/1.73M2 — LOW
GLUCOSE BLDC GLUCOMTR-MCNC: 145 MG/DL — HIGH (ref 70–99)
GLUCOSE BLDC GLUCOMTR-MCNC: 151 MG/DL — HIGH (ref 70–99)
GLUCOSE BLDC GLUCOMTR-MCNC: 153 MG/DL — HIGH (ref 70–99)
GLUCOSE SERPL-MCNC: 157 MG/DL — HIGH (ref 70–99)
HCT VFR BLD CALC: 25.2 % — LOW (ref 39–50)
HGB BLD-MCNC: 8 G/DL — LOW (ref 13–17)
MAGNESIUM SERPL-MCNC: 2 MG/DL — SIGNIFICANT CHANGE UP (ref 1.6–2.6)
MCHC RBC-ENTMCNC: 28.2 PG — SIGNIFICANT CHANGE UP (ref 27–34)
MCHC RBC-ENTMCNC: 31.7 G/DL — LOW (ref 32–36)
MCV RBC AUTO: 88.7 FL — SIGNIFICANT CHANGE UP (ref 80–100)
NRBC # BLD AUTO: 0 K/UL — SIGNIFICANT CHANGE UP (ref 0–0)
NRBC # FLD: 0 K/UL — SIGNIFICANT CHANGE UP (ref 0–0)
NRBC BLD AUTO-RTO: 0 /100 WBCS — SIGNIFICANT CHANGE UP (ref 0–0)
PHOSPHATE SERPL-MCNC: 2.8 MG/DL — SIGNIFICANT CHANGE UP (ref 2.5–4.5)
PLATELET # BLD AUTO: 154 K/UL — SIGNIFICANT CHANGE UP (ref 150–400)
PMV BLD: 11.4 FL — SIGNIFICANT CHANGE UP (ref 7–13)
POTASSIUM SERPL-MCNC: 3 MMOL/L — LOW (ref 3.5–5.3)
POTASSIUM SERPL-SCNC: 3 MMOL/L — LOW (ref 3.5–5.3)
RBC # BLD: 2.84 M/UL — LOW (ref 4.2–5.8)
RBC # FLD: 17.5 % — HIGH (ref 10.3–14.5)
SODIUM SERPL-SCNC: 135 MMOL/L — SIGNIFICANT CHANGE UP (ref 135–145)
SPECIMEN SOURCE: SIGNIFICANT CHANGE UP
WBC # BLD: 6.08 K/UL — SIGNIFICANT CHANGE UP (ref 3.8–10.5)
WBC # FLD AUTO: 6.08 K/UL — SIGNIFICANT CHANGE UP (ref 3.8–10.5)

## 2025-06-23 PROCEDURE — 99291 CRITICAL CARE FIRST HOUR: CPT

## 2025-06-23 PROCEDURE — 99232 SBSQ HOSP IP/OBS MODERATE 35: CPT

## 2025-06-23 PROCEDURE — 95718 EEG PHYS/QHP 2-12 HR W/VEEG: CPT

## 2025-06-23 PROCEDURE — 71045 X-RAY EXAM CHEST 1 VIEW: CPT | Mod: 26

## 2025-06-23 RX ORDER — AMPICILLIN SODIUM 1 G/1
2 INJECTION, POWDER, FOR SOLUTION INTRAMUSCULAR; INTRAVENOUS EVERY 6 HOURS
Refills: 0 | Status: COMPLETED | OUTPATIENT
Start: 2025-06-23 | End: 2025-06-25

## 2025-06-23 RX ORDER — ACETAMINOPHEN 500 MG/5ML
1000 LIQUID (ML) ORAL EVERY 6 HOURS
Refills: 0 | Status: COMPLETED | OUTPATIENT
Start: 2025-06-23 | End: 2025-06-24

## 2025-06-23 RX ADMIN — Medication 0.2 MILLIGRAM(S): at 15:25

## 2025-06-23 RX ADMIN — Medication 0.5 MILLIGRAM(S): at 09:00

## 2025-06-23 RX ADMIN — Medication 300 MILLIGRAM(S): at 11:41

## 2025-06-23 RX ADMIN — METOPROLOL SUCCINATE 5 MILLIGRAM(S): 50 TABLET, EXTENDED RELEASE ORAL at 17:55

## 2025-06-23 RX ADMIN — HEPARIN SODIUM 5000 UNIT(S): 1000 INJECTION INTRAVENOUS; SUBCUTANEOUS at 23:02

## 2025-06-23 RX ADMIN — HEPARIN SODIUM 5000 UNIT(S): 1000 INJECTION INTRAVENOUS; SUBCUTANEOUS at 05:26

## 2025-06-23 RX ADMIN — Medication 4 MILLILITER(S): at 21:42

## 2025-06-23 RX ADMIN — METOPROLOL SUCCINATE 5 MILLIGRAM(S): 50 TABLET, EXTENDED RELEASE ORAL at 00:05

## 2025-06-23 RX ADMIN — IPRATROPIUM BROMIDE AND ALBUTEROL SULFATE 3 MILLILITER(S): .5; 2.5 SOLUTION RESPIRATORY (INHALATION) at 03:17

## 2025-06-23 RX ADMIN — Medication 0.5 MILLIGRAM(S): at 04:00

## 2025-06-23 RX ADMIN — INSULIN LISPRO 1: 100 INJECTION, SOLUTION INTRAVENOUS; SUBCUTANEOUS at 12:07

## 2025-06-23 RX ADMIN — METOPROLOL SUCCINATE 5 MILLIGRAM(S): 50 TABLET, EXTENDED RELEASE ORAL at 11:40

## 2025-06-23 RX ADMIN — Medication 0.2 MILLIGRAM(S): at 21:13

## 2025-06-23 RX ADMIN — Medication 0.5 MILLIGRAM(S): at 09:30

## 2025-06-23 RX ADMIN — INSULIN LISPRO 1: 100 INJECTION, SOLUTION INTRAVENOUS; SUBCUTANEOUS at 07:12

## 2025-06-23 RX ADMIN — Medication 100 MILLIEQUIVALENT(S): at 05:01

## 2025-06-23 RX ADMIN — LIDOCAINE HYDROCHLORIDE 1 PATCH: 20 JELLY TOPICAL at 23:00

## 2025-06-23 RX ADMIN — HEPARIN SODIUM 5000 UNIT(S): 1000 INJECTION INTRAVENOUS; SUBCUTANEOUS at 15:09

## 2025-06-23 RX ADMIN — LIDOCAINE HYDROCHLORIDE 1 PATCH: 20 JELLY TOPICAL at 11:45

## 2025-06-23 RX ADMIN — Medication 400 MILLIGRAM(S): at 11:46

## 2025-06-23 RX ADMIN — Medication 0.2 MILLIGRAM(S): at 21:40

## 2025-06-23 RX ADMIN — IPRATROPIUM BROMIDE AND ALBUTEROL SULFATE 3 MILLILITER(S): .5; 2.5 SOLUTION RESPIRATORY (INHALATION) at 21:43

## 2025-06-23 RX ADMIN — FOLIC ACID 1 MILLIGRAM(S): 1 TABLET ORAL at 12:59

## 2025-06-23 RX ADMIN — AMPICILLIN SODIUM 200 GRAM(S): 1 INJECTION, POWDER, FOR SOLUTION INTRAMUSCULAR; INTRAVENOUS at 13:00

## 2025-06-23 RX ADMIN — Medication 40 MILLIGRAM(S): at 11:42

## 2025-06-23 RX ADMIN — AMPICILLIN SODIUM 200 GRAM(S): 1 INJECTION, POWDER, FOR SOLUTION INTRAMUSCULAR; INTRAVENOUS at 18:25

## 2025-06-23 RX ADMIN — IPRATROPIUM BROMIDE AND ALBUTEROL SULFATE 3 MILLILITER(S): .5; 2.5 SOLUTION RESPIRATORY (INHALATION) at 08:41

## 2025-06-23 RX ADMIN — INSULIN LISPRO 1: 100 INJECTION, SOLUTION INTRAVENOUS; SUBCUTANEOUS at 00:06

## 2025-06-23 RX ADMIN — FUROSEMIDE 20 MILLIGRAM(S): 10 INJECTION INTRAMUSCULAR; INTRAVENOUS at 17:56

## 2025-06-23 RX ADMIN — Medication 0.2 MILLIGRAM(S): at 16:00

## 2025-06-23 RX ADMIN — Medication 4 MILLILITER(S): at 08:40

## 2025-06-23 RX ADMIN — DORNASE ALFA 2.5 MILLIGRAM(S): 1 SOLUTION RESPIRATORY (INHALATION) at 08:41

## 2025-06-23 RX ADMIN — Medication 400 MILLIGRAM(S): at 17:52

## 2025-06-23 RX ADMIN — LIDOCAINE HYDROCHLORIDE 1 PATCH: 20 JELLY TOPICAL at 00:02

## 2025-06-23 RX ADMIN — FUROSEMIDE 20 MILLIGRAM(S): 10 INJECTION INTRAMUSCULAR; INTRAVENOUS at 06:26

## 2025-06-23 RX ADMIN — POTASSIUM CHLORIDE, DEXTROSE MONOHYDRATE AND SODIUM CHLORIDE 30 MILLILITER(S): 150; 5; 900 INJECTION, SOLUTION INTRAVENOUS at 00:00

## 2025-06-23 RX ADMIN — LIDOCAINE HYDROCHLORIDE 1 PATCH: 20 JELLY TOPICAL at 19:00

## 2025-06-23 RX ADMIN — METOPROLOL SUCCINATE 5 MILLIGRAM(S): 50 TABLET, EXTENDED RELEASE ORAL at 05:26

## 2025-06-23 RX ADMIN — IPRATROPIUM BROMIDE AND ALBUTEROL SULFATE 3 MILLILITER(S): .5; 2.5 SOLUTION RESPIRATORY (INHALATION) at 15:37

## 2025-06-23 RX ADMIN — Medication 100 MILLIEQUIVALENT(S): at 07:00

## 2025-06-23 RX ADMIN — Medication 100 MILLIGRAM(S): at 11:43

## 2025-06-23 RX ADMIN — Medication 0.5 MILLIGRAM(S): at 03:40

## 2025-06-23 RX ADMIN — Medication 100 MILLIEQUIVALENT(S): at 06:15

## 2025-06-23 RX ADMIN — Medication 25 GRAM(S): at 05:25

## 2025-06-23 RX ADMIN — Medication 112 MICROGRAM(S): at 23:02

## 2025-06-23 RX ADMIN — POTASSIUM CHLORIDE, DEXTROSE MONOHYDRATE AND SODIUM CHLORIDE 30 MILLILITER(S): 150; 5; 900 INJECTION, SOLUTION INTRAVENOUS at 21:12

## 2025-06-23 NOTE — EEG REPORT - NS EEG TEXT BOX
[facility] [place] [id number]     Study Date: 		06-22 12:38 - 08:00 6-23-25  Duration in hours:  x19:10    --------------------------------------------------------------------------------------------------  History:  CC/ HPI Patient is a 85y old  Male who presents with a chief complaint of back pain (23 Jun 2025 00:02)    MEDICATIONS  (STANDING):  acetaminophen   IVPB .. 1000 milliGRAM(s) IV Intermittent every 6 hours  albuterol    90 MICROgram(s) HFA Inhaler 2 Puff(s) Inhalation every 6 hours  albuterol/ipratropium for Nebulization 3 milliLiter(s) Nebulizer every 6 hours  aspirin Suppository 300 milliGRAM(s) Rectal daily  dextrose 5% + sodium chloride 0.45% with potassium chloride 20 mEq/L 1000 milliLiter(s) (30 mL/Hr) IV Continuous <Continuous>  dornase mayda Solution 2.5 milliGRAM(s) Inhalation daily  folic acid Injectable 1 milliGRAM(s) IV Push daily  furosemide   Injectable 20 milliGRAM(s) IV Push every 12 hours  heparin   Injectable 5000 Unit(s) SubCutaneous every 8 hours  insulin lispro (ADMELOG) corrective regimen sliding scale   SubCutaneous every 6 hours  levothyroxine Injectable 112 MICROGram(s) IV Push at bedtime  lidocaine   4% Patch 1 Patch Transdermal daily  metoprolol tartrate Injectable 5 milliGRAM(s) IV Push every 6 hours  pantoprazole  Injectable 40 milliGRAM(s) IV Push daily  piperacillin/tazobactam IVPB.. 3.375 Gram(s) IV Intermittent every 8 hours  sodium chloride 3%  Inhalation 4 milliLiter(s) Inhalation every 12 hours  thiamine Injectable 100 milliGRAM(s) IV Push daily    --------------------------------------------------------------------------------------------------  Study Interpretation:    [[[Abbreviation Key:  PDR=alpha rhythm/posterior dominant rhythm. A-P=anterior posterior gradient.  Amplitude: ‘very low’:<20; ‘low’:20-50; ‘medium’:; ‘high’:>200uV.  Persistence for periodic/rhythmic patterns (% of epoch) ‘rare’:<1%; ‘occasional’:1-10%; ‘frequent’:10-50%; ‘abundant’:50-90%; ‘continuous’:>90%.  Persistence for sporadic discharges: ‘rare’:<1/hr; ‘occasional’:1/min-1/hr; ‘frequent’:>1/min; ‘abundant’:>1/10 sec.  GRDA=generalized rhythmic delta activity; FIRDA=frontal intermittent GRDA; LRDA=lateralized rhythmic delta activity; TIRDA=temporal intermittent rhythmic delta activity;  LPD=PLED=lateralized periodic discharges; GPD=generalized periodic discharges; BiPDs=BiPLEDs=bilateral independent periodic epileptiform discharges; SIRPID=stimulus induced rhythmic, periodic, or ictal appearing discharges; BIRDs=brief potentially ictal rhythmic discharges >4 Hz, lasting .5-10s; PFA=paroxysmal bursts of beta/gamma; LVFA=low voltage fast activity.  Modifiers: +F=with fast component; +S=with spike component; +R=with rhythmic component.  S-B=burst suppression pattern.  Max=maximal. N1-drowsy; N2-stage II sleep; N3-slow wave sleep. SSS/BETS=small sharp spikes/benign epileptiform transients of sleep. HV=hyperventilation; PS=photic stimulation]]]    FINDINGS:      Background:  Continuity: continuous  Symmetry: symmetric  PDR: rudimentary 6-6.5Hz activity, with amplitude to 40 uV, that attenuated to eye opening.    Reactivity: present  Voltage: normal (between 20-150uV)  Anterior Posterior Gradient: present  Other background findings: none  Breach: absent    Background Slowing:  Generalized slowing: diffuse irregular delta and theta activity.  Focal slowing: none was present.    State Changes:   -Drowsiness noted with increased slowing, attenuation of fast activity, vertex transients.  -Present with N2 sleep transients with symmetric spindles and K-complexes.    Sporadic Epileptiform Discharges:    None    Rhythmic and Periodic Patterns (RPPs):  None     Electrographic and Electroclinical seizures:  None    Other Clinical Events:  None    Activation Procedures:   -Hyperventilation was not performed.    -Photic stimulation was not performed.    Artifacts:  Intermittent myogenic and movement artifacts were noted.    ECG:  The heart rate on single channel ECG was predominantly between BPM = 60-90 with frequent ectopy    EEG Classification / Summary:  Abnormal EEG study  Mild-mderate generalized background slowing    -----------------------------------------------------------------------------------------------------    Clinical Impression:  Mild-moderate diffuse/multi-focal cerebral dysfunction, not specific as to etiology.  There were no epileptiform abnormalities recorded.      ECG with frequent ectopy    -------------------------------------------------------------------------------------------------------  [facility] EEG Reading Room Ph#: [phone]  Epilepsy Answering Service after 5PM and before 8:30AM: Ph#: [phone]    Yon Irwin M.D.   of Neurology, [facility] Epilepsy [place]

## 2025-06-23 NOTE — PROGRESS NOTE ADULT - ASSESSMENT
85 year old man with multiple chronic medical comorbidities including hypertension, sinus node dysfunction, type two diabetes, and chronic kidney disease (baseline creatinine 1.5-2.0) presently admitted for lower back pain associated with lumbar compression deformities with concern for cauda equina impingement. He was found to be bacteremic (E faecalis) due to acute acalculous cholecystitis, and is now seen following laparoscopic cholecystectomy with Dr. Triny Verde 6/5/2025. POD 1 patient became hypotensive, unresponsive to 2L fluid resuscitation and 3 u pRBC and was started on vasopressors and transferred to SICU.Patient weaned off pressors, course c/b ileus now resolved, Endocrinology was consulted for management of diabetes mellitus.    Type 2 Diabetes Mellitus  - HbA1c 6.9%   - home regimen:  lantus 24 units qhs, admelog 12 units + tradjenta 5 mg    Inpatient plan   - FS goal 100-180 mg/dl : at goal on low dose scales.  - Pt NPO  - Agree with SICU hold Lantus as NPO- when diet reordered and if pt eating can reorder low dose lantus 5 units daily.   - continue low Admelog correctional scale TID AC  ---> q6 if NPO  - continue separate low Admelog correctional scale at HS   - Continue off Premeal insulin for now  - If fingerstick >180 x 2, can start Admelog 2 units TID AC. Hold if not eating/NPO.   - FS TID AC & HS ---> q6 if NPO   - hypoglycemia protocol PRN   - consistent carbohydrate diet    Discharge plan  - per family, plan to discharge to rehab   - tentatively, Lantus + Tradjenta. Hold Admelog.   - f/u with Dr. Cates      Hypertension  - BP goal <130/80  - Continue metoprolol  - Management as per primary team      Hyperlipidemia  - LDL goal <70   - Check lipid panel if not done recently   - management per primary team       hypothyroidism  - TSH 2.265 WNL  - Patient on levothyroxine 150 mcg daily at home  - continue Levothyroxine 112 mcg IV daily    Compression deformities (lumbar)  - outpatient DXA      BRET Rodriges-BC  Nurse Practitioner  Division of Endocrinology  Contact on TEAMS    If out of hospital/unavailable when paged, please note: patient will be cared for by another provider on the endocrine service.  For urgent concerns: call the endocrine answering service for assistance to reach covering provider ([phone]). For non-urgent matters: please email [email or web address] for assistance.

## 2025-06-23 NOTE — PROGRESS NOTE ADULT - ASSESSMENT
85 M w/ multiple chronic medical comorbidities including hypertension, sinus node dysfunction, type two diabetes, and chronic kidney disease (baseline creatinine 1.5-2.0) presently admitted for lower back pain associated with lumbar compression deformities with concern for cauda equina impingement. He was found to be bacteremic (E faecalis) due to acute acalculous cholecystitis, and is now seen following laparoscopic cholecystectomy 6/5/2025. POD 1 patient became hypotensive, unresponsive to 2L fluid resuscitation and 3 u pRBC and was started on vasopressors and transferred to SICU. Patient weaned off pressors, course c/b ileus now resolved, NGT removed 6/10. Midline placed 6/12 for IV abx, patient now with ileus again ct scanned 6/16 found to have collection in gb fossa, drained by IR on 6/17 patient was a rapid for hypotension and hypoxia now in sicu delirious on nasal cannula, CTPA - no bleed. no collection    Plan:   - sicu care appreciated,   - Drainage of left chest  - DVT ppx: sqh  - ASA81  - pain control  - Diet: ADAT  - c/w Ampicillin to be broadened d/w icu team   - will be d/c'd with wong (hx cauda equina)   - increase antihypertensives per cards  - ortho recs re: spinal surgery appreciated  - dispo planning: [facility] team   [id number]

## 2025-06-23 NOTE — PROGRESS NOTE ADULT - SUBJECTIVE AND OBJECTIVE BOX
Timothy Mojica MD  Interventional Cardiology / Advance Heart Failure and Cardiac Transplant Specialist  [place] Office : [phone] [place] [address] [place]. [address]  Tel:   [place] Office : [address] [facility] [place] [address]  Tel: [phone]       Pt is lying in bed , now on 3l     MEDICATIONS:  furosemide   Injectable 20 milliGRAM(s) IV Push every 12 hours  heparin   Injectable 5000 Unit(s) SubCutaneous every 8 hours  metoprolol tartrate Injectable 5 milliGRAM(s) IV Push every 6 hours    ampicillin  IVPB 2 Gram(s) IV Intermittent every 6 hours    albuterol    90 MICROgram(s) HFA Inhaler 2 Puff(s) Inhalation every 6 hours  albuterol/ipratropium for Nebulization 3 milliLiter(s) Nebulizer every 6 hours  dornase mayda Solution 2.5 milliGRAM(s) Inhalation daily  sodium chloride 3%  Inhalation 4 milliLiter(s) Inhalation every 12 hours    acetaminophen   IVPB .. 1000 milliGRAM(s) IV Intermittent every 6 hours  aspirin Suppository 300 milliGRAM(s) Rectal daily  HYDROmorphone  Injectable 0.2 milliGRAM(s) IV Push every 4 hours PRN  HYDROmorphone  Injectable 0.5 milliGRAM(s) IV Push every 4 hours PRN    pantoprazole  Injectable 40 milliGRAM(s) IV Push daily    insulin lispro (ADMELOG) corrective regimen sliding scale   SubCutaneous every 6 hours  levothyroxine Injectable 112 MICROGram(s) IV Push at bedtime    dextrose 5% + sodium chloride 0.45% with potassium chloride 20 mEq/L 1000 milliLiter(s) IV Continuous <Continuous>  folic acid Injectable 1 milliGRAM(s) IV Push daily  lidocaine   4% Patch 1 Patch Transdermal daily  thiamine Injectable 100 milliGRAM(s) IV Push daily      PAST MEDICAL/SURGICAL HISTORY  PAST MEDICAL & SURGICAL HISTORY:  HTN (hypertension)      HLD (hyperlipidemia)      DM (diabetes mellitus)      TIA (transient ischemic attack)      Prostate cancer      S/P prostatectomy      Pacemaker      H/O thyroidectomy          SOCIAL HISTORY: Substance Use (street drugs): ( x ) never used  (  ) other:    FAMILY HISTORY:  FHx: heart disease (Father, Mother)        PHYSICAL EXAM:  T(C): 36.6 (06-23-25 @ 12:00), Max: 37.3 (06-22-25 @ 20:00)  HR: 78 (06-23-25 @ 14:00) (69 - 90)  BP: 149/97 (06-23-25 @ 14:00) (109/88 - 167/45)  RR: 20 (06-23-25 @ 14:00) (15 - 27)  SpO2: 94% (06-23-25 @ 14:00) (94% - 100%)  Wt(kg): --  I&O's Summary    22 Jun 2025 07:01  -  23 Jun 2025 07:00  --------------------------------------------------------  IN: 1350 mL / OUT: 2350 mL / NET: -1000 mL    23 Jun 2025 07:01  -  23 Jun 2025 15:11  --------------------------------------------------------  IN: 340 mL / OUT: 1335 mL / NET: -995 mL      GENERAL: NAD  EYES:  EOMI  ENMT: NGT Moist mucous membranes  Cardiovascular: Normal S1 S2, No JVD, No murmurs, +b/l LUE edema L>R, LE edema  Respiratory: bipap	  Gastrointestinal:  Soft, Non-tender, + BS	  Extremities: b/l UE, LE edema                                  8.0    6.08  )-----------( 154      ( 23 Jun 2025 00:50 )             25.2     06-23    135  |  98  |  15  ----------------------------<  157[H]  3.0[L]   |  24  |  1.41[H]    Ca    7.7[L]      23 Jun 2025 00:50  Phos  2.8     06-23  Mg     2.00     06-23      proBNP:   Lipid Profile:   HgA1c:   TSH:     Consultant(s) Notes Reviewed:  [x ] YES  [ ] NO    Care Discussed with Consultants/Other Providers [ x] YES  [ ] NO    Imaging Personally Reviewed independently:  [x] YES  [ ] NO    All labs, radiologic studies, vitals, orders and medications list reviewed. Patient is seen and examined at bedside. Case discussed with medical team.

## 2025-06-23 NOTE — PROGRESS NOTE ADULT - ATTENDING COMMENTS
I agree with the detailed interval history, physical, and plan, which I have reviewed and edited where appropriate'; also agree with notes/assessment with my team on service.  I have personally examined the patient.  I was physically present for the key portions of the evaluation and management (E/M) service provided.  I reviewed all the pertinent data.  The patient is a critical care patient with life threatening hemodynamic and metabolic instability in SICU.  The SICU team has a constant risk benefit analyzes discussion and coordinating care with the primary team and all consultants.   The patient is in SICU with the chief complaint and diagnosis mentioned in the note.   The plan will be specified in the note.  85M PMH HTN, DM2, CKD, TIAs, s/p robotic cholecystectomy; s/p IR gallbladder fossa collection drainage in SICU for acute respiratory insufficiency and fluid overload.  Awake and alert  Lung coarse BS  Heart RR  Abd soft    NEUROLOGIC   -Tylenol  RESPIRATORY   - Monitor SpO2 goal >92%  -4LT NC  - DNR DNI  CARDIOVASCULAR   - Lopressor  GASTROINTESTINAL   - Diet: NPO  /RENAL   - FU Uo  -POCUS  HEMATOLOGIC  - SQH  -SCD's  -Aspirin  INFECTIOUS DISEASE  - Zosyn   ENDOCRINE  - Monitor glucose  - Levothyroxine    DISPO: SICU

## 2025-06-23 NOTE — PROGRESS NOTE ADULT - SUBJECTIVE AND OBJECTIVE BOX
Chief Complaint: T2DM    Interval Events: Pt seen and examined at bedside earlier today.  Daughters at bedside. Pt NPO, did not pass swallow study. Pt lethargic, no ROS completed.     MEDICATIONS  (STANDING):  acetaminophen   IVPB .. 1000 milliGRAM(s) IV Intermittent every 6 hours  albuterol    90 MICROgram(s) HFA Inhaler 2 Puff(s) Inhalation every 6 hours  albuterol/ipratropium for Nebulization 3 milliLiter(s) Nebulizer every 6 hours  ampicillin  IVPB 2 Gram(s) IV Intermittent every 6 hours  aspirin Suppository 300 milliGRAM(s) Rectal daily  dextrose 5% + sodium chloride 0.45% with potassium chloride 20 mEq/L 1000 milliLiter(s) (30 mL/Hr) IV Continuous <Continuous>  dornase mayda Solution 2.5 milliGRAM(s) Inhalation daily  folic acid Injectable 1 milliGRAM(s) IV Push daily  furosemide   Injectable 20 milliGRAM(s) IV Push every 12 hours  heparin   Injectable 5000 Unit(s) SubCutaneous every 8 hours  insulin lispro (ADMELOG) corrective regimen sliding scale   SubCutaneous every 6 hours  levothyroxine Injectable 112 MICROGram(s) IV Push at bedtime  lidocaine   4% Patch 1 Patch Transdermal daily  metoprolol tartrate Injectable 5 milliGRAM(s) IV Push every 6 hours  pantoprazole  Injectable 40 milliGRAM(s) IV Push daily  sodium chloride 3%  Inhalation 4 milliLiter(s) Inhalation every 12 hours  thiamine Injectable 100 milliGRAM(s) IV Push daily    MEDICATIONS  (PRN):  HYDROmorphone  Injectable 0.2 milliGRAM(s) IV Push every 4 hours PRN Moderate Pain (4 - 6)  HYDROmorphone  Injectable 0.5 milliGRAM(s) IV Push every 4 hours PRN Severe Pain (7 - 10)      Allergies    gabapentin (Other)    Intolerances      Review of Systems:  UNABLE TO OBTAIN    VITALS: T(C): 36.6 (06-23-25 @ 12:00)  T(F): 97.9 (06-23-25 @ 12:00), Max: 99.1 (06-22-25 @ 20:00)  HR: 75 (06-23-25 @ 16:00) (69 - 90)  BP: 167/54 (06-23-25 @ 15:00) (109/88 - 167/54)  RR:  (15 - 27)  SpO2:  (94% - 100%)  Wt(kg): --      Physical Exam:   GENERAL: NAD, swelling noted of left arm  EYES: No proptosis  HEENT:  Atraumatic, Normocephalic  RESPIRATORY: non labored breathing   PSYCH: lethargic      CAPILLARY BLOOD GLUCOSE    POCT Blood Glucose.: 153 mg/dL (23 Jun 2025 12:01)  POCT Blood Glucose.: 151 mg/dL (23 Jun 2025 07:08)  POCT Blood Glucose.: 159 mg/dL (22 Jun 2025 23:41)  POCT Blood Glucose.: 150 mg/dL (22 Jun 2025 17:46)      06-23    135  |  98  |  15  ----------------------------<  157[H]  3.0[L]   |  24  |  1.41[H]    eGFR: 48[L]    Ca    7.7[L]      06-23  Mg     2.00     06-23  Phos  2.8     06-23    TPro  5.3[L]  /  Alb  2.6[L]  /  TBili  0.7  /  DBili  x   /  AST  14  /  ALT  12  /  AlkPhos  131[H]  06-21      A1C with Estimated Average Glucose Result: 6.9 % (05-07-25 @ 06:03)  A1C with Estimated Average Glucose Result: 7.4 % (11-10-24 @ 06:55)      Thyroid Function Tests:

## 2025-06-23 NOTE — PROGRESS NOTE ADULT - SUBJECTIVE AND OBJECTIVE BOX
SICU PROGRESS NOTE:    24 HOUR INTERVAL EVENTS:  - Pending formal S&S  - D5 1/2NS @ 30cc/hr for hypoglycemia     SUBJECTIVE/ROS:  [ ] A ten-point review of systems was otherwise negative except as noted.  [ ] Due to altered mental status/intubation, subjective information were not able to be obtained from the patient. History was obtained, to the extent possible, from review of the chart and collateral sources of information.    NEURO  RASS:     GCS:     CAM ICU:  Exam: awake, alert, oriented  Meds: aspirin Suppository 300 milliGRAM(s) Rectal daily  HYDROmorphone  Injectable 0.2 milliGRAM(s) IV Push every 4 hours PRN Moderate Pain (4 - 6)  HYDROmorphone  Injectable 0.5 milliGRAM(s) IV Push every 4 hours PRN Severe Pain (7 - 10)    [x] Adequacy of sedation and pain control has been assessed and adjusted    RESPIRATORY  RR: 19 (06-22-25 @ 23:00) (14 - 24)  SpO2: 100% (06-22-25 @ 23:00) (96% - 100%)  Wt(kg): --  Exam: unlabored, clear to auscultation bilaterally  Mechanical Ventilation:   ABG - ( 22 Jun 2025 01:15 )  pH: 7.32  /  pCO2: 45    /  pO2: 126   / HCO3: 23    / Base Excess: -2.8  /  SaO2: 98.7    Lactate: x                [N/A] Extubation Readiness Assessed  Meds: albuterol    90 MICROgram(s) HFA Inhaler 2 Puff(s) Inhalation every 6 hours  albuterol/ipratropium for Nebulization 3 milliLiter(s) Nebulizer every 6 hours  dornase mayda Solution 2.5 milliGRAM(s) Inhalation daily  sodium chloride 3%  Inhalation 4 milliLiter(s) Inhalation every 12 hours      CARDIOVASCULAR  HR: 80 (06-22-25 @ 23:00) (57 - 92)  BP: 137/38 (06-22-25 @ 23:00) (111/69 - 176/58)  BP(mean): 67 (06-22-25 @ 23:00) (65 - 109)  ABP: --  ABP(mean): --  Wt(kg): --  CVP(cm H2O): --      Exam: regular rate and rhythm  Cardiac Rhythm: sinus  Perfusion     [x]Adequate   [ ]Inadequate  Mentation   [x]Normal       [ ]Reduced  Extremities  [x]Warm         [ ]Cool  Volume Status [ ]Hypervolemic [x]Euvolemic [ ]Hypovolemic  Meds: furosemide   Injectable 20 milliGRAM(s) IV Push every 12 hours  metoprolol tartrate Injectable 5 milliGRAM(s) IV Push every 6 hours      GI/NUTRITION  Exam: soft, nontender, nondistended, incision C/D/I  Diet:  Meds: pantoprazole  Injectable 40 milliGRAM(s) IV Push daily      GENITOURINARY  I&O's Detail    06-21 @ 07:01  -  06-22 @ 07:00  --------------------------------------------------------  IN:    dextrose 5%: 510 mL    IV PiggyBack: 500 mL  Total IN: 1010 mL    OUT:    Drain (mL): 17 mL    Indwelling Catheter - Urethral (mL): 1945 mL  Total OUT: 1962 mL    Total NET: -952 mL      06-22 @ 07:01  -  06-23 @ 00:02  --------------------------------------------------------  IN:    dextrose 5%: 510 mL    IV PiggyBack: 100 mL  Total IN: 610 mL    OUT:    Drain (mL): 0 mL    Indwelling Catheter - Urethral (mL): 1675 mL    Oral Fluid: 0 mL  Total OUT: 1675 mL    Total NET: -1065 mL          06-22    134[L]  |  96[L]  |  15  ----------------------------<  143[H]  3.1[L]   |  21[L]  |  1.32[H]    Ca    7.5[L]      22 Jun 2025 01:15  Phos  2.9     06-22  Mg     2.00     06-22    TPro  5.3[L]  /  Alb  2.6[L]  /  TBili  0.7  /  DBili  x   /  AST  14  /  ALT  12  /  AlkPhos  131[H]  06-21    [ ] Frye catheter, indication: N/A  Meds: dextrose 5% + sodium chloride 0.45% with potassium chloride 20 mEq/L 1000 milliLiter(s) IV Continuous <Continuous>  folic acid Injectable 1 milliGRAM(s) IV Push daily  thiamine Injectable 100 milliGRAM(s) IV Push daily      HEMATOLOGIC  Meds: heparin   Injectable 5000 Unit(s) SubCutaneous every 8 hours    [x] VTE Prophylaxis                        7.5    6.25  )-----------( 135      ( 22 Jun 2025 01:15 )             22.7       Transfusion     [ ] PRBC   [ ] Platelets   [ ] FFP   [ ] Cryoprecipitate    INFECTIOUS DISEASES  WBC Count: 6.25 K/uL (06-22 @ 01:15)    RECENT CULTURES:    Meds: piperacillin/tazobactam IVPB.. 3.375 Gram(s) IV Intermittent every 8 hours      ENDOCRINE  CAPILLARY BLOOD GLUCOSE      POCT Blood Glucose.: 159 mg/dL (22 Jun 2025 23:41)  POCT Blood Glucose.: 150 mg/dL (22 Jun 2025 17:46)  POCT Blood Glucose.: 157 mg/dL (22 Jun 2025 11:50)  POCT Blood Glucose.: 157 mg/dL (22 Jun 2025 05:12)    Meds: insulin lispro (ADMELOG) corrective regimen sliding scale   SubCutaneous at bedtime  insulin lispro (ADMELOG) corrective regimen sliding scale   SubCutaneous every 6 hours  levothyroxine Injectable 112 MICROGram(s) IV Push at bedtime      ACCESS DEVICES:  [ ] Peripheral IV  [ ] Central Venous Line	[ ] R	[ ] L	[ ] IJ	[ ] Fem	[ ] SC	Placed:   [ ] Arterial Line		[ ] R	[ ] L	[ ] Fem	[ ] Rad	[ ] Ax	Placed:   [ ] PICC:					[ ] Mediport  [ ] Urinary Catheter, Date Placed:   [x] Necessity of urinary, arterial, and venous catheters discussed    OTHER MEDICATIONS:  lidocaine   4% Patch 1 Patch Transdermal daily      CODE STATUS:  DNI    DNI: Trial NIV        IMAGING:    ASSESSMENT:    PLAN: SICU PROGRESS NOTE:    24 HOUR INTERVAL EVENTS:  - Pending formal S&S  - D5 1/2NS @ 30cc/hr for hypoglycemia     SUBJECTIVE/ROS:  [ ] A ten-point review of systems was otherwise negative except as noted.  [ ] Due to altered mental status/intubation, subjective information were not able to be obtained from the patient. History was obtained, to the extent possible, from review of the chart and collateral sources of information.    NEURO  RASS:     GCS:     CAM ICU:  Exam: awake, alert, oriented  Meds: aspirin Suppository 300 milliGRAM(s) Rectal daily  HYDROmorphone  Injectable 0.2 milliGRAM(s) IV Push every 4 hours PRN Moderate Pain (4 - 6)  HYDROmorphone  Injectable 0.5 milliGRAM(s) IV Push every 4 hours PRN Severe Pain (7 - 10)    [x] Adequacy of sedation and pain control has been assessed and adjusted    RESPIRATORY  RR: 19 (06-22-25 @ 23:00) (14 - 24)  SpO2: 100% (06-22-25 @ 23:00) (96% - 100%)  Wt(kg): --  Exam: unlabored, clear to auscultation bilaterally  Mechanical Ventilation:   ABG - ( 22 Jun 2025 01:15 )  pH: 7.32  /  pCO2: 45    /  pO2: 126   / HCO3: 23    / Base Excess: -2.8  /  SaO2: 98.7    Lactate: x                [N/A] Extubation Readiness Assessed  Meds: albuterol    90 MICROgram(s) HFA Inhaler 2 Puff(s) Inhalation every 6 hours  albuterol/ipratropium for Nebulization 3 milliLiter(s) Nebulizer every 6 hours  dornase mayda Solution 2.5 milliGRAM(s) Inhalation daily  sodium chloride 3%  Inhalation 4 milliLiter(s) Inhalation every 12 hours      CARDIOVASCULAR  HR: 80 (06-22-25 @ 23:00) (57 - 92)  BP: 137/38 (06-22-25 @ 23:00) (111/69 - 176/58)  BP(mean): 67 (06-22-25 @ 23:00) (65 - 109)  ABP: --  ABP(mean): --  Wt(kg): --  CVP(cm H2O): --      Exam: regular rate and rhythm  Cardiac Rhythm: sinus  Perfusion     [x]Adequate   [ ]Inadequate  Mentation   [x]Normal       [ ]Reduced  Extremities  [x]Warm         [ ]Cool  Volume Status [ ]Hypervolemic [x]Euvolemic [ ]Hypovolemic  Meds: furosemide   Injectable 20 milliGRAM(s) IV Push every 12 hours  metoprolol tartrate Injectable 5 milliGRAM(s) IV Push every 6 hours      GI/NUTRITION  Exam: soft, nontender, nondistended, incision C/D/I  Diet:  Meds: pantoprazole  Injectable 40 milliGRAM(s) IV Push daily      GENITOURINARY  I&O's Detail    06-21 @ 07:01  -  06-22 @ 07:00  --------------------------------------------------------  IN:    dextrose 5%: 510 mL    IV PiggyBack: 500 mL  Total IN: 1010 mL    OUT:    Drain (mL): 17 mL    Indwelling Catheter - Urethral (mL): 1945 mL  Total OUT: 1962 mL    Total NET: -952 mL      06-22 @ 07:01  -  06-23 @ 00:02  --------------------------------------------------------  IN:    dextrose 5%: 510 mL    IV PiggyBack: 100 mL  Total IN: 610 mL    OUT:    Drain (mL): 0 mL    Indwelling Catheter - Urethral (mL): 1675 mL    Oral Fluid: 0 mL  Total OUT: 1675 mL    Total NET: -1065 mL          06-22    134[L]  |  96[L]  |  15  ----------------------------<  143[H]  3.1[L]   |  21[L]  |  1.32[H]    Ca    7.5[L]      22 Jun 2025 01:15  Phos  2.9     06-22  Mg     2.00     06-22    TPro  5.3[L]  /  Alb  2.6[L]  /  TBili  0.7  /  DBili  x   /  AST  14  /  ALT  12  /  AlkPhos  131[H]  06-21    [ ] Frye catheter, indication: N/A  Meds: dextrose 5% + sodium chloride 0.45% with potassium chloride 20 mEq/L 1000 milliLiter(s) IV Continuous <Continuous>  folic acid Injectable 1 milliGRAM(s) IV Push daily  thiamine Injectable 100 milliGRAM(s) IV Push daily      HEMATOLOGIC  Meds: heparin   Injectable 5000 Unit(s) SubCutaneous every 8 hours    [x] VTE Prophylaxis                        7.5    6.25  )-----------( 135      ( 22 Jun 2025 01:15 )             22.7       Transfusion     [ ] PRBC   [ ] Platelets   [ ] FFP   [ ] Cryoprecipitate    INFECTIOUS DISEASES  WBC Count: 6.25 K/uL (06-22 @ 01:15)    RECENT CULTURES:    Meds: piperacillin/tazobactam IVPB.. 3.375 Gram(s) IV Intermittent every 8 hours      ENDOCRINE  CAPILLARY BLOOD GLUCOSE      POCT Blood Glucose.: 159 mg/dL (22 Jun 2025 23:41)  POCT Blood Glucose.: 150 mg/dL (22 Jun 2025 17:46)  POCT Blood Glucose.: 157 mg/dL (22 Jun 2025 11:50)  POCT Blood Glucose.: 157 mg/dL (22 Jun 2025 05:12)    Meds: insulin lispro (ADMELOG) corrective regimen sliding scale   SubCutaneous at bedtime  insulin lispro (ADMELOG) corrective regimen sliding scale   SubCutaneous every 6 hours  levothyroxine Injectable 112 MICROGram(s) IV Push at bedtime      ACCESS DEVICES:  [ ] Peripheral IV  [ ] Central Venous Line	[ ] R	[ ] L	[ ] IJ	[ ] Fem	[ ] SC	Placed:   [ ] Arterial Line		[ ] R	[ ] L	[ ] Fem	[ ] Rad	[ ] Ax	Placed:   [ ] PICC:					[ ] Mediport  [ ] Urinary Catheter, Date Placed:   [x] Necessity of urinary, arterial, and venous catheters discussed    OTHER MEDICATIONS:  lidocaine   4% Patch 1 Patch Transdermal daily      CODE STATUS:  DNI    DNI: Trial NIV

## 2025-06-23 NOTE — PROGRESS NOTE ADULT - SUBJECTIVE AND OBJECTIVE BOX
SURGERY DAILY PROGRESS NOTE    Overnight Events:   - Pending formal S&S  - D5 1/2NS @ 30cc/hr for hypoglycemia     SUBJECTIVE: Patient seen and evaluated on AM rounds. Pt is sleeping in bed.   -----------------------------------------------------------------------------------------------------------------------------------------------------------------------------------------------------------  OBJECTIVE:  Vital Signs Last 24 Hrs  T(C): 36.6 (2025 12:00), Max: 37.3 (2025 20:00)  T(F): 97.9 (2025 12:00), Max: 99.1 (2025 20:00)  HR: 90 (2025 12:00) (68 - 90)  BP: 142/51 (2025 12:00) (109/88 - 167/45)  BP(mean): 76 (2025 12:00) (64 - 109)  RR: 23 (2025 12:00) (15 - 27)  SpO2: 100% (2025 12:00) (95% - 100%)    Parameters below as of 2025 12:00  Patient On (Oxygen Delivery Method): nasal cannula  O2 Flow (L/min): 4    I&O's Detail    2025 07:01  -  2025 07:00  --------------------------------------------------------  IN:    dextrose 5%: 510 mL    dextrose 5% + sodium chloride 0.45% w/ Additives: 240 mL    IV PiggyBack: 600 mL  Total IN: 1350 mL    OUT:    Drain (mL): 5 mL    Indwelling Catheter - Urethral (mL): 2345 mL    Oral Fluid: 0 mL  Total OUT: 2350 mL    Total NET: -1000 mL      2025 07:01  -  2025 12:46  --------------------------------------------------------  IN:    dextrose 5% + sodium chloride 0.45% w/ Additives: 180 mL    IV PiggyBack: 100 mL  Total IN: 280 mL    OUT:    Drain (mL): 10 mL    Indwelling Catheter - Urethral (mL): 1325 mL  Total OUT: 1335 mL    Total NET: -1055 mL        Daily     Daily Weight in k.3 (2025 04:00)  MEDICATIONS  (STANDING):  acetaminophen   IVPB .. 1000 milliGRAM(s) IV Intermittent every 6 hours  albuterol    90 MICROgram(s) HFA Inhaler 2 Puff(s) Inhalation every 6 hours  albuterol/ipratropium for Nebulization 3 milliLiter(s) Nebulizer every 6 hours  ampicillin  IVPB 2 Gram(s) IV Intermittent every 6 hours  aspirin Suppository 300 milliGRAM(s) Rectal daily  dextrose 5% + sodium chloride 0.45% with potassium chloride 20 mEq/L 1000 milliLiter(s) (30 mL/Hr) IV Continuous <Continuous>  dornase mayda Solution 2.5 milliGRAM(s) Inhalation daily  folic acid Injectable 1 milliGRAM(s) IV Push daily  furosemide   Injectable 20 milliGRAM(s) IV Push every 12 hours  heparin   Injectable 5000 Unit(s) SubCutaneous every 8 hours  insulin lispro (ADMELOG) corrective regimen sliding scale   SubCutaneous every 6 hours  levothyroxine Injectable 112 MICROGram(s) IV Push at bedtime  lidocaine   4% Patch 1 Patch Transdermal daily  metoprolol tartrate Injectable 5 milliGRAM(s) IV Push every 6 hours  pantoprazole  Injectable 40 milliGRAM(s) IV Push daily  sodium chloride 3%  Inhalation 4 milliLiter(s) Inhalation every 12 hours  thiamine Injectable 100 milliGRAM(s) IV Push daily    MEDICATIONS  (PRN):  HYDROmorphone  Injectable 0.2 milliGRAM(s) IV Push every 4 hours PRN Moderate Pain (4 - 6)  HYDROmorphone  Injectable 0.5 milliGRAM(s) IV Push every 4 hours PRN Severe Pain (7 - 10)      PHYSICAL EXAM:  General: NAD, resting comfortably in bed  HEENT: Normocephalic atraumatic  Respiratory: Nonlabored respirations  Abdomen: soft, nontender, nondistended. Incisions c/d/i   : wong draining clear yellow urine   Neuro: awake, alert and answering questions appropriately     LABS:                        8.0    6.08  )-----------( 154      ( 2025 00:50 )             25.2     06-23    135  |  98  |  15  ----------------------------<  157[H]  3.0[L]   |  24  |  1.41[H]    Ca    7.7[L]      2025 00:50  Phos  2.8     06-23  Mg     2.00     06-23        Urinalysis Basic - ( 2025 00:50 )    Color: x / Appearance: x / SG: x / pH: x  Gluc: 157 mg/dL / Ketone: x  / Bili: x / Urobili: x   Blood: x / Protein: x / Nitrite: x   Leuk Esterase: x / RBC: x / WBC x   Sq Epi: x / Non Sq Epi: x / Bacteria: x                RADIOLOGY:

## 2025-06-23 NOTE — SWALLOW BEDSIDE ASSESSMENT ADULT - COMMENTS
Progress Note- SICU 6/23: "85M PMH HTN, DM2, CKD (baseline creatinine 1.5-2), TIAs (on Eliquis at home), sinus node dysfunction (s/p PPM ~1 year ago), prostate cancer s/p prostectomy (~early 2000s), total thyroidectomy (~2013) presented after a fall and found to have lumbar stenosis with cauda equina impingement however on 6/4 developed acute abdominal pain and had a CT abdomen and pelvis that had signs of acute cholecystitis. On 6/5 patient underwent a robotic cholecystectomy. S/p IR gallbladder fossa collection drainage on 6/17. RRT called overnight for hypotension and hypoxia. SICU consulted for new oxygen requirements"    CXR 6/22: "IMPRESSION: Stable small left pleural effusion."    Patient seen awake/alert to verbal stimuli this AM in SICU with patient's two daughters present at bedside. Patient noted with wet/gurgly upper breathing sounds. Patient's RN reports patient has required intermittent oral suctioning with return of mild/moderate thick/green secretions/phlegm which is noted in [place] and cannister. Patient's daughters note the patient was on clear liquids on 6/21 however was noted with immediate cough response post oral intake.

## 2025-06-23 NOTE — PROGRESS NOTE ADULT - ASSESSMENT
ASSESSMENT:  85M PMH HTN, DM2, CKD (baseline creatinine 1.5-2), TIAs (on Eliquis at home), sinus node dysfunction (s/p PPM ~1 year ago), prostate cancer s/p prostectomy (~early 2000s), total thyroidectomy (~2013) presented after a fall and found to have lumbar stenosis with cauda equina impingement however on 6/4 developed acute abdominal pain and had a CT abdomen and pelvis that had signs of acute cholecystitis. On 6/5 patient underwent a robotic cholecystectomy. S/p IR gallbladder fossa collection drainage on 6/17. RRT called overnight for hypotension and hypoxia. SICU consulted for new oxygen requirements      PLAN:  NEUROLOGIC   - Pain control: tylenol, dilaudid prn  - PRN haldol  - 20 sec staring episode > neurology consulted     - rec CTA head neck > wnl     - rec vEEG and MRI - pending, family refusing MRI    RESPIRATORY: DNR DNI; trial of NIV  - Monitor SpO2 goal >92%; wean NC currently 3L  - BIPAP 10/5  - Pulmonary toileting-- chest PT, mucomyst, duonebs, 3% inhlation  - CTA PE protocol: L chest wall hematoma unchanged, no PE, atelectasis  - COVID/flu swab - negative     CARDIOVASCULAR   - Monitor hemodynamics   - Lopressor 5 mg q6  - s/p PPM removal, MARIA LUISA w/o vegetations    GASTROINTESTINAL   - Diet: NPO  - failed dysphagia screen > SLP consult pending  - KO tube not within goc at this time    /RENAL   - resume BID lasix 20  - s/p diamox x 3 doses  - D5 1/2NS @ 30cc/hr  - Strict I/Os  - Monitor BMP qd  - Monitor electrolytes, replete PRN    HEMATOLOGIC  - Monitor H/H   - DVT ppx: SQH & SCD's  - Aspirin (hx TIAs) rectally  - ? home eliquis held    INFECTIOUS DISEASE  - Monitor fever / WBC  - Ampicillin transitioned to zosyn    ENDOCRINE  - Monitor gluc  - ISS  - Levothyroxine    LINES  - RUE midline  - Frye    DISPO: SICU   ASSESSMENT:  85M PMH HTN, DM2, CKD (baseline creatinine 1.5-2), TIAs (on Eliquis at home), sinus node dysfunction (s/p PPM ~1 year ago), prostate cancer s/p prostectomy (~early 2000s), total thyroidectomy (~2013) presented after a fall and found to have lumbar stenosis with cauda equina impingement however on 6/4 developed acute abdominal pain and had a CT abdomen and pelvis that had signs of acute cholecystitis. On 6/5 patient underwent a robotic cholecystectomy. S/p IR gallbladder fossa collection drainage on 6/17. RRT called overnight for hypotension and hypoxia. SICU consulted for new oxygen requirements      PLAN:  NEUROLOGIC   - Pain control: tylenol, dilaudid prn  - PRN haldol  - 20 sec staring episode > neurology consulted     - rec CTA head neck > wnl     - rec vEEG and MRI - pending, family refusing MRI    RESPIRATORY: DNR DNI; trial of NIV  - Monitor SpO2 goal >92%; wean NC currently 3L  - Pulmonary toileting-- chest PT, mucomyst, duonebs, 3% inhlation  - CTA PE protocol: L chest wall hematoma unchanged, no PE, atelectasis  - COVID/flu swab - negative     CARDIOVASCULAR   - Monitor hemodynamics   - Lopressor 5 mg q6  - s/p PPM removal, MARIA LUISA w/o vegetations    GASTROINTESTINAL   - Diet: NPO  - failed dysphagia screen > SLP consult pending  - KO tube not within goc at this time    /RENAL   - resume BID lasix 20  - s/p diamox x 3 doses  - D5 1/2NS @ 30cc/hr  - Strict I/Os  - Monitor BMP qd  - Monitor electrolytes, replete PRN    HEMATOLOGIC  - Monitor H/H   - DVT ppx: SQH & SCD's  - Aspirin (hx TIAs) rectally  - ? home eliquis held    INFECTIOUS DISEASE  - Monitor fever / WBC  - Ampicillin transitioned to zosyn    ENDOCRINE  - Monitor gluc  - ISS  - Levothyroxine    LINES  - RUE midline  - Frye    DISPO: SICU

## 2025-06-23 NOTE — PROGRESS NOTE ADULT - ASSESSMENT
-EKG w/ SR, no significant STT changes   -ECHO w/ normal EF no significant valvular heart disease     A/P:  85M w/ HTN, HLD, T2DM, prostate CA, CKD, TIA and SND s/p PPM transferred here for lumbar spinal cord compression and bacteremia s/p PPM explant and implant of Micra PPM.    1. bacteremia  -s/p cholecystectomy  -c/w abx per ID, primary team  6/13 afebrile, continues on abx per ID  6/19 c/w abx per ID    2. SND  -s/p leadless PPM w/ EP  6/16 recommend IV lasix 40mg daily for generalized edema  6/17 mild anarsaca, c/w diuresing lasix 20mg IV BID    3. HTN  -c/w clonidine, hydralazine, labetalol    4.  Pre-procedure clearance  6/17 pt optimized from cardiac srtandpoint and may proceed with cholecystostomy tube with moderate risk  6/18 s/p IR drain placement, remain hemodynamically stable    5. hypoxia  6/20 - admitted to SICU, CTA no PE; bipap, pulm toilet, diamox   6/23 - off bipap, continues with lasix IV 20mg q12

## 2025-06-23 NOTE — CHART NOTE - NSCHARTNOTEFT_GEN_A_CORE
CRITICAL CARE NUTRITION FOLLOW UP NOTE    Pt seen for malnutrition follow up and critical care Length Of Stay.    Medical Course: 85M PMH HTN, DM2, CKD (baseline creatinine 1.5-2), TIAs (on Eliquis at home), sinus node dysfunction (s/p PPM ~1 year ago), prostate cancer s/p prostectomy (~early 2000s), total thyroidectomy (~2013) presented after a fall and found to have lumbar stenosis with cauda equina impingement however on 6/4 developed acute abdominal pain and had a CT abdomen and pelvis that had signs of acute cholecystitis. On 6/5 patient underwent a robotic cholecystectomy. S/p IR gallbladder fossa collection drainage on 6/17. RRT called overnight for hypotension and hypoxia. SICU consulted for new oxygen requirements.      Nutrition Course:     SOURCE: [x] Patient [x] Medical record [x] RN/PCA   Pertinent Medications: MEDICATIONS  (STANDING):  acetaminophen   IVPB .. 1000 milliGRAM(s) IV Intermittent every 6 hours  albuterol    90 MICROgram(s) HFA Inhaler 2 Puff(s) Inhalation every 6 hours  albuterol/ipratropium for Nebulization 3 milliLiter(s) Nebulizer every 6 hours  ampicillin  IVPB 2 Gram(s) IV Intermittent every 6 hours  aspirin Suppository 300 milliGRAM(s) Rectal daily  dextrose 5% + sodium chloride 0.45% with potassium chloride 20 mEq/L 1000 milliLiter(s) (30 mL/Hr) IV Continuous <Continuous>  dornase mayda Solution 2.5 milliGRAM(s) Inhalation daily  folic acid Injectable 1 milliGRAM(s) IV Push daily  furosemide   Injectable 20 milliGRAM(s) IV Push every 12 hours  heparin   Injectable 5000 Unit(s) SubCutaneous every 8 hours  insulin lispro (ADMELOG) corrective regimen sliding scale   SubCutaneous every 6 hours  levothyroxine Injectable 112 MICROGram(s) IV Push at bedtime  lidocaine   4% Patch 1 Patch Transdermal daily  metoprolol tartrate Injectable 5 milliGRAM(s) IV Push every 6 hours  pantoprazole  Injectable 40 milliGRAM(s) IV Push daily  sodium chloride 3%  Inhalation 4 milliLiter(s) Inhalation every 12 hours  thiamine Injectable 100 milliGRAM(s) IV Push daily    [x] Relevant notes on medications: Admelog ISS coverage    Pertinent Labs: 06-23 Na135 mmol/L Glu 157 mg/dL[H] K+ 3.0 mmol/L[L] Cr  1.41 mg/dL[H] BUN 15 mg/dL 06-23 Phos 2.8 mg/dL 06-21 Alb 2.6 g/dL[L]     A1C with Estimated Average Glucose Result: 6.9 % (05-07-25 @ 06:03)     CAPILLARY BLOOD GLUCOSE    POCT Blood Glucose.: 153 mg/dL (23 Jun 2025 12:01)  POCT Blood Glucose.: 151 mg/dL (23 Jun 2025 07:08)  POCT Blood Glucose.: 159 mg/dL (22 Jun 2025 23:41)  POCT Blood Glucose.: 150 mg/dL (22 Jun 2025 17:46)    Weight Trend: 97.3 kg (6/23), 98.7 kg (6/20), 102.1 kg (6/18), 92.9kg (5/19), 102.4kg (6/8), 103.3kg (6/9), 99.8kg (6/10), 98.8kg (6/11).   Admit Weight: 92.9 kg  Weight Assessment: +4.4 kg weight increase reflective of fluid shifts with moderate edema as noted below  Height: 71in / 180.3cm  IBW: 172lbs / 78.1kg +/-10%  BMI: 28.6 kg/m^2    Physical Assessment, per flowsheets:  Edema: 1+ generalized; 3+ generalized  Pressure Injury: No PI/DTI noted.   Nutrition Focused Physical Exam: [x] conducted   Muscle wasting [mild] temporal [] clavicle [] shoulder [] scapula [] thigh [] calf [] dorsal hand    Fat Loss [mild] buccal [mild] orbital [] triceps [] ribs     Estimated Needs:   [X] No change since previous assessment, based on IBW 72lbs/78.1kg.   2187-2500kcals daily @ 28-32kcals/kg  78 - 94g protein daily @ 1.0-1.2g/kg (due to CKD)    Previous Nutrition Diagnosis:   Severe Malnutrition   Nutrition Diagnosis is [x] ongoing    New Nutrition Diagnosis: [x] not applicable     Education:  [ ] Provided pt with verbal / written education on   [ ] Provided on previous assessment by RD  [x] Not applicable secondary to medical acuity  [ ] Pt refused     Interventions:   1). Nutrition plan of care deferred to surgical team and per GOC of family.  2). NPO status with continuous IVF hydration as per surgical team.     Monitor & Evaluate:  Nutrition related lab values, weight trends, BMs/GI distress, hydration status, skin integrity.    Ana Cristina Maxewll, MS, RDN, CDN, CNSC on MS TEAMS PREFERRED or Pager #[phone] CRITICAL CARE NUTRITION FOLLOW UP NOTE    Pt seen for malnutrition follow up and critical care Length Of Stay.    Medical Course: 85M PMH HTN, DM2, CKD (baseline creatinine 1.5-2), TIAs (on Eliquis at home), sinus node dysfunction (s/p PPM ~1 year ago), prostate cancer s/p prostectomy (~early 2000s), total thyroidectomy (~2013) presented after a fall and found to have lumbar stenosis with cauda equina impingement however on 6/4 developed acute abdominal pain and had a CT abdomen and pelvis that had signs of acute cholecystitis. On 6/5 patient underwent a robotic cholecystectomy. S/p IR gallbladder fossa collection drainage on 6/17. RRT called overnight for hypotension and hypoxia. SICU consulted for new oxygen requirements.    Nutrition Course: Pt has been mostly NPO for the past 7 days. He had a SLP evaluation earlier today with recommendation for pt to continue NPO status with consideration for short-term, non-oral means of nutrition. Per SICU Team, enteral feeding does not align with GOC for patient. Weight trend reflective of fluid shifts, but also with overall decline as edema has been worsening. No GI distress noted; pt with fecal incontinence with last BM 6/23. FS over the past 24 hrs 135 - 159 mg/dl with Admelog ISS coverage. Nutrition plan of care deferred to surgical team at this time and as per GOC discussion with family. Pt remains at severe risk for malnutrition based on continued inadequate nutrition intake with moderate fluid accumulation as an ongoing assessment. RDN services to remain available as needed.     SOURCE: [x] Patient [x] Medical record [x] RN/PCA   Pertinent Medications: MEDICATIONS  (STANDING):  acetaminophen   IVPB .. 1000 milliGRAM(s) IV Intermittent every 6 hours  albuterol    90 MICROgram(s) HFA Inhaler 2 Puff(s) Inhalation every 6 hours  albuterol/ipratropium for Nebulization 3 milliLiter(s) Nebulizer every 6 hours  ampicillin  IVPB 2 Gram(s) IV Intermittent every 6 hours  aspirin Suppository 300 milliGRAM(s) Rectal daily  dextrose 5% + sodium chloride 0.45% with potassium chloride 20 mEq/L 1000 milliLiter(s) (30 mL/Hr) IV Continuous <Continuous>  dornase mayda Solution 2.5 milliGRAM(s) Inhalation daily  folic acid Injectable 1 milliGRAM(s) IV Push daily  furosemide   Injectable 20 milliGRAM(s) IV Push every 12 hours  heparin   Injectable 5000 Unit(s) SubCutaneous every 8 hours  insulin lispro (ADMELOG) corrective regimen sliding scale   SubCutaneous every 6 hours  levothyroxine Injectable 112 MICROGram(s) IV Push at bedtime  lidocaine   4% Patch 1 Patch Transdermal daily  metoprolol tartrate Injectable 5 milliGRAM(s) IV Push every 6 hours  pantoprazole  Injectable 40 milliGRAM(s) IV Push daily  sodium chloride 3%  Inhalation 4 milliLiter(s) Inhalation every 12 hours  thiamine Injectable 100 milliGRAM(s) IV Push daily    [x] Relevant notes on medications: Admelog ISS coverage    Pertinent Labs: 06-23 Na135 mmol/L Glu 157 mg/dL[H] K+ 3.0 mmol/L[L] Cr  1.41 mg/dL[H] BUN 15 mg/dL 06-23 Phos 2.8 mg/dL 06-21 Alb 2.6 g/dL[L]     A1C with Estimated Average Glucose Result: 6.9 % (05-07-25 @ 06:03)     CAPILLARY BLOOD GLUCOSE    POCT Blood Glucose.: 153 mg/dL (23 Jun 2025 12:01)  POCT Blood Glucose.: 151 mg/dL (23 Jun 2025 07:08)  POCT Blood Glucose.: 159 mg/dL (22 Jun 2025 23:41)  POCT Blood Glucose.: 150 mg/dL (22 Jun 2025 17:46)    Weight Trend: 97.3 kg (6/23), 98.7 kg (6/20), 102.1 kg (6/18), 92.9kg (5/19), 102.4kg (6/8), 103.3kg (6/9), 99.8kg (6/10), 98.8kg (6/11).   Admit Weight: 92.9 kg  Weight Assessment: +4.4 kg weight increase reflective of fluid shifts with moderate edema as noted below  Height: 71in / 180.3cm  IBW: 172lbs / 78.1kg +/-10%  BMI: 28.6 kg/m^2    Physical Assessment, per flowsheets:  Edema: 3+ generalized  Pressure Injury: No PI/DTI noted.   Nutrition Focused Physical Exam: [x] conducted   Muscle wasting [mild] temporal [] clavicle [] shoulder [] scapula [] thigh [] calf [] dorsal hand    Fat Loss [mild] buccal [mild] orbital [] triceps [] ribs     Estimated Needs:   [X] No change since previous assessment, based on IBW 72lbs/78.1kg.   2187-2500kcals daily @ 28-32kcals/kg  78 - 94g protein daily @ 1.0-1.2g/kg (due to CKD)    Previous Nutrition Diagnosis:   Severe Malnutrition   Nutrition Diagnosis is [x] ongoing    New Nutrition Diagnosis: [x] not applicable     Education:  [ ] Provided pt with verbal / written education on   [ ] Provided on previous assessment by RD  [x] Not applicable secondary to medical acuity  [ ] Pt refused     Interventions:   1). Nutrition plan of care deferred to surgical team and per GOC of family.  2). NPO status with continuous IVF hydration as per surgical team.     Monitor & Evaluate:  Nutrition related lab values, weight trends, BMs/GI distress, hydration status, skin integrity.    Ana Cristina Maxwell, MS, RDN, CDN, CNSC on MS TEAMS PREFERRED or Pager #[phone]

## 2025-06-24 DIAGNOSIS — G83.4 CAUDA EQUINA SYNDROME: ICD-10-CM

## 2025-06-24 DIAGNOSIS — R13.10 DYSPHAGIA, UNSPECIFIED: ICD-10-CM

## 2025-06-24 DIAGNOSIS — M46.40 DISCITIS, UNSPECIFIED, SITE UNSPECIFIED: ICD-10-CM

## 2025-06-24 LAB
ANION GAP SERPL CALC-SCNC: 11 MMOL/L — SIGNIFICANT CHANGE UP (ref 7–14)
ANION GAP SERPL CALC-SCNC: 13 MMOL/L — SIGNIFICANT CHANGE UP (ref 7–14)
BUN SERPL-MCNC: 11 MG/DL — SIGNIFICANT CHANGE UP (ref 7–23)
BUN SERPL-MCNC: 12 MG/DL — SIGNIFICANT CHANGE UP (ref 7–23)
CALCIUM SERPL-MCNC: 7.6 MG/DL — LOW (ref 8.4–10.5)
CALCIUM SERPL-MCNC: 8.2 MG/DL — LOW (ref 8.4–10.5)
CHLORIDE SERPL-SCNC: 97 MMOL/L — LOW (ref 98–107)
CHLORIDE SERPL-SCNC: 98 MMOL/L — SIGNIFICANT CHANGE UP (ref 98–107)
CO2 SERPL-SCNC: 26 MMOL/L — SIGNIFICANT CHANGE UP (ref 22–31)
CO2 SERPL-SCNC: 28 MMOL/L — SIGNIFICANT CHANGE UP (ref 22–31)
CREAT SERPL-MCNC: 1.09 MG/DL — SIGNIFICANT CHANGE UP (ref 0.5–1.3)
CREAT SERPL-MCNC: 1.19 MG/DL — SIGNIFICANT CHANGE UP (ref 0.5–1.3)
EGFR: 59 ML/MIN/1.73M2 — LOW
EGFR: 59 ML/MIN/1.73M2 — LOW
EGFR: 66 ML/MIN/1.73M2 — SIGNIFICANT CHANGE UP
EGFR: 66 ML/MIN/1.73M2 — SIGNIFICANT CHANGE UP
GLUCOSE BLDC GLUCOMTR-MCNC: 174 MG/DL — HIGH (ref 70–99)
GLUCOSE BLDC GLUCOMTR-MCNC: 175 MG/DL — HIGH (ref 70–99)
GLUCOSE BLDC GLUCOMTR-MCNC: 177 MG/DL — HIGH (ref 70–99)
GLUCOSE BLDC GLUCOMTR-MCNC: 181 MG/DL — HIGH (ref 70–99)
GLUCOSE BLDC GLUCOMTR-MCNC: 193 MG/DL — HIGH (ref 70–99)
GLUCOSE SERPL-MCNC: 163 MG/DL — HIGH (ref 70–99)
GLUCOSE SERPL-MCNC: 170 MG/DL — HIGH (ref 70–99)
HCT VFR BLD CALC: 27.4 % — LOW (ref 39–50)
HGB BLD-MCNC: 8.8 G/DL — LOW (ref 13–17)
MAGNESIUM SERPL-MCNC: 1.8 MG/DL — SIGNIFICANT CHANGE UP (ref 1.6–2.6)
MAGNESIUM SERPL-MCNC: 1.9 MG/DL — SIGNIFICANT CHANGE UP (ref 1.6–2.6)
MCHC RBC-ENTMCNC: 28.6 PG — SIGNIFICANT CHANGE UP (ref 27–34)
MCHC RBC-ENTMCNC: 32.1 G/DL — SIGNIFICANT CHANGE UP (ref 32–36)
MCV RBC AUTO: 89 FL — SIGNIFICANT CHANGE UP (ref 80–100)
NRBC # BLD AUTO: 0 K/UL — SIGNIFICANT CHANGE UP (ref 0–0)
NRBC # FLD: 0 K/UL — SIGNIFICANT CHANGE UP (ref 0–0)
NRBC BLD AUTO-RTO: 0 /100 WBCS — SIGNIFICANT CHANGE UP (ref 0–0)
PHOSPHATE SERPL-MCNC: 2.3 MG/DL — LOW (ref 2.5–4.5)
PHOSPHATE SERPL-MCNC: 2.7 MG/DL — SIGNIFICANT CHANGE UP (ref 2.5–4.5)
PLATELET # BLD AUTO: 200 K/UL — SIGNIFICANT CHANGE UP (ref 150–400)
PMV BLD: 11.7 FL — SIGNIFICANT CHANGE UP (ref 7–13)
POTASSIUM SERPL-MCNC: 2.9 MMOL/L — CRITICAL LOW (ref 3.5–5.3)
POTASSIUM SERPL-MCNC: 3.4 MMOL/L — LOW (ref 3.5–5.3)
POTASSIUM SERPL-SCNC: 2.9 MMOL/L — CRITICAL LOW (ref 3.5–5.3)
POTASSIUM SERPL-SCNC: 3.4 MMOL/L — LOW (ref 3.5–5.3)
RBC # BLD: 3.08 M/UL — LOW (ref 4.2–5.8)
RBC # FLD: 17.2 % — HIGH (ref 10.3–14.5)
SODIUM SERPL-SCNC: 136 MMOL/L — SIGNIFICANT CHANGE UP (ref 135–145)
SODIUM SERPL-SCNC: 137 MMOL/L — SIGNIFICANT CHANGE UP (ref 135–145)
WBC # BLD: 7.38 K/UL — SIGNIFICANT CHANGE UP (ref 3.8–10.5)
WBC # FLD AUTO: 7.38 K/UL — SIGNIFICANT CHANGE UP (ref 3.8–10.5)

## 2025-06-24 PROCEDURE — 99233 SBSQ HOSP IP/OBS HIGH 50: CPT

## 2025-06-24 PROCEDURE — 99232 SBSQ HOSP IP/OBS MODERATE 35: CPT

## 2025-06-24 RX ORDER — MAGNESIUM SULFATE 500 MG/ML
1 SYRINGE (ML) INJECTION ONCE
Refills: 0 | Status: COMPLETED | OUTPATIENT
Start: 2025-06-24 | End: 2025-06-25

## 2025-06-24 RX ORDER — POTASSIUM PHOSPHATE, MONOBASIC POTASSIUM PHOSPHATE, DIBASIC INJECTION, 236; 224 MG/ML; MG/ML
15 SOLUTION, CONCENTRATE INTRAVENOUS ONCE
Refills: 0 | Status: COMPLETED | OUTPATIENT
Start: 2025-06-24 | End: 2025-06-25

## 2025-06-24 RX ORDER — FUROSEMIDE 10 MG/ML
40 INJECTION INTRAMUSCULAR; INTRAVENOUS
Refills: 0 | Status: DISCONTINUED | OUTPATIENT
Start: 2025-06-24 | End: 2025-07-02

## 2025-06-24 RX ADMIN — DORNASE ALFA 2.5 MILLIGRAM(S): 1 SOLUTION RESPIRATORY (INHALATION) at 09:41

## 2025-06-24 RX ADMIN — Medication 100 MILLIEQUIVALENT(S): at 13:38

## 2025-06-24 RX ADMIN — Medication 1000 MILLIGRAM(S): at 05:30

## 2025-06-24 RX ADMIN — Medication 100 MILLIEQUIVALENT(S): at 12:18

## 2025-06-24 RX ADMIN — AMPICILLIN SODIUM 200 GRAM(S): 1 INJECTION, POWDER, FOR SOLUTION INTRAMUSCULAR; INTRAVENOUS at 05:13

## 2025-06-24 RX ADMIN — HEPARIN SODIUM 5000 UNIT(S): 1000 INJECTION INTRAVENOUS; SUBCUTANEOUS at 13:38

## 2025-06-24 RX ADMIN — METOPROLOL SUCCINATE 5 MILLIGRAM(S): 50 TABLET, EXTENDED RELEASE ORAL at 01:04

## 2025-06-24 RX ADMIN — INSULIN LISPRO 1: 100 INJECTION, SOLUTION INTRAVENOUS; SUBCUTANEOUS at 05:12

## 2025-06-24 RX ADMIN — Medication 0.2 MILLIGRAM(S): at 13:58

## 2025-06-24 RX ADMIN — AMPICILLIN SODIUM 200 GRAM(S): 1 INJECTION, POWDER, FOR SOLUTION INTRAMUSCULAR; INTRAVENOUS at 12:18

## 2025-06-24 RX ADMIN — LIDOCAINE HYDROCHLORIDE 1 PATCH: 20 JELLY TOPICAL at 19:14

## 2025-06-24 RX ADMIN — LIDOCAINE HYDROCHLORIDE 1 PATCH: 20 JELLY TOPICAL at 12:32

## 2025-06-24 RX ADMIN — POTASSIUM CHLORIDE, DEXTROSE MONOHYDRATE AND SODIUM CHLORIDE 30 MILLILITER(S): 150; 5; 900 INJECTION, SOLUTION INTRAVENOUS at 07:48

## 2025-06-24 RX ADMIN — INSULIN LISPRO 1: 100 INJECTION, SOLUTION INTRAVENOUS; SUBCUTANEOUS at 18:13

## 2025-06-24 RX ADMIN — Medication 4 MILLILITER(S): at 09:32

## 2025-06-24 RX ADMIN — INSULIN LISPRO 1: 100 INJECTION, SOLUTION INTRAVENOUS; SUBCUTANEOUS at 12:17

## 2025-06-24 RX ADMIN — IPRATROPIUM BROMIDE AND ALBUTEROL SULFATE 3 MILLILITER(S): .5; 2.5 SOLUTION RESPIRATORY (INHALATION) at 09:32

## 2025-06-24 RX ADMIN — Medication 100 MILLIEQUIVALENT(S): at 14:36

## 2025-06-24 RX ADMIN — INSULIN LISPRO 1: 100 INJECTION, SOLUTION INTRAVENOUS; SUBCUTANEOUS at 00:54

## 2025-06-24 RX ADMIN — IPRATROPIUM BROMIDE AND ALBUTEROL SULFATE 3 MILLILITER(S): .5; 2.5 SOLUTION RESPIRATORY (INHALATION) at 03:11

## 2025-06-24 RX ADMIN — Medication 100 MILLIEQUIVALENT(S): at 10:29

## 2025-06-24 RX ADMIN — Medication 4 MILLILITER(S): at 21:31

## 2025-06-24 RX ADMIN — FUROSEMIDE 40 MILLIGRAM(S): 10 INJECTION INTRAMUSCULAR; INTRAVENOUS at 18:16

## 2025-06-24 RX ADMIN — IPRATROPIUM BROMIDE AND ALBUTEROL SULFATE 3 MILLILITER(S): .5; 2.5 SOLUTION RESPIRATORY (INHALATION) at 17:03

## 2025-06-24 RX ADMIN — Medication 400 MILLIGRAM(S): at 00:58

## 2025-06-24 RX ADMIN — Medication 112 MICROGRAM(S): at 23:56

## 2025-06-24 RX ADMIN — METOPROLOL SUCCINATE 5 MILLIGRAM(S): 50 TABLET, EXTENDED RELEASE ORAL at 05:13

## 2025-06-24 RX ADMIN — Medication 1000 MILLIGRAM(S): at 01:00

## 2025-06-24 RX ADMIN — Medication 40 MILLIGRAM(S): at 12:19

## 2025-06-24 RX ADMIN — Medication 0.5 MILLIGRAM(S): at 09:20

## 2025-06-24 RX ADMIN — FOLIC ACID 1 MILLIGRAM(S): 1 TABLET ORAL at 12:18

## 2025-06-24 RX ADMIN — Medication 0.5 MILLIGRAM(S): at 09:50

## 2025-06-24 RX ADMIN — Medication 300 MILLIGRAM(S): at 12:19

## 2025-06-24 RX ADMIN — IPRATROPIUM BROMIDE AND ALBUTEROL SULFATE 3 MILLILITER(S): .5; 2.5 SOLUTION RESPIRATORY (INHALATION) at 21:31

## 2025-06-24 RX ADMIN — Medication 100 MILLIEQUIVALENT(S): at 09:23

## 2025-06-24 RX ADMIN — Medication 100 MILLIGRAM(S): at 12:19

## 2025-06-24 RX ADMIN — Medication 100 MILLIEQUIVALENT(S): at 18:12

## 2025-06-24 RX ADMIN — METOPROLOL SUCCINATE 5 MILLIGRAM(S): 50 TABLET, EXTENDED RELEASE ORAL at 12:24

## 2025-06-24 RX ADMIN — METOPROLOL SUCCINATE 5 MILLIGRAM(S): 50 TABLET, EXTENDED RELEASE ORAL at 23:59

## 2025-06-24 RX ADMIN — AMPICILLIN SODIUM 200 GRAM(S): 1 INJECTION, POWDER, FOR SOLUTION INTRAMUSCULAR; INTRAVENOUS at 18:12

## 2025-06-24 RX ADMIN — FUROSEMIDE 20 MILLIGRAM(S): 10 INJECTION INTRAMUSCULAR; INTRAVENOUS at 05:13

## 2025-06-24 RX ADMIN — HEPARIN SODIUM 5000 UNIT(S): 1000 INJECTION INTRAVENOUS; SUBCUTANEOUS at 05:12

## 2025-06-24 RX ADMIN — HEPARIN SODIUM 5000 UNIT(S): 1000 INJECTION INTRAVENOUS; SUBCUTANEOUS at 23:54

## 2025-06-24 RX ADMIN — Medication 0.2 MILLIGRAM(S): at 14:28

## 2025-06-24 RX ADMIN — METOPROLOL SUCCINATE 5 MILLIGRAM(S): 50 TABLET, EXTENDED RELEASE ORAL at 18:14

## 2025-06-24 RX ADMIN — AMPICILLIN SODIUM 200 GRAM(S): 1 INJECTION, POWDER, FOR SOLUTION INTRAMUSCULAR; INTRAVENOUS at 00:58

## 2025-06-24 RX ADMIN — Medication 400 MILLIGRAM(S): at 05:11

## 2025-06-24 NOTE — CONSULT NOTE ADULT - ASSESSMENT
85M w/ hypertension, type 2 diabetes (A1c 6.9%), CKD (b/l Cr 1.5-2), prostate cancer s/p prostatectomy, h/o strokes (on apixaban, though no clear diagnosis of AF), sinus node dysfunction s/p PPM presented to [facility] with acute onset lower back pain and bilateral leg weakness (R>L), found to have imaging with lumbar compression deformities and high grade E faecalis bacteremia, now s/p PPM extraction/replacement with leadless PPM, negative TTE and MARIA LUISA for vegetation, and cleared cultures, course c/b persistent and severe back pain, lower extremity weakness, and urinary retention. MRI T/L spine with likely L4/5 OM/discitis and L3/4 and L4/5 severe spinal stenosis w/ cauda equina impingement - ortho was planning for laminectomy/fusion 6/5, but course c/b acute acalculous cholecystitis on 6/4 s/p robotic cholecystectomy; complicated by postop bleeding; transferred to SICU for pressor support with course c/b ileus from 6/6-6/11; downgraded. Cholecystectomy further complicated by 9cm collection of gallbladder fossa s/p percutaneous drain placement. Patient had hypoxemia 6/20 for hypoxemia; transffered to SICU  85M w/ hypertension, type 2 diabetes (A1c 6.9%), CKD (b/l Cr 1.5-2), prostate cancer s/p prostatectomy, h/o strokes (on apixaban, though no clear diagnosis of AF), sinus node dysfunction s/p PPM presented to [facility] with acute onset lower back pain and bilateral leg weakness (R>L), found to have imaging with lumbar compression deformities and high grade E faecalis bacteremia, now s/p PPM extraction/replacement with leadless PPM, negative TTE and MARIA LUISA for vegetation, and cleared cultures, course c/b persistent and severe back pain, lower extremity weakness, and urinary retention. MRI T/L spine with likely L4/5 OM/discitis and L3/4 and L4/5 severe spinal stenosis w/ cauda equina impingement - ortho was planning for laminectomy/fusion 6/5, but course c/b acute acalculous cholecystitis on 6/4 s/p robotic cholecystectomy; complicated by postop bleeding; transferred to SICU for pressor support with course c/b ileus from 6/6-6/11; downgraded. Cholecystectomy further complicated by 9cm collection of gallbladder fossa s/p percutaneous drain placement. Patient had hypoxemia 6/20 for hypoxemia; transferred to SICU for PE ruleout; now s/p aggressive pulmonary toilet with improvement. 85M w/ hypertension, type 2 diabetes (A1c 6.9%), CKD (b/l Cr 1.5-2), prostate cancer s/p prostatectomy, h/o strokes (on apixaban, though no clear diagnosis of AF), sinus node dysfunction s/p PPM presented to [facility] with acute onset lower back pain and bilateral leg weakness (R>L), found to have imaging with lumbar compression deformities and high grade E faecalis bacteremia, now s/p PPM extraction/replacement with leadless PPM, negative TTE and MARIA LUISA for vegetation, and cleared cultures, course c/b persistent and severe back pain, lower extremity weakness, and urinary retention. MRI T/L spine with likely L4/5 OM/discitis and L3/4 and L4/5 severe spinal stenosis w/ cauda equina impingement - ortho was planning for laminectomy/fusion 6/5, but course c/b acute acalculous cholecystitis on 6/4 s/p robotic cholecystectomy; complicated by postop bleeding; transferred to SICU for pressor support with course c/b ileus from 6/6-6/11; downgraded. Cholecystectomy further complicated by 9cm collection of gallbladder fossa s/p percutaneous drain placement. Patient had hypoxemia 6/20 for hypoxemia; transferred to SICU for PE ruleout; now s/p aggressive pulmonary toilet with improvement now downgraded to the floors with dysphagia; currently NPO pending repeat speech evaluation; currently receiving IV medications on D5 1/2 NS, thiamine/folate for nutrition. 85M w/ hypertension, type 2 diabetes (A1c 6.9%), CKD (b/l Cr 1.5-2), prostate cancer s/p prostatectomy, h/o strokes (on apixaban, though no clear diagnosis of AF), sinus node dysfunction s/p PPM presented to [facility] with acute onset lower back pain and bilateral leg weakness (R>L), found to have imaging with lumbar compression deformities and high grade E faecalis bacteremia, now s/p PPM extraction/replacement with leadless PPM, negative TTE and MARIA LUISA for vegetation, and cleared cultures, course c/b persistent and severe back pain, lower extremity weakness, and urinary retention. MRI T/L spine with likely L4/5 OM/discitis and L3/4 and L4/5 severe spinal stenosis w/ cauda equina impingement - ortho was planning for laminectomy/fusion 6/5, but course c/b acute acalculous cholecystitis on 6/4 s/p robotic cholecystectomy; complicated by postop bleeding; transferred to SICU for pressor support with course c/b ileus from 6/6-6/11; downgraded. Cholecystectomy further complicated by 9cm collection of gallbladder fossa s/p percutaneous drain placement. Patient had hypoxemia 6/20 for hypoxemia; transferred to SICU for PE ruleout; now s/p aggressive pulmonary toilet with improvement now downgraded to the floors with dysphagia; currently NPO pending repeat speech evaluation; currently receiving IV medications on D5 1/2 NS, thiamine/folate for nutrition. Medicine consulted for management of patient outside of SICU. Given patient is awaiting speech eval and will finish Abx tomorrow, admission to medicine will not change the plan set in place.

## 2025-06-24 NOTE — CONSULT NOTE ADULT - ATTENDING COMMENTS
Hypoxemic respiratory insufficiency  a,  With acute hypoxemia to 02 saturation in the 60s  b.  Placed on nonrebreather with improvement to 92-95%  c.  CXR reviewed.  CTPA ordered and reviewed.  d.  Left > R pleural effusion.  POCUS consider centesis.  Discussed with day SICU attending    Fluid overload  a.  Continue lasix diuresis  b,  Minimize IVF    JEROME  a, Stable  b.  Trend creatinine    Sepsis secondary to postoperative collection  a.  S/P IR drainage  b.  Continue IV abx per ID
I have reviewed the history, pertinent labs and imaging, and discussed the care with the consult resident.  More than 50% of this 80 minute encounter included review of vitals, labs (elevated WBC and LFTs), imaging (CT scan and RUQ US images and radiologist's reading), discussion with consultants (IM, EP) and coordination with the operating room.    Chief complaint:  RUQ pain    Seems to have developed acute acalculous cholecystitis with CBD WNL for age at 7mm in setting of concurrent other illness.  Given that the GB is very distended and he has worsening leukocytosis on iv antibiotics, he risks ischemia/gangrene.  He was already optimized for GA and t-lovenox held >12hrs now (ortho procedure scheduled for tmrw) so it is reasonable to proceed with urgent cholecystectomy at this juncture.  If he becomes unstable during OR, I will opt for c-tube instead.    The risks of surgical intervention including but not limited to bleeding, infection, scar, inadvertant injury to surrounding structures was weighed against the benefits and discussed with the patient who understood and agreed with the treatment plan.    The Acute Care Surgery (B Team) Practice:    urgent issues - pager [phone]  nonurgent issues - [phone]  patient appointments or afterhours - [phone]
Patient s/p francisco iniguez rapid response on the floor for hypotension and tachycardia. Pocus revealed hypovolemia, given fluid bolus and blood products with adequate response.  N mentating, pain controlled  resp on room air  cv hypovolemic shock, given fluid bolus and on laura  gi npo, ivf  gu/renal monitor uop, anuric, uptrending cr, atn from hypovolemia and acute blood loss anemia  heme monitor cbc chemical vte ppx  id on zosyn  endo no changes    The patient is a critical care patient with life threatening hemodynamic and metabolic instability in SICU.  I have personally interviewed when possible and examined the patient, reviewed data and laboratory tests/x-rays and all pertinent electronic images.  I was physically present for the key portions of the evaluation and management (E/M) service provided.   The SICU team has a constant risk benefit analyzes discussion with the primary team, all consultants, House Staff and PA's on all decisions.  These diagnoses are unrelated to the surgical procedure noted above.  I meet with family if needed to get further history, discuss the case and make care decisions for this patient who might not be able to participate.  Time involved in performance of separately billable procedures was not counted toward my critical care time. There is no overlap.  I spent 96 minutes ( 0800Hrs-0915Hrs in AM/ 1600Hrs-1715Hrs in PM, or other time indicated) of critical care time for the diagnoses, assessment, plan and interventions.  This time excludes time spent on separate procedures and teaching.
This is a 84 y/o M w/ PMhx HTN, HLD, DM2, hypothyroidism, prostate cancer s/p prostatectomy, CKD, TIA, pacemaker placed in 5/2024 for abnormal arrythmia initially admitted to [facility] on 5/6 for back pain, now transferred to [facility] for orthopedic spine. Labs w/ leukocytosis to 18, BCx w/ E faecalis in multiple sets.     #E faecalis bacteremia in the setting of PPM, c/f PPM infection, and possible IE   #LBP, L3-4 disc bulge  #Respiratory failure, improved     Overall, 84 y/o M w/ PMhx HTN, HLD, DM2, hypothyroidism, prostate cancer s/p prostatectomy, CKD, TIA, pacemaker placed in 5/2024 for abnormal arrythmia transferred to [facility] from [facility] for MRI spine and ortho evaluation give LBP and lumbar compression w/ urinary retention. Pt noted to have chills and leukocytosis to 18, now with high grade E faecalis bacteremia. Unclear source however UCx not done, U/A with 10 WBCs, CT A/P w/o IV contrast on 5/10 w/o acute infectious source, MRI L spine here limited exam, findings of stenosis, no clear OM/discitis, however no contrast.   Given PPM and high grade bacteremia, will need TTE and eventual MARIA LUISA with PPM removal. Would empirically treat for IE until MARIA LUISA done.      Recommendations:   1. Ampicillin 2 g q6, Ceftriaxone 2 g q12  2. TTE, will need MARIA LUISA eventually    3. EP consult for PPM removal   4. Repeat BCx x 2 in the AM   5. If bacteremia not clearing, consider interval CT A/P with IV contrast     Thank you for consulting us and involving us in the management of this patient's case. In addition to reviewing history, imaging, documents, labs, microbiology, and infection control strategies and potential issues.     ID will continue to follow    Darrin Falk M.D.  Attending Physician  Division of Infectious Diseases  Department of Medicine    Please contact through MS Teams message.  Office: [phone] (after 5 PM or weekend)
Mr. Liu is a 84 yo man w/ PMHx including reportedly asymptomatic cerebral infarct attributed to sinus node dysfunction s/p PPM on Eliquis 2.5 mg BID, hypertension, type two diabetes, and chronic kidney disease (baseline creatinine 1.5-2.0). Patient initially admitted for chronic LBP 2/2 cauda impingement however long course complicated by acalculous geetha s/p robotic removal complicated by sepsis with infection of PPM leads s/p removal, recent hypoxia, agitation required haldol last night. Patient is drowsy and inattentive but exam overall non-focal. Neurology contacted for 20 second staring episode w/ unresponsiveness w/o clear post ictal period. Unclear what this represented. Focal seizure could look this way but could also be encephalopathic from multiple infectious and metabolic factors. Suspect toxic metabolic encephalopathy related to sepsis/infection and also could have delirium factoring given post surgery and older patient in ICU.      [] Recommend primary team to clarify patient's indication for home medication Eliquis 2.5 mg BID and start when deemed appropriate by primary team  [] MRI brain while ideal seems unlikely to be tolerated by patient  [] video 24hr EEG  [] neuro checks, NPO until swallow evaluation, seizure, fall & aspiration precautions  [] DVT ppx as per primary team   [] Please note: if patient has a convulsion, please document length of episode, specifically what patient was doing paying attention to eye opening vs closure, gaze deviation, shaking of extremities, tongue bite, urinary incontinence, any derangement of vital signs.
85M w/ HTN, HLD, DM2, prostate cancer s/p prostatectomy, CKD, TIA with a pacemaker who presents as a transfer from [place] for further evaluation of low back pain. Patient was noted to have compression fx with disc protrusion and c/f cauda equina. Patient was originally planned for surgery with ortho spine, but course c/b Enterococcus bacteremia, PPM infection s/p extraction replacement with collin PPM, acute geetha s/p cholecystectomy, s/p IR drain 6/17 for post-op fluid collection in GB fossa. He stabilized and pending discharge to [facility], when he had acute hypoxia and hypotension on 6/19, transfer back to SICU. CTA neg for PE, small pleural eff. ABG w/o significant hypoxia. CXR showing progression of LLL infiltrate.       - c/w Unasyn for enterococcus bacteremia with c/f discitis. to be completed on 6/25  - per family, patient's mental status improving. more alert and interactive  - c/w diuresis per cardiology rec.   - repeat SLP eval per primary team, long GOC discussion with patient and daughters at bedside, patient had made prior advance directives, he does not want feeding tube. remain DNI/DNR. discussed pleasure feeding as alternative option with understanding the risk of aspiration. family and patient in agreement with pleasure feeding if continue to fail SLP eval. would rec to place patient on Puree with Mod thick liquid with aspiration precaution if continue to fail rpt SLP eval.   - c/w pulmonary toilet as performed by primary team.   - oral care.   - SW for follow up for placement.   - Appreciate consult, but unsure how patient would benefit from transfer to medicine service at this time, as patient appears to be d/c planning.   - please reconsult with questions.    above discussed with patient, family and resident.

## 2025-06-24 NOTE — CONSULT NOTE ADULT - PROBLEM SELECTOR RECOMMENDATION 3
MR from 5/30 showing L4/L5 concerning for Discitis/OM  No further inpatient Ortho management  Ampicillin 2gm IV q6h for 6 weeks; finishes 6/25 MR from 5/30 showing L4/L5 concerning for Discitis/OM  No further inpatient Ortho management; plan for outpatient management    Ampicillin 2gm IV q6h for 6 weeks; finishes 6/25

## 2025-06-24 NOTE — EEG REPORT - NS EEG TEXT BOX
[facility] [place] [id number]     Study Date: 06-23-25 08:00 to 06-23-25 11:15  Duration: 3 hr 14 min    --------------------------------------------------------------------------------------------------  History:  CC/ HPI Patient is a 85y old  Male who presents with a chief complaint of back pain (24 Jun 2025 08:38)    MEDICATIONS  (STANDING):  albuterol    90 MICROgram(s) HFA Inhaler 2 Puff(s) Inhalation every 6 hours  albuterol/ipratropium for Nebulization 3 milliLiter(s) Nebulizer every 6 hours  ampicillin  IVPB 2 Gram(s) IV Intermittent every 6 hours  aspirin Suppository 300 milliGRAM(s) Rectal daily  dextrose 5% + sodium chloride 0.45% with potassium chloride 20 mEq/L 1000 milliLiter(s) (30 mL/Hr) IV Continuous <Continuous>  dornase mayda Solution 2.5 milliGRAM(s) Inhalation daily  folic acid Injectable 1 milliGRAM(s) IV Push daily  furosemide   Injectable 20 milliGRAM(s) IV Push every 12 hours  heparin   Injectable 5000 Unit(s) SubCutaneous every 8 hours  insulin lispro (ADMELOG) corrective regimen sliding scale   SubCutaneous every 6 hours  levothyroxine Injectable 112 MICROGram(s) IV Push at bedtime  lidocaine   4% Patch 1 Patch Transdermal daily  metoprolol tartrate Injectable 5 milliGRAM(s) IV Push every 6 hours  pantoprazole  Injectable 40 milliGRAM(s) IV Push daily  potassium chloride  10 mEq/100 mL IVPB 10 milliEquivalent(s) IV Intermittent every 1 hour  sodium chloride 3%  Inhalation 4 milliLiter(s) Inhalation every 12 hours  thiamine Injectable 100 milliGRAM(s) IV Push daily    --------------------------------------------------------------------------------------------------  Study Interpretation:    [[[Abbreviation Key:  PDR=alpha rhythm/posterior dominant rhythm. A-P=anterior posterior gradient.  Amplitude: ‘very low’:<20; ‘low’:20-50; ‘medium’:; ‘high’:>200uV.  Persistence for periodic/rhythmic patterns (% of epoch) ‘rare’:<1%; ‘occasional’:1-10%; ‘frequent’:10-50%; ‘abundant’:50-90%; ‘continuous’:>90%.  Persistence for sporadic discharges: ‘rare’:<1/hr; ‘occasional’:1/min-1/hr; ‘frequent’:>1/min; ‘abundant’:>1/10 sec.  GRDA=generalized rhythmic delta activity; FIRDA=frontal intermittent GRDA; LRDA=lateralized rhythmic delta activity; TIRDA=temporal intermittent rhythmic delta activity;  LPD=PLED=lateralized periodic discharges; GPD=generalized periodic discharges; BiPDs=BiPLEDs=bilateral independent periodic epileptiform discharges; SIRPID=stimulus induced rhythmic, periodic, or ictal appearing discharges; BIRDs=brief potentially ictal rhythmic discharges >4 Hz, lasting .5-10s; PFA=paroxysmal bursts of beta/gamma; LVFA=low voltage fast activity.  Modifiers: +F=with fast component; +S=with spike component; +R=with rhythmic component.  S-B=burst suppression pattern.  Max=maximal. N1-drowsy; N2-stage II sleep; N3-slow wave sleep. SSS/BETS=small sharp spikes/benign epileptiform transients of sleep. HV=hyperventilation; PS=photic stimulation]]]    FINDINGS:      Background:  Continuity: Continuous  Symmetry: Symmetric  PDR: 6 - 6.5 Hz activity, poorly modulated with amplitude to 40 uV, that attenuated to eye opening.    Reactivity: Present  Voltage: Normal (between 20-150uV)  Anterior Posterior Gradient: Present  Other background findings: None  Breach: Absent    Background Slowing:  Generalized slowing: Diffuse irregular delta and theta activity.  Focal slowing: None was present.    State Changes:   -Drowsiness noted with increased slowing, attenuation of fast activity, vertex transients.  -Present with N2 sleep transients with symmetric spindles and K-complexes.    Interictal Findings:  None    Electrographic and Electroclinical seizures:  None    Other Clinical Events:  None    Activation Procedures:   -Hyperventilation was not performed.    -Photic stimulation was not performed.     Artifacts:  Intermittent myogenic and movement artifacts were noted.    ECG:  The heart rate on single channel ECG was predominantly between 70 - 90 BPM, with frequent ectopy.    EEG Classification / Summary:  Abnormal EEG study  Mild to moderate generalized background slowing    -----------------------------------------------------------------------------------------------------    Clinical Impression:  Abnormal EEG study  Mild to moderate diffuse/multi-focal cerebral dysfunction, not specific as to etiology.  There were no epileptiform abnormalities or seizures recorded.    ECG with frequent ectopy.    This is a preliminary impression still pending attendings attestation.     -------------------------------------------------------------------------------------------------------  EEG reading room: [phone]    After-hours epilepsy service: [phone]    Mathew Guerrier DO  Epilepsy Fellow [facility] [id number]     Study Date: 06-23-25 08:00-11:15  Duration: 3 hr 14 min    --------------------------------------------------------------------------------------------------  History:  CC/ HPI Patient is a 85y old  Male who presents with a chief complaint of back pain (24 Jun 2025 08:38)    MEDICATIONS  (STANDING):  albuterol    90 MICROgram(s) HFA Inhaler 2 Puff(s) Inhalation every 6 hours  albuterol/ipratropium for Nebulization 3 milliLiter(s) Nebulizer every 6 hours  ampicillin  IVPB 2 Gram(s) IV Intermittent every 6 hours  aspirin Suppository 300 milliGRAM(s) Rectal daily  dextrose 5% + sodium chloride 0.45% with potassium chloride 20 mEq/L 1000 milliLiter(s) (30 mL/Hr) IV Continuous <Continuous>  dornase mayda Solution 2.5 milliGRAM(s) Inhalation daily  folic acid Injectable 1 milliGRAM(s) IV Push daily  furosemide   Injectable 20 milliGRAM(s) IV Push every 12 hours  heparin   Injectable 5000 Unit(s) SubCutaneous every 8 hours  insulin lispro (ADMELOG) corrective regimen sliding scale   SubCutaneous every 6 hours  levothyroxine Injectable 112 MICROGram(s) IV Push at bedtime  lidocaine   4% Patch 1 Patch Transdermal daily  metoprolol tartrate Injectable 5 milliGRAM(s) IV Push every 6 hours  pantoprazole  Injectable 40 milliGRAM(s) IV Push daily  potassium chloride  10 mEq/100 mL IVPB 10 milliEquivalent(s) IV Intermittent every 1 hour  sodium chloride 3%  Inhalation 4 milliLiter(s) Inhalation every 12 hours  thiamine Injectable 100 milliGRAM(s) IV Push daily    --------------------------------------------------------------------------------------------------  Study Interpretation:    [[[Abbreviation Key:  PDR=alpha rhythm/posterior dominant rhythm. A-P=anterior posterior gradient.  Amplitude: ‘very low’:<20; ‘low’:20-50; ‘medium’:; ‘high’:>200uV.  Persistence for periodic/rhythmic patterns (% of epoch) ‘rare’:<1%; ‘occasional’:1-10%; ‘frequent’:10-50%; ‘abundant’:50-90%; ‘continuous’:>90%.  Persistence for sporadic discharges: ‘rare’:<1/hr; ‘occasional’:1/min-1/hr; ‘frequent’:>1/min; ‘abundant’:>1/10 sec.  GRDA=generalized rhythmic delta activity; FIRDA=frontal intermittent GRDA; LRDA=lateralized rhythmic delta activity; TIRDA=temporal intermittent rhythmic delta activity;  LPD=PLED=lateralized periodic discharges; GPD=generalized periodic discharges; BiPDs=BiPLEDs=bilateral independent periodic epileptiform discharges; SIRPID=stimulus induced rhythmic, periodic, or ictal appearing discharges; BIRDs=brief potentially ictal rhythmic discharges >4 Hz, lasting .5-10s; PFA=paroxysmal bursts of beta/gamma; LVFA=low voltage fast activity.  Modifiers: +F=with fast component; +S=with spike component; +R=with rhythmic component.  S-B=burst suppression pattern.  Max=maximal. N1-drowsy; N2-stage II sleep; N3-slow wave sleep. SSS/BETS=small sharp spikes/benign epileptiform transients of sleep. HV=hyperventilation; PS=photic stimulation]]]    FINDINGS:      Background:  Continuity: Continuous  Symmetry: Symmetric  PDR: Intermittent, poorly formed, 7 Hz  Voltage: Normal  Anterior Posterior Gradient: Present, lower-amplitude frontal theta  Other background findings: Frequent diffuse polymorphic delta and theta slowing  Breach: Absent    Background Slowing:  Generalized slowing: As above  Focal slowing: Occasional left and right hemispheric frontotemporally predominant focal polymorphic delta slowing    State Changes:   Drowsiness is characterized by slowing of the background activity.  Stage 2 sleep is characterized by symmetric K complexes and sleep spindles.    Interictal Findings:  None    Electrographic and Electroclinical seizures:  None    Other Clinical Events:  None    Activation Procedures:   -Hyperventilation was not performed.    -Photic stimulation was not performed.     Artifacts:  Intermittent myogenic and movement artifacts were noted.    Single-lead EKG: Regular rhythm, occasional PVCs     EEG Classification / Summary:  Abnormal EEG in the awake, drowsy, asleep states.  -Occasional left and right hemispheric frontotemporally predominant focal slowing  -Mild-moderate diffuse slowing  -No epileptiform abnormalities or seizures captured.     Clinical Impression:  -Left and right hemispheric frontotemporally predominant focal cerebral dysfunction can be structural or functional in etiology.  -Mild-moderate diffuse cerebral dysfunction is nonspecific in etiology.  -No epileptiform abnormalities or seizures captured.     -------------------------------------------------------------------------------------------------------  EEG reading room: [phone]  After-hours epilepsy service: [phone]    Mathew Guerrier DO  Epilepsy Fellow    Faith Alegria MD  Attending Physician, [facility] [place]

## 2025-06-24 NOTE — CONSULT NOTE ADULT - PROBLEM SELECTOR RECOMMENDATION 4
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

## 2025-06-24 NOTE — PROGRESS NOTE ADULT - SUBJECTIVE AND OBJECTIVE BOX
SICU Daily Progress Note  =====================================================  Interval/Overnight Events  - d/c vEEG - negative  - switch back to ampicillin to end 6/25  - add EtCO2 to NC  - SLP failed  - Listed for floor    MEDICATIONS:  --------------------------------------------------------------------------------------  Neurologic Medications  -acetaminophen   IVPB .. 1000 milliGRAM(s) IV Intermittent every 6 hours  -aspirin Suppository 300 milliGRAM(s) Rectal daily  -HYDROmorphone  Injectable 0.2 milliGRAM(s) IV Push every 4 hours PRN Moderate Pain (4 - 6)  -HYDROmorphone  Injectable 0.5 milliGRAM(s) IV Push every 4 hours PRN Severe Pain (7 - 10)    Respiratory Medications  -albuterol    90 MICROgram(s) HFA Inhaler 2 Puff(s) Inhalation every 6 hours  -albuterol/ipratropium for Nebulization 3 milliLiter(s) Nebulizer every 6 hours  -dornase mayda Solution 2.5 milliGRAM(s) Inhalation daily  -sodium chloride 3%  Inhalation 4 milliLiter(s) Inhalation every 12 hours    Cardiovascular Medications  -furosemide   Injectable 20 milliGRAM(s) IV Push every 12 hours  -metoprolol tartrate Injectable 5 milliGRAM(s) IV Push every 6 hours    Gastrointestinal Medications  -dextrose 5% + sodium chloride 0.45% with potassium chloride 20 mEq/L 1000 milliLiter(s) IV Continuous <Continuous>  -folic acid Injectable 1 milliGRAM(s) IV Push daily  -pantoprazole  Injectable 40 milliGRAM(s) IV Push daily  -thiamine Injectable 100 milliGRAM(s) IV Push daily    Genitourinary Medications  -None    Hematologic/Oncologic Medications  -heparin   Injectable 5000 Unit(s) SubCutaneous every 8 hours    Antimicrobial/Immunologic Medications  -ampicillin  IVPB 2 Gram(s) IV Intermittent every 6 hours    Endocrine/Metabolic Medications  -insulin lispro (ADMELOG) corrective regimen sliding scale   SubCutaneous every 6 hours  -levothyroxine Injectable 112 MICROGram(s) IV Push at bedtime    Topical/Other Medications  -lidocaine   4% Patch 1 Patch Transdermal daily    --------------------------------------------------------------------------------------  VITAL SIGNS, INS/OUTS (last 24 hours):  --------------------------------------------------------------------------------------  VITALS  T(C): 36.6 (06-23-25 @ 20:00), Max: 36.8 (06-23-25 @ 04:00)  HR: 73 (06-23-25 @ 20:00) (69 - 90)  BP: 163/56 (06-23-25 @ 20:00) (109/88 - 167/54)  BP(mean): 85 (06-23-25 @ 20:00) (64 - 107)  RR: 19 (06-23-25 @ 20:00) (15 - 27)  SpO2: 95% (06-23-25 @ 20:00) (94% - 100%)    CAPILLARY BLOOD GLUCOSE  POCT Blood Glucose.: 145 mg/dL (23 Jun 2025 17:54)  POCT Blood Glucose.: 153 mg/dL (23 Jun 2025 12:01)  POCT Blood Glucose.: 151 mg/dL (23 Jun 2025 07:08)    IN/OUT    06-22 @ 07:01  -  06-23 @ 07:00  IN:    dextrose 5%: 510 mL    dextrose 5% + sodium chloride 0.45% w/ Additives: 240 mL    IV PiggyBack: 600 mL  Total IN: 1350 mL  OUT:    Drain (mL): 5 mL    Indwelling Catheter - Urethral (mL): 2345 mL    Oral Fluid: 0 mL  Total OUT: 2350 mL  Total NET: -1000 mL    06-23 @ 07:01  -  06-24 @ 00:04  IN:    dextrose 5% + sodium chloride 0.45% w/ Additives: 480 mL    IV PiggyBack: 400 mL  Total IN: 880 mL  OUT:    Drain (mL): 10 mL    Indwelling Catheter - Urethral (mL): 3175 mL  Total OUT: 3185 mL  Total NET: -2305 mL  --------------------------------------------------------------------------------------  PHYSICAL EXAM  NEUROLOGY  Exam: Normal, NAD, alert, oriented  HEENT  Exam: Normocephalic, atraumatic, EOMI.   RESPIRATORY  Exam: Lungs clear to auscultation, Normal expansion/effort.   CARDIOVASCULAR  Exam: S1, S2.  Regular rate and rhythm.   GI/NUTRITION  Exam: Abdomen soft, Non-tender, Non-distended.   VASCULAR  Exam: Extremities warm. No edema.   MUSCULOSKELETAL  Exam: All extremities moving spontaneously without limitations.  SKIN  Exam: Normal turgor, normal color and temperature    METABOLIC/FLUIDS/ELECTROLYTES  -dextrose 5% + sodium chloride 0.45% with potassium chloride 20 mEq/L 1000 milliLiter(s) IV Continuous <Continuous>  -folic acid Injectable 1 milliGRAM(s) IV Push daily  -thiamine Injectable 100 milliGRAM(s) IV Push daily    HEMATOLOGIC  [x] VTE Prophylaxis: heparin   Injectable 5000 Unit(s) SubCutaneous every 8 hours    INFECTIOUS DISEASE  -ampicillin  IVPB 2 Gram(s) IV Intermittent every 6 hours    LABS  --------------------------------------------------------------------------------------  CBC (06-23 @ 00:50)                              8.0[L]                         6.08    )----------------(  154        --    % Neutrophils, --    % Lymphocytes, ANC: --                                  25.2[L]              BMP (06-23 @ 00:50)             135     |  98      |  15    		Ca++ --      Ca 7.7[L]             ---------------------------------( 157[H]		Mg 2.00               3.0[L]  |  24      |  1.41[H]			Ph 2.8       ABG (06-23 @ 00:50)     7.33[L] / 49[H] / 124[H] / 26 / -0.3 / 99.1[H]%     Lactate:       -> Body Fluid abdominal collection Culture (06-17 @ 18:45)       polymorphonuclear leukocytes seen, No organisms seen by cytocentrifuge NG  No growth at 5 days    CODE STATUS  DNI, trial of NIV  --------------------------------------------------------------------------------------

## 2025-06-24 NOTE — PROGRESS NOTE ADULT - ATTENDING COMMENTS
Patient s/p francisco iniguez with hypoxic respiratory failure  now recovering from surgery  comorbidities medicine evaluation for transfer  dispo planning    I have personally interviewed and examined this patient, reviewed pertinent labs and imaging, and discussed the case with colleagues, residents, and physician assistants on B Team rounds.    The active care issues are:  1. s/p francisco iniguez    [facility] (B Team) Attending [facility]    urgent issues - spectra [id number]  nonurgent issues - [phone]  patient appointments or afterhours - [phone]

## 2025-06-24 NOTE — CHART NOTE - NSCHARTNOTEFT_GEN_A_CORE
Interim events:  EEG performed, as follows:  Study Date: 06-22 12:38 - 08:00 6-23-25  Duration in hours:  x19:10    EEG Classification / Summary:  Abnormal EEG study  Mild-mderate generalized background slowing  Clinical Impression:  Mild-moderate diffuse/multi-focal cerebral dysfunction, not specific as to etiology.  There were no epileptiform abnormalities recorded.    ECG with frequent ectopy    Impression  #Staring episode with unresponsiveness and rest tremor of RUE > LUE. Less likely seizure though i/s/o multiple potential infectious and metabolic sources would consider a triggered seizure; additionally, EEG negative for epileptiform abnormalities  #Postural tremor. History of CKD i/s/o acute illness with episodes of hypotension, Cr trend upwards, reduced GFR . 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    Recommendations:  [] Recommend primary team to clarify patient's indication for home medication Eliquis 2.5 mg BID and start when deemed appropriate by primary team  [] MRI brain declined by patient's family per chart review  [x] EEG - mild-moderate slowing  [] neuro checks, NPO until swallow evaluation, seizure, fall & aspiration precautions  [x] DVT ppx as per primary team   [] Please note: if patient has a convulsion, please document length of episode, specifically what patient was doing paying attention to eye opening vs closure, gaze deviation, shaking of extremities, tongue bite, urinary incontinence, any derangement of vital signs    Neurology team to sign off at this time. If any questions or if MR brain obtained, please notify at [phone]. Finalized upon attestation. Thank you.

## 2025-06-24 NOTE — CHART NOTE - NSCHARTNOTEFT_GEN_A_CORE
patient transferred from sicu to floors  signed out to resident  all questions addressed and answered.    [id number]

## 2025-06-24 NOTE — PROGRESS NOTE ADULT - ASSESSMENT
85M PMH HTN, DM2, CKD (baseline creatinine 1.5-2), TIAs (on Eliquis at home), sinus node dysfunction (s/p PPM ~1 year ago), prostate cancer s/p prostectomy (~early 2000s), total thyroidectomy (~2013) presented after a fall and found to have lumbar stenosis with cauda equina impingement however on 6/4 developed acute abdominal pain and had a CT abdomen and pelvis that had signs of acute cholecystitis. On 6/5 patient underwent a robotic cholecystectomy. S/p IR gallbladder fossa collection drainage on 6/17. RRT called overnight for hypotension and hypoxia. SICU consulted for new oxygen requirements; r/o PE    PLAN:  NEUROLOGIC   - Pain control: tylenol, dilaudid prn, lidocaine patch  - 20 sec staring episode > neurology consulted     - rec CTA head neck > wnl     - rec vEEG and MRI - pending, family refusing MRI> vEEG neg    RESPIRATORY: DNR DNI; trial of NIV  - Monitor SpO2 goal >92%; wean NC currently 4L  - Pulmonary toileting-- chest PT, albuterol inhaler, duonebs, 3% inhalation, Dornase alpha  - CTA PE protocol: L chest wall hematoma unchanged, no PE, atelectasis  - COVID/flu swab - negative     CARDIOVASCULAR   - Monitor hemodynamics   - Lopressor 5 mg q6  - s/p PPM removal, MARIA LUISA w/o vegetations    GASTROINTESTINAL   - Diet: NPO  - failed dysphagia screen > SLP consult pending > failed formal SLP  - KO tube not within goc at this time  - Thiamine, Folic Acid, Dextrose containing maintenance fluids as below  - Pantoprazole     /RENAL   - BID lasix 20  - s/p diamox x 3 doses  - D5 1/2NS 20K @ 30cc/hr  - Strict I/Os  - Monitor BMP qd  - Monitor electrolytes, replete PRN    HEMATOLOGIC  - Monitor H/H   - DVT ppx: SQH & SCD's  - Aspirin (hx TIAs) rectally  - ? home eliquis held    INFECTIOUS DISEASE  - Monitor fever / WBC  - Ampicillin until 6/25    ENDOCRINE  - Monitor gluc  - ISS  - Levothyroxine    LINES  - RUE midline  - [place]    DISPO: Listed

## 2025-06-24 NOTE — PROGRESS NOTE ADULT - SUBJECTIVE AND OBJECTIVE BOX
Morning Surgical Progress Note  Patient is a 85y old  Male who presents with a chief complaint of back pain (24 Jun 2025 00:03)    SUBJECTIVE: Patient seen and examined at bedside with surgical team, patient without complaints.     Vital Signs Last 24 Hrs  T(C): 36.8 (24 Jun 2025 07:43), Max: 37.1 (24 Jun 2025 04:00)  T(F): 98.2 (24 Jun 2025 07:43), Max: 98.7 (24 Jun 2025 04:00)  HR: 74 (24 Jun 2025 07:43) (69 - 90)  BP: 144/80 (24 Jun 2025 07:43) (127/70 - 167/54)  BP(mean): 92 (24 Jun 2025 05:00) (76 - 107)  RR: 20 (24 Jun 2025 07:43) (15 - 27)  SpO2: 93% (24 Jun 2025 07:43) (93% - 100%)    Parameters below as of 24 Jun 2025 07:43  Patient On (Oxygen Delivery Method): nasal cannula  O2 Flow (L/min): 3  I&O's Detail    23 Jun 2025 07:01  -  24 Jun 2025 07:00  --------------------------------------------------------  IN:    dextrose 5% + sodium chloride 0.45% w/ Additives: 690 mL    IV PiggyBack: 800 mL  Total IN: 1490 mL    OUT:    Drain (mL): 15 mL    Indwelling Catheter - Urethral (mL): 3975 mL  Total OUT: 3990 mL    Total NET: -2500 mL      24 Jun 2025 07:01  -  24 Jun 2025 08:03  --------------------------------------------------------  IN:  Total IN: 0 mL    OUT:    Indwelling Catheter - Urethral (mL): 500 mL  Total OUT: 500 mL    Total NET: -500 mL        Medications  MEDICATIONS  (STANDING):  albuterol    90 MICROgram(s) HFA Inhaler 2 Puff(s) Inhalation every 6 hours  albuterol/ipratropium for Nebulization 3 milliLiter(s) Nebulizer every 6 hours  ampicillin  IVPB 2 Gram(s) IV Intermittent every 6 hours  aspirin Suppository 300 milliGRAM(s) Rectal daily  dextrose 5% + sodium chloride 0.45% with potassium chloride 20 mEq/L 1000 milliLiter(s) (30 mL/Hr) IV Continuous <Continuous>  dornase mayda Solution 2.5 milliGRAM(s) Inhalation daily  folic acid Injectable 1 milliGRAM(s) IV Push daily  furosemide   Injectable 20 milliGRAM(s) IV Push every 12 hours  heparin   Injectable 5000 Unit(s) SubCutaneous every 8 hours  insulin lispro (ADMELOG) corrective regimen sliding scale   SubCutaneous every 6 hours  levothyroxine Injectable 112 MICROGram(s) IV Push at bedtime  lidocaine   4% Patch 1 Patch Transdermal daily  metoprolol tartrate Injectable 5 milliGRAM(s) IV Push every 6 hours  pantoprazole  Injectable 40 milliGRAM(s) IV Push daily  potassium chloride  10 mEq/100 mL IVPB 10 milliEquivalent(s) IV Intermittent every 1 hour  sodium chloride 3%  Inhalation 4 milliLiter(s) Inhalation every 12 hours  thiamine Injectable 100 milliGRAM(s) IV Push daily    MEDICATIONS  (PRN):  HYDROmorphone  Injectable 0.2 milliGRAM(s) IV Push every 4 hours PRN Moderate Pain (4 - 6)  HYDROmorphone  Injectable 0.5 milliGRAM(s) IV Push every 4 hours PRN Severe Pain (7 - 10)    Physical Exam  Constitutional: Awake, NAD  Gastrointestinal: soft, nontender, nondistended. Incisions c/d/i, pattie ss    LABS:                        8.8    7.38  )-----------( 200      ( 24 Jun 2025 05:45 )             27.4     06-24    136  |  97[L]  |  12  ----------------------------<  170[H]  2.9[LL]   |  26  |  1.19    Ca    7.6[L]      24 Jun 2025 05:45  Phos  2.7     06-24  Mg     1.90     06-24          Urinalysis Basic - ( 24 Jun 2025 05:45 )    Color: x / Appearance: x / SG: x / pH: x  Gluc: 170 mg/dL / Ketone: x  / Bili: x / Urobili: x   Blood: x / Protein: x / Nitrite: x   Leuk Esterase: x / RBC: x / WBC x   Sq Epi: x / Non Sq Epi: x / Bacteria: x       Morning Surgical Progress Note  Patient is a 85y old  Male who presents with a chief complaint of back pain (24 Jun 2025 00:03)    SUBJECTIVE: Patient seen and examined at bedside with surgical team, patient without complaints. Daughters at bedside state that patients legs are more swollen then they were 2 days ago.     Vital Signs Last 24 Hrs  T(C): 36.8 (24 Jun 2025 07:43), Max: 37.1 (24 Jun 2025 04:00)  T(F): 98.2 (24 Jun 2025 07:43), Max: 98.7 (24 Jun 2025 04:00)  HR: 74 (24 Jun 2025 07:43) (69 - 90)  BP: 144/80 (24 Jun 2025 07:43) (127/70 - 167/54)  BP(mean): 92 (24 Jun 2025 05:00) (76 - 107)  RR: 20 (24 Jun 2025 07:43) (15 - 27)  SpO2: 93% (24 Jun 2025 07:43) (93% - 100%)    Parameters below as of 24 Jun 2025 07:43  Patient On (Oxygen Delivery Method): nasal cannula  O2 Flow (L/min): 3  I&O's Detail    23 Jun 2025 07:01  -  24 Jun 2025 07:00  --------------------------------------------------------  IN:    dextrose 5% + sodium chloride 0.45% w/ Additives: 690 mL    IV PiggyBack: 800 mL  Total IN: 1490 mL    OUT:    Drain (mL): 15 mL    Indwelling Catheter - Urethral (mL): 3975 mL  Total OUT: 3990 mL    Total NET: -2500 mL      24 Jun 2025 07:01  -  24 Jun 2025 08:03  --------------------------------------------------------  IN:  Total IN: 0 mL    OUT:    Indwelling Catheter - Urethral (mL): 500 mL  Total OUT: 500 mL    Total NET: -500 mL        Medications  MEDICATIONS  (STANDING):  albuterol    90 MICROgram(s) HFA Inhaler 2 Puff(s) Inhalation every 6 hours  albuterol/ipratropium for Nebulization 3 milliLiter(s) Nebulizer every 6 hours  ampicillin  IVPB 2 Gram(s) IV Intermittent every 6 hours  aspirin Suppository 300 milliGRAM(s) Rectal daily  dextrose 5% + sodium chloride 0.45% with potassium chloride 20 mEq/L 1000 milliLiter(s) (30 mL/Hr) IV Continuous <Continuous>  dornase mayda Solution 2.5 milliGRAM(s) Inhalation daily  folic acid Injectable 1 milliGRAM(s) IV Push daily  furosemide   Injectable 20 milliGRAM(s) IV Push every 12 hours  heparin   Injectable 5000 Unit(s) SubCutaneous every 8 hours  insulin lispro (ADMELOG) corrective regimen sliding scale   SubCutaneous every 6 hours  levothyroxine Injectable 112 MICROGram(s) IV Push at bedtime  lidocaine   4% Patch 1 Patch Transdermal daily  metoprolol tartrate Injectable 5 milliGRAM(s) IV Push every 6 hours  pantoprazole  Injectable 40 milliGRAM(s) IV Push daily  potassium chloride  10 mEq/100 mL IVPB 10 milliEquivalent(s) IV Intermittent every 1 hour  sodium chloride 3%  Inhalation 4 milliLiter(s) Inhalation every 12 hours  thiamine Injectable 100 milliGRAM(s) IV Push daily    MEDICATIONS  (PRN):  HYDROmorphone  Injectable 0.2 milliGRAM(s) IV Push every 4 hours PRN Moderate Pain (4 - 6)  HYDROmorphone  Injectable 0.5 milliGRAM(s) IV Push every 4 hours PRN Severe Pain (7 - 10)    Physical Exam  Constitutional: Awake and alert, NAD  Gastrointestinal: soft, nontender, nondistended. Incisions c/d/i, pattie ss  Ext: Left hand swollen, bl legs edematous  LABS:                        8.8    7.38  )-----------( 200      ( 24 Jun 2025 05:45 )             27.4     06-24    136  |  97[L]  |  12  ----------------------------<  170[H]  2.9[LL]   |  26  |  1.19    Ca    7.6[L]      24 Jun 2025 05:45  Phos  2.7     06-24  Mg     1.90     06-24          Urinalysis Basic - ( 24 Jun 2025 05:45 )    Color: x / Appearance: x / SG: x / pH: x  Gluc: 170 mg/dL / Ketone: x  / Bili: x / Urobili: x   Blood: x / Protein: x / Nitrite: x   Leuk Esterase: x / RBC: x / WBC x   Sq Epi: x / Non Sq Epi: x / Bacteria: x

## 2025-06-24 NOTE — CONSULT NOTE ADULT - PROBLEM SELECTOR RECOMMENDATION 8
Aspirin 300 qhs Outpatient on Eliquis; no evidence of AFib; however found to have multiple TIAs; started empirically on DOAC for prevention while further workup took place  Currently on

## 2025-06-24 NOTE — CONSULT NOTE ADULT - PROBLEM SELECTOR RECOMMENDATION 9
Patient NPO Since 6/20  Speech assesment 6/23 showing overt signs ofimpaired airway protection  dextrose 5% + sodium chloride 0.45% with potassium chloride 20 mEq/L 1000 milliLiter(s) (30 mL/Hr) IV Continuous Synthroid 112 IV qd Patient NPO Since 6/20  Speech assesment 6/23 showing overt signs ofimpaired airway protection  Feeding Tubes are against GOC  NG Tubes are in line, but not long term solution  Recent bacteremia multiple surgical procedures; poor candidate for TPN  dextrose 5% + sodium chloride 0.45% with potassium chloride 20 mEq/L 1000 milliLiter(s) (30 mL/Hr) IV Continuous Patient NPO Since 6/20  Speech assesment 6/23 showing overt signs ofimpaired airway protection  Feeding Tubes are against GOC  NG Tubes are in line, but not long term solution  Recent bacteremia, multiple surgical procedures; poor candidate for TPN  Repeat Speech Eval today; if fails; will require GOC discussion.  dextrose 5% + sodium chloride 0.45% with potassium chloride 20 mEq/L 1000 milliLiter(s) (30 mL/Hr) IV Continuous

## 2025-06-24 NOTE — PROGRESS NOTE ADULT - ASSESSMENT
-EKG w/ SR, no significant STT changes   -ECHO w/ normal EF no significant valvular heart disease     A/P:  85M w/ HTN, HLD, T2DM, prostate CA, CKD, TIA and SND s/p PPM transferred here for lumbar spinal cord compression and bacteremia s/p PPM explant and implant of Micra PPM.    1. bacteremia  -s/p cholecystectomy  -c/w abx per ID, primary team  6/13 afebrile, continues on abx per ID  6/19 c/w abx per ID  6/24 - c/w Ampicillin to 6/25    2. SND  -s/p leadless PPM w/ EP  6/16 recommend IV lasix 40mg daily for generalized edema  6/17 mild anarsaca, c/w diuresing lasix 20mg IV BID    3. HTN  -c/w clonidine, hydralazine, labetalol    4.  Pre-procedure clearance  6/17 pt optimized from cardiac srtandpoint and may proceed with cholecystostomy tube with moderate risk  6/18 s/p IR drain placement, remain hemodynamically stable    5. hypoxia  6/20 - admitted to SICU, CTA no PE; bipap, pulm toilet, diamox   6/23 - off bipap, continues with lasix IV 20mg q12 -EKG w/ SR, no significant STT changes   -ECHO w/ normal EF no significant valvular heart disease     A/P:  85M w/ HTN, HLD, T2DM, prostate CA, CKD, TIA and SND s/p PPM transferred here for lumbar spinal cord compression and bacteremia s/p PPM explant and implant of Micra PPM.    1. bacteremia  -s/p cholecystectomy  -c/w abx per ID, primary team  6/13 afebrile, continues on abx per ID  6/19 c/w abx per ID  6/24 - c/w Ampicillin to 6/25    2. SND  -s/p leadless PPM w/ EP  6/16 recommend IV lasix 40mg daily for generalized edema  6/17 mild anarsaca, c/w diuresing lasix 20mg IV BID    3. HTN  -c/w clonidine, hydralazine, labetalol    4.  Pre-procedure clearance  6/17 pt optimized from cardiac srtandpoint and may proceed with cholecystostomy tube with moderate risk  6/18 s/p IR drain placement, remain hemodynamically stable    5. hypoxia  6/20 - admitted to SICU, CTA no PE; bipap, pulm toilet, diamox   6/23 - off bipap, continues with lasix IV 20mg q12 can increase to lasix IV 40mg BID sec to worsening LE edema , check K twice daily and replace

## 2025-06-24 NOTE — CONSULT NOTE ADULT - PROBLEM SELECTOR RECOMMENDATION 10
Diet: NPO  Psych/Sleep:  GI: Protonix  DVT ppx: Heparin SQH  Code Status: DNR/DNI  Dispo: Diet: NPO  Psych/Sleep:  GI: Protonix  DVT ppx: Heparin SQH  Code Status: DNR/DNI  Dispo: [facility]

## 2025-06-24 NOTE — CONSULT NOTE ADULT - PROBLEM SELECTOR RECOMMENDATION 2
Duonebs q6h  Lasix 20 IV BID Likely in the setting of aspiration  Patient afebrile; no abx received; progressing LLL Pulmonary effusion    Plan:  Duonebs q6h  3% NaCl Nebulized with Duonebs  Dornase Alpha  Chest PT q8h

## 2025-06-24 NOTE — PROGRESS NOTE ADULT - SUBJECTIVE AND OBJECTIVE BOX
Timothy Mojica MD  Interventional Cardiology / Advance Heart Failure and Cardiac Transplant Specialist  [place] Office : [phone] [place] [address] [place]. [address]  Tel:   [place] Office : [phone] [facility] [place] [address]  Tel: [phone]       Pt is lying in bed , now on 3l     	  MEDICATIONS:  furosemide   Injectable 20 milliGRAM(s) IV Push every 12 hours  heparin   Injectable 5000 Unit(s) SubCutaneous every 8 hours  metoprolol tartrate Injectable 5 milliGRAM(s) IV Push every 6 hours    ampicillin  IVPB 2 Gram(s) IV Intermittent every 6 hours    albuterol    90 MICROgram(s) HFA Inhaler 2 Puff(s) Inhalation every 6 hours  albuterol/ipratropium for Nebulization 3 milliLiter(s) Nebulizer every 6 hours  dornase mayda Solution 2.5 milliGRAM(s) Inhalation daily  sodium chloride 3%  Inhalation 4 milliLiter(s) Inhalation every 12 hours    aspirin Suppository 300 milliGRAM(s) Rectal daily  HYDROmorphone  Injectable 0.2 milliGRAM(s) IV Push every 4 hours PRN  HYDROmorphone  Injectable 0.5 milliGRAM(s) IV Push every 4 hours PRN    pantoprazole  Injectable 40 milliGRAM(s) IV Push daily    insulin lispro (ADMELOG) corrective regimen sliding scale   SubCutaneous every 6 hours  levothyroxine Injectable 112 MICROGram(s) IV Push at bedtime    dextrose 5% + sodium chloride 0.45% with potassium chloride 20 mEq/L 1000 milliLiter(s) IV Continuous <Continuous>  folic acid Injectable 1 milliGRAM(s) IV Push daily  lidocaine   4% Patch 1 Patch Transdermal daily  potassium chloride  10 mEq/100 mL IVPB 10 milliEquivalent(s) IV Intermittent every 1 hour  potassium chloride  10 mEq/100 mL IVPB 10 milliEquivalent(s) IV Intermittent every 1 hour  thiamine Injectable 100 milliGRAM(s) IV Push daily      PAST MEDICAL/SURGICAL HISTORY  PAST MEDICAL & SURGICAL HISTORY:  HTN (hypertension)      HLD (hyperlipidemia)      DM (diabetes mellitus)      TIA (transient ischemic attack)      Prostate cancer      S/P prostatectomy      Pacemaker      H/O thyroidectomy          SOCIAL HISTORY: Substance Use (street drugs): ( x ) never used  (  ) other:    FAMILY HISTORY:  FHx: heart disease (Father, Mother)        PHYSICAL EXAM:  T(C): 37.2 (06-24-25 @ 10:02), Max: 37.2 (06-24-25 @ 10:02)  HR: 74 (06-24-25 @ 10:02) (71 - 83)  BP: 145/73 (06-24-25 @ 10:02) (127/70 - 167/54)  RR: 19 (06-24-25 @ 10:02) (19 - 23)  SpO2: 98% (06-24-25 @ 10:02) (93% - 100%)  Wt(kg): --  I&O's Summary    23 Jun 2025 07:01  -  24 Jun 2025 07:00  --------------------------------------------------------  IN: 1490 mL / OUT: 3990 mL / NET: -2500 mL    24 Jun 2025 07:01  -  24 Jun 2025 12:47  --------------------------------------------------------  IN: 0 mL / OUT: 500 mL / NET: -500 mL        GENERAL: NAD  EYES:  EOMI  ENMT: NGT Moist mucous membranes  Cardiovascular: Normal S1 S2, No JVD, No murmurs, +b/l LUE edema L>R, LE edema  Respiratory: bipap	  Gastrointestinal:  Soft, Non-tender, + BS	  Extremities: b/l UE, LE edema                          8.8    7.38  )-----------( 200      ( 24 Jun 2025 05:45 )             27.4     06-24    136  |  97[L]  |  12  ----------------------------<  170[H]  2.9[LL]   |  26  |  1.19    Ca    7.6[L]      24 Jun 2025 05:45  Phos  2.7     06-24  Mg     1.90     06-24      proBNP:   Lipid Profile:   HgA1c:   TSH:     Consultant(s) Notes Reviewed:  [x ] YES  [ ] NO    Care Discussed with Consultants/Other Providers [ x] YES  [ ] NO    Imaging Personally Reviewed independently:  [x] YES  [ ] NO    All labs, radiologic studies, vitals, orders and medications list reviewed. Patient is seen and examined at bedside. Case discussed with medical team.

## 2025-06-24 NOTE — PROGRESS NOTE ADULT - ASSESSMENT
85 year old man with multiple chronic medical comorbidities including hypertension, sinus node dysfunction, type two diabetes, and chronic kidney disease (baseline creatinine 1.5-2.0) presently admitted for lower back pain associated with lumbar compression deformities with concern for cauda equina impingement. He was found to be bacteremic (E faecalis) due to acute acalculous cholecystitis, and is now seen following laparoscopic cholecystectomy with Dr. Triny Verde 6/5/2025. POD 1 patient became hypotensive, unresponsive to 2L fluid resuscitation and 3 u pRBC and was started on vasopressors and transferred to SICU.Patient weaned off pressors, course c/b ileus now resolved, Endocrinology was consulted for management of diabetes mellitus.    Type 2 Diabetes Mellitus  - HbA1c 6.9%   - home regimen:  lantus 24 units qhs, admelog 12 units + tradjenta 5 mg    Inpatient plan   - FS goal 100-180 mg/dl : at goal on low dose scales.  - Pt remains NPO  - Hold Lantus when NPO- when diet reordered and if pt eating can reorder low dose Lantus 5 units daily.   - continue low Admelog correctional scale TID AC  ---> q6 if NPO  - continue separate low Admelog correctional scale at HS   - Continue off Premeal insulin for now  - If fingerstick >180 x 2, can start Admelog 2 units TID AC. Hold if not eating/NPO.   - FS TID AC & HS ---> q6 if NPO   - hypoglycemia protocol PRN   - consistent carbohydrate diet    Discharge plan  - per family, plan to discharge to rehab   - tentatively, Lantus + Tradjenta. Hold Admelog.   - f/u with Dr. Cates      Hypertension  - BP goal <130/80  - Continue metoprolol  - Management as per primary team      Hyperlipidemia  - LDL goal <70   - Check lipid panel if not done recently   - management per primary team       hypothyroidism  - TSH 2.265 WNL  - Patient on levothyroxine 150 mcg daily at home  - continue Levothyroxine 112 mcg IV daily    Compression deformities (lumbar)  - outpatient DXA      BRET Rodriges-BC  Nurse Practitioner  Division of Endocrinology  Contact on TEAMS    If out of hospital/unavailable when paged, please note: patient will be cared for by another provider on the endocrine service.  For urgent concerns: call the endocrine answering service for assistance to reach covering provider ([phone]). For non-urgent matters: please email [email or web address] for assistance.

## 2025-06-24 NOTE — PROGRESS NOTE ADULT - ASSESSMENT
85 M w/ multiple chronic medical comorbidities including hypertension, sinus node dysfunction, type two diabetes, and chronic kidney disease (baseline creatinine 1.5-2.0) presently admitted for lower back pain associated with lumbar compression deformities with concern for cauda equina impingement. He was found to be bacteremic (E faecalis) due to acute acalculous cholecystitis, s/p removal of pacemaker, and is now seen following laparoscopic cholecystectomy 6/5/2025. POD 1 patient became hypotensive, unresponsive to 2L fluid resuscitation and 3 u pRBC and was started on vasopressors and transferred to SICU. Patient weaned off pressors, course c/b ileus now resolved, NGT removed 6/10. Midline placed 6/12 for IV abx, patient now with ileus again ct scanned 6/16 found to have collection in gb fossa, drained by IR on 6/17 patient was a rapid for hypotension and hypoxia requiring sicu admission, patient diuresed with lasix gtt and diamox now improved and patient was transferred to the floor 6/24    Plan:   - Failed S&S yesterday will need repeat in future  - DVT ppx: sqh  - ASA81  - pain control  - c/w Ampicillin to 6/25  - will be d/c'd with wong (hx cauda equina)   - possible spine decompression with ortho in future with Dr Gruber  - increase antihypertensives per cards  - ortho recs re: spinal surgery appreciated  - Medicine transfer  - dispo planning: [facility] in future  - Seen and discussed with B team    B team   [id number]   85 M w/ multiple chronic medical comorbidities including hypertension, sinus node dysfunction, type two diabetes, and chronic kidney disease (baseline creatinine 1.5-2.0) presently admitted for lower back pain associated with lumbar compression deformities with concern for cauda equina impingement. He was found to be bacteremic (E faecalis) due to acute acalculous cholecystitis, s/p removal of pacemaker, and is now seen following laparoscopic cholecystectomy 6/5/2025. POD 1 patient became hypotensive, unresponsive to 2L fluid resuscitation and 3 u pRBC and was started on vasopressors and transferred to SICU. Patient weaned off pressors, course c/b ileus now resolved, NGT removed 6/10. Midline placed 6/12 for IV abx, patient now with ileus again ct scanned 6/16 found to have collection in gb fossa, drained by IR on 6/17 patient was a rapid for hypotension and hypoxia requiring sicu admission, patient diuresed with lasix gtt and diamox now improved and patient was transferred to the floor 6/24    Plan:   - f/u cards about increasing lasix  - 6 runs of potassium   - Failed S&S yesterday will need repeat in future  - DVT ppx: sqh  - ASA81  - pain control  - c/w Ampicillin to 6/25  - will be d/c'd with judith (hx cauda equina)   - possible spine decompression with ortho in future with Dr Gruber  - increase antihypertensives per cards  - ortho recs re: spinal surgery appreciated  - Medicine transfer  - dispo planning: [facility] in future  - Seen and discussed with B team    B team   [id number]   85 M w/ multiple chronic medical comorbidities including hypertension, sinus node dysfunction, type two diabetes, and chronic kidney disease (baseline creatinine 1.5-2.0) presently admitted for lower back pain associated with lumbar compression deformities with concern for cauda equina impingement. He was found to be bacteremic (E faecalis) due to acute acalculous cholecystitis, s/p removal of pacemaker, and is now seen following laparoscopic cholecystectomy 6/5/2025. POD 1 patient became hypotensive, unresponsive to 2L fluid resuscitation and 3 u pRBC and was started on vasopressors and transferred to SICU. Patient weaned off pressors, course c/b ileus now resolved, NGT removed 6/10. Midline placed 6/12 for IV abx, patient now with ileus again ct scanned 6/16 found to have collection in gb fossa, drained by IR on 6/17 patient was a rapid for hypotension and hypoxia requiring sicu admission, patient diuresed with lasix gtt and diamox now improved and patient was transferred to the floor 6/24    Plan:   - f/u cards about increasing lasix  - 6 runs of potassium   - Failed S&S yesterday will need repeat in future  - DVT ppx: sqh  - ASA81  - pain control  - c/w Ampicillin to 6/25  - will be d/c'd with judith (hx cauda equina)   - possible spine decompression with ortho in future with Dr Gruber  - increase antihypertensives per cards  - ortho recs re: spinal surgery appreciated  - Medicine transfer  - dispo planning: [facility] in future  -  as per daughter patient has been on that for years as per his cardiologist Dr Uvaldo Pro  - Patient takes Eliquis 2.5 bid for history of TIAs  - Seen and discussed with B team    B team   [id number]

## 2025-06-24 NOTE — PROGRESS NOTE ADULT - SUBJECTIVE AND OBJECTIVE BOX
Chief Complaint: T2DM    Interval Events: Pt seen and examined at bedside earlier today.  Daughters at bedside. Pt much more responsive today. Without complaints.     MEDICATIONS  (STANDING):  albuterol    90 MICROgram(s) HFA Inhaler 2 Puff(s) Inhalation every 6 hours  albuterol/ipratropium for Nebulization 3 milliLiter(s) Nebulizer every 6 hours  ampicillin  IVPB 2 Gram(s) IV Intermittent every 6 hours  aspirin Suppository 300 milliGRAM(s) Rectal daily  dextrose 5% + sodium chloride 0.45% with potassium chloride 20 mEq/L 1000 milliLiter(s) (30 mL/Hr) IV Continuous <Continuous>  dornase mayda Solution 2.5 milliGRAM(s) Inhalation daily  folic acid Injectable 1 milliGRAM(s) IV Push daily  furosemide   Injectable 40 milliGRAM(s) IV Push two times a day  heparin   Injectable 5000 Unit(s) SubCutaneous every 8 hours  insulin lispro (ADMELOG) corrective regimen sliding scale   SubCutaneous every 6 hours  levothyroxine Injectable 112 MICROGram(s) IV Push at bedtime  lidocaine   4% Patch 1 Patch Transdermal daily  metoprolol tartrate Injectable 5 milliGRAM(s) IV Push every 6 hours  pantoprazole  Injectable 40 milliGRAM(s) IV Push daily  potassium chloride  10 mEq/100 mL IVPB 10 milliEquivalent(s) IV Intermittent every 1 hour  sodium chloride 3%  Inhalation 4 milliLiter(s) Inhalation every 12 hours  thiamine Injectable 100 milliGRAM(s) IV Push daily    MEDICATIONS  (PRN):  HYDROmorphone  Injectable 0.2 milliGRAM(s) IV Push every 4 hours PRN Moderate Pain (4 - 6)  HYDROmorphone  Injectable 0.5 milliGRAM(s) IV Push every 4 hours PRN Severe Pain (7 - 10)      Allergies    gabapentin (Other)    Intolerances      Review of Systems:  Cardiovascular: No chest pain, palpitations  Respiratory: No SOB, no cough  GI: No nausea, vomiting, abdominal pain  : No dysuria    VITALS: T(C): 37.2 (06-24-25 @ 10:02)  T(F): 98.9 (06-24-25 @ 10:02), Max: 98.9 (06-24-25 @ 10:02)  HR: 74 (06-24-25 @ 10:02) (71 - 83)  BP: 145/73 (06-24-25 @ 10:02) (127/70 - 167/54)  RR:  (19 - 23)  SpO2:  (93% - 100%)  Wt(kg): --      Physical Exam:   GENERAL: NAD, well-developed  EYES: No proptosis  HEENT:  Atraumatic, Normocephalic  RESPIRATORY: non labored breathing   GI: Non distended  PSYCH: Alert, normal affect, normal mood    CAPILLARY BLOOD GLUCOSE    POCT Blood Glucose.: 181 mg/dL (24 Jun 2025 11:24)  POCT Blood Glucose.: 193 mg/dL (24 Jun 2025 07:40)  POCT Blood Glucose.: 174 mg/dL (24 Jun 2025 05:00)  POCT Blood Glucose.: 177 mg/dL (24 Jun 2025 00:49)  POCT Blood Glucose.: 145 mg/dL (23 Jun 2025 17:54)      06-24    136  |  97[L]  |  12  ----------------------------<  170[H]  2.9[LL]   |  26  |  1.19    eGFR: 59[L]    Ca    7.6[L]      06-24  Mg     1.90     06-24  Phos  2.7     06-24        A1C with Estimated Average Glucose Result: 6.9 % (05-07-25 @ 06:03)  A1C with Estimated Average Glucose Result: 7.4 % (11-10-24 @ 06:55)      Thyroid Function Tests:

## 2025-06-25 LAB
ANION GAP SERPL CALC-SCNC: 14 MMOL/L — SIGNIFICANT CHANGE UP (ref 7–14)
ANION GAP SERPL CALC-SCNC: 15 MMOL/L — HIGH (ref 7–14)
BUN SERPL-MCNC: 10 MG/DL — SIGNIFICANT CHANGE UP (ref 7–23)
BUN SERPL-MCNC: 11 MG/DL — SIGNIFICANT CHANGE UP (ref 7–23)
CALCIUM SERPL-MCNC: 7.6 MG/DL — LOW (ref 8.4–10.5)
CALCIUM SERPL-MCNC: 7.9 MG/DL — LOW (ref 8.4–10.5)
CHLORIDE SERPL-SCNC: 96 MMOL/L — LOW (ref 98–107)
CHLORIDE SERPL-SCNC: 96 MMOL/L — LOW (ref 98–107)
CO2 SERPL-SCNC: 25 MMOL/L — SIGNIFICANT CHANGE UP (ref 22–31)
CO2 SERPL-SCNC: 26 MMOL/L — SIGNIFICANT CHANGE UP (ref 22–31)
CREAT SERPL-MCNC: 0.99 MG/DL — SIGNIFICANT CHANGE UP (ref 0.5–1.3)
CREAT SERPL-MCNC: 1.07 MG/DL — SIGNIFICANT CHANGE UP (ref 0.5–1.3)
EGFR: 67 ML/MIN/1.73M2 — SIGNIFICANT CHANGE UP
EGFR: 67 ML/MIN/1.73M2 — SIGNIFICANT CHANGE UP
EGFR: 74 ML/MIN/1.73M2 — SIGNIFICANT CHANGE UP
EGFR: 74 ML/MIN/1.73M2 — SIGNIFICANT CHANGE UP
GLUCOSE BLDC GLUCOMTR-MCNC: 166 MG/DL — HIGH (ref 70–99)
GLUCOSE BLDC GLUCOMTR-MCNC: 168 MG/DL — HIGH (ref 70–99)
GLUCOSE BLDC GLUCOMTR-MCNC: 178 MG/DL — HIGH (ref 70–99)
GLUCOSE BLDC GLUCOMTR-MCNC: 183 MG/DL — HIGH (ref 70–99)
GLUCOSE BLDC GLUCOMTR-MCNC: 186 MG/DL — HIGH (ref 70–99)
GLUCOSE BLDC GLUCOMTR-MCNC: 188 MG/DL — HIGH (ref 70–99)
GLUCOSE BLDC GLUCOMTR-MCNC: 199 MG/DL — HIGH (ref 70–99)
GLUCOSE SERPL-MCNC: 181 MG/DL — HIGH (ref 70–99)
GLUCOSE SERPL-MCNC: 182 MG/DL — HIGH (ref 70–99)
HCT VFR BLD CALC: 27.8 % — LOW (ref 39–50)
HGB BLD-MCNC: 9 G/DL — LOW (ref 13–17)
MAGNESIUM SERPL-MCNC: 1.9 MG/DL — SIGNIFICANT CHANGE UP (ref 1.6–2.6)
MAGNESIUM SERPL-MCNC: 2 MG/DL — SIGNIFICANT CHANGE UP (ref 1.6–2.6)
MCHC RBC-ENTMCNC: 28.3 PG — SIGNIFICANT CHANGE UP (ref 27–34)
MCHC RBC-ENTMCNC: 32.4 G/DL — SIGNIFICANT CHANGE UP (ref 32–36)
MCV RBC AUTO: 87.4 FL — SIGNIFICANT CHANGE UP (ref 80–100)
NRBC # BLD AUTO: 0 K/UL — SIGNIFICANT CHANGE UP (ref 0–0)
NRBC # FLD: 0 K/UL — SIGNIFICANT CHANGE UP (ref 0–0)
NRBC BLD AUTO-RTO: 0 /100 WBCS — SIGNIFICANT CHANGE UP (ref 0–0)
PHOSPHATE SERPL-MCNC: 2 MG/DL — LOW (ref 2.5–4.5)
PHOSPHATE SERPL-MCNC: 2.6 MG/DL — SIGNIFICANT CHANGE UP (ref 2.5–4.5)
PLATELET # BLD AUTO: 209 K/UL — SIGNIFICANT CHANGE UP (ref 150–400)
PMV BLD: 11.1 FL — SIGNIFICANT CHANGE UP (ref 7–13)
POTASSIUM SERPL-MCNC: 3.3 MMOL/L — LOW (ref 3.5–5.3)
POTASSIUM SERPL-MCNC: 3.9 MMOL/L — SIGNIFICANT CHANGE UP (ref 3.5–5.3)
POTASSIUM SERPL-SCNC: 3.3 MMOL/L — LOW (ref 3.5–5.3)
POTASSIUM SERPL-SCNC: 3.9 MMOL/L — SIGNIFICANT CHANGE UP (ref 3.5–5.3)
RBC # BLD: 3.18 M/UL — LOW (ref 4.2–5.8)
RBC # FLD: 16.9 % — HIGH (ref 10.3–14.5)
SODIUM SERPL-SCNC: 136 MMOL/L — SIGNIFICANT CHANGE UP (ref 135–145)
SODIUM SERPL-SCNC: 136 MMOL/L — SIGNIFICANT CHANGE UP (ref 135–145)
WBC # BLD: 11.51 K/UL — HIGH (ref 3.8–10.5)
WBC # FLD AUTO: 11.51 K/UL — HIGH (ref 3.8–10.5)

## 2025-06-25 PROCEDURE — 99232 SBSQ HOSP IP/OBS MODERATE 35: CPT

## 2025-06-25 RX ORDER — POTASSIUM PHOSPHATE, MONOBASIC POTASSIUM PHOSPHATE, DIBASIC INJECTION, 236; 224 MG/ML; MG/ML
15 SOLUTION, CONCENTRATE INTRAVENOUS ONCE
Refills: 0 | Status: DISCONTINUED | OUTPATIENT
Start: 2025-06-25 | End: 2025-06-25

## 2025-06-25 RX ORDER — MAGNESIUM SULFATE 500 MG/ML
1 SYRINGE (ML) INJECTION ONCE
Refills: 0 | Status: COMPLETED | OUTPATIENT
Start: 2025-06-25 | End: 2025-06-25

## 2025-06-25 RX ORDER — POTASSIUM PHOSPHATE, MONOBASIC POTASSIUM PHOSPHATE, DIBASIC INJECTION, 236; 224 MG/ML; MG/ML
15 SOLUTION, CONCENTRATE INTRAVENOUS ONCE
Refills: 0 | Status: COMPLETED | OUTPATIENT
Start: 2025-06-25 | End: 2025-06-26

## 2025-06-25 RX ADMIN — Medication 40 MILLIGRAM(S): at 13:51

## 2025-06-25 RX ADMIN — DORNASE ALFA 2.5 MILLIGRAM(S): 1 SOLUTION RESPIRATORY (INHALATION) at 10:32

## 2025-06-25 RX ADMIN — FUROSEMIDE 40 MILLIGRAM(S): 10 INJECTION INTRAMUSCULAR; INTRAVENOUS at 18:54

## 2025-06-25 RX ADMIN — Medication 4 MILLILITER(S): at 15:39

## 2025-06-25 RX ADMIN — AMPICILLIN SODIUM 200 GRAM(S): 1 INJECTION, POWDER, FOR SOLUTION INTRAMUSCULAR; INTRAVENOUS at 00:38

## 2025-06-25 RX ADMIN — HEPARIN SODIUM 5000 UNIT(S): 1000 INJECTION INTRAVENOUS; SUBCUTANEOUS at 07:28

## 2025-06-25 RX ADMIN — Medication 112 MICROGRAM(S): at 22:00

## 2025-06-25 RX ADMIN — LIDOCAINE HYDROCHLORIDE 1 PATCH: 20 JELLY TOPICAL at 00:51

## 2025-06-25 RX ADMIN — IPRATROPIUM BROMIDE AND ALBUTEROL SULFATE 3 MILLILITER(S): .5; 2.5 SOLUTION RESPIRATORY (INHALATION) at 10:31

## 2025-06-25 RX ADMIN — FOLIC ACID 1 MILLIGRAM(S): 1 TABLET ORAL at 13:52

## 2025-06-25 RX ADMIN — INSULIN LISPRO 1: 100 INJECTION, SOLUTION INTRAVENOUS; SUBCUTANEOUS at 18:53

## 2025-06-25 RX ADMIN — Medication 300 MILLIGRAM(S): at 13:52

## 2025-06-25 RX ADMIN — INSULIN LISPRO 1: 100 INJECTION, SOLUTION INTRAVENOUS; SUBCUTANEOUS at 06:47

## 2025-06-25 RX ADMIN — Medication 100 MILLIEQUIVALENT(S): at 13:53

## 2025-06-25 RX ADMIN — POTASSIUM PHOSPHATE, MONOBASIC POTASSIUM PHOSPHATE, DIBASIC INJECTION, 62.5 MILLIMOLE(S): 236; 224 SOLUTION, CONCENTRATE INTRAVENOUS at 07:50

## 2025-06-25 RX ADMIN — Medication 0.2 MILLIGRAM(S): at 10:10

## 2025-06-25 RX ADMIN — Medication 0.2 MILLIGRAM(S): at 19:53

## 2025-06-25 RX ADMIN — INSULIN LISPRO 1: 100 INJECTION, SOLUTION INTRAVENOUS; SUBCUTANEOUS at 00:41

## 2025-06-25 RX ADMIN — AMPICILLIN SODIUM 200 GRAM(S): 1 INJECTION, POWDER, FOR SOLUTION INTRAMUSCULAR; INTRAVENOUS at 06:51

## 2025-06-25 RX ADMIN — IPRATROPIUM BROMIDE AND ALBUTEROL SULFATE 3 MILLILITER(S): .5; 2.5 SOLUTION RESPIRATORY (INHALATION) at 21:11

## 2025-06-25 RX ADMIN — Medication 4 MILLILITER(S): at 21:11

## 2025-06-25 RX ADMIN — FUROSEMIDE 40 MILLIGRAM(S): 10 INJECTION INTRAMUSCULAR; INTRAVENOUS at 06:36

## 2025-06-25 RX ADMIN — INSULIN LISPRO 1: 100 INJECTION, SOLUTION INTRAVENOUS; SUBCUTANEOUS at 11:49

## 2025-06-25 RX ADMIN — Medication 100 GRAM(S): at 22:41

## 2025-06-25 RX ADMIN — Medication 4 MILLILITER(S): at 03:26

## 2025-06-25 RX ADMIN — HEPARIN SODIUM 5000 UNIT(S): 1000 INJECTION INTRAVENOUS; SUBCUTANEOUS at 15:53

## 2025-06-25 RX ADMIN — IPRATROPIUM BROMIDE AND ALBUTEROL SULFATE 3 MILLILITER(S): .5; 2.5 SOLUTION RESPIRATORY (INHALATION) at 03:26

## 2025-06-25 RX ADMIN — METOPROLOL SUCCINATE 5 MILLIGRAM(S): 50 TABLET, EXTENDED RELEASE ORAL at 18:53

## 2025-06-25 RX ADMIN — Medication 100 MILLIEQUIVALENT(S): at 14:56

## 2025-06-25 RX ADMIN — Medication 0.2 MILLIGRAM(S): at 11:10

## 2025-06-25 RX ADMIN — Medication 100 MILLIGRAM(S): at 13:51

## 2025-06-25 RX ADMIN — Medication 100 GRAM(S): at 04:14

## 2025-06-25 RX ADMIN — METOPROLOL SUCCINATE 5 MILLIGRAM(S): 50 TABLET, EXTENDED RELEASE ORAL at 06:44

## 2025-06-25 RX ADMIN — Medication 4 MILLILITER(S): at 10:32

## 2025-06-25 RX ADMIN — Medication 100 MILLIEQUIVALENT(S): at 15:53

## 2025-06-25 RX ADMIN — Medication 0.2 MILLIGRAM(S): at 18:53

## 2025-06-25 RX ADMIN — IPRATROPIUM BROMIDE AND ALBUTEROL SULFATE 3 MILLILITER(S): .5; 2.5 SOLUTION RESPIRATORY (INHALATION) at 15:38

## 2025-06-25 RX ADMIN — METOPROLOL SUCCINATE 5 MILLIGRAM(S): 50 TABLET, EXTENDED RELEASE ORAL at 13:52

## 2025-06-25 NOTE — PROGRESS NOTE ADULT - ASSESSMENT
85 year old man with multiple chronic medical comorbidities including hypertension, sinus node dysfunction, type two diabetes, and chronic kidney disease (baseline creatinine 1.5-2.0) presently admitted for lower back pain associated with lumbar compression deformities with concern for cauda equina impingement. He was found to be bacteremic (E faecalis) due to acute acalculous cholecystitis, and is now seen following laparoscopic cholecystectomy with Dr. Triny Verde 6/5/2025. POD 1 patient became hypotensive, unresponsive to 2L fluid resuscitation and 3 u pRBC and was started on vasopressors and transferred to SICU.Patient weaned off pressors, course c/b ileus now resolved, Endocrinology was consulted for management of diabetes mellitus.    Type 2 Diabetes Mellitus  - HbA1c 6.9%   - home regimen:  lantus 24 units qhs, admelog 12 units + tradjenta 5 mg    Inpatient plan   - FS goal 100-180 mg/dl : at goal on low dose scales.  - Pt remains NPO  - Hold Lantus when NPO- when diet reordered and if pt eating can reorder low dose Lantus 5 units daily.   - continue low Admelog correctional scale TID AC  ---> q6 if NPO  - continue separate low Admelog correctional scale at HS   - Continue off Premeal insulin for now  - If fingerstick >180 x 2, can start Admelog 2 units TID AC. Hold if not eating/NPO.   - FS TID AC & HS ---> q6 if NPO   - hypoglycemia protocol PRN   - consistent carbohydrate diet    Discharge plan  - per family, plan to discharge to rehab   - tentatively, Lantus + Tradjenta. Hold Admelog.   - f/u with Dr. Cates      Hypertension  - BP goal <130/80  - Continue metoprolol  - Management as per primary team      Hyperlipidemia  - LDL goal <70   - Check lipid panel if not done recently   - management per primary team       hypothyroidism  - TSH 2.265 WNL  - Patient on levothyroxine 150 mcg daily at home  - continue Levothyroxine 112 mcg IV daily  - ordered TSH with reflex Free T4 for am    Compression deformities (lumbar)  - outpatient DXA      BRET Rodriges-BC  Nurse Practitioner  Division of Endocrinology  Contact on TEAMS    If out of hospital/unavailable when paged, please note: patient will be cared for by another provider on the endocrine service.  For urgent concerns: call the endocrine answering service for assistance to reach covering provider ([phone]). For non-urgent matters: please email [email or web address] for assistance.

## 2025-06-25 NOTE — PROGRESS NOTE ADULT - SUBJECTIVE AND OBJECTIVE BOX
Morning Surgical Progress Note  Patient is a 85y old  Male who presents with a chief complaint of back pain (24 Jun 2025 00:03)    SUBJECTIVE: Patient seen and examined at bedside with surgical team. Patient resting comfortably in bed. patient denies difficulty breathing or any other complaints at this time    Vital Signs Last 24 Hrs  T(C): 36.5 (06-25-25 @ 05:20), Max: 37.2 (06-24-25 @ 10:02)  HR: 85 (06-25-25 @ 05:20) (70 - 85)  BP: 126/99 (06-25-25 @ 05:20) (126/99 - 168/68)  BP(mean): --  ABP: --  ABP(mean): --  RR: 18 (06-25-25 @ 05:20) (18 - 20)  SpO2: 91% (06-25-25 @ 05:20) (88% - 99%)  Wt(kg): --  CVP(mm Hg): --  CI: --  CAPILLARY BLOOD GLUCOSE      POCT Blood Glucose.: 168 mg/dL (25 Jun 2025 06:04)  POCT Blood Glucose.: 183 mg/dL (25 Jun 2025 00:20)  POCT Blood Glucose.: 175 mg/dL (24 Jun 2025 18:01)  POCT Blood Glucose.: 181 mg/dL (24 Jun 2025 11:24)   N/A      06-24 @ 07:01  -  06-25 @ 07:00  --------------------------------------------------------  IN:    dextrose 5% + sodium chloride 0.45% w/ Additives: 180 mL  Total IN: 180 mL    OUT:    Drain (mL): 5 mL    Indwelling Catheter - Urethral (mL): 3200 mL    Oral Fluid: 0 mL  Total OUT: 3205 mL    Total NET: -3025 mL    Medications  MEDICATIONS  (STANDING):  albuterol    90 MICROgram(s) HFA Inhaler 2 Puff(s) Inhalation every 6 hours  albuterol/ipratropium for Nebulization 3 milliLiter(s) Nebulizer every 6 hours  aspirin Suppository 300 milliGRAM(s) Rectal daily  dextrose 5% + sodium chloride 0.45% with potassium chloride 20 mEq/L 1000 milliLiter(s) (30 mL/Hr) IV Continuous <Continuous>  dornase mayda Solution 2.5 milliGRAM(s) Inhalation daily  folic acid Injectable 1 milliGRAM(s) IV Push daily  furosemide   Injectable 40 milliGRAM(s) IV Push two times a day  heparin   Injectable 5000 Unit(s) SubCutaneous every 8 hours  insulin lispro (ADMELOG) corrective regimen sliding scale   SubCutaneous every 6 hours  levothyroxine Injectable 112 MICROGram(s) IV Push at bedtime  lidocaine   4% Patch 1 Patch Transdermal daily  metoprolol tartrate Injectable 5 milliGRAM(s) IV Push every 6 hours  pantoprazole  Injectable 40 milliGRAM(s) IV Push daily  potassium chloride  10 mEq/100 mL IVPB 10 milliEquivalent(s) IV Intermittent every 1 hour  potassium chloride  10 mEq/100 mL IVPB 10 milliEquivalent(s) IV Intermittent every 1 hour  potassium phosphate IVPB 15 milliMole(s) IV Intermittent once  sodium chloride 3%  Inhalation 4 milliLiter(s) Inhalation every 6 hours  thiamine Injectable 100 milliGRAM(s) IV Push daily    MEDICATIONS  (PRN):  HYDROmorphone  Injectable 0.2 milliGRAM(s) IV Push every 4 hours PRN Moderate Pain (4 - 6)  HYDROmorphone  Injectable 0.5 milliGRAM(s) IV Push every 4 hours PRN Severe Pain (7 - 10)      Physical Exam  Constitutional: Awake and alert, NAD  Gastrointestinal: soft, nontender, nondistended. Incisions c/d/i, pattie ss  Ext: Left hand swollen, bl legs edematous      LABS:             CBC (06-25 @ 06:00)                              9.0[L]                         11.51[H]  )----------------(  209        --    % Neutrophils, --    % Lymphocytes, ANC: --                                  27.8[L]  CBC (06-24 @ 05:45)                              8.8[L]                         7.38    )----------------(  200        --    % Neutrophils, --    % Lymphocytes, ANC: --                                  27.4[L]    BMP (06-25 @ 06:00)             136     |  96[L]   |  11    		Ca++ --      Ca 7.9[L]             ---------------------------------( 181[H]		Mg 2.00               3.3[L]  |  25      |  1.07  			Ph 2.0[L]  BMP (06-24 @ 19:11)             137     |  98      |  11    		Ca++ --      Ca 8.2[L]             ---------------------------------( 163[H]		Mg 1.80               3.4[L]  |  28      |  1.09  			Ph 2.3[L]      Urinalysis (06-25 @ 06:00):     Color:  / Appearance:  / SG:  / pH:  / Gluc: 181[H] / Ketones:  / Bili:  / Urobili:  / Protein : / Nitrites:  / Leuk.Est:  / RBC:  / WBC:  / Sq Epi:  / Non Sq Epi:  / Bacteria        -> Body Fluid abdominal collection Culture (06-17 @ 18:45)       polymorphonuclear leukocytes seen  No organisms seen  by cytocentrifuge    NG    No growth at 5 days    Assessment and Plan:   · Assessment	  85 M w/ multiple chronic medical comorbidities including hypertension, sinus node dysfunction, type two diabetes, and chronic kidney disease (baseline creatinine 1.5-2.0) presently admitted for lower back pain associated with lumbar compression deformities with concern for cauda equina impingement. He was found to be bacteremic (E faecalis) due to acute acalculous cholecystitis, s/p removal of pacemaker, and is now seen following laparoscopic cholecystectomy 6/5/2025. POD 1 patient became hypotensive, unresponsive to 2L fluid resuscitation and 3 u pRBC and was started on vasopressors and transferred to SICU. Patient weaned off pressors, course c/b ileus now resolved, NGT removed 6/10. Midline placed 6/12 for IV abx, patient now with ileus again ct scanned 6/16 found to have collection in gb fossa, drained by IR on 6/17 patient was a rapid for hypotension and hypoxia requiring sicu admission, patient diuresed with lasix gtt and diamox now improved and patient was transferred to the floor 6/24    Plan:   - continue 40mg bid lasix for diuresis  - DVT ppx: sqh  - [id number]  - pain control  - c/w Ampicillin to 6/25  - will be d/c'd with judith (hx cauda equina)   - possible spine decompression with ortho in future with Dr Gruber  - ortho recs re: spinal surgery appreciated  -  as per daughter patient has been on that for years as per his cardiologist Dr Uvaldo Pro  - Patient takes Eliquis 2.5 bid for history of TIAs  - repeat swallow study  - dc to [facility] team   [id number]

## 2025-06-25 NOTE — PROGRESS NOTE ADULT - ATTENDING COMMENTS
Patient s/p lap geetha with poor pulmonary reserve  rehab placement pending  s/s eval  discussed with family no peg in patients wishes or goc    I have personally interviewed and examined this patient, reviewed pertinent labs and imaging, and discussed the case with colleagues, residents, and physician assistants on B Team rounds.    The active care issues are:  1. hypoxic respiratory insufficiency    The Acute Care Surgery (B Team) Attending Group Practice    urgent issues - spectra [id number]  nonurgent issues - [phone]  patient appointments or afterhours - [phone]

## 2025-06-25 NOTE — PROGRESS NOTE ADULT - ASSESSMENT
-EKG w/ SR, no significant STT changes   -ECHO w/ normal EF no significant valvular heart disease     A/P:  85M w/ HTN, HLD, T2DM, prostate CA, CKD, TIA and SND s/p PPM transferred here for lumbar spinal cord compression and bacteremia s/p PPM explant and implant of Micra PPM.    1. bacteremia  -s/p cholecystectomy  -c/w abx per ID, primary team  6/13 afebrile, continues on abx per ID  6/19 c/w abx per ID  6/24 - c/w Ampicillin to 6/25 6/25 remains afebrile, t&t per ID    2. SND  -s/p leadless PPM w/ EP  6/16 recommend IV lasix 40mg daily for generalized edema  6/17 mild anarsaca, c/w diuresing lasix 20mg IV BID    3. HTN  -c/w clonidine, hydralazine, labetalol    4.  Pre-procedure clearance  6/17 pt optimized from cardiac srtandpoint and may proceed with cholecystostomy tube with moderate risk  6/18 s/p IR drain placement, remain hemodynamically stable    5. hypoxia  6/20 - admitted to SICU, CTA no PE; bipap, pulm toilet, diamox   6/23 - off bipap, continues with lasix IV 20mg q12 can increase to lasix IV 40mg BID sec to worsening LE edema , check K twice daily and replace     -EKG w/ SR, no significant STT changes   -ECHO w/ normal EF no significant valvular heart disease     A/P:  85M w/ HTN, HLD, T2DM, prostate CA, CKD, TIA and SND s/p PPM transferred here for lumbar spinal cord compression and bacteremia s/p PPM explant and implant of Micra PPM.    1. bacteremia  -s/p cholecystectomy  -c/w abx per ID, primary team  6/13 afebrile, continues on abx per ID  6/19 c/w abx per ID  6/24 - c/w Ampicillin to 6/25 6/25 remains afebrile, t&t per ID    2. SND  -s/p leadless PPM w/ EP  6/16 recommend IV lasix 40mg daily for generalized edema  6/17 mild anarsaca, c/w diuresing lasix 40mg IV BID    3. HTN  -c/w clonidine, hydralazine, labetalol    4.  Pre-procedure clearance  6/17 pt optimized from cardiac srtandpoint and may proceed with cholecystostomy tube with moderate risk  6/18 s/p IR drain placement, remain hemodynamically stable    5. hypoxia  6/20 - admitted to SICU, CTA no PE; bipap, pulm toilet, diamox   6/23 - off bipap, continues with lasix IV 20mg q12 can increase to lasix IV 40mg BID sec to worsening LE edema , check K twice daily and replace

## 2025-06-25 NOTE — PROGRESS NOTE ADULT - SUBJECTIVE AND OBJECTIVE BOX
Chief Complaint: T2DM    Interval Events: Pt seen and examined at bedside earlier today. Daughters x 2 at bedside. Pt NPO waiting for repeat speech and swallow study.     MEDICATIONS  (STANDING):  albuterol    90 MICROgram(s) HFA Inhaler 2 Puff(s) Inhalation every 6 hours  albuterol/ipratropium for Nebulization 3 milliLiter(s) Nebulizer every 6 hours  aspirin Suppository 300 milliGRAM(s) Rectal daily  dextrose 5% + sodium chloride 0.45% with potassium chloride 20 mEq/L 1000 milliLiter(s) (30 mL/Hr) IV Continuous <Continuous>  dornase mayda Solution 2.5 milliGRAM(s) Inhalation daily  folic acid Injectable 1 milliGRAM(s) IV Push daily  furosemide   Injectable 40 milliGRAM(s) IV Push two times a day  heparin   Injectable 5000 Unit(s) SubCutaneous every 8 hours  insulin lispro (ADMELOG) corrective regimen sliding scale   SubCutaneous every 6 hours  levothyroxine Injectable 112 MICROGram(s) IV Push at bedtime  lidocaine   4% Patch 1 Patch Transdermal daily  metoprolol tartrate Injectable 5 milliGRAM(s) IV Push every 6 hours  pantoprazole  Injectable 40 milliGRAM(s) IV Push daily  potassium chloride  10 mEq/100 mL IVPB 10 milliEquivalent(s) IV Intermittent every 1 hour  potassium phosphate IVPB 15 milliMole(s) IV Intermittent once  sodium chloride 3%  Inhalation 4 milliLiter(s) Inhalation every 6 hours  thiamine Injectable 100 milliGRAM(s) IV Push daily    MEDICATIONS  (PRN):  HYDROmorphone  Injectable 0.2 milliGRAM(s) IV Push every 4 hours PRN Moderate Pain (4 - 6)  HYDROmorphone  Injectable 0.5 milliGRAM(s) IV Push every 4 hours PRN Severe Pain (7 - 10)      Allergies    gabapentin (Other)    Intolerances      Review of Systems:  Cardiovascular: No chest pain, palpitations  Respiratory: No SOB, no cough  GI: No nausea, vomiting, abdominal pain      VITALS: T(C): 36.8 (06-25-25 @ 14:00)  T(F): 98.2 (06-25-25 @ 14:00), Max: 98.2 (06-25-25 @ 01:46)  HR: 80 (06-25-25 @ 14:00) (70 - 85)  BP: 153/56 (06-25-25 @ 14:00) (126/99 - 168/68)  RR:  (17 - 20)  SpO2:  (88% - 100%)  Wt(kg): --      Physical Exam:   GENERAL: NAD, well-developed  EYES: No proptosis  HEENT:  Atraumatic, Normocephalic  RESPIRATORY: non labored breathing   GI: Non distended  PSYCH: Alert, normal affect, confused at times.     CAPILLARY BLOOD GLUCOSE    POCT Blood Glucose.: 178 mg/dL (25 Jun 2025 11:14)  POCT Blood Glucose.: 168 mg/dL (25 Jun 2025 06:04)  POCT Blood Glucose.: 183 mg/dL (25 Jun 2025 00:20)  POCT Blood Glucose.: 175 mg/dL (24 Jun 2025 18:01)      06-25    136  |  96[L]  |  11  ----------------------------<  181[H]  3.3[L]   |  25  |  1.07    eGFR: 67    Ca    7.9[L]      06-25  Mg     2.00     06-25  Phos  2.0     06-25        A1C with Estimated Average Glucose Result: 6.9 % (05-07-25 @ 06:03)  A1C with Estimated Average Glucose Result: 7.4 % (11-10-24 @ 06:55)      Thyroid Function Tests:

## 2025-06-25 NOTE — PROGRESS NOTE ADULT - SUBJECTIVE AND OBJECTIVE BOX
Timothy Mojica MD  Interventional Cardiology / Advance Heart Failure and Cardiac Transplant Specialist  [place] Office : [phone] [place] [address] [place]. [address]  Tel:   [place] Office : [phone] [facility] [place] [address]  Tel: [phone]       Pt is lying in bed NAD      MEDICATIONS:  furosemide   Injectable 40 milliGRAM(s) IV Push two times a day  heparin   Injectable 5000 Unit(s) SubCutaneous every 8 hours  metoprolol tartrate Injectable 5 milliGRAM(s) IV Push every 6 hours      albuterol    90 MICROgram(s) HFA Inhaler 2 Puff(s) Inhalation every 6 hours  albuterol/ipratropium for Nebulization 3 milliLiter(s) Nebulizer every 6 hours  dornase mayda Solution 2.5 milliGRAM(s) Inhalation daily  sodium chloride 3%  Inhalation 4 milliLiter(s) Inhalation every 6 hours    aspirin Suppository 300 milliGRAM(s) Rectal daily  HYDROmorphone  Injectable 0.2 milliGRAM(s) IV Push every 4 hours PRN  HYDROmorphone  Injectable 0.5 milliGRAM(s) IV Push every 4 hours PRN    pantoprazole  Injectable 40 milliGRAM(s) IV Push daily    insulin lispro (ADMELOG) corrective regimen sliding scale   SubCutaneous every 6 hours  levothyroxine Injectable 112 MICROGram(s) IV Push at bedtime    dextrose 5% + sodium chloride 0.45% with potassium chloride 20 mEq/L 1000 milliLiter(s) IV Continuous <Continuous>  folic acid Injectable 1 milliGRAM(s) IV Push daily  lidocaine   4% Patch 1 Patch Transdermal daily  potassium chloride  10 mEq/100 mL IVPB 10 milliEquivalent(s) IV Intermittent every 1 hour  potassium chloride  10 mEq/100 mL IVPB 10 milliEquivalent(s) IV Intermittent every 1 hour  potassium phosphate IVPB 15 milliMole(s) IV Intermittent once  thiamine Injectable 100 milliGRAM(s) IV Push daily      PAST MEDICAL/SURGICAL HISTORY  PAST MEDICAL & SURGICAL HISTORY:  HTN (hypertension)      HLD (hyperlipidemia)      DM (diabetes mellitus)      TIA (transient ischemic attack)      Prostate cancer      S/P prostatectomy      Pacemaker      H/O thyroidectomy          SOCIAL HISTORY: Substance Use (street drugs): ( x ) never used  (  ) other:    FAMILY HISTORY:  FHx: heart disease (Father, Mother)        PHYSICAL EXAM:  T(C): 36.6 (06-25-25 @ 09:40), Max: 36.8 (06-24-25 @ 14:00)  HR: 77 (06-25-25 @ 10:32) (70 - 85)  BP: 144/77 (06-25-25 @ 09:40) (126/99 - 168/68)  RR: 18 (06-25-25 @ 09:40) (18 - 20)  SpO2: 100% (06-25-25 @ 10:32) (88% - 100%)  Wt(kg): --  I&O's Summary    24 Jun 2025 07:01  -  25 Jun 2025 07:00  --------------------------------------------------------  IN: 300 mL / OUT: 3207.5 mL / NET: -2907.5 mL    25 Jun 2025 07:01  -  25 Jun 2025 12:40  --------------------------------------------------------  IN: 60 mL / OUT: 1002 mL / NET: -942 mL          GENERAL: NAD  EYES:  EOMI  ENMT: NGT Moist mucous membranes  Cardiovascular: Normal S1 S2, No JVD, No murmurs, +b/l LUE edema L>R, LE edema  Respiratory: bipap	  Gastrointestinal:  Soft, Non-tender, + BS	  Extremities: b/l UE, LE edema                                  9.0    11.51 )-----------( 209      ( 25 Jun 2025 06:00 )             27.8     06-25    136  |  96[L]  |  11  ----------------------------<  181[H]  3.3[L]   |  25  |  1.07    Ca    7.9[L]      25 Jun 2025 06:00  Phos  2.0     06-25  Mg     2.00     06-25      proBNP:   Lipid Profile:   HgA1c:   TSH:     Consultant(s) Notes Reviewed:  [x ] YES  [ ] NO    Care Discussed with Consultants/Other Providers [ x] YES  [ ] NO    Imaging Personally Reviewed independently:  [x] YES  [ ] NO    All labs, radiologic studies, vitals, orders and medications list reviewed. Patient is seen and examined at bedside. Case discussed with medical team.

## 2025-06-26 DIAGNOSIS — N17.9 ACUTE KIDNEY FAILURE, UNSPECIFIED: ICD-10-CM

## 2025-06-26 DIAGNOSIS — Z51.5 ENCOUNTER FOR PALLIATIVE CARE: ICD-10-CM

## 2025-06-26 DIAGNOSIS — Z71.89 OTHER SPECIFIED COUNSELING: ICD-10-CM

## 2025-06-26 LAB
ANION GAP SERPL CALC-SCNC: 14 MMOL/L — SIGNIFICANT CHANGE UP (ref 7–14)
BUN SERPL-MCNC: 11 MG/DL — SIGNIFICANT CHANGE UP (ref 7–23)
CALCIUM SERPL-MCNC: 8 MG/DL — LOW (ref 8.4–10.5)
CHLORIDE SERPL-SCNC: 97 MMOL/L — LOW (ref 98–107)
CO2 SERPL-SCNC: 27 MMOL/L — SIGNIFICANT CHANGE UP (ref 22–31)
CREAT SERPL-MCNC: 0.97 MG/DL — SIGNIFICANT CHANGE UP (ref 0.5–1.3)
EGFR: 76 ML/MIN/1.73M2 — SIGNIFICANT CHANGE UP
EGFR: 76 ML/MIN/1.73M2 — SIGNIFICANT CHANGE UP
GLUCOSE BLDC GLUCOMTR-MCNC: 181 MG/DL — HIGH (ref 70–99)
GLUCOSE BLDC GLUCOMTR-MCNC: 182 MG/DL — HIGH (ref 70–99)
GLUCOSE BLDC GLUCOMTR-MCNC: 195 MG/DL — HIGH (ref 70–99)
GLUCOSE SERPL-MCNC: 200 MG/DL — HIGH (ref 70–99)
HCT VFR BLD CALC: 28.1 % — LOW (ref 39–50)
HGB BLD-MCNC: 9 G/DL — LOW (ref 13–17)
MAGNESIUM SERPL-MCNC: 1.9 MG/DL — SIGNIFICANT CHANGE UP (ref 1.6–2.6)
MCHC RBC-ENTMCNC: 28.4 PG — SIGNIFICANT CHANGE UP (ref 27–34)
MCHC RBC-ENTMCNC: 32 G/DL — SIGNIFICANT CHANGE UP (ref 32–36)
MCV RBC AUTO: 88.6 FL — SIGNIFICANT CHANGE UP (ref 80–100)
NRBC # BLD AUTO: 0 K/UL — SIGNIFICANT CHANGE UP (ref 0–0)
NRBC # FLD: 0 K/UL — SIGNIFICANT CHANGE UP (ref 0–0)
NRBC BLD AUTO-RTO: 0 /100 WBCS — SIGNIFICANT CHANGE UP (ref 0–0)
PHOSPHATE SERPL-MCNC: 3 MG/DL — SIGNIFICANT CHANGE UP (ref 2.5–4.5)
PLATELET # BLD AUTO: 206 K/UL — SIGNIFICANT CHANGE UP (ref 150–400)
PMV BLD: 11 FL — SIGNIFICANT CHANGE UP (ref 7–13)
POTASSIUM SERPL-MCNC: 3.7 MMOL/L — SIGNIFICANT CHANGE UP (ref 3.5–5.3)
POTASSIUM SERPL-SCNC: 3.7 MMOL/L — SIGNIFICANT CHANGE UP (ref 3.5–5.3)
RBC # BLD: 3.17 M/UL — LOW (ref 4.2–5.8)
RBC # FLD: 17.1 % — HIGH (ref 10.3–14.5)
SODIUM SERPL-SCNC: 138 MMOL/L — SIGNIFICANT CHANGE UP (ref 135–145)
T4 FREE+ TSH PNL SERPL: 2.92 UIU/ML — SIGNIFICANT CHANGE UP (ref 0.27–4.2)
WBC # BLD: 12.66 K/UL — HIGH (ref 3.8–10.5)
WBC # FLD AUTO: 12.66 K/UL — HIGH (ref 3.8–10.5)

## 2025-06-26 PROCEDURE — 99233 SBSQ HOSP IP/OBS HIGH 50: CPT

## 2025-06-26 PROCEDURE — 71045 X-RAY EXAM CHEST 1 VIEW: CPT | Mod: 26

## 2025-06-26 PROCEDURE — 99223 1ST HOSP IP/OBS HIGH 75: CPT

## 2025-06-26 PROCEDURE — 99223 1ST HOSP IP/OBS HIGH 75: CPT | Mod: 25

## 2025-06-26 PROCEDURE — 93010 ELECTROCARDIOGRAM REPORT: CPT

## 2025-06-26 PROCEDURE — 99232 SBSQ HOSP IP/OBS MODERATE 35: CPT

## 2025-06-26 PROCEDURE — 76604 US EXAM CHEST: CPT | Mod: 26

## 2025-06-26 RX ORDER — ACETAMINOPHEN 500 MG/5ML
1000 LIQUID (ML) ORAL EVERY 6 HOURS
Refills: 0 | Status: DISCONTINUED | OUTPATIENT
Start: 2025-06-26 | End: 2025-07-02

## 2025-06-26 RX ADMIN — Medication 112 MICROGRAM(S): at 22:41

## 2025-06-26 RX ADMIN — FUROSEMIDE 40 MILLIGRAM(S): 10 INJECTION INTRAMUSCULAR; INTRAVENOUS at 15:03

## 2025-06-26 RX ADMIN — Medication 300 MILLIGRAM(S): at 11:44

## 2025-06-26 RX ADMIN — Medication 40 MILLIGRAM(S): at 12:02

## 2025-06-26 RX ADMIN — IPRATROPIUM BROMIDE AND ALBUTEROL SULFATE 3 MILLILITER(S): .5; 2.5 SOLUTION RESPIRATORY (INHALATION) at 09:54

## 2025-06-26 RX ADMIN — FUROSEMIDE 40 MILLIGRAM(S): 10 INJECTION INTRAMUSCULAR; INTRAVENOUS at 06:28

## 2025-06-26 RX ADMIN — POTASSIUM PHOSPHATE, MONOBASIC POTASSIUM PHOSPHATE, DIBASIC INJECTION, 62.5 MILLIMOLE(S): 236; 224 SOLUTION, CONCENTRATE INTRAVENOUS at 00:39

## 2025-06-26 RX ADMIN — Medication 100 MILLIGRAM(S): at 11:47

## 2025-06-26 RX ADMIN — Medication 4 MILLILITER(S): at 09:55

## 2025-06-26 RX ADMIN — INSULIN LISPRO 1: 100 INJECTION, SOLUTION INTRAVENOUS; SUBCUTANEOUS at 06:37

## 2025-06-26 RX ADMIN — METOPROLOL SUCCINATE 5 MILLIGRAM(S): 50 TABLET, EXTENDED RELEASE ORAL at 06:32

## 2025-06-26 RX ADMIN — Medication 0.2 MILLIGRAM(S): at 01:27

## 2025-06-26 RX ADMIN — Medication 0.2 MILLIGRAM(S): at 15:41

## 2025-06-26 RX ADMIN — Medication 100 MILLIEQUIVALENT(S): at 09:48

## 2025-06-26 RX ADMIN — Medication 400 MILLIGRAM(S): at 04:25

## 2025-06-26 RX ADMIN — Medication 0.2 MILLIGRAM(S): at 01:57

## 2025-06-26 RX ADMIN — FOLIC ACID 1 MILLIGRAM(S): 1 TABLET ORAL at 11:46

## 2025-06-26 RX ADMIN — INSULIN LISPRO 1: 100 INJECTION, SOLUTION INTRAVENOUS; SUBCUTANEOUS at 18:40

## 2025-06-26 RX ADMIN — INSULIN LISPRO 1: 100 INJECTION, SOLUTION INTRAVENOUS; SUBCUTANEOUS at 00:03

## 2025-06-26 RX ADMIN — IPRATROPIUM BROMIDE AND ALBUTEROL SULFATE 3 MILLILITER(S): .5; 2.5 SOLUTION RESPIRATORY (INHALATION) at 03:29

## 2025-06-26 RX ADMIN — DORNASE ALFA 2.5 MILLIGRAM(S): 1 SOLUTION RESPIRATORY (INHALATION) at 09:54

## 2025-06-26 RX ADMIN — POTASSIUM CHLORIDE, DEXTROSE MONOHYDRATE AND SODIUM CHLORIDE 30 MILLILITER(S): 150; 5; 900 INJECTION, SOLUTION INTRAVENOUS at 00:10

## 2025-06-26 RX ADMIN — HEPARIN SODIUM 5000 UNIT(S): 1000 INJECTION INTRAVENOUS; SUBCUTANEOUS at 15:06

## 2025-06-26 RX ADMIN — METOPROLOL SUCCINATE 5 MILLIGRAM(S): 50 TABLET, EXTENDED RELEASE ORAL at 00:04

## 2025-06-26 RX ADMIN — Medication 100 MILLIEQUIVALENT(S): at 10:59

## 2025-06-26 RX ADMIN — Medication 1000 MILLIGRAM(S): at 04:55

## 2025-06-26 RX ADMIN — IPRATROPIUM BROMIDE AND ALBUTEROL SULFATE 3 MILLILITER(S): .5; 2.5 SOLUTION RESPIRATORY (INHALATION) at 15:00

## 2025-06-26 RX ADMIN — Medication 100 MILLIEQUIVALENT(S): at 11:50

## 2025-06-26 RX ADMIN — Medication 0.2 MILLIGRAM(S): at 16:41

## 2025-06-26 RX ADMIN — HEPARIN SODIUM 5000 UNIT(S): 1000 INJECTION INTRAVENOUS; SUBCUTANEOUS at 07:09

## 2025-06-26 RX ADMIN — METOPROLOL SUCCINATE 5 MILLIGRAM(S): 50 TABLET, EXTENDED RELEASE ORAL at 11:48

## 2025-06-26 RX ADMIN — Medication 4 MILLILITER(S): at 03:29

## 2025-06-26 RX ADMIN — INSULIN LISPRO 1: 100 INJECTION, SOLUTION INTRAVENOUS; SUBCUTANEOUS at 11:45

## 2025-06-26 RX ADMIN — Medication 4 MILLILITER(S): at 15:00

## 2025-06-26 RX ADMIN — HEPARIN SODIUM 5000 UNIT(S): 1000 INJECTION INTRAVENOUS; SUBCUTANEOUS at 00:04

## 2025-06-26 NOTE — PROGRESS NOTE ADULT - PROBLEM SELECTOR PLAN 2
failed SLP eval on 6/24, repeat SLP eval pending today.   per discussion with family, patient had prior statements that he would not want artificial nutrition or feeding tube. they are in agreement with pleasure feeding of continue to fail SLP eval. they are aware with risk of aspiration.   - recommends Puree with moderate thick, aspiration precaution if fail SLP bedside eval again.

## 2025-06-26 NOTE — CONSULT NOTE ADULT - ASSESSMENT
Pt is an 85M with HTN, HLD, DM2, prostate cancer s/p prostatectomy, CKD, TIA with a pacemaker who presents transferred from [place] for further evaluation of low back pain. The pain began when he was going to the bathroom and worsened when he was in bed and rolled over. The pain is worse when he is walking or moving. He has never had back pain like this before. Patient normally ambulates with a walker or with a cane. Of note, patient endorses a fall in February where he fell into a pile of snow. Patient denies any injury after that event and did not have any imaging at that time. He usually has one BM every 3-4 days and this has not changed. Wakes up 2-3x/night to urinate. No episodes of bladder or bowel incontinence. He says he has diabetic neuropathy and has seen pain management. Says he has tried gabapentin but it made his legs very weak and he almost fell. At [facility], CT showed multiple lumbar compression deformities. MRI was unable to be performed 2/2 ppm compatibility. He was transferred to [facility] for MRI and orthopedic eval.

## 2025-06-26 NOTE — PROGRESS NOTE ADULT - PROBLEM SELECTOR PLAN 6
Outpatient on Eliquis; no evidence of AFib; however found to have multiple TIAs; started empirically on DOAC for prevention while further workup took place  Currently on .  c/w aspirin daily.

## 2025-06-26 NOTE — SWALLOW BEDSIDE ASSESSMENT ADULT - ADDITIONAL RECOMMENDATIONS
Medical team further advised to reconsult as patient becomes medically optimized.
Medical team advised to reconsult this service if there is change in patient's medical status. This service to follow as schedule permits to objectively assess swallow function.

## 2025-06-26 NOTE — PROGRESS NOTE ADULT - PROBLEM SELECTOR PLAN 8
MR 5/30;   Severe spinal canal stenosis at L3-L4 and L4-L5 with impingement of the cauda equina at these levels  Exam with signs concerning for cauda equina, evaluated by Orthopedic surgery  Per Ortho; no inpatient plans for intervention on these impingements; defer to outpatient.

## 2025-06-26 NOTE — PROVIDER CONTACT NOTE (OTHER) - ACTION/TREATMENT ORDERED:
Provider came to bedside to assess patient. Encourage pt to deep breath and cough, then suction secretions. Ordered chest PT be done on pt. NC increased to 6L.

## 2025-06-26 NOTE — PROGRESS NOTE ADULT - PROBLEM SELECTOR PLAN 1
patient in no distress.   Clinically patient is mouth breathing and hypoxia improved when NC is placed over the mouth with 99% sat on 2L cannula.   family endorsed AM confusion.   wbc uptrending iso aggressive diuresis, likely due to hemoconcentration. remain afebrile and clinically improving, sputum now white.     - continue wean O2  - pulm toilet. (Duoneb, Hypersal q6hrs rtc, pulmozyme, chest PT)  - diuresis per cardiology team  - dysphagia eval on-going, pending SLP re-eval.   - consider check AM VBG to assess for overnight CO2 retention given family concern of confusion in the morning.

## 2025-06-26 NOTE — CONSULT NOTE ADULT - SUBJECTIVE AND OBJECTIVE BOX
[facility] DIVISION OF PULMONARY, CRITICAL CARE AND SLEEP MEDICINE  PULMONARY CONSULTATION NOTE  OFFICE NUMBER: [phone] (Afterhours and Weekends)    NAME: TRINITY [place]  MEDICAL RECORD NUMBER: [id number]    CHIEF COMPLAINT: Patient is a 85y old  Male who presents with a chief complaint of back pain (26 Jun 2025 15:32)      HISTORY OF PRESENT ILLNESS:      An 85-year-old male with a complex medical history including hypertension, hyperlipidemia, type 2 diabetes, prostate cancer, CKD, TIA, and a pacemaker, was transferred from [place] for further evaluation of new, severe low back pain that began during bathroom use and worsened with movement. He uses a walker or cane for mobility and experienced a fall in February but reported no immediate injury; imaging was not done at that time. Diabetic neuropathy affects him and he previously tried gabapentin, which caused significant leg weakness. A CT scan revealed multiple lumbar compression deformities, but an MRI couldn't be performed due to pacemaker compatibility, leading to his transfer for MRI and orthopedic evaluation at [facility].    He was found to be bacteremic (E faecalis) due to acute acalculous cholecystitis, and is now seen following laparoscopic cholecystectomy.    The patient was on 5L nasal cannula without respiratory distress and reported no discomfort, but his daughter mentioned increased confusion in the mornings, which resolves later in the day. When his oxygen was reduced to 2L, his saturation dropped to 87%; placing the nasal cannula over his mouth improved saturation to 99%. Had speech and swallow evaluation which he failed and will undergo a barium swallow tomorrow.     Pulmonary consulted for continued hypoxemia.       ====================BACKGROUND INFORMATION============================    FAMILY HISTORY: FAMILY HISTORY:  FHx: heart disease (Father, Mother)        PAST MEDICAL AND SURGICAL HISTORY: PAST MEDICAL & SURGICAL HISTORY:  HTN (hypertension)      HLD (hyperlipidemia)      DM (diabetes mellitus)      TIA (transient ischemic attack)      Prostate cancer      S/P prostatectomy      Pacemaker      H/O thyroidectomy          ALLERGIES:Allergies    gabapentin (Other)    Intolerances        HOME MEDICATIONS: As per HPI    OUTPATIENT PULMONARY DOCTOR:  None    SOCIAL HISTORY:  CODE STATUS: DNR with trial of NIV    ====================REVIEW OF SYSTEMS=====================================  CONSTITUTIONAL: The patient denies experiencing any fever, chills, or unexplained weight changes. No fatigue or malaise reported.  CARDIOVASCULAR: No chest pain, palpitations  PULMONARY: No shortness of breath, cough, or sputum production  GASTROINTESTINAL: No nausea, vomiting, diarrhea, or abdominal pain. No changes in bowel habits.  [x] ALL OTHER REVIEW OF SYSTEMS ARE NEGATIVE   [] UNABLE TO OBTAIN REVIEW OF SYSTEMS DUE TO ______________      ====================PHYSICAL EXAM=========================================    VITALS: ICU Vital Signs Last 24 Hrs  T(C): 36.8 (26 Jun 2025 09:41), Max: 37 (26 Jun 2025 06:23)  T(F): 98.3 (26 Jun 2025 09:41), Max: 98.6 (26 Jun 2025 06:23)  HR: 77 (26 Jun 2025 15:06) (73 - 86)  BP: 151/50 (26 Jun 2025 11:40) (136/60 - 164/46)  BP(mean): --  ABP: --  ABP(mean): --  RR: 17 (26 Jun 2025 09:41) (17 - 18)  SpO2: 100% (26 Jun 2025 15:06) (94% - 100%)    O2 Parameters below as of 26 Jun 2025 15:06  Patient On (Oxygen Delivery Method): nasal cannula      INTAKE and OUTPUT: I&O's Summary    25 Jun 2025 07:01  -  26 Jun 2025 07:00  --------------------------------------------------------  IN: 360 mL / OUT: 2854.5 mL / NET: -2494.5 mL    26 Jun 2025 07:01 - 26 Jun 2025 16:22  --------------------------------------------------------  IN: 60 mL / OUT: 1000 mL / NET: -940 mL      WEIGHT: Daily     Daily     GLUCOSE: CAPILLARY BLOOD GLUCOSE      POCT Blood Glucose.: 182 mg/dL (26 Jun 2025 11:34)      VENTILATOR SETTINGS:     GENERAL: The patient is lethargic and in no apparent distress. ( Just received Dilaudid   HEENT: Mallampati score 4  CARDIOVASCULAR: Regular rate and rhythm without murmurs, rubs, or gallops. Peripheral pulses intact.  PULMONARY:  mild crackles bilaterally with rales which improved with cough.   ABDOMEN: Soft, non-tender, non-distended. Bowel sounds present and normal. No hepatosplenomegaly or masses.  SKIN: Warm, dry, and intact. No rashes, lesions, or discoloration.  EXTREMITIES: No clubbing, cyanosis, + 4 edema  PSYCHIATRIC: Cooperative but lethargic but with flat affect    ====================MEDICATIONS===========================================  MEDICATIONS  (STANDING):  albuterol    90 MICROgram(s) HFA Inhaler 2 Puff(s) Inhalation every 6 hours  albuterol/ipratropium for Nebulization 3 milliLiter(s) Nebulizer every 6 hours  aspirin Suppository 300 milliGRAM(s) Rectal daily  dextrose 5% + sodium chloride 0.45% with potassium chloride 20 mEq/L 1000 milliLiter(s) (30 mL/Hr) IV Continuous <Continuous>  dornase mayda Solution 2.5 milliGRAM(s) Inhalation daily  folic acid Injectable 1 milliGRAM(s) IV Push daily  furosemide   Injectable 40 milliGRAM(s) IV Push two times a day  heparin   Injectable 5000 Unit(s) SubCutaneous every 8 hours  insulin lispro (ADMELOG) corrective regimen sliding scale   SubCutaneous every 6 hours  levothyroxine Injectable 112 MICROGram(s) IV Push at bedtime  lidocaine   4% Patch 1 Patch Transdermal daily  metoprolol tartrate Injectable 5 milliGRAM(s) IV Push every 6 hours  pantoprazole  Injectable 40 milliGRAM(s) IV Push daily  sodium chloride 3%  Inhalation 4 milliLiter(s) Inhalation every 6 hours  thiamine Injectable 100 milliGRAM(s) IV Push daily      MEDICATIONS  (PRN):  acetaminophen   IVPB .. 1000 milliGRAM(s) IV Intermittent every 6 hours PRN Mild Pain (1 - 3)  HYDROmorphone  Injectable 0.2 milliGRAM(s) IV Push every 4 hours PRN Moderate Pain (4 - 6)  HYDROmorphone  Injectable 0.5 milliGRAM(s) IV Push every 4 hours PRN Severe Pain (7 - 10)      ====================LABORATORY RESULTS====================================  CBC Full  -  ( 26 Jun 2025 07:00 )  WBC Count : 12.66 K/uL  RBC Count : 3.17 M/uL  Hemoglobin : 9.0 g/dL  Hematocrit : 28.1 %  Platelet Count - Automated : 206 K/uL  Mean Cell Volume : 88.6 fl  Mean Cell Hemoglobin : 28.4 pg  Mean Cell Hemoglobin Concentration : 32.0 g/dL  Auto Neutrophil # : x  Auto Lymphocyte # : x  Auto Monocyte # : x  Auto Eosinophil # : x  Auto Basophil # : x  Auto Neutrophil % : x  Auto Lymphocyte % : x  Auto Monocyte % : x  Auto Eosinophil % : x  Auto Basophil % : x                                    06-26    138  |  97[L]  |  11  ----------------------------<  200[H]  3.7   |  27  |  0.97    Ca    8.0[L]      26 Jun 2025 07:00  Phos  3.0     06-26  Mg     1.90     06-26        Creatinine Trend: 0.97<--, 0.99<--, 1.07<--, 1.09<--, 1.19<--, 1.41<--      ====================RADIOLOGY and ECHOCARDIOGRAPHY=======================    CXR: < from: Xray Chest 1 View- PORTABLE-Routine (Xray Chest 1 View- PORTABLE-Routine in AM.) (06.23.25 @ 01:59) >  Frontal expiratory view of the chest shows the heart to be similar in   size.    The lungs show partial clearing of the right base with progression of   lower left lung infiltrate. Small left pleural effusion is present and   there is no evidence of pneumothorax nor right pleural effusion.    IMPRESSION:  Progression of left infiltrate/effusion.    CT CHEST: < from: CT Chest w/ IV Cont (06.16.25 @ 21:31) >  IMPRESSION:  5.6 x 3.3 cm left anterior chest wall hematoma, at site of prior   pacemaker.  Status post cholecystectomy. 9.3 x 5.5 cm fluid collection with few   droplets of air in the gallbladder fossa.  Stomach is collapsed with enteric tube tenting the stomach wall.  Large stool burden in the ascending and transverse colon.  Small ascites. Anasarca.      ECHOCARDIOGRAPHY: < from: TTE Limited W or WO Ultrasound Enhancing Agent (06.06.25 @ 11:15) >  CONCLUSIONS:      1. Limited study to evaluate left ventricular systolic function.   2. Left ventricular cavity is normal in size. Left ventricular wall thickness is normal. Left ventricular systolic function is normal with an ejection fraction of 63 % by 3D. There are no regional wall motion abnormalities seen.    ________________________________________________________________________________________  FINDINGS:     Left Ventricle:  The left ventricular cavity is normal in size. Left ventricular wall thickness is normal. Left ventricular systolic function is normal with a calculated ejection fraction of 63 % by 3D. There are no regional wall motion abnormalities seen.    < end of copied text >      POINT OF CARE ULTRASOUND: B-lines bilaterally with small left sided effusion but no effusion on the right.

## 2025-06-26 NOTE — CONSULT NOTE ADULT - TIME BILLING
Medical management as above, review of results/records, discussion with patient and primary team.  Encounter time excludes time spent teaching resident/fellow.
Reviewing medical chart and results, symptom assessment and management, counseling patient/decision makers/caregivers, coordination of care, documentation.
- Ordering, reviewing, and interpreting labs, testing, and imaging.  - Independently obtaining a review of systems and performing a physical exam  - Reviewing prior hospitalization and where necessary, outpatient records.  - Reviewing consultant recommendations/communicating with consultants  - Counselling and educating patient and family regarding interpretation of aforementioned items and plan of care.

## 2025-06-26 NOTE — SWALLOW BEDSIDE ASSESSMENT ADULT - ORAL PHASE
Slow a/p transfer; Suspect premature spillage to the hypopharynx for cup sip of mildly-thick liquids. Slow a/p transfer; Suspect piecemeal deglutition.

## 2025-06-26 NOTE — PROGRESS NOTE ADULT - SUBJECTIVE AND OBJECTIVE BOX
[facility] Division of Hospital Medicine  Frantz Maurer MD  In House Pager [phone]    Patient is a 85y old  Male who presents with a chief complaint of back pain (26 Jun 2025 11:30)    INTERVAL EVENTS: medicine called back for worsening hypoxia.   SUBJECTIVE: patient seen at bedside with daughters giving collateral. Patient noted to be on 5L NC but w/o over respiratory distress. He denied any discomfort at this time. Daughter brought up concern patient seem to be more confused in morning upon awakening, but clears up later in the day. attempted to wean patient down to 2L NC at bedside and patient desaturates to 87%. noted patient breathing through his open mouth, thus NC was placed over his mouth, and patient saturation quickly improved to 99%, O2 again titrated down to 2L and patient saturating well to 99%. Patient w/o over respiratory distress the entire time. family is hopeful that he will passed the SLP, but continue to endorse no feeding tubes even if patient fails.   ROS: Denied Fever, Chill, CP, SOB, Abd pain, N/V/D, LE swelling or pain.     MEDICATIONS  (STANDING):  albuterol    90 MICROgram(s) HFA Inhaler 2 Puff(s) Inhalation every 6 hours  albuterol/ipratropium for Nebulization 3 milliLiter(s) Nebulizer every 6 hours  aspirin Suppository 300 milliGRAM(s) Rectal daily  dextrose 5% + sodium chloride 0.45% with potassium chloride 20 mEq/L 1000 milliLiter(s) (30 mL/Hr) IV Continuous <Continuous>  dornase mayda Solution 2.5 milliGRAM(s) Inhalation daily  folic acid Injectable 1 milliGRAM(s) IV Push daily  furosemide   Injectable 40 milliGRAM(s) IV Push two times a day  heparin   Injectable 5000 Unit(s) SubCutaneous every 8 hours  insulin lispro (ADMELOG) corrective regimen sliding scale   SubCutaneous every 6 hours  levothyroxine Injectable 112 MICROGram(s) IV Push at bedtime  lidocaine   4% Patch 1 Patch Transdermal daily  metoprolol tartrate Injectable 5 milliGRAM(s) IV Push every 6 hours  pantoprazole  Injectable 40 milliGRAM(s) IV Push daily  sodium chloride 3%  Inhalation 4 milliLiter(s) Inhalation every 6 hours  thiamine Injectable 100 milliGRAM(s) IV Push daily    MEDICATIONS  (PRN):  acetaminophen   IVPB .. 1000 milliGRAM(s) IV Intermittent every 6 hours PRN Mild Pain (1 - 3)  HYDROmorphone  Injectable 0.2 milliGRAM(s) IV Push every 4 hours PRN Moderate Pain (4 - 6)  HYDROmorphone  Injectable 0.5 milliGRAM(s) IV Push every 4 hours PRN Severe Pain (7 - 10)    CAPILLARY BLOOD GLUCOSE      POCT Blood Glucose.: 182 mg/dL (26 Jun 2025 11:34)  POCT Blood Glucose.: 181 mg/dL (26 Jun 2025 06:08)  POCT Blood Glucose.: 199 mg/dL (25 Jun 2025 23:43)  POCT Blood Glucose.: 188 mg/dL (25 Jun 2025 21:50)  POCT Blood Glucose.: 186 mg/dL (25 Jun 2025 18:51)  POCT Blood Glucose.: 166 mg/dL (25 Jun 2025 16:31)    I&O's Summary    25 Jun 2025 07:01  -  26 Jun 2025 07:00  --------------------------------------------------------  IN: 360 mL / OUT: 2854.5 mL / NET: -2494.5 mL    26 Jun 2025 07:01  -  26 Jun 2025 13:33  --------------------------------------------------------  IN: 60 mL / OUT: 1000 mL / NET: -940 mL        Vital Signs Last 24 Hrs  T(C): 36.8 (26 Jun 2025 09:41), Max: 37 (26 Jun 2025 06:23)  T(F): 98.3 (26 Jun 2025 09:41), Max: 98.6 (26 Jun 2025 06:23)  HR: 76 (26 Jun 2025 09:59) (73 - 86)  BP: 145/83 (26 Jun 2025 09:41) (136/60 - 164/46)  BP(mean): --  RR: 17 (26 Jun 2025 09:41) (17 - 18)  SpO2: 100% (26 Jun 2025 09:59) (94% - 100%)    Parameters below as of 26 Jun 2025 09:59  Patient On (Oxygen Delivery Method): nasal cannula        LABS:                        9.0    12.66 )-----------( 206      ( 26 Jun 2025 07:00 )             28.1     06-26    138  |  97[L]  |  11  ----------------------------<  200[H]  3.7   |  27  |  0.97    Ca    8.0[L]      26 Jun 2025 07:00  Phos  3.0     06-26  Mg     1.90     06-26            Urinalysis Basic - ( 26 Jun 2025 07:00 )    Color: x / Appearance: x / SG: x / pH: x  Gluc: 200 mg/dL / Ketone: x  / Bili: x / Urobili: x   Blood: x / Protein: x / Nitrite: x   Leuk Esterase: x / RBC: x / WBC x   Sq Epi: x / Non Sq Epi: x / Bacteria: x        RADIOLOGY & ADDITIONAL TESTS:  Results Reviewed: Y  Imaging Personally Reviewed: Y  Electrocardiogram Personally Reviewed: Y    COORDINATION OF CARE:  Care Discussed with Consultants/Other Providers [Y/N]: Y  Prior or Outpatient Records Reviewed [Y/N]: VEENA

## 2025-06-26 NOTE — PROGRESS NOTE ADULT - ASSESSMENT
85M w/ hypertension, type 2 diabetes (A1c 6.9%), CKD (b/l Cr 1.5-2), prostate cancer s/p prostatectomy, h/o strokes (on apixaban, though no clear diagnosis of AF), sinus node dysfunction s/p PPM presented to [facility] with acute onset lower back pain and bilateral leg weakness (R>L), found to have imaging with lumbar compression deformities and high grade E faecalis bacteremia, now s/p PPM extraction/replacement with leadless PPM, negative TTE and MARIA LUISA for vegetation, and cleared cultures, course c/b persistent and severe back pain, lower extremity weakness, and urinary retention. MRI T/L spine with likely L4/5 OM/discitis and L3/4 and L4/5 severe spinal stenosis w/ cauda equina impingement - ortho was planning for laminectomy/fusion 6/5, but course c/b acute acalculous cholecystitis on 6/4 s/p robotic cholecystectomy; complicated by postop bleeding; transferred to SICU for pressor support with course c/b ileus from 6/6-6/11; downgraded. Cholecystectomy further complicated by 9cm collection of gallbladder fossa s/p percutaneous drain placement. Patient had hypoxemia 6/20 for hypoxemia; transferred to SICU for PE ruleout; now s/p aggressive pulmonary toilet with improvement now downgraded to the floors with dysphagia; currently NPO pending repeat speech evaluation; currently receiving IV medications on D5 1/2 NS, thiamine/folate for nutrition. Medicine consulted for management of patient outside of SICU. Given patient is awaiting speech eval and will finish Abx tomorrow, admission to medicine will not change the plan set in place.     6/26 - medicine recalled for worsening hypoxia.

## 2025-06-26 NOTE — PROGRESS NOTE ADULT - ASSESSMENT
-EKG w/ SR, no significant STT changes   -ECHO w/ normal EF no significant valvular heart disease     A/P:  85M w/ HTN, HLD, T2DM, prostate CA, CKD, TIA and SND s/p PPM transferred here for lumbar spinal cord compression and bacteremia s/p PPM explant and implant of Micra PPM.    1. bacteremia  -s/p cholecystectomy  -c/w abx per ID, primary team  6/13 afebrile, continues on abx per ID  6/19 c/w abx per ID  6/24 - c/w Ampicillin to 6/25 6/25 remains afebrile, t&t per ID  6//26 failed s/s eval, pending barium swallow    2. SND  -s/p leadless PPM w/ EP  6/16 recommend IV lasix 40mg daily for generalized edema  6/17 mild anarsaca, c/w diuresing lasix 40mg IV BID    3. HTN  -c/w clonidine, hydralazine, labetalol    4.  Pre-procedure clearance  6/17 pt optimized from cardiac srtandpoint and may proceed with cholecystostomy tube with moderate risk  6/18 s/p IR drain placement, remain hemodynamically stable    5. hypoxia  6/20 - admitted to SICU, CTA no PE; bipap, pulm toilet, diamox   6/23 - off bipap, continues with lasix IV 20mg q12 can increase to lasix IV 40mg BID sec to worsening LE edema , check K twice daily and replace

## 2025-06-26 NOTE — PROGRESS NOTE ADULT - ATTENDING COMMENTS
Patient no events overnight. Continues have copious secretions, poor clearance  requiring 5l nc    palliative eval for goc and end of life  s/s  medicine for co management vs transfer  dispo planning    I have personally interviewed and examined this patient, reviewed pertinent labs and imaging, and discussed the case with colleagues, residents, and physician assistants on B Team rounds.    The active care issues are:  1. respiratory insufficiency    [facility] (B Team[facility]    urgent issues - spectra [id number]  nonurgent issues - [phone]  patient appointments or afterhours - [phone]

## 2025-06-26 NOTE — SWALLOW BEDSIDE ASSESSMENT ADULT - ASR SWALLOW RECOMMEND DIAG
Objective testing not warranted at this time given overt signs of impaired airway protection on conservative trials of puree
2. Cinesophagram to determine if cough is aspiration/dysphagia-related./VFSS/MBS

## 2025-06-26 NOTE — PROGRESS NOTE ADULT - SUBJECTIVE AND OBJECTIVE BOX
Morning Surgical Progress Note  Patient is a 85y old  Male who presents with a chief complaint of back pain (24 Jun 2025 00:03)    SUBJECTIVE: Patient seen and examined at bedside with surgical team. Patient resting comfortably in bed. patient denies difficulty breathing or any other complaints at this time    Vital Signs Last 24 Hrs  T(C): 37 (06-26-25 @ 06:23), Max: 37 (06-26-25 @ 06:23)  HR: 82 (06-26-25 @ 06:23) (75 - 86)  BP: 164/46 (06-26-25 @ 06:23) (136/60 - 164/46)  BP(mean): --  ABP: --  ABP(mean): --  RR: 18 (06-26-25 @ 06:23) (17 - 18)  SpO2: 99% (06-26-25 @ 06:23) (94% - 100%)  Wt(kg): --  CVP(mm Hg): --  CI: --  CAPILLARY BLOOD GLUCOSE      POCT Blood Glucose.: 181 mg/dL (26 Jun 2025 06:08)  POCT Blood Glucose.: 199 mg/dL (25 Jun 2025 23:43)  POCT Blood Glucose.: 188 mg/dL (25 Jun 2025 21:50)  POCT Blood Glucose.: 186 mg/dL (25 Jun 2025 18:51)  POCT Blood Glucose.: 166 mg/dL (25 Jun 2025 16:31)  POCT Blood Glucose.: 178 mg/dL (25 Jun 2025 11:14)   N/A      06-25 @ 07:01  -  06-26 @ 07:00  --------------------------------------------------------  IN:    dextrose 5% + sodium chloride 0.45% w/ Additives: 360 mL  Total IN: 360 mL    OUT:    Drain (mL): 4.5 mL    Indwelling Catheter - Urethral (mL): 2850 mL  Total OUT: 2854.5 mL    Total NET: -2494.5 mL      06-26 @ 07:01  -  06-26 @ 08:59  --------------------------------------------------------  IN:    dextrose 5% + sodium chloride 0.45% w/ Additives: 60 mL  Total IN: 60 mL    OUT:  Total OUT: 0 mL    Total NET: 60 mL      Medications  MEDICATIONS  (STANDING):  albuterol    90 MICROgram(s) HFA Inhaler 2 Puff(s) Inhalation every 6 hours  albuterol/ipratropium for Nebulization 3 milliLiter(s) Nebulizer every 6 hours  aspirin Suppository 300 milliGRAM(s) Rectal daily  dextrose 5% + sodium chloride 0.45% with potassium chloride 20 mEq/L 1000 milliLiter(s) (30 mL/Hr) IV Continuous <Continuous>  dornase mayda Solution 2.5 milliGRAM(s) Inhalation daily  folic acid Injectable 1 milliGRAM(s) IV Push daily  furosemide   Injectable 40 milliGRAM(s) IV Push two times a day  heparin   Injectable 5000 Unit(s) SubCutaneous every 8 hours  insulin lispro (ADMELOG) corrective regimen sliding scale   SubCutaneous every 6 hours  levothyroxine Injectable 112 MICROGram(s) IV Push at bedtime  lidocaine   4% Patch 1 Patch Transdermal daily  metoprolol tartrate Injectable 5 milliGRAM(s) IV Push every 6 hours  pantoprazole  Injectable 40 milliGRAM(s) IV Push daily  potassium chloride  10 mEq/100 mL IVPB 10 milliEquivalent(s) IV Intermittent every 1 hour  sodium chloride 3%  Inhalation 4 milliLiter(s) Inhalation every 6 hours  thiamine Injectable 100 milliGRAM(s) IV Push daily    MEDICATIONS  (PRN):  acetaminophen   IVPB .. 1000 milliGRAM(s) IV Intermittent every 6 hours PRN Mild Pain (1 - 3)  HYDROmorphone  Injectable 0.2 milliGRAM(s) IV Push every 4 hours PRN Moderate Pain (4 - 6)  HYDROmorphone  Injectable 0.5 milliGRAM(s) IV Push every 4 hours PRN Severe Pain (7 - 10)        Physical Exam  Constitutional: Awake and alert, NAD  Gastrointestinal: soft, nontender, nondistended. Incisions c/d/i, pattie ss  Ext: Left hand swollen, bl legs edematous      LABS:   CBC (06-26 @ 07:00)                              9.0[L]                         12.66[H]  )----------------(  206        --    % Neutrophils, --    % Lymphocytes, ANC: --                                  28.1[L]  CBC (06-25 @ 06:00)                              9.0[L]                         11.51[H]  )----------------(  209        --    % Neutrophils, --    % Lymphocytes, ANC: --                                  27.8[L]    BMP (06-26 @ 07:00)             138     |  97[L]   |  11    		Ca++ --      Ca 8.0[L]             ---------------------------------( 200[H]		Mg 1.90               3.7     |  27      |  0.97  			Ph 3.0     BMP (06-25 @ 16:43)             136     |  96[L]   |  10    		Ca++ --      Ca 7.6[L]             ---------------------------------( 182[H]		Mg 1.90               3.9     |  26      |  0.99  			Ph 2.6         Urinalysis (06-26 @ 07:00):     Color:  / Appearance:  / SG:  / pH:  / Gluc: 200[H] / Ketones:  / Bili:  / Urobili:  / Protein : / Nitrites:  / Leuk.Est:  / RBC:  / WBC:  / Sq Epi:  / Non Sq Epi:  / Bacteria        -> Body Fluid abdominal collection Culture (06-17 @ 18:45)       polymorphonuclear leukocytes seen  No organisms seen  by cytocentrifuge    NG    No growth at 5 days               Assessment and Plan:   · Assessment	  85 M w/ multiple chronic medical comorbidities including hypertension, sinus node dysfunction, type two diabetes, and chronic kidney disease (baseline creatinine 1.5-2.0) presently admitted for lower back pain associated with lumbar compression deformities with concern for cauda equina impingement. He was found to be bacteremic (E faecalis) due to acute acalculous cholecystitis, s/p removal of pacemaker, and is now seen following laparoscopic cholecystectomy 6/5/2025. POD 1 patient became hypotensive, unresponsive to 2L fluid resuscitation and 3 u pRBC and was started on vasopressors and transferred to SICU. Patient weaned off pressors, course c/b ileus now resolved, NGT removed 6/10. Midline placed 6/12 for IV abx, patient now with ileus again ct scanned 6/16 found to have collection in gb fossa, drained by IR on 6/17 patient was a rapid for hypotension and hypoxia requiring sicu admission, patient diuresed with lasix gtt and diamox now improved and patient was transferred to the floor 6/24    Plan:   - continue 40mg bid lasix for diuresis -  f/u cardiology recs  - DVT ppx: sqh  - pain control  - will be d/c'd with wong (hx cauda equina)   - possible spine decompression with ortho in future with Dr Gruber  -  rectally as patient is NPO  - Patient takes Eliquis 2.5 bid for history of TIAs -  on hold due to patient failing S+S on 6/23 and made NPO  - repeat swallow study  - dc to [facility] team   [id number]

## 2025-06-26 NOTE — SWALLOW BEDSIDE ASSESSMENT ADULT - H & P REVIEW
yes Progress Note-Surgery 6/26: "85 M w/ multiple chronic medical comorbidities including hypertension, sinus node dysfunction, type two diabetes, and chronic kidney disease (baseline creatinine 1.5-2.0) presently admitted for lower back pain associated with lumbar compression deformities with concern for cauda equina impingement. He was found to be bacteremic (E faecalis) due to acute acalculous cholecystitis, s/p removal of pacemaker, and is now seen following laparoscopic cholecystectomy 6/5/2025. POD 1 patient became hypotensive, unresponsive to 2L fluid resuscitation and 3 u pRBC and was started on vasopressors and transferred to SICU. Patient weaned off pressors, course c/b ileus now resolved, NGT removed 6/10. Midline placed 6/12 for IV abx, patient now with ileus again ct scanned 6/16 found to have collection in gb fossa, drained by IR on 6/17 patient was a rapid for hypotension and hypoxia requiring sicu admission, patient diuresed with lasix gtt and diamox now improved and patient was transferred to the floor 6/24"

## 2025-06-26 NOTE — CONSULT NOTE ADULT - PROBLEM SELECTOR RECOMMENDATION 4
Name:  Bri   ID Number:  [id number]    Content Discussed:  Plan of care for day, bladder scan, straight cath, pain medication   For GOC

## 2025-06-26 NOTE — SWALLOW BEDSIDE ASSESSMENT ADULT - COMMENTS
Progress Note-Surgery 6/26: "85 M w/ multiple chronic medical comorbidities including hypertension, sinus node dysfunction, type two diabetes, and chronic kidney disease (baseline creatinine 1.5-2.0) presently admitted for lower back pain associated with lumbar compression deformities with concern for cauda equina impingement. He was found to be bacteremic (E faecalis) due to acute acalculous cholecystitis, s/p removal of pacemaker, and is now seen following laparoscopic cholecystectomy 6/5/2025. POD 1 patient became hypotensive, unresponsive to 2L fluid resuscitation and 3 u pRBC and was started on vasopressors and transferred to SICU. Patient weaned off pressors, course c/b ileus now resolved, NGT removed 6/10. Midline placed 6/12 for IV abx, patient now with ileus again ct scanned 6/16 found to have collection in gb fossa, drained by IR on 6/17 patient was a rapid for hypotension and hypoxia requiring sicu admission, patient diuresed with lasix gtt and diamox now improved and patient was transferred to the floor 6/24"    CXR 6/23: "IMPRESSION: Progression of left infiltrate/effusion."    Of Note: Patient seen for initial Clinical Swallow Evaluation on 6/23 with recommendations: "Continue NPO with consideration for short term nonoral means of nutrition; Objective testing not warranted at this time given overt signs of impaired airway protection on conservative trials of puree" (see note). CXR 6/23: "IMPRESSION: Progression of left infiltrate/effusion."    Of Note: Patient seen for initial Clinical Swallow Evaluation on 6/23 with recommendations: "Continue NPO with consideration for short term nonoral means of nutrition; Objective testing not warranted at this time given overt signs of impaired airway protection on conservative trials of puree" (see note).    Patient received sleeping reclined in bed, receiving supplemental O2 via Nasal Cannula, and in no acute distress. Family members (x3) present at bedside. Patient roused to alert-state and was agreeable to repositioning to upright for PO trials/repeat Swallow Eval. Patient noted with audible chest congestion and had bedside Yankauer/cannister filled with thick yellow/green phlegm/secretions. Patient endorsed ongoing productive cough and was provided with cues to produce strong, volitional cues and utilize Yankauer to perform oral suctioning prior to initiation of PO. Return of copious/thick yellow/green phlegm/secretions was noted.

## 2025-06-26 NOTE — CONSULT NOTE ADULT - PROBLEM/RECOMMENDATION-4
Pre-Electrophysiology Diagnosis  1. Paroxysmal Atrial fibrillation     Procedure Performed  1. EPS afib ablation/pulmonary vein isolation  2. Left atrial pacing recording from the coronary sinus.  3. 3-D Electroanatomical mapping  4. Intracardiac echo  5. Ablation of second arrhythmia  6. LV pacing and recording  7. Transesophageal echo  8. Drug infusion  9. Ablation of an additional linear lines x 1    Anesthesia: General     Estimated Blood Loss: Less than 10 mL     Procedure in Detail:  The patient was brought to the electrophysiology lab in the fasting state. The patient was intubated by anesthesiology and an esophageal temperature probe inserted and advanced to a location directly posterior to the LA at the level of the pulmonary veins.  The esophageal temperature probe was repositioned throughout the case to a location as close the the ablation catheter as possible. If the esophageal temperature increased 0.5 degrees Celsius ablation was stopped until the temperature returned to baseline.  A tranesophageal echocardiogram was performed directly prior to the procedure and was negative for a NOHEMY thrombus (see full report in chart).  A Ref-Star CARTO patch was placed, the patient was then prepped and draped in sterile fashion. Venous access was obtained under ultrasound guidance x4 using modified Seldinger technique, with placement of one 8Fr short sidearm sheath and two 8.5 Fr SL-O 63cm long braided sheaths in the right femoral vein and placement of a 10Fr sheath into the left femoral vein.  An intra-cardiac echo probe was prepped, and then inserted into an 10 Fr short sheath and used to localize the fossa ovalis and create LA and pulmonary vein anatomy utilizing CARTO Sound technology.  A Bitiumter Octaray catheter was then inserted into an 8.5 SLO Fr sheath and used to create an electroanatomical map of the right atrium, including attempts at His localization and the os of the CS.  Then a Smart Touch 
DISPLAY PLAN FREE TEXT

## 2025-06-26 NOTE — CHART NOTE - NSCHARTNOTEFT_GEN_A_CORE
: Dr. Boggs    INDICATION: Evaluation of acute hypoxemia respiratory failure    PROCEDURE:  [x] LIMITED CHEST    FINDINGS: B-lines bilaterally with small pleural effusion on the left. No focal consolidations noted.     INTERPRETATION: Pulmonary edema bilaterally with miniscule pleural effusion on the left.

## 2025-06-26 NOTE — CONSULT NOTE ADULT - PROBLEM SELECTOR RECOMMENDATION 2
- back pain 2/2 cauda equina   - CT L spine- Mild loss of height of the L4 and L5 vertebral bodies with associated   edema compatible and erosions. FINDINGS suspicious for osteomyelitis and   discitis. Correlate with ESR/CRP. Edema in the bilateral paraspinal musculature, right psoas musculature,   and presacral space may be infectious/inflammatory. Severe spinal canal stenosis at L3-L4 and L4-L5 with impingement of the cauda equina at these levels.  - PRN use in past 24 hours from 8 AM to 8 AM: IV dilaudid 0.2 mg x 3 doses   - c/w acetaminophen   IVPB .. 1000 milliGRAM(s) IV Intermittent every 6 hours PRN Mild Pain (1 - 3)  HYDROmorphone  Injectable 0.2 milliGRAM(s) IV Push every 4 hours PRN Moderate Pain (4 - 6)  HYDROmorphone  Injectable 0.5 milliGRAM(s) IV Push every 4 hours PRN Severe Pain (7 - 10)

## 2025-06-26 NOTE — CHART NOTE - NSCHARTNOTEFT_GEN_A_CORE
Pt ly to mid 40s three times for 20-30 s each. Pt asymptomatic, other vitals wnl, EKG unchanged. Iso PPM, reached out to Dr. Angela who suggested 24 hour tele monitoring.

## 2025-06-26 NOTE — CONSULT NOTE ADULT - PROBLEM SELECTOR RECOMMENDATION 9
- initially presented for back pain but hospital course complicated by multiple medical issues including bacteremia,  acalculous cholecystitis s/p lap choli , PPM replacement, septic shock in SICU   - CXR 6/23- The lungs show partial clearing of the right base with progression of lower left lung infiltrate. Small left pleural effusion is present and there is no evidence of pneumothorax nor right pleural effusion.  - TTE EF 63%   - On NC, oxygen support fluctuating   - Recommend pulmonary eval

## 2025-06-26 NOTE — PROGRESS NOTE ADULT - SUBJECTIVE AND OBJECTIVE BOX
Chief Complaint: T2DM    Interval Events: Pt seen and examined at bedside earlier today.  Daughters at bedside. Speech and swallow done today and will be repeated in am.     MEDICATIONS  (STANDING):  albuterol    90 MICROgram(s) HFA Inhaler 2 Puff(s) Inhalation every 6 hours  albuterol/ipratropium for Nebulization 3 milliLiter(s) Nebulizer every 6 hours  aspirin Suppository 300 milliGRAM(s) Rectal daily  dextrose 5% + sodium chloride 0.45% with potassium chloride 20 mEq/L 1000 milliLiter(s) (30 mL/Hr) IV Continuous <Continuous>  dornase mayda Solution 2.5 milliGRAM(s) Inhalation daily  folic acid Injectable 1 milliGRAM(s) IV Push daily  furosemide   Injectable 40 milliGRAM(s) IV Push two times a day  heparin   Injectable 5000 Unit(s) SubCutaneous every 8 hours  insulin lispro (ADMELOG) corrective regimen sliding scale   SubCutaneous every 6 hours  levothyroxine Injectable 112 MICROGram(s) IV Push at bedtime  lidocaine   4% Patch 1 Patch Transdermal daily  metoprolol tartrate Injectable 5 milliGRAM(s) IV Push every 6 hours  pantoprazole  Injectable 40 milliGRAM(s) IV Push daily  sodium chloride 3%  Inhalation 4 milliLiter(s) Inhalation every 6 hours  thiamine Injectable 100 milliGRAM(s) IV Push daily    MEDICATIONS  (PRN):  acetaminophen   IVPB .. 1000 milliGRAM(s) IV Intermittent every 6 hours PRN Mild Pain (1 - 3)  HYDROmorphone  Injectable 0.2 milliGRAM(s) IV Push every 4 hours PRN Moderate Pain (4 - 6)  HYDROmorphone  Injectable 0.5 milliGRAM(s) IV Push every 4 hours PRN Severe Pain (7 - 10)      Allergies    gabapentin (Other)    Intolerances      Review of Systems:  Cardiovascular: No chest pain, palpitations  Respiratory: No SOB, no cough  GI: No nausea, vomiting, abdominal pain      VITALS: T(C): 36.8 (06-26-25 @ 09:41)  T(F): 98.3 (06-26-25 @ 09:41), Max: 98.6 (06-26-25 @ 06:23)  HR: 77 (06-26-25 @ 15:06) (73 - 86)  BP: 151/50 (06-26-25 @ 11:40) (136/60 - 164/46)  RR:  (17 - 18)  SpO2:  (94% - 100%)  Wt(kg): --      Physical Exam:   GENERAL: NAD, well-developed  EYES: No proptosis  HEENT:  Atraumatic, Normocephalic  RESPIRATORY: non labored breathing   GI: Non distended  PSYCH: Alert, confused at times    CAPILLARY BLOOD GLUCOSE    POCT Blood Glucose.: 182 mg/dL (26 Jun 2025 11:34)  POCT Blood Glucose.: 181 mg/dL (26 Jun 2025 06:08)  POCT Blood Glucose.: 199 mg/dL (25 Jun 2025 23:43)  POCT Blood Glucose.: 188 mg/dL (25 Jun 2025 21:50)  POCT Blood Glucose.: 186 mg/dL (25 Jun 2025 18:51)  POCT Blood Glucose.: 166 mg/dL (25 Jun 2025 16:31)      06-26    138  |  97[L]  |  11  ----------------------------<  200[H]  3.7   |  27  |  0.97    eGFR: 76    Ca    8.0[L]      06-26  Mg     1.90     06-26  Phos  3.0     06-26        A1C with Estimated Average Glucose Result: 6.9 % (05-07-25 @ 06:03)  A1C with Estimated Average Glucose Result: 7.4 % (11-10-24 @ 06:55)      Thyroid Function Tests:

## 2025-06-26 NOTE — PROGRESS NOTE ADULT - PROBLEM SELECTOR PLAN 3
Initially due to urinary retention, improved after wong placement  transiently worsen when patient became hypotensive.   renal function now improving. lytes normalized.   ctm K and mg with diuretics. supplement as needed.   keep wong on DC.

## 2025-06-26 NOTE — SWALLOW BEDSIDE ASSESSMENT ADULT - MUCOSAL QUALITY
Copious oral secretions/phlegm following cued cough; Patient utilized Yankauer to perform oral suctioning to ensure clearance of oral cavity prior to PO.

## 2025-06-26 NOTE — PROGRESS NOTE ADULT - SUBJECTIVE AND OBJECTIVE BOX
Timothy Mojica MD  Interventional Cardiology / Advance Heart Failure and Cardiac Transplant Specialist  [place] Office : [phone] [place] [address] [address]. [address]  Tel:   [place] Office : [phone] [facility] [place] [address]  Tel: [phone]       Pt is lying in bed NAD  	  MEDICATIONS:  furosemide   Injectable 40 milliGRAM(s) IV Push two times a day  heparin   Injectable 5000 Unit(s) SubCutaneous every 8 hours  metoprolol tartrate Injectable 5 milliGRAM(s) IV Push every 6 hours      albuterol    90 MICROgram(s) HFA Inhaler 2 Puff(s) Inhalation every 6 hours  albuterol/ipratropium for Nebulization 3 milliLiter(s) Nebulizer every 6 hours  dornase mayda Solution 2.5 milliGRAM(s) Inhalation daily  sodium chloride 3%  Inhalation 4 milliLiter(s) Inhalation every 6 hours    acetaminophen   IVPB .. 1000 milliGRAM(s) IV Intermittent every 6 hours PRN  aspirin Suppository 300 milliGRAM(s) Rectal daily  HYDROmorphone  Injectable 0.2 milliGRAM(s) IV Push every 4 hours PRN  HYDROmorphone  Injectable 0.5 milliGRAM(s) IV Push every 4 hours PRN    pantoprazole  Injectable 40 milliGRAM(s) IV Push daily    insulin lispro (ADMELOG) corrective regimen sliding scale   SubCutaneous every 6 hours  levothyroxine Injectable 112 MICROGram(s) IV Push at bedtime    dextrose 5% + sodium chloride 0.45% with potassium chloride 20 mEq/L 1000 milliLiter(s) IV Continuous <Continuous>  folic acid Injectable 1 milliGRAM(s) IV Push daily  lidocaine   4% Patch 1 Patch Transdermal daily  thiamine Injectable 100 milliGRAM(s) IV Push daily      PAST MEDICAL/SURGICAL HISTORY  PAST MEDICAL & SURGICAL HISTORY:  HTN (hypertension)      HLD (hyperlipidemia)      DM (diabetes mellitus)      TIA (transient ischemic attack)      Prostate cancer      S/P prostatectomy      Pacemaker      H/O thyroidectomy          SOCIAL HISTORY: Substance Use (street drugs): ( x ) never used  (  ) other:    FAMILY HISTORY:  FHx: heart disease (Father, Mother)           PHYSICAL EXAM:  T(C): 36.8 (06-26-25 @ 09:41), Max: 37 (06-26-25 @ 06:23)  HR: 77 (06-26-25 @ 15:06) (73 - 86)  BP: 151/50 (06-26-25 @ 11:40) (136/60 - 164/46)  RR: 17 (06-26-25 @ 09:41) (17 - 18)  SpO2: 100% (06-26-25 @ 15:06) (94% - 100%)  Wt(kg): --  I&O's Summary    25 Jun 2025 07:01  -  26 Jun 2025 07:00  --------------------------------------------------------  IN: 360 mL / OUT: 2854.5 mL / NET: -2494.5 mL    26 Jun 2025 07:01  -  26 Jun 2025 17:03  --------------------------------------------------------  IN: 60 mL / OUT: 1000 mL / NET: -940 mL          GENERAL: NAD  EYES:  EOMI  ENMT: NGT Moist mucous membranes  Cardiovascular: Normal S1 S2, No JVD, No murmurs, +b/l LUE edema L>R, LE edema  Respiratory: diminished	  Gastrointestinal:  Soft, Non-tender, + BS	  Extremities: b/l UE, LE edema                                  9.0    12.66 )-----------( 206      ( 26 Jun 2025 07:00 )             28.1     06-26    138  |  97[L]  |  11  ----------------------------<  200[H]  3.7   |  27  |  0.97    Ca    8.0[L]      26 Jun 2025 07:00  Phos  3.0     06-26  Mg     1.90     06-26      proBNP:   Lipid Profile:   HgA1c:   TSH:     Consultant(s) Notes Reviewed:  [x ] YES  [ ] NO    Care Discussed with Consultants/Other Providers [ x] YES  [ ] NO    Imaging Personally Reviewed independently:  [x] YES  [ ] NO    All labs, radiologic studies, vitals, orders and medications list reviewed. Patient is seen and examined at bedside. Case discussed with medical team.

## 2025-06-26 NOTE — PROGRESS NOTE ADULT - PROBLEM SELECTOR PLAN 10
MR from 5/30 showing L4/L5 concerning for Discitis/OM  No further inpatient Ortho management; plan for outpatient management    Ampicillin 2gm IV q6h for 6 weeks; completed on 6/25.

## 2025-06-26 NOTE — SWALLOW BEDSIDE ASSESSMENT ADULT - ASR SWALLOW REFERRAL
RD consult/follow up for potential tube feed management/Registered Dietitian
Recommend RD consultation to ensure adequate nutrition/hydration & provide potential management of tube feeds pending MD discretion.

## 2025-06-26 NOTE — CONSULT NOTE ADULT - ASSESSMENT
An 85-year-old male with a complex medical history, including recent bacteremia from acute acalculous cholecystitis requiring laparoscopic cholecystectomy, presents with persistent hypoxemia and confusion, failed a speech and swallow evaluation, and now requires further pulmonary assessment as well as a barium swallow study.    #Acute respiratory failure with hypoxemia  # Pulmonary edema  # Small left sided pleural effusion  # Aspiration   # Hypoventilation     - Patient with persistent B-lines bilaterally and small (miniscule) left sided effusion  - Suggest continuing aggressive diuresis   - Agree with NPO until barium swallow is done and follow up speech and swallow   - Can get ABG to see for hypercarbia, but it is unlikely that the patient will be able to tolerate a full face mask given claustrophobia   - Given mouth breathing may do better with Venturi mask but again unlikely to tolerate given claustrophobia  - Patient with diminished cough reflex but also with audible congestion  - May benefit from hypertonic saline, albuterol, and chest vest  - Sputum Culture  - Incentive spirometry  - Keep in chair position during the day.      Thank you for your consult. We will continue to follow the patient's care with you.

## 2025-06-26 NOTE — PROGRESS NOTE ADULT - ASSESSMENT
85 year old man with multiple chronic medical comorbidities including hypertension, sinus node dysfunction, type two diabetes, and chronic kidney disease (baseline creatinine 1.5-2.0) presently admitted for lower back pain associated with lumbar compression deformities with concern for cauda equina impingement. He was found to be bacteremic (E faecalis) due to acute acalculous cholecystitis, and is now seen following laparoscopic cholecystectomy with Dr. Triny Verde 6/5/2025. POD 1 patient became hypotensive, unresponsive to 2L fluid resuscitation and 3 u pRBC and was started on vasopressors and transferred to SICU.Patient weaned off pressors, course c/b ileus now resolved, Endocrinology was consulted for management of diabetes mellitus.    Type 2 Diabetes Mellitus  - HbA1c 6.9%   - home regimen:  lantus 24 units qhs, admelog 12 units + tradjenta 5 mg    Inpatient plan   - FS goal 100-180 mg/dl : at goal  - Pt remains NPO  - Hold Lantus when NPO- when diet reordered and if pt eating can reorder low dose Lantus 5 units daily.   - Contine low Admelog correctional scale TID AC  ---> q6 if NPO  - continue separate low Admelog correctional scale at HS   - Continue off Premeal insulin for now  - If fingerstick >180 x 2, can start Admelog 2 units TID AC. Hold if not eating/NPO.   - FS TID AC & HS ---> q6 if NPO   - hypoglycemia protocol PRN   - consistent carbohydrate diet    Discharge plan  - per family, plan to discharge to rehab   - tentatively, Lantus + Tradjenta. Hold Admelog.   - f/u with Dr. Cates      Hypertension  - BP goal <130/80  - Continue metoprolol  - Management as per primary team      Hyperlipidemia  - LDL goal <70   - Check lipid panel if not done recently   - management per primary team       hypothyroidism  - TSH 2.265 WNL  - TSH 2.92 at goal today  - Patient on levothyroxine 150 mcg daily at home  - continue Levothyroxine 112 mcg IV daily      Compression deformities (lumbar)  - outpatient DXA      BRET Rodriges-BC  Nurse Practitioner  Division of Endocrinology  Contact on TEAMS    If out of hospital/unavailable when paged, please note: patient will be cared for by another provider on the endocrine service.  For urgent concerns: call the endocrine answering service for assistance to reach covering provider ([phone]). For non-urgent matters: please email [email or web address] for assistance.

## 2025-06-26 NOTE — SWALLOW BEDSIDE ASSESSMENT ADULT - SWALLOW EVAL: DIAGNOSIS
1- Mild oral stage for puree marked by adequate oral containment, slow bolus manipulation with slow anterior to posterior transfer, and oral clearance noted. 2- Pharyngeal stage for puree marked by initiation of the pharyngeal swallow with hyolaryngeal excursion upon palpation with prolonged cough episode post oral intake suggestive of impaired airway protection. SLP utilized Yankauer suction to clear oral cavity with return of mild residue/secretions.
1. Mild oral stage for puree, moderately-thick, and mildly-thick liquids as characterized by adequate oral acceptance/containment, adequate bolus manipulation for puree, slow anterior-posterior transport with suspected piecemeal transfer/deglutition for puree (additionally, suspect premature spillage to the hypopharynx for cup sip of mildly-thick liquids), and adequate oral clearance. 2. Moderate pharyngeal stage for aforementioned consistencies as characterized by suspected delay in initiation of the pharyngeal swallow trigger and +hyolaryngeal excursion upon digital palpation. Overt s/s airway penetration/aspiration (delayed cough) evidenced with moderately-thick liquids. Patient further with immediate cough evidenced with single cup sip of mildly-thick liquids.

## 2025-06-26 NOTE — PROGRESS NOTE ADULT - PROBLEM/PLAN-7
DISPLAY PLAN FREE TEXT
2 person assist
DISPLAY PLAN FREE TEXT

## 2025-06-26 NOTE — SWALLOW BEDSIDE ASSESSMENT ADULT - SWALLOW EVAL: RECOMMENDED DIET
Continue NPO with consideration for short term nonoral means of nutrition
1. Continue NPO with consideration for short-term, non-oral means of nutrition/hydration/medication administration.

## 2025-06-26 NOTE — CONSULT NOTE ADULT - SUBJECTIVE AND OBJECTIVE BOX
HPI:  Pt is an 85M with HTN, HLD, DM2, prostate cancer s/p prostatectomy, CKD, TIA with a pacemaker who presents transferred from [place] for further evaluation of low back pain. The pain began when he was going to the bathroom and worsened when he was in bed and rolled over. The pain is worse when he is walking or moving. He has never had back pain like this before. Patient normally ambulates with a walker or with a cane. Of note, patient endorses a fall in February where he fell into a pile of snow. Patient denies any injury after that event and did not have any imaging at that time. He usually has one BM every 3-4 days and this has not changed. Wakes up 2-3x/night to urinate. No episodes of bladder or bowel incontinence. He says he has diabetic neuropathy and has seen pain management. Says he has tried gabapentin but it made his legs very weak and he almost fell. At [place], CT showed multiple lumbar compression deformities. MRI was unable to be performed 2/2 ppm compatibility. He was transferred to [facility] for MRI and orthopedic eval.     Patient seen this AM, daughters Cody at bedside.     PERTINENT PM/SXH:   No pertinent past medical history    HTN (hypertension)    HLD (hyperlipidemia)    DM (diabetes mellitus)    TIA (transient ischemic attack)    Prostate cancer      No significant past surgical history    S/P prostatectomy    Pacemaker    H/O thyroidectomy      FAMILY HISTORY:  FHx: heart disease (Father, Mother)      ------------------------------------------------------------------------------------------------------------  ITEMS NOT CHECKED ARE NOT PRESENT    SOCIAL HISTORY:   Living Situation: [X ]Home  [ ]Long term care  [ ]Rehab [ ]Other  Support: ; has two daughters and two sons     Substance hx:  [ ]   Tobacco hx:  [ ]   Alcohol hx: [ ]   Family Hx substance abuse [ ]yes [ ]no    Baptism/Spirituality:  PCSSQ[Palliative Care Spiritual Screening Question]   Severity (0-10): 0  Score of 4 or > indicate consideration of Chaplaincy referral.  Chaplaincy Referral: [ ] yes [X ] refused [ ] following    Anticipatory Grief present?:  [ ] yes [X ] no  [ ] Deferred                      Other Referrals:    [ ]Hospice   [ ]Caregiver [place] Support  [ ]Child Life    [ ]Patient & Family Centered Care Referral  [ ]Holistic Therapy     ------------------------------------------------------------------------------------------------------------    PRESENT SYMPTOMS:     [ ] Unable to self-report      [ ] PAINADS     [ ] RDOS    [ ] No     [ ] Yes     Source if other than patient:  [ ]Family   [ ]Team     PAIN:   If blank, patient unable to specify     [ ]yes [ ]no    1. Location-   2. Radiation-    3. Quality-   4. Timing-   5. Minimal acceptable level/pain goal-   6. Aggravating factors-   7. QOL impact-     SYMPTOMS:   Dyspnea:                           [ ]Mild [ ]Moderate [ ]Severe  Anxiety:                             [ ]Mild [ ]Moderate [ ]Severe  Fatigue:                             [ ]Mild [ ]Moderate [ ]Severe  Nausea/Vomiting:              [ ]Mild [ ]Moderate [ ]Severe  Loss of appetite:                [ ]Mild [ ]Moderate [ ]Severe  Constipation:                     [ ]Mild [ ]Moderate [ ]Severe    Other Symptoms:  [X ]All other review of systems negative     ------------------------------------------------------------------------------------------------------------      I-Stop Reference No:     ------------------------------------------------------------------------------------------------------------    FUNCTIONAL STATUS:     Baseline ADL (prior to admission):  [ ] Independent  [ ] Moderate Assistance [ ] Dependent    Palliative Performance Score (current):     [ ]PPSV2 < or = to 30%   [ ]artificial nutrition      NUTRITIONAL STATUS:     Protein Calorie Malnutrition Present:   [ ]mild   [ ]moderate or severe    Weight:   [ ]underweight (BMI 18.5 or less)   [ ]morbid obesity (BMI 30 or higher)   [ ]anasarca  [ ]significant weight loss     Height (cm): 180.3 (05-19-25 @ 12:47), 180.3 (05-06-25 @ 22:29)  Weight (kg): 92.9 (05-19-25 @ 12:47), 102.3 (05-06-25 @ 22:29), 90.7 (11-09-24 @ 20:03)  BMI (kg/m2): 28.6 (05-19-25 @ 12:47), 31.5 (05-06-25 @ 22:29)    ------------------------------------------------------------------------------------------------------------    PRIOR ADVANCE DIRECTIVES:    [ ] DNR/MOLST    [ ] Living Will    [ ] Health Care Proxy(s)    DECISION MAKER(s):  [ ] Patient    [ ] Surrogate(s)  [ ] HCP   [ ] Guardian             ------------------------------------------------------------------------------------------------------------  PHYSICAL EXAM:  Vital Signs Last 24 Hrs  T(C): 36.8 (26 Jun 2025 09:41), Max: 37 (26 Jun 2025 06:23)  T(F): 98.3 (26 Jun 2025 09:41), Max: 98.6 (26 Jun 2025 06:23)  HR: 76 (26 Jun 2025 09:59) (73 - 86)  BP: 145/83 (26 Jun 2025 09:41) (136/60 - 164/46)  BP(mean): --  RR: 17 (26 Jun 2025 09:41) (17 - 18)  SpO2: 100% (26 Jun 2025 09:59) (94% - 100%)    Parameters below as of 26 Jun 2025 09:59  Patient On (Oxygen Delivery Method): nasal cannula     I&O's Summary    25 Jun 2025 07:01  -  26 Jun 2025 07:00  --------------------------------------------------------  IN: 360 mL / OUT: 2854.5 mL / NET: -2494.5 mL    26 Jun 2025 07:01  -  26 Jun 2025 11:31  --------------------------------------------------------  IN: 60 mL / OUT: 1000 mL / NET: -940 mL        GENERAL:  [ ]Cachexia  [ ] Frail  [ ]Awake  [ ]Oriented   [ ]Lethargic  [ ]Unarousable  [ ]Verbal  [ ]Non-Verbal    BEHAVIORAL:   [ ] Anxiety  [ ] Delirium [ ] Agitation [ ] Other    HEENT:   [ ]Normal   [ ]Dry mouth   [ ]ET Tube/Trach  [ ]Oral lesions    PULMONARY:   [ ]Clear              [ ]Tachypnea  [ ]Audible excessive secretions   [ ]Rhonchi        [ ]Right [ ]Left [ ]Bilateral  [ ]Crackles        [ ]Right [ ]Left [ ]Bilateral  [ ]Wheezing     [ ]Right [ ]Left [ ]Bilateral  [ ]Diminished breath sounds [ ]right [ ]left [ ]bilateral    CARDIOVASCULAR:    [ ]Regular [ ]Irregular [ ]Tachy  [ ]Jurgen [ ]Murmur [ ]Other    GASTROINTESTINAL:  [ ]Soft  [ ]Distended   [ ]+BS  [ ]Non tender [ ]Tender  [ ]Other [ ]PEG [ ]OGT/ NGT      GENITOURINARY:  [ ]Normal [ ] Incontinent   [ ]Oliguria/Anuria   [ ]Frye    MUSCULOSKELETAL:   [ ]Normal   [ ]Weakness  [ ]Bed/Wheelchair bound [ ]Edema    NEUROLOGIC:   [ ]No focal deficits  [ ]Cognitive impairment  [ ]Dysphagia [ ]Dysarthria [ ]Paresis [ ]Other     SKIN:   [ ]Normal  [ ]Rash  [ ]Other  [ ]Pressure ulcer(s)       Present on admission [ ]y [ ]n    ------------------------------------------------------------------------------------------------------------    LABS:                        9.0    12.66 )-----------( 206      ( 26 Jun 2025 07:00 )             28.1   06-26    138  |  97[L]  |  11  ----------------------------<  200[H]  3.7   |  27  |  0.97    Ca    8.0[L]      26 Jun 2025 07:00  Phos  3.0     06-26  Mg     1.90     06-26        Urinalysis Basic - ( 26 Jun 2025 07:00 )    Color: x / Appearance: x / SG: x / pH: x  Gluc: 200 mg/dL / Ketone: x  / Bili: x / Urobili: x   Blood: x / Protein: x / Nitrite: x   Leuk Esterase: x / RBC: x / WBC x   Sq Epi: x / Non Sq Epi: x / Bacteria: x        ------------------------------------------------------------------------------------------------------------    CRITICAL CARE:  [ ]Shock Present  [ ]Septic [ ]Cardiogenic [ ]Neurologic [ ]Hypovolemic [ ] Undifferentiated/Mixed   [ ]Vasopressors [ ]Inotropes     [ ]Respiratory failure present: [ ]Acute  [ ]Chronic [ ]Hypoxic  [ ]Hypercarbic   [ ]Mechanical ventilation   [ ]Trach collar   [ ]Non-invasive ventilatory support   [ ]High flow    [ ]Non-rebreather/Venti     [ ]Other organ failure     ------------------------------------------------------------------------------------------------------------    RADIOLOGY & ADDITIONAL STUDIES:      ------------------------------------------------------------------------------------------------------------    ALLERGIES:  Allergies    gabapentin (Other)    Intolerances        MEDICATIONS  (STANDING):  albuterol    90 MICROgram(s) HFA Inhaler 2 Puff(s) Inhalation every 6 hours  albuterol/ipratropium for Nebulization 3 milliLiter(s) Nebulizer every 6 hours  aspirin Suppository 300 milliGRAM(s) Rectal daily  dextrose 5% + sodium chloride 0.45% with potassium chloride 20 mEq/L 1000 milliLiter(s) (30 mL/Hr) IV Continuous <Continuous>  dornase mayda Solution 2.5 milliGRAM(s) Inhalation daily  folic acid Injectable 1 milliGRAM(s) IV Push daily  furosemide   Injectable 40 milliGRAM(s) IV Push two times a day  heparin   Injectable 5000 Unit(s) SubCutaneous every 8 hours  insulin lispro (ADMELOG) corrective regimen sliding scale   SubCutaneous every 6 hours  levothyroxine Injectable 112 MICROGram(s) IV Push at bedtime  lidocaine   4% Patch 1 Patch Transdermal daily  metoprolol tartrate Injectable 5 milliGRAM(s) IV Push every 6 hours  pantoprazole  Injectable 40 milliGRAM(s) IV Push daily  potassium chloride  10 mEq/100 mL IVPB 10 milliEquivalent(s) IV Intermittent every 1 hour  sodium chloride 3%  Inhalation 4 milliLiter(s) Inhalation every 6 hours  thiamine Injectable 100 milliGRAM(s) IV Push daily    MEDICATIONS  (PRN):  acetaminophen   IVPB .. 1000 milliGRAM(s) IV Intermittent every 6 hours PRN Mild Pain (1 - 3)  HYDROmorphone  Injectable 0.2 milliGRAM(s) IV Push every 4 hours PRN Moderate Pain (4 - 6)  HYDROmorphone  Injectable 0.5 milliGRAM(s) IV Push every 4 hours PRN Severe Pain (7 - 10)           HPI:  Pt is an 85M with HTN, HLD, DM2, prostate cancer s/p prostatectomy, CKD, TIA with a pacemaker who presents transferred from [place] for further evaluation of low back pain. The pain began when he was going to the bathroom and worsened when he was in bed and rolled over. The pain is worse when he is walking or moving. He has never had back pain like this before. Patient normally ambulates with a walker or with a cane. Of note, patient endorses a fall in February where he fell into a pile of snow. Patient denies any injury after that event and did not have any imaging at that time. He usually has one BM every 3-4 days and this has not changed. Wakes up 2-3x/night to urinate. No episodes of bladder or bowel incontinence. He says he has diabetic neuropathy and has seen pain management. Says he has tried gabapentin but it made his legs very weak and he almost fell. At [place], CT showed multiple lumbar compression deformities. MRI was unable to be performed 2/2 ppm compatibility. He was transferred to [facility] for MRI and orthopedic eval.     Patient seen this AM, daughters Cody at bedside.     PERTINENT PM/SXH:   No pertinent past medical history    HTN (hypertension)    HLD (hyperlipidemia)    DM (diabetes mellitus)    TIA (transient ischemic attack)    Prostate cancer      No significant past surgical history    S/P prostatectomy    Pacemaker    H/O thyroidectomy      FAMILY HISTORY:  FHx: heart disease (Father, Mother)      ------------------------------------------------------------------------------------------------------------  ITEMS NOT CHECKED ARE NOT PRESENT    SOCIAL HISTORY:   Living Situation: [X ]Home  [ ]Long term care  [ ]Rehab [ ]Other  Support: ; has two daughters and two sons     Substance hx:  [ ]   Tobacco hx:  [ ]   Alcohol hx: [ ]   Family Hx substance abuse [ ]yes [ ]no    Cheondoism/Spirituality:  PCSSQ[Palliative Care Spiritual Screening Question]   Severity (0-10): 0  Score of 4 or > indicate consideration of Chaplaincy referral.  Chaplaincy Referral: [ ] yes [X ] refused [ ] following    Anticipatory Grief present?:  [ ] yes [X ] no  [ ] Deferred                      Other Referrals:    [ ]Hospice   [ ]Caregiver [place] Support  [ ]Child Life    [ ]Patient & Family Centered Care Referral  [ ]Holistic Therapy     ------------------------------------------------------------------------------------------------------------    PRESENT SYMPTOMS:     [ ] Unable to self-report      [ ] PAINADS     [ ] RDOS    [ ] No     [ ] Yes     Source if other than patient:  [ ]Family   [ ]Team     PAIN:   If blank, patient unable to specify     [ ]yes [ ]no    1. Location-   2. Radiation-    3. Quality-   4. Timing-   5. Minimal acceptable level/pain goal-   6. Aggravating factors-   7. QOL impact-     SYMPTOMS:   Dyspnea:                           [ ]Mild [ ]Moderate [ ]Severe  Anxiety:                             [ ]Mild [ ]Moderate [ ]Severe  Fatigue:                             [ ]Mild [ ]Moderate [ ]Severe  Nausea/Vomiting:              [ ]Mild [ ]Moderate [ ]Severe  Loss of appetite:                [ ]Mild [ ]Moderate [ ]Severe  Constipation:                     [ ]Mild [ ]Moderate [ ]Severe    Other Symptoms:  [X ]All other review of systems negative     ------------------------------------------------------------------------------------------------------------      I-Stop Reference No: [id number]    ------------------------------------------------------------------------------------------------------------    FUNCTIONAL STATUS:     Baseline ADL (prior to admission):  [ ] Independent  [X ] Moderate Assistance [ ] Dependent    [X ]PPSV2 < or = to 30%   [ ]artificial nutrition      NUTRITIONAL STATUS:     Protein Calorie Malnutrition Present:   [ ]mild   [ ]moderate or severe    Weight:   [ ]underweight (BMI 18.5 or less)   [ ]morbid obesity (BMI 30 or higher)   [ ]anasarca  [ ]significant weight loss     Height (cm): 180.3 (05-19-25 @ 12:47), 180.3 (05-06-25 @ 22:29)  Weight (kg): 92.9 (05-19-25 @ 12:47), 102.3 (05-06-25 @ 22:29), 90.7 (11-09-24 @ 20:03)  BMI (kg/m2): 28.6 (05-19-25 @ 12:47), 31.5 (05-06-25 @ 22:29)    ------------------------------------------------------------------------------------------------------------    PRIOR ADVANCE DIRECTIVES:    [ ] DNR/MOLST    [ ] Living Will    [ ] Health Care Proxy(s)    DECISION MAKER(s):  [ ] Patient    [X ] Surrogate(s)- children   [ ] HCP   [ ] Guardian             ------------------------------------------------------------------------------------------------------------  PHYSICAL EXAM:  Vital Signs Last 24 Hrs  T(C): 36.8 (26 Jun 2025 09:41), Max: 37 (26 Jun 2025 06:23)  T(F): 98.3 (26 Jun 2025 09:41), Max: 98.6 (26 Jun 2025 06:23)  HR: 76 (26 Jun 2025 09:59) (73 - 86)  BP: 145/83 (26 Jun 2025 09:41) (136/60 - 164/46)  BP(mean): --  RR: 17 (26 Jun 2025 09:41) (17 - 18)  SpO2: 100% (26 Jun 2025 09:59) (94% - 100%)    Parameters below as of 26 Jun 2025 09:59  Patient On (Oxygen Delivery Method): nasal cannula     I&O's Summary    25 Jun 2025 07:01  -  26 Jun 2025 07:00  --------------------------------------------------------  IN: 360 mL / OUT: 2854.5 mL / NET: -2494.5 mL    26 Jun 2025 07:01  -  26 Jun 2025 11:31  --------------------------------------------------------  IN: 60 mL / OUT: 1000 mL / NET: -940 mL        GENERAL:  [ ]Cachexia  [X ] Frail  [X ]Awake  [X ]Oriented   [ ]Lethargic  [ ]Unarousable  [ ]Verbal  [ ]Non-Verbal    BEHAVIORAL:   [ ] Anxiety  [ ] Delirium [ ] Agitation [ ] Other    HEENT:   [X ]Normal   [ ]Dry mouth   [ ]ET Tube/Trach  [ ]Oral lesions    PULMONARY:   [ ]Clear              [ ]Tachypnea  [ ]Audible excessive secretions   [ ]Rhonchi        [ ]Right [ ]Left [ ]Bilateral  [ ]Crackles        [ ]Right [ ]Left [ ]Bilateral  [ ]Wheezing     [ ]Right [ ]Left [ ]Bilateral  [X ]Diminished breath sounds [ ]right [ ]left [X ]bilateral    CARDIOVASCULAR:    [X ]Regular [ ]Irregular [ ]Tachy  [ ]Jurgen [ ]Murmur [ ]Other    GASTROINTESTINAL:  [X ]Soft  [ ]Distended   [ ]+BS  [X ]Non tender [ ]Tender  [ ]Other [ ]PEG [ ]OGT/ NGT      GENITOURINARY:  [ ]Normal [ ] Incontinent   [ ]Oliguria/Anuria   [X ]Frye    MUSCULOSKELETAL:   [ ]Normal   [ ]Weakness  [ ]Bed/Wheelchair bound [X ]Edema    NEUROLOGIC:   [X ]No focal deficits  [ ]Cognitive impairment  [ ]Dysphagia [ ]Dysarthria [ ]Paresis [ ]Other     SKIN:   [X ]Normal  [ ]Rash  [ ]Other  [ ]Pressure ulcer(s)       Present on admission [ ]y [ ]n    ------------------------------------------------------------------------------------------------------------    LABS:                        9.0    12.66 )-----------( 206      ( 26 Jun 2025 07:00 )             28.1   06-26    138  |  97[L]  |  11  ----------------------------<  200[H]  3.7   |  27  |  0.97    Ca    8.0[L]      26 Jun 2025 07:00  Phos  3.0     06-26  Mg     1.90     06-26        Urinalysis Basic - ( 26 Jun 2025 07:00 )    Color: x / Appearance: x / SG: x / pH: x  Gluc: 200 mg/dL / Ketone: x  / Bili: x / Urobili: x   Blood: x / Protein: x / Nitrite: x   Leuk Esterase: x / RBC: x / WBC x   Sq Epi: x / Non Sq Epi: x / Bacteria: x    ------------------------------------------------------------------------------------------------------------    CRITICAL CARE:  [ ]Shock Present  [ ]Septic [ ]Cardiogenic [ ]Neurologic [ ]Hypovolemic [ ] Undifferentiated/Mixed   [ ]Vasopressors [ ]Inotropes     [ ]Respiratory failure present: [ ]Acute  [ ]Chronic [ ]Hypoxic  [ ]Hypercarbic   [ ]Mechanical ventilation   [ ]Trach collar   [ ]Non-invasive ventilatory support   [ ]High flow    [ ]Non-rebreather/Venti     [ ]Other organ failure     ------------------------------------------------------------------------------------------------------------    RADIOLOGY & ADDITIONAL STUDIES:    < from: Xray Chest 1 View- PORTABLE-Routine (Xray Chest 1 View- PORTABLE-Routine in AM.) (06.23.25 @ 01:59) >    Frontal expiratory view of the chest shows the heart to be similar in   size.    The lungs show partial clearing of the right base with progression of   lower left lung infiltrate. Small left pleural effusion is present and   there is no evidence of pneumothorax nor right pleural effusion.    IMPRESSION:  Progression of left infiltrate/effusion.    < end of copied text >    ------------------------------------------------------------------------------------------------------------    ALLERGIES:  Allergies    gabapentin (Other)    Intolerances        MEDICATIONS  (STANDING):  albuterol    90 MICROgram(s) HFA Inhaler 2 Puff(s) Inhalation every 6 hours  albuterol/ipratropium for Nebulization 3 milliLiter(s) Nebulizer every 6 hours  aspirin Suppository 300 milliGRAM(s) Rectal daily  dextrose 5% + sodium chloride 0.45% with potassium chloride 20 mEq/L 1000 milliLiter(s) (30 mL/Hr) IV Continuous <Continuous>  dornase mayda Solution 2.5 milliGRAM(s) Inhalation daily  folic acid Injectable 1 milliGRAM(s) IV Push daily  furosemide   Injectable 40 milliGRAM(s) IV Push two times a day  heparin   Injectable 5000 Unit(s) SubCutaneous every 8 hours  insulin lispro (ADMELOG) corrective regimen sliding scale   SubCutaneous every 6 hours  levothyroxine Injectable 112 MICROGram(s) IV Push at bedtime  lidocaine   4% Patch 1 Patch Transdermal daily  metoprolol tartrate Injectable 5 milliGRAM(s) IV Push every 6 hours  pantoprazole  Injectable 40 milliGRAM(s) IV Push daily  potassium chloride  10 mEq/100 mL IVPB 10 milliEquivalent(s) IV Intermittent every 1 hour  sodium chloride 3%  Inhalation 4 milliLiter(s) Inhalation every 6 hours  thiamine Injectable 100 milliGRAM(s) IV Push daily    MEDICATIONS  (PRN):  acetaminophen   IVPB .. 1000 milliGRAM(s) IV Intermittent every 6 hours PRN Mild Pain (1 - 3)  HYDROmorphone  Injectable 0.2 milliGRAM(s) IV Push every 4 hours PRN Moderate Pain (4 - 6)  HYDROmorphone  Injectable 0.5 milliGRAM(s) IV Push every 4 hours PRN Severe Pain (7 - 10)           HPI:  Pt is an 85M with HTN, HLD, DM2, prostate cancer s/p prostatectomy, CKD, TIA with a pacemaker who presents transferred from [place] for further evaluation of low back pain. The pain began when he was going to the bathroom and worsened when he was in bed and rolled over. The pain is worse when he is walking or moving. He has never had back pain like this before. Patient normally ambulates with a walker or with a cane. Of note, patient endorses a fall in February where he fell into a pile of snow. Patient denies any injury after that event and did not have any imaging at that time. He usually has one BM every 3-4 days and this has not changed. Wakes up 2-3x/night to urinate. No episodes of bladder or bowel incontinence. He says he has diabetic neuropathy and has seen pain management. Says he has tried gabapentin but it made his legs very weak and he almost fell. At [place], CT showed multiple lumbar compression deformities. MRI was unable to be performed 2/2 ppm compatibility. He was transferred to [facility] for MRI and orthopedic eval.     Patient seen this AM, sadiq Pizarro and Lilian at bedside. Patient comfortable in no distress. Patient purposely has nasal cannula in his mouth to help oxygenate, as they were told he is mouth breather and this has been helping. Sadiq reviews patient's complicated and prolonged hospital course. Before admission patient was independent. They were hoping for discharge to [facility] however setbacks have occurred. They are now waiting for S&S re-evaluation.     PERTINENT PM/SXH:   No pertinent past medical history    HTN (hypertension)    HLD (hyperlipidemia)    DM (diabetes mellitus)    TIA (transient ischemic attack)    Prostate cancer      No significant past surgical history    S/P prostatectomy    Pacemaker    H/O thyroidectomy      FAMILY HISTORY:  FHx: heart disease (Father, Mother)      ------------------------------------------------------------------------------------------------------------  ITEMS NOT CHECKED ARE NOT PRESENT    SOCIAL HISTORY:   Living Situation: [X ]Home  [ ]Long term care  [ ]Rehab [ ]Other  Support: ; has two daughters and two sons     Substance hx:  [ ]   Tobacco hx:  [ ]   Alcohol hx: [ ]   Family Hx substance abuse [ ]yes [ ]no    Shinto/Spirituality:  PCSSQ[Palliative Care Spiritual Screening Question]   Severity (0-10): 0  Score of 4 or > indicate consideration of Chaplaincy referral.  Chaplaincy Referral: [ ] yes [X ] refused [ ] following    Anticipatory Grief present?:  [ ] yes [X ] no  [ ] Deferred                      Other Referrals:    [ ]Hospice   [ ]Caregiver [place] Support  [ ]Child Life    [ ]Patient & Family Centered Care Referral  [ ]Holistic Therapy     ------------------------------------------------------------------------------------------------------------    PRESENT SYMPTOMS:     [ ] Unable to self-report      [ ] PAINADS     [ ] RDOS    [ ] No     [X ] Yes     Source if other than patient:  [ ]Family   [ ]Team     PAIN:   If blank, patient unable to specify     [X ]yes [ ]no    1. Location- Back   2. Radiation-  Legs   3. Quality- Sharp   4. Timing- On and off   5. Minimal acceptable level/pain goal- 3/10   6. Aggravating factors- Movement   7. QOL impact- Moderate     SYMPTOMS:   Dyspnea:                           [ ]Mild [ ]Moderate [ ]Severe  Anxiety:                             [ ]Mild [ ]Moderate [ ]Severe  Fatigue:                             [ ]Mild [ ]Moderate [ ]Severe  Nausea/Vomiting:              [ ]Mild [ ]Moderate [ ]Severe  Loss of appetite:                [ ]Mild [ ]Moderate [ ]Severe  Constipation:                     [ ]Mild [ ]Moderate [ ]Severe    Other Symptoms:  [X ]All other review of systems negative     ------------------------------------------------------------------------------------------------------------      I-Stop Reference No: [id number]    ------------------------------------------------------------------------------------------------------------    FUNCTIONAL STATUS:     Baseline ADL (prior to admission):  [ ] Independent  [X ] Moderate Assistance [ ] Dependent    [X ]PPSV2 < or = to 30%   [ ]artificial nutrition      NUTRITIONAL STATUS:     Protein Calorie Malnutrition Present:   [ ]mild   [ ]moderate or severe    Weight:   [ ]underweight (BMI 18.5 or less)   [ ]morbid obesity (BMI 30 or higher)   [ ]anasarca  [ ]significant weight loss     Height (cm): 180.3 (05-19-25 @ 12:47), 180.3 (05-06-25 @ 22:29)  Weight (kg): 92.9 (05-19-25 @ 12:47), 102.3 (05-06-25 @ 22:29), 90.7 (11-09-24 @ 20:03)  BMI (kg/m2): 28.6 (05-19-25 @ 12:47), 31.5 (05-06-25 @ 22:29)    ------------------------------------------------------------------------------------------------------------    PRIOR ADVANCE DIRECTIVES:    [ ] DNR/MOLST    [ ] Living Will    [ ] Health Care Proxy(s)    DECISION MAKER(s):  [ ] Patient    [X ] Surrogate(s)- children   [ ] HCP   [ ] Guardian             ------------------------------------------------------------------------------------------------------------  PHYSICAL EXAM:  Vital Signs Last 24 Hrs  T(C): 36.8 (26 Jun 2025 09:41), Max: 37 (26 Jun 2025 06:23)  T(F): 98.3 (26 Jun 2025 09:41), Max: 98.6 (26 Jun 2025 06:23)  HR: 76 (26 Jun 2025 09:59) (73 - 86)  BP: 145/83 (26 Jun 2025 09:41) (136/60 - 164/46)  BP(mean): --  RR: 17 (26 Jun 2025 09:41) (17 - 18)  SpO2: 100% (26 Jun 2025 09:59) (94% - 100%)    Parameters below as of 26 Jun 2025 09:59  Patient On (Oxygen Delivery Method): nasal cannula     I&O's Summary    25 Jun 2025 07:01  -  26 Jun 2025 07:00  --------------------------------------------------------  IN: 360 mL / OUT: 2854.5 mL / NET: -2494.5 mL    26 Jun 2025 07:01  -  26 Jun 2025 11:31  --------------------------------------------------------  IN: 60 mL / OUT: 1000 mL / NET: -940 mL        GENERAL:  [ ]Cachexia  [X ] Frail  [X ]Awake  [X ]Oriented   [ ]Lethargic  [ ]Unarousable  [ ]Verbal  [ ]Non-Verbal    BEHAVIORAL:   [ ] Anxiety  [ ] Delirium [ ] Agitation [ ] Other    HEENT:   [X ]Normal   [ ]Dry mouth   [ ]ET Tube/Trach  [ ]Oral lesions    PULMONARY:   [ ]Clear              [ ]Tachypnea  [ ]Audible excessive secretions   [ ]Rhonchi        [ ]Right [ ]Left [ ]Bilateral  [ ]Crackles        [ ]Right [ ]Left [ ]Bilateral  [ ]Wheezing     [ ]Right [ ]Left [ ]Bilateral  [X ]Diminished breath sounds [ ]right [ ]left [X ]bilateral    CARDIOVASCULAR:    [X ]Regular [ ]Irregular [ ]Tachy  [ ]Jurgen [ ]Murmur [ ]Other    GASTROINTESTINAL:  [X ]Soft  [ ]Distended   [ ]+BS  [X ]Non tender [ ]Tender  [ ]Other [ ]PEG [ ]OGT/ NGT      GENITOURINARY:  [ ]Normal [ ] Incontinent   [ ]Oliguria/Anuria   [X ]Frye    MUSCULOSKELETAL:   [ ]Normal   [ ]Weakness  [ ]Bed/Wheelchair bound [X ]Edema    NEUROLOGIC:   [X ]No focal deficits  [ ]Cognitive impairment  [ ]Dysphagia [ ]Dysarthria [ ]Paresis [ ]Other     SKIN:   [X ]Normal  [ ]Rash  [ ]Other  [ ]Pressure ulcer(s)       Present on admission [ ]y [ ]n    ------------------------------------------------------------------------------------------------------------    LABS:                        9.0    12.66 )-----------( 206      ( 26 Jun 2025 07:00 )             28.1   06-26    138  |  97[L]  |  11  ----------------------------<  200[H]  3.7   |  27  |  0.97    Ca    8.0[L]      26 Jun 2025 07:00  Phos  3.0     06-26  Mg     1.90     06-26        Urinalysis Basic - ( 26 Jun 2025 07:00 )    Color: x / Appearance: x / SG: x / pH: x  Gluc: 200 mg/dL / Ketone: x  / Bili: x / Urobili: x   Blood: x / Protein: x / Nitrite: x   Leuk Esterase: x / RBC: x / WBC x   Sq Epi: x / Non Sq Epi: x / Bacteria: x    ------------------------------------------------------------------------------------------------------------    CRITICAL CARE:  [ ]Shock Present  [ ]Septic [ ]Cardiogenic [ ]Neurologic [ ]Hypovolemic [ ] Undifferentiated/Mixed   [ ]Vasopressors [ ]Inotropes     [ ]Respiratory failure present: [ ]Acute  [ ]Chronic [ ]Hypoxic  [ ]Hypercarbic   [ ]Mechanical ventilation   [ ]Trach collar   [ ]Non-invasive ventilatory support   [ ]High flow    [ ]Non-rebreather/Venti     [ ]Other organ failure     ------------------------------------------------------------------------------------------------------------    RADIOLOGY & ADDITIONAL STUDIES:    < from: Xray Chest 1 View- PORTABLE-Routine (Xray Chest 1 View- PORTABLE-Routine in AM.) (06.23.25 @ 01:59) >    Frontal expiratory view of the chest shows the heart to be similar in   size.    The lungs show partial clearing of the right base with progression of   lower left lung infiltrate. Small left pleural effusion is present and   there is no evidence of pneumothorax nor right pleural effusion.    IMPRESSION:  Progression of left infiltrate/effusion.    < end of copied text >    ------------------------------------------------------------------------------------------------------------    ALLERGIES:  Allergies    gabapentin (Other)    Intolerances        MEDICATIONS  (STANDING):  albuterol    90 MICROgram(s) HFA Inhaler 2 Puff(s) Inhalation every 6 hours  albuterol/ipratropium for Nebulization 3 milliLiter(s) Nebulizer every 6 hours  aspirin Suppository 300 milliGRAM(s) Rectal daily  dextrose 5% + sodium chloride 0.45% with potassium chloride 20 mEq/L 1000 milliLiter(s) (30 mL/Hr) IV Continuous <Continuous>  dornase mayda Solution 2.5 milliGRAM(s) Inhalation daily  folic acid Injectable 1 milliGRAM(s) IV Push daily  furosemide   Injectable 40 milliGRAM(s) IV Push two times a day  heparin   Injectable 5000 Unit(s) SubCutaneous every 8 hours  insulin lispro (ADMELOG) corrective regimen sliding scale   SubCutaneous every 6 hours  levothyroxine Injectable 112 MICROGram(s) IV Push at bedtime  lidocaine   4% Patch 1 Patch Transdermal daily  metoprolol tartrate Injectable 5 milliGRAM(s) IV Push every 6 hours  pantoprazole  Injectable 40 milliGRAM(s) IV Push daily  potassium chloride  10 mEq/100 mL IVPB 10 milliEquivalent(s) IV Intermittent every 1 hour  sodium chloride 3%  Inhalation 4 milliLiter(s) Inhalation every 6 hours  thiamine Injectable 100 milliGRAM(s) IV Push daily    MEDICATIONS  (PRN):  acetaminophen   IVPB .. 1000 milliGRAM(s) IV Intermittent every 6 hours PRN Mild Pain (1 - 3)  HYDROmorphone  Injectable 0.2 milliGRAM(s) IV Push every 4 hours PRN Moderate Pain (4 - 6)  HYDROmorphone  Injectable 0.5 milliGRAM(s) IV Push every 4 hours PRN Severe Pain (7 - 10)

## 2025-06-27 LAB
ANION GAP SERPL CALC-SCNC: 12 MMOL/L — SIGNIFICANT CHANGE UP (ref 7–14)
BUN SERPL-MCNC: 10 MG/DL — SIGNIFICANT CHANGE UP (ref 7–23)
CALCIUM SERPL-MCNC: 8 MG/DL — LOW (ref 8.4–10.5)
CHLORIDE SERPL-SCNC: 96 MMOL/L — LOW (ref 98–107)
CO2 SERPL-SCNC: 30 MMOL/L — SIGNIFICANT CHANGE UP (ref 22–31)
CREAT SERPL-MCNC: 0.87 MG/DL — SIGNIFICANT CHANGE UP (ref 0.5–1.3)
EGFR: 84 ML/MIN/1.73M2 — SIGNIFICANT CHANGE UP
EGFR: 84 ML/MIN/1.73M2 — SIGNIFICANT CHANGE UP
GLUCOSE BLDC GLUCOMTR-MCNC: 161 MG/DL — HIGH (ref 70–99)
GLUCOSE BLDC GLUCOMTR-MCNC: 164 MG/DL — HIGH (ref 70–99)
GLUCOSE BLDC GLUCOMTR-MCNC: 190 MG/DL — HIGH (ref 70–99)
GLUCOSE BLDC GLUCOMTR-MCNC: 196 MG/DL — HIGH (ref 70–99)
GLUCOSE BLDC GLUCOMTR-MCNC: 202 MG/DL — HIGH (ref 70–99)
GLUCOSE BLDC GLUCOMTR-MCNC: 204 MG/DL — HIGH (ref 70–99)
GLUCOSE BLDC GLUCOMTR-MCNC: 204 MG/DL — HIGH (ref 70–99)
GLUCOSE SERPL-MCNC: 210 MG/DL — HIGH (ref 70–99)
HCT VFR BLD CALC: 27.3 % — LOW (ref 39–50)
HGB BLD-MCNC: 8.6 G/DL — LOW (ref 13–17)
MAGNESIUM SERPL-MCNC: 1.7 MG/DL — SIGNIFICANT CHANGE UP (ref 1.6–2.6)
MCHC RBC-ENTMCNC: 28.2 PG — SIGNIFICANT CHANGE UP (ref 27–34)
MCHC RBC-ENTMCNC: 31.5 G/DL — LOW (ref 32–36)
MCV RBC AUTO: 89.5 FL — SIGNIFICANT CHANGE UP (ref 80–100)
NRBC # BLD AUTO: 0 K/UL — SIGNIFICANT CHANGE UP (ref 0–0)
NRBC # FLD: 0 K/UL — SIGNIFICANT CHANGE UP (ref 0–0)
NRBC BLD AUTO-RTO: 0 /100 WBCS — SIGNIFICANT CHANGE UP (ref 0–0)
PHOSPHATE SERPL-MCNC: 2.2 MG/DL — LOW (ref 2.5–4.5)
PLATELET # BLD AUTO: 223 K/UL — SIGNIFICANT CHANGE UP (ref 150–400)
PMV BLD: 10.8 FL — SIGNIFICANT CHANGE UP (ref 7–13)
POTASSIUM SERPL-MCNC: 3.4 MMOL/L — LOW (ref 3.5–5.3)
POTASSIUM SERPL-SCNC: 3.4 MMOL/L — LOW (ref 3.5–5.3)
RBC # BLD: 3.05 M/UL — LOW (ref 4.2–5.8)
RBC # FLD: 17 % — HIGH (ref 10.3–14.5)
SODIUM SERPL-SCNC: 138 MMOL/L — SIGNIFICANT CHANGE UP (ref 135–145)
WBC # BLD: 8.85 K/UL — SIGNIFICANT CHANGE UP (ref 3.8–10.5)
WBC # FLD AUTO: 8.85 K/UL — SIGNIFICANT CHANGE UP (ref 3.8–10.5)

## 2025-06-27 PROCEDURE — 74230 X-RAY XM SWLNG FUNCJ C+: CPT | Mod: 26

## 2025-06-27 PROCEDURE — 99233 SBSQ HOSP IP/OBS HIGH 50: CPT

## 2025-06-27 PROCEDURE — 99232 SBSQ HOSP IP/OBS MODERATE 35: CPT

## 2025-06-27 PROCEDURE — 99497 ADVNCD CARE PLAN 30 MIN: CPT | Mod: 25

## 2025-06-27 PROCEDURE — 74176 CT ABD & PELVIS W/O CONTRAST: CPT | Mod: 26

## 2025-06-27 RX ORDER — POTASSIUM PHOSPHATE, MONOBASIC POTASSIUM PHOSPHATE, DIBASIC INJECTION, 236; 224 MG/ML; MG/ML
15 SOLUTION, CONCENTRATE INTRAVENOUS ONCE
Refills: 0 | Status: COMPLETED | OUTPATIENT
Start: 2025-06-27 | End: 2025-06-27

## 2025-06-27 RX ORDER — HYDROMORPHONE/SOD CHLOR,ISO/PF 2 MG/10 ML
0.5 SYRINGE (ML) INJECTION EVERY 4 HOURS
Refills: 0 | Status: DISCONTINUED | OUTPATIENT
Start: 2025-06-27 | End: 2025-07-02

## 2025-06-27 RX ORDER — GLYCOPYRROLATE 0.2 MG/ML
0.4 INJECTION INTRAMUSCULAR; INTRAVENOUS EVERY 6 HOURS
Refills: 0 | Status: DISCONTINUED | OUTPATIENT
Start: 2025-06-27 | End: 2025-07-02

## 2025-06-27 RX ORDER — MAGNESIUM SULFATE 500 MG/ML
2 SYRINGE (ML) INJECTION ONCE
Refills: 0 | Status: COMPLETED | OUTPATIENT
Start: 2025-06-27 | End: 2025-06-27

## 2025-06-27 RX ORDER — INSULIN GLARGINE-YFGN 100 [IU]/ML
5 INJECTION, SOLUTION SUBCUTANEOUS AT BEDTIME
Refills: 0 | Status: DISCONTINUED | OUTPATIENT
Start: 2025-06-27 | End: 2025-07-02

## 2025-06-27 RX ADMIN — INSULIN GLARGINE-YFGN 5 UNIT(S): 100 INJECTION, SOLUTION SUBCUTANEOUS at 23:59

## 2025-06-27 RX ADMIN — POTASSIUM CHLORIDE, DEXTROSE MONOHYDRATE AND SODIUM CHLORIDE 30 MILLILITER(S): 150; 5; 900 INJECTION, SOLUTION INTRAVENOUS at 07:52

## 2025-06-27 RX ADMIN — INSULIN LISPRO 2: 100 INJECTION, SOLUTION INTRAVENOUS; SUBCUTANEOUS at 12:16

## 2025-06-27 RX ADMIN — Medication 40 MILLIGRAM(S): at 11:43

## 2025-06-27 RX ADMIN — Medication 4 MILLILITER(S): at 16:14

## 2025-06-27 RX ADMIN — METOPROLOL SUCCINATE 5 MILLIGRAM(S): 50 TABLET, EXTENDED RELEASE ORAL at 11:44

## 2025-06-27 RX ADMIN — Medication 100 MILLIEQUIVALENT(S): at 10:57

## 2025-06-27 RX ADMIN — HEPARIN SODIUM 5000 UNIT(S): 1000 INJECTION INTRAVENOUS; SUBCUTANEOUS at 01:06

## 2025-06-27 RX ADMIN — Medication 25 GRAM(S): at 15:08

## 2025-06-27 RX ADMIN — Medication 300 MILLIGRAM(S): at 15:07

## 2025-06-27 RX ADMIN — Medication 4 MILLILITER(S): at 09:39

## 2025-06-27 RX ADMIN — LIDOCAINE HYDROCHLORIDE 1 PATCH: 20 JELLY TOPICAL at 15:04

## 2025-06-27 RX ADMIN — INSULIN LISPRO 1: 100 INJECTION, SOLUTION INTRAVENOUS; SUBCUTANEOUS at 23:58

## 2025-06-27 RX ADMIN — Medication 100 MILLIEQUIVALENT(S): at 12:08

## 2025-06-27 RX ADMIN — Medication 4 MILLILITER(S): at 22:40

## 2025-06-27 RX ADMIN — Medication 0.2 MILLIGRAM(S): at 16:54

## 2025-06-27 RX ADMIN — METOPROLOL SUCCINATE 5 MILLIGRAM(S): 50 TABLET, EXTENDED RELEASE ORAL at 23:58

## 2025-06-27 RX ADMIN — METOPROLOL SUCCINATE 5 MILLIGRAM(S): 50 TABLET, EXTENDED RELEASE ORAL at 18:31

## 2025-06-27 RX ADMIN — INSULIN LISPRO 1: 100 INJECTION, SOLUTION INTRAVENOUS; SUBCUTANEOUS at 18:31

## 2025-06-27 RX ADMIN — HEPARIN SODIUM 5000 UNIT(S): 1000 INJECTION INTRAVENOUS; SUBCUTANEOUS at 15:07

## 2025-06-27 RX ADMIN — METOPROLOL SUCCINATE 5 MILLIGRAM(S): 50 TABLET, EXTENDED RELEASE ORAL at 01:06

## 2025-06-27 RX ADMIN — DORNASE ALFA 2.5 MILLIGRAM(S): 1 SOLUTION RESPIRATORY (INHALATION) at 09:39

## 2025-06-27 RX ADMIN — Medication 0.2 MILLIGRAM(S): at 15:54

## 2025-06-27 RX ADMIN — LIDOCAINE HYDROCHLORIDE 1 PATCH: 20 JELLY TOPICAL at 19:45

## 2025-06-27 RX ADMIN — INSULIN LISPRO 1: 100 INJECTION, SOLUTION INTRAVENOUS; SUBCUTANEOUS at 01:06

## 2025-06-27 RX ADMIN — FUROSEMIDE 40 MILLIGRAM(S): 10 INJECTION INTRAMUSCULAR; INTRAVENOUS at 15:08

## 2025-06-27 RX ADMIN — IPRATROPIUM BROMIDE AND ALBUTEROL SULFATE 3 MILLILITER(S): .5; 2.5 SOLUTION RESPIRATORY (INHALATION) at 22:40

## 2025-06-27 RX ADMIN — IPRATROPIUM BROMIDE AND ALBUTEROL SULFATE 3 MILLILITER(S): .5; 2.5 SOLUTION RESPIRATORY (INHALATION) at 04:34

## 2025-06-27 RX ADMIN — Medication 400 MILLIGRAM(S): at 15:04

## 2025-06-27 RX ADMIN — INSULIN LISPRO 2: 100 INJECTION, SOLUTION INTRAVENOUS; SUBCUTANEOUS at 06:27

## 2025-06-27 RX ADMIN — Medication 100 MILLIGRAM(S): at 11:43

## 2025-06-27 RX ADMIN — METOPROLOL SUCCINATE 5 MILLIGRAM(S): 50 TABLET, EXTENDED RELEASE ORAL at 05:43

## 2025-06-27 RX ADMIN — Medication 100 MILLIEQUIVALENT(S): at 09:28

## 2025-06-27 RX ADMIN — FUROSEMIDE 40 MILLIGRAM(S): 10 INJECTION INTRAMUSCULAR; INTRAVENOUS at 05:43

## 2025-06-27 RX ADMIN — FOLIC ACID 1 MILLIGRAM(S): 1 TABLET ORAL at 11:44

## 2025-06-27 RX ADMIN — Medication 112 MICROGRAM(S): at 23:58

## 2025-06-27 RX ADMIN — Medication 1000 MILLIGRAM(S): at 16:00

## 2025-06-27 RX ADMIN — IPRATROPIUM BROMIDE AND ALBUTEROL SULFATE 3 MILLILITER(S): .5; 2.5 SOLUTION RESPIRATORY (INHALATION) at 09:43

## 2025-06-27 RX ADMIN — Medication 0.2 MILLIGRAM(S): at 09:06

## 2025-06-27 RX ADMIN — Medication 4 MILLILITER(S): at 04:32

## 2025-06-27 RX ADMIN — Medication 0.2 MILLIGRAM(S): at 10:06

## 2025-06-27 RX ADMIN — IPRATROPIUM BROMIDE AND ALBUTEROL SULFATE 3 MILLILITER(S): .5; 2.5 SOLUTION RESPIRATORY (INHALATION) at 16:14

## 2025-06-27 NOTE — SWALLOW VFSS/MBS ASSESSMENT ADULT - ORAL PHASE
Reduced anterior - posterior transport Delayed oral transit time/Reduced anterior - posterior transport Laryngeal penetration before swallow - silent

## 2025-06-27 NOTE — SWALLOW VFSS/MBS ASSESSMENT ADULT - ADDITIONAL RECOMMENDATIONS
Surgery team further advised to reconsult as clinically indicated. This department remains available for nutritional goals of care discussion as needed.

## 2025-06-27 NOTE — PROGRESS NOTE ADULT - SUBJECTIVE AND OBJECTIVE BOX
[facility] DIVISION OF PULMONARY, CRITICAL CARE and SLEEP MEDICINE  PULMONARY PROGRESS NOTE  OFFICE NUMBER: [phone] (Afterhours and Weekends)    PATIENT INFORMATION:  NAME: TRINITY [place]:  MRN: [id number]    CHIEF COMPLAINT: Patient is a 85y old  Male who presents with a chief complaint of back pain (27 Jun 2025 09:15)      [x] INITIAL CONSULT, H&P, FAMILY HISTORY and PAST MEDICAL AND SURGICAL HISTORY REVIEWED    OVERNIGHT EVENTS or CHANGES TO HPI:  Patient recently underwent barium swallow eval. Primary team will discuss with family regarding NPO status and comfort feeding. Oxygen saturation still waxing and weaning.     ========================REVIEW OF SYSTEMS========================  CONSTITUTIONAL: The patient denies experiencing any fever, chills, or unexplained weight changes. No fatigue or malaise reported.  CARDIOVASCULAR: No chest pain, palpitations  PULMONARY: No shortness of breath, cough, or sputum production  GASTROINTESTINAL: No nausea, vomiting, diarrhea, or abdominal pain. No changes in bowel habits.  [x] ALL OTHER REVIEW OF SYSTEMS ARE NEGATIVE   [] UNABLE TO OBTAIN REVIEW OF SYSTEMS DUE TO ______________    ========================MEDICATIONS=============================  MEDICATIONS  (STANDING):  albuterol    90 MICROgram(s) HFA Inhaler 2 Puff(s) Inhalation every 6 hours  albuterol/ipratropium for Nebulization 3 milliLiter(s) Nebulizer every 6 hours  aspirin Suppository 300 milliGRAM(s) Rectal daily  dextrose 5% + sodium chloride 0.45% with potassium chloride 20 mEq/L 1000 milliLiter(s) (30 mL/Hr) IV Continuous <Continuous>  dornase mayda Solution 2.5 milliGRAM(s) Inhalation daily  folic acid Injectable 1 milliGRAM(s) IV Push daily  furosemide   Injectable 40 milliGRAM(s) IV Push two times a day  heparin   Injectable 5000 Unit(s) SubCutaneous every 8 hours  insulin lispro (ADMELOG) corrective regimen sliding scale   SubCutaneous every 6 hours  levothyroxine Injectable 112 MICROGram(s) IV Push at bedtime  lidocaine   4% Patch 1 Patch Transdermal daily  magnesium sulfate  IVPB 2 Gram(s) IV Intermittent once  metoprolol tartrate Injectable 5 milliGRAM(s) IV Push every 6 hours  pantoprazole  Injectable 40 milliGRAM(s) IV Push daily  potassium chloride  10 mEq/100 mL IVPB 10 milliEquivalent(s) IV Intermittent every 1 hour  potassium phosphate IVPB 15 milliMole(s) IV Intermittent once  sodium chloride 3%  Inhalation 4 milliLiter(s) Inhalation every 6 hours  thiamine Injectable 100 milliGRAM(s) IV Push daily      MEDICATIONS  (PRN):  acetaminophen   IVPB .. 1000 milliGRAM(s) IV Intermittent every 6 hours PRN Mild Pain (1 - 3)  HYDROmorphone  Injectable 0.2 milliGRAM(s) IV Push every 4 hours PRN Moderate Pain (4 - 6)  HYDROmorphone  Injectable 0.5 milliGRAM(s) IV Push every 4 hours PRN Severe Pain (7 - 10)      ========================PHYSICAL EXAM============================    VITALS: ICU Vital Signs Last 24 Hrs  T(C): 36.9 (27 Jun 2025 09:00), Max: 36.9 (26 Jun 2025 23:58)  T(F): 98.4 (27 Jun 2025 09:00), Max: 98.5 (26 Jun 2025 23:58)  HR: 80 (27 Jun 2025 09:52) (40 - 81)  BP: 141/75 (27 Jun 2025 09:00) (138/77 - 178/63)  BP(mean): --  ABP: --  ABP(mean): --  RR: 18 (27 Jun 2025 09:00) (17 - 20)  SpO2: 96% (27 Jun 2025 09:52) (95% - 100%)    O2 Parameters below as of 27 Jun 2025 09:52  Patient On (Oxygen Delivery Method): nasal cannula            INTAKE and OUTPUT: I&O's Summary    26 Jun 2025 07:01  -  27 Jun 2025 07:00  --------------------------------------------------------  IN: 300 mL / OUT: 2774 mL / NET: -2474 mL    27 Jun 2025 07:01  -  27 Jun 2025 13:55  --------------------------------------------------------  IN: 0 mL / OUT: 800 mL / NET: -800 mL        VENTILATOR SETTINGS:     GENERAL: The patient is alert, oriented, and in no apparent distress.  CARDIOVASCULAR: Regular rate and rhythm without murmurs, rubs, or gallops. Peripheral pulses intact.  PULMONARY:  Poor inspiration with mild decrease in cough refelx  EXTREMITIES: +4 edema bilaterally  PSYCHIATRIC: Cooperative with flat mood and affect.     ========================LABORATORY RESULTS AND IMAGING=============                        8.6    8.85  )-----------( 223      ( 27 Jun 2025 06:30 )             27.3                                                    06-27    138  |  96[L]  |  10  ----------------------------<  210[H]  3.4[L]   |  30  |  0.87    Ca    8.0[L]      27 Jun 2025 06:30  Phos  2.2     06-27  Mg     1.70     06-27            Creatinine Trend: 0.87<--, 0.97<--, 0.99<--, 1.07<--, 1.09<--, 1.19<--    CT CHEST:     [] RADIOLOGY REVIEWED AND INTERPRETED BY ME      THANK YOU FOR ALLOWING US TO PARTICIPATE IN THE CARE OF THIS PATIENT

## 2025-06-27 NOTE — PROGRESS NOTE ADULT - ASSESSMENT
An 85-year-old male with a complex medical history, including recent bacteremia from acute acalculous cholecystitis requiring laparoscopic cholecystectomy, presents with persistent hypoxemia and confusion, failed a speech and swallow evaluation, and now requires further pulmonary assessment as well as a barium swallow study.    #Acute respiratory failure with hypoxemia  # Pulmonary edema  # Small left sided pleural effusion  # Aspiration   # Hypoventilation     - Patient with persistent B-lines bilaterally and small (miniscule) left sided effusion  - Suggest continuing aggressive diuresis   - Agree with NPO until barium swallow is done and follow up speech and swallow - Barium swallow indicates high risk for aspiration.   - Given mouth breathing may do better with Venturi mask but again unlikely to tolerate given claustrophobia  - Patient with diminished cough reflex but also with audible congestion  - May benefit from hypertonic saline, albuterol, and Aerobika  - Sputum Culture  - Incentive spirometry      Patient likely having hypoxemia due to multifactorial reasons which includes pulmonary edema and chronic persistent aspiration. Overall prognosis is poor. Can treat for aspiration pneumonia if the patient becomes febrile, but this is likely to continue to occur. Would have palliative discuss with the patient and family regarding chronic aspiration and risks associated with comfort feeding.   - No role for thoracentesis at this time    Please note that the patient will not be seen over the weekend. Please call the oncall pulmonary fellow if any issues or questions arise. The patient will likely be seen again on Monday.

## 2025-06-27 NOTE — SWALLOW VFSS/MBS ASSESSMENT ADULT - H & P REVIEW
Hospitalist Attending 6/26: "85M w/ hypertension, type 2 diabetes (A1c 6.9%), CKD (b/l Cr 1.5-2), prostate cancer s/p prostatectomy, h/o strokes (on apixaban, though no clear diagnosis of AF), sinus node dysfunction s/p PPM presented to [facility] with acute onset lower back pain and bilateral leg weakness (R>L), found to have imaging with lumbar compression deformities and high grade E faecalis bacteremia, now s/p PPM extraction/replacement with leadless PPM, negative TTE and MARIA LUISA for vegetation, and cleared cultures, course c/b persistent and severe back pain, lower extremity weakness, and urinary retention. MRI T/L spine with likely L4/5 OM/discitis and L3/4 and L4/5 severe spinal stenosis w/ cauda equina impingement - ortho was planning for laminectomy/fusion 6/5, but course c/b acute acalculous cholecystitis on 6/4 s/p robotic cholecystectomy; complicated by postop bleeding; transferred to SICU for pressor support with course c/b ileus from 6/6-6/11; downgraded. Cholecystectomy further complicated by 9cm collection of gallbladder fossa s/p percutaneous drain placement. Patient had hypoxemia 6/20 for hypoxemia; transferred to SICU for PE ruleout; now s/p aggressive pulmonary toilet with improvement now downgraded to the floors with dysphagia; currently NPO pending repeat speech evaluation; currently receiving IV medications on D5 1/2 NS, thiamine/folate for nutrition. Medicine consulted for management of patient outside of SICU. Given patient is awaiting speech eval and will finish Abx tomorrow, admission to medicine will not change the plan set in place."/yes

## 2025-06-27 NOTE — PROGRESS NOTE ADULT - SUBJECTIVE AND OBJECTIVE BOX
Chief Complaint: T2DM    Interval Events: Pt seen and examined at bedside earlier today. Family at bedside. Speech and swallow with X ray completed today. Recommending continuation of NPO at this time.     MEDICATIONS  (STANDING):  albuterol    90 MICROgram(s) HFA Inhaler 2 Puff(s) Inhalation every 6 hours  albuterol/ipratropium for Nebulization 3 milliLiter(s) Nebulizer every 6 hours  aspirin Suppository 300 milliGRAM(s) Rectal daily  dextrose 5% + sodium chloride 0.45% with potassium chloride 20 mEq/L 1000 milliLiter(s) (30 mL/Hr) IV Continuous <Continuous>  dornase mayda Solution 2.5 milliGRAM(s) Inhalation daily  folic acid Injectable 1 milliGRAM(s) IV Push daily  furosemide   Injectable 40 milliGRAM(s) IV Push two times a day  heparin   Injectable 5000 Unit(s) SubCutaneous every 8 hours  insulin lispro (ADMELOG) corrective regimen sliding scale   SubCutaneous every 6 hours  levothyroxine Injectable 112 MICROGram(s) IV Push at bedtime  lidocaine   4% Patch 1 Patch Transdermal daily  metoprolol tartrate Injectable 5 milliGRAM(s) IV Push every 6 hours  pantoprazole  Injectable 40 milliGRAM(s) IV Push daily  potassium phosphate IVPB 15 milliMole(s) IV Intermittent once  sodium chloride 3%  Inhalation 4 milliLiter(s) Inhalation every 6 hours  thiamine Injectable 100 milliGRAM(s) IV Push daily    MEDICATIONS  (PRN):  acetaminophen   IVPB .. 1000 milliGRAM(s) IV Intermittent every 6 hours PRN Mild Pain (1 - 3)  HYDROmorphone  Injectable 0.2 milliGRAM(s) IV Push every 4 hours PRN Moderate Pain (4 - 6)  HYDROmorphone  Injectable 0.5 milliGRAM(s) IV Push every 4 hours PRN Severe Pain (7 - 10)      Allergies    gabapentin (Other)    Intolerances      Review of Systems:  Eyes: No blurry vision  Cardiovascular: No chest pain, palpitations  Respiratory: No SOB, no cough  GI: No nausea, vomiting, abdominal pain  : No dysuria    VITALS: T(C): 36.9 (06-27-25 @ 09:00)  T(F): 98.4 (06-27-25 @ 09:00), Max: 98.5 (06-26-25 @ 23:58)  HR: 80 (06-27-25 @ 09:52) (40 - 81)  BP: 141/75 (06-27-25 @ 09:00) (138/77 - 178/63)  RR:  (17 - 20)  SpO2:  (95% - 99%)  Wt(kg): --      Physical Exam:   GENERAL: NAD, well-developed  EYES: No proptosis  HEENT:  Atraumatic, Normocephalic  RESPIRATORY: non labored breathing   GI: Non distended  PSYCH: Alert, normal affect, normal mood      CAPILLARY BLOOD GLUCOSE    POCT Blood Glucose.: 202 mg/dL (27 Jun 2025 12:03)  POCT Blood Glucose.: 190 mg/dL (27 Jun 2025 09:33)  POCT Blood Glucose.: 204 mg/dL (27 Jun 2025 07:40)  POCT Blood Glucose.: 204 mg/dL (27 Jun 2025 06:04)  POCT Blood Glucose.: 196 mg/dL (27 Jun 2025 01:00)  POCT Blood Glucose.: 195 mg/dL (26 Jun 2025 18:14)      06-27    138  |  96[L]  |  10  ----------------------------<  210[H]  3.4[L]   |  30  |  0.87    eGFR: 84    Ca    8.0[L]      06-27  Mg     1.70     06-27  Phos  2.2     06-27      A1C with Estimated Average Glucose Result: 6.9 % (05-07-25 @ 06:03)  A1C with Estimated Average Glucose Result: 7.4 % (11-10-24 @ 06:55)      Thyroid Function Tests:

## 2025-06-27 NOTE — CONSULT NOTE ADULT - CONSULT REQUESTED BY NAME
Dr Peralta
ED
Dr. Irwin
Medicine
Surg ICU
Medicine
[place]
Dc
Dc
Ethan Connell
Surgical Team
Dr. Irwin
Surgery
primary team
Primary team

## 2025-06-27 NOTE — PROGRESS NOTE ADULT - PROBLEM SELECTOR PLAN 4
For GOC and symptom management     In the event of worsening symptoms, please contact the Palliative Medicine team via pager (if the patient is at [facility] #[phone] or if the patient is at [facility] #[phone]) The Geriatric and Palliative Medicine service has coverage 24 hours a day/ 7 days a week to provide medical recommendations regarding symptom management needs via telephone. For GOC and symptom management   Family interested in pursuing hospice at facility in [place] previously chosen for rehab. Also gave option of inpatient hospice in [place]. F/u SW     In the event of worsening symptoms, please contact the Palliative Medicine team via pager (if the patient is at [facility] #[phone] or if the patient is at [facility] #[phone]) The Geriatric and Palliative Medicine service has coverage 24 hours a day/ 7 days a week to provide medical recommendations regarding symptom management needs via telephone.

## 2025-06-27 NOTE — CONSULT NOTE ADULT - SUBJECTIVE AND OBJECTIVE BOX
Vascular & Interventional Radiology Brief Consult Note    Evaluate for Procedure: Tube check    HPI:      85y old Male with multiple comorbidities, s/p recent cholecystectomy c/b GB fossa collection. Patient is now s/p Gallbladder fossa collection drainage on 6/17 in Interventional Radiology ( 9.3 x 5.5 cm fluid collection with few droplets of air in the gallbladder fossa on CT 6/18/25)    VSS. On ASA suppository last dose 6-26     Drain ouytpt 5cc in 24 hr    CTAP 6/26: “GALLBLADDER: Cholecystectomy. Indwelling percutaneous pigtail drainage catheter with loop positioned at the gallbladder fossa. No residual fluid surrounding the pigtail catheter.”     Allergies: gabapentin (Other)    Medications (Abx/Cardiac/Anticoagulation/Blood Products)    furosemide   Injectable: 40 milliGRAM(s) IV Push (06-27 @ 15:08)  heparin   Injectable: 5000 Unit(s) SubCutaneous (06-27 @ 15:07)  metoprolol tartrate Injectable: 5 milliGRAM(s) IV Push (06-27 @ 11:44)    Data:    T(C): 36.6  HR: 76  BP: 173/63  RR: 19  SpO2: 95%    -WBC 8.85 / HgB 8.6 / Hct 27.3 / Plt 223  -Na 138 / Cl 96 / BUN 10 / Glucose 210  -K 3.4 / CO2 30 / Cr 0.87  -ALT -- / Alk Phos -- / T.Bili --    Imaging: Reviewed, resolved gallbladder fossa collection    Assessment/Plan:     85y old Male with multiple comorbidities, s/p recent cholecystectomy c/b GB fossa collection.   Patient is now s/p Gallbladder fossa collection drainage on 6/17 in Interventional Radiology.   IR consulted for low drainage outputs.   CT abdomen reviewed, resolved collection  Discussed with attg.  No residual collection on CT  Team can reach out to IR ACP for bedside evaluation/possible pull at bedside.      - d/w  provider covering pt  - Ensure the pt can and will provide consent; if alternative/family consent is required; please ensure the number is in the chart/handoff     ---------------------------------------------  - Nonemergent consults:  [facility] order "Consult- Interventional Radiology", no page required  - Emergent issues (pager): [facility] [phone]; [facility] [phone]; [phone]; DO NOT PAGE FOR SCHEDULING QUESTIONS  - Scheduling questions 8am-6pm : [facility] [phone]; [facility] [phone],     Please note that urgent / emergent procedures take priority in the hospital. If a procedure is non-urgent and outpatient scheduling is preferable, please contact the following:     For outpatient IR Booking:   [facility]: [phone]  [facility]: [phone]

## 2025-06-27 NOTE — CONSULT NOTE ADULT - CONSULT REQUESTED DATE/TIME
14-May-2025 11:13
20-Jun-2025 23:10
11-May-2025 03:13
20-Jun-2025 04:55
13-May-2025 12:20
24-Jun-2025 08:39
02-Jun-2025 14:02
06-Jun-2025 07:35
15-Heriberto-2025 13:04
17-Jun-2025 08:54
27-Jun-2025 16:06
04-Jun-2025 18:48
26-Jun-2025 16:22
27-Jun-2025 16:50
26-Jun-2025 11:31

## 2025-06-27 NOTE — PROGRESS NOTE ADULT - ASSESSMENT
85 year old man with multiple chronic medical comorbidities including hypertension, sinus node dysfunction, type two diabetes, and chronic kidney disease (baseline creatinine 1.5-2.0) presently admitted for lower back pain associated with lumbar compression deformities with concern for cauda equina impingement. He was found to be bacteremic (E faecalis) due to acute acalculous cholecystitis, and is now seen following laparoscopic cholecystectomy with Dr. Triny Verde 6/5/2025. POD 1 patient became hypotensive, unresponsive to 2L fluid resuscitation and 3 u pRBC and was started on vasopressors and transferred to SICU.Patient weaned off pressors, course c/b ileus now resolved, Endocrinology was consulted for management of diabetes mellitus.    # Type 2 Diabetes Mellitus  - HbA1c 6.9%   - home regimen:  lantus 24 units qhs, admelog 12 units + tradjenta 5 mg    Inpatient plan   - FS goal 100-180 mg/dl : above goal. Pt remains NPO.   - Lantus 5 units SQ at bedtime tonight  - Continue low Admelog correctional scale q6   - FS TID AC & HS ---> q6 if NPO   - hypoglycemia protocol PRN   - Please inform endocrine team if patient starts oral / nonoral diet for recommendations.     Discharge plan  - per family, plan to discharge to rehab   - tentatively, Lantus + Tradjenta. Hold Admelog.   - f/u with Dr. Cates    # Hypertension  - BP goal <130/80  - Continue metoprolol  - Management as per primary team      # Hyperlipidemia  - LDL goal <70   - Check lipid panel if not done recently   - management per primary team     # hypothyroidism  - TSH 2.265 WNL  - TSH 2.92 at goal today  - Patient on levothyroxine 150 mcg daily at home  - continue Levothyroxine 112 mcg IV daily      # Compression deformities (lumbar)  - outpatient DXA      BRET Rodriges-BC  Nurse Practitioner  Division of Endocrinology  Contact on TEAMS    If out of hospital/unavailable when paged, please note: patient will be cared for by another provider on the endocrine service.  For urgent concerns: call the endocrine answering service for assistance to reach covering provider ([phone]). For non-urgent matters: please email [email or web address] for assistance.

## 2025-06-27 NOTE — PROGRESS NOTE ADULT - PROBLEM SELECTOR PLAN 3
- Patient and Family previously agreed to DNR/DNI and no feeding tube  - Patient maintains wish for no feeding tube

## 2025-06-27 NOTE — PROGRESS NOTE ADULT - ASSESSMENT
85 M w/ multiple chronic medical comorbidities including hypertension, sinus node dysfunction, type two diabetes, and chronic kidney disease (baseline creatinine 1.5-2.0) presently admitted for lower back pain associated with lumbar compression deformities with concern for cauda equina impingement. He was found to be bacteremic (E faecalis) due to acute acalculous cholecystitis, s/p removal of pacemaker, and is now seen following laparoscopic cholecystectomy 6/5/2025. POD 1 patient became hypotensive, unresponsive to 2L fluid resuscitation and 3 u pRBC and was started on vasopressors and transferred to SICU. Patient weaned off pressors, course c/b ileus now resolved, NGT removed 6/10. Midline placed 6/12 for IV abx, patient now with ileus again ct scanned 6/16 found to have collection in gb fossa, drained by IR on 6/17 patient was a rapid for hypotension and hypoxia requiring sicu admission, patient diuresed with lasix gtt and diamox now improved and patient was transferred to the floor 6/24    Plan:   - continue 40mg bid lasix for diuresis -  f/u cardiology recs  - DVT ppx: sqh  - pain control  - will be d/c'd with wong (hx cauda equina)   - possible spine decompression with ortho in future with Dr Gruber  -  rectally as patient is NPO  - Patient takes Eliquis 2.5 bid for history of TIAs -  on hold due to patient failing S+S on 6/23 and made NPO  - pulm c/s -- appreciate recs  - repeat swallow study today  - dispo pending    B team   [id number]

## 2025-06-27 NOTE — PROGRESS NOTE ADULT - PROBLEM SELECTOR PLAN 2
- back pain 2/2 cauda equina   - CT L spine- Mild loss of height of the L4 and L5 vertebral bodies with associated   edema compatible and erosions. FINDINGS suspicious for osteomyelitis and   discitis. Correlate with ESR/CRP. Edema in the bilateral paraspinal musculature, right psoas musculature,   and presacral space may be infectious/inflammatory. Severe spinal canal stenosis at L3-L4 and L4-L5 with impingement of the cauda equina at these levels.  - PRN use in past 24 hours from 8 AM to 8 AM: IV dilaudid 0.2 mg x 3 doses   - c/w acetaminophen   IVPB .. 1000 milliGRAM(s) IV Intermittent every 6 hours PRN Mild Pain (1 - 3)  HYDROmorphone  Injectable 0.2 milliGRAM(s) IV Push every 4 hours PRN Moderate Pain (4 - 6)  HYDROmorphone  Injectable 0.5 milliGRAM(s) IV Push every 4 hours PRN Severe Pain (7 - 10). - back pain 2/2 cauda equina   - CT L spine- Mild loss of height of the L4 and L5 vertebral bodies with associated   edema compatible and erosions. FINDINGS suspicious for osteomyelitis and   discitis. Correlate with ESR/CRP. Edema in the bilateral paraspinal musculature, right psoas musculature,   and presacral space may be infectious/inflammatory. Severe spinal canal stenosis at L3-L4 and L4-L5 with impingement of the cauda equina at these levels.  - Was unable to have surgery due to multiple medical issues   - PRN use in past 24 hours from 8 AM to 8 AM: IV dilaudid 0.2 mg x 3 doses   - c/w acetaminophen   IVPB .. 1000 milliGRAM(s) IV Intermittent every 6 hours PRN Mild Pain (1 - 3)  HYDROmorphone  Injectable 0.2 milliGRAM(s) IV Push every 4 hours PRN Moderate Pain (4 - 6)  HYDROmorphone  Injectable 0.5 milliGRAM(s) IV Push every 4 hours PRN Severe Pain (7 - 10).

## 2025-06-27 NOTE — SWALLOW VFSS/MBS ASSESSMENT ADULT - PHARYNGEAL PHASE COMMENTS
delayed swallow initiation, reduced base of tongue retraction, reduced laryngeal elevation, reduced pharyngeal constriction, mod residue - vallecula, trace laryngeal penetration during the swallow without retrieval delayed swallow initiation, reduced base of tongue retraction, reduced laryngeal elevation, reduced pharyngeal constriction, mild residue - vallecula/pyriform sinuses, laryngeal penetration during the swallow without retrieval delayed swallow initiation, reduced base of tongue retraction, reduced laryngeal elevation, reduced pharyngeal constriction, mild residue - vallecula/pyriform sinuses, laryngeal penetration to the level of the vocal folds without sensation/retrieval delayed swallow initiation, reduced base of tongue retraction, reduced laryngeal elevation, reduced pharyngeal constriction, mod residue - vallecula delayed swallow initiation, reduced base of tongue retraction, reduced laryngeal elevation, reduced pharyngeal constriction, mod to sev residue - vallecula

## 2025-06-27 NOTE — SWALLOW VFSS/MBS ASSESSMENT ADULT - COMMENTS
Of note; patient known to this service from this admission, seen for bedside swallow assessment x2 w/recommendations for NPO w/consideration for short term nonoral means of nutrition/hydration (see notes for details).    Patient received in radiology suite, transferred to specialized seating unit and positioned upright at ~60 degrees due to reported discomfort for lateral projection. Patient was awake, alert, oriented, and cooperative, accompanied by his daughter Moira who waited in holding area during exam. Patient provided verbal agreement to [employer].

## 2025-06-27 NOTE — SWALLOW VFSS/MBS ASSESSMENT ADULT - RECOMMENDED CONSISTENCY
***incomplete 1.) Continue NPO w/consideration for short term nonoral means of nutrition/hydration   2.) Suggest Goals Of Care Discussion regarding nutritional goals of care  3.) Aspiration/Reflux Precautions

## 2025-06-27 NOTE — PROGRESS NOTE ADULT - ATTENDING COMMENTS
awaiting s/s  palliative eval for placement into hospice if cannot go to rehab    I have personally interviewed and examined this patient, reviewed pertinent labs and imaging, and discussed the case with colleagues, residents, and physician assistants on B Team rounds.    The active care issues are:  1. aspiration risk    [facility] ([facility]    urgent issues - spectra [id number]  nonurgent issues - [phone]  patient appointments or afterhours - [phone]

## 2025-06-27 NOTE — SWALLOW VFSS/MBS ASSESSMENT ADULT - DIAGNOSTIC IMPRESSIONS
***incomplete Mild oral phase for puree, regular, moderately thick, mildly thick, and thin liquids marked by adequate retrieval/containment, prolonged bolus manipulation (puree/regular> liquids), prolonged mastication of solid, and adequate oral clearance.  Moderate to severe pharyngeal phase marked by delayed initiation of the swallow, reduced base of tongue retraction, reduced laryngeal elevation, reduced laryngeal vestibular closure, reduced pharyngeal constriction, and reduced pharyngeal clearance. There was trace laryngeal penetration during the swallow for moderately thick and mildly thick liquids without sensation or retrieval. There was trace laryngeal penetration to the level of the vocal folds without sensation or retrieval. A cued cough was beneficial in clearing contrast from the laryngeal vestibule. There was moderate to severe pharyngeal residue post-primary swallow located at the vallecula for puree & regular solid. Cued reswallows/liquid wash minimally reduced residue. There were mild pharyngeal clearance deficits located at the vallecula/pyriform sinuses for all liquids which reduced with cued reswallow. A compensatory strategy of a chin tuck maneuver did not benefit to eliminate laryngeal penetration. Patient is at heightened risk of aspiration/choking due to severity of pharyngeal clearance deficits. Mild oral phase for puree, regular, moderately thick, mildly thick, and thin liquids marked by adequate retrieval/containment, prolonged bolus manipulation (puree/regular> liquids), prolonged mastication of solid, and adequate oral clearance.  Moderate to severe pharyngeal phase marked by delayed initiation of the swallow, reduced base of tongue retraction, reduced laryngeal elevation, reduced laryngeal vestibular closure, reduced pharyngeal constriction, and reduced pharyngeal clearance. There was trace laryngeal penetration during the swallow for moderately thick and mildly thick liquids without sensation or retrieval. There was trace laryngeal penetration to the level of the vocal folds without sensation or retrieval for thin liquids. A cued cough cleared contrast from the laryngeal vestibule. There was moderate to severe pharyngeal residue post-primary swallow located at the vallecula for puree & regular solid. Cued reswallows/liquid wash minimally reduced residue. There were mild pharyngeal clearance deficits located at the vallecula/pyriform sinuses for all liquids which reduced with cued reswallow. A compensatory strategy of a chin tuck maneuver did not benefit to eliminate laryngeal penetration. Patient is at heightened risk of aspiration/choking due to severity of pharyngeal clearance deficits.

## 2025-06-27 NOTE — PROGRESS NOTE ADULT - PROBLEM SELECTOR PLAN 1
- initially presented for back pain but hospital course complicated by multiple medical issues including bacteremia,  acalculous cholecystitis s/p lap choli , PPM replacement, septic shock in SICU   - CXR 6/23- The lungs show partial clearing of the right base with progression of lower left lung infiltrate. Small left pleural effusion is present and there is no evidence of pneumothorax nor right pleural effusion.  - TTE EF 63%   - On NC, oxygen support fluctuating   - Appreciate pulm eval  - S&S showing severe dysphagia, aspiration, NPO   - Started IV dilaudid 0.5 mg q4h PRN for dyspnea  - Started IV robinul 0.4 mg q6h PRN for secretions

## 2025-06-27 NOTE — PROGRESS NOTE ADULT - SUBJECTIVE AND OBJECTIVE BOX
B TEAM Surgery Progress Note  Patient is a 85y old  Male who presents with a chief complaint of back pain (26 Jun 2025 17:02)    SUBJECTIVE: Patient seen and examined at bedside with surgical team, patient without complaints.     OBJECTIVE:    Vital Signs Last 24 Hrs  T(C): 36.7 (27 Jun 2025 04:48), Max: 36.9 (26 Jun 2025 23:58)  T(F): 98.1 (27 Jun 2025 04:48), Max: 98.5 (26 Jun 2025 23:58)  HR: 76 (27 Jun 2025 04:48) (40 - 83)  BP: 142/53 (27 Jun 2025 06:25) (138/77 - 178/63)  BP(mean): --  RR: 20 (27 Jun 2025 04:48) (17 - 20)  SpO2: 99% (27 Jun 2025 04:48) (95% - 100%)    Parameters below as of 27 Jun 2025 04:48  Patient On (Oxygen Delivery Method): nasal cannula    I&O's Detail    26 Jun 2025 07:01  -  27 Jun 2025 07:00  --------------------------------------------------------  IN:    dextrose 5% + sodium chloride 0.45% w/ Additives: 300 mL  Total IN: 300 mL    OUT:    Drain (mL): 4 mL    Indwelling Catheter - Urethral (mL): 2770 mL  Total OUT: 2774 mL    Total NET: -2474 mL      MEDICATIONS  (STANDING):  albuterol    90 MICROgram(s) HFA Inhaler 2 Puff(s) Inhalation every 6 hours  albuterol/ipratropium for Nebulization 3 milliLiter(s) Nebulizer every 6 hours  aspirin Suppository 300 milliGRAM(s) Rectal daily  dextrose 5% + sodium chloride 0.45% with potassium chloride 20 mEq/L 1000 milliLiter(s) (30 mL/Hr) IV Continuous <Continuous>  dornase mayda Solution 2.5 milliGRAM(s) Inhalation daily  folic acid Injectable 1 milliGRAM(s) IV Push daily  furosemide   Injectable 40 milliGRAM(s) IV Push two times a day  heparin   Injectable 5000 Unit(s) SubCutaneous every 8 hours  insulin lispro (ADMELOG) corrective regimen sliding scale   SubCutaneous every 6 hours  levothyroxine Injectable 112 MICROGram(s) IV Push at bedtime  lidocaine   4% Patch 1 Patch Transdermal daily  magnesium sulfate  IVPB 2 Gram(s) IV Intermittent once  metoprolol tartrate Injectable 5 milliGRAM(s) IV Push every 6 hours  pantoprazole  Injectable 40 milliGRAM(s) IV Push daily  potassium chloride  10 mEq/100 mL IVPB 10 milliEquivalent(s) IV Intermittent every 1 hour  potassium phosphate IVPB 15 milliMole(s) IV Intermittent once  sodium chloride 3%  Inhalation 4 milliLiter(s) Inhalation every 6 hours  thiamine Injectable 100 milliGRAM(s) IV Push daily    MEDICATIONS  (PRN):  acetaminophen   IVPB .. 1000 milliGRAM(s) IV Intermittent every 6 hours PRN Mild Pain (1 - 3)  HYDROmorphone  Injectable 0.2 milliGRAM(s) IV Push every 4 hours PRN Moderate Pain (4 - 6)  HYDROmorphone  Injectable 0.5 milliGRAM(s) IV Push every 4 hours PRN Severe Pain (7 - 10)      PHYSICAL EXAM:  Constitutional: A&Ox3, NAD  Respiratory: productive cough, NC 4L  Abdomen: Soft, nondistended, NTTP.   Extremities: b/l LE edema    LABS:                        8.6    8.85  )-----------( 223      ( 27 Jun 2025 06:30 )             27.3     06-27    138  |  96[L]  |  10  ----------------------------<  210[H]  3.4[L]   |  30  |  0.87    Ca    8.0[L]      27 Jun 2025 06:30  Phos  2.2     06-27  Mg     1.70     06-27          Urinalysis Basic - ( 27 Jun 2025 06:30 )    Color: x / Appearance: x / SG: x / pH: x  Gluc: 210 mg/dL / Ketone: x  / Bili: x / Urobili: x   Blood: x / Protein: x / Nitrite: x   Leuk Esterase: x / RBC: x / WBC x   Sq Epi: x / Non Sq Epi: x / Bacteria: x

## 2025-06-27 NOTE — PROGRESS NOTE ADULT - TIME BILLING
Medical management as above, review of results/records, discussion with patient and primary team.  Encounter time excludes time spent teaching resident/fellow.
Reviewing the chart, interpreting lab data, discussing case with team, patient's daughter, interview and examination of patient, and documentation. Pt is at high risk of mortality due to his disease.
Reviewing the chart, interpreting lab data, discussing case with resident, pt's daughter, interview and examination of patient, and documentation.
Reviewing the chart, interpreting lab data, discussing case with team, interview and examination of patient, and documentation. Pt is at high risk of mortality due to his disease.
Reviewing the chart, interpreting lab data, discussing case with team, interview and examination of patient, and documentation. Pt is at high risk of mortality due to his disease.
- Ordering, reviewing, and interpreting labs, testing, and imaging.  - Independently obtaining a review of systems and performing a physical exam  - Reviewing prior hospitalization and where necessary, outpatient records.  - Reviewing consultant recommendations/communicating with consultants  - Counselling and educating patient and family regarding interpretation of aforementioned items and plan of care.
Reviewing the chart, interpreting lab data, discussing case with resident, interview and examination of patient, and documentation.
Reviewing the chart, interpreting lab data, discussing case with resident, pt's daughter, interview and examination of patient, and documentation.
Review of labs, imaging, records, case d/w interprofessional and interdisciplinary team, pt and daughter.
Review of labs, imaging, records, case d/w interprofessional and interdisciplinary team, pt and daughter.
Review of laboratory data, radiology results, consultants' recommendations, documentation in [place], discussion with patient/advanced care providers and interdisciplinary staff (such as , social workers, etc). Interventions were performed as documented above.
Patient encounter, including chart review, medication review, patient interview, ordering labs and medications, interpreting labs and imaging results, and coordination of care with consultants
Review of laboratory data, radiology results, consultants' recommendations, documentation in [place], discussion with patient/advanced care providers and interdisciplinary staff (such as , social workers, etc). Interventions were performed as documented above.
review of laboratory data, radiology results, consultants' recommendations, documentation in [place], discussion with patient/ACP and interdisciplinary staff (such as , social workers, etc). Interventions were performed as documented above.
Review of laboratory data, radiology results, consultants' recommendations, documentation in [place], discussion with patient/advanced care providers and interdisciplinary staff (such as , social workers, etc). Interventions were performed as documented above.
Review of laboratory data, radiology results, consultants' recommendations, documentation in [place], discussion with patient/advanced care providers and interdisciplinary staff (such as , social workers, etc). Interventions were performed as documented above.
Patient encounter, including chart review, medication review, patient interview, ordering labs and medications, interpreting labs and imaging results, and coordination of care with consultants
Review of labs, imaging, records, case d/w interprofessional and interdisciplinary team, pt and daughter
Time spent includes direct patient care  (interview and examination of patient), discussion with other providers, support staff and/or patient's family members, review of medical records, ordering diagnostic tests and analyzing results, and documentation.
Review of laboratory data, radiology results, consultants' recommendations, documentation in [place], discussion with patient/advanced care providers and interdisciplinary staff (such as , social workers, etc). Interventions were performed as documented above.

## 2025-06-27 NOTE — PROGRESS NOTE ADULT - ASSESSMENT
Pt is an 85M with HTN, HLD, DM2, prostate cancer s/p prostatectomy, CKD, TIA with a pacemaker who presents transferred from [place] for further evaluation of low back pain. The pain began when he was going to the bathroom and worsened when he was in bed and rolled over. The pain is worse when he is walking or moving. He has never had back pain like this before. Patient normally ambulates with a walker or with a cane. Of note, patient endorses a fall in February where he fell into a pile of snow. Patient denies any injury after that event and did not have any imaging at that time. He usually has one BM every 3-4 days and this has not changed. Wakes up 2-3x/night to urinate. No episodes of bladder or bowel incontinence. He says he has diabetic neuropathy and has seen pain management. Says he has tried gabapentin but it made his legs very weak and he almost fell. At [facility], CT showed multiple lumbar compression deformities. MRI was unable to be performed 2/2 ppm compatibility. He was transferred to [facility] for MRI and orthopedic eval.

## 2025-06-27 NOTE — CHART NOTE - NSCHARTNOTEFT_GEN_A_CORE
Refer to IR Consult note.     Patient seen and evaluated at bedside for bedside removal of right sided drainage catheter. Patient is s/p Gallbladder fossa collection drainage on 6/17 in Interventional Radiology. Patient with family at bedside. Stitches removed, tube cut and removed swiftly. Pressure held at insertion site for 7minutes as patient on blood thinners. Patient tolerated procedure well. RN to monitor site for ecchymosis, active bleeding, and TTP around insertion site. Can reconsult IR if any acute emergent issues.     [id number]

## 2025-06-27 NOTE — CONSULT NOTE ADULT - ASSESSMENT
Patient is a 85y old Male with multiple comorbidities, s/p recent cholecystectomy c/b GB fossa collection. Patient is now s/p Gallbladder fossa collection drainage on 6/17 in Interventional Radiology. IR consulted for low drainage outputs. CT abdomen reviewed.     Assessment/Plan:     -Please place IR procedure order for Abdominal drain check under Dr. Maurer.  -Tentatively scheduled for Today.  -please complete IR pre-procedure note

## 2025-06-27 NOTE — PROGRESS NOTE ADULT - SUBJECTIVE AND OBJECTIVE BOX
Timothy Mojica MD  Interventional Cardiology / Advance Heart Failure and Cardiac Transplant Specialist  [place] Office : [address] [place] [address] [place]. [address]  Tel:   [place] Office : [address] [facility] [place] [address]  Tel: [phone]       Pt is lying in bed, NAD  	  MEDICATIONS:  furosemide   Injectable 40 milliGRAM(s) IV Push two times a day  heparin   Injectable 5000 Unit(s) SubCutaneous every 8 hours  metoprolol tartrate Injectable 5 milliGRAM(s) IV Push every 6 hours      albuterol    90 MICROgram(s) HFA Inhaler 2 Puff(s) Inhalation every 6 hours  albuterol/ipratropium for Nebulization 3 milliLiter(s) Nebulizer every 6 hours  dornase mayda Solution 2.5 milliGRAM(s) Inhalation daily  sodium chloride 3%  Inhalation 4 milliLiter(s) Inhalation every 6 hours    acetaminophen   IVPB .. 1000 milliGRAM(s) IV Intermittent every 6 hours PRN  aspirin Suppository 300 milliGRAM(s) Rectal daily  HYDROmorphone  Injectable 0.2 milliGRAM(s) IV Push every 4 hours PRN  HYDROmorphone  Injectable 0.5 milliGRAM(s) IV Push every 4 hours PRN    pantoprazole  Injectable 40 milliGRAM(s) IV Push daily    insulin lispro (ADMELOG) corrective regimen sliding scale   SubCutaneous every 6 hours  levothyroxine Injectable 112 MICROGram(s) IV Push at bedtime    dextrose 5% + sodium chloride 0.45% with potassium chloride 20 mEq/L 1000 milliLiter(s) IV Continuous <Continuous>  folic acid Injectable 1 milliGRAM(s) IV Push daily  lidocaine   4% Patch 1 Patch Transdermal daily  magnesium sulfate  IVPB 2 Gram(s) IV Intermittent once  potassium chloride  10 mEq/100 mL IVPB 10 milliEquivalent(s) IV Intermittent every 1 hour  potassium phosphate IVPB 15 milliMole(s) IV Intermittent once  thiamine Injectable 100 milliGRAM(s) IV Push daily      PAST MEDICAL/SURGICAL HISTORY  PAST MEDICAL & SURGICAL HISTORY:  HTN (hypertension)      HLD (hyperlipidemia)      DM (diabetes mellitus)      TIA (transient ischemic attack)      Prostate cancer      S/P prostatectomy      Pacemaker      H/O thyroidectomy          SOCIAL HISTORY: Substance Use (street drugs): ( x ) never used  (  ) other:    FAMILY HISTORY:  FHx: heart disease (Father, Mother)           PHYSICAL EXAM:  T(C): 36.9 (06-27-25 @ 09:00), Max: 36.9 (06-26-25 @ 23:58)  HR: 80 (06-27-25 @ 09:52) (40 - 81)  BP: 141/75 (06-27-25 @ 09:00) (138/77 - 178/63)  RR: 18 (06-27-25 @ 09:00) (17 - 20)  SpO2: 96% (06-27-25 @ 09:52) (95% - 100%)  Wt(kg): --  I&O's Summary    26 Jun 2025 07:01  -  27 Jun 2025 07:00  --------------------------------------------------------  IN: 300 mL / OUT: 2774 mL / NET: -2474 mL    27 Jun 2025 07:01  -  27 Jun 2025 14:42  --------------------------------------------------------  IN: 0 mL / OUT: 800 mL / NET: -800 mL          GENERAL: NAD  EYES:  EOMI  ENMT: NGT Moist mucous membranes  Cardiovascular: Normal S1 S2, No JVD, No murmurs, +b/l LUE edema L>R, LE edema  Respiratory: diminished	  Gastrointestinal:  Soft, Non-tender, + BS	  Extremities: b/l UE, LE edema                                8.6    8.85  )-----------( 223      ( 27 Jun 2025 06:30 )             27.3     06-27    138  |  96[L]  |  10  ----------------------------<  210[H]  3.4[L]   |  30  |  0.87    Ca    8.0[L]      27 Jun 2025 06:30  Phos  2.2     06-27  Mg     1.70     06-27      proBNP:   Lipid Profile:   HgA1c:   TSH:     Consultant(s) Notes Reviewed:  [x ] YES  [ ] NO    Care Discussed with Consultants/Other Providers [ x] YES  [ ] NO    Imaging Personally Reviewed independently:  [x] YES  [ ] NO    All labs, radiologic studies, vitals, orders and medications list reviewed. Patient is seen and examined at bedside. Case discussed with medical team.         Timothy Mojica MD  Interventional Cardiology / Advance Heart Failure and Cardiac Transplant Specialist  [place] Office : [address] [place] [address] [place]. [address]  Tel:   [place] Office : [address] [facility] [place] [address]  Tel: [phone]       Pt is lying in bed, NAD  	  MEDICATIONS:  furosemide   Injectable 40 milliGRAM(s) IV Push two times a day  heparin   Injectable 5000 Unit(s) SubCutaneous every 8 hours  metoprolol tartrate Injectable 5 milliGRAM(s) IV Push every 6 hours      albuterol    90 MICROgram(s) HFA Inhaler 2 Puff(s) Inhalation every 6 hours  albuterol/ipratropium for Nebulization 3 milliLiter(s) Nebulizer every 6 hours  dornase mayda Solution 2.5 milliGRAM(s) Inhalation daily  sodium chloride 3%  Inhalation 4 milliLiter(s) Inhalation every 6 hours    acetaminophen   IVPB .. 1000 milliGRAM(s) IV Intermittent every 6 hours PRN  aspirin Suppository 300 milliGRAM(s) Rectal daily  HYDROmorphone  Injectable 0.2 milliGRAM(s) IV Push every 4 hours PRN  HYDROmorphone  Injectable 0.5 milliGRAM(s) IV Push every 4 hours PRN    pantoprazole  Injectable 40 milliGRAM(s) IV Push daily    insulin lispro (ADMELOG) corrective regimen sliding scale   SubCutaneous every 6 hours  levothyroxine Injectable 112 MICROGram(s) IV Push at bedtime    dextrose 5% + sodium chloride 0.45% with potassium chloride 20 mEq/L 1000 milliLiter(s) IV Continuous <Continuous>  folic acid Injectable 1 milliGRAM(s) IV Push daily  lidocaine   4% Patch 1 Patch Transdermal daily  magnesium sulfate  IVPB 2 Gram(s) IV Intermittent once  potassium chloride  10 mEq/100 mL IVPB 10 milliEquivalent(s) IV Intermittent every 1 hour  potassium phosphate IVPB 15 milliMole(s) IV Intermittent once  thiamine Injectable 100 milliGRAM(s) IV Push daily      PAST MEDICAL/SURGICAL HISTORY  PAST MEDICAL & SURGICAL HISTORY:  HTN (hypertension)      HLD (hyperlipidemia)      DM (diabetes mellitus)      TIA (transient ischemic attack)      Prostate cancer      S/P prostatectomy      Pacemaker      H/O thyroidectomy          SOCIAL HISTORY: Substance Use (street drugs): ( x ) never used  (  ) other:    FAMILY HISTORY:  FHx: heart disease (Father, Mother)           PHYSICAL EXAM:  T(C): 36.9 (06-27-25 @ 09:00), Max: 36.9 (06-26-25 @ 23:58)  HR: 80 (06-27-25 @ 09:52) (40 - 81)  BP: 141/75 (06-27-25 @ 09:00) (138/77 - 178/63)  RR: 18 (06-27-25 @ 09:00) (17 - 20)  SpO2: 96% (06-27-25 @ 09:52) (95% - 100%)  Wt(kg): --  I&O's Summary    26 Jun 2025 07:01  -  27 Jun 2025 07:00  --------------------------------------------------------  IN: 300 mL / OUT: 2774 mL / NET: -2474 mL    27 Jun 2025 07:01  -  27 Jun 2025 14:42  --------------------------------------------------------  IN: 0 mL / OUT: 800 mL / NET: -800 mL          GENERAL: NAD  EYES:  EOMI  ENMT: NGT Moist mucous membranes  Cardiovascular: Normal S1 S2, No JVD, No murmurs, +b/l LUE edema L>R, LE edema  Respiratory: diminished	  Gastrointestinal:  Soft, Non-tender, + BS	  Extremities: b/l UE, LE edema improving                                 8.6    8.85  )-----------( 223      ( 27 Jun 2025 06:30 )             27.3     06-27    138  |  96[L]  |  10  ----------------------------<  210[H]  3.4[L]   |  30  |  0.87    Ca    8.0[L]      27 Jun 2025 06:30  Phos  2.2     06-27  Mg     1.70     06-27      proBNP:   Lipid Profile:   HgA1c:   TSH:     Consultant(s) Notes Reviewed:  [x ] YES  [ ] NO    Care Discussed with Consultants/Other Providers [ x] YES  [ ] NO    Imaging Personally Reviewed independently:  [x] YES  [ ] NO    All labs, radiologic studies, vitals, orders and medications list reviewed. Patient is seen and examined at bedside. Case discussed with medical team.

## 2025-06-27 NOTE — PROGRESS NOTE ADULT - SUBJECTIVE AND OBJECTIVE BOX
Indication of Geriatrics and Palliative Medicine Services:  [  ] Complex Medical Decision Making   [  ] Symptom/Pain management     DNR on chart:      INTERVAL EVENTS:     -------------------------------------------------------------------------------------------------------    PRESENT SYMPTOMS:     (from initial)     -------------------------------------------------------------------------------------------------------    I STOP:     -------------------------------------------------------------------------------------------------------    ITEMS UNCHECKED ARE NOT PRESENT    PHYSICAL:  Vital Signs Last 24 Hrs  T(C): 36.6 (27 Jun 2025 16:10), Max: 37.1 (27 Jun 2025 11:30)  T(F): 97.9 (27 Jun 2025 16:10), Max: 98.7 (27 Jun 2025 11:30)  HR: 76 (27 Jun 2025 16:10) (40 - 95)  BP: 173/63 (27 Jun 2025 16:10) (138/77 - 178/63)  BP(mean): --  RR: 19 (27 Jun 2025 16:10) (17 - 20)  SpO2: 95% (27 Jun 2025 16:10) (95% - 99%)    Parameters below as of 27 Jun 2025 16:10  Patient On (Oxygen Delivery Method): nasal cannula  O2 Flow (L/min): 4   I&O's Summary    26 Jun 2025 07:01  -  27 Jun 2025 07:00  --------------------------------------------------------  IN: 300 mL / OUT: 2774 mL / NET: -2474 mL    27 Jun 2025 07:01  -  27 Jun 2025 16:48  --------------------------------------------------------  IN: 300 mL / OUT: 1005 mL / NET: -705 mL        (from initial)     -------------------------------------------------------------------------------------------------------    LABS:                        8.6    8.85  )-----------( 223      ( 27 Jun 2025 06:30 )             27.3   06-27    138  |  96[L]  |  10  ----------------------------<  210[H]  3.4[L]   |  30  |  0.87    Ca    8.0[L]      27 Jun 2025 06:30  Phos  2.2     06-27  Mg     1.70     06-27        Urinalysis Basic - ( 27 Jun 2025 06:30 )    Color: x / Appearance: x / SG: x / pH: x  Gluc: 210 mg/dL / Ketone: x  / Bili: x / Urobili: x   Blood: x / Protein: x / Nitrite: x   Leuk Esterase: x / RBC: x / WBC x   Sq Epi: x / Non Sq Epi: x / Bacteria: x                -------------------------------------------------------------------------------------------------------    CRITICAL CARE:  [ ]Shock Present  [ ]Septic [ ]Cardiogenic [ ]Neurologic [ ]Hypovolemic [ ]Undifferentiated    [ ]Vasopressors [ ]Inotropes    [ ]Respiratory failure present [ ]Acute  [ ]Chronic [ ]Hypoxic  [ ]Hypercarbic [ ]Mixed   [ ]Mechanical Ventilation  [ ]Trach collar   [ ]Non-invasive ventilatory support   [ ]High-Flow   [ ]Oxygen mask/venti     [ ]Other organ failure     -------------------------------------------------------------------------------------------------------    RADIOLOGY & ADDITIONAL STUDIES:           -------------------------------------------------------------------------------------------------------  MEDICATIONS:     MEDICATIONS  (STANDING):  albuterol    90 MICROgram(s) HFA Inhaler 2 Puff(s) Inhalation every 6 hours  albuterol/ipratropium for Nebulization 3 milliLiter(s) Nebulizer every 6 hours  aspirin Suppository 300 milliGRAM(s) Rectal daily  dextrose 5% + sodium chloride 0.45% with potassium chloride 20 mEq/L 1000 milliLiter(s) (30 mL/Hr) IV Continuous <Continuous>  dornase mayda Solution 2.5 milliGRAM(s) Inhalation daily  folic acid Injectable 1 milliGRAM(s) IV Push daily  furosemide   Injectable 40 milliGRAM(s) IV Push two times a day  heparin   Injectable 5000 Unit(s) SubCutaneous every 8 hours  insulin glargine Injectable (LANTUS) 5 Unit(s) SubCutaneous at bedtime  insulin lispro (ADMELOG) corrective regimen sliding scale   SubCutaneous every 6 hours  levothyroxine Injectable 112 MICROGram(s) IV Push at bedtime  lidocaine   4% Patch 1 Patch Transdermal daily  metoprolol tartrate Injectable 5 milliGRAM(s) IV Push every 6 hours  pantoprazole  Injectable 40 milliGRAM(s) IV Push daily  potassium phosphate IVPB 15 milliMole(s) IV Intermittent once  sodium chloride 3%  Inhalation 4 milliLiter(s) Inhalation every 6 hours  thiamine Injectable 100 milliGRAM(s) IV Push daily    MEDICATIONS  (PRN):  acetaminophen   IVPB .. 1000 milliGRAM(s) IV Intermittent every 6 hours PRN Mild Pain (1 - 3)  glycopyrrolate Injectable 0.4 milliGRAM(s) IV Push every 6 hours PRN Secretions  HYDROmorphone  Injectable 0.2 milliGRAM(s) IV Push every 4 hours PRN Moderate Pain (4 - 6)  HYDROmorphone  Injectable 0.5 milliGRAM(s) IV Push every 4 hours PRN Severe Pain (7 - 10)  HYDROmorphone  Injectable 0.5 milliGRAM(s) IV Push every 4 hours PRN Dyspnea         Indication of Geriatrics and Palliative Medicine Services:  [X  ] Complex Medical Decision Making   [X  ] Symptom/Pain management     DNR on chart: Yes     INTERVAL EVENTS: Patient seen this PM with daughters and son at bedside. Patient in no distress, having deep coughs.     -------------------------------------------------------------------------------------------------------    PRESENT SYMPTOMS:     [ ] Unable to self-report      [ ] PAINADS     [ ] RDOS    [ ] No     [X ] Yes     Source if other than patient:  [ ]Family   [ ]Team     PAIN:   If blank, patient unable to specify     [X ]yes [ ]no    1. Location- Back   2. Radiation-  Legs   3. Quality- Sharp   4. Timing- On and off   5. Minimal acceptable level/pain goal- 3/10   6. Aggravating factors- Movement   7. QOL impact- Moderate     SYMPTOMS:   Dyspnea:                           [ ]Mild [ ]Moderate [ ]Severe  Anxiety:                             [ ]Mild [ ]Moderate [ ]Severe  Fatigue:                             [ ]Mild [ ]Moderate [ ]Severe  Nausea/Vomiting:              [ ]Mild [ ]Moderate [ ]Severe  Loss of appetite:                [ ]Mild [ ]Moderate [ ]Severe  Constipation:                     [ ]Mild [ ]Moderate [ ]Severe    Other Symptoms:  [X ]All other review of systems negative     -------------------------------------------------------------------------------------------------------    I STOP: [id number]    -------------------------------------------------------------------------------------------------------    ITEMS UNCHECKED ARE NOT PRESENT    PHYSICAL:  Vital Signs Last 24 Hrs  T(C): 36.6 (27 Jun 2025 16:10), Max: 37.1 (27 Jun 2025 11:30)  T(F): 97.9 (27 Jun 2025 16:10), Max: 98.7 (27 Jun 2025 11:30)  HR: 76 (27 Jun 2025 16:10) (40 - 95)  BP: 173/63 (27 Jun 2025 16:10) (138/77 - 178/63)  BP(mean): --  RR: 19 (27 Jun 2025 16:10) (17 - 20)  SpO2: 95% (27 Jun 2025 16:10) (95% - 99%)    Parameters below as of 27 Jun 2025 16:10  Patient On (Oxygen Delivery Method): nasal cannula  O2 Flow (L/min): 4   I&O's Summary    26 Jun 2025 07:01  -  27 Jun 2025 07:00  --------------------------------------------------------  IN: 300 mL / OUT: 2774 mL / NET: -2474 mL    27 Jun 2025 07:01  -  27 Jun 2025 16:48  --------------------------------------------------------  IN: 300 mL / OUT: 1005 mL / NET: -705 mL    GENERAL:  [ ]Cachexia  [X ] Frail  [X ]Awake  [X ]Oriented   [ ]Lethargic  [ ]Unarousable  [ ]Verbal  [ ]Non-Verbal    BEHAVIORAL:   [ ] Anxiety  [ ] Delirium [ ] Agitation [ ] Other    HEENT:   [X ]Normal   [ ]Dry mouth   [ ]ET Tube/Trach  [ ]Oral lesions    PULMONARY:   [ ]Clear              [ ]Tachypnea  [X ]Audible excessive secretions   [ ]Rhonchi        [ ]Right [ ]Left [ ]Bilateral  [ ]Crackles        [ ]Right [ ]Left [ ]Bilateral  [ ]Wheezing     [ ]Right [ ]Left [ ]Bilateral  [X ]Diminished breath sounds [ ]right [ ]left [X ]bilateral    CARDIOVASCULAR:    [X ]Regular [ ]Irregular [ ]Tachy  [ ]Jurgen [ ]Murmur [ ]Other    GASTROINTESTINAL:  [X ]Soft  [ ]Distended   [ ]+BS  [X ]Non tender [ ]Tender  [ ]Other [ ]PEG [ ]OGT/ NGT      GENITOURINARY:  [ ]Normal [ ] Incontinent   [ ]Oliguria/Anuria   [X ]Frye    MUSCULOSKELETAL:   [ ]Normal   [ ]Weakness  [ ]Bed/Wheelchair bound [X ]Edema    NEUROLOGIC:   [X ]No focal deficits  [ ]Cognitive impairment  [ ]Dysphagia [ ]Dysarthria [ ]Paresis [ ]Other     SKIN:   [X ]Normal  [ ]Rash  [ ]Other  [ ]Pressure ulcer(s)       Present on admission [ ]y [ ]n    -------------------------------------------------------------------------------------------------------    LABS:                        8.6    8.85  )-----------( 223      ( 27 Jun 2025 06:30 )             27.3   06-27    138  |  96[L]  |  10  ----------------------------<  210[H]  3.4[L]   |  30  |  0.87    Ca    8.0[L]      27 Jun 2025 06:30  Phos  2.2     06-27  Mg     1.70     06-27    Urinalysis Basic - ( 27 Jun 2025 06:30 )    Color: x / Appearance: x / SG: x / pH: x  Gluc: 210 mg/dL / Ketone: x  / Bili: x / Urobili: x   Blood: x / Protein: x / Nitrite: x   Leuk Esterase: x / RBC: x / WBC x   Sq Epi: x / Non Sq Epi: x / Bacteria: x    -------------------------------------------------------------------------------------------------------    CRITICAL CARE:  [ ]Shock Present  [ ]Septic [ ]Cardiogenic [ ]Neurologic [ ]Hypovolemic [ ]Undifferentiated    [ ]Vasopressors [ ]Inotropes    [ ]Respiratory failure present [ ]Acute  [ ]Chronic [ ]Hypoxic  [ ]Hypercarbic [ ]Mixed   [ ]Mechanical Ventilation  [ ]Trach collar   [ ]Non-invasive ventilatory support   [ ]High-Flow   [ ]Oxygen mask/venti     [ ]Other organ failure     -------------------------------------------------------------------------------------------------------    RADIOLOGY & ADDITIONAL STUDIES:     < from: Xray Chest 1 View- PORTABLE-Routine (Xray Chest 1 View- PORTABLE-Routine in AM.) (06.23.25 @ 01:59) >    Frontal expiratory view of the chest shows the heart to be similar in   size.    The lungs show partial clearing of the right base with progression of   lower left lung infiltrate. Small left pleural effusion is present and   there is no evidence of pneumothorax nor right pleural effusion.    IMPRESSION:  Progression of left infiltrate/effusion.    < end of copied text >    -------------------------------------------------------------------------------------------------------  MEDICATIONS:     MEDICATIONS  (STANDING):  albuterol    90 MICROgram(s) HFA Inhaler 2 Puff(s) Inhalation every 6 hours  albuterol/ipratropium for Nebulization 3 milliLiter(s) Nebulizer every 6 hours  aspirin Suppository 300 milliGRAM(s) Rectal daily  dextrose 5% + sodium chloride 0.45% with potassium chloride 20 mEq/L 1000 milliLiter(s) (30 mL/Hr) IV Continuous <Continuous>  dornase mayda Solution 2.5 milliGRAM(s) Inhalation daily  folic acid Injectable 1 milliGRAM(s) IV Push daily  furosemide   Injectable 40 milliGRAM(s) IV Push two times a day  heparin   Injectable 5000 Unit(s) SubCutaneous every 8 hours  insulin glargine Injectable (LANTUS) 5 Unit(s) SubCutaneous at bedtime  insulin lispro (ADMELOG) corrective regimen sliding scale   SubCutaneous every 6 hours  levothyroxine Injectable 112 MICROGram(s) IV Push at bedtime  lidocaine   4% Patch 1 Patch Transdermal daily  metoprolol tartrate Injectable 5 milliGRAM(s) IV Push every 6 hours  pantoprazole  Injectable 40 milliGRAM(s) IV Push daily  potassium phosphate IVPB 15 milliMole(s) IV Intermittent once  sodium chloride 3%  Inhalation 4 milliLiter(s) Inhalation every 6 hours  thiamine Injectable 100 milliGRAM(s) IV Push daily    MEDICATIONS  (PRN):  acetaminophen   IVPB .. 1000 milliGRAM(s) IV Intermittent every 6 hours PRN Mild Pain (1 - 3)  glycopyrrolate Injectable 0.4 milliGRAM(s) IV Push every 6 hours PRN Secretions  HYDROmorphone  Injectable 0.2 milliGRAM(s) IV Push every 4 hours PRN Moderate Pain (4 - 6)  HYDROmorphone  Injectable 0.5 milliGRAM(s) IV Push every 4 hours PRN Severe Pain (7 - 10)  HYDROmorphone  Injectable 0.5 milliGRAM(s) IV Push every 4 hours PRN Dyspnea

## 2025-06-27 NOTE — CONSULT NOTE ADULT - SUBJECTIVE AND OBJECTIVE BOX
HPI:  Pt is an 85M with HTN, HLD, DM2, prostate cancer s/p prostatectomy, CKD, TIA with a pacemaker who presents transferred from [place] for further evaluation of low back pain. The pain began when he was going to the bathroom and worsened when he was in bed and rolled over. The pain is worse when he is walking or moving. He has never had back pain like this before. Patient normally ambulates with a walker or with a cane. Of note, patient endorses a fall in February where he fell into a pile of snow. Patient denies any injury after that event and did not have any imaging at that time. He usually has one BM every 3-4 days and this has not changed. Wakes up 2-3x/night to urinate. No episodes of bladder or bowel incontinence. He says he has diabetic neuropathy and has seen pain management. Says he has tried gabapentin but it made his legs very weak and he almost fell.    At [place], CT showed multiple lumbar compression deformities. MRI was unable to be performed 2/2 ppm compatibility. He was transferred to [facility] for MRI and orthopedic eval.  (11 May 2025 03:10)      Allergies: gabapentin (Other)    Medications (Abx/Cardiac/Anticoagulation/Blood Products)    furosemide   Injectable: 40 milliGRAM(s) IV Push (06-27 @ 15:08)  heparin   Injectable: 5000 Unit(s) SubCutaneous (06-27 @ 15:07)  metoprolol tartrate Injectable: 5 milliGRAM(s) IV Push (06-27 @ 11:44)    Data:    T(C): 36.7  HR: 93  BP: 143/60  RR: 18  SpO2: 98%    -WBC 8.85 / HgB 8.6 / Hct 27.3 / Plt 223  -Na 138 / Cl 96 / BUN 10 / Glucose 210  -K 3.4 / CO2 30 / Cr 0.87  -ALT -- / Alk Phos -- / T.Bili --  -INR <0.90 / PTT 25.5

## 2025-06-27 NOTE — CONSULT NOTE ADULT - CONSULT REASON
dm2
new onset oxygen requirements; desaturation
Persistent hypoxemia
preop
Acute cholecystitis
Drain study
Tube check
Gallbladder collection drainage.
bacteremia
hypotension
lumbar DDD
Staring episode
Transfer to Medicine
Bacteremia, PPM
complex medical decision making in the setting of serious illness

## 2025-06-27 NOTE — PROGRESS NOTE ADULT - ASSESSMENT
-EKG w/ SR, no significant STT changes   -ECHO w/ normal EF no significant valvular heart disease     A/P:  85M w/ HTN, HLD, T2DM, prostate CA, CKD, TIA and SND s/p PPM transferred here for lumbar spinal cord compression and bacteremia s/p PPM explant and implant of Micra PPM.    1. bacteremia  -s/p cholecystectomy  -c/w abx per ID, primary team  6/13 afebrile, continues on abx per ID  6/19 c/w abx per ID  6/24 c/w Ampicillin to 6/25 6/25 remains afebrile, t&t per ID  6//26 failed s/s eval, pending barium swallow    2. SND  -s/p leadless PPM w/ EP  6/16 recommend IV lasix 40mg daily for generalized edema  6/17 mild anarsaca, c/w diuresing lasix 40mg IV BID    3. HTN  -c/w clonidine, hydralazine, labetalol    4.  Pre-procedure clearance  6/17 pt optimized from cardiac srtandpoint and may proceed with cholecystostomy tube with moderate risk  6/18 s/p IR drain placement, remain hemodynamically stable    5. hypoxia  6/20 - admitted to SICU, CTA no PE; bipap, pulm toilet, diamox   6/23 - off bipap, continues with lasix IV 20mg q12 can increase to lasix IV 40mg BID sec to worsening LE edema , check K twice daily and replace  6/27 - reported with slow HR on pulse ox; placed on tele, telemetry and EKG reviewed, SR with PVCs, bigeminy (rate not detected on pulse ox) -EKG w/ SR, no significant STT changes   -ECHO w/ normal EF no significant valvular heart disease     A/P:  85M w/ HTN, HLD, T2DM, prostate CA, CKD, TIA and SND s/p PPM transferred here for lumbar spinal cord compression and bacteremia s/p PPM explant and implant of Micra PPM.    1. bacteremia  -s/p cholecystectomy  -c/w abx per ID, primary team  6/13 afebrile, continues on abx per ID  6/19 c/w abx per ID  6/24 c/w Ampicillin to 6/25 6/25 remains afebrile, t&t per ID  6//26 failed s/s eval, pending barium swallow    2. SND  -s/p leadless PPM w/ EP  6/16 recommend IV lasix 40mg daily for generalized edema  6/17 mild anarsaca, c/w diuresing lasix 40mg IV BID    3. HTN  -c/w clonidine, hydralazine, labetalol    4.  Pre-procedure clearance  6/17 pt optimized from cardiac srtandpoint and may proceed with cholecystostomy tube with moderate risk  6/18 s/p IR drain placement, remain hemodynamically stable    5. hypoxia  6/20 - admitted to SICU, CTA no PE; bipap, pulm toilet, diamox   6/23 - off bipap, continues with lasix IV 20mg q12 can increase to lasix IV 40mg BID sec to worsening LE edema , check K twice daily and replace  6/27 - reported with slow HR on pulse ox; placed on tele, telemetry and EKG reviewed, SR with PVCs, bigeminy (rate not detected on pulse ox), pt not bradycardic

## 2025-06-28 LAB
ANION GAP SERPL CALC-SCNC: 12 MMOL/L — SIGNIFICANT CHANGE UP (ref 7–14)
ANION GAP SERPL CALC-SCNC: 13 MMOL/L — SIGNIFICANT CHANGE UP (ref 7–14)
BUN SERPL-MCNC: 9 MG/DL — SIGNIFICANT CHANGE UP (ref 7–23)
BUN SERPL-MCNC: 9 MG/DL — SIGNIFICANT CHANGE UP (ref 7–23)
CALCIUM SERPL-MCNC: 7.6 MG/DL — LOW (ref 8.4–10.5)
CALCIUM SERPL-MCNC: 8.4 MG/DL — SIGNIFICANT CHANGE UP (ref 8.4–10.5)
CHLORIDE SERPL-SCNC: 93 MMOL/L — LOW (ref 98–107)
CHLORIDE SERPL-SCNC: 93 MMOL/L — LOW (ref 98–107)
CO2 SERPL-SCNC: 31 MMOL/L — SIGNIFICANT CHANGE UP (ref 22–31)
CO2 SERPL-SCNC: 34 MMOL/L — HIGH (ref 22–31)
CREAT SERPL-MCNC: 0.84 MG/DL — SIGNIFICANT CHANGE UP (ref 0.5–1.3)
CREAT SERPL-MCNC: 0.85 MG/DL — SIGNIFICANT CHANGE UP (ref 0.5–1.3)
EGFR: 84 ML/MIN/1.73M2 — SIGNIFICANT CHANGE UP
GLUCOSE BLDC GLUCOMTR-MCNC: 138 MG/DL — HIGH (ref 70–99)
GLUCOSE BLDC GLUCOMTR-MCNC: 161 MG/DL — HIGH (ref 70–99)
GLUCOSE BLDC GLUCOMTR-MCNC: 163 MG/DL — HIGH (ref 70–99)
GLUCOSE BLDC GLUCOMTR-MCNC: 167 MG/DL — HIGH (ref 70–99)
GLUCOSE SERPL-MCNC: 139 MG/DL — HIGH (ref 70–99)
GLUCOSE SERPL-MCNC: 173 MG/DL — HIGH (ref 70–99)
HCT VFR BLD CALC: 28.2 % — LOW (ref 39–50)
HGB BLD-MCNC: 9.1 G/DL — LOW (ref 13–17)
MAGNESIUM SERPL-MCNC: 1.6 MG/DL — SIGNIFICANT CHANGE UP (ref 1.6–2.6)
MAGNESIUM SERPL-MCNC: 1.7 MG/DL — SIGNIFICANT CHANGE UP (ref 1.6–2.6)
MCHC RBC-ENTMCNC: 28.3 PG — SIGNIFICANT CHANGE UP (ref 27–34)
MCHC RBC-ENTMCNC: 32.3 G/DL — SIGNIFICANT CHANGE UP (ref 32–36)
MCV RBC AUTO: 87.9 FL — SIGNIFICANT CHANGE UP (ref 80–100)
NRBC # BLD AUTO: 0 K/UL — SIGNIFICANT CHANGE UP (ref 0–0)
NRBC # FLD: 0 K/UL — SIGNIFICANT CHANGE UP (ref 0–0)
NRBC BLD AUTO-RTO: 0 /100 WBCS — SIGNIFICANT CHANGE UP (ref 0–0)
PHOSPHATE SERPL-MCNC: 1.9 MG/DL — LOW (ref 2.5–4.5)
PHOSPHATE SERPL-MCNC: 2.6 MG/DL — SIGNIFICANT CHANGE UP (ref 2.5–4.5)
PLATELET # BLD AUTO: 226 K/UL — SIGNIFICANT CHANGE UP (ref 150–400)
PMV BLD: 10.7 FL — SIGNIFICANT CHANGE UP (ref 7–13)
POTASSIUM SERPL-MCNC: 3.2 MMOL/L — LOW (ref 3.5–5.3)
POTASSIUM SERPL-MCNC: 3.8 MMOL/L — SIGNIFICANT CHANGE UP (ref 3.5–5.3)
POTASSIUM SERPL-SCNC: 3.2 MMOL/L — LOW (ref 3.5–5.3)
POTASSIUM SERPL-SCNC: 3.8 MMOL/L — SIGNIFICANT CHANGE UP (ref 3.5–5.3)
RBC # BLD: 3.21 M/UL — LOW (ref 4.2–5.8)
RBC # FLD: 16.8 % — HIGH (ref 10.3–14.5)
SODIUM SERPL-SCNC: 137 MMOL/L — SIGNIFICANT CHANGE UP (ref 135–145)
SODIUM SERPL-SCNC: 139 MMOL/L — SIGNIFICANT CHANGE UP (ref 135–145)
WBC # BLD: 12.77 K/UL — HIGH (ref 3.8–10.5)
WBC # FLD AUTO: 12.77 K/UL — HIGH (ref 3.8–10.5)

## 2025-06-28 PROCEDURE — 99024 POSTOP FOLLOW-UP VISIT: CPT

## 2025-06-28 RX ORDER — POTASSIUM PHOSPHATE, MONOBASIC POTASSIUM PHOSPHATE, DIBASIC INJECTION, 236; 224 MG/ML; MG/ML
30 SOLUTION, CONCENTRATE INTRAVENOUS ONCE
Refills: 0 | Status: COMPLETED | OUTPATIENT
Start: 2025-06-28 | End: 2025-06-28

## 2025-06-28 RX ORDER — MAGNESIUM SULFATE 500 MG/ML
2 SYRINGE (ML) INJECTION ONCE
Refills: 0 | Status: COMPLETED | OUTPATIENT
Start: 2025-06-28 | End: 2025-06-28

## 2025-06-28 RX ADMIN — FOLIC ACID 1 MILLIGRAM(S): 1 TABLET ORAL at 13:10

## 2025-06-28 RX ADMIN — FUROSEMIDE 40 MILLIGRAM(S): 10 INJECTION INTRAMUSCULAR; INTRAVENOUS at 05:39

## 2025-06-28 RX ADMIN — FUROSEMIDE 40 MILLIGRAM(S): 10 INJECTION INTRAMUSCULAR; INTRAVENOUS at 14:42

## 2025-06-28 RX ADMIN — Medication 100 MILLIEQUIVALENT(S): at 17:54

## 2025-06-28 RX ADMIN — METOPROLOL SUCCINATE 5 MILLIGRAM(S): 50 TABLET, EXTENDED RELEASE ORAL at 17:57

## 2025-06-28 RX ADMIN — Medication 0.2 MILLIGRAM(S): at 03:43

## 2025-06-28 RX ADMIN — POTASSIUM PHOSPHATE, MONOBASIC POTASSIUM PHOSPHATE, DIBASIC INJECTION, 83.33 MILLIMOLE(S): 236; 224 SOLUTION, CONCENTRATE INTRAVENOUS at 19:22

## 2025-06-28 RX ADMIN — HEPARIN SODIUM 5000 UNIT(S): 1000 INJECTION INTRAVENOUS; SUBCUTANEOUS at 00:35

## 2025-06-28 RX ADMIN — Medication 100 MILLIEQUIVALENT(S): at 14:46

## 2025-06-28 RX ADMIN — Medication 0.2 MILLIGRAM(S): at 03:13

## 2025-06-28 RX ADMIN — INSULIN LISPRO 1: 100 INJECTION, SOLUTION INTRAVENOUS; SUBCUTANEOUS at 17:54

## 2025-06-28 RX ADMIN — Medication 0.2 MILLIGRAM(S): at 19:26

## 2025-06-28 RX ADMIN — Medication 300 MILLIGRAM(S): at 13:00

## 2025-06-28 RX ADMIN — LIDOCAINE HYDROCHLORIDE 1 PATCH: 20 JELLY TOPICAL at 19:21

## 2025-06-28 RX ADMIN — LIDOCAINE HYDROCHLORIDE 1 PATCH: 20 JELLY TOPICAL at 03:00

## 2025-06-28 RX ADMIN — IPRATROPIUM BROMIDE AND ALBUTEROL SULFATE 3 MILLILITER(S): .5; 2.5 SOLUTION RESPIRATORY (INHALATION) at 04:19

## 2025-06-28 RX ADMIN — LIDOCAINE HYDROCHLORIDE 1 PATCH: 20 JELLY TOPICAL at 12:59

## 2025-06-28 RX ADMIN — Medication 100 MILLIGRAM(S): at 13:11

## 2025-06-28 RX ADMIN — Medication 0.2 MILLIGRAM(S): at 09:01

## 2025-06-28 RX ADMIN — METOPROLOL SUCCINATE 5 MILLIGRAM(S): 50 TABLET, EXTENDED RELEASE ORAL at 12:59

## 2025-06-28 RX ADMIN — HEPARIN SODIUM 5000 UNIT(S): 1000 INJECTION INTRAVENOUS; SUBCUTANEOUS at 14:41

## 2025-06-28 RX ADMIN — INSULIN LISPRO 1: 100 INJECTION, SOLUTION INTRAVENOUS; SUBCUTANEOUS at 05:39

## 2025-06-28 RX ADMIN — Medication 100 MILLIEQUIVALENT(S): at 15:47

## 2025-06-28 RX ADMIN — Medication 0.2 MILLIGRAM(S): at 13:37

## 2025-06-28 RX ADMIN — INSULIN LISPRO 1: 100 INJECTION, SOLUTION INTRAVENOUS; SUBCUTANEOUS at 12:53

## 2025-06-28 RX ADMIN — Medication 40 MILLIGRAM(S): at 13:00

## 2025-06-28 RX ADMIN — Medication 4 MILLILITER(S): at 04:19

## 2025-06-28 RX ADMIN — Medication 0.2 MILLIGRAM(S): at 13:07

## 2025-06-28 RX ADMIN — Medication 0.2 MILLIGRAM(S): at 08:31

## 2025-06-28 RX ADMIN — METOPROLOL SUCCINATE 5 MILLIGRAM(S): 50 TABLET, EXTENDED RELEASE ORAL at 05:39

## 2025-06-28 RX ADMIN — Medication 25 GRAM(S): at 12:53

## 2025-06-28 NOTE — CHART NOTE - NSCHARTNOTEFT_GEN_A_CORE
Pt and daugther's at bedside. Pt wants to have ice chips and small sips of water. Pt and daughter both aware of risk of aspiration and would like to have sips and chips for comfort purposes regardless of risk. All risks benefits and alternatives discussed with patient and family. Pt and daughters' at bedside. Pt wants to have ice chips and small sips of water as well as comfort feeds. Pt and daughter both aware of risk of aspiration and would like to have sip/chips and comfort feeds for comfort purposes regardless of risk. All risks benefits and alternatives discussed with patient and family.

## 2025-06-28 NOTE — PROGRESS NOTE ADULT - ATTENDING COMMENTS
Patient failed s/s  npo  awaiting hospice placement   dispo planning  appreciate pulm recs     I have personally interviewed and examined this patient, reviewed pertinent labs and imaging, and discussed the case with colleagues, residents, and physician assistants on B Team rounds.    The active care issues are:  1. dysphagia    [facility] (B Team) [facility]ending [facility]    urgent issues - spectra [id number]  nonurgent issues - [phone]  patient appointments or afterhours - [phone]

## 2025-06-28 NOTE — PROGRESS NOTE ADULT - SUBJECTIVE AND OBJECTIVE BOX
B TEAM Surgery Progress Note  Patient is a 85y old  Male who presents with a chief complaint of back pain (26 Jun 2025 17:02)    SUBJECTIVE: Patient seen and examined at bedside with surgical team, reports his breathing is more comfortable. Asking when he can get out of hospital. Drain removed yesterday, no other complaints.     OBJECTIVE:    Vital Signs Last 24 Hrs  T(C): 36.9 (28 Jun 2025 05:02), Max: 37.1 (27 Jun 2025 11:30)  T(F): 98.4 (28 Jun 2025 05:02), Max: 98.7 (27 Jun 2025 11:30)  HR: 81 (28 Jun 2025 05:02) (72 - 95)  BP: 159/52 (28 Jun 2025 05:02) (141/75 - 173/63)  BP(mean): --  RR: 18 (28 Jun 2025 05:02) (18 - 19)  SpO2: 94% (28 Jun 2025 05:02) (94% - 99%)    Parameters below as of 28 Jun 2025 05:02  Patient On (Oxygen Delivery Method): nasal cannula      I&O's Detail    27 Jun 2025 07:01  -  28 Jun 2025 07:00  --------------------------------------------------------  IN:    dextrose 5% + sodium chloride 0.45% w/ Additives: 630 mL  Total IN: 630 mL    OUT:    Drain (mL): 5 mL    Indwelling Catheter - Urethral (mL): 2950 mL    Oral Fluid: 0 mL  Total OUT: 2955 mL    Total NET: -2325 mL          PHYSICAL EXAM:  Constitutional: A&Ox3, NAD  Respiratory: productive cough, NC 4L  Abdomen: Soft, nondistended, NTTP.   Extremities: b/l UE and LE edema    LABS:                                     8.6    8.85  )-----------( 223      ( 27 Jun 2025 06:30 )             27.3   06-27    138  |  96[L]  |  10  ----------------------------<  210[H]  3.4[L]   |  30  |  0.87    Ca    8.0[L]      27 Jun 2025 06:30  Phos  2.2     06-27  Mg     1.70     06-27

## 2025-06-28 NOTE — PROGRESS NOTE ADULT - ATTENDING SUPERVISION STATEMENT
Fellow
Resident
Fellow
Fellow
Resident
Fellow
Resident

## 2025-06-28 NOTE — PROGRESS NOTE ADULT - ASSESSMENT
85 M w/ multiple chronic medical comorbidities including hypertension, sinus node dysfunction, type two diabetes, and chronic kidney disease (baseline creatinine 1.5-2.0) presently admitted for lower back pain associated with lumbar compression deformities with concern for cauda equina impingement. He was found to be bacteremic (E faecalis) due to acute acalculous cholecystitis, s/p removal of pacemaker, and is now seen following laparoscopic cholecystectomy 6/5/2025. POD 1 patient became hypotensive, unresponsive to 2L fluid resuscitation and 3 u pRBC and was started on vasopressors and transferred to SICU. Patient weaned off pressors, course c/b ileus now resolved, NGT removed 6/10. Midline placed 6/12 for IV abx, patient now with ileus again ct scanned 6/16 found to have collection in gb fossa, drained by IR on 6/17 patient was a rapid for hypotension and hypoxia requiring sicu admission, patient diuresed with lasix gtt and diamox now improved and patient was transferred to the floor 6/24. IR drain removed, now pending hopsice placement.     Plan:   - continue 40mg bid lasix for diuresis -  f/u cardiology recs  - DVT ppx: sqh  - pain control  - failed s/s and cineesophagogram with aspiration, no pleasure feeds per GOC   - will be d/c'd with wong (hx cauda equina)   - no plan for surgical enteral access per GOC   - palliative eval appreciated, focus on symptom directed management for dyspnea and secretions  - dispo: hospice     B team   [id number]

## 2025-06-29 LAB
GLUCOSE BLDC GLUCOMTR-MCNC: 135 MG/DL — HIGH (ref 70–99)
GLUCOSE BLDC GLUCOMTR-MCNC: 147 MG/DL — HIGH (ref 70–99)
GLUCOSE BLDC GLUCOMTR-MCNC: 160 MG/DL — HIGH (ref 70–99)
GLUCOSE BLDC GLUCOMTR-MCNC: 168 MG/DL — HIGH (ref 70–99)

## 2025-06-29 RX ORDER — HYDROMORPHONE/SOD CHLOR,ISO/PF 2 MG/10 ML
0.2 SYRINGE (ML) INJECTION EVERY 4 HOURS
Refills: 0 | Status: DISCONTINUED | OUTPATIENT
Start: 2025-06-29 | End: 2025-07-02

## 2025-06-29 RX ORDER — QUETIAPINE FUMARATE 25 MG/1
12.5 TABLET ORAL AT BEDTIME
Refills: 0 | Status: DISCONTINUED | OUTPATIENT
Start: 2025-06-29 | End: 2025-07-01

## 2025-06-29 RX ADMIN — METOPROLOL SUCCINATE 5 MILLIGRAM(S): 50 TABLET, EXTENDED RELEASE ORAL at 12:02

## 2025-06-29 RX ADMIN — INSULIN GLARGINE-YFGN 5 UNIT(S): 100 INJECTION, SOLUTION SUBCUTANEOUS at 23:50

## 2025-06-29 RX ADMIN — FUROSEMIDE 40 MILLIGRAM(S): 10 INJECTION INTRAMUSCULAR; INTRAVENOUS at 14:17

## 2025-06-29 RX ADMIN — Medication 112 MICROGRAM(S): at 00:10

## 2025-06-29 RX ADMIN — HEPARIN SODIUM 5000 UNIT(S): 1000 INJECTION INTRAVENOUS; SUBCUTANEOUS at 23:53

## 2025-06-29 RX ADMIN — METOPROLOL SUCCINATE 5 MILLIGRAM(S): 50 TABLET, EXTENDED RELEASE ORAL at 00:10

## 2025-06-29 RX ADMIN — Medication 0.2 MILLIGRAM(S): at 22:09

## 2025-06-29 RX ADMIN — Medication 100 MILLIGRAM(S): at 11:57

## 2025-06-29 RX ADMIN — METOPROLOL SUCCINATE 5 MILLIGRAM(S): 50 TABLET, EXTENDED RELEASE ORAL at 17:59

## 2025-06-29 RX ADMIN — POTASSIUM CHLORIDE, DEXTROSE MONOHYDRATE AND SODIUM CHLORIDE 30 MILLILITER(S): 150; 5; 900 INJECTION, SOLUTION INTRAVENOUS at 08:43

## 2025-06-29 RX ADMIN — Medication 112 MICROGRAM(S): at 22:09

## 2025-06-29 RX ADMIN — HEPARIN SODIUM 5000 UNIT(S): 1000 INJECTION INTRAVENOUS; SUBCUTANEOUS at 08:43

## 2025-06-29 RX ADMIN — LIDOCAINE HYDROCHLORIDE 1 PATCH: 20 JELLY TOPICAL at 12:50

## 2025-06-29 RX ADMIN — Medication 25 GRAM(S): at 00:09

## 2025-06-29 RX ADMIN — Medication 0.2 MILLIGRAM(S): at 12:20

## 2025-06-29 RX ADMIN — Medication 0.2 MILLIGRAM(S): at 18:35

## 2025-06-29 RX ADMIN — Medication 0.5 MILLIGRAM(S): at 06:51

## 2025-06-29 RX ADMIN — INSULIN LISPRO 1: 100 INJECTION, SOLUTION INTRAVENOUS; SUBCUTANEOUS at 06:14

## 2025-06-29 RX ADMIN — FOLIC ACID 1 MILLIGRAM(S): 1 TABLET ORAL at 11:57

## 2025-06-29 RX ADMIN — LIDOCAINE HYDROCHLORIDE 1 PATCH: 20 JELLY TOPICAL at 00:12

## 2025-06-29 RX ADMIN — Medication 0.2 MILLIGRAM(S): at 18:09

## 2025-06-29 RX ADMIN — Medication 0.2 MILLIGRAM(S): at 12:55

## 2025-06-29 RX ADMIN — METOPROLOL SUCCINATE 5 MILLIGRAM(S): 50 TABLET, EXTENDED RELEASE ORAL at 23:54

## 2025-06-29 RX ADMIN — Medication 300 MILLIGRAM(S): at 12:50

## 2025-06-29 RX ADMIN — INSULIN LISPRO 1: 100 INJECTION, SOLUTION INTRAVENOUS; SUBCUTANEOUS at 23:51

## 2025-06-29 RX ADMIN — FUROSEMIDE 40 MILLIGRAM(S): 10 INJECTION INTRAMUSCULAR; INTRAVENOUS at 06:18

## 2025-06-29 RX ADMIN — Medication 40 MILLIGRAM(S): at 12:05

## 2025-06-29 RX ADMIN — METOPROLOL SUCCINATE 5 MILLIGRAM(S): 50 TABLET, EXTENDED RELEASE ORAL at 06:18

## 2025-06-29 RX ADMIN — HEPARIN SODIUM 5000 UNIT(S): 1000 INJECTION INTRAVENOUS; SUBCUTANEOUS at 17:57

## 2025-06-29 RX ADMIN — HEPARIN SODIUM 5000 UNIT(S): 1000 INJECTION INTRAVENOUS; SUBCUTANEOUS at 00:10

## 2025-06-29 RX ADMIN — Medication 0.2 MILLIGRAM(S): at 23:09

## 2025-06-29 RX ADMIN — INSULIN GLARGINE-YFGN 5 UNIT(S): 100 INJECTION, SOLUTION SUBCUTANEOUS at 00:09

## 2025-06-29 NOTE — PROGRESS NOTE ADULT - ASSESSMENT
85 M w/ multiple chronic medical comorbidities including hypertension, sinus node dysfunction, type two diabetes, and chronic kidney disease (baseline creatinine 1.5-2.0) presently admitted for lower back pain associated with lumbar compression deformities with concern for cauda equina impingement. He was found to be bacteremic (E faecalis) due to acute acalculous cholecystitis, s/p removal of pacemaker, and is now seen following laparoscopic cholecystectomy 6/5/2025. POD 1 patient became hypotensive, unresponsive to 2L fluid resuscitation and 3 u pRBC and was started on vasopressors and transferred to SICU. Patient weaned off pressors, course c/b ileus now resolved, NGT removed 6/10. Midline placed 6/12 for IV abx, patient now with ileus again ct scanned 6/16 found to have collection in gb fossa, drained by IR on 6/17 patient was a rapid for hypotension and hypoxia requiring sicu admission, patient diuresed with lasix gtt and diamox now improved and patient was transferred to the floor 6/24. IR drain removed, now pending hopsice placement.     Plan:   - continue 40mg bid lasix for diuresis -  f/u cardiology recs  - DVT ppx: sqh  - pain control  - failed s/s and cineesophagogram with aspiration, pleasure feeds per GOC   - will be d/c'd with wong (hx cauda equina)   - no plan for surgical enteral access per GOC   - palliative eval appreciated, focus on symptom directed management for dyspnea and secretions  - dispo: hospice   - protected sleep     B team   [id number]

## 2025-06-29 NOTE — PROGRESS NOTE ADULT - SUBJECTIVE AND OBJECTIVE BOX
Surgery Progress Note    SUBJECTIVE: Pt seen and examined at bedside. Patient comfortable, asking to leave hospital, breathing improved symptomatically, on sips of clears for pleasure.      Vital Signs Last 24 Hrs  T(C): 36.6 (29 Jun 2025 04:28), Max: 37 (28 Jun 2025 12:30)  T(F): 97.9 (29 Jun 2025 04:28), Max: 98.6 (28 Jun 2025 12:30)  HR: 81 (29 Jun 2025 04:28) (77 - 87)  BP: 150/66 (29 Jun 2025 04:28) (137/99 - 191/61)  BP(mean): --  RR: 20 (29 Jun 2025 04:28) (18 - 20)  SpO2: 92% (29 Jun 2025 04:28) (91% - 94%)    Parameters below as of 29 Jun 2025 04:28  Patient On (Oxygen Delivery Method): nasal cannula        Physical Exam:  General Appearance: chronically ill appearing, peripheral edema   Respiratory: moderately labored, nasal cannula   CV: Pulse regularly present  Abdomen: Soft, nontender, incisions clean and dry     LABS:                        9.1    12.77 )-----------( 226      ( 28 Jun 2025 10:33 )             28.2     06-28    139  |  93[L]  |  9   ----------------------------<  139[H]  3.8   |  34[H]  |  0.84    Ca    8.4      28 Jun 2025 21:16  Phos  2.6     06-28  Mg     1.60     06-28        Urinalysis Basic - ( 28 Jun 2025 21:16 )    Color: x / Appearance: x / SG: x / pH: x  Gluc: 139 mg/dL / Ketone: x  / Bili: x / Urobili: x   Blood: x / Protein: x / Nitrite: x   Leuk Esterase: x / RBC: x / WBC x   Sq Epi: x / Non Sq Epi: x / Bacteria: x        INs and OUTs:    06-28-25 @ 07:01  -  06-29-25 @ 07:00  --------------------------------------------------------  IN: 240 mL / OUT: 2600 mL / NET: -2360 mL

## 2025-06-30 LAB
GLUCOSE BLDC GLUCOMTR-MCNC: 145 MG/DL — HIGH (ref 70–99)
GLUCOSE BLDC GLUCOMTR-MCNC: 166 MG/DL — HIGH (ref 70–99)
GLUCOSE BLDC GLUCOMTR-MCNC: 178 MG/DL — HIGH (ref 70–99)
GLUCOSE BLDC GLUCOMTR-MCNC: 180 MG/DL — HIGH (ref 70–99)

## 2025-06-30 PROCEDURE — 99233 SBSQ HOSP IP/OBS HIGH 50: CPT

## 2025-06-30 PROCEDURE — 99232 SBSQ HOSP IP/OBS MODERATE 35: CPT

## 2025-06-30 RX ADMIN — METOPROLOL SUCCINATE 5 MILLIGRAM(S): 50 TABLET, EXTENDED RELEASE ORAL at 06:29

## 2025-06-30 RX ADMIN — Medication 0.2 MILLIGRAM(S): at 22:10

## 2025-06-30 RX ADMIN — INSULIN LISPRO 1: 100 INJECTION, SOLUTION INTRAVENOUS; SUBCUTANEOUS at 11:55

## 2025-06-30 RX ADMIN — Medication 0.2 MILLIGRAM(S): at 21:10

## 2025-06-30 RX ADMIN — Medication 112 MICROGRAM(S): at 21:13

## 2025-06-30 RX ADMIN — Medication 0.5 MILLIGRAM(S): at 02:27

## 2025-06-30 RX ADMIN — METOPROLOL SUCCINATE 5 MILLIGRAM(S): 50 TABLET, EXTENDED RELEASE ORAL at 11:56

## 2025-06-30 RX ADMIN — INSULIN LISPRO 1: 100 INJECTION, SOLUTION INTRAVENOUS; SUBCUTANEOUS at 23:10

## 2025-06-30 RX ADMIN — Medication 0.2 MILLIGRAM(S): at 12:25

## 2025-06-30 RX ADMIN — Medication 400 MILLIGRAM(S): at 08:56

## 2025-06-30 RX ADMIN — Medication 0.5 MILLIGRAM(S): at 07:00

## 2025-06-30 RX ADMIN — Medication 0.2 MILLIGRAM(S): at 17:05

## 2025-06-30 RX ADMIN — METOPROLOL SUCCINATE 5 MILLIGRAM(S): 50 TABLET, EXTENDED RELEASE ORAL at 17:06

## 2025-06-30 RX ADMIN — Medication 0.5 MILLIGRAM(S): at 03:27

## 2025-06-30 RX ADMIN — INSULIN GLARGINE-YFGN 5 UNIT(S): 100 INJECTION, SOLUTION SUBCUTANEOUS at 23:10

## 2025-06-30 RX ADMIN — Medication 1000 MILLIGRAM(S): at 09:30

## 2025-06-30 RX ADMIN — INSULIN LISPRO 1: 100 INJECTION, SOLUTION INTRAVENOUS; SUBCUTANEOUS at 17:06

## 2025-06-30 RX ADMIN — Medication 0.2 MILLIGRAM(S): at 17:40

## 2025-06-30 RX ADMIN — Medication 0.5 MILLIGRAM(S): at 06:29

## 2025-06-30 RX ADMIN — Medication 100 MILLIGRAM(S): at 11:56

## 2025-06-30 RX ADMIN — FUROSEMIDE 40 MILLIGRAM(S): 10 INJECTION INTRAMUSCULAR; INTRAVENOUS at 13:59

## 2025-06-30 RX ADMIN — FUROSEMIDE 40 MILLIGRAM(S): 10 INJECTION INTRAMUSCULAR; INTRAVENOUS at 06:30

## 2025-06-30 RX ADMIN — FOLIC ACID 1 MILLIGRAM(S): 1 TABLET ORAL at 11:55

## 2025-06-30 RX ADMIN — Medication 40 MILLIGRAM(S): at 11:55

## 2025-06-30 RX ADMIN — Medication 0.2 MILLIGRAM(S): at 11:54

## 2025-06-30 RX ADMIN — HEPARIN SODIUM 5000 UNIT(S): 1000 INJECTION INTRAVENOUS; SUBCUTANEOUS at 08:28

## 2025-06-30 RX ADMIN — METOPROLOL SUCCINATE 5 MILLIGRAM(S): 50 TABLET, EXTENDED RELEASE ORAL at 23:14

## 2025-06-30 NOTE — PROGRESS NOTE ADULT - SUBJECTIVE AND OBJECTIVE BOX
TEAM [ B ] Surgery Daily Progress Note  =====================================================    SUBJECTIVE: Patient seen and examined at bedside on AM rounds. Patient without complaints. Permissive comfort feeds per GOC, denies nausea, vomiting. Pt pending transfer for hospice.    ALLERGIES:  gabapentin (Other)      --------------------------------------------------------------------------------------    MEDICATIONS:    Neurologic Medications  acetaminophen   IVPB .. 1000 milliGRAM(s) IV Intermittent every 6 hours PRN Mild Pain (1 - 3)  aspirin Suppository 300 milliGRAM(s) Rectal daily  HYDROmorphone  Injectable 0.5 milliGRAM(s) IV Push every 4 hours PRN Dyspnea  HYDROmorphone  Injectable 0.2 milliGRAM(s) IV Push every 4 hours PRN Moderate Pain (4 - 6)  QUEtiapine 12.5 milliGRAM(s) Oral at bedtime PRN anxiety    Respiratory Medications  albuterol    90 MICROgram(s) HFA Inhaler 2 Puff(s) Inhalation every 6 hours  albuterol/ipratropium for Nebulization 3 milliLiter(s) Nebulizer every 6 hours  dornase mayda Solution 2.5 milliGRAM(s) Inhalation daily  sodium chloride 3%  Inhalation 4 milliLiter(s) Inhalation every 6 hours    Cardiovascular Medications  furosemide   Injectable 40 milliGRAM(s) IV Push two times a day  metoprolol tartrate Injectable 5 milliGRAM(s) IV Push every 6 hours    Gastrointestinal Medications  dextrose 5% + sodium chloride 0.45% with potassium chloride 20 mEq/L 1000 milliLiter(s) IV Continuous <Continuous>  folic acid Injectable 1 milliGRAM(s) IV Push daily  glycopyrrolate Injectable 0.4 milliGRAM(s) IV Push every 6 hours PRN Secretions  pantoprazole  Injectable 40 milliGRAM(s) IV Push daily  thiamine Injectable 100 milliGRAM(s) IV Push daily    Genitourinary Medications    Hematologic/Oncologic Medications  heparin   Injectable 5000 Unit(s) SubCutaneous every 8 hours    Antimicrobial/Immunologic Medications    Endocrine/Metabolic Medications  insulin glargine Injectable (LANTUS) 5 Unit(s) SubCutaneous at bedtime  insulin lispro (ADMELOG) corrective regimen sliding scale   SubCutaneous every 6 hours  levothyroxine Injectable 112 MICROGram(s) IV Push at bedtime    Topical/Other Medications  lidocaine   4% Patch 1 Patch Transdermal daily    --------------------------------------------------------------------------------------    VITAL SIGNS:  T(C): 36.9 (06-30-25 @ 08:00), Max: 36.9 (06-30-25 @ 08:00)  HR: 78 (06-30-25 @ 08:00) (73 - 85)  BP: 147/57 (06-30-25 @ 08:00) (133/60 - 152/70)  RR: 19 (06-30-25 @ 08:00) (18 - 20)  SpO2: 92% (06-30-25 @ 08:00) (92% - 98%)  --------------------------------------------------------------------------------------    EXAM    General: NAD, resting in bed comfortably.  Cardiac: regular rate, warm and well perfused  Respiratory: On NC, cough, copious secretions  Abdomen: soft, nontender, nondistended.     --------------------------------------------------------------------------------------    LABS    --------------------------------------------------------------------------------------    INS AND OUTS:    06-29-25 @ 07:01  -  06-30-25 @ 07:00  --------------------------------------------------------  IN: 660 mL / OUT: 2250 mL / NET: -1590 mL    06-30-25 @ 07:01  -  06-30-25 @ 11:20  --------------------------------------------------------  IN: 220 mL / OUT: 250 mL / NET: -30 mL      --------------------------------------------------------------------------------------

## 2025-06-30 NOTE — PROGRESS NOTE ADULT - ASSESSMENT
85 year old man with multiple chronic medical comorbidities including hypertension, sinus node dysfunction, type two diabetes, and chronic kidney disease (baseline creatinine 1.5-2.0) presently admitted for lower back pain associated with lumbar compression deformities with concern for cauda equina impingement. He was found to be bacteremic (E faecalis) due to acute acalculous cholecystitis, and is now seen following laparoscopic cholecystectomy with Dr. Triny Verde 6/5/2025. POD 1 patient became hypotensive, unresponsive to 2L fluid resuscitation and 3 u pRBC and was started on vasopressors and transferred to SICU.Patient weaned off pressors, course c/b ileus now resolved, Endocrinology was consulted for management of diabetes mellitus.    # Type 2 Diabetes Mellitus  - HbA1c 6.9%   - home regimen:  lantus 24 units qhs, admelog 12 units + tradjenta 5 mg    Inpatient plan   - FS goal 100-180 mg/dl : At goal. Accepted to hospice/palliative care.  - Continue Lantus 5 units SQ at bedtime   - Continue low Admelog correctional scale q6   - FS TID AC & HS ---> q6 if NPO   - hypoglycemia protocol PRN   - Please inform endocrine team if patient starts oral / nonoral diet for recommendations.     Discharge plan:  - per family, plan to discharge to Hospice/Palliative   - Continue Lantus 5 units at bedtime.  - f/u with Dr. Cates    # Hypertension  - BP goal <130/80  - Continue metoprolol  - Management as per primary team      # Hyperlipidemia  - LDL goal <70   - management per primary team     # hypothyroidism  - TSH 2.265 WNL  - TSH 2.92 at goal today  - Patient on levothyroxine 150 mcg daily at home  - continue Levothyroxine 112 mcg IV daily    Discharge: Continue  Levothyroxine 112 mcg IV daily on discharge.       # Compression deformities (lumbar)  - outpatient DXA      BRET Rodriges-BC  Nurse Practitioner  Division of Endocrinology  Contact on TEAMS    If out of hospital/unavailable when paged, please note: patient will be cared for by another provider on the endocrine service.  For urgent concerns: call the endocrine answering service for assistance to reach covering provider ([phone]). For non-urgent matters: please email [email or web address] for assistance.

## 2025-06-30 NOTE — PROGRESS NOTE ADULT - PROBLEM SELECTOR PLAN 2
- back pain 2/2 cauda equina   - CT L spine- Mild loss of height of the L4 and L5 vertebral bodies with associated   edema compatible and erosions. FINDINGS suspicious for osteomyelitis and   discitis. Correlate with ESR/CRP. Edema in the bilateral paraspinal musculature, right psoas musculature,   and presacral space may be infectious/inflammatory. Severe spinal canal stenosis at L3-L4 and L4-L5 with impingement of the cauda equina at these levels.  - Was unable to have surgery due to multiple medical issues   - PRN use in past 24 hours from 8 AM to 8 AM: IV dilaudid 0.2 mg x 3 doses   - c/w acetaminophen   IVPB .. 1000 milliGRAM(s) IV Intermittent every 6 hours PRN Mild Pain (1 - 3)  HYDROmorphone  Injectable 0.2 milliGRAM(s) IV Push every 4 hours PRN Moderate Pain (4 - 6)  HYDROmorphone  Injectable 0.5 milliGRAM(s) IV Push every 4 hours PRN Severe Pain (7 - 10).

## 2025-06-30 NOTE — PROGRESS NOTE ADULT - SUBJECTIVE AND OBJECTIVE BOX
Timothy Mojica MD  Interventional Cardiology / Advance Heart Failure and Cardiac Transplant Specialist  [place] Office : [phone] [place] [address] [place]. [address]  Tel:   [place] Office : [phone] [facility] [place] [address]  Tel: [phone]       Pt is lying in bed NAD  	  MEDICATIONS:  furosemide   Injectable 40 milliGRAM(s) IV Push two times a day  metoprolol tartrate Injectable 5 milliGRAM(s) IV Push every 6 hours      albuterol    90 MICROgram(s) HFA Inhaler 2 Puff(s) Inhalation every 6 hours  albuterol/ipratropium for Nebulization 3 milliLiter(s) Nebulizer every 6 hours  dornase mayda Solution 2.5 milliGRAM(s) Inhalation daily  sodium chloride 3%  Inhalation 4 milliLiter(s) Inhalation every 6 hours    acetaminophen   IVPB .. 1000 milliGRAM(s) IV Intermittent every 6 hours PRN  aspirin Suppository 300 milliGRAM(s) Rectal daily  HYDROmorphone  Injectable 0.2 milliGRAM(s) IV Push every 4 hours PRN  HYDROmorphone  Injectable 0.5 milliGRAM(s) IV Push every 4 hours PRN  QUEtiapine 12.5 milliGRAM(s) Oral at bedtime PRN    glycopyrrolate Injectable 0.4 milliGRAM(s) IV Push every 6 hours PRN  pantoprazole  Injectable 40 milliGRAM(s) IV Push daily    insulin glargine Injectable (LANTUS) 5 Unit(s) SubCutaneous at bedtime  insulin lispro (ADMELOG) corrective regimen sliding scale   SubCutaneous every 6 hours  levothyroxine Injectable 112 MICROGram(s) IV Push at bedtime    dextrose 5% + sodium chloride 0.45% with potassium chloride 20 mEq/L 1000 milliLiter(s) IV Continuous <Continuous>  folic acid Injectable 1 milliGRAM(s) IV Push daily  lidocaine   4% Patch 1 Patch Transdermal daily  thiamine Injectable 100 milliGRAM(s) IV Push daily      PAST MEDICAL/SURGICAL HISTORY  PAST MEDICAL & SURGICAL HISTORY:  HTN (hypertension)      HLD (hyperlipidemia)      DM (diabetes mellitus)      TIA (transient ischemic attack)      Prostate cancer      S/P prostatectomy      Pacemaker      H/O thyroidectomy          SOCIAL HISTORY: Substance Use (street drugs): ( x ) never used  (  ) other:    FAMILY HISTORY:  FHx: heart disease (Father, Mother)             PHYSICAL EXAM:  T(C): 36.3 (06-30-25 @ 12:29), Max: 36.9 (06-30-25 @ 08:00)  HR: 75 (06-30-25 @ 12:29) (73 - 85)  BP: 138/73 (06-30-25 @ 12:29) (133/60 - 152/70)  RR: 18 (06-30-25 @ 12:29) (18 - 20)  SpO2: 94% (06-30-25 @ 12:29) (92% - 98%)  Wt(kg): --  I&O's Summary    29 Jun 2025 07:01  -  30 Jun 2025 07:00  --------------------------------------------------------  IN: 660 mL / OUT: 2250 mL / NET: -1590 mL    30 Jun 2025 07:01  -  30 Jun 2025 14:37  --------------------------------------------------------  IN: 340 mL / OUT: 450 mL / NET: -110 mL        GENERAL: NAD  EYES:  EOMI  ENMT: NGT Moist mucous membranes  Cardiovascular: Normal S1 S2, No JVD, No murmurs, +b/l LUE edema L>R, LE edema  Respiratory: diminished	  Gastrointestinal:  Soft, Non-tender, + BS	  Extremities: b/l UE, LE edema improving                 06-28    139  |  93[L]  |  9   ----------------------------<  139[H]  3.8   |  34[H]  |  0.84    Ca    8.4      28 Jun 2025 21:16  Phos  2.6     06-28  Mg     1.60     06-28      proBNP:   Lipid Profile:   HgA1c:   TSH:     Consultant(s) Notes Reviewed:  [x ] YES  [ ] NO    Care Discussed with Consultants/Other Providers [ x] YES  [ ] NO    Imaging Personally Reviewed independently:  [x] YES  [ ] NO    All labs, radiologic studies, vitals, orders and medications list reviewed. Patient is seen and examined at bedside. Case discussed with medical team.

## 2025-06-30 NOTE — PROGRESS NOTE ADULT - ASSESSMENT
-EKG w/ SR, no significant STT changes   -ECHO w/ normal EF no significant valvular heart disease     A/P:  85M w/ HTN, HLD, T2DM, prostate CA, CKD, TIA and SND s/p PPM transferred here for lumbar spinal cord compression and bacteremia s/p PPM explant and implant of Micra PPM.    1. bacteremia  -s/p cholecystectomy  -c/w abx per ID, primary team  6/13 afebrile, continues on abx per ID  6/19 c/w abx per ID  6/24 c/w Ampicillin to 6/25 6/25 remains afebrile, t&t per ID  6//26 failed s/s eval, pending barium swallow    2. SND  -s/p leadless PPM w/ EP  6/16 recommend IV lasix 40mg daily for generalized edema  6/17 mild anarsaca, c/w diuresing lasix 40mg IV BID  6/30 c/w diuresing lasix 40mg IV BID    3. HTN  -c/w clonidine, hydralazine, labetalol    4.  Pre-procedure clearance  6/17 pt optimized from cardiac srtandpoint and may proceed with cholecystostomy tube with moderate risk  6/18 s/p IR drain placement, remain hemodynamically stable    5. hypoxia  6/20 - admitted to SICU, CTA no PE; bipap, pulm toilet, diamox   6/23 - off bipap, continues with lasix IV 20mg q12 can increase to lasix IV 40mg BID sec to worsening LE edema , check K twice daily and replace  6/27 - reported with slow HR on pulse ox; placed on tele, telemetry and EKG reviewed, SR with PVCs, bigeminy (rate not detected on pulse ox), pt not bradycardic

## 2025-06-30 NOTE — PROGRESS NOTE ADULT - SUBJECTIVE AND OBJECTIVE BOX
Indication of Geriatrics and Palliative Medicine Services:  [X  ] Complex Medical Decision Making   [X  ] Symptom/Pain management     DNR on chart: Yes     INTERVAL EVENTS: Patient seen this PM with daughters at bedside. Patient comfortable in no distress, symptoms controlled. Pending transfer to facility.     -------------------------------------------------------------------------------------------------------    PRESENT SYMPTOMS:     [ ] Unable to self-report      [ ] PAINADS     [ ] RDOS    [ ] No     [X ] Yes     Source if other than patient:  [ ]Family   [ ]Team     PAIN:   If blank, patient unable to specify     [X ]yes [ ]no    1. Location- Back   2. Radiation-  Legs   3. Quality- Sharp   4. Timing- On and off   5. Minimal acceptable level/pain goal- 3/10   6. Aggravating factors- Movement   7. QOL impact- Moderate     SYMPTOMS:   Dyspnea:                           [ ]Mild [ ]Moderate [ ]Severe  Anxiety:                             [ ]Mild [ ]Moderate [ ]Severe  Fatigue:                             [ ]Mild [ ]Moderate [ ]Severe  Nausea/Vomiting:              [ ]Mild [ ]Moderate [ ]Severe  Loss of appetite:                [ ]Mild [ ]Moderate [ ]Severe  Constipation:                     [ ]Mild [ ]Moderate [ ]Severe    Other Symptoms:  [X ]All other review of systems negative     -------------------------------------------------------------------------------------------------------    I STOP: [id number]    -------------------------------------------------------------------------------------------------------    ITEMS UNCHECKED ARE NOT PRESENT    PHYSICAL:  Vital Signs Last 24 Hrs  T(C): 36.3 (30 Jun 2025 12:29), Max: 36.9 (30 Jun 2025 08:00)  T(F): 97.4 (30 Jun 2025 12:29), Max: 98.4 (30 Jun 2025 08:00)  HR: 75 (30 Jun 2025 12:29) (73 - 85)  BP: 138/73 (30 Jun 2025 12:29) (133/60 - 152/70)  BP(mean): --  RR: 18 (30 Jun 2025 12:29) (18 - 20)  SpO2: 94% (30 Jun 2025 12:29) (92% - 98%)    Parameters below as of 30 Jun 2025 12:29  Patient On (Oxygen Delivery Method): nasal cannula  O2 Flow (L/min): 4      GENERAL:  [ ]Cachexia  [X ] Frail  [X ]Awake  [X ]Oriented   [ ]Lethargic  [ ]Unarousable  [ ]Verbal  [ ]Non-Verbal    BEHAVIORAL:   [ ] Anxiety  [ ] Delirium [ ] Agitation [ ] Other    HEENT:   [X ]Normal   [ ]Dry mouth   [ ]ET Tube/Trach  [ ]Oral lesions    PULMONARY:   [ ]Clear              [ ]Tachypnea  [X ]Audible excessive secretions   [ ]Rhonchi        [ ]Right [ ]Left [ ]Bilateral  [ ]Crackles        [ ]Right [ ]Left [ ]Bilateral  [ ]Wheezing     [ ]Right [ ]Left [ ]Bilateral  [X ]Diminished breath sounds [ ]right [ ]left [X ]bilateral    CARDIOVASCULAR:    [X ]Regular [ ]Irregular [ ]Tachy  [ ]Jurgen [ ]Murmur [ ]Other    GASTROINTESTINAL:  [X ]Soft  [ ]Distended   [ ]+BS  [X ]Non tender [ ]Tender  [ ]Other [ ]PEG [ ]OGT/ NGT      GENITOURINARY:  [ ]Normal [ ] Incontinent   [ ]Oliguria/Anuria   [X ]Frye    MUSCULOSKELETAL:   [ ]Normal   [ ]Weakness  [ ]Bed/Wheelchair bound [X ]Edema    NEUROLOGIC:   [X ]No focal deficits  [ ]Cognitive impairment  [ ]Dysphagia [ ]Dysarthria [ ]Paresis [ ]Other     SKIN:   [X ]Normal  [ ]Rash  [ ]Other  [ ]Pressure ulcer(s)       Present on admission [ ]y [ ]n    -------------------------------------------------------------------------------------------------------    LABS:               06-28    139  |  93[L]  |  9   ----------------------------<  139[H]  3.8   |  34[H]  |  0.84    Ca    8.4      28 Jun 2025 21:16  Phos  2.6     06-28  Mg     1.60     06-28      -------------------------------------------------------------------------------------------------------    CRITICAL CARE:  [ ]Shock Present  [ ]Septic [ ]Cardiogenic [ ]Neurologic [ ]Hypovolemic [ ]Undifferentiated    [ ]Vasopressors [ ]Inotropes    [ ]Respiratory failure present [ ]Acute  [ ]Chronic [ ]Hypoxic  [ ]Hypercarbic [ ]Mixed   [ ]Mechanical Ventilation  [ ]Trach collar   [ ]Non-invasive ventilatory support   [ ]High-Flow   [ ]Oxygen mask/venti     [ ]Other organ failure     -------------------------------------------------------------------------------------------------------    RADIOLOGY & ADDITIONAL STUDIES:     < from: Xray Chest 1 View- PORTABLE-Routine (Xray Chest 1 View- PORTABLE-Routine in AM.) (06.23.25 @ 01:59) >    Frontal expiratory view of the chest shows the heart to be similar in   size.    The lungs show partial clearing of the right base with progression of   lower left lung infiltrate. Small left pleural effusion is present and   there is no evidence of pneumothorax nor right pleural effusion.    IMPRESSION:  Progression of left infiltrate/effusion.    < end of copied text >    -------------------------------------------------------------------------------------------------------  MEDICATIONS:     MEDICATIONS  (STANDING):  albuterol    90 MICROgram(s) HFA Inhaler 2 Puff(s) Inhalation every 6 hours  albuterol/ipratropium for Nebulization 3 milliLiter(s) Nebulizer every 6 hours  aspirin Suppository 300 milliGRAM(s) Rectal daily  dextrose 5% + sodium chloride 0.45% with potassium chloride 20 mEq/L 1000 milliLiter(s) (30 mL/Hr) IV Continuous <Continuous>  dornase mayda Solution 2.5 milliGRAM(s) Inhalation daily  folic acid Injectable 1 milliGRAM(s) IV Push daily  furosemide   Injectable 40 milliGRAM(s) IV Push two times a day  insulin glargine Injectable (LANTUS) 5 Unit(s) SubCutaneous at bedtime  insulin lispro (ADMELOG) corrective regimen sliding scale   SubCutaneous every 6 hours  levothyroxine Injectable 112 MICROGram(s) IV Push at bedtime  lidocaine   4% Patch 1 Patch Transdermal daily  metoprolol tartrate Injectable 5 milliGRAM(s) IV Push every 6 hours  pantoprazole  Injectable 40 milliGRAM(s) IV Push daily  sodium chloride 3%  Inhalation 4 milliLiter(s) Inhalation every 6 hours  thiamine Injectable 100 milliGRAM(s) IV Push daily    MEDICATIONS  (PRN):  acetaminophen   IVPB .. 1000 milliGRAM(s) IV Intermittent every 6 hours PRN Mild Pain (1 - 3)  glycopyrrolate Injectable 0.4 milliGRAM(s) IV Push every 6 hours PRN Secretions  HYDROmorphone  Injectable 0.2 milliGRAM(s) IV Push every 4 hours PRN Moderate Pain (4 - 6)  HYDROmorphone  Injectable 0.5 milliGRAM(s) IV Push every 4 hours PRN Dyspnea  QUEtiapine 12.5 milliGRAM(s) Oral at bedtime PRN anxiety

## 2025-06-30 NOTE — PROGRESS NOTE ADULT - ASSESSMENT
85 M w/ multiple chronic medical comorbidities including hypertension, sinus node dysfunction, type two diabetes, and chronic kidney disease (baseline creatinine 1.5-2.0) presently admitted for lower back pain associated with lumbar compression deformities with concern for cauda equina impingement. He was found to be bacteremic (E faecalis) due to acute acalculous cholecystitis, s/p removal of pacemaker, and is now seen following laparoscopic cholecystectomy 6/5/2025. POD 1 patient became hypotensive, unresponsive to 2L fluid resuscitation and 3 u pRBC and was started on vasopressors and transferred to SICU. Patient weaned off pressors, course c/b ileus now resolved, NGT removed 6/10. Midline placed 6/12 for IV abx, patient now with ileus again ct scanned 6/16 found to have collection in gb fossa, drained by IR on 6/17 patient was a rapid for hypotension and hypoxia requiring sicu admission, patient diuresed with lasix gtt and diamox now improved and patient was transferred to the floor 6/24. IR drain removed, now pending hopsice placement.     Plan:   - continue 40mg bid lasix for diuresis -  f/u cardiology recs  - DVT ppx: sqh  - pain control  - failed s/s and cineesophagogram with aspiration, pleasure feeds per GOC   - will be d/c'd with wong (hx cauda equina)   - no plan for surgical enteral access per GOC   - palliative eval appreciated, focus on symptom directed management for dyspnea and secretions  - dispo: hospice   - protected sleep     B team   [id number]

## 2025-06-30 NOTE — PROGRESS NOTE ADULT - PROBLEM SELECTOR PLAN 1
- initially presented for back pain but hospital course complicated by multiple medical issues including bacteremia,  acalculous cholecystitis s/p lap choli , PPM replacement, septic shock in SICU   - CXR 6/23- The lungs show partial clearing of the right base with progression of lower left lung infiltrate. Small left pleural effusion is present and there is no evidence of pneumothorax nor right pleural effusion.  - TTE EF 63%   - On NC, oxygen support fluctuating   - Appreciate pulm eval  - S&S showing severe dysphagia, aspiration, NPO   - c/w IV dilaudid 0.5 mg q4h PRN for dyspnea  - c/w IV robinul 0.4 mg q6h PRN for secretions - initially presented for back pain but hospital course complicated by multiple medical issues including bacteremia,  acalculous cholecystitis s/p lap choli , PPM replacement, septic shock in SICU   - CXR 6/23- The lungs show partial clearing of the right base with progression of lower left lung infiltrate. Small left pleural effusion is present and there is no evidence of pneumothorax nor right pleural effusion.  - TTE EF 63%   - On NC, oxygen support fluctuating   - Appreciate pulm eval  - S&S showing severe dysphagia, aspiration, NPO   - PRN use in past 24 hours from 8 AM to 8 AM: IV dilaudid 0.5 mg x 2 doses   - c/w IV dilaudid 0.5 mg q4h PRN for dyspnea  - c/w IV robinul 0.4 mg q6h PRN for secretions

## 2025-06-30 NOTE — PROGRESS NOTE ADULT - SUBJECTIVE AND OBJECTIVE BOX
Chief Complaint: T2DM    Interval Events: Pt seen and examined at bedside earlier today.  Daughters at bedside. Pt is now accepted to hospice/palliative care. Allowed comfort feeds as per daughter.     MEDICATIONS  (STANDING):  albuterol    90 MICROgram(s) HFA Inhaler 2 Puff(s) Inhalation every 6 hours  albuterol/ipratropium for Nebulization 3 milliLiter(s) Nebulizer every 6 hours  aspirin Suppository 300 milliGRAM(s) Rectal daily  dextrose 5% + sodium chloride 0.45% with potassium chloride 20 mEq/L 1000 milliLiter(s) (30 mL/Hr) IV Continuous <Continuous>  dornase mayda Solution 2.5 milliGRAM(s) Inhalation daily  folic acid Injectable 1 milliGRAM(s) IV Push daily  furosemide   Injectable 40 milliGRAM(s) IV Push two times a day  insulin glargine Injectable (LANTUS) 5 Unit(s) SubCutaneous at bedtime  insulin lispro (ADMELOG) corrective regimen sliding scale   SubCutaneous every 6 hours  levothyroxine Injectable 112 MICROGram(s) IV Push at bedtime  lidocaine   4% Patch 1 Patch Transdermal daily  metoprolol tartrate Injectable 5 milliGRAM(s) IV Push every 6 hours  pantoprazole  Injectable 40 milliGRAM(s) IV Push daily  sodium chloride 3%  Inhalation 4 milliLiter(s) Inhalation every 6 hours  thiamine Injectable 100 milliGRAM(s) IV Push daily    MEDICATIONS  (PRN):  acetaminophen   IVPB .. 1000 milliGRAM(s) IV Intermittent every 6 hours PRN Mild Pain (1 - 3)  glycopyrrolate Injectable 0.4 milliGRAM(s) IV Push every 6 hours PRN Secretions  HYDROmorphone  Injectable 0.2 milliGRAM(s) IV Push every 4 hours PRN Moderate Pain (4 - 6)  HYDROmorphone  Injectable 0.5 milliGRAM(s) IV Push every 4 hours PRN Dyspnea  QUEtiapine 12.5 milliGRAM(s) Oral at bedtime PRN anxiety      Allergies    gabapentin (Other)    Intolerances      Review of Systems:  UNABLE TO OBTAIN    VITALS: T(C): 36.3 (06-30-25 @ 12:29)  T(F): 97.4 (06-30-25 @ 12:29), Max: 98.4 (06-30-25 @ 08:00)  HR: 75 (06-30-25 @ 12:29) (73 - 85)  BP: 138/73 (06-30-25 @ 12:29) (133/60 - 152/70)  RR:  (18 - 20)  SpO2:  (92% - 98%)  Wt(kg): --      Physical Exam:   GENERAL: NAD, well-developed  EYES: No proptosis  HEENT:  Atraumatic, Normocephalic  RESPIRATORY: non labored breathing  PSYCH: Alert, confused      CAPILLARY BLOOD GLUCOSE      POCT Blood Glucose.: 166 mg/dL (30 Jun 2025 11:53)  POCT Blood Glucose.: 145 mg/dL (30 Jun 2025 05:54)  POCT Blood Glucose.: 160 mg/dL (29 Jun 2025 23:42)  POCT Blood Glucose.: 147 mg/dL (29 Jun 2025 16:52)      06-28    139  |  93[L]  |  9   ----------------------------<  139[H]  3.8   |  34[H]  |  0.84    eGFR: 84    Ca    8.4      06-28  Mg     1.60     06-28  Phos  2.6     06-28        A1C with Estimated Average Glucose Result: 6.9 % (05-07-25 @ 06:03)  A1C with Estimated Average Glucose Result: 7.4 % (11-10-24 @ 06:55)      Thyroid Function Tests:

## 2025-06-30 NOTE — PROGRESS NOTE ADULT - ASSESSMENT
Pt is an 85M with HTN, HLD, DM2, prostate cancer s/p prostatectomy, CKD, TIA with a pacemaker who presents transferred from [place] for further evaluation of low back pain. The pain began when he was going to the bathroom and worsened when he was in bed and rolled over. The pain is worse when he is walking or moving. He has never had back pain like this before. Patient normally ambulates with a walker or with a cane. Of note, patient endorses a fall in February where he fell into a pile of snow. Patient denies any injury after that event and did not have any imaging at that time. He usually has one BM every 3-4 days and this has not changed. Wakes up 2-3x/night to urinate. No episodes of bladder or bowel incontinence. He says he has diabetic neuropathy and has seen pain management. Says he has tried gabapentin but it made his legs very weak and he almost fell. At [place], CT showed multiple lumbar compression deformities. MRI was unable to be performed 2/2 ppm compatibility. He was transferred to [facility] for MRI and orthopedic eval.

## 2025-06-30 NOTE — CHART NOTE - NSCHARTNOTEFT_GEN_A_CORE
NUTRITION FOLLOW UP NOTE   REASON FOR ASSESSMENT: Severe Malnutrition (Follow-up)    SOURCE: [x] Medical record [x] RN/PCA  [x] Patient   [x] Family at bed side, daughter    DIET PRESCRIPTION: NPO: With Ice Chips/Sips of Water (06-28-25 @ 11:50)    MEDICAL COURSE: Pt 86 yo male with PMHx of hypertension, sinus node dysfunction, type two diabetes, chronic kidney disease (baseline creatinine 1.5-2.0) admitted for lower back pain associated with lumbar compression deformities - per chart review.     NUTRITION COURSE: At time of visit, Pt awake, appears weak. Pt's daughter at bed side. Pt NPO at present. Of note, Pt failed s/s and cineesophagogram with aspiration, pleasure feeds per GOC. No plan for surgical enteral access per GOC; Pt DNR/DNI; Pt pending hospice placement. Case discussed with nurse. No report of vomiting or diarrhea @ this time. +Last BM (6/29) per flow sheets. Pt & his daughter with no questions related to diet, at time of visit. RD remains available, daughter made aware.     PERTINENT MEDICATIONS:  MEDICATIONS  (STANDING):  albuterol    90 MICROgram(s) HFA Inhaler 2 Puff(s) Inhalation every 6 hours  albuterol/ipratropium for Nebulization 3 milliLiter(s) Nebulizer every 6 hours  aspirin Suppository 300 milliGRAM(s) Rectal daily  dextrose 5% + sodium chloride 0.45% with potassium chloride 20 mEq/L 1000 milliLiter(s) (30 mL/Hr) IV Continuous <Continuous>  dornase mayda Solution 2.5 milliGRAM(s) Inhalation daily  folic acid Injectable 1 milliGRAM(s) IV Push daily  furosemide   Injectable 40 milliGRAM(s) IV Push two times a day  heparin   Injectable 5000 Unit(s) SubCutaneous every 8 hours  insulin glargine Injectable (LANTUS) 5 Unit(s) SubCutaneous at bedtime  insulin lispro (ADMELOG) corrective regimen sliding scale   SubCutaneous every 6 hours  levothyroxine Injectable 112 MICROGram(s) IV Push at bedtime  lidocaine   4% Patch 1 Patch Transdermal daily  metoprolol tartrate Injectable 5 milliGRAM(s) IV Push every 6 hours  pantoprazole  Injectable 40 milliGRAM(s) IV Push daily  sodium chloride 3%  Inhalation 4 milliLiter(s) Inhalation every 6 hours  thiamine Injectable 100 milliGRAM(s) IV Push daily  MEDICATIONS  (PRN):  acetaminophen   IVPB .. 1000 milliGRAM(s) IV Intermittent every 6 hours PRN Mild Pain (1 - 3)  glycopyrrolate Injectable 0.4 milliGRAM(s) IV Push every 6 hours PRN Secretions  HYDROmorphone  Injectable 0.2 milliGRAM(s) IV Push every 4 hours PRN Moderate Pain (4 - 6)  HYDROmorphone  Injectable 0.5 milliGRAM(s) IV Push every 4 hours PRN Dyspnea  QUEtiapine 12.5 milliGRAM(s) Oral at bedtime PRN anxiety    PERTINENT LABS:  06-28 Na139 mmol/L Glu 139 mg/dL[H] K+ 3.8 mmol/L Cr  0.84 mg/dL BUN 9 mg/dL 06-28 Phos 2.6 mg/dL  A1C with Estimated Average Glucose Result: 6.9 % (05-07-25 @ 06:03)  CAPILLARY BLOOD GLUCOSE  POCT Blood Glucose.: 166 mg/dL (30 Jun 2025 11:53)  POCT Blood Glucose.: 145 mg/dL (30 Jun 2025 05:54)  POCT Blood Glucose.: 160 mg/dL (29 Jun 2025 23:42)  POCT Blood Glucose.: 147 mg/dL (29 Jun 2025 16:52)    ANTHROPOMETRICS:  Height: 180.3 cm/71" (05-19 @ 12:47)   Weights: 94.3 kg (daily - 6/27/25), 92.9 kg (dosing - 5/19/25)   BMI (kg/m2): 28.6 (05-19 @ 12:47),  Weight Assessment: indicative of weight gain: 1.4 kg x >1month     PHYSICAL ASSESSMENT (per flow-sheets):  Edema: 4+: R/L arm (6/29/25)  Pressure Injury: Sacrum - stage II   NUTRITION FOCUSED PHYSICAL EXAM: [x] not applicable at present, Dispo: Hospice pending     ESTIMATED NEEDS: [x] no change since previous assessment, based on ideal body weight: 172 lbs / 78.1 kg  estimated energy needs: 2187 - 2500 kcal daily @ 28-32 kcal/kg  estimated protein needs: 78-94 gm protein daily @ 1.0-1.2 gm/kg     PREVIOUS NUTRITION Dx: [x] Malnutrition, severe    Nutrition Diagnosis is [x] ongoing   New Nutrition Diagnosis: [x] not applicable   EDUCATION: [x] not applicable at present, Dispo: Hospice pending     RECOMMENDATIONS/INTERVENTIONS:  1. Defer Nutrition Plan of Care to MD based on GOC discussion and decisions of Pt's family;  2. If alternate means of Nutrition to initiate, consult nutrition for recommendations;  3. Monitor labs, hydration status;   RDN remains available

## 2025-06-30 NOTE — PROGRESS NOTE ADULT - PROBLEM SELECTOR PLAN 4
For GOC and symptom management   Pending transfer to facility, f/u SW     In the event of worsening symptoms, please contact the Palliative Medicine team via pager (if the patient is at [facility] #[phone] or if the patient is at [facility] #[phone]) The Geriatric and Palliative Medicine service has coverage 24 hours a day/ 7 days a week to provide medical recommendations regarding symptom management needs via telephone.

## 2025-07-01 LAB
GLUCOSE BLDC GLUCOMTR-MCNC: 139 MG/DL — HIGH (ref 70–99)
GLUCOSE BLDC GLUCOMTR-MCNC: 150 MG/DL — HIGH (ref 70–99)
GLUCOSE BLDC GLUCOMTR-MCNC: 159 MG/DL — HIGH (ref 70–99)
GLUCOSE BLDC GLUCOMTR-MCNC: 161 MG/DL — HIGH (ref 70–99)

## 2025-07-01 PROCEDURE — 99233 SBSQ HOSP IP/OBS HIGH 50: CPT

## 2025-07-01 PROCEDURE — 99497 ADVNCD CARE PLAN 30 MIN: CPT | Mod: 25

## 2025-07-01 RX ORDER — LABETALOL HYDROCHLORIDE 200 MG/1
10 TABLET, FILM COATED ORAL ONCE
Refills: 0 | Status: COMPLETED | OUTPATIENT
Start: 2025-07-01 | End: 2025-07-01

## 2025-07-01 RX ORDER — HYDROMORPHONE/SOD CHLOR,ISO/PF 2 MG/10 ML
0.2 SYRINGE (ML) INJECTION EVERY 4 HOURS
Refills: 0 | Status: DISCONTINUED | OUTPATIENT
Start: 2025-07-01 | End: 2025-07-02

## 2025-07-01 RX ADMIN — Medication 0.2 MILLIGRAM(S): at 09:23

## 2025-07-01 RX ADMIN — Medication 0.2 MILLIGRAM(S): at 08:53

## 2025-07-01 RX ADMIN — FUROSEMIDE 40 MILLIGRAM(S): 10 INJECTION INTRAMUSCULAR; INTRAVENOUS at 14:59

## 2025-07-01 RX ADMIN — METOPROLOL SUCCINATE 5 MILLIGRAM(S): 50 TABLET, EXTENDED RELEASE ORAL at 05:53

## 2025-07-01 RX ADMIN — Medication 40 MILLIGRAM(S): at 12:12

## 2025-07-01 RX ADMIN — Medication 0.2 MILLIGRAM(S): at 05:52

## 2025-07-01 RX ADMIN — FUROSEMIDE 40 MILLIGRAM(S): 10 INJECTION INTRAMUSCULAR; INTRAVENOUS at 05:52

## 2025-07-01 RX ADMIN — METOPROLOL SUCCINATE 5 MILLIGRAM(S): 50 TABLET, EXTENDED RELEASE ORAL at 12:12

## 2025-07-01 RX ADMIN — Medication 0.5 MILLIGRAM(S): at 14:58

## 2025-07-01 RX ADMIN — Medication 0.2 MILLIGRAM(S): at 12:07

## 2025-07-01 RX ADMIN — Medication 0.2 MILLIGRAM(S): at 21:18

## 2025-07-01 RX ADMIN — Medication 100 MILLIGRAM(S): at 12:12

## 2025-07-01 RX ADMIN — LABETALOL HYDROCHLORIDE 10 MILLIGRAM(S): 200 TABLET, FILM COATED ORAL at 14:58

## 2025-07-01 RX ADMIN — INSULIN LISPRO 1: 100 INJECTION, SOLUTION INTRAVENOUS; SUBCUTANEOUS at 12:39

## 2025-07-01 RX ADMIN — METOPROLOL SUCCINATE 5 MILLIGRAM(S): 50 TABLET, EXTENDED RELEASE ORAL at 18:02

## 2025-07-01 RX ADMIN — Medication 0.2 MILLIGRAM(S): at 12:37

## 2025-07-01 RX ADMIN — Medication 0.2 MILLIGRAM(S): at 01:26

## 2025-07-01 RX ADMIN — Medication 0.2 MILLIGRAM(S): at 20:48

## 2025-07-01 RX ADMIN — FOLIC ACID 1 MILLIGRAM(S): 1 TABLET ORAL at 12:12

## 2025-07-01 RX ADMIN — Medication 0.5 MILLIGRAM(S): at 15:30

## 2025-07-01 RX ADMIN — POTASSIUM CHLORIDE, DEXTROSE MONOHYDRATE AND SODIUM CHLORIDE 30 MILLILITER(S): 150; 5; 900 INJECTION, SOLUTION INTRAVENOUS at 12:39

## 2025-07-01 NOTE — PROGRESS NOTE ADULT - ASSESSMENT
Pt is an 85M with HTN, HLD, DM2, prostate cancer s/p prostatectomy, CKD, TIA with a pacemaker who presents transferred from [place] for further evaluation of low back pain. The pain began when he was going to the bathroom and worsened when he was in bed and rolled over. The pain is worse when he is walking or moving. He has never had back pain like this before. Patient normally ambulates with a walker or with a cane. Of note, patient endorses a fall in February where he fell into a pile of snow. Patient denies any injury after that event and did not have any imaging at that time. He usually has one BM every 3-4 days and this has not changed. Wakes up 2-3x/night to urinate. No episodes of bladder or bowel incontinence. He says he has diabetic neuropathy and has seen pain management. Says he has tried gabapentin but it made his legs very weak and he almost fell. At [place], CT showed multiple lumbar compression deformities. MRI was unable to be performed 2/2 ppm compatibility. He was transferred to [facility] for MRI and orthopedic eval.

## 2025-07-01 NOTE — PROVIDER CONTACT NOTE (OTHER) - BACKGROUND
Pt is an 85M with HTN, HLD, DM2, prostate cancer s/p prostatectomy, CKD, TIA with a pacemaker who presents transferred from [place] for further evaluation of low back pain . S/P Cholecystectomy
Pt is an 85M with HTN, HLD, DM2, prostate cancer s/p prostatectomy, CKD, TIA with a pacemaker who presents transferred from [place] for further evaluation of low back pain . S/P Cholecystectomy
pt. s/p helen iniguez
transferred from [place] for MRI back and ortho eval. received a pacemaker lead extraction yesterday.
85 year old male admitted for compression fracture. PMH of prostate cancer, HTN, TIA.
85 year old male admitted for compression fracture. PMH of prostate cancer, HTN, TIA.
85M with pmhx of prostate cancer transferred from [place] for MRI back and ortho eval
pt admit dx of wedge compression fracture pf first lumbar vertebra... PMHx of prostate CA, TIA, DM...
pt admit dx of wedge compression fracture pf first lumbar vertebra... PMHx of prostate CA, TIA, DM...
pt received additional dose of IV pain medication immediately prior to procedure.
s/p PPM placement
85 year old male admitted with compression fraction. PMH of TIA, DM, HLD.
patient s/p cholecystectomy from 6/5/25, 4 surgical sites noted but 2 from the right site are oozing
Pt on 5L NC on .
85 year old male admitted for compression fracture. PMH of prostate cancer, HTN, TIA.
Pt w/ hx of diabetes, received full 28u Lantus last night according to EMAR. On report from 4N RN pt's fingerstick was 91 at 6am.
lap geetha, pace maker removal
s/p lap geetha/ fluid retention/JEROME
pt. s/p helen iniguez

## 2025-07-01 NOTE — PROGRESS NOTE ADULT - PROBLEM SELECTOR PLAN 2
- back pain 2/2 cauda equina   - CT L spine- Mild loss of height of the L4 and L5 vertebral bodies with associated   edema compatible and erosions. FINDINGS suspicious for osteomyelitis and   discitis. Correlate with ESR/CRP. Edema in the bilateral paraspinal musculature, right psoas musculature,   and presacral space may be infectious/inflammatory. Severe spinal canal stenosis at L3-L4 and L4-L5 with impingement of the cauda equina at these levels.  - Was unable to have surgery due to multiple medical issues   - PRN use in past 24 hours from 8 AM to 8 AM: IV dilaudid 0.2 mg x 3 doses   - c/w acetaminophen   IVPB .. 1000 milliGRAM(s) IV Intermittent every 6 hours PRN Mild Pain (1 - 3)  HYDROmorphone  Injectable 0.2 milliGRAM(s) IV Push every 4 hours PRN Moderate Pain (4 - 6)  HYDROmorphone  Injectable 0.5 milliGRAM(s) IV Push every 4 hours PRN Severe Pain (7 - 10). - back pain 2/2 cauda equina   - CT L spine- Mild loss of height of the L4 and L5 vertebral bodies with associated   edema compatible and erosions. FINDINGS suspicious for osteomyelitis and   discitis. Correlate with ESR/CRP. Edema in the bilateral paraspinal musculature, right psoas musculature,   and presacral space may be infectious/inflammatory. Severe spinal canal stenosis at L3-L4 and L4-L5 with impingement of the cauda equina at these levels.  - Was unable to have surgery due to multiple medical issues   - PRN use in past 24 hours from 8 AM to 8 AM: IV dilaudid 0.2 mg x 5 doses   - c/w acetaminophen   IVPB .. 1000 milliGRAM(s) IV Intermittent every 6 hours PRN Mild Pain (1 - 3)  HYDROmorphone  Injectable 0.2 milliGRAM(s) IV Push every 4 hours PRN Moderate Pain (4 - 6)  HYDROmorphone  Injectable 0.5 milliGRAM(s) IV Push every 4 hours PRN Severe Pain (7 - 10).

## 2025-07-01 NOTE — PROGRESS NOTE ADULT - SUBJECTIVE AND OBJECTIVE BOX
TEAM [ B ] Surgery Daily Progress Note  =====================================================    SUBJECTIVE: Patient seen and examined at bedside on AM rounds. Patient without complaints. Permissive comfort feeds per GOC, denies nausea, vomiting. Pt pending transfer for hospice.    ALLERGIES:  gabapentin (Other)      --------------------------------------------------------------------------------------    MEDICATIONS:    Neurologic Medications  acetaminophen   IVPB .. 1000 milliGRAM(s) IV Intermittent every 6 hours PRN Mild Pain (1 - 3)  aspirin Suppository 300 milliGRAM(s) Rectal daily  HYDROmorphone  Injectable 0.2 milliGRAM(s) IV Push every 4 hours PRN Moderate Pain (4 - 6)  HYDROmorphone  Injectable 0.2 milliGRAM(s) IV Push every 4 hours PRN For breakthrough pain  HYDROmorphone  Injectable 0.5 milliGRAM(s) IV Push every 4 hours PRN Dyspnea  QUEtiapine 12.5 milliGRAM(s) Oral at bedtime PRN anxiety    Respiratory Medications  albuterol    90 MICROgram(s) HFA Inhaler 2 Puff(s) Inhalation every 6 hours  albuterol/ipratropium for Nebulization 3 milliLiter(s) Nebulizer every 6 hours  dornase mayda Solution 2.5 milliGRAM(s) Inhalation daily  sodium chloride 3%  Inhalation 4 milliLiter(s) Inhalation every 6 hours    Cardiovascular Medications  furosemide   Injectable 40 milliGRAM(s) IV Push two times a day  metoprolol tartrate Injectable 5 milliGRAM(s) IV Push every 6 hours    Gastrointestinal Medications  dextrose 5% + sodium chloride 0.45% with potassium chloride 20 mEq/L 1000 milliLiter(s) IV Continuous <Continuous>  folic acid Injectable 1 milliGRAM(s) IV Push daily  glycopyrrolate Injectable 0.4 milliGRAM(s) IV Push every 6 hours PRN Secretions  pantoprazole  Injectable 40 milliGRAM(s) IV Push daily  thiamine Injectable 100 milliGRAM(s) IV Push daily    Genitourinary Medications    Hematologic/Oncologic Medications    Antimicrobial/Immunologic Medications    Endocrine/Metabolic Medications  insulin glargine Injectable (LANTUS) 5 Unit(s) SubCutaneous at bedtime  insulin lispro (ADMELOG) corrective regimen sliding scale   SubCutaneous every 6 hours  levothyroxine Injectable 112 MICROGram(s) IV Push at bedtime    Topical/Other Medications  lidocaine   4% Patch 1 Patch Transdermal daily    --------------------------------------------------------------------------------------    VITAL SIGNS:  T(C): 37.1 (07-01-25 @ 07:50), Max: 37.1 (07-01-25 @ 07:50)  HR: 72 (07-01-25 @ 07:50) (72 - 75)  BP: 129/51 (07-01-25 @ 07:50) (129/51 - 184/50)  RR: 20 (07-01-25 @ 07:50) (18 - 20)  SpO2: 90% (07-01-25 @ 07:50) (75% - 98%)  --------------------------------------------------------------------------------------    EXAM    General: NAD, resting in bed comfortably.  Cardiac: regular rate, warm and well perfused  Respiratory: On NC, cough, copious secretions  Abdomen: soft, nontender, nondistended.     --------------------------------------------------------------------------------------    LABS  No longer getting labs  --------------------------------------------------------------------------------------    INS AND OUTS:    06-30-25 @ 07:01 - 07-01-25 @ 07:00  --------------------------------------------------------  IN: 820 mL / OUT: 1875 mL / NET: -1055 mL    07-01-25 @ 07:01 - 07-01-25 @ 11:06  --------------------------------------------------------  IN: 30 mL / OUT: 700 mL / NET: -670 mL      --------------------------------------------------------------------------------------

## 2025-07-01 NOTE — PROGRESS NOTE ADULT - ASSESSMENT
85 M w/ multiple chronic medical comorbidities including hypertension, sinus node dysfunction, type two diabetes, and chronic kidney disease (baseline creatinine 1.5-2.0) presently admitted for lower back pain associated with lumbar compression deformities with concern for cauda equina impingement. He was found to be bacteremic (E faecalis) due to acute acalculous cholecystitis, s/p removal of pacemaker, and is now seen following laparoscopic cholecystectomy 6/5/2025. POD 1 patient became hypotensive, unresponsive to 2L fluid resuscitation and 3 u pRBC and was started on vasopressors and transferred to SICU. Patient weaned off pressors, course c/b ileus now resolved, NGT removed 6/10. Midline placed 6/12 for IV abx, patient now with ileus again ct scanned 6/16 found to have collection in gb fossa, drained by IR on 6/17 patient was a rapid for hypotension and hypoxia requiring sicu admission, patient diuresed with lasix gtt and diamox now improved and patient was transferred to the floor 6/24. IR drain removed, now pending hopsice placement.     Plan:   - continue 40mg bid lasix for diuresis -  f/u cardiology recs  - DVT ppx: sqh  - pain control  - failed s/s and cineesophagogram with aspiration, pleasure feeds per GOC   - will be d/c'd with wong (hx cauda equina)   - no plan for surgical enteral access per GOC   - palliative eval appreciated, focus on symptom directed management for dyspnea and secretions  - dispo: hospice   - protected sleep     B team   [id number]     11

## 2025-07-01 NOTE — PROGRESS NOTE ADULT - PROBLEM SELECTOR PLAN 1
- initially presented for back pain but hospital course complicated by multiple medical issues including bacteremia,  acalculous cholecystitis s/p lap choli , PPM replacement, septic shock in SICU   - CXR 6/23- The lungs show partial clearing of the right base with progression of lower left lung infiltrate. Small left pleural effusion is present and there is no evidence of pneumothorax nor right pleural effusion.  - TTE EF 63%   - On NC, oxygen support fluctuating   - Appreciate pulm eval  - S&S showing severe dysphagia, aspiration, NPO   - PRN use in past 24 hours from 8 AM to 8 AM: IV dilaudid 0.5 mg x 2 doses   - c/w IV dilaudid 0.5 mg q4h PRN for dyspnea  - c/w IV robinul 0.4 mg q6h PRN for secretions - initially presented for back pain but hospital course complicated by multiple medical issues including bacteremia,  acalculous cholecystitis s/p lap choli , PPM replacement, septic shock in SICU   - CXR 6/23- The lungs show partial clearing of the right base with progression of lower left lung infiltrate. Small left pleural effusion is present and there is no evidence of pneumothorax nor right pleural effusion.  - TTE EF 63%   - On NC, oxygen support fluctuating   - Appreciate pulm eval  - S&S showing severe dysphagia, aspiration, NPO   - PRN use in past 24 hours from 8 AM to 8 AM: none   - c/w IV dilaudid 0.5 mg q4h PRN for dyspnea  - c/w IV robinul 0.4 mg q6h PRN for secretions

## 2025-07-01 NOTE — ADVANCED PRACTICE NURSE CONSULT - RECOMMEDATIONS
Topical Recommendations    Sacrum to Bl buttocks: Cleanse with skin cleanser, pat dry. Apply silicone foam to boniest prominence only. Once adhered, apply TRIAD paste twice a day and PRN with incontinent episodes. With episodes of incontinence only remove soiled layer of Triad, then reinforce with thin layer. Change foam daily and PRN if soiled.     While inpatient, Continue low air loss bed therapy, continue heel elevation, continue to turn & reposition per protocol, soft pillow between bony prominences, continue moisture management with barrier creams & single breathable pad, continue measures to decrease friction/shear/pressure. Continue with nutritional support as per dietary/orders.    Plan of care discussed with primary RN Vivi, patient and daughters, all questions answered.    Please contact Wound Care Service Line if we can be of further assistance (ext [phone]).

## 2025-07-01 NOTE — PROGRESS NOTE ADULT - SUBJECTIVE AND OBJECTIVE BOX
Timothy Mojica MD  Interventional Cardiology / Advance Heart Failure and Cardiac Transplant Specialist  [place] Office : [phone] [place] [address] [place] [address]  Tel:   [place] Office : [phone] [facility] [place] [address]  Tel: [phone]       Pt is lying in bed NAD  	  MEDICATIONS:  furosemide   Injectable 40 milliGRAM(s) IV Push two times a day  metoprolol tartrate Injectable 5 milliGRAM(s) IV Push every 6 hours      albuterol    90 MICROgram(s) HFA Inhaler 2 Puff(s) Inhalation every 6 hours  albuterol/ipratropium for Nebulization 3 milliLiter(s) Nebulizer every 6 hours  dornase mayda Solution 2.5 milliGRAM(s) Inhalation daily  sodium chloride 3%  Inhalation 4 milliLiter(s) Inhalation every 6 hours    acetaminophen   IVPB .. 1000 milliGRAM(s) IV Intermittent every 6 hours PRN  HYDROmorphone  Injectable 0.2 milliGRAM(s) IV Push every 4 hours PRN  HYDROmorphone  Injectable 0.2 milliGRAM(s) IV Push every 4 hours PRN  HYDROmorphone  Injectable 0.5 milliGRAM(s) IV Push every 4 hours PRN  QUEtiapine 12.5 milliGRAM(s) Oral at bedtime PRN    glycopyrrolate Injectable 0.4 milliGRAM(s) IV Push every 6 hours PRN  pantoprazole  Injectable 40 milliGRAM(s) IV Push daily    insulin glargine Injectable (LANTUS) 5 Unit(s) SubCutaneous at bedtime  insulin lispro (ADMELOG) corrective regimen sliding scale   SubCutaneous every 6 hours  levothyroxine Injectable 112 MICROGram(s) IV Push at bedtime    folic acid Injectable 1 milliGRAM(s) IV Push daily  lidocaine   4% Patch 1 Patch Transdermal daily  thiamine Injectable 100 milliGRAM(s) IV Push daily      PAST MEDICAL/SURGICAL HISTORY  PAST MEDICAL & SURGICAL HISTORY:  HTN (hypertension)      HLD (hyperlipidemia)      DM (diabetes mellitus)      TIA (transient ischemic attack)      Prostate cancer      S/P prostatectomy      Pacemaker      H/O thyroidectomy          SOCIAL HISTORY: Substance Use (street drugs): ( x ) never used  (  ) other:    FAMILY HISTORY:  FHx: heart disease (Father, Mother)       PHYSICAL EXAM:  T(C): 36.4 (07-01-25 @ 12:10), Max: 37.1 (07-01-25 @ 07:50)  HR: 69 (07-01-25 @ 12:10) (69 - 75)  BP: 183/40 (07-01-25 @ 12:10) (129/51 - 184/50)  RR: 18 (07-01-25 @ 12:10) (18 - 20)  SpO2: 100% (07-01-25 @ 12:10) (75% - 100%)  Wt(kg): --  I&O's Summary    30 Jun 2025 07:01  -  01 Jul 2025 07:00  --------------------------------------------------------  IN: 820 mL / OUT: 1875 mL / NET: -1055 mL    01 Jul 2025 07:01  -  01 Jul 2025 16:16  --------------------------------------------------------  IN: 150 mL / OUT: 1200 mL / NET: -1050 mL      GENERAL: NAD  EYES:  EOMI  ENMT: NGT Moist mucous membranes  Cardiovascular: Normal S1 S2, No JVD, No murmurs, +b/l LUE edema L>R, LE edema  Respiratory: diminished	  Gastrointestinal:  Soft, Non-tender, + BS	  Extremities: b/l UE, LE edema improving           proBNP:   Lipid Profile:   HgA1c:   TSH:     Consultant(s) Notes Reviewed:  [x ] YES  [ ] NO    Care Discussed with Consultants/Other Providers [ x] YES  [ ] NO    Imaging Personally Reviewed independently:  [x] YES  [ ] NO    All labs, radiologic studies, vitals, orders and medications list reviewed. Patient is seen and examined at bedside. Case discussed with medical team.

## 2025-07-01 NOTE — ADVANCED PRACTICE NURSE CONSULT - REASON FOR CONSULT
Patient known to [facility] service line last seen on 6/9, seen for re-evaluation of the sacrum, acute skin failure injury.     At this time, patient and daughters at bedside are refusing assessment. As per daughters, patient is pending palliative care, and their main focus and concern is his comfort. Education provided, wishes respected at this time. Encouraged daughters and patient to contact primary RN to contact [facility] service line if amendable to assessment. Patient and family verbalizes understanding.    Due to patient's overall hospital course progression with goal of discharge to palliative/hospice, can consider wound to be acute skin failure now c/b SCALEs.

## 2025-07-01 NOTE — PROVIDER CONTACT NOTE (OTHER) - ACTION/TREATMENT ORDERED:
Provider said it is ok for him to refuse nasal canula and will come see patient and family to clarify if they want pt to stay on tele and  monitoring

## 2025-07-01 NOTE — PROGRESS NOTE ADULT - NSPROGADDITIONALINFOA_GEN_ALL_CORE

## 2025-07-01 NOTE — PROVIDER CONTACT NOTE (OTHER) - RECOMMENDATIONS
make provider aware. bedside assessment.
medications
assess arm
per provider
Right arm VA Duplex  Elevated Right Arm
medication to control BP, IV patient unable to take po d/t persistent nausea and vomiting
per provider
EKG
Provider made aware. recommend pt to be straight cath.
12.5gm IVP dextrose 50% ordered as pt needs to remain NPO for procedure.
Provider made aware
Provider to bedside to assess patient.
pain medication
PPM turned back off MRI safety mode by rep; patient brought back to unit.  attempt MRI again another day? will have to re-address with patient if willing to attempt again.
bedside TTE
Manual B/P, IV fluid bolus Lactated Ringers
Provider made aware.
provider made aware and informed of extra gauze in place

## 2025-07-01 NOTE — PROGRESS NOTE ADULT - CONVERSATION DETAILS
Palliative consulted for complex medical decision making in the setting of serious illness.     Elicited family’s understanding of patient’s illness and prognosis. Patient had prolonged hospitalization >1 month and now with persistent hypoxia. Patient had barium swallow today, which shows severe dysphagia and aspiration. Patient maintains he does not want artificial feeding tube.     Explored family’s values and goals. Family's goals are to honor their father's wishes and want to ensure he is comfortable. They want symptom control and would not want escalation of care such as placing uncomfortable oxygen masks such as HFNC or bipap. They are interested in pursuing hospice care at the facility they have original chosen for rehab, stating the facility also has hospice care. They would not want pleasure feeds as they worry it will worsen his symptoms.     Introduced Advance Care Planning to discuss future medical decisions and treatment options in the event patient has further deterioration in condition and Advanced Directives such as MOLST to discuss specific preferences for Life Sustaining Treatments in the future. Patient already DNR/DNI, no feeding tube.     Discussed option of comfort care to prioritize end of life symptom management with deescalation/discontinuation of other interventions which can include blood draws, imaging, other diagnostic investigations, vitals monitoring.
Followed up with family. Family understands patient has limited time due to acute hypoxemic respiratory failure, minimal PO intake with comfort feeds. Daughters stated they did not want to continue life prolonging measures, naming aspirin. Patient also still receiving IVF.     Discussed Advance Care Planning to discuss future medical decisions and treatment options in the event patient has further deterioration in condition and Advanced Directives such as MOLST to discuss specific preferences for Life Sustaining Treatments in the future.     Discussed option of comfort care to prioritize end of life symptom management with deescalation/discontinuation of other interventions which can include blood draws, imaging, other diagnostic investigations, vitals monitoring, IVF. They agreed to comfort care. New MOLST done for DNR/DNI and comfort care. Family has been interested in a specific facility for end of life care that would be a good location for them in [place].

## 2025-07-01 NOTE — PROVIDER CONTACT NOTE (OTHER) - ASSESSMENT
NAD noted, Pt. denies chest pain or SOB
Oxygen saturation 50-60% on room air, Hr 77, Pt refusing nasal canula
Patient alert and oriented x4, abdomen distended, wong was removed in morning, did not void in 8hrs. bladder scan showed 702 cc urine retained.
RN with patient at MRI secondary to PPM.  asked patient if team would offer additional pain medication if he would be willing to continue to test; stated no he is not going back in there.
A&Ox4. Breathing on room air. c/o 6/10 right sided chest pain radiating to mid chest. states sharp like pain.
Pt A&Ox4, Right arm noted with +3 edema, ROM intact, capillary refil <3sec, radial pulses +2 bilaterally
abdomen soft, tender midline lower abdomen
Asymptomatic
pt unable to go for TTE because of back pain. pt c/o severe pain yesterday when being moved from stretcher to bed.
Alert and oriented x 4, denies weakness, no dizziness. S/P Cholecystectomy.  Slight bloody drainage noted to surgical sites.
Pt A&Ox4. Pedal pulses in b/l feet. Pt denies n/t. Extremities warm to touch. Pt able to move toes and feet.
alert and oriented. patient states he feels terrible and is very nauseous. NSR on telemetry monitoring.
vital stable as document, patient was not aware of the event, no distress noted
Alert and oriented. Breathing on 2Lnc. NSR on telemetry monitoring. s/p PPM extraction 5/16.
NAD noted, pt. agitated and does not want oxygen
pt c/o severe pain of back
Alert and oriented. c/o nausea and vomiting. states 10/10 back pain. Breathing on 2Lnc. NSR on telemetry monitoring.
Blood glucose checked at 11:20 am is 73, pt ready to go for mauricio, held and discussed w/ Dr. Mcghee.
Pt oxygen sats dropped into low 80s. Pt has respiratory secretions and productive cough. Pt encouraged to deep breath with incentive spirometer and to cough.

## 2025-07-01 NOTE — PROGRESS NOTE ADULT - SUBJECTIVE AND OBJECTIVE BOX
Indication of Geriatrics and Palliative Medicine Services:  [X  ] Complex Medical Decision Making   [X  ] Symptom/Pain management     DNR on chart: Yes     INTERVAL EVENTS: Patient seen this AM with daughters at bedside.     -------------------------------------------------------------------------------------------------------    PRESENT SYMPTOMS:     [ ] Unable to self-report      [ ] PAINADS     [ ] RDOS    [ ] No     [X ] Yes     Source if other than patient:  [ ]Family   [ ]Team     PAIN:   If blank, patient unable to specify     [X ]yes [ ]no    1. Location- Back   2. Radiation-  Legs   3. Quality- Sharp   4. Timing- On and off   5. Minimal acceptable level/pain goal- 3/10   6. Aggravating factors- Movement   7. QOL impact- Moderate     SYMPTOMS:   Dyspnea:                           [ ]Mild [ ]Moderate [ ]Severe  Anxiety:                             [ ]Mild [ ]Moderate [ ]Severe  Fatigue:                             [ ]Mild [ ]Moderate [ ]Severe  Nausea/Vomiting:              [ ]Mild [ ]Moderate [ ]Severe  Loss of appetite:                [ ]Mild [ ]Moderate [ ]Severe  Constipation:                     [ ]Mild [ ]Moderate [ ]Severe    Other Symptoms:  [X ]All other review of systems negative     -------------------------------------------------------------------------------------------------------    I STOP: [id number]    -------------------------------------------------------------------------------------------------------    ITEMS UNCHECKED ARE NOT PRESENT    PHYSICAL:  Vital Signs Last 24 Hrs  T(C): 37.1 (01 Jul 2025 07:50), Max: 37.1 (01 Jul 2025 07:50)  T(F): 98.7 (01 Jul 2025 07:50), Max: 98.7 (01 Jul 2025 07:50)  HR: 72 (01 Jul 2025 07:50) (72 - 75)  BP: 129/51 (01 Jul 2025 07:50) (129/51 - 184/50)  BP(mean): --  RR: 20 (01 Jul 2025 07:50) (18 - 20)  SpO2: 90% (01 Jul 2025 07:50) (75% - 98%)    Parameters below as of 01 Jul 2025 08:53  Patient On (Oxygen Delivery Method): nasal cannula        GENERAL:  [ ]Cachexia  [X ] Frail  [X ]Awake  [X ]Oriented   [ ]Lethargic  [ ]Unarousable  [ ]Verbal  [ ]Non-Verbal    BEHAVIORAL:   [ ] Anxiety  [ ] Delirium [ ] Agitation [ ] Other    HEENT:   [X ]Normal   [ ]Dry mouth   [ ]ET Tube/Trach  [ ]Oral lesions    PULMONARY:   [ ]Clear              [ ]Tachypnea  [X ]Audible excessive secretions   [ ]Rhonchi        [ ]Right [ ]Left [ ]Bilateral  [ ]Crackles        [ ]Right [ ]Left [ ]Bilateral  [ ]Wheezing     [ ]Right [ ]Left [ ]Bilateral  [X ]Diminished breath sounds [ ]right [ ]left [X ]bilateral    CARDIOVASCULAR:    [X ]Regular [ ]Irregular [ ]Tachy  [ ]Jurgen [ ]Murmur [ ]Other    GASTROINTESTINAL:  [X ]Soft  [ ]Distended   [ ]+BS  [X ]Non tender [ ]Tender  [ ]Other [ ]PEG [ ]OGT/ NGT      GENITOURINARY:  [ ]Normal [ ] Incontinent   [ ]Oliguria/Anuria   [X ]Frye    MUSCULOSKELETAL:   [ ]Normal   [ ]Weakness  [ ]Bed/Wheelchair bound [X ]Edema    NEUROLOGIC:   [X ]No focal deficits  [ ]Cognitive impairment  [ ]Dysphagia [ ]Dysarthria [ ]Paresis [ ]Other     SKIN:   [X ]Normal  [ ]Rash  [ ]Other  [ ]Pressure ulcer(s)       Present on admission [ ]y [ ]n    -------------------------------------------------------------------------------------------------------    LABS:             -------------------------------------------------------------------------------------------------------    CRITICAL CARE:  [ ]Shock Present  [ ]Septic [ ]Cardiogenic [ ]Neurologic [ ]Hypovolemic [ ]Undifferentiated    [ ]Vasopressors [ ]Inotropes    [ ]Respiratory failure present [ ]Acute  [ ]Chronic [ ]Hypoxic  [ ]Hypercarbic [ ]Mixed   [ ]Mechanical Ventilation  [ ]Trach collar   [ ]Non-invasive ventilatory support   [ ]High-Flow   [ ]Oxygen mask/venti     [ ]Other organ failure     -------------------------------------------------------------------------------------------------------    RADIOLOGY & ADDITIONAL STUDIES:     < from: Xray Chest 1 View- PORTABLE-Routine (Xray Chest 1 View- PORTABLE-Routine in AM.) (06.23.25 @ 01:59) >    Frontal expiratory view of the chest shows the heart to be similar in   size.    The lungs show partial clearing of the right base with progression of   lower left lung infiltrate. Small left pleural effusion is present and   there is no evidence of pneumothorax nor right pleural effusion.    IMPRESSION:  Progression of left infiltrate/effusion.    < end of copied text >    -------------------------------------------------------------------------------------------------------  MEDICATIONS:     MEDICATIONS  (STANDING):  albuterol    90 MICROgram(s) HFA Inhaler 2 Puff(s) Inhalation every 6 hours  albuterol/ipratropium for Nebulization 3 milliLiter(s) Nebulizer every 6 hours  aspirin Suppository 300 milliGRAM(s) Rectal daily  dextrose 5% + sodium chloride 0.45% with potassium chloride 20 mEq/L 1000 milliLiter(s) (30 mL/Hr) IV Continuous <Continuous>  dornase mayda Solution 2.5 milliGRAM(s) Inhalation daily  folic acid Injectable 1 milliGRAM(s) IV Push daily  furosemide   Injectable 40 milliGRAM(s) IV Push two times a day  insulin glargine Injectable (LANTUS) 5 Unit(s) SubCutaneous at bedtime  insulin lispro (ADMELOG) corrective regimen sliding scale   SubCutaneous every 6 hours  levothyroxine Injectable 112 MICROGram(s) IV Push at bedtime  lidocaine   4% Patch 1 Patch Transdermal daily  metoprolol tartrate Injectable 5 milliGRAM(s) IV Push every 6 hours  pantoprazole  Injectable 40 milliGRAM(s) IV Push daily  sodium chloride 3%  Inhalation 4 milliLiter(s) Inhalation every 6 hours  thiamine Injectable 100 milliGRAM(s) IV Push daily    MEDICATIONS  (PRN):  acetaminophen   IVPB .. 1000 milliGRAM(s) IV Intermittent every 6 hours PRN Mild Pain (1 - 3)  glycopyrrolate Injectable 0.4 milliGRAM(s) IV Push every 6 hours PRN Secretions  HYDROmorphone  Injectable 0.2 milliGRAM(s) IV Push every 4 hours PRN Moderate Pain (4 - 6)  HYDROmorphone  Injectable 0.2 milliGRAM(s) IV Push every 4 hours PRN For breakthrough pain  HYDROmorphone  Injectable 0.5 milliGRAM(s) IV Push every 4 hours PRN Dyspnea  QUEtiapine 12.5 milliGRAM(s) Oral at bedtime PRN anxiety           Indication of Geriatrics and Palliative Medicine Services:  [X  ] Complex Medical Decision Making   [X  ] Symptom/Pain management     DNR on chart: Yes     INTERVAL EVENTS: Patient seen this AM with daughters at bedside. Patient comfortable in no distress. Waiting for authorization to go to facility. Daughter states they don't want patient to receive life sustaining treatments, bringing up rectal ASA, does not see utility of it and uncomfortable to give.     -------------------------------------------------------------------------------------------------------    PRESENT SYMPTOMS:     [ ] Unable to self-report      [ ] PAINADS     [ ] RDOS    [ ] No     [X ] Yes     Source if other than patient:  [ ]Family   [ ]Team     PAIN:   If blank, patient unable to specify     [X ]yes [ ]no    1. Location- Back   2. Radiation-  Legs   3. Quality- Sharp   4. Timing- On and off   5. Minimal acceptable level/pain goal- 3/10   6. Aggravating factors- Movement   7. QOL impact- Moderate     SYMPTOMS:   Dyspnea:                           [ ]Mild [ ]Moderate [ ]Severe  Anxiety:                             [ ]Mild [ ]Moderate [ ]Severe  Fatigue:                             [ ]Mild [ ]Moderate [ ]Severe  Nausea/Vomiting:              [ ]Mild [ ]Moderate [ ]Severe  Loss of appetite:                [ ]Mild [ ]Moderate [ ]Severe  Constipation:                     [ ]Mild [ ]Moderate [ ]Severe    Other Symptoms:  [X ]All other review of systems negative     -------------------------------------------------------------------------------------------------------    I STOP: [id number]    -------------------------------------------------------------------------------------------------------    ITEMS UNCHECKED ARE NOT PRESENT    PHYSICAL:  Vital Signs Last 24 Hrs  T(C): 37.1 (01 Jul 2025 07:50), Max: 37.1 (01 Jul 2025 07:50)  T(F): 98.7 (01 Jul 2025 07:50), Max: 98.7 (01 Jul 2025 07:50)  HR: 72 (01 Jul 2025 07:50) (72 - 75)  BP: 129/51 (01 Jul 2025 07:50) (129/51 - 184/50)  BP(mean): --  RR: 20 (01 Jul 2025 07:50) (18 - 20)  SpO2: 90% (01 Jul 2025 07:50) (75% - 98%)    Parameters below as of 01 Jul 2025 08:53  Patient On (Oxygen Delivery Method): nasal cannula        GENERAL:  [ ]Cachexia  [X ] Frail  [X ]Awake  [X ]Oriented   [ ]Lethargic  [ ]Unarousable  [ ]Verbal  [ ]Non-Verbal    BEHAVIORAL:   [ ] Anxiety  [ ] Delirium [ ] Agitation [ ] Other    HEENT:   [X ]Normal   [ ]Dry mouth   [ ]ET Tube/Trach  [ ]Oral lesions    PULMONARY:   [ ]Clear              [ ]Tachypnea  [X ]Audible excessive secretions   [ ]Rhonchi        [ ]Right [ ]Left [ ]Bilateral  [ ]Crackles        [ ]Right [ ]Left [ ]Bilateral  [ ]Wheezing     [ ]Right [ ]Left [ ]Bilateral  [X ]Diminished breath sounds [ ]right [ ]left [X ]bilateral    CARDIOVASCULAR:    [X ]Regular [ ]Irregular [ ]Tachy  [ ]Jurgen [ ]Murmur [ ]Other    GASTROINTESTINAL:  [X ]Soft  [ ]Distended   [ ]+BS  [X ]Non tender [ ]Tender  [ ]Other [ ]PEG [ ]OGT/ NGT      GENITOURINARY:  [ ]Normal [ ] Incontinent   [ ]Oliguria/Anuria   [X ]Frye    MUSCULOSKELETAL:   [ ]Normal   [ ]Weakness  [ ]Bed/Wheelchair bound [X ]Edema    NEUROLOGIC:   [X ]No focal deficits  [ ]Cognitive impairment  [ ]Dysphagia [ ]Dysarthria [ ]Paresis [ ]Other     SKIN:   [X ]Normal  [ ]Rash  [ ]Other  [ ]Pressure ulcer(s)       Present on admission [ ]y [ ]n    -------------------------------------------------------------------------------------------------------    LABS:             -------------------------------------------------------------------------------------------------------    CRITICAL CARE:  [ ]Shock Present  [ ]Septic [ ]Cardiogenic [ ]Neurologic [ ]Hypovolemic [ ]Undifferentiated    [ ]Vasopressors [ ]Inotropes    [ ]Respiratory failure present [ ]Acute  [ ]Chronic [ ]Hypoxic  [ ]Hypercarbic [ ]Mixed   [ ]Mechanical Ventilation  [ ]Trach collar   [ ]Non-invasive ventilatory support   [ ]High-Flow   [ ]Oxygen mask/venti     [ ]Other organ failure     -------------------------------------------------------------------------------------------------------    RADIOLOGY & ADDITIONAL STUDIES:     < from: Xray Chest 1 View- PORTABLE-Routine (Xray Chest 1 View- PORTABLE-Routine in AM.) (06.23.25 @ 01:59) >    Frontal expiratory view of the chest shows the heart to be similar in   size.    The lungs show partial clearing of the right base with progression of   lower left lung infiltrate. Small left pleural effusion is present and   there is no evidence of pneumothorax nor right pleural effusion.    IMPRESSION:  Progression of left infiltrate/effusion.    < end of copied text >    -------------------------------------------------------------------------------------------------------  MEDICATIONS:     MEDICATIONS  (STANDING):  albuterol    90 MICROgram(s) HFA Inhaler 2 Puff(s) Inhalation every 6 hours  albuterol/ipratropium for Nebulization 3 milliLiter(s) Nebulizer every 6 hours  aspirin Suppository 300 milliGRAM(s) Rectal daily  dextrose 5% + sodium chloride 0.45% with potassium chloride 20 mEq/L 1000 milliLiter(s) (30 mL/Hr) IV Continuous <Continuous>  dornase mayda Solution 2.5 milliGRAM(s) Inhalation daily  folic acid Injectable 1 milliGRAM(s) IV Push daily  furosemide   Injectable 40 milliGRAM(s) IV Push two times a day  insulin glargine Injectable (LANTUS) 5 Unit(s) SubCutaneous at bedtime  insulin lispro (ADMELOG) corrective regimen sliding scale   SubCutaneous every 6 hours  levothyroxine Injectable 112 MICROGram(s) IV Push at bedtime  lidocaine   4% Patch 1 Patch Transdermal daily  metoprolol tartrate Injectable 5 milliGRAM(s) IV Push every 6 hours  pantoprazole  Injectable 40 milliGRAM(s) IV Push daily  sodium chloride 3%  Inhalation 4 milliLiter(s) Inhalation every 6 hours  thiamine Injectable 100 milliGRAM(s) IV Push daily    MEDICATIONS  (PRN):  acetaminophen   IVPB .. 1000 milliGRAM(s) IV Intermittent every 6 hours PRN Mild Pain (1 - 3)  glycopyrrolate Injectable 0.4 milliGRAM(s) IV Push every 6 hours PRN Secretions  HYDROmorphone  Injectable 0.2 milliGRAM(s) IV Push every 4 hours PRN Moderate Pain (4 - 6)  HYDROmorphone  Injectable 0.2 milliGRAM(s) IV Push every 4 hours PRN For breakthrough pain  HYDROmorphone  Injectable 0.5 milliGRAM(s) IV Push every 4 hours PRN Dyspnea  QUEtiapine 12.5 milliGRAM(s) Oral at bedtime PRN anxiety

## 2025-07-01 NOTE — PROVIDER CONTACT NOTE (OTHER) - DATE AND TIME:
01-Jul-2025 06:57
20-May-2025 11:55
01-Jul-2025 18:22
14-May-2025 08:37
22-May-2025 03:46
13-May-2025 16:00
13-May-2025 16:41
05-Jun-2025 09:20
17-May-2025 08:11
17-May-2025 08:28
01-Jul-2025 06:00
03-Jun-2025 18:55
05-Jun-2025 22:32
20-May-2025 02:19
26-Jun-2025 01:30
12-Jun-2025 19:00
15-Heriberto-2025 10:30
17-May-2025 01:22
17-May-2025 10:57

## 2025-07-01 NOTE — PROGRESS NOTE ADULT - PROBLEM SELECTOR PLAN 4
For GOC and symptom management   Pending transfer to facility, f/u SW     In the event of worsening symptoms, please contact the Palliative Medicine team via pager (if the patient is at [facility] #[phone] or if the patient is at [facility] #[phone]) The Geriatric and Palliative Medicine service has coverage 24 hours a day/ 7 days a week to provide medical recommendations regarding symptom management needs via telephone.

## 2025-07-01 NOTE — PROGRESS NOTE ADULT - ASSESSMENT
-EKG w/ SR, no significant STT changes   -ECHO w/ normal EF no significant valvular heart disease     A/P:  85M w/ HTN, HLD, T2DM, prostate CA, CKD, TIA and SND s/p PPM transferred here for lumbar spinal cord compression and bacteremia s/p PPM explant and implant of Micra PPM.    1. bacteremia  -s/p cholecystectomy  -c/w abx per ID, primary team  6/13 afebrile, continues on abx per ID  6/19 c/w abx per ID  6/24 c/w Ampicillin to 6/25 6/25 remains afebrile, t&t per ID  6//26 failed s/s eval, pending barium swallow    2. edema  -s/p leadless PPM w/ EP  6/16 recommend IV lasix 40mg daily for generalized edema  6/17 mild anarsaca, c/w diuresing lasix 40mg IV BID  6/30 c/w diuresing lasix 40mg IV BID  7/1 would recommend decreasing lasix to 40mg daily    3. HTN  -c/w clonidine, hydralazine, labetalol    4.  Pre-procedure clearance  6/17 pt optimized from cardiac srtandpoint and may proceed with cholecystostomy tube with moderate risk  6/18 s/p IR drain placement, remain hemodynamically stable    5. hypoxia  6/20 - admitted to SICU, CTA no PE; bipap, pulm toilet, diamox   6/23 - off bipap, continues with lasix IV 20mg q12 can increase to lasix IV 40mg BID sec to worsening LE edema , check K twice daily and replace  6/27 - reported with slow HR on pulse ox; placed on tele, telemetry and EKG reviewed, SR with PVCs, bigeminy (rate not detected on pulse ox), pt not bradycardic

## 2025-07-01 NOTE — PROGRESS NOTE ADULT - PROBLEM SELECTOR PLAN 3
- Patient and Family previously agreed to DNR/DNI and no feeding tube  - Patient maintains wish for no feeding tube - Patient and Family previously agreed to DNR/DNI and no feeding tube  - Patient maintains wish for no feeding tube  - Recommend primary team to f/u with family, who wish to deescalate certain medical interventions. Recommend to DC IVF, review other orders with family - Patient and Family previously agreed to DNR/DNI and no feeding tube  - Patient maintains wish for no feeding tube  - Recommend primary team to f/u with family, who wish to deescalate certain medical interventions. Recommend to DC IVF, review other orders with family  - 7/1- Family agreed to comfort care

## 2025-07-01 NOTE — PROVIDER CONTACT NOTE (OTHER) - NAME OF MD/NP/PA/DO NOTIFIED:
MD Favio Lares
MD Lili Dsouza
Kimi Bennett
MD Tahira Velasquez
Ana Willis
Hope Noble
Jacinto Rodriguez
Hope Noble
MD Reynolds, W
Sarah Major
MD Lili Dsouza
MD Raman Carmichael
Dr. Mcghee
Krystin Singh
MD Reynolds, W
Jacinto Rodriguez
Lili Dsouza
MD Raman Carmichael
MD Reynolds, W

## 2025-07-02 ENCOUNTER — TRANSCRIPTION ENCOUNTER (OUTPATIENT)
Age: 85
End: 2025-07-02

## 2025-07-02 VITALS
SYSTOLIC BLOOD PRESSURE: 165 MMHG | OXYGEN SATURATION: 93 % | RESPIRATION RATE: 18 BRPM | TEMPERATURE: 98 F | DIASTOLIC BLOOD PRESSURE: 56 MMHG | HEART RATE: 74 BPM

## 2025-07-02 LAB — GLUCOSE BLDC GLUCOMTR-MCNC: 131 MG/DL — HIGH (ref 70–99)

## 2025-07-02 PROCEDURE — 99233 SBSQ HOSP IP/OBS HIGH 50: CPT

## 2025-07-02 PROCEDURE — 99232 SBSQ HOSP IP/OBS MODERATE 35: CPT

## 2025-07-02 RX ORDER — PROPRANOLOL HCL 60 MG
1 TABLET ORAL
Refills: 0 | DISCHARGE

## 2025-07-02 RX ORDER — LORAZEPAM 4 MG/ML
0.2 VIAL (ML) INJECTION
Refills: 0 | Status: DISCONTINUED | OUTPATIENT
Start: 2025-07-02 | End: 2025-07-02

## 2025-07-02 RX ORDER — INSULIN GLARGINE-YFGN 100 [IU]/ML
24 INJECTION, SOLUTION SUBCUTANEOUS
Refills: 0 | DISCHARGE

## 2025-07-02 RX ORDER — ASPIRIN 325 MG
1 TABLET ORAL
Refills: 0 | DISCHARGE

## 2025-07-02 RX ORDER — ERGOCALCIFEROL 1.25 MG/1
1 CAPSULE ORAL
Refills: 0 | DISCHARGE

## 2025-07-02 RX ORDER — CHLORTHALIDONE 25 MG/1
1 TABLET ORAL
Refills: 0 | DISCHARGE

## 2025-07-02 RX ORDER — IPRATROPIUM BROMIDE AND ALBUTEROL SULFATE .5; 2.5 MG/3ML; MG/3ML
3 SOLUTION RESPIRATORY (INHALATION)
Qty: 0 | Refills: 0 | DISCHARGE
Start: 2025-07-02

## 2025-07-02 RX ORDER — ACETAMINOPHEN 500 MG/5ML
2 LIQUID (ML) ORAL
Qty: 0 | Refills: 0 | DISCHARGE

## 2025-07-02 RX ORDER — APIXABAN 2.5 MG/1
1 TABLET, FILM COATED ORAL
Refills: 0 | DISCHARGE

## 2025-07-02 RX ORDER — DORNASE ALFA 1 MG/ML
2.5 SOLUTION RESPIRATORY (INHALATION)
Qty: 0 | Refills: 0 | DISCHARGE
Start: 2025-07-02

## 2025-07-02 RX ORDER — LINAGLIPTIN 5 MG/1
1 TABLET, FILM COATED ORAL
Refills: 0 | DISCHARGE

## 2025-07-02 RX ORDER — INSULIN LISPRO 100 U/ML
12 INJECTION, SOLUTION INTRAVENOUS; SUBCUTANEOUS
Refills: 0 | DISCHARGE

## 2025-07-02 RX ORDER — LORAZEPAM 4 MG/ML
0.2 VIAL (ML) INJECTION ONCE
Refills: 0 | Status: DISCONTINUED | OUTPATIENT
Start: 2025-07-02 | End: 2025-07-02

## 2025-07-02 RX ORDER — ROSUVASTATIN CALCIUM 5 MG/1
1 TABLET, FILM COATED ORAL
Refills: 0 | DISCHARGE

## 2025-07-02 RX ORDER — LORAZEPAM 4 MG/ML
0.5 VIAL (ML) INJECTION
Qty: 0 | Refills: 0 | DISCHARGE

## 2025-07-02 RX ORDER — ALBUTEROL SULFATE 2.5 MG/3ML
2 VIAL, NEBULIZER (ML) INHALATION
Qty: 0 | Refills: 0 | DISCHARGE
Start: 2025-07-02

## 2025-07-02 RX ORDER — TELMISARTAN 20 MG/1
1 TABLET ORAL
Refills: 0 | DISCHARGE

## 2025-07-02 RX ORDER — LEVOTHYROXINE SODIUM 300 MCG
1 TABLET ORAL
Refills: 0 | DISCHARGE

## 2025-07-02 RX ADMIN — Medication 0.2 MILLIGRAM(S): at 16:45

## 2025-07-02 RX ADMIN — Medication 0.2 MILLIGRAM(S): at 14:37

## 2025-07-02 RX ADMIN — Medication 0.2 MILLIGRAM(S): at 17:00

## 2025-07-02 RX ADMIN — INSULIN GLARGINE-YFGN 5 UNIT(S): 100 INJECTION, SOLUTION SUBCUTANEOUS at 00:32

## 2025-07-02 RX ADMIN — FUROSEMIDE 40 MILLIGRAM(S): 10 INJECTION INTRAMUSCULAR; INTRAVENOUS at 13:11

## 2025-07-02 RX ADMIN — Medication 0.2 MILLIGRAM(S): at 06:19

## 2025-07-02 RX ADMIN — Medication 0.2 MILLIGRAM(S): at 10:58

## 2025-07-02 RX ADMIN — Medication 0.2 MILLIGRAM(S): at 16:00

## 2025-07-02 RX ADMIN — METOPROLOL SUCCINATE 5 MILLIGRAM(S): 50 TABLET, EXTENDED RELEASE ORAL at 06:19

## 2025-07-02 RX ADMIN — Medication 0.2 MILLIGRAM(S): at 08:02

## 2025-07-02 RX ADMIN — Medication 0.2 MILLIGRAM(S): at 01:04

## 2025-07-02 RX ADMIN — Medication 112 MICROGRAM(S): at 01:04

## 2025-07-02 RX ADMIN — FUROSEMIDE 40 MILLIGRAM(S): 10 INJECTION INTRAMUSCULAR; INTRAVENOUS at 06:19

## 2025-07-02 RX ADMIN — Medication 0.2 MILLIGRAM(S): at 07:00

## 2025-07-02 RX ADMIN — METOPROLOL SUCCINATE 5 MILLIGRAM(S): 50 TABLET, EXTENDED RELEASE ORAL at 00:33

## 2025-07-02 RX ADMIN — INSULIN LISPRO 1: 100 INJECTION, SOLUTION INTRAVENOUS; SUBCUTANEOUS at 00:32

## 2025-07-02 NOTE — PROGRESS NOTE ADULT - PROBLEM SELECTOR PLAN 1
- DNR/DNI comfort care  - PRN use in past 24 hours from 8 AM to 8 AM: IV dilaudid 0.2 mg x 4 doses, IV dilaudid 0.5 mg x 1 dose, IV ativan 0.2 mg x 1 dose  - c/w glycopyrrolate Injectable 0.4 milliGRAM(s) IV Push every 6 hours PRN Secretions  HYDROmorphone  Injectable 0.5 milliGRAM(s) IV Push every 4 hours PRN Dyspnea  HYDROmorphone  Injectable 0.2 milliGRAM(s) IV Push every 4 hours PRN Moderate Pain (4 - 6)  HYDROmorphone  Injectable 0.2 milliGRAM(s) IV Push every 4 hours PRN For breakthrough pain  LORazepam   Injectable 0.2 milliGRAM(s) IV Push every 2 hours PRN Agitation

## 2025-07-02 NOTE — CHART NOTE - NSCHARTNOTEFT_GEN_A_CORE
NUTRITION FOLLOW UP NOTE   REASON FOR ASSESSMENT: Severe Malnutrition (Follow-up)    SOURCE: [x] Medical record [x] RN/PCA   DIET PRESCRIPTION: NPO: With Ice Chips/Sips of Water (06-28-25 @ 11:50) [Active]    MEDICAL COURSE: Pt 84 yo male with PMHx of hypertension, sinus node dysfunction, type two diabetes, chronic kidney disease (baseline creatinine 1.5-2.0) admitted for lower back pain associated with lumbar compression deformities - per chart review.     NUTRITION COURSE: Pt remains NPO. Of note, Pt failed s/s and cineesophagogram with aspiration. Per chart review -> no plan for surgical enteral access; Pt DNR/DNI; Comfort Measures Only. Plan for hospice placement, pending authorization, per . Case discussed with nurse. RD remains available.      PERTINENT MEDICATIONS: MEDICATIONS  (STANDING):  albuterol    90 MICROgram(s) HFA Inhaler 2 Puff(s) Inhalation every 6 hours  albuterol/ipratropium for Nebulization 3 milliLiter(s) Nebulizer every 6 hours  dornase mayda Solution 2.5 milliGRAM(s) Inhalation daily  furosemide   Injectable 40 milliGRAM(s) IV Push two times a day  levothyroxine Injectable 112 MICROGram(s) IV Push at bedtime  lidocaine   4% Patch 1 Patch Transdermal daily  metoprolol tartrate Injectable 5 milliGRAM(s) IV Push every 6 hours  sodium chloride 3%  Inhalation 4 milliLiter(s) Inhalation every 6 hours    PERTINENT LABS:  06-28 Na139 mmol/L Glu 139 mg/dL[H] K+ 3.8 mmol/L Cr  0.84 mg/dL BUN 9 mg/dL 06-28 Phos 2.6 mg/dL  A1C with Estimated Average Glucose Result: 6.9 % (05-07-25 @ 06:03)  CAPILLARY BLOOD GLUCOSE  POCT Blood Glucose.: 131 mg/dL (02 Jul 2025 05:19)  POCT Blood Glucose.: 161 mg/dL (01 Jul 2025 23:42)  POCT Blood Glucose.: 139 mg/dL (01 Jul 2025 17:20)    ANTHROPOMETRICS:  Height: 180.3 cm/71" (05-19 @ 12:47)   Weights: 94.3 kg (daily - 6/27/25), 92.9 kg (dosing - 5/19/25)   BMI (kg/m2): 28.6 (05-19 @ 12:47),  Weight Assessment: indicative of weight gain: 1.4 kg x >1month     PHYSICAL ASSESSMENT (per flow-sheets):  Edema: 4+: R/L arm; R/L leg (7/1/25)  skin integrity: Surgical incision: abdomen;   +wound: Sacrum to b/l buttocks    NUTRITION FOCUSED PHYSICAL EXAM: [x] not applicable; Pt DNR/DNI; Comfort measures only     ESTIMATED NEEDS: [x] no change since previous assessment, based on ideal body weight: 172 lbs / 78.1 kg  estimated energy needs: 2187 - 2500 kcal daily @ 28-32 kcal/kg  estimated protein needs: 78-94 gm protein daily @ 1.0-1.2 gm/kg     PREVIOUS NUTRITION Dx: [x] Malnutrition, severe    Nutrition Diagnosis is [x] ongoing   New Nutrition Diagnosis: [x] not applicable   EDUCATION: [x] not applicable; Pt DNR/DNI; Comfort measures only     RECOMMENDATIONS/INTERVENTIONS:  Aggressive nutrition intervention may not be appropriate at resent  Consult nutrition if warranted  RD remains available

## 2025-07-02 NOTE — DISCHARGE NOTE NURSING/CASE MANAGEMENT/SOCIAL WORK - PATIENT PORTAL LINK FT
You can access the FollowMyHealth Patient Portal offered by [facility] by registering at the following website: [email or web address]. By joining [facility]’s FollowMyHealth portal, you will also be able to view your health information using other applications (apps) compatible with our system.

## 2025-07-02 NOTE — CHART NOTE - NSCHARTNOTEFT_GEN_A_CORE
Recommendations for symptom management :  Dilaudid 0.5 mg IV q2h prn dyspnea or tachypnea  Dilaudid 0.5 mg IV q2h prn severe pain and Dilaudid 0.2 mg IV q2h prn moderate pain  Ativan 0.2 mg IV q2h prn agitation   Glycopyrrolate 0.4 mg IV q6h prn copious oral secretions   Dulcolax suppository daily prn constipation   Acetaminophen suppository 650 mg q6h prn fever  Zofran 4 mg IV q8h prn nausea or vomiting     Recommend discontinuation of all medications and interventions that do not serve goal of promoting patient's comfort and dignity at end-of-life.     Please page for any acute symptoms or further questions or concerns. Paged by primary team regarding symptom management for this patient.   Pt seen 7/1 by OLVIN Guzman team, goals for comfort measures only.     Pt experiencing agiation/anxiety pulling out tubes/lines per team/RN.     Recommendations for symptom management :  Dilaudid 0.5 mg IV q2h prn dyspnea or tachypnea  Dilaudid 0.5 mg IV q2h prn severe pain and Dilaudid 0.2 mg IV q2h prn moderate pain  Ativan 0.2 mg IV q2h prn agitation   Glycopyrrolate 0.4 mg IV q6h prn copious oral secretions   Dulcolax suppository daily prn constipation   Acetaminophen suppository 650 mg q6h prn fever  Zofran 4 mg IV q8h prn nausea or vomiting     Recommend discontinuation of all medications and interventions that do not serve goal of promoting patient's comfort and dignity at end-of-life.     Please page for any acute symptoms or further questions or concerns. Paged by primary team regarding symptom management for this patient.   Pt seen 7/1 by OLVIN Guzman team, goals for comfort measures only.     Pt experiencing agitation/anxiety pulling out tubes/lines per team/RN.   Encouraged team to assess for underlying symptoms which may be causing patient anxiety/agitation such as pain or dyspnea.     Recommendations for symptom management :  Dilaudid 0.5 mg IV q2h prn dyspnea or tachypnea  Dilaudid 0.5 mg IV q2h prn severe pain and Dilaudid 0.2 mg IV q2h prn moderate pain  Ativan 0.2 mg IV q2h prn agitation   Glycopyrrolate 0.4 mg IV q6h prn copious oral secretions   Dulcolax suppository daily prn constipation   Acetaminophen suppository 650 mg q6h prn fever  Zofran 4 mg IV q8h prn nausea or vomiting     Recommend discontinuation of all medications and interventions that do not serve goal of promoting patient's comfort and dignity at end-of-life.     Please page for any acute symptoms or further questions or concerns.

## 2025-07-02 NOTE — PROGRESS NOTE ADULT - ASSESSMENT
Pt is an 85M with HTN, HLD, DM2, prostate cancer s/p prostatectomy, CKD, TIA with a pacemaker who presents transferred from [place] for further evaluation of low back pain. The pain began when he was going to the bathroom and worsened when he was in bed and rolled over. The pain is worse when he is walking or moving. He has never had back pain like this before. Patient normally ambulates with a walker or with a cane. Of note, patient endorses a fall in February where he fell into a pile of snow. Patient denies any injury after that event and did not have any imaging at that time. He usually has one BM every 3-4 days and this has not changed. Wakes up 2-3x/night to urinate. No episodes of bladder or bowel incontinence. He says he has diabetic neuropathy and has seen pain management. Says he has tried gabapentin but it made his legs very weak and he almost fell. At [place], CT showed multiple lumbar compression deformities. MRI was unable to be performed 2/2 ppm compatibility. He was transferred to [facility] for MRI and orthopedic eval.

## 2025-07-02 NOTE — CHART NOTE - NSCHARTNOTESELECT_GEN_ALL_CORE
EP Recs
EP Suture removal/Event Note
EP/Event Note
EP/Event Note
Endocrine
Event Note
Event Note
Follow up/Nutrition Services
Follow-up/Nutrition Services
Follow-up/Nutrition Services
Neurology/Event Note
Nutrition Follow Up/Nutrition Services
Nutrition Follow Up/Nutrition Services
Nutrition Services
Pocus Note
SICU Transfer
SICU transfer note
Surgery
Abdominal exam
Bedside Evaluation
Bradycardia/Event Note
Event Note
GOC - Diet
IR
Nutrition Services
Post Procedure Check/Event Note
Pre IR NOTE
follow up/Nutrition Services

## 2025-07-02 NOTE — PROGRESS NOTE ADULT - PROBLEM SELECTOR PLAN 2
- back pain 2/2 cauda equina   - CT L spine- Mild loss of height of the L4 and L5 vertebral bodies with associated   edema compatible and erosions. FINDINGS suspicious for osteomyelitis and   discitis. Correlate with ESR/CRP. Edema in the bilateral paraspinal musculature, right psoas musculature,   and presacral space may be infectious/inflammatory. Severe spinal canal stenosis at L3-L4 and L4-L5 with impingement of the cauda equina at these levels.  - Was unable to have surgery due to multiple medical issues

## 2025-07-02 NOTE — PROGRESS NOTE ADULT - PROBLEM SELECTOR PLAN 4
For GOC and symptom management   Pending transfer to facility, f/u SW     In the event of worsening symptoms, please contact the Palliative Medicine team via pager (if the patient is at [facility] #[phone] or if the patient is at [facility] #[phone]) The Geriatric and Palliative Medicine service has coverage 24 hours a day/ 7 days a week to provide medical recommendations regarding symptom management needs via telephone. For GOC and symptom management   Patient accepted for transfer today to facility for comfort care. Discussed with primary team     In the event of worsening symptoms, please contact the Palliative Medicine team via pager (if the patient is at [facility] #[phone] or if the patient is at [facility] #[phone]) The Geriatric and Palliative Medicine service has coverage 24 hours a day/ 7 days a week to provide medical recommendations regarding symptom management needs via telephone.

## 2025-07-02 NOTE — PROGRESS NOTE ADULT - ASSESSMENT
85 year old man with multiple chronic medical comorbidities including hypertension, sinus node dysfunction, type two diabetes, and chronic kidney disease (baseline creatinine 1.5-2.0) presently admitted for lower back pain associated with lumbar compression deformities with concern for cauda equina impingement. He was found to be bacteremic (E faecalis) due to acute acalculous cholecystitis, and is now seen following laparoscopic cholecystectomy with Dr. Triny Verde 6/5/2025. POD 1 patient became hypotensive, unresponsive to 2L fluid resuscitation and 3 u pRBC and was started on vasopressors and transferred to SICU.Patient weaned off pressors, course c/b ileus now resolved, Endocrinology was consulted for management of diabetes mellitus.    # Type 2 Diabetes Mellitus  - HbA1c 6.9%   - home regimen:  lantus 24 units qhs, admelog 12 units + tradjenta 5 mg    Inpatient plan   - FS goal 100-180 mg/dl : At goal. Accepted to hospice/palliative care.  - Continue Lantus 5 units SQ at bedtime   - Continue low Admelog correctional scale q6   - FS TID AC & HS ---> q6 if NPO   - hypoglycemia protocol PRN   - Please inform endocrine team if patient starts oral / nonoral diet for recommendations.     Discharge plan:  - per family, plan to discharge to Hospice/Palliative   - Continue Lantus 5 units at bedtime.  - f/u with Dr. Cates    # Hypertension  - BP goal <130/80  - Continue metoprolol  - Management as per primary team    # Hyperlipidemia  - LDL goal <70   - management per primary team     # hypothyroidism  - TSH 2.265 WNL  - TSH 2.92 at goal today  - Patient on levothyroxine 150 mcg daily at home  - continue Levothyroxine 112 mcg IV daily    Discharge: Continue  Levothyroxine 112 mcg IV daily on discharge.     Endocrine to sign off at this time given palliative/hospice care.     Ramakrishna Dubose [facility]-BC  Nurse Practitioner  Division of Endocrinology  Contact on TEAMS    If out of hospital/unavailable when paged, please note: patient will be cared for by another provider on the endocrine service.  For urgent concerns: call the endocrine answering service for assistance to reach covering provider ([phone]). For non-urgent matters: please email [email or web address] for assistance.   85 year old man with multiple chronic medical comorbidities including hypertension, sinus node dysfunction, type two diabetes, and chronic kidney disease (baseline creatinine 1.5-2.0) presently admitted for lower back pain associated with lumbar compression deformities with concern for cauda equina impingement. He was found to be bacteremic (E faecalis) due to acute acalculous cholecystitis, and is now seen following laparoscopic cholecystectomy with Dr. Triny Verde 6/5/2025. POD 1 patient became hypotensive, unresponsive to 2L fluid resuscitation and 3 u pRBC and was started on vasopressors and transferred to SICU.Patient weaned off pressors, course c/b ileus now resolved, Endocrinology was consulted for management of diabetes mellitus.    # Type 2 Diabetes Mellitus  - HbA1c 6.9%   - home regimen:  lantus 24 units qhs, admelog 12 units + tradjenta 5 mg    Inpatient plan   - FS goal 100-180 mg/dl : At goal. Accepted to hospice/palliative care.  - Continue Lantus 5 units SQ at bedtime   - Continue low Admelog correctional scale q6   - FS TID AC & HS ---> q6 if NPO   - hypoglycemia protocol PRN   - Please inform endocrine team if patient starts oral / nonoral diet for recommendations.     Discharge plan:  - per family, plan to discharge to Hospice/Palliative   - Continue Lantus 5 units at bedtime.  - f/u with Dr. Cates    # Hypertension  - BP goal <130/80  - Continue metoprolol  - Management as per primary team    # Hyperlipidemia  - LDL goal <70   - management per primary team     # hypothyroidism  - TSH 2.265 WNL  - TSH 2.92 at goal today  - Patient on levothyroxine 150 mcg daily at home  - continue Levothyroxine 112 mcg IV daily    Discharge: Continue  Levothyroxine 112 mcg IV daily on discharge.     Endocrine to sign off at this time given palliative/hospice care. D/w ACP Real Smith.     BRET Rodriges-BC  Nurse Practitioner  Division of Endocrinology  Contact on TEAMS    If out of hospital/unavailable when paged, please note: patient will be cared for by another provider on the endocrine service.  For urgent concerns: call the endocrine answering service for assistance to reach covering provider ([phone]). For non-urgent matters: please email [email or web address] for assistance.

## 2025-07-02 NOTE — PROGRESS NOTE ADULT - ASSESSMENT
-EKG w/ SR, no significant STT changes   -ECHO w/ normal EF no significant valvular heart disease     A/P:  85M w/ HTN, HLD, T2DM, prostate CA, CKD, TIA and SND s/p PPM transferred here for lumbar spinal cord compression and bacteremia s/p PPM explant and implant of Micra PPM.    1. bacteremia  -s/p cholecystectomy  -c/w abx per ID, primary team  6/13 afebrile, continues on abx per ID  6/19 c/w abx per ID  6/24 c/w Ampicillin to 6/25 6/25 remains afebrile, t&t per ID  6//26 failed s/s eval, pending barium swallow    2. edema  -s/p leadless PPM w/ EP  6/16 recommend IV lasix 40mg daily for generalized edema  6/17 mild anarsaca, c/w diuresing lasix 40mg IV BID  6/30 c/w diuresing lasix 40mg IV BID  7/1 would recommend decreasing lasix to 40mg daily  7/2 pending transfer to hospice, can continue lasix at family/primary discretion    3. HTN  -c/w clonidine, hydralazine, labetalol    4.  Pre-procedure clearance  6/17 pt optimized from cardiac srtandpoint and may proceed with cholecystostomy tube with moderate risk  6/18 s/p IR drain placement, remain hemodynamically stable    5. hypoxia  6/20 - admitted to SICU, CTA no PE; bipap, pulm toilet, diamox   6/23 - off bipap, continues with lasix IV 20mg q12 can increase to lasix IV 40mg BID sec to worsening LE edema , check K twice daily and replace  6/27 - reported with slow HR on pulse ox; placed on tele, telemetry and EKG reviewed, SR with PVCs, bigeminy (rate not detected on pulse ox), pt not bradycardic

## 2025-07-02 NOTE — PROGRESS NOTE ADULT - NS ATTEST RISK PROBLEM GEN_ALL_CORE FT
Patient is seriously ill with life threatening acute hypoxemic respiratory failure   High risk of complications, further morbidity and mortality   IV controlled substances

## 2025-07-02 NOTE — PROGRESS NOTE ADULT - PROVIDER SPECIALTY LIST ADULT
Cardiology
Endocrinology
Infectious Disease
Orthopedics
SICU
Surgery
Cardiology
Endocrinology
Infectious Disease
Infectious Disease
Intervent Radiology
Orthopedics
SICU
Surgery
Cardiology
Electrophysiology
Electrophysiology
Endocrinology
Infectious Disease
Internal Medicine
Internal Medicine
Orthopedics
Palliative Care
SICU
Surgery
Cardiology
Infectious Disease
Orthopedics
SICU
SICU
Surgery
Cardiology
Endocrinology
SICU
Surgery
Surgery
Internal Medicine
Pulmonology
Endocrinology
Endocrinology
Palliative Care
Endocrinology
Internal Medicine
Internal Medicine
Endocrinology
Endocrinology
Internal Medicine
Hospitalist
Internal Medicine
Internal Medicine
Palliative Care
Internal Medicine
Palliative Care
Internal Medicine

## 2025-07-02 NOTE — PROGRESS NOTE ADULT - NUTRITIONAL ASSESSMENT
This patient has been assessed with a concern for Malnutrition and has been determined to have a diagnosis/diagnoses of Severe protein-calorie malnutrition.    This patient is being managed with:   Diet Clear Liquid-  Consistent Carbohydrate {Evening Snack} (CSTCHOSN)  Entered: Jun 19 2025  9:34AM  
This patient has been assessed with a concern for Malnutrition and has been determined to have a diagnosis/diagnoses of Severe protein-calorie malnutrition.    This patient is being managed with:   Diet NPO-  Entered: Jun 21 2025  1:17PM  
This patient has been assessed with a concern for Malnutrition and has been determined to have a diagnosis/diagnoses of Severe protein-calorie malnutrition.    This patient is being managed with:   Diet NPO-  Entered: Jun 21 2025  1:17PM  
This patient has been assessed with a concern for Malnutrition and has been determined to have a diagnosis/diagnoses of Severe protein-calorie malnutrition.    This patient is being managed with:   Diet NPO-  With Ice Chips/Sips of Water  Entered: Jun 28 2025 11:50AM  
This patient has been assessed with a concern for Malnutrition and has been determined to have a diagnosis/diagnoses of Severe protein-calorie malnutrition.    This patient is being managed with:   Diet NPO-  Entered: Jun 21 2025  1:17PM  
This patient has been assessed with a concern for Malnutrition and has been determined to have a diagnosis/diagnoses of Severe protein-calorie malnutrition.    This patient is being managed with:   Diet NPO-  Entered: Jun 21 2025  1:17PM  
This patient has been assessed with a concern for Malnutrition and has been determined to have a diagnosis/diagnoses of Severe protein-calorie malnutrition.    This patient is being managed with:   Diet NPO-  Except Medications  Entered: Jun 20 2025  5:04AM  
This patient has been assessed with a concern for Malnutrition and has been determined to have a diagnosis/diagnoses of Severe protein-calorie malnutrition.    This patient is being managed with:   Diet NPO-  Entered: Jun 16 2025  9:05PM  
This patient has been assessed with a concern for Malnutrition and has been determined to have a diagnosis/diagnoses of Severe protein-calorie malnutrition.    This patient is being managed with:   Diet NPO-  Entered: Jun 21 2025  1:17PM  
This patient has been assessed with a concern for Malnutrition and has been determined to have a diagnosis/diagnoses of Severe protein-calorie malnutrition.    This patient is being managed with:   Diet NPO-  With Ice Chips/Sips of Water  Entered: Jun 28 2025 11:50AM  
This patient has been assessed with a concern for Malnutrition and has been determined to have a diagnosis/diagnoses of Severe protein-calorie malnutrition.    This patient is being managed with:   Diet Clear Liquid-  Kevin(7 Gm Arginine/7 Gm Glut/1.2 Gm HMB     Qty per Day:  2  Liquid Protein Supplement     Qty per Day:  2  Supplement Feeding Modality:  Oral  Ensure Clear Cans or Servings Per Day:  1       Frequency:  Daily  Entered: Jun 11 2025  4:10PM  
This patient has been assessed with a concern for Malnutrition and has been determined to have a diagnosis/diagnoses of Severe protein-calorie malnutrition.    This patient is being managed with:   Diet NPO-  Entered: Jun 21 2025  1:17PM  
This patient has been assessed with a concern for Malnutrition and has been determined to have a diagnosis/diagnoses of Severe protein-calorie malnutrition.    This patient is being managed with:   Diet NPO-  Entered: Jun 21 2025  1:17PM  
This patient has been assessed with a concern for Malnutrition and has been determined to have a diagnosis/diagnoses of Severe protein-calorie malnutrition.    This patient is being managed with:   Diet NPO-  With Ice Chips/Sips of Water  Entered: Jun 28 2025 11:50AM  
This patient has been assessed with a concern for Malnutrition and has been determined to have a diagnosis/diagnoses of Severe protein-calorie malnutrition.    This patient is being managed with:   Diet NPO-  Entered: Jun 21 2025  1:17PM  
This patient has been assessed with a concern for Malnutrition and has been determined to have a diagnosis/diagnoses of Severe protein-calorie malnutrition.    This patient is being managed with:   Diet NPO-  Entered: Jun 21 2025  1:17PM  
This patient has been assessed with a concern for Malnutrition and has been determined to have a diagnosis/diagnoses of Severe protein-calorie malnutrition.    This patient is being managed with:   Diet NPO-  Except Medications  Entered: Jun 20 2025  5:04AM  
This patient has been assessed with a concern for Malnutrition and has been determined to have a diagnosis/diagnoses of Severe protein-calorie malnutrition.    This patient is being managed with:   Diet NPO-  Entered: Jun 21 2025  1:17PM  
This patient has been assessed with a concern for Malnutrition and has been determined to have a diagnosis/diagnoses of Severe protein-calorie malnutrition.  Diet, NPO (06-21-25 @ 13:18) [Active]
Diet, NPO:   With Ice Chips/Sips of Water (06-28-25 @ 11:50) [Active]
This patient has been assessed with a concern for Malnutrition and has been determined to have a diagnosis/diagnoses of Severe protein-calorie malnutrition.    This patient is being managed with:   Diet NPO-  Entered: Jun 16 2025  9:05PM  
This patient has been assessed with a concern for Malnutrition and has been determined to have a diagnosis/diagnoses of Severe protein-calorie malnutrition.  Diet, NPO (06-21-25 @ 13:18) [Active]
This patient has been assessed with a concern for Malnutrition and has been determined to have a diagnosis/diagnoses of Severe protein-calorie malnutrition.  Diet, NPO (06-21-25 @ 13:18) [Active]
This patient has been assessed with a concern for Malnutrition and has been determined to have a diagnosis/diagnoses of Severe protein-calorie malnutrition.    This patient is being managed with:   Diet NPO-  Except Medications  Entered: Jun 20 2025  5:04AM  
This patient has been assessed with a concern for Malnutrition and has been determined to have a diagnosis/diagnoses of Severe protein-calorie malnutrition.    This patient is being managed with:   Diet NPO-  Entered: Jun 16 2025  9:05PM  
This patient has been assessed with a concern for Malnutrition and has been determined to have a diagnosis/diagnoses of Severe protein-calorie malnutrition.    This patient is being managed with:   Diet NPO-  Entered: Jun 21 2025  1:17PM

## 2025-07-02 NOTE — PROGRESS NOTE ADULT - SUBJECTIVE AND OBJECTIVE BOX
TEAM [ B ] Surgery Daily Progress Note   B Team Surgery [id number]  =====================================================    SUBJECTIVE: Patient seen and examined at bedside on AM rounds. Ativan given overnight for agitation. Patient without complaints. Permissive comfort feeds per GOC, denies nausea, vomiting. Pt pending transfer for hospice.  ALLERGIES:  gabapentin (Other)      --------------------------------------------------------------------------------------    MEDICATIONS:    Neurologic Medications  acetaminophen   IVPB .. 1000 milliGRAM(s) IV Intermittent every 6 hours PRN Mild Pain (1 - 3)  HYDROmorphone  Injectable 0.2 milliGRAM(s) IV Push every 4 hours PRN For breakthrough pain  HYDROmorphone  Injectable 0.5 milliGRAM(s) IV Push every 4 hours PRN Dyspnea  HYDROmorphone  Injectable 0.2 milliGRAM(s) IV Push every 4 hours PRN Moderate Pain (4 - 6)  LORazepam   Injectable 0.2 milliGRAM(s) IV Push every 2 hours PRN Agitation    Respiratory Medications  albuterol    90 MICROgram(s) HFA Inhaler 2 Puff(s) Inhalation every 6 hours  albuterol/ipratropium for Nebulization 3 milliLiter(s) Nebulizer every 6 hours  dornase mayda Solution 2.5 milliGRAM(s) Inhalation daily  sodium chloride 3%  Inhalation 4 milliLiter(s) Inhalation every 6 hours    Cardiovascular Medications  furosemide   Injectable 40 milliGRAM(s) IV Push two times a day    Gastrointestinal Medications  glycopyrrolate Injectable 0.4 milliGRAM(s) IV Push every 6 hours PRN Secretions    Genitourinary Medications    Hematologic/Oncologic Medications    Antimicrobial/Immunologic Medications    Endocrine/Metabolic Medications  levothyroxine Injectable 112 MICROGram(s) IV Push at bedtime    Topical/Other Medications  lidocaine   4% Patch 1 Patch Transdermal daily    --------------------------------------------------------------------------------------    VITAL SIGNS:  T(C): 36.9 (07-02-25 @ 06:19), Max: 36.9 (07-02-25 @ 06:19)  HR: 74 (07-02-25 @ 06:19) (74 - 79)  BP: 165/56 (07-02-25 @ 06:19) (132/105 - 165/56)  RR: 18 (07-02-25 @ 06:19) (18 - 18)  SpO2: 93% (07-02-25 @ 06:19) (89% - 93%)  --------------------------------------------------------------------------------------    EXAM    General: NAD, resting in bed comfortably.  Cardiac: regular rate, warm and well perfused  Respiratory: Nonlabored respirations, normal cw expansion.  Abdomen: soft, nontender, nondistended. ___ incision is c/d/i, ostomy, NGT, judith.   Extremities: normal strength, FROM, no deformities    --------------------------------------------------------------------------------------    LABS    --------------------------------------------------------------------------------------    INS AND OUTS:    07-01-25 @ 07:01  -  07-02-25 @ 07:00  --------------------------------------------------------  IN: 240 mL / OUT: 1600 mL / NET: -1360 mL    07-02-25 @ 07:01  -  07-02-25 @ 15:25  --------------------------------------------------------  IN: 0 mL / OUT: 500 mL / NET: -500 mL      --------------------------------------------------------------------------------------     TEAM [ B ] Surgery Daily Progress Note   B Team Surgery [id number]  =====================================================    SUBJECTIVE: Patient seen and examined at bedside on AM rounds. Ativan given overnight for agitation. Patient without complaints. Permissive comfort feeds per GOC, denies nausea, vomiting. Pt pending transfer for hospice.  ALLERGIES:  gabapentin (Other)      --------------------------------------------------------------------------------------    MEDICATIONS:    Neurologic Medications  acetaminophen   IVPB .. 1000 milliGRAM(s) IV Intermittent every 6 hours PRN Mild Pain (1 - 3)  HYDROmorphone  Injectable 0.2 milliGRAM(s) IV Push every 4 hours PRN For breakthrough pain  HYDROmorphone  Injectable 0.5 milliGRAM(s) IV Push every 4 hours PRN Dyspnea  HYDROmorphone  Injectable 0.2 milliGRAM(s) IV Push every 4 hours PRN Moderate Pain (4 - 6)  LORazepam   Injectable 0.2 milliGRAM(s) IV Push every 2 hours PRN Agitation    Respiratory Medications  albuterol    90 MICROgram(s) HFA Inhaler 2 Puff(s) Inhalation every 6 hours  albuterol/ipratropium for Nebulization 3 milliLiter(s) Nebulizer every 6 hours  dornase mayda Solution 2.5 milliGRAM(s) Inhalation daily  sodium chloride 3%  Inhalation 4 milliLiter(s) Inhalation every 6 hours    Cardiovascular Medications  furosemide   Injectable 40 milliGRAM(s) IV Push two times a day    Gastrointestinal Medications  glycopyrrolate Injectable 0.4 milliGRAM(s) IV Push every 6 hours PRN Secretions    Genitourinary Medications    Hematologic/Oncologic Medications    Antimicrobial/Immunologic Medications    Endocrine/Metabolic Medications  levothyroxine Injectable 112 MICROGram(s) IV Push at bedtime    Topical/Other Medications  lidocaine   4% Patch 1 Patch Transdermal daily    --------------------------------------------------------------------------------------    VITAL SIGNS:  T(C): 36.9 (07-02-25 @ 06:19), Max: 36.9 (07-02-25 @ 06:19)  HR: 74 (07-02-25 @ 06:19) (74 - 79)  BP: 165/56 (07-02-25 @ 06:19) (132/105 - 165/56)  RR: 18 (07-02-25 @ 06:19) (18 - 18)  SpO2: 93% (07-02-25 @ 06:19) (89% - 93%)  --------------------------------------------------------------------------------------    EXAM    General: NAD, resting in bed comfortably.  Cardiac: regular rate, warm and well perfused  Respiratory: On NC, cough, copious secretions  Abdomen: soft, nontender, nondistended.      --------------------------------------------------------------------------------------    LABS    --------------------------------------------------------------------------------------    INS AND OUTS:    07-01-25 @ 07:01  -  07-02-25 @ 07:00  --------------------------------------------------------  IN: 240 mL / OUT: 1600 mL / NET: -1360 mL    07-02-25 @ 07:01  -  07-02-25 @ 15:25  --------------------------------------------------------  IN: 0 mL / OUT: 500 mL / NET: -500 mL      --------------------------------------------------------------------------------------

## 2025-07-02 NOTE — DISCHARGE NOTE NURSING/CASE MANAGEMENT/SOCIAL WORK - FINANCIAL ASSISTANCE
[facility] provides services at a reduced cost to those who are determined to be eligible through [facility]’s financial assistance program. Information regarding [facility]’s financial assistance program can be found by going to [email or web address] or by calling [phone].

## 2025-07-02 NOTE — PROGRESS NOTE ADULT - REASON FOR ADMISSION
back pain

## 2025-07-02 NOTE — PROGRESS NOTE ADULT - ASSESSMENT
85 M w/ multiple chronic medical comorbidities including hypertension, sinus node dysfunction, type two diabetes, and chronic kidney disease (baseline creatinine 1.5-2.0) presently admitted for lower back pain associated with lumbar compression deformities with concern for cauda equina impingement. He was found to be bacteremic (E faecalis) due to acute acalculous cholecystitis, s/p removal of pacemaker, and is now seen following laparoscopic cholecystectomy 6/5/2025. POD 1 patient became hypotensive, unresponsive to 2L fluid resuscitation and 3 u pRBC and was started on vasopressors and transferred to SICU. Patient weaned off pressors, course c/b ileus now resolved, NGT removed 6/10. Midline placed 6/12 for IV abx, patient now with ileus again ct scanned 6/16 found to have collection in gb fossa, drained by IR on 6/17 patient was a rapid for hypotension and hypoxia requiring sicu admission, patient diuresed with lasix gtt and diamox now improved and patient was transferred to the floor 6/24. IR drain removed, now pending hopsice placement.     Plan:   - continue 40mg bid lasix for diuresis -  f/u cardiology recs  - DVT ppx: sqh completed  - pain control  - failed s/s and cineesophagogram with aspiration, pleasure feeds per GOC   - will be d/c'd with wong (hx cauda equina)   - no plan for surgical enteral access per GOC   - palliative eval appreciated, focus on symptom directed management for dyspnea and secretions  - dispo: hospice   - protected sleep   - Ativan prn for agitation    B team   [id number]

## 2025-07-02 NOTE — DISCHARGE NOTE NURSING/CASE MANAGEMENT/SOCIAL WORK - NSDCFUADDAPPT_GEN_ALL_CORE_FT
Please follow up with you  1: PCP  2: Electrophysiology  3: Infectious disease  4: Orthopedic surgery

## 2025-07-02 NOTE — PROGRESS NOTE ADULT - SUBJECTIVE AND OBJECTIVE BOX
Chief Complaint: T2DM    Interval Events: Pt seen and examined at bedside earlier today.  Daughters at bedside. Pt now on comfort care-awaiting hospice/palliative facility placement. Pt received ativan today and sleeping. Comfort feeds allowed.     MEDICATIONS  (STANDING):  albuterol    90 MICROgram(s) HFA Inhaler 2 Puff(s) Inhalation every 6 hours  albuterol/ipratropium for Nebulization 3 milliLiter(s) Nebulizer every 6 hours  dornase mayda Solution 2.5 milliGRAM(s) Inhalation daily  furosemide   Injectable 40 milliGRAM(s) IV Push two times a day  levothyroxine Injectable 112 MICROGram(s) IV Push at bedtime  lidocaine   4% Patch 1 Patch Transdermal daily  metoprolol tartrate Injectable 5 milliGRAM(s) IV Push every 6 hours  sodium chloride 3%  Inhalation 4 milliLiter(s) Inhalation every 6 hours    MEDICATIONS  (PRN):  acetaminophen   IVPB .. 1000 milliGRAM(s) IV Intermittent every 6 hours PRN Mild Pain (1 - 3)  glycopyrrolate Injectable 0.4 milliGRAM(s) IV Push every 6 hours PRN Secretions  HYDROmorphone  Injectable 0.2 milliGRAM(s) IV Push every 4 hours PRN For breakthrough pain  HYDROmorphone  Injectable 0.5 milliGRAM(s) IV Push every 4 hours PRN Dyspnea  HYDROmorphone  Injectable 0.2 milliGRAM(s) IV Push every 4 hours PRN Moderate Pain (4 - 6)  LORazepam   Injectable 0.2 milliGRAM(s) IV Push every 2 hours PRN Agitation      Allergies    gabapentin (Other)    Intolerances      Review of Systems:  UNABLE TO OBTAIN      VITALS: T(C): 36.9 (07-02-25 @ 06:19)  T(F): 98.4 (07-02-25 @ 06:19), Max: 98.4 (07-02-25 @ 06:19)  HR: 74 (07-02-25 @ 06:19) (74 - 79)  BP: 165/56 (07-02-25 @ 06:19) (132/105 - 165/56)  RR:  (18 - 18)  SpO2:  (89% - 93%)  Wt(kg): --      Physical Exam:   GENERAL: appears comfortable  HEENT:  Atraumatic, Normocephalic  RESPIRATORY: non labored breathing   GI: Non distended  PSYCH: sleeping    CAPILLARY BLOOD GLUCOSE    POCT Blood Glucose.: 131 mg/dL (02 Jul 2025 05:19)  POCT Blood Glucose.: 161 mg/dL (01 Jul 2025 23:42)  POCT Blood Glucose.: 139 mg/dL (01 Jul 2025 17:20)    A1C with Estimated Average Glucose Result: 6.9 % (05-07-25 @ 06:03)  A1C with Estimated Average Glucose Result: 7.4 % (11-10-24 @ 06:55)      Thyroid Function Tests:

## 2025-07-02 NOTE — PROGRESS NOTE ADULT - PROBLEM SELECTOR PLAN 3
- Patient and Family previously agreed to DNR/DNI and no feeding tube  - Patient maintains wish for no feeding tube  - Recommend primary team to f/u with family, who wish to deescalate certain medical interventions. Recommend to DC IVF, review other orders with family  - 7/1- Family agreed to comfort care

## 2025-07-02 NOTE — PROGRESS NOTE ADULT - SUBJECTIVE AND OBJECTIVE BOX
Timothy Mojica MD  Interventional Cardiology / Advance Heart Failure and Cardiac Transplant Specialist  [place] Office : [phone] [place] [id number]  Tel:   [place] Office : [phone] [facility] [address] [id number]  Tel: [phone]       Pt is pending transfer to hospice facility  	  MEDICATIONS:  furosemide   Injectable 40 milliGRAM(s) IV Push two times a day      albuterol    90 MICROgram(s) HFA Inhaler 2 Puff(s) Inhalation every 6 hours  albuterol/ipratropium for Nebulization 3 milliLiter(s) Nebulizer every 6 hours  dornase mayda Solution 2.5 milliGRAM(s) Inhalation daily  sodium chloride 3%  Inhalation 4 milliLiter(s) Inhalation every 6 hours    acetaminophen   IVPB .. 1000 milliGRAM(s) IV Intermittent every 6 hours PRN  HYDROmorphone  Injectable 0.5 milliGRAM(s) IV Push every 4 hours PRN  HYDROmorphone  Injectable 0.2 milliGRAM(s) IV Push every 4 hours PRN  HYDROmorphone  Injectable 0.2 milliGRAM(s) IV Push every 4 hours PRN  LORazepam   Injectable 0.2 milliGRAM(s) IV Push every 2 hours PRN    glycopyrrolate Injectable 0.4 milliGRAM(s) IV Push every 6 hours PRN      lidocaine   4% Patch 1 Patch Transdermal daily      PAST MEDICAL/SURGICAL HISTORY  PAST MEDICAL & SURGICAL HISTORY:  HTN (hypertension)      HLD (hyperlipidemia)      DM (diabetes mellitus)      TIA (transient ischemic attack)      Prostate cancer      S/P prostatectomy      Pacemaker      H/O thyroidectomy          SOCIAL HISTORY: Substance Use (street drugs): ( x ) never used  (  ) other:    FAMILY HISTORY:  FHx: heart disease (Father, Mother)          PHYSICAL EXAM:  T(C): 36.9 (07-02-25 @ 06:19), Max: 36.9 (07-02-25 @ 06:19)  HR: 74 (07-02-25 @ 06:19) (74 - 79)  BP: 165/56 (07-02-25 @ 06:19) (132/105 - 165/56)  RR: 18 (07-02-25 @ 06:19) (18 - 18)  SpO2: 93% (07-02-25 @ 06:19) (89% - 93%)  Wt(kg): --  I&O's Summary    01 Jul 2025 07:01  -  02 Jul 2025 07:00  --------------------------------------------------------  IN: 240 mL / OUT: 1600 mL / NET: -1360 mL    02 Jul 2025 07:01  -  02 Jul 2025 16:03  --------------------------------------------------------  IN: 0 mL / OUT: 500 mL / NET: -500 mL          GENERAL: somnolent  ENMT:   Moist mucous membranes  Cardiovascular: Normal S1 S2, No JVD, No murmurs, b/l UE edema  Respiratory: Lungs clear to auscultation	  Gastrointestinal:  Soft, Non-tender, + BS	  Extremities: b/l UE edema                      proBNP:   Lipid Profile:   HgA1c:   TSH:     Consultant(s) Notes Reviewed:  [x ] YES  [ ] NO    Care Discussed with Consultants/Other Providers [ x] YES  [ ] NO    Imaging Personally Reviewed independently:  [x] YES  [ ] NO    All labs, radiologic studies, vitals, orders and medications list reviewed. Patient is seen and examined at bedside. Case discussed with medical team.

## 2025-07-02 NOTE — PROGRESS NOTE ADULT - SUBJECTIVE AND OBJECTIVE BOX
Indication of Geriatrics and Palliative Medicine Services:  [X  ] Complex Medical Decision Making   [X  ] Symptom/Pain management     DNR on chart: Yes     INTERVAL EVENTS: Last night patient received IV ativan for agitation. Patient seen this PM, comfortable in no distress, somnolent recently received IV ativan, NC off now as well. Daughters at bedside.     -------------------------------------------------------------------------------------------------------    PRESENT SYMPTOMS:     [ ] Unable to self-report      [ ] PAINADS     [ ] RDOS    [ ] No     [X ] Yes     Source if other than patient:  [ ]Family   [ ]Team     PAIN:   If blank, patient unable to specify     [X ]yes [ ]no    1. Location- Back   2. Radiation-  Legs   3. Quality- Sharp   4. Timing- On and off   5. Minimal acceptable level/pain goal- 3/10   6. Aggravating factors- Movement   7. QOL impact- Moderate     SYMPTOMS:   Dyspnea:                           [ ]Mild [ ]Moderate [ ]Severe  Anxiety:                             [ ]Mild [ ]Moderate [ ]Severe  Fatigue:                             [ ]Mild [ ]Moderate [ ]Severe  Nausea/Vomiting:              [ ]Mild [ ]Moderate [ ]Severe  Loss of appetite:                [ ]Mild [ ]Moderate [ ]Severe  Constipation:                     [ ]Mild [ ]Moderate [ ]Severe    Other Symptoms:  [X ]All other review of systems negative     -------------------------------------------------------------------------------------------------------    I STOP: [id number]    -------------------------------------------------------------------------------------------------------    ITEMS UNCHECKED ARE NOT PRESENT    PHYSICAL:  Vital Signs Last 24 Hrs  T(C): 36.9 (02 Jul 2025 06:19), Max: 36.9 (02 Jul 2025 06:19)  T(F): 98.4 (02 Jul 2025 06:19), Max: 98.4 (02 Jul 2025 06:19)  HR: 74 (02 Jul 2025 06:19) (74 - 79)  BP: 165/56 (02 Jul 2025 06:19) (132/105 - 165/56)  BP(mean): --  RR: 18 (02 Jul 2025 06:19) (18 - 18)  SpO2: 93% (02 Jul 2025 06:19) (89% - 93%)    Parameters below as of 02 Jul 2025 06:19  Patient On (Oxygen Delivery Method): nasal cannula  O2 Flow (L/min): 4    GENERAL:  [ ]Cachexia  [X ] Frail  [ ]Awake  [ ]Oriented   [X ]Lethargic  [ ]Unarousable  [ ]Verbal  [ ]Non-Verbal    BEHAVIORAL:   [ ] Anxiety  [ ] Delirium [ ] Agitation [ ] Other    HEENT:   [X ]Normal   [ ]Dry mouth   [ ]ET Tube/Trach  [ ]Oral lesions    PULMONARY:   [ ]Clear              [ ]Tachypnea  [X ]Audible excessive secretions   [ ]Rhonchi        [ ]Right [ ]Left [ ]Bilateral  [ ]Crackles        [ ]Right [ ]Left [ ]Bilateral  [ ]Wheezing     [ ]Right [ ]Left [ ]Bilateral  [X ]Diminished breath sounds [ ]right [ ]left [X ]bilateral    CARDIOVASCULAR:    [X ]Regular [ ]Irregular [ ]Tachy  [ ]Jurgen [ ]Murmur [ ]Other    GASTROINTESTINAL:  [X ]Soft  [ ]Distended   [ ]+BS  [X ]Non tender [ ]Tender  [ ]Other [ ]PEG [ ]OGT/ NGT      GENITOURINARY:  [ ]Normal [ ] Incontinent   [ ]Oliguria/Anuria   [X ]Frye    MUSCULOSKELETAL:   [ ]Normal   [ ]Weakness  [ ]Bed/Wheelchair bound [X ]Edema    NEUROLOGIC:   [X ]No focal deficits  [ ]Cognitive impairment  [ ]Dysphagia [ ]Dysarthria [ ]Paresis [ ]Other     SKIN:   [X ]Normal  [ ]Rash  [ ]Other  [ ]Pressure ulcer(s)       Present on admission [ ]y [ ]n    -------------------------------------------------------------------------------------------------------    LABS:             -------------------------------------------------------------------------------------------------------    CRITICAL CARE:  [ ]Shock Present  [ ]Septic [ ]Cardiogenic [ ]Neurologic [ ]Hypovolemic [ ]Undifferentiated    [ ]Vasopressors [ ]Inotropes    [ ]Respiratory failure present [ ]Acute  [ ]Chronic [ ]Hypoxic  [ ]Hypercarbic [ ]Mixed   [ ]Mechanical Ventilation  [ ]Trach collar   [ ]Non-invasive ventilatory support   [ ]High-Flow   [ ]Oxygen mask/venti     [ ]Other organ failure     -------------------------------------------------------------------------------------------------------    RADIOLOGY & ADDITIONAL STUDIES:     < from: Xray Chest 1 View- PORTABLE-Routine (Xray Chest 1 View- PORTABLE-Routine in AM.) (06.23.25 @ 01:59) >    Frontal expiratory view of the chest shows the heart to be similar in   size.    The lungs show partial clearing of the right base with progression of   lower left lung infiltrate. Small left pleural effusion is present and   there is no evidence of pneumothorax nor right pleural effusion.    IMPRESSION:  Progression of left infiltrate/effusion.    < end of copied text >    -------------------------------------------------------------------------------------------------------  MEDICATIONS:     MEDICATIONS  (STANDING):  albuterol    90 MICROgram(s) HFA Inhaler 2 Puff(s) Inhalation every 6 hours  albuterol/ipratropium for Nebulization 3 milliLiter(s) Nebulizer every 6 hours  dornase mayda Solution 2.5 milliGRAM(s) Inhalation daily  furosemide   Injectable 40 milliGRAM(s) IV Push two times a day  levothyroxine Injectable 112 MICROGram(s) IV Push at bedtime  lidocaine   4% Patch 1 Patch Transdermal daily  sodium chloride 3%  Inhalation 4 milliLiter(s) Inhalation every 6 hours    MEDICATIONS  (PRN):  acetaminophen   IVPB .. 1000 milliGRAM(s) IV Intermittent every 6 hours PRN Mild Pain (1 - 3)  glycopyrrolate Injectable 0.4 milliGRAM(s) IV Push every 6 hours PRN Secretions  HYDROmorphone  Injectable 0.5 milliGRAM(s) IV Push every 4 hours PRN Dyspnea  HYDROmorphone  Injectable 0.2 milliGRAM(s) IV Push every 4 hours PRN Moderate Pain (4 - 6)  HYDROmorphone  Injectable 0.2 milliGRAM(s) IV Push every 4 hours PRN For breakthrough pain  LORazepam   Injectable 0.2 milliGRAM(s) IV Push every 2 hours PRN Agitation

## 2025-07-16 LAB
CULTURE RESULTS: SIGNIFICANT CHANGE UP
SPECIMEN SOURCE: SIGNIFICANT CHANGE UP

## (undated) DEVICE — VENODYNE/SCD SLEEVE CALF MEDIUM

## (undated) DEVICE — XI 12MM AND STAPLER CANNULA SEAL

## (undated) DEVICE — XI ARM FORCEP FENESTRATED BIPOLAR 8MM

## (undated) DEVICE — SOL IRR POUR H2O 250ML

## (undated) DEVICE — SUT MONOCRYL 4-0 27" PS-2 UNDYED

## (undated) DEVICE — XI SEAL UNIVERSIAL 5-12MM

## (undated) DEVICE — XI SCISSOR TIP COVER

## (undated) DEVICE — POSITIONER FOAM HEAD CRADLE (PINK)

## (undated) DEVICE — POSITIONER FOAM EGG CRATE ULNAR 2PCS (PINK)

## (undated) DEVICE — PACK ROBOTIC

## (undated) DEVICE — XI ENDOWRIST SUCTION IRRIGATOR 8MM

## (undated) DEVICE — XI ENDOWRIST 12 - 8 MM CANNULA REDUCER

## (undated) DEVICE — Device

## (undated) DEVICE — VENODYNE/SCD SLEEVE FOOT

## (undated) DEVICE — WARMING BLANKET UPPER ADULT

## (undated) DEVICE — APPLICATOR SURGICEL LAP TROCAR POINT 2.5MM X 150MM

## (undated) DEVICE — D HELP - CLEARVIEW CLEARIFY SYSTEM

## (undated) DEVICE — TUBING STRYKEFLOW II SUCTION / IRRIGATOR

## (undated) DEVICE — WARMING BLANKET LOWER ADULT

## (undated) DEVICE — POSITIONER PURPLE ARM ONE STEP (LARGE)

## (undated) DEVICE — PREP CHLORAPREP HI-LITE ORANGE 26ML

## (undated) DEVICE — TUBING SUCTION 20FT

## (undated) DEVICE — SOL IRR POUR NS 0.9% 500ML

## (undated) DEVICE — SUT VICRYL 0 27" UR-6

## (undated) DEVICE — DRAPE WARMING SOLUTION 44 X 44"

## (undated) DEVICE — XI ARM CLIP APPLIER MEDIUM-LARGE

## (undated) DEVICE — BLADE SCALPEL SAFETYLOCK #10

## (undated) DEVICE — XI OBTURATOR OPTICAL BLADELESS 8MM

## (undated) DEVICE — XI ARM FORCEP PROGRASP 8MM

## (undated) DEVICE — XI ARM CLIP APPLIER LARGE

## (undated) DEVICE — GLV 7.5 PROTEXIS (WHITE)

## (undated) DEVICE — ENDOCATCH 10MM

## (undated) DEVICE — ELCTR BOVIE PENCIL SMOKE EVACUATION

## (undated) DEVICE — XI ARM PERMANENT CAUTERY HOOK

## (undated) DEVICE — XI ARM SCISSOR ROUND TIP 8MM

## (undated) DEVICE — XI ARM SCISSOR MONO CURVED